# Patient Record
Sex: FEMALE | Race: WHITE | NOT HISPANIC OR LATINO | Employment: FULL TIME | URBAN - METROPOLITAN AREA
[De-identification: names, ages, dates, MRNs, and addresses within clinical notes are randomized per-mention and may not be internally consistent; named-entity substitution may affect disease eponyms.]

---

## 2017-02-09 ENCOUNTER — ALLSCRIPTS OFFICE VISIT (OUTPATIENT)
Dept: OTHER | Facility: OTHER | Age: 57
End: 2017-02-09

## 2017-05-11 ENCOUNTER — ALLSCRIPTS OFFICE VISIT (OUTPATIENT)
Dept: OTHER | Facility: OTHER | Age: 57
End: 2017-05-11

## 2017-08-23 ENCOUNTER — TRANSCRIBE ORDERS (OUTPATIENT)
Dept: MRI IMAGING | Facility: CLINIC | Age: 57
End: 2017-08-23

## 2017-08-23 ENCOUNTER — APPOINTMENT (OUTPATIENT)
Dept: RADIOLOGY | Facility: CLINIC | Age: 57
End: 2017-08-23
Payer: MEDICARE

## 2017-08-23 DIAGNOSIS — M47.817 LUMBOSACRAL SPONDYLOSIS WITHOUT MYELOPATHY: ICD-10-CM

## 2017-08-23 DIAGNOSIS — M47.812 CERVICAL SPONDYLOSIS WITHOUT MYELOPATHY: ICD-10-CM

## 2017-08-23 DIAGNOSIS — M47.812 CERVICAL SPONDYLOSIS WITHOUT MYELOPATHY: Primary | ICD-10-CM

## 2017-08-23 PROCEDURE — 72050 X-RAY EXAM NECK SPINE 4/5VWS: CPT

## 2017-08-23 PROCEDURE — 72100 X-RAY EXAM L-S SPINE 2/3 VWS: CPT

## 2017-11-27 ENCOUNTER — GENERIC CONVERSION - ENCOUNTER (OUTPATIENT)
Dept: OTHER | Facility: OTHER | Age: 57
End: 2017-11-27

## 2017-11-27 DIAGNOSIS — Z98.1 ARTHRODESIS STATUS: ICD-10-CM

## 2017-11-27 DIAGNOSIS — R93.7 ABNORMAL FINDINGS ON DIAGNOSTIC IMAGING OF OTHER PARTS OF MUSCULOSKELETAL SYSTEM: ICD-10-CM

## 2017-11-27 DIAGNOSIS — Z48.89 ENCOUNTER FOR OTHER SPECIFIED SURGICAL AFTERCARE (CODE): ICD-10-CM

## 2017-12-12 ENCOUNTER — APPOINTMENT (OUTPATIENT)
Dept: RADIOLOGY | Facility: CLINIC | Age: 57
End: 2017-12-12
Payer: MEDICARE

## 2017-12-12 ENCOUNTER — TRANSCRIBE ORDERS (OUTPATIENT)
Dept: RADIOLOGY | Facility: CLINIC | Age: 57
End: 2017-12-12

## 2017-12-12 DIAGNOSIS — R93.7 ABNORMAL FINDINGS ON DIAGNOSTIC IMAGING OF OTHER PARTS OF MUSCULOSKELETAL SYSTEM: ICD-10-CM

## 2017-12-12 DIAGNOSIS — Z48.89 ENCOUNTER FOR OTHER SPECIFIED SURGICAL AFTERCARE (CODE): ICD-10-CM

## 2017-12-12 DIAGNOSIS — Z98.1 ARTHRODESIS STATUS: ICD-10-CM

## 2017-12-12 PROCEDURE — 72100 X-RAY EXAM L-S SPINE 2/3 VWS: CPT

## 2018-01-04 ENCOUNTER — ALLSCRIPTS OFFICE VISIT (OUTPATIENT)
Dept: OTHER | Facility: OTHER | Age: 58
End: 2018-01-04
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
## 2018-01-10 ENCOUNTER — GENERIC CONVERSION - ENCOUNTER (OUTPATIENT)
Dept: OTHER | Facility: OTHER | Age: 58
End: 2018-01-10

## 2018-01-11 NOTE — RESULT NOTES
Dear Elvis Mcghee,   Your test results have returned and are listed below:      Discussion/Summary  Your results have some minor abnormalities, but nothing that is concerning  Please keep your regularly scheduled follow-up appointment  If you do not have a follow-up scheduled, please call the office to schedule a follow-up visit  If you have any questions, please don't hesitate to call the office  Sincerely,      Signatures   Electronically signed by : ABIGAIL Spencer RD; Sep  7 2016  2:53PM EST                       (Author)    Electronically signed by :  TANIA Briggs ; Sep 13 2016  9:07AM EST

## 2018-01-11 NOTE — PROGRESS NOTES
Discussion/Summary  Discussion Summary:   Assess: 3# wt loss from previous appointment  Total 9 4# wt loss  Pt needs additional 2  1# wt loss prior to surgery  Pt has significantly decreased her pain medications  Pt takes a centrum multivitamin, 1000 IU Vitamin D3, and 250 mg Mg daily  Pt continues to food journal on paper daily  Pt continues to eat yogurt for breakfast and now includes a piece of fruit  Pt now eats poultry/seafood/meat and a salad for lunch and dinner and is eating a 40-calorie popsicle for 8:00pm snack  Pt is still working on getting up to 64 oz water per day, but is close  Pt states 30/60 rule is going very well, she is eating slowly, chewing well, and has eliminated all carbonated, caffeinated, and sugared beverages except for approx  4 oz sweet tea occasionally  Pt walks 7 days per week for varying amounts of time  Diagnose: Overweight/Obesity related to positive energy balance as evidenced by BMI >30 and pt's self-reported energy intake  (Continued-Improving)  Intervene: Congratulated pt on her hard work  Reviewed with pt the importance of adequate fruits, vegetables, fiber in diet  Discussed post-operative protein supplement and vitamin recommendations and provided samples of each  Pt will begin sampling protein drinks for her 8:00 PM snack  PT may also begin substituting a protein shake for breakfast  Pt will samples the chewable bariatric vitamins  Reviewed workflow with pt: Pt needs psych clearance (appointment completed), Bloodwork, EGD, Cardiac clearance, and precert  Pt verbalized understanding, no further questions at present, expect good compliance  Monitor/Evaluate: Will monitor food journals, weight, pt's reported compliance with goals at next appointment  Follow-up scheduled for: Wednesday, 7/27/16 at 2:00pm  Will cancel if pt gets scheduled for surgery prior to follow-up date  Active Problems    1  Abnormal x-ray of lumbar spine (793 7) (R93 7)   2   Aftercare following surgery (V58 89) (Z48 89)   3  Aftercare following surgery of the musculoskeletal system (V58 78) (Z47 89)   4  Backache (724 5) (M54 9)   5  Bilateral low back pain without sciatica (724 2) (M54 5)   6  Cellulitis of finger, unspecified laterality (681 00) (L03 019)   7  Chronic pain syndrome (338 4) (G89 4)   8  Diabetes Mellitus (250 00)   9  Digital mucinous cyst (727 43) (M67 449)   10  History of allergy (V15 09) (Z88 9)   11  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 3) (G56 31)   12  Limb pain (729 5) (M79 609)   13  Lumbar degenerative disc disease (722 52) (M51 36)   14  Lumbar post-laminectomy syndrome (722 83) (M96 1)   15  Lumbar radiculopathy (724 4) (M54 16)   16  Lumbar stenosis (724 02) (M48 06)   17  Morbid obesity due to excess calories (278 01) (E66 01)   18  Other abnormal finding of urine (791 9) (R82 99)   19  Postoperative examination (V67 00) (Z09)   20  Postsurgical arthrodesis status (V45 4) (Z98 1)   21  Removal of staple (V58 32) (Z48 02)   22  Surgery, elective (V50 9) (Z41 9)   23  UTI (lower urinary tract infection) (599 0) (N39 0)    Past Medical History    1  History of urinary tract infection (V13 02) (Z87 440)   2  History of Spinal stenosis (724 00) (M48 00)   3  History of Tachycardia (785 0) (R00 0)   4  History of Type 2 diabetes mellitus (250 00) (E11 9)    Surgical History    1  History of Appendectomy   2  History of Back Surgery   3  History of Catheter Ablation   4  History of Lower Back Surgery   5  History of Neck Surgery   6  History of Removal Of Intraspinal Neurostimulator, With Fluoroscopy   7  History of Spinal Surgery Neurostimulator Implants    Family History  Mother    1  No pertinent family history  Family History    2  Family history of Family Health Status Brother 1   3  Family history of Family Health Status Brother 2   4  Family history of Family Health Status Of Father -    11  Family history of Family Health Status Of Mother - Alive   6  Family history of Family Health Status Sister 1   7  Family history of Maternal Grandfather Is    6  Family history of Maternal Grandmother Is    5  Family history of Paternal Grandfather Is    8  Family history of Paternal Grandmother Is     Social History    · Activities   · Caffeine Use   · Educational Level - Completed [address]   · Former smoker (E74 99) (Y73 341)   · Heavy Alcohol Consumption (305 00)   · Living Independently   · Marital History -  (V61 03)   · Never Used Drugs   · Occupation:   · Sexual Activity Denied    Current Meds   1  ALPRAZolam 0 25 MG Oral Tablet; TAKE 1 TABLET EVERY 12 HOURS AS NEEDED; Therapy: 95OUY9985 to (Evaluate:60Ewy5531) Recorded   2  BuPROPion HCl ER (SR) 150 MG Oral Tablet Extended Release 12 Hour; TAKE 1   TABLET DAILY; Therapy: 12KWL6098 to Recorded   3  Cyclobenzaprine HCl - 10 MG Oral Tablet; Take 1 tablet daily; Therapy: (0498 72 13 49) to Recorded   4  Diclofenac Sodium 75 MG Oral Tablet Delayed Release; 1 PO BID; Therapy: ([employer]) to Recorded   5  Fenofibrate 160 MG Oral Tablet; TAKE 1 TABLET DAILY; Therapy: 02RNE2468 to Recorded   6  FentaNYL 25 MCG/HR Transdermal Patch 72 Hour; APPLY 1 PATCH EVERY 3 DAYS; Therapy: 74VQQ4386 to Recorded   7  Fluticasone Propionate 50 MCG/ACT Nasal Suspension; INSTILL 1 SQUIRT  PRN; Therapy: 10Frq2316 to Recorded   8  Glimepiride 2 MG Oral Tablet; TAKE 1 TABLET DAILY AS DIRECTED; Therapy: 35YZH1997 to Recorded   9  Lexapro 20 MG Oral Tablet; TAKE 1 TABLET DAILY; Therapy: (911  271) to Recorded   10  Loratadine 10 MG CAPS; 1 PO QD; Therapy: () to Recorded   11  Magnesium 200 MG Oral Tablet; TAKE 1 TABLET DAILY AS DIRECTED; Therapy: (Recorded:2015) to Recorded   12  MetFORMIN HCl - 500 MG Oral Tablet; Take 1 tablet daily; Therapy: (Recorded:2015) to Recorded   13   Neurontin 800 MG Oral Tablet; 1 PO BID; Therapy: ([phone]) to Recorded   14  OxyCODONE HCl - 15 MG Oral Tablet; Take 1-2 Tablets QID as needed; Therapy: 25JSK0314 to (76 443 107) Recorded   15  TraMADol HCl - 50 MG Oral Tablet; TAKE 1 TABLET Bedtime; Therapy: 97WYJ2236 to (Evaluate:31Dhh4220) Recorded   16  Vitamin B-12 1000 MCG Oral Tablet; 2500; Therapy: ([phone]) to Recorded   17  Vitamin B-6 500 MG Oral Tablet; 2000; Therapy: ([phone]) to Recorded   18  Vitamin D3 5000 UNIT Oral Tablet; qd;    Therapy: (VGBIZPIZ:38EGO6661) to Recorded   19  Wellbutrin  MG Oral Tablet Extended Release 24 Hour; TAKE 1 TABLET DAILY; Therapy: (Recorded:23Njz9259) to Recorded    Allergies    1  No Known Drug Allergies    Vitals  Signs [Data Includes: Current Encounter]   Recorded: 97ODC5080 03:35PM   Height: 5 ft 3 5 in  Weight: 223 lb 9 6 oz  BMI Calculated: 38 99  BSA Calculated: 2 04    Signatures   Electronically signed by : ABIGAIL LopezRD; Jun 29 2016  3:35PM EST                       (Author)    Electronically signed by :  TANIA Lucas ; Jul 7 2016  8:20AM EST

## 2018-01-11 NOTE — PROGRESS NOTES
Discussion/Summary  Discussion Summary:   Assess: 6 4# wt loss from previous visit  Pt needs 5# additional wt loss  No changes in Dx or Rx noted  Pt brought 7 days of written food journals:  B: 1 greek yogurt cup and coffee  L: roast beef sandwich or 2 hardboiled eggs  D: grilled chicken or 2 hot dogs  S: fruit plate, water  Pt c/o some instances of significant hunger at night  Pt states she has read the entire bariatric manual and also watched the exercise podcast on the website  Pt states she is practicing and doing well with the 30/60 rule  Pt states she will occasionally have 4 oz sweetened tea, but has increased her water consumption to 32 oz per day  Diagnose: Overweight/Obesity related to positive energy balance as evidenced by BMI >30 and pt's self-reported energy intake  (Continued-Improving)  Intervene: Reviewed pt's food journals  Discussed adequate nutrition during the day to prevent feeling famished at night  Discussed importance of protein and fiber at each meal for improved satiation  Also reviewed timing of meals  Pt will set alarms for three meals per day at 9:00am, 1:00pm, and 5:00pm  Pt will add 1 piece fruit to breakfast and add a vegetable to both lunch and dinner  Pt will have a snack at 8:00pm only if still hungry, and if so will choose off 200-calorie snack list provided  Pt will also eliminate all sugared beverages  Discussed alternative sugar-free beverage options  Reviewed workflow with pt: Pt needs psych clearance, EGD, Labs, Cardiac Clearance, and 5# additional wt loss  Pt verbalized understanding, no further questions at present, expect good compliance  Monitor/Evaluate: Will monitor food journals, weight, pt's reported compliance with goals at next appointment  Follow-up scheduled for: Wednesday, June 29, 2016 at 2:00pm       Active Problems    1  Abnormal x-ray of lumbar spine (793 7) (R93 7)   2  Aftercare following surgery (V58 89) (Z48 89)   3   Aftercare following surgery of the musculoskeletal system (V58 78) (Z47 89)   4  Backache (724 5) (M54 9)   5  Bilateral low back pain without sciatica (724 2) (M54 5)   6  Cellulitis of finger, unspecified laterality (681 00) (L03 019)   7  Chronic pain syndrome (338 4) (G89 4)   8  Diabetes Mellitus (250 00)   9  Digital mucinous cyst (727 43) (M67 449)   10  History of allergy (V15 09) (Z88 9)   11  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 3) (G56 31)   12  Limb pain (729 5) (M79 609)   13  Lumbar degenerative disc disease (722 52) (M51 36)   14  Lumbar post-laminectomy syndrome (722 83) (M96 1)   15  Lumbar radiculopathy (724 4) (M54 16)   16  Lumbar stenosis (724 02) (M48 06)   17  Morbid obesity due to excess calories (278 01) (E66 01)   18  Other abnormal finding of urine (791 9) (R82 99)   19  Postoperative examination (V67 00) (Z09)   20  Postsurgical arthrodesis status (V45 4) (Z98 1)   21  Removal of staple (V58 32) (Z48 02)   22  Surgery, elective (V50 9) (Z41 9)   23  UTI (lower urinary tract infection) (599 0) (N39 0)    Past Medical History    1  History of urinary tract infection (V13 02) (Z87 440)   2  History of Spinal stenosis (724 00) (M48 00)   3  History of Tachycardia (785 0) (R00 0)   4  History of Type 2 diabetes mellitus (250 00) (E11 9)    Surgical History    1  History of Appendectomy   2  History of Back Surgery   3  History of Catheter Ablation   4  History of Lower Back Surgery   5  History of Neck Surgery   6  History of Removal Of Intraspinal Neurostimulator, With Fluoroscopy   7  History of Spinal Surgery Neurostimulator Implants    Family History  Mother    1  No pertinent family history  Family History    2  Family history of Family Health Status Brother 1   3  Family history of Family Health Status Brother 2   4  Family history of Family Health Status Of Father -    11  Family history of Family Health Status Of Mother - Alive   6  Family history of Family Health Status Sister 1   7   Family history of Maternal Grandfather Is    8  Family history of Maternal Grandmother Is    5  Family history of Paternal Grandfather Is    8  Family history of Paternal Grandmother Is     Social History    · Activities   · Caffeine Use   · Educational Level - Completed [address]   · Former smoker (H36 31) (P61 493)   · Heavy Alcohol Consumption (305 00)   · Living Independently   · Marital History -  (V61 03)   · Never Used Drugs   · Occupation:   · Sexual Activity Denied    Current Meds   1  ALPRAZolam 0 25 MG Oral Tablet; TAKE 1 TABLET EVERY 12 HOURS AS NEEDED; Therapy: 62AFC8265 to (Evaluate:15Vdd2776) Recorded   2  BuPROPion HCl ER (SR) 150 MG Oral Tablet Extended Release 12 Hour; TAKE 1   TABLET DAILY; Therapy: 65VBY5263 to Recorded   3  Cyclobenzaprine HCl - 10 MG Oral Tablet; Take 1 tablet daily; Therapy: ([phone]) to Recorded   4  Diclofenac Sodium 75 MG Oral Tablet Delayed Release; 1 PO BID; Therapy: (Tyshawn Patel) to Recorded   5  Fenofibrate 160 MG Oral Tablet; TAKE 1 TABLET DAILY; Therapy: 88KSO5660 to Recorded   6  FentaNYL 25 MCG/HR Transdermal Patch 72 Hour; APPLY 1 PATCH EVERY 3 DAYS; Therapy: 86JJS9361 to Recorded   7  Fluticasone Propionate 50 MCG/ACT Nasal Suspension; INSTILL 1 SQUIRT  PRN; Therapy: 36Nqw5245 to Recorded   8  Glimepiride 2 MG Oral Tablet; TAKE 1 TABLET DAILY AS DIRECTED; Therapy: 18XSG5487 to Recorded   9  Lexapro 20 MG Oral Tablet; TAKE 1 TABLET DAILY; Therapy: [phone]) to Recorded   10  Loratadine 10 MG CAPS; 1 PO QD; Therapy: [phone]) to Recorded   11  Magnesium 200 MG Oral Tablet; TAKE 1 TABLET DAILY AS DIRECTED; Therapy: (Recorded:2015) to Recorded   12  MetFORMIN HCl - 500 MG Oral Tablet; Take 1 tablet daily; Therapy: (Recorded:2015) to Recorded   13  Neurontin 800 MG Oral Tablet; 1 PO BID; Therapy: [phone]) to Recorded   14   OxyCODONE HCl - 15 MG Oral Tablet; Take 1-2 Tablets QID as needed; Therapy: 69PGF0936 to (Arthur Cunningham) Recorded   15  TraMADol HCl - 50 MG Oral Tablet; TAKE 1 TABLET Bedtime; Therapy: 66YMN7673 to (Evaluate:55Lvc7825) Recorded   16  Vitamin B-12 1000 MCG Oral Tablet; 2500; Therapy: ([phone]) to Recorded   17  Vitamin B-6 500 MG Oral Tablet; 2000; Therapy: ([phone]) to Recorded   18  Vitamin D3 5000 UNIT Oral Tablet; qd;    Therapy: (FQNEEKQU:89DWO2868) to Recorded   19  Wellbutrin  MG Oral Tablet Extended Release 24 Hour; TAKE 1 TABLET DAILY; Therapy: (Recorded:04Oct2013) to Recorded    Allergies    1  No Known Drug Allergies    Vitals  Signs [Data Includes: Current Encounter]   Recorded: 53JFZ4715 03:37PM   Height: 5 ft 3 5 in  Weight: 226 lb 9 6 oz  BMI Calculated: 39 51  BSA Calculated: 2 05    Signatures   Electronically signed by : ABIGAIL Currie RD; Jun 1 2016  3:37PM EST                       (Author)    Electronically signed by :  TANIA Ibarra ; Jun 2 2016 11:35AM EST

## 2018-01-13 NOTE — PROGRESS NOTES
Message  placed call to patient to inform her that her psychiatric clearance documents were received  Patient's psychiatrist has cleared her for surgery      Active Problems    1  Abnormal x-ray of lumbar spine (793 7) (R93 7)   2  Aftercare following surgery (V58 89) (Z48 89)   3  Aftercare following surgery of the musculoskeletal system (V58 78) (Z47 89)   4  Backache (724 5) (M54 9)   5  Bilateral low back pain without sciatica (724 2) (M54 5)   6  Cellulitis of finger, unspecified laterality (681 00) (L03 019)   7  Chronic pain syndrome (338 4) (G89 4)   8  Diabetes Mellitus (250 00)   9  Digital mucinous cyst (727 43) (M67 449)   10  History of allergy (V15 09) (Z88 9)   11  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 3) (G56 31)   12  Limb pain (729 5) (M79 609)   13  Lumbar degenerative disc disease (722 52) (M51 36)   14  Lumbar post-laminectomy syndrome (722 83) (M96 1)   15  Lumbar radiculopathy (724 4) (M54 16)   16  Lumbar stenosis (724 02) (M48 06)   17  Morbid obesity due to excess calories (278 01) (E66 01)   18  Other abnormal finding of urine (791 9) (R82 99)   19  Postoperative examination (V67 00) (Z09)   20  Postsurgical arthrodesis status (V45 4) (Z98 1)   21  Removal of staple (V58 32) (Z48 02)   22  Surgery, elective (V50 9) (Z41 9)   23  UTI (lower urinary tract infection) (599 0) (N39 0)    Current Meds   1  ALPRAZolam 0 25 MG Oral Tablet; TAKE 1 TABLET EVERY 12 HOURS AS NEEDED; Therapy: 13DUG6230 to (Evaluate:23Net6538) Recorded   2  BuPROPion HCl ER (SR) 150 MG Oral Tablet Extended Release 12 Hour; TAKE 1   TABLET DAILY; Therapy: 29EVI2094 to Recorded   3  Cyclobenzaprine HCl - 10 MG Oral Tablet; Take 1 tablet daily; Therapy: ([phone]) to Recorded   4  Diclofenac Sodium 75 MG Oral Tablet Delayed Release; 1 PO BID; Therapy: (Genita Jump) to Recorded   5  Fenofibrate 160 MG Oral Tablet; TAKE 1 TABLET DAILY; Therapy: 88AKQ9266 to Recorded   6   FentaNYL 25 MCG/HR Transdermal Patch 72 Hour; APPLY 1 PATCH EVERY 3 DAYS; Therapy: 31FCF9902 to Recorded   7  Fluticasone Propionate 50 MCG/ACT Nasal Suspension; INSTILL 1 SQUIRT  PRN; Therapy: 27Cqa5852 to Recorded   8  Glimepiride 2 MG Oral Tablet; TAKE 1 TABLET DAILY AS DIRECTED; Therapy: 60JWB1501 to Recorded   9  Lexapro 20 MG Oral Tablet (Escitalopram Oxalate); TAKE 1 TABLET DAILY; Therapy: ([phone]) to Recorded   10  Loratadine 10 MG CAPS; 1 PO QD; Therapy: (468 6587) to Recorded   11  Magnesium 200 MG Oral Tablet; TAKE 1 TABLET DAILY AS DIRECTED; Therapy: (Recorded:27Mar2015) to Recorded   12  MetFORMIN HCl - 500 MG Oral Tablet; Take 1 tablet daily; Therapy: (Recorded:27Mar2015) to Recorded   13  Neurontin 800 MG Oral Tablet (Gabapentin); 1 PO BID; Therapy: (220 6547) to Recorded   14  OxyCODONE HCl - 15 MG Oral Tablet; Take 1-2 Tablets QID as needed; Therapy: 06LWQ9718 to (Roselie Stage) Recorded   15  TraMADol HCl - 50 MG Oral Tablet; TAKE 1 TABLET Bedtime; Therapy: 01UWP2910 to (Evaluate:41Xuy1381) Recorded   16  Vitamin B-12 1000 MCG Oral Tablet; 2500; Therapy: (382 6564) to Recorded   17  Vitamin B-6 500 MG Oral Tablet; 2000; Therapy: (656 9124) to Recorded   18  Vitamin D3 5000 UNIT Oral Tablet; qd;    Therapy: (PLTTTYMT:92ADB3900) to Recorded   19  Wellbutrin  MG Oral Tablet Extended Release 24 Hour (BuPROPion HCl ER (XL));    TAKE 1 TABLET DAILY; Therapy: (Recorded:04Oct2013) to Recorded    Allergies    1  No Known Drug Allergies    Signatures   Electronically signed by :  Clifford Mistry, NATEWMSWMSAMPARO,JOE; Aug 11 2016  8:54AM EST                       (Author)

## 2018-01-13 NOTE — MISCELLANEOUS
Message  10/28/2016 @ 6496 - post op follow up phone call completed  Pt is sipping liquids, using IS as instructed, reinforced importance of using IS to help prevent pneumonia  Ambulating about home without difficulty  Pain controlled with analgesia  Reaffirmed examples of liquid diet over the next week  Pt stated understanding about discharge instructions and medication adjustments  Pt educated [facility] [address] [facility]  Follow up appt with surgeon scheduled for next week  Instructed to call with any additional questions or concerns  Active Problems    1  Abnormal x-ray of lumbar spine (793 7) (R93 7)   2  Aftercare following surgery (V58 89) (Z48 89)   3  Aftercare following surgery of the musculoskeletal system (V58 78) (Z47 89)   4  Backache (724 5) (M54 9)   5  Bilateral low back pain without sciatica (724 2) (M54 5)   6  Cellulitis of finger, unspecified laterality (681 00) (L03 019)   7  Chronic pain syndrome (338 4) (G89 4)   8  Digital mucinous cyst (727 43) (M67 449)   9  DMII (diabetes mellitus, type 2) (250 00) (E11 9)   10  History of allergy (V15 09) (Z88 9)   11  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 3) (G56 31)   12  Lateral epicondylitis of left elbow (726 32) (M77 12)   13  Limb pain (729 5) (M79 609)   14  Lumbar degenerative disc disease (722 52) (M51 36)   15  Lumbar post-laminectomy syndrome (722 83) (M96 1)   16  Lumbar radiculopathy (724 4) (M54 16)   17  Lumbar stenosis (724 02) (M48 06)   18  Morbid obesity due to excess calories (278 01) (E66 01)   19  Obesity (278 00) (E66 9)   20  Other abnormal finding of urine (791 9) (R82 99)   21  Postoperative examination (V67 00) (Z09)   22  Postsurgical arthrodesis status (V45 4) (Z98 1)   23  Removal of staple (V58 32) (Z48 02)   24  Surgery, elective (V50 9) (Z41 9)   25  UTI (lower urinary tract infection) (599 0) (N39 0)    Current Meds   1  Baclofen TABS; Therapy: (Recorded:06Oct2016) to Recorded   2   BuPROPion HCl ER (SR) 150 MG Oral Tablet Extended Release 12 Hour; TAKE 1   TABLET DAILY; Therapy: 12BPO2470 to Recorded   3  Fenofibrate 160 MG Oral Tablet; TAKE 1 TABLET DAILY; Therapy: 94TYW2537 to Recorded   4  FentaNYL 25 MCG/HR Transdermal Patch 72 Hour; APPLY 1 PATCH EVERY 3 DAYS; Therapy: 54MIA5775 to Recorded   5  Fluticasone Propionate 50 MCG/ACT Nasal Suspension; INSTILL 1 SQUIRT  PRN; Therapy: 64Eub3898 to Recorded   6  Lexapro 20 MG Oral Tablet (Escitalopram Oxalate); TAKE 1 TABLET DAILY; Therapy: [phone]) to Recorded   7  Loratadine 10 MG CAPS; 1 PO QD; Therapy: [phone]) to Recorded   8  Magnesium 200 MG Oral Tablet; TAKE 1 TABLET DAILY AS DIRECTED; Therapy: (Recorded:27Mar2015) to Recorded   9  MetFORMIN HCl - 500 MG Oral Tablet; Take 1 tablet daily; Therapy: (Recorded:27Mar2015) to Recorded   10  Naproxen TABS Recorded   11  Neurontin 800 MG Oral Tablet (Gabapentin); 1 PO BID; Therapy: [phone]) to Recorded   12  Omeprazole 20 MG Oral Capsule Delayed Release; take 1 capsule daily; Therapy: 88RMD2574 to (Evaluate:00Ahv9396)  Requested for: 27Oct2016; Last    Rx:06Oct2016 Ordered   13  OxyCODONE HCl - 15 MG Oral Tablet; Take 1-2 Tablets QID as needed; Therapy: 21ZIY7901 to ([phone]) Recorded   14  TraMADol HCl - 50 MG Oral Tablet; TAKE 1 TABLET Bedtime; Therapy: 40XPF8353 to (Evaluate:99Uuc4045) Recorded   15  Vitamin D3 5000 UNIT Oral Tablet; qd;    Therapy: (KOUERORN:78OZX1944) to Recorded   16  Wellbutrin  MG Oral Tablet Extended Release 24 Hour (BuPROPion HCl ER (XL));    TAKE 1 TABLET DAILY; Therapy: (Recorded:04Oct2013) to Recorded    Allergies    1   No Known Drug Allergies    Signatures   Electronically signed by : Pramod Claros, ; Oct 28 2016  1:20PM EST                       (Author)

## 2018-01-14 VITALS
TEMPERATURE: 97.6 F | HEIGHT: 64 IN | DIASTOLIC BLOOD PRESSURE: 66 MMHG | BODY MASS INDEX: 25.86 KG/M2 | SYSTOLIC BLOOD PRESSURE: 128 MMHG | RESPIRATION RATE: 20 BRPM | WEIGHT: 151.5 LBS | HEART RATE: 68 BPM

## 2018-01-14 VITALS
BODY MASS INDEX: 27.57 KG/M2 | RESPIRATION RATE: 17 BRPM | DIASTOLIC BLOOD PRESSURE: 70 MMHG | SYSTOLIC BLOOD PRESSURE: 100 MMHG | WEIGHT: 161.5 LBS | TEMPERATURE: 98.5 F | HEIGHT: 64 IN | HEART RATE: 69 BPM

## 2018-01-14 NOTE — MISCELLANEOUS
Message  Pt called office because she is on the 2 week liquid diet and she was wondering if she should still v=continue her Metformin  I spoke to Charity Raymond the [place] and she referred me to Dr Ana Raymond did have me advise the pt to monitor her BP since she is taking medication for it  She said if she is feeling dizzy or light headed she should contact the prescriber of her BP meds as they may need to be adjusted  Dr Ana Sow said to advise her to stop the Metformin as it may cause GI upset  He said she will most like stop it after sx anyway  Called pt and let her know  She understood the advice and said dhe would  Active Problems    1  Abnormal x-ray of lumbar spine (793 7) (R93 7)   2  Aftercare following surgery (V58 89) (Z48 89)   3  Aftercare following surgery of the musculoskeletal system (V58 78) (Z47 89)   4  Backache (724 5) (M54 9)   5  Bilateral low back pain without sciatica (724 2) (M54 5)   6  Cellulitis of finger, unspecified laterality (681 00) (L03 019)   7  Chronic pain syndrome (338 4) (G89 4)   8  Digital mucinous cyst (727 43) (M67 449)   9  DMII (diabetes mellitus, type 2) (250 00) (E11 9)   10  History of allergy (V15 09) (Z88 9)   11  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 3) (G56 31)   12  Lateral epicondylitis of left elbow (726 32) (M77 12)   13  Limb pain (729 5) (M79 609)   14  Lumbar degenerative disc disease (722 52) (M51 36)   15  Lumbar post-laminectomy syndrome (722 83) (M96 1)   16  Lumbar radiculopathy (724 4) (M54 16)   17  Lumbar stenosis (724 02) (M48 06)   18  Morbid obesity due to excess calories (278 01) (E66 01)   19  Obesity (278 00) (E66 9)   20  Other abnormal finding of urine (791 9) (R82 99)   21  Postoperative examination (V67 00) (Z09)   22  Postsurgical arthrodesis status (V45 4) (Z98 1)   23  Removal of staple (V58 32) (Z48 02)   24  Surgery, elective (V50 9) (Z41 9)   25  UTI (lower urinary tract infection) (599 0) (N39 0)    Current Meds   1   Baclofen TABS; Therapy: (Recorded:06Oct2016) to Recorded   2  BuPROPion HCl ER (SR) 150 MG Oral Tablet Extended Release 12 Hour; TAKE 1   TABLET DAILY; Therapy: 27DHL0550 to Recorded   3  Fenofibrate 160 MG Oral Tablet; TAKE 1 TABLET DAILY; Therapy: 47RPL8962 to Recorded   4  FentaNYL 25 MCG/HR Transdermal Patch 72 Hour; APPLY 1 PATCH EVERY 3 DAYS; Therapy: 84FRR2090 to Recorded   5  Fluticasone Propionate 50 MCG/ACT Nasal Suspension; INSTILL 1 SQUIRT  PRN; Therapy: 68Mxy6770 to Recorded   6  Lexapro 20 MG Oral Tablet (Escitalopram Oxalate); TAKE 1 TABLET DAILY; Therapy: ([phone]) to Recorded   7  Loratadine 10 MG CAPS; 1 PO QD; Therapy: (494 17 271) to Recorded   8  Magnesium 200 MG Oral Tablet; TAKE 1 TABLET DAILY AS DIRECTED; Therapy: (Recorded:27Mar2015) to Recorded   9  MetFORMIN HCl - 500 MG Oral Tablet; Take 1 tablet daily; Therapy: (Recorded:27Mar2015) to Recorded   10  Naproxen TABS Recorded   11  Neurontin 800 MG Oral Tablet (Gabapentin); 1 PO BID; Therapy: (494 17 271) to Recorded   12  Omeprazole 20 MG Oral Capsule Delayed Release; take 1 capsule daily; Therapy: 01NLV7607 to (Evaluate:03Feb2017)  Requested for: 87YLO8737; Last    Rx:06Oct2016; Status: ACTIVE - Retrospective By Protocol Authorization Ordered   13  OxyCODONE HCl - 15 MG Oral Tablet; Take 1-2 Tablets QID as needed; Therapy: 32KIM3516 to ([id number]) Recorded   14  TraMADol HCl - 50 MG Oral Tablet; TAKE 1 TABLET Bedtime; Therapy: 33EUC2387 to (Evaluate:04Feb2015) Recorded   15  Vitamin D3 5000 UNIT Oral Tablet; qd;    Therapy: (DYXOTHWZ:82WST4110) to Recorded   16  Wellbutrin  MG Oral Tablet Extended Release 24 Hour (BuPROPion HCl ER (XL));    TAKE 1 TABLET DAILY; Therapy: (Recorded:04Oct2013) to Recorded    Allergies    1   No Known Drug Allergies    Signatures   Electronically signed by : Ciaran Castellanos LPN; Oct 19 3357  3:62JC EST                       (Author)

## 2018-01-16 NOTE — PROGRESS NOTES
History of Present Illness  Bariatric MNT [facility] Screening Preop [facility]:     She was on time  the appointment lasted: 30 minutes  Her surgeon is Dr Odalys Valentin  The patient was present at the session  The diagnosis/reason for the appointment is: She has Grade III Obesity with a BMI of 40 6  Diet Order: Nutritional Assesment for Bariatric Surgery  Her goal weight is 170#-179#  She has the following comorbidities: diabetes type II and Tachycardia, Heart Ablation, Osteoarthritis, Heartburn, Reflux, Carpal Tunnel, neck surgery, back surgery, appy   Labs: Pilar Galvan She was reminded to have her labs drawn   She does not need to monitor her glucose  She has a meter but does not test her blood glucose  Exercise Frequency:  She does not exercise  Relationship to food: She is an emotional eater, is a stress eater, eats when she is bored and eats large portions  She knows the difference between being comfortably full and uncomfortably full and knows the difference between being stuffed and comfortably full  She felt/thought they felt her best at 130# pounds she last weighed this 15 years ago  She completed a food journal She drinks 2-6 cups of water daily She drinks 3 caffienated beverages daily Her motivators are:pt wants to improve pain, mobility, activity level, and quality of life  Her obesity/being overweight is related to positive energy balance and is evidenced by: BMI=40 6, pt reports sedentary lifestyle and daily consumption of large amount of sugared beverages  Knowledge Deficit Prior to Education  She is new to the diet  Barriers to education:  She has no barriers to education  Medical Nutrition Therapy Intervention: Individualized Meal Plan, Daily Food /Exercise Diary, Lifestyle /Behavior Modification Techniques, Basic Pathophysiology of Obesity, Checklist of the Overview of Lesson Plans and Surgical changes to Stomach/GI     Area's Reviewed: Post - surgery goal weight, Web forum, Lifestyle Ewa Biggs Modification Techniques, [facility], [facility] in [facility] and Pre- surgery goals Jono Leonard  Brief Review of Vitamins, diet progression for post-op and Pathophysiology of Obesity  Her comprehension of the presented material was good  Her receptivity to the presented material was good  Her motivation was good  Provided: Nutrition Guidelines for Gastric Surgery, Bariatric Program Pre- Surgery Nutrition and Goals  Goals: Her set goal for physical activity is aqua-therapy , for 30 minutes, 3 days a week  Review [employer] [facility] in [facility]   Post Surgery: She will adhere to the diet progression: remain hydrated, consume adequate protein; and take vitamins as outlined in guidelines  Recommendations: She was provided contact information for any questions  She is recommended for surgery  ~He/She is aware she needs to lose 11 5# prior to surgery  ~He/She needs additional education  She states adequate knowledge of nutrition, exercise, and behavior modification  She will attend a Team Meeting approximately 2-3 weeks prior to surgery  Bariatric Behavioral Health Evaluation Caderuben Lemusradha:   She is here today because: Increase health, increase mobility, reduce chronic pain, prevent family diseases  She is seeking a Bariatric surgery evaluation  She researched this option for: 4 years  She realizes the post-op requirements has a community contact with bariatric surgery, has discussed with her PCP   Her Psychiatric/Psychological diagnosis:   She has not had Inpatient Treatment  (Patient has long history of chronic pain and narcotic pain medication  she is currently on 2 antidepressants but states they are for other conditions  patient being required to have psychiatric and substance abuse clearance/evaluations)  Family Constellation: Family Constellation: Mother: diabetes, heart disease arthritis,  Father: , heart disease, cancer, tobacco use history,   Siblings: hypertension, heart disease, diabetes, stroke, copd, drug and alcohol abuse  Spouse:   Children: 2-overweight  Other: tobacco use history  She lives withher son  Domestic Violence: has not happened  Abuse History: (denies child abuse)   Additional Comments: health, weight, severe chronic pain, financial    Physical/psychological assessment Appearance: casual  Sociability: average  Affect: labile  Mood: calm  Thought Process: coherent  Speech: normal  Content: no impairment  The patient was oriented to person, oriented to place, oriented to time and decreased memory   normal attention span  no decreased concentration ability  normal judgment  Her emotional insight was: fair  Her intellectual insight was: fair  Risk assessment: No associated symptoms are reported  (patient denies depression and/or anxiety but has severe chronic pain condition for over 10 years and takes numerous narcotics  patient is being required to have a full psychiatric and drug and alcohol evaluation  Patient's chart identifies heavy alcohol abuse but patient denies drinking alcohol)  Sexual history: She is not pregnant  She has a history of STD's, treated  Recommendations: She is not recommended for surger  (patient in need of full substance abuse and psychiatric evaluation)  The Following Ratings are based on my: Obsevation of this individual over the last  Risk of Harm to Self or Others: The following are demographic risk factors associated with harm to self: , [place], or  and  Status  (n/a)  Recent Specific Risk Factors: No associated symptoms are reported  Access to Weapons:   She has access to the following weapons: denies access to weapons   Summary and Recommendations:   Low: No thoughts or occasional thoughts of suicide, but no intent or actions  Self inflicted scratches, abrasions, or other self- injurious of behavior where medical attention is typically not warranted   No elements of homicidality or occasional thoughts but no plan, intent, or actions  Active Problems    1  Abnormal x-ray of lumbar spine (793 7) (R93 7)   2  Aftercare following surgery (V58 89) (Z48 89)   3  Aftercare following surgery of the musculoskeletal system (V58 78) (Z47 89)   4  Backache (724 5) (M54 9)   5  Bilateral low back pain without sciatica (724 2) (M54 5)   6  Cellulitis of finger, unspecified laterality (681 00) (L03 019)   7  Chronic pain syndrome (338 4) (G89 4)   8  Diabetes Mellitus (250 00)   9  Digital mucinous cyst (727 43) (M67 449)   10  History of allergy (V15 09) (Z88 9)   11  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 3) (G56 31)   12  Limb pain (729 5) (M79 609)   13  Lumbar degenerative disc disease (722 52) (M51 36)   14  Lumbar post-laminectomy syndrome (722 83) (M96 1)   15  Lumbar radiculopathy (724 4) (M54 16)   16  Lumbar stenosis (724 02) (M48 06)   17  Morbid obesity due to excess calories (278 01) (E66 01)   18  Other abnormal finding of urine (791 9) (R82 99)   19  Postoperative examination (V67 00) (Z09)   20  Postsurgical arthrodesis status (V45 4) (Z98 1)   21  Removal of staple (V58 32) (Z48 02)   22  Surgery, elective (V50 9) (Z41 9)   23  UTI (lower urinary tract infection) (599 0) (N39 0)    Past Medical History    1  History of urinary tract infection (V13 02) (Z87 440)   2  History of Spinal stenosis (724 00) (M48 00)   3  History of Tachycardia (785 0) (R00 0)   4  History of Type 2 diabetes mellitus (250 00) (E11 9)    Surgical History    1  History of Appendectomy   2  History of Back Surgery   3  History of Catheter Ablation   4  History of Lower Back Surgery   5  History of Neck Surgery   6  History of Removal Of Intraspinal Neurostimulator, With Fluoroscopy   7  History of Spinal Surgery Neurostimulator Implants    Family History  Mother    1  No pertinent family history  Family History    2  Family history of Family Health Status Brother 1   3  Family history of Family Health Status Brother 2   4   Family history of Family Health Status Of Father -    5  Family history of Family Health Status Of Mother - Alive   6  Family history of Family Health Status Sister 1   7  Family history of Maternal Grandfather Is    6  Family history of Maternal Grandmother Is    5  Family history of Paternal Grandfather Is    8  Family history of Paternal Grandmother Is     Social History    · Activities   · Caffeine Use   · Educational Level - Completed [address]   · Former smoker (Q82 53) (A54 901)   · Heavy Alcohol Consumption (305 00)   · Living Independently   · Marital History -  (V61 03)   · Never Used Drugs   · Occupation:   · Sexual Activity Denied    Current Meds   1  ALPRAZolam 0 25 MG Oral Tablet; TAKE 1 TABLET EVERY 12 HOURS AS NEEDED; Therapy: 74SLH2064 to (Evaluate:02Hys0254) Recorded   2  BuPROPion HCl ER (SR) 150 MG Oral Tablet Extended Release 12 Hour; TAKE 1   TABLET DAILY; Therapy: 23UYP9936 to Recorded   3  Cyclobenzaprine HCl - 10 MG Oral Tablet; Take 1 tablet daily; Therapy: ([phone]) to Recorded   4  Diclofenac Sodium 75 MG Oral Tablet Delayed Release; 1 PO BID; Therapy: (Eitan Donahue) to Recorded   5  Fenofibrate 160 MG Oral Tablet; TAKE 1 TABLET DAILY; Therapy: 21LKK5060 to Recorded   6  FentaNYL 25 MCG/HR Transdermal Patch 72 Hour; APPLY 1 PATCH EVERY 3 DAYS; Therapy: 61ZVM7808 to Recorded   7  Fluticasone Propionate 50 MCG/ACT Nasal Suspension; INSTILL 1 SQUIRT  PRN; Therapy: 71Mox0860 to Recorded   8  Glimepiride 2 MG Oral Tablet; TAKE 1 TABLET DAILY AS DIRECTED; Therapy: 88BFI0300 to Recorded   9  Lexapro 20 MG Oral Tablet; TAKE 1 TABLET DAILY; Therapy: [phone]) to Recorded   10  Loratadine 10 MG CAPS; 1 PO QD; Therapy: [phone]) to Recorded   11  Magnesium 200 MG Oral Tablet; TAKE 1 TABLET DAILY AS DIRECTED; Therapy: (Recorded:2015) to Recorded   12  MetFORMIN HCl - 500 MG Oral Tablet;  Take 1 tablet daily; Therapy: (Recorded:27Mar2015) to Recorded   13  Neurontin 800 MG Oral Tablet; 1 PO BID; Therapy: ([phone] 15) to Recorded   14  OxyCODONE HCl - 15 MG Oral Tablet; Take 1-2 Tablets QID as needed; Therapy: 88MTT3373 to (Grace Bobby) Recorded   15  TraMADol HCl - 50 MG Oral Tablet; TAKE 1 TABLET Bedtime; Therapy: 58YAL0798 to (Evaluate:37Aoa5293) Recorded   16  Vitamin B-12 1000 MCG Oral Tablet; 2500; Therapy: ([phone] 63 15) to Recorded   17  Vitamin B-6 500 MG Oral Tablet; 2000; Therapy: (031 339 63 15) to Recorded   18  Vitamin D3 5000 UNIT Oral Tablet; qd;    Therapy: (SGWUIRMQ:59PYA7573) to Recorded   19  Wellbutrin  MG Oral Tablet Extended Release 24 Hour; TAKE 1 TABLET DAILY; Therapy: (Recorded:04Oct2013) to Recorded    Allergies    1  No Known Drug Allergies    Vitals  Signs [Data Includes: Current Encounter]   Recorded: 20ZMQ6992 12:43PM   Height: 5 ft 3 5 in  Weight: 233 lb   BMI Calculated: 40 63  BSA Calculated: 2 08     Note   Note:   patient in need of full psychiatric and substance abuse evaluations  patient has E/R note that indicates heavy alcohol use  patient denies drinking  patient has over 10 year history of multiple narcotic medications  Further decisions deferred until receipt and review of completed clearances  Future Appointments    Date/Time Provider Specialty Site   05/05/2016 11:30 AM TANIA Gil  General Surgery [facility]     Signatures   Electronically signed by : NATE WhiteWMSWMSAMPARO,JOE; May  4 2016 11:56AM EST                       (Author)    Electronically signed by : ABIGAIL Rodriguez RD; May  4 2016 12:43PM EST                       (Author)    Electronically signed by :  TANIA Anglin ; May  5 2016  1:15PM EST

## 2018-01-17 NOTE — PROGRESS NOTES
Message  placed pre op call to patient who continues to follow all medical and dietary directions she was provided  Patient is aware she will be weighed the day of surgery  Patient encouraged to contact this office with questions or concerns  Patient call was transferred to nursing to respond to medication questions      Active Problems    1  Abnormal x-ray of lumbar spine (793 7) (R93 7)   2  Aftercare following surgery (V58 89) (Z48 89)   3  Aftercare following surgery of the musculoskeletal system (V58 78) (Z47 89)   4  Backache (724 5) (M54 9)   5  Bilateral low back pain without sciatica (724 2) (M54 5)   6  Cellulitis of finger, unspecified laterality (681 00) (L03 019)   7  Chronic pain syndrome (338 4) (G89 4)   8  Digital mucinous cyst (727 43) (M67 449)   9  DMII (diabetes mellitus, type 2) (250 00) (E11 9)   10  History of allergy (V15 09) (Z88 9)   11  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 3) (G56 31)   12  Lateral epicondylitis of left elbow (726 32) (M77 12)   13  Limb pain (729 5) (M79 609)   14  Lumbar degenerative disc disease (722 52) (M51 36)   15  Lumbar post-laminectomy syndrome (722 83) (M96 1)   16  Lumbar radiculopathy (724 4) (M54 16)   17  Lumbar stenosis (724 02) (M48 06)   18  Morbid obesity due to excess calories (278 01) (E66 01)   19  Obesity (278 00) (E66 9)   20  Other abnormal finding of urine (791 9) (R82 99)   21  Postoperative examination (V67 00) (Z09)   22  Postsurgical arthrodesis status (V45 4) (Z98 1)   23  Removal of staple (V58 32) (Z48 02)   24  Surgery, elective (V50 9) (Z41 9)   25  UTI (lower urinary tract infection) (599 0) (N39 0)    Current Meds   1  Baclofen TABS; Therapy: (Recorded:06Oct2016) to Recorded   2  BuPROPion HCl ER (SR) 150 MG Oral Tablet Extended Release 12 Hour; TAKE 1   TABLET DAILY; Therapy: 39YFQ2146 to Recorded   3  Fenofibrate 160 MG Oral Tablet; TAKE 1 TABLET DAILY; Therapy: 20VRN2509 to Recorded   4   FentaNYL 25 MCG/HR Transdermal Patch 72 Hour; APPLY 1 PATCH EVERY 3 DAYS; Therapy: 71VSP6277 to Recorded   5  Fluticasone Propionate 50 MCG/ACT Nasal Suspension; INSTILL 1 SQUIRT  PRN; Therapy: 45Owu9470 to Recorded   6  Lexapro 20 MG Oral Tablet (Escitalopram Oxalate); TAKE 1 TABLET DAILY; Therapy: [phone]) to Recorded   7  Loratadine 10 MG CAPS; 1 PO QD; Therapy: [phone]) to Recorded   8  Magnesium 200 MG Oral Tablet; TAKE 1 TABLET DAILY AS DIRECTED; Therapy: (Recorded:27Mar2015) to Recorded   9  MetFORMIN HCl - 500 MG Oral Tablet; Take 1 tablet daily; Therapy: (Recorded:27Mar2015) to Recorded   10  Naproxen TABS Recorded   11  Neurontin 800 MG Oral Tablet (Gabapentin); 1 PO BID; Therapy: [phone]) to Recorded   12  Omeprazole 20 MG Oral Capsule Delayed Release; take 1 capsule daily; Therapy: 09UYX2395 to (Evaluate:25Tmh9229)  Requested for: 31GJH7662; Last    Rx:06Oct2016; Status: ACTIVE - Retrospective By Protocol Authorization Ordered   13  OxyCODONE HCl - 15 MG Oral Tablet; Take 1-2 Tablets QID as needed; Therapy: 77RXS6324 to ([phone]) Recorded   14  TraMADol HCl - 50 MG Oral Tablet; TAKE 1 TABLET Bedtime; Therapy: 79MYG2234 to (Evaluate:04Feb2015) Recorded   15  Vitamin D3 5000 UNIT Oral Tablet; qd;    The[facility]: ([facility] to Recorded   16  Wellbutrin  MG Oral Tablet Extended Release 24 Hour (BuPROPion HCl ER (XL));    TAKE 1 TABLET DAILY; Therapy: (Recorded:04Oct2013) to Recorded    Allergies    1  No Known Drug Allergies    Signatures   Electronically signed by :  Kenneth Holden, JOEMSWMSAMPARO,JOE; Oct 18 2016  2:58PM EST                       (Author)

## 2018-01-17 NOTE — PROGRESS NOTES
Discussion/Summary  Discussion Summary:   Pt seen briefly in conference with JOE Sloan  7 2# wt loss from previous visit  Total 16 5# wt loss  Pt has exceeded pre-operative weight loss goal  Pt is doing well and following all dietary goal in bariatric manual  Pt has read manual several times  Pt is awaiting psych clearance to schedule surgeon consult, EGD, and get lab script  Briefly reviewed post-operative vitamins and diet progression  Active Problems    1  Abnormal x-ray of lumbar spine (793 7) (R93 7)   2  Aftercare following surgery (V58 89) (Z48 89)   3  Aftercare following surgery of the musculoskeletal system (V58 78) (Z47 89)   4  Backache (724 5) (M54 9)   5  Bilateral low back pain without sciatica (724 2) (M54 5)   6  Cellulitis of finger, unspecified laterality (681 00) (L03 019)   7  Chronic pain syndrome (338 4) (G89 4)   8  Diabetes Mellitus (250 00)   9  Digital mucinous cyst (727 43) (M67 449)   10  History of allergy (V15 09) (Z88 9)   11  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 3) (G56 31)   12  Limb pain (729 5) (M79 609)   13  Lumbar degenerative disc disease (722 52) (M51 36)   14  Lumbar post-laminectomy syndrome (722 83) (M96 1)   15  Lumbar radiculopathy (724 4) (M54 16)   16  Lumbar stenosis (724 02) (M48 06)   17  Morbid obesity due to excess calories (278 01) (E66 01)   18  Other abnormal finding of urine (791 9) (R82 99)   19  Postoperative examination (V67 00) (Z09)   20  Postsurgical arthrodesis status (V45 4) (Z98 1)   21  Removal of staple (V58 32) (Z48 02)   22  Surgery, elective (V50 9) (Z41 9)   23  UTI (lower urinary tract infection) (599 0) (N39 0)    Past Medical History    1  History of urinary tract infection (V13 02) (Z87 440)   2  History of Spinal stenosis (724 00) (M48 00)   3  History of Tachycardia (785 0) (R00 0)   4  History of Type 2 diabetes mellitus (250 00) (E11 9)    Surgical History    1  History of Appendectomy   2  History of Back Surgery   3  History of Catheter Ablation   4  History of Lower Back Surgery   5  History of Neck Surgery   6  History of Removal Of Intraspinal Neurostimulator, With Fluoroscopy   7  History of Spinal Surgery Neurostimulator Implants    Family History  Mother    1  No pertinent family history  Family History    2  Family history of Family Health Status Brother 1   3  Family history of Family Health Status Brother 2   4  Family history of Family Health Status Of Father -    11  Family history of Family Health Status Of Mother - Alive   6  Family history of Family Health Status Sister 1   7  Family history of Maternal Grandfather Is    6  Family history of Maternal Grandmother Is    5  Family history of Paternal Grandfather Is    8  Family history of Paternal Grandmother Is     Social History    · Activities   · Caffeine Use   · Educational Level - Completed [address]   · Former smoker (U05 76) (N61 288)   · Heavy Alcohol Consumption (305 00)   · Living Independently   · Marital History -  (V61 03)   · Never Used Drugs   · Occupation:   · Sexual Activity Denied    Current Meds   1  ALPRAZolam 0 25 MG Oral Tablet; TAKE 1 TABLET EVERY 12 HOURS AS NEEDED; Therapy: 51DKE9735 to (Evaluate:86Lqc2520) Recorded   2  BuPROPion HCl ER (SR) 150 MG Oral Tablet Extended Release 12 Hour; TAKE 1   TABLET DAILY; Therapy: 65CJQ9405 to Recorded   3  Cyclobenzaprine HCl - 10 MG Oral Tablet; Take 1 tablet daily; Therapy: ([id number]) to Recorded   4  Diclofenac Sodium 75 MG Oral Tablet Delayed Release; 1 PO BID; Therapy: (Jacquelyn Cueva) to Recorded   5  Fenofibrate 160 MG Oral Tablet; TAKE 1 TABLET DAILY; Therapy: 44UTB9571 to Recorded   6  FentaNYL 25 MCG/HR Transdermal Patch 72 Hour; APPLY 1 PATCH EVERY 3 DAYS; Therapy: 57VPL3565 to Recorded   7  Fluticasone Propionate 50 MCG/ACT Nasal Suspension; INSTILL 1 SQUIRT  PRN; Therapy: 00Hvb5764 to Recorded   8   Glimepiride 2 MG Oral Tablet; TAKE 1 TABLET DAILY AS DIRECTED; Therapy: 18JLB3056 to Recorded   9  Lexapro 20 MG Oral Tablet; TAKE 1 TABLET DAILY; Therapy: ([phone]) to Recorded   10  Loratadine 10 MG CAPS; 1 PO QD; Therapy: ([phone]) to Recorded   11  Magnesium 200 MG Oral Tablet; TAKE 1 TABLET DAILY AS DIRECTED; Therapy: (Recorded:27Mar2015) to Recorded   12  MetFORMIN HCl - 500 MG Oral Tablet; Take 1 tablet daily; Therapy: (Recorded:27Mar2015) to Recorded   13  Neurontin 800 MG Oral Tablet; 1 PO BID; Therapy: ([phone]) to Recorded   14  OxyCODONE HCl - 15 MG Oral Tablet; Take 1-2 Tablets QID as needed; Therapy: 34CSO0638 to (76 443 107) Recorded   15  TraMADol HCl - 50 MG Oral Tablet; TAKE 1 TABLET Bedtime; Therapy: 57EMV0111 to (Evaluate:56Uxu0021) Recorded   16  Vitamin B-12 1000 MCG Oral Tablet; 2500; Therapy: ([phone]) to Recorded   17  Vitamin B-6 500 MG Oral Tablet; 2000; Therapy: ([phone]) to Recorded   18  Vitamin D3 5000 UNIT Oral Tablet; qd;    Therapy: (GGGTLJOE:33OOU9962) to Recorded   19  Wellbutrin  MG Oral Tablet Extended Release 24 Hour; TAKE 1 TABLET DAILY; Therapy: (Recorded:04Oct2013) to Recorded    Allergies    1  No Known Drug Allergies    Vitals  Signs   Recorded: 18Wrj0227 02:26PM   Weight: 216 lb 6 4 oz  BMI Calculated: 37 73  BSA Calculated: 2 01    Signatures   Electronically signed by : ABIGAIL SzymanskiRD; Jul 28 2016  2:25PM EST                       (Author)    Electronically signed by :  TANIA Hardin ; Aug  4 2016 10:12AM EST

## 2018-01-18 ENCOUNTER — ALLSCRIPTS OFFICE VISIT (OUTPATIENT)
Dept: OTHER | Facility: OTHER | Age: 58
End: 2018-01-18

## 2018-01-18 NOTE — PROGRESS NOTES
Chief Complaint  Chief Complaint Free Text Note Form: Patient excited about weight loss  Her pain management doctor is titrating her off her pain medications  She provided information from pain management physician which was scanned into her allscripts chart  She stated we should have her psychiatric clearance this week  Patient is aware that she will receive a call upon receipt and review of her clearance forms  Active Problems    1  Abnormal x-ray of lumbar spine (793 7) (R93 7)   2  Aftercare following surgery (V58 89) (Z48 89)   3  Aftercare following surgery of the musculoskeletal system (V58 78) (Z47 89)   4  Backache (724 5) (M54 9)   5  Bilateral low back pain without sciatica (724 2) (M54 5)   6  Cellulitis of finger, unspecified laterality (681 00) (L03 019)   7  Chronic pain syndrome (338 4) (G89 4)   8  Diabetes Mellitus (250 00)   9  Digital mucinous cyst (727 43) (M67 449)   10  History of allergy (V15 09) (Z88 9)   11  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 3) (G56 31)   12  Limb pain (729 5) (M79 609)   13  Lumbar degenerative disc disease (722 52) (M51 36)   14  Lumbar post-laminectomy syndrome (722 83) (M96 1)   15  Lumbar radiculopathy (724 4) (M54 16)   16  Lumbar stenosis (724 02) (M48 06)   17  Morbid obesity due to excess calories (278 01) (E66 01)   18  Other abnormal finding of urine (791 9) (R82 99)   19  Postoperative examination (V67 00) (Z09)   20  Postsurgical arthrodesis status (V45 4) (Z98 1)   21  Removal of staple (V58 32) (Z48 02)   22  Surgery, elective (V50 9) (Z41 9)   23  UTI (lower urinary tract infection) (599 0) (N39 0)    Past Medical History    1  History of urinary tract infection (V13 02) (Z87 440)   2  History of Spinal stenosis (724 00) (M48 00)   3  History of Tachycardia (785 0) (R00 0)   4  History of Type 2 diabetes mellitus (250 00) (E11 9)    Surgical History    1  History of Appendectomy   2  History of Back Surgery   3  History of Catheter Ablation   4  History of Lower Back Surgery   5  History of Neck Surgery   6  History of Removal Of Intraspinal Neurostimulator, With Fluoroscopy   7  History of Spinal Surgery Neurostimulator Implants    Family History  Mother    1  No pertinent family history  Family History    2  Family history of Family Health Status Brother 1   3  Family history of Family Health Status Brother 2   4  Family history of Family Health Status Of Father -    11  Family history of Family Health Status Of Mother - Alive   6  Family history of Family Health Status Sister 1   7  Family history of Maternal Grandfather Is    6  Family history of Maternal Grandmother Is    5  Family history of Paternal Grandfather Is    8  Family history of Paternal Grandmother Is     Social History    · Activities   · Caffeine Use   · Educational Level - Completed [address]   · Former smoker (I79 84) (J86 364)   · Heavy Alcohol Consumption (305 00)   · Living Independently   · Marital History -  (V61 03)   · Never Used Drugs   · Occupation:   · Sexual Activity Denied    Current Meds   1  ALPRAZolam 0 25 MG Oral Tablet; TAKE 1 TABLET EVERY 12 HOURS AS NEEDED; Therapy: 46NIV9869 to (Evaluate:32Rqm1546) Recorded   2  BuPROPion HCl ER (SR) 150 MG Oral Tablet Extended Release 12 Hour; TAKE 1   TABLET DAILY; Therapy: 60UTO9986 to Recorded   3  Cyclobenzaprine HCl - 10 MG Oral Tablet; Take 1 tablet daily; Therapy: ([id number]) to Recorded   4  Diclofenac Sodium 75 MG Oral Tablet Delayed Release; 1 PO BID; Therapy: (Laura Correa) to Recorded   5  Fenofibrate 160 MG Oral Tablet; TAKE 1 TABLET DAILY; Therapy: 27PZM1262 to Recorded   6  FentaNYL 25 MCG/HR Transdermal Patch 72 Hour; APPLY 1 PATCH EVERY 3 DAYS; Therapy: 96XOH3356 to Recorded   7  Fluticasone Propionate 50 MCG/ACT Nasal Suspension; INSTILL 1 SQUIRT  PRN; Therapy: 54Nhr5169 to Recorded   8   Glimepiride 2 MG Oral Tablet; TAKE 1 TABLET DAILY AS DIRECTED; Therapy: 47RLW5342 to Recorded   9  Lexapro 20 MG Oral Tablet (Escitalopram Oxalate); TAKE 1 TABLET DAILY; Therapy: (782 2652) to Recorded   10  Loratadine 10 MG CAPS; 1 PO QD; Therapy: (782 2652) to Recorded   11  Magnesium 200 MG Oral Tablet; TAKE 1 TABLET DAILY AS DIRECTED; Therapy: (Recorded:27Mar2015) to Recorded   12  MetFORMIN HCl - 500 MG Oral Tablet; Take 1 tablet daily; Therapy: (Recorded:27Mar2015) to Recorded   13  Neurontin 800 MG Oral Tablet (Gabapentin); 1 PO BID; Therapy: (782 2652) to Recorded   14  OxyCODONE HCl - 15 MG Oral Tablet; Take 1-2 Tablets QID as needed; Therapy: 73WAZ8605 to (Shannon Escamilla) Recorded   15  TraMADol HCl - 50 MG Oral Tablet; TAKE 1 TABLET Bedtime; Therapy: 36RFC4009 to (Evaluate:81Dww3898) Recorded   16  Vitamin B-12 1000 MCG Oral Tablet; 2500; Therapy: (782 2652) to Recorded   17  Vitamin B-6 500 MG Oral Tablet; 2000; Therapy: (782 2652) to Recorded   18  Vitamin D3 5000 UNIT Oral Tablet; qd;    Therapy: (BUHDZTDL:84SCH6255) to Recorded   19  Wellbutrin  MG Oral Tablet Extended Release 24 Hour (BuPROPion HCl ER (XL));    TAKE 1 TABLET DAILY; Therapy: (Recorded:04Oct2013) to Recorded    Allergies    1  No Known Drug Allergies    Signatures   Electronically signed by :  Jordan Canales LCSWMSWEDUARD,JOE; Jun 29 2016  2:46PM EST                       (Author)    Electronically signed by : TANIA Minor ; Jun 30 2016  3:12PM EST                       (Validation)

## 2018-01-21 NOTE — PROGRESS NOTES
Assessment    1  Status post gastric bypass for obesity (V45 86) (Z98 84)   2  Postgastrectomy malabsorption (579 3) (K91 2,Z90 3)   3  Vitamin D insufficiency (268 9) (E55 9)   4  History of Type 2 diabetes mellitus (250 00) (E11 9)   5  Epigastric pain (789 06) (R10 13)    Discussion/Summary    Follow up in 4-6 weeks to reassess abdominal pain  Go back to post-op liquid diet for 2 days and then re-advance diet as tolerated  Increase omeprazole to twice daily now  Follow up in 1 year   Follow diet as discussed  I will mail results/recommendations after I get your labs back-call the office if you have not heard back with results  Follow vitamin/mineral recommendations as reviewed with you  Exercise as tolerated  Call our office if you have any problems with abdominal pain especially if associated with fever, chills, nausea, vomiting, or any other concerns  1 s/p lap Noé-en-y gastric bypass surgery/ overweight bmi of 26 2 3  epigastric abdominal pain  62-year-old female status post laparoscopic Noé-en-Y gastric bypass surgery October 25, 2016 by Dr Haim Lees is here for routine follow-up  Patient is here for routine follow-up  She is complaining of 2 week history of intermittent mid-epigastric burning pain which tends to be worse with meals  No associated nausea/vomiting/fever or chills  she notes she primarily struggles with intermittent constipation but also reports loose stools-no black/tarry or bloody bowel movements  Pain is not associated with her bowel movements  she restarted her ppi once daily with some but not complete relief  She did have + tenderness to palpation to mid-epigastric area on exam   She is not smoking, does not drink alcohol and denies NSAIDs or oral steroids   She does drink at least 30 ounces of caffeinated coffee a day  I advised her on the importance of gradually tapering her caffeine down to 16 ounces /day-advised to do so gradually to avoid headache but advised high caffeine intakes can cause gastric irritation  I suspect she has some gastritis/pouchitis    PLAN: increase ppi to twice daily  Advised to go back to post-op liquid diet for 2 days and then increase diet back to regular as tolerated  (of note she is 56 and has never had routine colonoscopy- I encouraged her to have this done for general health maintenance  She has a follow-up with her PCP in February of this year and will also discuss with him )    A 24 hour diet recall was obtained from the patient  Is eating a regular diet  Is eating at least 60 grams of protein per day  is following 30/60 minute rule with liquids  is not drinking at least 64 ounces of fluid per day-advised on the importance of adequate hydration and ways to improve this as this will also help with her bowels  Is exercising regularly-primarily by walking  She has lost 92% excess body weight loss which is above average for this time frame  bmi is 26 2-stable from last visit-concerning some excess skin, I believe she is actually closer to ideal body weight range-advise of cold to maintain weight now      3  Malabsorption- pateint is at risk for malabsorption of vitamins/minerals secondary to malabsorption from her procedure and restriction of intakes  Reviewed current supplements and advised on same  She is currently taking for bariatric fusion and 500 mg of calcium citrate with D daily-she is not taking extra vitamin D as advised    She reports that she recently had her routine labs done through her PCP office  She was asking them to fax a copy of her labs to our office for my review but I did not receive them at this point  She has routine follow-up with family doctor in February advised her that if she has questions about her labs at that point and has not heard back from our office she should call me to review the labs with her      4  Vitamin-D insufficiency-by last year's labs-she has not been taking the maintenance dose of vitamin-D and expect this to be low again  Advised in the interim she can an extra 2000 of D3 daily    The patient has the current Goals: Resolve abdominal pain  Maintain +/- Continued weight loss with good nutrition intakes  Normal vitamin/mineral levels  Exercise as toleratedThe patent has the current Barriers: None identified  Patient is able to Self-Care  Educational resources provided: N/a  Possible side effects of new medications were reviewed with the patient/guardian today  The treatment plan was reviewed with the patient/guardian  The patient/guardian understands and agrees with the treatment plan   She was advised to follow up due to malabsorption risks  Chief Complaint  Patient in office today for annual visit  She is having issues with heartburnwith some abdominal pain and is taking Omeprazole once a day with some relief  She is taking vitamins  Post-Op  Post-Op Bariatric Surgery:   Colt Matthews is status post laparoscopic Noé-en-Y procedure, performed on 10/25/2016   by Dr Florentino Favre  HPI: today's weight is 150 lb pounds, today's BMI is 26 2 and her total weight loss is 92% excess body weight loss pounds  The patient reports constipation, but no nausea, no vomiting, no diarrhea, no chest pain and no abdominal pain  Diet and Exercise: Diet history reviewed and discussed with the patient  Weight loss/gains to date discussed with the patient  Supplements: multivitamins and calcium  PE:   Abdominal exam: soft and no incisional hernia  Assessment:   Post-op, the patient see discussion  Plan: Activity restrictions: None  Instructions / Recommendations: recommended a daily protein intake of  grams, vitamin supplement(s) recommended, mineral supplement(s) recommended, recommended exercising at least 150 minutes per week, diet as discussed and instructed to call the office for concerns, questions, or problems     The patient was instructed to follow up in 4-6 weeks, and schedule routine annual visit  Review of Systems    Constitutional: planned weight loss, but not feeling poorly  Cardiovascular: no chest pain and no palpitations  Respiratory: no shortness of breath and no wheezing  Gastrointestinal: constipation and alternates between constipation and loose stools, no black/tarry stools, but no abdominal pain, no nausea, no vomiting, no diarrhea and no blood in stools  Psychiatric: no anxiety and no depression  Active Problems    1  Abnormal x-ray of lumbar spine (793 7) (R93 7)   2  Aftercare following surgery (V58 89) (Z48 89)   3  Aftercare following surgery of the musculoskeletal system (V58 78) (Z47 89)   4  Backache (724 5) (M54 9)   5  Bilateral low back pain without sciatica (724 2) (M54 5)   6  Cellulitis of finger, unspecified laterality (681 00) (L03 019)   7  Chronic pain syndrome (338 4) (G89 4)   8  Digital mucinous cyst (727 43) (M67 449)   9  DMII (diabetes mellitus, type 2) (250 00) (E11 9)   10  History of allergy (V15 09) (Z88 9)   11  Hyperlipemia (272 4) (E78 5)   12  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 3) (G56 31)   13  Lateral epicondylitis of left elbow (726 32) (M77 12)   14  Limb pain (729 5) (M79 609)   15  Lumbar degenerative disc disease (722 52) (M51 36)   16  Lumbar post-laminectomy syndrome (722 83) (M96 1)   17  Lumbar radiculopathy (724 4) (M54 16)   18  Lumbar stenosis (724 02) (M48 061)   19  Morbid obesity due to excess calories (278 01) (E66 01)   20  Obesity (278 00) (E66 9)   21  Other abnormal finding of urine (791 9) (R82 99)   22  Postgastrectomy malabsorption (579 3) (K91 2,Z90 3)   23  Postoperative examination (V67 00) (Z09)   24  Postsurgical arthrodesis status (V45 4) (Z98 1)   25  Removal of staple (V58 32) (Z48 02)   26  Status post gastric bypass for obesity (V45 86) (Z98 84)   27  Surgery, elective (V50 9) (Z41 9)   28  UTI (lower urinary tract infection) (599 0) (N39 0)   29   Vitamin D insufficiency (268 9) (E55 9)    Social History    · Activities   · Does not participate in activities inside/outside the home   · Caffeine Use   · 24 oz   · Educational Level - Completed [address]   · Former smoker (W60 97) (U07 590)   · Heavy Alcohol Consumption (305 00)   · Living Independently   · 2 sons & grandchild   · Marital History -  (V61 03)   · Never Used Drugs   · Occupation:   · Realtor   · Sexual Activity Denied  The social history was reviewed and updated today  Current Meds   1  Bariatric Fusion Oral Tablet Chewable; CHEW AND SWALLOW 1 TABLET DAILY; Therapy: (UQWYWIPP:67KGT8974) to Recorded   2  BuPROPion HCl ER (SR) 150 MG Oral Tablet Extended Release 12 Hour; TAKE 1   TABLET DAILY; Therapy: 71SQG9746 to Recorded   3  Fenofibrate 160 MG Oral Tablet; TAKE 1 TABLET DAILY; Therapy: 05VAA7014 to Recorded   4  FentaNYL 25 MCG/HR Transdermal Patch 72 Hour; APPLY 1 PATCH EVERY 3 DAYS; Therapy: 56IFH3699 to Recorded   5  Lexapro 20 MG Oral Tablet; TAKE 1 TABLET DAILY; Therapy: (4335 3859) to Recorded   6  Loratadine 10 MG Oral Capsule; 1 PO QD; Therapy: (1057 5241) to Recorded   7  Omeprazole 20 MG Oral Capsule Delayed Release; Therapy: (Recorded:78Kex7395) to Recorded   8  OxyCODONE HCl - 15 MG Oral Tablet; Take 1-2 Tablets QID as needed; Therapy: 31QTQ0736 to (773 222 038) Recorded   9  Vitamin D TABS; Take 1 tablet daily; Therapy: (368 303 985) to Recorded    The medication list was reviewed and updated today  Allergies    1  No Known Drug Allergies    Vitals   Recorded: 48PDB4078 03:05PM   Temperature 98 3 F   Heart Rate 70   Respiration 18   Systolic 339   Diastolic 62   Height 5 ft 3 5 in   Weight 150 lb    BMI Calculated 26 15   BSA Calculated 1 72     Physical Exam    Constitutional   General appearance: No acute distress, well appearing and well nourished  Eyes bilateral conjunctiva without pallor     Ears, Nose, Mouth, and Throat mucous membranes moist  Pulmonary   Respiratory effort: No increased work of breathing or signs of respiratory distress  Auscultation of lungs: Clear to auscultation  Cardiovascular   Auscultation of heart: Normal rate and rhythm, normal S1 and S2, without murmurs  Abdomen Soft, +BS, + tenderness to epigastric area to palpation, no rebound or guarding, negative gill's sign; no incisional hernias appreciated  Musculoskeletal   Gait and station: Normal     Psychiatric   Orientation to person, place, and time: Normal     Mood and affect: Normal          Signatures   Electronically signed by : JR Heart; Jan 18 2018  4:14PM EST                       (Author)    Electronically signed by :  TANIA Solomon ; Jan 20 2018  8:36AM EST

## 2018-01-22 VITALS
HEIGHT: 64 IN | TEMPERATURE: 96.6 F | DIASTOLIC BLOOD PRESSURE: 64 MMHG | BODY MASS INDEX: 25.78 KG/M2 | HEART RATE: 65 BPM | RESPIRATION RATE: 16 BRPM | SYSTOLIC BLOOD PRESSURE: 108 MMHG | WEIGHT: 151 LBS

## 2018-01-23 VITALS
TEMPERATURE: 98.3 F | HEART RATE: 70 BPM | BODY MASS INDEX: 25.61 KG/M2 | DIASTOLIC BLOOD PRESSURE: 62 MMHG | WEIGHT: 150 LBS | RESPIRATION RATE: 18 BRPM | SYSTOLIC BLOOD PRESSURE: 102 MMHG | HEIGHT: 64 IN

## 2018-01-23 NOTE — MISCELLANEOUS
Message  Patient called to report that she has been having a burning feeling in her stomach after meals  I told her to go back on Omeprazole twice a day until het follow up appointment next week  If she develops any other symptoms, she knows to call the office  Active Problems    1  Abnormal x-ray of lumbar spine (793 7) (R93 7)   2  Aftercare following surgery (V58 89) (Z48 89)   3  Aftercare following surgery of the musculoskeletal system (V58 78) (Z47 89)   4  Backache (724 5) (M54 9)   5  Bilateral low back pain without sciatica (724 2) (M54 5)   6  Cellulitis of finger, unspecified laterality (681 00) (L03 019)   7  Chronic pain syndrome (338 4) (G89 4)   8  Digital mucinous cyst (727 43) (M67 449)   9  DMII (diabetes mellitus, type 2) (250 00) (E11 9)   10  History of allergy (V15 09) (Z88 9)   11  Hyperlipemia (272 4) (E78 5)   12  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 3) (G56 31)   13  Lateral epicondylitis of left elbow (726 32) (M77 12)   14  Limb pain (729 5) (M79 609)   15  Lumbar degenerative disc disease (722 52) (M51 36)   16  Lumbar post-laminectomy syndrome (722 83) (M96 1)   17  Lumbar radiculopathy (724 4) (M54 16)   18  Lumbar stenosis (724 02) (M48 061)   19  Morbid obesity due to excess calories (278 01) (E66 01)   20  Obesity (278 00) (E66 9)   21  Other abnormal finding of urine (791 9) (R82 99)   22  Postgastrectomy malabsorption (579 3) (K91 2,Z90 3)   23  Postoperative examination (V67 00) (Z09)   24  Postsurgical arthrodesis status (V45 4) (Z98 1)   25  Removal of staple (V58 32) (Z48 02)   26  Status post gastric bypass for obesity (V45 86) (Z98 84)   27  Surgery, elective (V50 9) (Z41 9)   28  UTI (lower urinary tract infection) (599 0) (N39 0)   29  Vitamin D insufficiency (268 9) (E55 9)    Current Meds   1  Bariatric Fusion Oral Tablet Chewable; CHEW AND SWALLOW 1 TABLET DAILY; Therapy: (FTQLTBOP:78USY0787) to Recorded   2   BuPROPion HCl ER (SR) 150 MG Oral Tablet Extended Release 12 Hour; TAKE 1   TABLET DAILY; Therapy: 09HXX8243 to Recorded   3  Fenofibrate 160 MG Oral Tablet; TAKE 1 TABLET DAILY; Therapy: 81ILH0756 to Recorded   4  FentaNYL 25 MCG/HR Transdermal Patch 72 Hour; APPLY 1 PATCH EVERY 3 DAYS; Therapy: 65EXF6257 to Recorded   5  Fluticasone Propionate 50 MCG/ACT Nasal Suspension; INSTILL 1 SQUIRT  PRN; Therapy: 30Ast6764 to Recorded   6  Lexapro 20 MG Oral Tablet (Escitalopram Oxalate); TAKE 1 TABLET DAILY; Therapy: (105 79 680) to Recorded   7  Loratadine 10 MG Oral Capsule; 1 PO QD; Therapy: (369 98 929) to Recorded   8  Neurontin 800 MG Oral Tablet (Gabapentin); 1 PO BID; Therapy: (076 24 582) to Recorded   9  OxyCODONE HCl - 15 MG Oral Tablet; Take 1-2 Tablets QID as needed; Therapy: 65FHN7836 to (Karelena Hunger) Recorded   10  Vitamin C TABS; TAKE 1 TABLET DAILY; Therapy: ([id number]) to Recorded   11  Vitamin D TABS; Take 1 tablet daily; Therapy: (Recorded:02Yfo1489) to Recorded    Allergies    1   No Known Drug Allergies    Signatures   Electronically signed by : Maris Sanabria, ; Oneil 10 2018  1:25PM EST                       (Author)

## 2018-03-09 RX ORDER — ALPRAZOLAM 0.5 MG/1
TABLET ORAL
COMMUNITY
End: 2018-03-20 | Stop reason: ALTCHOICE

## 2018-03-09 RX ORDER — GLIMEPIRIDE 2 MG/1
2 TABLET ORAL
COMMUNITY
Start: 2016-01-12 | End: 2018-03-20 | Stop reason: ALTCHOICE

## 2018-03-09 RX ORDER — DICLOFENAC SODIUM 75 MG/1
TABLET, DELAYED RELEASE ORAL
COMMUNITY
End: 2018-03-20 | Stop reason: ALTCHOICE

## 2018-03-09 RX ORDER — OMEPRAZOLE 20 MG/1
CAPSULE, DELAYED RELEASE ORAL
COMMUNITY
End: 2018-04-24 | Stop reason: SDUPTHER

## 2018-03-09 RX ORDER — CYCLOBENZAPRINE HCL 10 MG
10 TABLET ORAL
COMMUNITY
Start: 2015-11-24 | End: 2018-03-20 | Stop reason: ALTCHOICE

## 2018-03-09 RX ORDER — PRAVASTATIN SODIUM 40 MG
TABLET ORAL
COMMUNITY
End: 2018-03-20 | Stop reason: ALTCHOICE

## 2018-03-09 RX ORDER — OXYCODONE HYDROCHLORIDE 15 MG/1
15 TABLET ORAL 2 TIMES DAILY PRN
COMMUNITY
Start: 2016-01-27 | End: 2020-02-26 | Stop reason: ALTCHOICE

## 2018-03-20 ENCOUNTER — OFFICE VISIT (OUTPATIENT)
Dept: BARIATRICS | Facility: CLINIC | Age: 58
End: 2018-03-20
Payer: MEDICARE

## 2018-03-20 VITALS
HEIGHT: 64 IN | TEMPERATURE: 98.5 F | DIASTOLIC BLOOD PRESSURE: 80 MMHG | WEIGHT: 151 LBS | BODY MASS INDEX: 25.78 KG/M2 | SYSTOLIC BLOOD PRESSURE: 110 MMHG | HEART RATE: 64 BPM

## 2018-03-20 DIAGNOSIS — Z98.84 BARIATRIC SURGERY STATUS: ICD-10-CM

## 2018-03-20 DIAGNOSIS — R10.13 EPIGASTRIC PAIN: Primary | ICD-10-CM

## 2018-03-20 PROCEDURE — 99213 OFFICE O/P EST LOW 20 MIN: CPT | Performed by: PHYSICIAN ASSISTANT

## 2018-03-20 RX ORDER — BUPROPION HYDROCHLORIDE 75 MG/1
25 TABLET ORAL 2 TIMES DAILY
Refills: 3 | COMMUNITY
Start: 2018-03-01 | End: 2019-11-27 | Stop reason: SDUPTHER

## 2018-03-20 NOTE — ASSESSMENT & PLAN NOTE
She is returning today to reassess her abdominal pain  She has had intermittent mid-epigastric abdominal  burning pain for up to 8 weeks now  Initially when she added back her ppi once a day she noted some but not complete relief  She was advised to increase her ppi to twice a day since her last visit a few weeks ago  and she notes some break-through pain with this as well  She does report one episode this week caused her to curl up with the pain which happened an hour or so after a meal of [place] fried chicken  (but notes she ate it without the skin)  She is moving her bowels every other day now and denies straining  She is not smoking, not using NSAIDs and not drinking alcohol  On exam she does still appear tender to mid-epigastric area on exam without rebound or guarding with negative gill's sign, but since most recent episode and others have been after a meal, along with her very successful 91% excess body weight loss concern would be for a biliary dyskinesia vs  Gastric ulcer/pouchitis  PLAN: Will add Carafate 4 times daily since ppi twice daily alone is not helping her symptoms  Check Ultrasound of gallbladder for stones and upper endoscopy to rule out ulcer/pouchitis  Follow-up will be determined after getting Ultrasound results back    Patient is agreeable to the plan

## 2018-03-20 NOTE — PATIENT INSTRUCTIONS
Get ultrasound of gallbladder done and upper endoscopy done  Once ultrasound is completed will determine whether you have follow-up with me or Dr Doris Willams  Go back to post-op liquid diet for a couple days and then advance diet as tolerated  If you are having pain from the gallbladder high fat foods will make this worse  I will call you with results of gallbladder study  Follow diet as discussed  Get lab work done prior to next office visit  It is recommended to check with your insurance BEFORE getting labs done to make sure they are covered by your policy  Make sure to HOLD any multivitamins that may contain biotin and any biotin supplements FOR 5 DAYS before any labs since it can affect the results  Follow vitamin  and mineral recommendations as reviewed with you  Exercise as tolerated  Call our office if you have any problems with abdominal pain especially associated with fever, chills, nausea, vomiting or any other concerns  All  Post-bariatric surgery patients should be aware that very small quantities of any alcohol  can cause impairment and it is very possible not to feel the effect  The effect can be in the system for several hours  It is also a stomach irritant  It is advised to AVOID alcohol, Nonsteroidal antiinflammatory drugs (NSAIDS) and nicotine of all forms   Any of these can cause stomach irritation/pain

## 2018-03-20 NOTE — PROGRESS NOTES
Assessment/Plan:    Epigastric pain  She is returning today to reassess her abdominal pain  She has had intermittent mid-epigastric abdominal  burning pain for up to 8 weeks now  Initially when she added back her ppi once a day she noted some but not complete relief  She was advised to increase her ppi to twice a day since her last visit a few weeks ago  and she notes some break-through pain with this as well  She does report one episode this week caused her to curl up with the pain which happened an hour or so after a meal of [place]  (but notes she ate it without the skin)  She is moving her bowels every other day now and denies straining  She is not smoking, not using NSAIDs and not drinking alcohol  On exam she does still appear tender to mid-epigastric area on exam without rebound or guarding with negative gill's sign, but since most recent episode and others have been after a meal, along with her very successful 91% excess body weight loss concern would be for a biliary dyskinesia vs  Gastric ulcer/pouchitis  PLAN: Will add Carafate 4 times daily since ppi twice daily alone is not helping her symptoms  Check Ultrasound of gallbladder for stones and upper endoscopy to rule out ulcer/pouchitis  Follow-up will be determined after getting Ultrasound results back  Patient is agreeable to the plan    Bariatric surgery status  She is status post Noé-en-y gastric bypass surgery 10/25/2016 by Dr Janneth Molina  Initial: 233 lb  Current: 151 lb  EWL: 91% which is above average weight loss  Endy: 150 lb  Current BMI is Body mass index is 26 33 kg/m²  Diagnoses and all orders for this visit:    Epigastric pain  -     US gallbladder; Future    Bariatric surgery status  -     US gallbladder; Future    Other orders  -     buPROPion (WELLBUTRIN) 75 mg tablet; Take 75 mg by mouth 2 (two) times a day          Subjective:      Patient ID: Denise Cortes is a 56 y o  female      She is here in follow-up to reassess mid-epigastric abdominal pain  Notes despite increasing her ppi to twice a day she is still having pain at times  A couple episodes have caused increase pain and were an hour after her meal  No associated vomiting/chills or fever, mild nausea at times  She is moving her bowels every other day now  The following portions of the patient's history were reviewed and updated as appropriate: allergies, current medications, past family history, past medical history, past social history, past surgical history and problem list     Review of Systems   Constitutional: Negative for chills and fever  Unexpected weight change: weight is stable from last visit  Respiratory: Negative for shortness of breath and wheezing  Cardiovascular: Negative for chest pain and palpitations  Gastrointestinal: Negative for abdominal pain, constipation, diarrhea, nausea and vomiting  Psychiatric/Behavioral: Suicidal ideas: no complait of anxiety or depression  Objective:      /80   Pulse 64   Temp 98 5 °F (36 9 °C)   Ht 5' 3 5" (1 613 m)   Wt 68 5 kg (151 lb)   BMI 26 33 kg/m²          Physical Exam   Constitutional: She is oriented to person, place, and time  She appears well-developed and well-nourished  HENT:   Mouth/Throat: Oropharynx is clear and moist    Eyes: Conjunctivae are normal  No scleral icterus  Cardiovascular: Normal rate, regular rhythm and normal heart sounds  Pulmonary/Chest: Effort normal and breath sounds normal    Abdominal: Soft  There is tenderness  No incisional hernias appreciated; pain to palpation to mid-epigastric area without rebound or guarding  Musculoskeletal:   Normal gait   Neurological: She is alert and oriented to person, place, and time  Psychiatric: She has a normal mood and affect  Her behavior is normal  Judgment and thought content normal    Nursing note and vitals reviewed        GOALS: Maintain weight loss with good nutrition intakes    Normal vitamin and mineral levels  Exercise as tolerated  Resolve abdominal pain  BARRIERS: none identified

## 2018-03-20 NOTE — ASSESSMENT & PLAN NOTE
She is status post Noé-en-y gastric bypass surgery 10/25/2016 by Dr Jonna Vazquez  Initial: 233 lb  Current: 151 lb  EWL: 91% which is above average weight loss  Endy: 150 lb  Current BMI is Body mass index is 26 33 kg/m²

## 2018-03-21 DIAGNOSIS — R10.13 EPIGASTRIC ABDOMINAL PAIN: Primary | ICD-10-CM

## 2018-03-21 RX ORDER — SUCRALFATE ORAL 1 G/10ML
1 SUSPENSION ORAL 4 TIMES DAILY
Qty: 420 ML | Refills: 0 | Status: ON HOLD | OUTPATIENT
Start: 2018-03-21 | End: 2018-04-18

## 2018-03-23 PROBLEM — R10.13 ABDOMINAL PAIN, EPIGASTRIC: Status: ACTIVE | Noted: 2018-03-23

## 2018-03-27 ENCOUNTER — HOSPITAL ENCOUNTER (OUTPATIENT)
Dept: ULTRASOUND IMAGING | Facility: HOSPITAL | Age: 58
Discharge: HOME | End: 2018-03-27
Payer: MEDICARE

## 2018-03-27 DIAGNOSIS — Z98.84 BARIATRIC SURGERY STATUS: ICD-10-CM

## 2018-03-27 DIAGNOSIS — R10.13 EPIGASTRIC PAIN: ICD-10-CM

## 2018-03-27 PROCEDURE — 76705 ECHO EXAM OF ABDOMEN: CPT

## 2018-03-29 ENCOUNTER — TELEPHONE (OUTPATIENT)
Dept: BARIATRICS | Facility: CLINIC | Age: 58
End: 2018-03-29

## 2018-03-29 NOTE — TELEPHONE ENCOUNTER
Reviewed Ultrasound results of gallbladder with patient  Advised she does have gallstones and may benefit from consideration of lap cholecystectomy but this would be determined at follow-up with her surgeon  She has upper endoscopy scheduled and advised once this is completed she should make follow-up with her surgeon to review both tests  She notes she is going to call next week to see if there is a cancellation on the EGD schedule for next week  Advised she also has liver cyst and evidence of fatty liver on ultrasound and in general fatty liver should be followed by her PCP over time as needed  CR    She verbalized understanding  I will mail her ultrasound results to her for her records and to share with her pcp   CR

## 2018-03-30 DIAGNOSIS — E66.01 MORBID (SEVERE) OBESITY DUE TO EXCESS CALORIES (HCC): Primary | ICD-10-CM

## 2018-04-03 RX ORDER — OMEPRAZOLE 20 MG/1
CAPSULE, DELAYED RELEASE ORAL
Qty: 30 CAPSULE | Refills: 1 | Status: SHIPPED | OUTPATIENT
Start: 2018-04-03 | End: 2018-05-30 | Stop reason: SDUPTHER

## 2018-04-17 ENCOUNTER — ANESTHESIA EVENT (OUTPATIENT)
Dept: GASTROENTEROLOGY | Facility: HOSPITAL | Age: 58
End: 2018-04-17
Payer: MEDICARE

## 2018-04-18 ENCOUNTER — ANESTHESIA (OUTPATIENT)
Dept: GASTROENTEROLOGY | Facility: HOSPITAL | Age: 58
End: 2018-04-18
Payer: MEDICARE

## 2018-04-18 ENCOUNTER — HOSPITAL ENCOUNTER (OUTPATIENT)
Facility: HOSPITAL | Age: 58
Setting detail: OUTPATIENT SURGERY
Discharge: HOME | End: 2018-04-18
Attending: SURGERY | Admitting: SURGERY
Payer: MEDICARE

## 2018-04-18 VITALS
WEIGHT: 148 LBS | DIASTOLIC BLOOD PRESSURE: 60 MMHG | HEIGHT: 65 IN | SYSTOLIC BLOOD PRESSURE: 108 MMHG | RESPIRATION RATE: 16 BRPM | HEART RATE: 60 BPM | BODY MASS INDEX: 24.66 KG/M2 | OXYGEN SATURATION: 100 % | TEMPERATURE: 97.9 F

## 2018-04-18 DIAGNOSIS — Z98.84 BARIATRIC SURGERY STATUS: ICD-10-CM

## 2018-04-18 DIAGNOSIS — R10.13 ABDOMINAL PAIN, EPIGASTRIC: ICD-10-CM

## 2018-04-18 PROCEDURE — 88342 IMHCHEM/IMCYTCHM 1ST ANTB: CPT | Performed by: PATHOLOGY

## 2018-04-18 PROCEDURE — 43239 EGD BIOPSY SINGLE/MULTIPLE: CPT | Performed by: SURGERY

## 2018-04-18 PROCEDURE — 88305 TISSUE EXAM BY PATHOLOGIST: CPT | Performed by: PATHOLOGY

## 2018-04-18 RX ORDER — BACLOFEN 10 MG/1
10 TABLET ORAL DAILY
COMMUNITY

## 2018-04-18 RX ORDER — SODIUM CHLORIDE 9 MG/ML
125 INJECTION, SOLUTION INTRAVENOUS CONTINUOUS
Status: DISCONTINUED | OUTPATIENT
Start: 2018-04-18 | End: 2018-04-18 | Stop reason: HOSPADM

## 2018-04-18 RX ORDER — PROPOFOL 10 MG/ML
INJECTION, EMULSION INTRAVENOUS AS NEEDED
Status: DISCONTINUED | OUTPATIENT
Start: 2018-04-18 | End: 2018-04-18 | Stop reason: SURG

## 2018-04-18 RX ADMIN — SODIUM CHLORIDE 125 ML/HR: 0.9 INJECTION, SOLUTION INTRAVENOUS at 09:04

## 2018-04-18 RX ADMIN — LIDOCAINE HYDROCHLORIDE 60 MG: 20 INJECTION, SOLUTION INTRAVENOUS at 09:29

## 2018-04-18 RX ADMIN — PROPOFOL 150 MG: 10 INJECTION, EMULSION INTRAVENOUS at 09:30

## 2018-04-18 NOTE — ANESTHESIA PREPROCEDURE EVALUATION
Review of Systems/Medical History  Patient summary reviewed        Cardiovascular  Negative cardio ROS    Pulmonary  Negative pulmonary ROS        GI/Hepatic  Negative GI/hepatic ROS    Hiatal hernia,        Negative  ROS        Endo/Other  Negative endo/other ROS      GYN  Negative gynecology ROS          Hematology  Negative hematology ROS      Musculoskeletal  Negative musculoskeletal ROS   Arthritis     Neurology  Negative neurology ROS      Psychology   Negative psychology ROS              Physical Exam    Airway    Mallampati score: II  TM Distance: >3 FB  Neck ROM: full     Dental   No notable dental hx     Cardiovascular  Comment: Negative ROS, Rhythm: regular, Rate: normal, Cardiovascular exam normal    Pulmonary  Pulmonary exam normal Breath sounds clear to auscultation,     Other Findings        Anesthesia Plan  ASA Score- 2     Anesthesia Type- general with ASA Monitors  Additional Monitors:   Airway Plan:         Plan Factors-    Induction- intravenous  Postoperative Plan-     Informed Consent- Anesthetic plan and risks discussed with patient

## 2018-04-18 NOTE — H&P
H&P EXAM - Outpatient Endoscopy  [facility] GI LAB INTRA   Cherelle Mckeon 56 y o  female MRN: [id number]  Unit/Bed#: [facility] Encounter: [id number]        Impression: Epigastric pain    Plan:Upper endoscopy and a biopsy to rule out H  Pylori    Chief Complaint: Epigastric pain s/p RYGB    Physical Exam: Normal not in acute distress   Chest: Clear to auscultation   Heart: Normal S1 and S2

## 2018-04-18 NOTE — ANESTHESIA POSTPROCEDURE EVALUATION
Post-Op Assessment Note      CV Status:  Stable    Mental Status:  Alert and awake    Hydration Status:  Euvolemic    PONV Controlled:  Controlled    Airway Patency:  Patent    Post Op Vitals Reviewed: Yes          Staff: Anesthesiologist           /54 (04/18/18 0936)    Temp      Pulse 61 (04/18/18 0936)   Resp 16 (04/18/18 0936)    SpO2 100 % (04/18/18 0936)

## 2018-04-18 NOTE — DISCHARGE INSTRUCTIONS
Findings: Normal         Upper Endoscopy   WHAT YOU NEED TO KNOW:   An upper endoscopy is also called an upper gastrointestinal (GI) endoscopy, or an esophagogastroduodenoscopy (EGD)  You may feel bloated, gassy, or have some abdominal discomfort after your procedure  Your throat may be sore for 24 to 36 hours  You may burp or pass gas from air that is still inside your body  DISCHARGE INSTRUCTIONS:   Call 911 if:   · You have sudden chest pain or trouble breathing  Seek care immediately if:   · You feel dizzy or faint  · You have trouble swallowing  · You have severe throat pain  · Your bowel movements are very dark or black  · Your abdomen is hard and firm and you have severe pain  · You vomit blood  Contact your healthcare provider if:   · You feel full or bloated and cannot burp or pass gas  · You have not had a bowel movement for 3 days after your procedure  · You have neck pain  · You have a fever or chills  · You have nausea or are vomiting  · You have a rash or hives  · You have questions or concerns about your endoscopy  Relieve a sore throat:  Suck on throat lozenges or crushed ice  Gargle with a small amount of warm salt water  Mix 1 teaspoon of salt and 1 cup of warm water to make salt water  Relieve gas and discomfort from bloating:  Lie on your right side with a heating pad on your abdomen  Take short walks to help pass gas  Eat small meals until bloating is relieved  Rest after your procedure:  Do not drive or make important decisions until the day after your procedure  Return to your normal activity as directed  You can usually return to work the day after your procedure  Follow up with your healthcare provider as directed:  Write down your questions so you remember to ask them during your visits  © 2017 [employer] Information is for End User's use only and may not be sold, redistributed or otherwise used for commercial purposes   All illustrations and images included in CareNotes® are the copyrighted property of [employer]  or [employer]  The above information is an  only  It is not intended as medical advice for individual conditions or treatments  Talk to your doctor, nurse or pharmacist before following any medical regimen to see if it is safe and effective for you

## 2018-04-18 NOTE — OP NOTE
OPERATIVE REPORT  PATIENT NAME: Phuong Hayes    :  1960  MRN: [id number]  Pt Location: [facility] GI ROOM 01    SURGERY DATE: 2018    Surgeon(s) and Role:     * Valente Steward MD - Primary    Preop Diagnosis:  Abdominal pain, epigastric [R10 13]  Bariatric surgery status [Z98 84]    Post-Op Diagnosis Codes:     * Abdominal pain, epigastric [R10 13]     * Bariatric surgery status [Z98 84]    Procedure(s) (LRB):  ESOPHAGOGASTRODUODENOSCOPY (EGD) (N/A)    Specimen(s):  * No specimens in log *    Estimated Blood Loss:   Minimal    Drains:       Anesthesia Type:   IV Sedation with Anesthesia    Operative Indications:  Abdominal pain, epigastric [R10 13]  Bariatric surgery status [Z98 84]      Operative Findings:  Normal findings    Complications:   None    Procedure and Technique:  Upper endoscopy and biopsy   I was present for the entire procedure    Patient Disposition:  hemodynamically stable     Indication:   Patient suffers from morbid obesity s/p RYGB presenting with epigastric pain    Description of the procedure: The patient was brought to the endoscopy suite and was placed in  left lateral decubitus position  A time-out was called and the patient was identified by name and by armband  The patient received IV  Propofol under closed monitoring  by the anesthesiologist and nurse anesthesist     Upper endoscopy was performed under direct visualization  Esophagus was visualized and showed normal findings   The GE junction was normal   The stomach pouch was then inspected and showed normal findings  Gastrojejunostomy was inspected and showed normal findings    Blind end and Noé limbs were both inspected and showed normal findings  Biopsy was taken with a punch biopsy forceps from the gastric pouch and sent to pathology to rule out H  Pylori  Gastro-gastric Fistula was not identified      The patient tolerated the procedure well and was transferred to the recovery unit in stable condition  SIGNATURE: Autumn Sinha MD  DATE: April 18, 2018  TIME: 9:29 AM

## 2018-04-24 RX ORDER — GABAPENTIN 100 MG/1
100 CAPSULE ORAL
COMMUNITY
Start: 2018-02-27

## 2018-04-24 RX ORDER — SUCRALFATE 1 G/1
1 TABLET ORAL
COMMUNITY
End: 2018-05-08

## 2018-04-24 RX ORDER — OFLOXACIN 3 MG/ML
SOLUTION/ DROPS OPHTHALMIC
Refills: 0 | COMMUNITY
Start: 2018-03-24 | End: 2018-05-08

## 2018-04-27 ENCOUNTER — OFFICE VISIT (OUTPATIENT)
Dept: BARIATRICS | Facility: CLINIC | Age: 58
End: 2018-04-27
Payer: MEDICARE

## 2018-04-27 VITALS
BODY MASS INDEX: 25.44 KG/M2 | HEIGHT: 64 IN | SYSTOLIC BLOOD PRESSURE: 118 MMHG | RESPIRATION RATE: 20 BRPM | DIASTOLIC BLOOD PRESSURE: 64 MMHG | TEMPERATURE: 98.2 F | HEART RATE: 64 BPM | WEIGHT: 149 LBS

## 2018-04-27 DIAGNOSIS — R10.13 EPIGASTRIC PAIN: Primary | ICD-10-CM

## 2018-04-27 DIAGNOSIS — K80.20 CALCULUS OF GALLBLADDER WITHOUT CHOLECYSTITIS WITHOUT OBSTRUCTION: Primary | ICD-10-CM

## 2018-04-27 PROBLEM — K80.40 CALCULUS OF BILE DUCT WITH CHOLECYSTITIS WITHOUT OBSTRUCTION: Status: ACTIVE | Noted: 2018-04-27

## 2018-04-27 PROCEDURE — 99214 OFFICE O/P EST MOD 30 MIN: CPT | Performed by: SURGERY

## 2018-04-27 RX ORDER — OXYCODONE HYDROCHLORIDE AND ACETAMINOPHEN 5; 325 MG/1; MG/1
1 TABLET ORAL EVERY 4 HOURS PRN
Qty: 30 TABLET | Refills: 0 | Status: CANCELLED | OUTPATIENT
Start: 2018-04-27

## 2018-04-27 RX ORDER — HEPARIN SODIUM 5000 [USP'U]/ML
5000 INJECTION, SOLUTION INTRAVENOUS; SUBCUTANEOUS
Status: CANCELLED | OUTPATIENT
Start: 2018-04-27 | End: 2018-04-28

## 2018-04-27 NOTE — PROGRESS NOTES
BARIATRIC HISTORY AND PHYSICAL - BARIATRIC SURGERY  Trevin Sosa 56 y o  female MRN: [id number]  Unit/Bed#:  Encounter: [id number]      HPI:  Trevin Sosa is a 56 y o  female who presents with recurrent postprandial right upper quadrant pain  Patient pain is colicky in nature and usually lasts for 2 hr and not associated with nausea vomiting  Recent upper endoscopy was performed and showed normal findings  Review of Systems   Constitutional: Negative  HENT: Negative  Eyes: Negative  Respiratory: Negative  Cardiovascular: Negative  Endocrine: Negative  Genitourinary: Negative  Musculoskeletal: Negative  Skin: Negative  Neurological: Negative  Hematological: Negative  Psychiatric/Behavioral: Negative  Historical Information   Past Medical History:   Diagnosis Date    Degenerative lumbar disc     Hiatal hernia     History of transfusion     Post-op spinal surgery    Low back pain     Spinal stenosis     SVT (supraventricular tachycardia) (HCC)     Wears glasses      Past Surgical History:   Procedure Laterality Date    APPENDECTOMY      BACK SURGERY      Lumbar and Sacral fusions-3 surgeries total    CARDIAC ELECTROPHYSIOLOGY STUDY AND ABLATION      CARPAL TUNNEL RELEASE      x2    CERVICAL SPINE SURGERY      Fusion of C2-C7 over a period of 3 surgeries     SECTION      X2    INSERTION / PLACEMENT / REVISION NEUROSTIMULATOR      X2- both were removed    NECK SURGERY      [place] EGD TRANSORAL BIOPSY SINGLE/MULTIPLE N/A 2016    Procedure: ESOPHAGOGASTRODUODENOSCOPY (EGD); Surgeon: Soledad Zurita MD;  Location: [facility] LAB; Service: Bariatrics    [place] EGD TRANSORAL BIOPSY SINGLE/MULTIPLE N/A 2018    Procedure: ESOPHAGOGASTRODUODENOSCOPY (EGD) with biopsy;  Surgeon: Soledad Zurita MD;  Location: [facility];   Service: Bariatrics    [place] LAP GASTRIC BYPASS/BRALUIO-EN-Y N/A 10/25/2016    Procedure: BYPASS GASTRIC  BRAULIO-EN-Y LAPAROSCOPIC, WITH INTRA OP EGD;  Surgeon: Dwight Lemus MD;  Location: [facility] OR;  Service: Bariatrics    TONSILLECTOMY      TUBAL LIGATION      WISDOM TOOTH EXTRACTION       Social History   History   Alcohol Use No     History   Drug Use No     History   Smoking Status    Former Smoker    Packs/day: 1 00    Years: 20 00    Types: Cigarettes   Smokeless Tobacco    Never Used     Comment: Quit 15 years ago  Family History: non-contributory    Meds/Allergies   all medications and allergies reviewed  No Known Allergies    Objective     Current Vitals:   Blood Pressure: 118/64 (04/27/18 1106)  Pulse: 64 (04/27/18 1106)  Temperature: 98 2 °F (36 8 °C) (04/27/18 1106)  Respirations: 20 (04/27/18 1106)  Height: 5' 3 5" (161 3 cm) (04/27/18 1106)  Weight - Scale: 67 6 kg (149 lb) (04/27/18 1106)  bowel movement  [unfilled]    Invasive Devices          No matching active lines, drains, or airways          Physical Exam   Constitutional: She is oriented to person, place, and time  She appears well-developed  HENT:   Head: Normocephalic and atraumatic  Eyes: Conjunctivae and EOM are normal    Neck: Normal range of motion  Neck supple  Cardiovascular: Normal rate, regular rhythm, normal heart sounds and intact distal pulses  Pulmonary/Chest: Effort normal and breath sounds normal    Abdominal: Soft  Bowel sounds are normal    Musculoskeletal: Normal range of motion  Neurological: She is alert and oriented to person, place, and time  She has normal reflexes  Skin: Skin is warm and dry  Psychiatric: She has a normal mood and affect  Lab Results: I have personally reviewed pertinent lab results  Imaging: I have personally reviewed pertinent reports  EKG, Pathology, and Other Studies: I have personally reviewed pertinent reports        Code Status: [unfilled]  Advance Directive and Living Will:      Power of :    POLST:      Assessment/Plan:    Patient's symptoms are consistent with symptomatic cholelithiasis  I had a long talk with the patient regarding the nature of her symptoms and the natural history of gallbladder stones  We also discussed the different options that we have regarding her treatment options including laparoscopic cholecystectomy versus dietary modifications  Patient selected to undergo laparoscopic cholecystectomy  Risks and benefits were explained to the patient including the risks of bleeding, infection, bile leak and bile duct injury  All questions were answered  Patient understands and wishes to proceed

## 2018-05-08 RX ORDER — CYANOCOBALAMIN (VITAMIN B-12) 1000 MCG
1 TABLET, EXTENDED RELEASE ORAL 2 TIMES DAILY
COMMUNITY

## 2018-05-08 NOTE — PRE-PROCEDURE INSTRUCTIONS
Pre-Surgery Instructions:   Medication Instructions    baclofen 10 mg tablet Instructed patient per Anesthesia Guidelines   buPROPion (WELLBUTRIN) 75 mg tablet Instructed patient per Anesthesia Guidelines   Calcium Citrate-Vitamin D (CALCIUM CITRATE +D) 315-250 MG-UNIT TABS Instructed patient per Anesthesia Guidelines   escitalopram (LEXAPRO) 20 mg tablet Instructed patient per Anesthesia Guidelines   fenofibrate (TRIGLIDE) 160 MG tablet Instructed patient per Anesthesia Guidelines   fentaNYL (DURAGESIC) 25 mcg/hr Instructed patient per Anesthesia Guidelines   gabapentin (NEURONTIN) 100 mg capsule Instructed patient per Anesthesia Guidelines   loratadine (CLARITIN) 10 mg tablet Instructed patient per Anesthesia Guidelines   Multiple Vitamins-Minerals (BARIATRIC FUSION PO) Instructed patient per Anesthesia Guidelines   omeprazole (PriLOSEC) 20 mg delayed release capsule Instructed patient per Anesthesia Guidelines   oxyCODONE (ROXICODONE) 15 mg immediate release tablet Instructed patient per Anesthesia Guidelines  Spoke to patient via telephone  Medications reviewed and patient was instructed to take Wellbutrin, omeprazole, and Lexapro am of surgery with a sip of water  Patient was instructed to avoid NSAIDs, Aspirin, Vitamins, and supplements 7 days prior to surgery  [facility] pre-op instructions reviewed  Pre-op bathing reviewed with patient

## 2018-05-14 ENCOUNTER — ANESTHESIA EVENT (OUTPATIENT)
Dept: PERIOP | Facility: HOSPITAL | Age: 58
End: 2018-05-14
Payer: MEDICARE

## 2018-05-14 ENCOUNTER — ANESTHESIA (OUTPATIENT)
Dept: PERIOP | Facility: HOSPITAL | Age: 58
End: 2018-05-14
Payer: MEDICARE

## 2018-05-14 ENCOUNTER — HOSPITAL ENCOUNTER (OUTPATIENT)
Facility: HOSPITAL | Age: 58
Setting detail: OUTPATIENT SURGERY
Discharge: HOME | End: 2018-05-14
Attending: SURGERY | Admitting: SURGERY
Payer: MEDICARE

## 2018-05-14 VITALS
WEIGHT: 148 LBS | TEMPERATURE: 98.6 F | RESPIRATION RATE: 18 BRPM | BODY MASS INDEX: 24.66 KG/M2 | SYSTOLIC BLOOD PRESSURE: 113 MMHG | DIASTOLIC BLOOD PRESSURE: 53 MMHG | OXYGEN SATURATION: 96 % | HEIGHT: 65 IN | HEART RATE: 71 BPM

## 2018-05-14 DIAGNOSIS — K80.40 CALCULUS OF BILE DUCT WITH CHOLECYSTITIS WITHOUT OBSTRUCTION, UNSPECIFIED CHOLECYSTITIS ACUITY: Primary | ICD-10-CM

## 2018-05-14 DIAGNOSIS — Z98.84 BARIATRIC SURGERY STATUS: ICD-10-CM

## 2018-05-14 DIAGNOSIS — Z90.49 S/P LAPAROSCOPIC CHOLECYSTECTOMY: ICD-10-CM

## 2018-05-14 DIAGNOSIS — R10.13 ABDOMINAL PAIN, EPIGASTRIC: ICD-10-CM

## 2018-05-14 PROCEDURE — 88304 TISSUE EXAM BY PATHOLOGIST: CPT | Performed by: PATHOLOGY

## 2018-05-14 PROCEDURE — 47562 LAPAROSCOPIC CHOLECYSTECTOMY: CPT | Performed by: SURGERY

## 2018-05-14 RX ORDER — MORPHINE SULFATE 10 MG/ML
2 INJECTION, SOLUTION INTRAMUSCULAR; INTRAVENOUS EVERY 4 HOURS PRN
Status: DISCONTINUED | OUTPATIENT
Start: 2018-05-14 | End: 2018-05-14 | Stop reason: HOSPADM

## 2018-05-14 RX ORDER — PROMETHAZINE HYDROCHLORIDE 25 MG/ML
12.5 INJECTION, SOLUTION INTRAMUSCULAR; INTRAVENOUS ONCE
Status: DISCONTINUED | OUTPATIENT
Start: 2018-05-14 | End: 2018-05-14 | Stop reason: HOSPADM

## 2018-05-14 RX ORDER — ROCURONIUM BROMIDE 10 MG/ML
INJECTION, SOLUTION INTRAVENOUS AS NEEDED
Status: DISCONTINUED | OUTPATIENT
Start: 2018-05-14 | End: 2018-05-14 | Stop reason: SURG

## 2018-05-14 RX ORDER — PROPOFOL 10 MG/ML
INJECTION, EMULSION INTRAVENOUS AS NEEDED
Status: DISCONTINUED | OUTPATIENT
Start: 2018-05-14 | End: 2018-05-14 | Stop reason: SURG

## 2018-05-14 RX ORDER — GLYCOPYRROLATE 0.2 MG/ML
INJECTION INTRAMUSCULAR; INTRAVENOUS AS NEEDED
Status: DISCONTINUED | OUTPATIENT
Start: 2018-05-14 | End: 2018-05-14 | Stop reason: SURG

## 2018-05-14 RX ORDER — ONDANSETRON 2 MG/ML
INJECTION INTRAMUSCULAR; INTRAVENOUS AS NEEDED
Status: DISCONTINUED | OUTPATIENT
Start: 2018-05-14 | End: 2018-05-14 | Stop reason: SURG

## 2018-05-14 RX ORDER — KETOROLAC TROMETHAMINE 30 MG/ML
INJECTION, SOLUTION INTRAMUSCULAR; INTRAVENOUS AS NEEDED
Status: DISCONTINUED | OUTPATIENT
Start: 2018-05-14 | End: 2018-05-14 | Stop reason: SURG

## 2018-05-14 RX ORDER — SODIUM CHLORIDE 9 MG/ML
125 INJECTION, SOLUTION INTRAVENOUS CONTINUOUS
Status: DISCONTINUED | OUTPATIENT
Start: 2018-05-14 | End: 2018-05-14 | Stop reason: HOSPADM

## 2018-05-14 RX ORDER — FENTANYL CITRATE 50 UG/ML
INJECTION, SOLUTION INTRAMUSCULAR; INTRAVENOUS AS NEEDED
Status: DISCONTINUED | OUTPATIENT
Start: 2018-05-14 | End: 2018-05-14 | Stop reason: SURG

## 2018-05-14 RX ORDER — OXYCODONE HCL 5 MG/5 ML
5 SOLUTION, ORAL ORAL EVERY 4 HOURS PRN
Status: DISCONTINUED | OUTPATIENT
Start: 2018-05-14 | End: 2018-05-14 | Stop reason: HOSPADM

## 2018-05-14 RX ORDER — OXYCODONE HYDROCHLORIDE AND ACETAMINOPHEN 5; 325 MG/1; MG/1
1 TABLET ORAL EVERY 4 HOURS PRN
Qty: 10 TABLET | Refills: 0
Start: 2018-05-14 | End: 2018-05-14 | Stop reason: HOSPADM

## 2018-05-14 RX ORDER — ONDANSETRON 2 MG/ML
4 INJECTION INTRAMUSCULAR; INTRAVENOUS EVERY 4 HOURS PRN
Status: DISCONTINUED | OUTPATIENT
Start: 2018-05-14 | End: 2018-05-14 | Stop reason: HOSPADM

## 2018-05-14 RX ORDER — HEPARIN SODIUM 5000 [USP'U]/ML
5000 INJECTION, SOLUTION INTRAVENOUS; SUBCUTANEOUS
Status: COMPLETED | OUTPATIENT
Start: 2018-05-14 | End: 2018-05-14

## 2018-05-14 RX ORDER — ACETAMINOPHEN 160 MG/5ML
650 SUSPENSION, ORAL (FINAL DOSE FORM) ORAL EVERY 4 HOURS PRN
Status: DISCONTINUED | OUTPATIENT
Start: 2018-05-14 | End: 2018-05-14 | Stop reason: HOSPADM

## 2018-05-14 RX ORDER — MIDAZOLAM HYDROCHLORIDE 1 MG/ML
INJECTION INTRAMUSCULAR; INTRAVENOUS AS NEEDED
Status: DISCONTINUED | OUTPATIENT
Start: 2018-05-14 | End: 2018-05-14 | Stop reason: SURG

## 2018-05-14 RX ORDER — BUPIVACAINE HYDROCHLORIDE AND EPINEPHRINE 5; 5 MG/ML; UG/ML
INJECTION, SOLUTION PERINEURAL AS NEEDED
Status: DISCONTINUED | OUTPATIENT
Start: 2018-05-14 | End: 2018-05-14 | Stop reason: HOSPADM

## 2018-05-14 RX ORDER — SODIUM CHLORIDE 9 MG/ML
INJECTION, SOLUTION INTRAVENOUS AS NEEDED
Status: DISCONTINUED | OUTPATIENT
Start: 2018-05-14 | End: 2018-05-14 | Stop reason: HOSPADM

## 2018-05-14 RX ORDER — ONDANSETRON 2 MG/ML
4 INJECTION INTRAMUSCULAR; INTRAVENOUS ONCE AS NEEDED
Status: DISCONTINUED | OUTPATIENT
Start: 2018-05-14 | End: 2018-05-14 | Stop reason: HOSPADM

## 2018-05-14 RX ORDER — DEXAMETHASONE SODIUM PHOSPHATE 4 MG/ML
INJECTION, SOLUTION INTRA-ARTICULAR; INTRALESIONAL; INTRAMUSCULAR; INTRAVENOUS; SOFT TISSUE AS NEEDED
Status: DISCONTINUED | OUTPATIENT
Start: 2018-05-14 | End: 2018-05-14 | Stop reason: SURG

## 2018-05-14 RX ADMIN — PROPOFOL 180 MG: 10 INJECTION, EMULSION INTRAVENOUS at 10:29

## 2018-05-14 RX ADMIN — FENTANYL CITRATE 50 MCG: 50 INJECTION, SOLUTION INTRAMUSCULAR; INTRAVENOUS at 10:49

## 2018-05-14 RX ADMIN — DEXAMETHASONE SODIUM PHOSPHATE 4 MG: 4 INJECTION, SOLUTION INTRAMUSCULAR; INTRAVENOUS at 10:38

## 2018-05-14 RX ADMIN — SODIUM CHLORIDE: 0.9 INJECTION, SOLUTION INTRAVENOUS at 10:52

## 2018-05-14 RX ADMIN — OXYCODONE HYDROCHLORIDE 5 MG: 5 SOLUTION ORAL at 13:15

## 2018-05-14 RX ADMIN — GLYCOPYRROLATE 0.6 MG: 0.2 INJECTION, SOLUTION INTRAMUSCULAR; INTRAVENOUS at 11:27

## 2018-05-14 RX ADMIN — SODIUM CHLORIDE 125 ML/HR: 0.9 INJECTION, SOLUTION INTRAVENOUS at 12:08

## 2018-05-14 RX ADMIN — LIDOCAINE HYDROCHLORIDE 60 MG: 20 INJECTION, SOLUTION INTRAVENOUS at 10:29

## 2018-05-14 RX ADMIN — HYDROMORPHONE HYDROCHLORIDE 0.5 MG: 1 INJECTION, SOLUTION INTRAMUSCULAR; INTRAVENOUS; SUBCUTANEOUS at 12:15

## 2018-05-14 RX ADMIN — Medication 2000 MG: at 10:35

## 2018-05-14 RX ADMIN — KETOROLAC TROMETHAMINE 30 MG: 30 INJECTION, SOLUTION INTRAMUSCULAR at 11:20

## 2018-05-14 RX ADMIN — NEOSTIGMINE METHYLSULFATE 3 MG: 1 INJECTION, SOLUTION INTRAMUSCULAR; INTRAVENOUS; SUBCUTANEOUS at 11:27

## 2018-05-14 RX ADMIN — MIDAZOLAM HYDROCHLORIDE 2 MG: 1 INJECTION, SOLUTION INTRAMUSCULAR; INTRAVENOUS at 10:24

## 2018-05-14 RX ADMIN — SODIUM CHLORIDE 125 ML/HR: 0.9 INJECTION, SOLUTION INTRAVENOUS at 08:51

## 2018-05-14 RX ADMIN — ONDANSETRON HYDROCHLORIDE 4 MG: 2 INJECTION, SOLUTION INTRAVENOUS at 10:24

## 2018-05-14 RX ADMIN — FENTANYL CITRATE 50 MCG: 50 INJECTION, SOLUTION INTRAMUSCULAR; INTRAVENOUS at 11:14

## 2018-05-14 RX ADMIN — HYDROMORPHONE HYDROCHLORIDE 0.5 MG: 1 INJECTION, SOLUTION INTRAMUSCULAR; INTRAVENOUS; SUBCUTANEOUS at 12:25

## 2018-05-14 RX ADMIN — FENTANYL CITRATE 100 MCG: 50 INJECTION, SOLUTION INTRAMUSCULAR; INTRAVENOUS at 10:29

## 2018-05-14 RX ADMIN — HYDROMORPHONE HYDROCHLORIDE 0.5 MG: 1 INJECTION, SOLUTION INTRAMUSCULAR; INTRAVENOUS; SUBCUTANEOUS at 11:55

## 2018-05-14 RX ADMIN — ROCURONIUM BROMIDE 50 MG: 10 INJECTION INTRAVENOUS at 10:29

## 2018-05-14 RX ADMIN — HEPARIN SODIUM 5000 UNITS: 5000 INJECTION, SOLUTION INTRAVENOUS; SUBCUTANEOUS at 09:41

## 2018-05-14 RX ADMIN — HYDROMORPHONE HYDROCHLORIDE 0.5 MG: 1 INJECTION, SOLUTION INTRAMUSCULAR; INTRAVENOUS; SUBCUTANEOUS at 12:05

## 2018-05-14 NOTE — DISCHARGE INSTRUCTIONS
Laparoscopic Cholecystectomy   WHAT YOU NEED TO KNOW:   Laparoscopic cholecystectomy is surgery to remove your gallbladder  DISCHARGE INSTRUCTIONS:   Medicines: You may need any of the following:  · Prescription pain medicine  helps decrease pain  Do not wait until the pain is severe before you take this medicine  · NSAIDs  decrease swelling and pain  This medicine can be bought with or without a doctor's order  This medicine can cause stomach bleeding or kidney problems in certain people  If you take blood thinner medicine, always ask your healthcare provider if NSAIDs are safe for you  Read the medicine label and follow the directions on it before using this medicine  · Take your medicine as directed  Contact your healthcare provider if you think your medicine is not helping or if you have side effects  Tell him or her if you are allergic to any medicine  Keep a list of the medicines, vitamins, and herbs you take  Include the amounts, and when and why you take them  Bring the list or the pill bottles to follow-up visits  Carry your medicine list with you in case of an emergency  Follow up with your healthcare provider 2 weeks after surgery, or as directed:  Write down your questions so you remember to ask them during your visits  Wound care:  Care for your surgical wounds as directed  Keep the wounds clean and dry  You may take a shower the day after your surgery  What to eat after surgery:  Eat low-fat foods for 4 to 6 weeks while your body learns to digest fat without a gallbladder  Slowly increase the amount of fat that you eat  Drink plenty of liquids  Ask how much liquid to drink and which liquids are best for you  When to return to work and other activities: You may return to work or other activities as soon as your pain is controlled and you feel comfortable  For many people, this is 5 to 7 days after surgery     Contact your healthcare provider if:   · You have a fever over 101°F (38°C) or chills  · You have pain or nausea that is not relieved by medicine  · You have redness and swelling around your incisions, or blood or pus is leaking from your incisions  · You are constipated or have diarrhea  · Your skin or eyes are yellow, or your bowel movements are pale  · You have questions or concerns about your surgery, condition, or care  Seek care immediately or call 911 if:   · You cannot stop vomiting  · Your bowel movements are black or bloody  · You have pain in your abdomen and it is swollen or hard  · Your arm or leg feels warm, tender, and painful  It may look swollen and red  · You feel lightheaded, short of breath, and have chest pain  · You cough up blood  © 2017 [employer]  Information is for End User's use only and may not be sold, redistributed or otherwise used for commercial purposes  All illustrations and images included in CareNotes® are the copyrighted property of [employer]  or [employer]  The above information is an  only  It is not intended as medical advice for individual conditions or treatments  Talk to your doctor, nurse or pharmacist before following any medical regimen to see if it is safe and effective for you

## 2018-05-14 NOTE — H&P (VIEW-ONLY)
BARIATRIC HISTORY AND PHYSICAL - BARIATRIC SURGERY  Adalberto Higuera 56 y o  female MRN: [id number]  Unit/Bed#:  Encounter: [id number]      HPI:  Adalberto Higuera is a 56 y o  female who presents with recurrent postprandial right upper quadrant pain  Patient pain is colicky in nature and usually lasts for 2 hr and not associated with nausea vomiting  Recent upper endoscopy was performed and showed normal findings  Review of Systems   Constitutional: Negative  HENT: Negative  Eyes: Negative  Respiratory: Negative  Cardiovascular: Negative  Endocrine: Negative  Genitourinary: Negative  Musculoskeletal: Negative  Skin: Negative  Neurological: Negative  Hematological: Negative  Psychiatric/Behavioral: Negative  Historical Information   Past Medical History:   Diagnosis Date    Degenerative lumbar disc     Hiatal hernia     History of transfusion     Post-op spinal surgery    Low back pain     Spinal stenosis     SVT (supraventricular tachycardia) (HCC)     Wears glasses      Past Surgical History:   Procedure Laterality Date    APPENDECTOMY      BACK SURGERY      Lumbar and Sacral fusions-3 surgeries total    CARDIAC ELECTROPHYSIOLOGY STUDY AND ABLATION      CARPAL TUNNEL RELEASE      x2    CERVICAL SPINE SURGERY      Fusion of C2-C7 over a period of 3 surgeries     SECTION      X2    INSERTION / PLACEMENT / REVISION NEUROSTIMULATOR      X2- both were removed    NECK SURGERY      [place] E[place] TRANSORAL BIOPSY SINGLE/MULTIPLE N/A 2016    Procedure: ESOPHAGOGASTRODUODENOSCOPY (EGD); Surgeon: Villa Lim MD;  [facility]; Service: Bariatrics    [place] E[place] TRANSORAL BIOPSY SINGLE/MULTIPLE N/A 2018    Procedure: ESOPHAGOGASTRODUODENOSCOPY (EGD) with biopsy;  Surgeon: Villa Lim MD;  [facility];   Service: Bariatrics    [place] LAP GASTRIC BYPASS/BRAULIO-EN-Y N/A 10/25/2016    Procedure: BYPASS GASTRIC  BRAULIO-EN-Y LAPAROSCOPIC, WITH INTRA OP EGD;  Surgeon: Abdirahman Jean Baptiste MD;  Location[facility];  Service: Bariatrics    TONSILLECTOMY      TUBAL LIGATION      WISDOM TOOTH EXTRACTION       Social History   History   Alcohol Use No     History   Drug Use No     History   Smoking Status    Former Smoker    Packs/day: 1 00    Years: 20 00    Types: Cigarettes   Smokeless Tobacco    Never Used     Comment: Quit 15 years ago  Family History: non-contributory    Meds/Allergies   all medications and allergies reviewed  No Known Allergies    Objective     Current Vitals:   Blood Pressure: 118/64 (04/27/18 1106)  Pulse: 64 (04/27/18 1106)  Temperature: 98 2 °F (36 8 °C) (04/27/18 1106)  Respirations: 20 (04/27/18 1106)  Height: 5' 3 5" (161 3 cm) (04/27/18 1106)  Weight - Scale: 67 6 kg (149 lb) (04/27/18 1106)  bowel movement  [unfilled]    Invasive Devices          No matching active lines, drains, or airways          Physical Exam   Constitutional: She is oriented to person, place, and time  She appears well-developed  HENT:   Head: Normocephalic and atraumatic  Eyes: Conjunctivae and EOM are normal    Neck: Normal range of motion  Neck supple  Cardiovascular: Normal rate, regular rhythm, normal heart sounds and intact distal pulses  Pulmonary/Chest: Effort normal and breath sounds normal    Abdominal: Soft  Bowel sounds are normal    Musculoskeletal: Normal range of motion  Neurological: She is alert and oriented to person, place, and time  She has normal reflexes  Skin: Skin is warm and dry  Psychiatric: She has a normal mood and affect  Lab Results: I have personally reviewed pertinent lab results  Imaging: I have personally reviewed pertinent reports  EKG, Pathology, and Other Studies: I have personally reviewed pertinent reports        Code Status: [unfilled]  Advance Directive and Living Will:      Power of :    POLST:      Assessment/Plan:    Patient's symptoms are consistent with symptomatic cholelithiasis  I had a long talk with the patient regarding the nature of her symptoms and the natural history of gallbladder stones  We also discussed the different options that we have regarding her treatment options including laparoscopic cholecystectomy versus dietary modifications  Patient selected to undergo laparoscopic cholecystectomy  Risks and benefits were explained to the patient including the risks of bleeding, infection, bile leak and bile duct injury  All questions were answered  Patient understands and wishes to proceed

## 2018-05-14 NOTE — OP NOTE
OPERATIVE REPORT  PATIENT NAME: Param Ribeiro    :  1960  MRN: [id number]  Pt Location: [facility]    SURGERY DATE: 2018    Surgeon(s) and Role:     * Muna Lim MD - Primary     * Dawson Weinberg MD - Fellow    Preop Diagnosis:  Abdominal pain, epigastric [R10 13]  Bariatric surgery status [Z98 84]  Calculus of bile duct with cholecystitis without obstruction, unspecified cholecystitis acuity [K80 40]    Post-Op Diagnosis Codes:     * Abdominal pain, epigastric [R10 13]     * Bariatric surgery status [Z98 84]     * Calculus of bile duct with cholecystitis without obstruction, unspecified cholecystitis acuity [K80 40]    Procedure(s) (LRB):  CHOLECYSTECTOMY LAPAROSCOPIC (N/A)    Specimen(s):  * No specimens in log *    Estimated Blood Loss:   Minimal    Drains:  None    Anesthesia Type:   General    Operative Indications:  Abdominal pain, epigastric [R10 13]  Bariatric surgery status [Z98 84]  Calculus of bile duct with cholecystitis without obstruction, unspecified cholecystitis acuity [K80 40]    Operative Findings:  Normal post-bypass anatomy    Complications:   None    Surgical fellow was necessary for traction, counter traction and for camera holding    Procedure:  Patient was brought to the operating room and was placed in a supine position, patient was induced and intubated under general endotracheal anesthesia, abdominal wall was and draped under sterile conditions in the usual fashion  Patient received a dose of IV antibiotics prior to the procedure   An infraumbilical incision was made with #15 blade and the subcutaneous tissue was dissected down to the fascia, fascia was then opened with a #15 blade and a 12 mm trocar was placed under direct visualization, 2 separate 5 mm trocars were placed on the right side of the abdominal wall and another 12 mm trocar was placed in the subxiphoid window, patient was placed in a reverse Trendelenburg with the right side was elevated, gallbladder appeared to be thickened and distended so the gallbladder was decompressed with the suction  catheter, fundus was then retracted in a cephalad fashion and the infundibulum was retracted toward the right side of the abdominal wall, dissection was started at the level of the infundibulo-cystic junction in the triangle of Callot, cystic duct and cystic artery were both completely skeletonized and dissected until the critical view was obtained, cystic duct was then transected between 2 proximal hemoclips and one distal hemoclip, cystic artery was controlled with one proximal hemoclip and taken down with the Harmonic scalpel, the gallbladder was then dissected off the liver bed using the Harmonic scalpel, no injury to Celestine's capsule was made there was no violation of the liver bed and there was no evidence of bleeding or bile leak, gallbladder was placed in an Endo Catch bag and removed from the subxiphoid port, midline port was closed with #1 Vicryl suture, skin edges were approximated with 4-0 Monocryl and Histocryl  Patient was extubated and transferred to the PACU in stable condition      Patient Disposition:  PACU     SIGNATURE: Baldemar Driver MD  DATE: May 14, 2018  TIME: 10:36 AM

## 2018-05-14 NOTE — ANESTHESIA PREPROCEDURE EVALUATION
Review of Systems/Medical History  Patient summary reviewed  Chart reviewed  No history of anesthetic complications     Cardiovascular  Exercise tolerance: good,  Dysrhythmias (SVT with ablation) , history of PSVT,    Pulmonary  Smoker ex-smoker  ,        GI/Hepatic     Hiatal hernia, Bariatric surgery,             Endo/Other  Diabetes well controlled ,   Comment: Resolved since bariatric surgery    GYN  Negative gynecology ROS          Hematology   Musculoskeletal  Back pain , cervical pain, lumbar pain and spinal stenosis,   Arthritis     Neurology    Neuromuscular disease: spinal stenosis with impaired reflexex  ,    Psychology   Psychiatric history (depression Nancy Clink),              Physical Exam    Airway    Mallampati score: II  TM Distance: >3 FB  Neck ROM: full     Dental   No notable dental hx     Cardiovascular  Rhythm: regular, Rate: normal, Cardiovascular exam normal    Pulmonary  Pulmonary exam normal Breath sounds clear to auscultation,     Other Findings        Anesthesia Plan  ASA Score- 2     Anesthesia Type- general with ASA Monitors  Additional Monitors:   Airway Plan: ETT  Plan Factors- Patient instructed to abstain from smoking on day of procedure       Induction- intravenous  Postoperative Plan- Plan for postoperative opioid use  Informed Consent- Anesthetic plan and risks discussed with patient

## 2018-05-14 NOTE — DISCHARGE SUMMARY
Discharge Summary - Cherelle Mckeon 56 y o  female MRN: [id number]    Unit/Bed#: [facility] Encounter: [id number]    Admission Date: 5/14/2018     Discharge Date: 05/14/18    Admitting Diagnosis: Abdominal pain, epigastric [R10 13]  Bariatric surgery status [Z98 84]  Calculus of bile duct with cholecystitis without obstruction, unspecified cholecystitis acuity [K80 40]    Secondary Diagnosis:   Past Medical History:   Diagnosis Date    Basal cell carcinoma     Degenerative lumbar disc     Diabetes mellitus ([address] )     resolved since bariatric surgery    Hiatal hernia     History of transfusion 2014    Post-op spinal surgery    Low back pain     Spinal stenosis     SVT [facility]ra[facility]ricular tachy[address]) [facility] [address] [facility]     Wears glasses        Discharge Diagnosis: Same    Procedures Performed: Procedure(s):  CHOLECYSTECTOMY LAPAROSCOPIC    Consults: None    Hospital Course: Patient with morbid obesity was admitted to the hospital on 5/14/2018 for elective Procedure(s):  CHOLECYSTECTOMY LAPAROSCOPIC  Postoperatively, the patient was stable and transferred to PACU and back to Same-Day Surgery for further observation  The patient was tolerating a liquid diet and pain was controlled oral pain medications  The patient was ambulating without assistance, vital signs and lab work were stable  The patient was cleared for discharge on 05/14/18  Disposition: The patient should follow up with Luis Hackett MD in 2 weeks for a post op check and with Gloria Santos DO as needed for medical management  The patient should refer to the handout "Discharge Instructions" for further information  Call physician for temperature over 101, wound redness or discharge, vomiting, or intolerance to diet  Discharge Medications:  See after visit summary for reconciled discharge medications provided to patient and family

## 2018-05-24 ENCOUNTER — OFFICE VISIT (OUTPATIENT)
Dept: BARIATRICS | Facility: CLINIC | Age: 58
End: 2018-05-24
Payer: MEDICARE

## 2018-05-24 VITALS
BODY MASS INDEX: 25.44 KG/M2 | RESPIRATION RATE: 20 BRPM | WEIGHT: 149 LBS | HEIGHT: 64 IN | DIASTOLIC BLOOD PRESSURE: 64 MMHG | SYSTOLIC BLOOD PRESSURE: 122 MMHG | TEMPERATURE: 98.2 F | HEART RATE: 70 BPM

## 2018-05-24 DIAGNOSIS — E66.01 MORBID (SEVERE) OBESITY DUE TO EXCESS CALORIES (HCC): Primary | ICD-10-CM

## 2018-05-24 PROCEDURE — 99024 POSTOP FOLLOW-UP VISIT: CPT | Performed by: SURGERY

## 2018-05-24 NOTE — PROGRESS NOTES
Bariatric Surgery Post Op    Procedure: Laparoscopic Cholecystectomy  Date: 5/14/18      Assessment     57 yo F s/p Lap Caryl on 5/14 with Dr Kedar Graham, doing very well, no issues  Pathology negative  Plan:    - Continue normal diet  - Follow up as scheduled for normal bariatric f/u      Subjective:     Patient ID: Farrukh Delaney is a 56 y o  female  Patient presents for post op appointment, she is post op laparoscopic cholecystectomy for cholelithiasis on 5/14 with Dr Kedar Graham  She states she is "cured" and feeling very well, no RUQ pain  No N/V  Incisions healing well, slightly itchy at times  No pain control requirements  Chief Complaint   Patient presents with    Post-op     1st post op Lap caryl     Post-Op Bariatric Surgery Lap Caryl    HPI as above    Review of Systems  Negatve    Objective:     Physical Exam    Physical Exam:   Gen: A&O, NAD  Cardio: RRR  Lungs: CTAB  Abd: Soft, non distended, non tender  Incisions healing well, glue is flaking off  No erythema or drainage    Ext: Warm, dry

## 2018-05-30 DIAGNOSIS — E66.01 MORBID (SEVERE) OBESITY DUE TO EXCESS CALORIES (HCC): ICD-10-CM

## 2018-05-30 RX ORDER — OMEPRAZOLE 20 MG/1
CAPSULE, DELAYED RELEASE ORAL
Qty: 30 CAPSULE | Refills: 1 | Status: SHIPPED | OUTPATIENT
Start: 2018-05-30 | End: 2018-07-23 | Stop reason: SDUPTHER

## 2018-06-07 ENCOUNTER — TELEPHONE (OUTPATIENT)
Dept: BARIATRICS | Facility: CLINIC | Age: 58
End: 2018-06-07

## 2018-06-07 NOTE — TELEPHONE ENCOUNTER
LM for patient to call back if she would like to schedule her follow-up appointments at the [facility]

## 2018-06-28 ENCOUNTER — TELEPHONE (OUTPATIENT)
Dept: OBGYN CLINIC | Facility: HOSPITAL | Age: 58
End: 2018-06-28

## 2018-07-23 DIAGNOSIS — E66.01 MORBID (SEVERE) OBESITY DUE TO EXCESS CALORIES (HCC): ICD-10-CM

## 2018-07-25 RX ORDER — OMEPRAZOLE 20 MG/1
CAPSULE, DELAYED RELEASE ORAL
Qty: 30 CAPSULE | Refills: 1 | Status: SHIPPED | OUTPATIENT
Start: 2018-07-25 | End: 2018-09-19 | Stop reason: SDUPTHER

## 2018-09-19 DIAGNOSIS — E66.01 MORBID (SEVERE) OBESITY DUE TO EXCESS CALORIES (HCC): ICD-10-CM

## 2018-09-21 RX ORDER — OMEPRAZOLE 20 MG/1
CAPSULE, DELAYED RELEASE ORAL
Qty: 30 CAPSULE | Refills: 1 | Status: SHIPPED | OUTPATIENT
Start: 2018-09-21 | End: 2018-11-13 | Stop reason: SDUPTHER

## 2018-10-02 ENCOUNTER — OFFICE VISIT (OUTPATIENT)
Dept: URGENT CARE | Facility: CLINIC | Age: 58
End: 2018-10-02
Payer: MEDICARE

## 2018-10-02 VITALS
SYSTOLIC BLOOD PRESSURE: 98 MMHG | DIASTOLIC BLOOD PRESSURE: 64 MMHG | BODY MASS INDEX: 25.49 KG/M2 | WEIGHT: 153 LBS | RESPIRATION RATE: 16 BRPM | HEART RATE: 58 BPM | HEIGHT: 65 IN | TEMPERATURE: 97.4 F | OXYGEN SATURATION: 98 %

## 2018-10-02 DIAGNOSIS — N30.00 ACUTE CYSTITIS WITHOUT HEMATURIA: Primary | ICD-10-CM

## 2018-10-02 DIAGNOSIS — R30.9 PAINFUL URINATION: ICD-10-CM

## 2018-10-02 LAB
SL AMB  POCT GLUCOSE, UA: NEGATIVE
SL AMB LEUKOCYTE ESTERASE,UA: NORMAL
SL AMB POCT BILIRUBIN,UA: NEGATIVE
SL AMB POCT BLOOD,UA: NORMAL
SL AMB POCT CLARITY,UA: CLEAR
SL AMB POCT COLOR,UA: NORMAL
SL AMB POCT KETONES,UA: NEGATIVE
SL AMB POCT NITRITE,UA: POSITIVE
SL AMB POCT PH,UA: 5
SL AMB POCT SPECIFIC GRAVITY,UA: 1.01
SL AMB POCT URINE PROTEIN: NEGATIVE
SL AMB POCT UROBILINOGEN: 1

## 2018-10-02 PROCEDURE — 81002 URINALYSIS NONAUTO W/O SCOPE: CPT | Performed by: PHYSICIAN ASSISTANT

## 2018-10-02 PROCEDURE — 87086 URINE CULTURE/COLONY COUNT: CPT | Performed by: PHYSICIAN ASSISTANT

## 2018-10-02 PROCEDURE — G0463 HOSPITAL OUTPT CLINIC VISIT: HCPCS | Performed by: PHYSICIAN ASSISTANT

## 2018-10-02 PROCEDURE — 99213 OFFICE O/P EST LOW 20 MIN: CPT | Performed by: PHYSICIAN ASSISTANT

## 2018-10-02 RX ORDER — SULFAMETHOXAZOLE AND TRIMETHOPRIM 800; 160 MG/1; MG/1
1 TABLET ORAL EVERY 12 HOURS SCHEDULED
Qty: 10 TABLET | Refills: 0 | Status: SHIPPED | OUTPATIENT
Start: 2018-10-02 | End: 2018-10-07

## 2018-10-02 RX ORDER — PHENAZOPYRIDINE HYDROCHLORIDE 200 MG/1
200 TABLET, FILM COATED ORAL
Qty: 10 TABLET | Refills: 0 | Status: SHIPPED | OUTPATIENT
Start: 2018-10-02 | End: 2018-10-24 | Stop reason: ALTCHOICE

## 2018-10-02 NOTE — PATIENT INSTRUCTIONS
Urinary Tract Infection in Women, Ambulatory Care   GENERAL INFORMATION:   A urinary tract infection (UTI)  is caused by bacteria that get inside your urinary tract  Most bacteria that enter your urinary tract are expelled when you urinate  If the bacteria stay in your urinary tract, you may get an infection  Your urinary tract includes your kidneys, ureters, bladder, and urethra  Urine is made in your kidneys, and it flows from the ureters to the bladder  Urine leaves the bladder through the urethra  A UTI is more common in your lower urinary tract, which includes your bladder and urethra  Common symptoms include the following:   · Urinating more often or waking from sleep to urinate    · Pain or burning when you urinate    · Pain or pressure in your lower abdomen     · Urine that smells bad    · Blood in your urine    · Leaking urine  Seek immediate care for the following symptoms:   · Urinating very little or not at all    · Vomiting    · Shaking chills with a fever    · Side or back pain that gets worse  Treatment for a UTI  may include medicines to treat a bacterial infection  You may also need medicines to decrease pain and burning, or decrease the urge to urinate often  Prevent a UTI:   · Urinate when you feel the urge  Do not hold your urine  Urinate as soon as you feel you have to  · Drink liquids as directed  Ask how much liquid to drink each day and which liquids are best for you  You may need to drink more fluids than usual to help flush out the bacteria  Do not drink alcohol, caffeine, and citrus juices  These can irritate your bladder and increase your symptoms  · Apply heat  on your abdomen for 20 to 30 minutes every 2 hours for as many days as directed  Heat helps decrease discomfort and pressure in your bladder  Follow up with your healthcare provider as directed:  Write down your questions so you remember to ask them during your visits     CARE AGREEMENT:   You have the right to help plan your care  Learn about your health condition and how it may be treated  Discuss treatment options with your caregivers to decide what care you want to receive  You always have the right to refuse treatment  The above information is an  only  It is not intended as medical advice for individual conditions or treatments  Talk to your doctor, nurse or pharmacist before following any medical regimen to see if it is safe and effective for you  © 2014 [address] is for End User's use only and may not be sold, redistributed or otherwise used for commercial purposes  All illustrations and images included in CareNotes® are the copyrighted property of [employer]  or [employer]

## 2018-10-02 NOTE — PROGRESS NOTES
[facility]        NAME: Ed Ocasio is a 56 y o  female  : 1960    MRN: [id number]  DATE: 2018  TIME: 12:01 PM    Assessment and Plan   Acute cystitis without hematuria [N30 00]  1  Acute cystitis without hematuria  Urine culture    sulfamethoxazole-trimethoprim (BACTRIM DS) 800-160 mg per tablet    phenazopyridine (PYRIDIUM) 200 mg tablet   2  Painful urination  POCT urine dip         Patient Instructions      urine dip with leukocytes protein blood nitrates  Follow up with PCP in 3-5 days  Proceed to  ER if symptoms worsen  Chief Complaint     Chief Complaint   Patient presents with    Possible UTI     x last night, pain/urgency/lower back pain, took azo         History of Present Illness        59-year-old female complains of urinary  Urgency hesitancy dysuria and frequency since last night  History of frequent UTIs but last was over 6 months ago  No history urinary surgery  No blood in her urine  She took azo over-the-counter  No back pain nausea or vomiting        Review of Systems   Review of Systems      Current Medications       Current Outpatient Prescriptions:     baclofen 10 mg tablet, Take 10 mg by mouth daily, Disp: , Rfl:     buPROPion (WELLBUTRIN) 75 mg tablet, Take 75 mg by mouth 2 (two) times a day, Disp: , Rfl: 3    Calcium Citrate-Vitamin D (CALCIUM CITRATE +D) 315-250 MG-UNIT TABS, Take 2 tablets by mouth daily, Disp: , Rfl:     escitalopram (LEXAPRO) 20 mg tablet, Take 20 mg by mouth every morning  , Disp: , Rfl:     fenofibrate (TRIGLIDE) 160 MG tablet, Take 160 mg by mouth daily  , Disp: , Rfl:     fentaNYL (DURAGESIC) 25 mcg/hr, Place 1 patch on the skin every third day, Disp: , Rfl:     gabapentin (NEURONTIN) 100 mg capsule, TAKE ONE CAPSULE TWICE A DAY, Disp: , Rfl:     loratadine (CLARITIN) 10 mg tablet, Take 10 mg by mouth daily  , Disp: , Rfl:     Multiple Vitamins-Minerals (BARIATRIC FUSION PO), Take 1 tablet by mouth daily, Disp: , Rfl:   omeprazole (PriLOSEC) 20 mg delayed release capsule, TAKE 1 CAPSULE DAILY, Disp: 30 capsule, Rfl: 1    oxyCODONE (ROXICODONE) 15 mg immediate release tablet, Take 15 mg by mouth 2 (two) times a day as needed  , Disp: , Rfl:     phenazopyridine (PYRIDIUM) 200 mg tablet, Take 1 tablet (200 mg total) by mouth 3 (three) times a day with meals, Disp: 10 tablet, Rfl: 0    sulfamethoxazole-trimethoprim (BACTRIM DS) 800-160 mg per tablet, Take 1 tablet by mouth every 12 (twelve) hours for 5 days, Disp: 10 tablet, Rfl: 0    Current Allergies     Allergies as of 10/02/2018 - Reviewed 10/02/2018   Allergen Reaction Noted    No known allergies Other (See Comments) 2016            The following portions of the patient's history were reviewed and updated as appropriate: allergies, current medications, past family history, past medical history, past social history, past surgical history and problem list      Past Medical History:   Diagnosis Date    Basal cell carcinoma     Degenerative lumbar disc     Diabetes mellitus ([address] )     resolved since bariatric surgery    Hiatal hernia     History of transfusion     Post-op spinal surgery    Low back pain     Spinal stenosis     SVT (supraventricular tachycardia) ([address] )     Wears glasses        Past Surgical History:   Procedure Laterality Date    APPENDECTOMY      BACK SURGERY      Lumbar and Sacral fusions-3 surgeries total    CARDIAC ELECTROPHYSIOLOGY STUDY AND ABLATION      CARPAL TUNNEL RELEASE      x2    CERVICAL SPINE SURGERY      Fusion of C2-C7 over a period of 3 surgeries     SECTION      X2    CHOLECYSTECTOMY      INSERTION / PLACEMENT / REVISION NEUROSTIMULATOR      X2- both were removed    NECK SURGERY      [place] E[place] TRANSORAL BIOPSY SINGLE/MULTIPLE N/A 2016    Procedure: ESOPHAGOGASTRODUODENOSCOPY (EGD); Surgeon: Rosy Estevez MD;  Location: [facility];   Service: Bariatrics    [place] E[place] TRANSORAL BIOPSY SINGLE/MULTIPLE N/A 4/18/2018    Procedure: ESOPHAGOGASTRODUODENOSCOPY (EGD) with biopsy;  Surgeon: Edu Gillespie MD;  [facility]: [facility]; Service: Bariatrics    [place] LAP GASTRIC BYPASS/BRAULIO-EN-Y N/A 10/25/2016    Procedure: BYPASS GASTRIC  RBAULIO-EN-Y LAPAROSCOPIC, WITH INTRA OP EGD;  Surgeon: Edu Gillespie MD;  [facility];  Service: Wu Pacheco [place] LAP,CHOLECYSTECTOMY N/A 5/14/2018    Procedure: Yari Parker;  Surgeon: Edu Gillespie MD;  [facility];  Service: Lurleslye Pacheco SKIN CANCER EXCISION      TONSILLECTOMY      TUBAL LIGATION      WISDOM TOOTH EXTRACTION         Family History   Problem Relation Age of Onset    No Known Problems Father     No Known Problems Mother          Medications have been verified  Objective   BP 98/64 (BP Location: Left arm, Patient Position: Sitting, Cuff Size: Standard)   Pulse 58   Temp (!) 97 4 °F (36 3 °C) (Tympanic)   Resp 16   Ht 5' 5" (1 651 m)   Wt 69 4 kg (153 lb)   SpO2 98%   BMI 25 46 kg/m²        Physical Exam     Physical Exam   Constitutional: She appears well-developed and well-nourished  Cardiovascular: Normal rate and regular rhythm  Pulmonary/Chest: Effort normal and breath sounds normal    Abdominal: There is tenderness in the suprapubic area  There is no rigidity, no rebound, no guarding and no CVA tenderness

## 2018-10-03 LAB — BACTERIA UR CULT: NORMAL

## 2018-10-24 ENCOUNTER — OFFICE VISIT (OUTPATIENT)
Dept: URGENT CARE | Facility: CLINIC | Age: 58
End: 2018-10-24
Payer: MEDICARE

## 2018-10-24 VITALS
WEIGHT: 153 LBS | HEIGHT: 65 IN | TEMPERATURE: 98.5 F | HEART RATE: 66 BPM | OXYGEN SATURATION: 97 % | SYSTOLIC BLOOD PRESSURE: 110 MMHG | BODY MASS INDEX: 25.49 KG/M2 | RESPIRATION RATE: 18 BRPM | DIASTOLIC BLOOD PRESSURE: 70 MMHG

## 2018-10-24 DIAGNOSIS — N30.00 ACUTE CYSTITIS WITHOUT HEMATURIA: Primary | ICD-10-CM

## 2018-10-24 LAB
SL AMB  POCT GLUCOSE, UA: NEGATIVE
SL AMB LEUKOCYTE ESTERASE,UA: ABNORMAL
SL AMB POCT BLOOD,UA: ABNORMAL
SL AMB POCT CLARITY,UA: ABNORMAL
SL AMB POCT COLOR,UA: ABNORMAL

## 2018-10-24 PROCEDURE — 99213 OFFICE O/P EST LOW 20 MIN: CPT | Performed by: PHYSICIAN ASSISTANT

## 2018-10-24 PROCEDURE — 87086 URINE CULTURE/COLONY COUNT: CPT | Performed by: PHYSICIAN ASSISTANT

## 2018-10-24 PROCEDURE — G0463 HOSPITAL OUTPT CLINIC VISIT: HCPCS | Performed by: PHYSICIAN ASSISTANT

## 2018-10-24 PROCEDURE — 81002 URINALYSIS NONAUTO W/O SCOPE: CPT | Performed by: PHYSICIAN ASSISTANT

## 2018-10-24 RX ORDER — LEVOFLOXACIN 500 MG/1
500 TABLET, FILM COATED ORAL EVERY 24 HOURS
Qty: 5 TABLET | Refills: 0 | Status: SHIPPED | OUTPATIENT
Start: 2018-10-24 | End: 2018-10-29

## 2018-10-24 RX ORDER — PHENAZOPYRIDINE HYDROCHLORIDE 200 MG/1
200 TABLET, FILM COATED ORAL
Qty: 10 TABLET | Refills: 0 | Status: SHIPPED | OUTPATIENT
Start: 2018-10-24 | End: 2019-03-28

## 2018-10-24 NOTE — PROGRESS NOTES
[facility]        NAME: Elba Antony is a 66 y o  female  : 1960    MRN: [id number]  DATE: 2018  TIME: 4:17 PM    Assessment and Plan   Acute cystitis without hematuria [N30 00]  1  Acute cystitis without hematuria  POCT urine dip    Urine culture    levofloxacin (LEVAQUIN) 500 mg tablet    phenazopyridine (PYRIDIUM) 200 mg tablet         Patient Instructions       Do not hold urine  Hydrate  If back pain or fever go to the ER  We will check a culture  Follow up with PCP in 3-5 days  Proceed to  ER if symptoms worsen  Chief Complaint     Chief Complaint   Patient presents with    Possible UTI     started 2 hours ago  complains of frequency, urgency, and pain at the end of her urine stream  also noted blood in urine  History of Present Illness       53-year-old female complains of 2 hr of urinary burning or urgency hematuria and dysuria  She had a UTI several weeks ago which got better but she recently came back from vacation where she was holding her bladder a lot  No back pain or fever  No history of urinary surgery  No nausea or vomiting  Review of Systems   Review of Systems      Current Medications       Current Outpatient Prescriptions:     baclofen 10 mg tablet, Take 10 mg by mouth daily, Disp: , Rfl:     buPROPion (WELLBUTRIN) 75 mg tablet, Take 75 mg by mouth 2 (two) times a day, Disp: , Rfl: 3    Calcium Citrate-Vitamin D (CALCIUM CITRATE +D) 315-250 MG-UNIT TABS, Take 2 tablets by mouth daily, Disp: , Rfl:     escitalopram (LEXAPRO) 20 mg tablet, Take 20 mg by mouth every morning  , Disp: , Rfl:     fenofibrate (TRIGLIDE) 160 MG tablet, Take 160 mg by mouth daily  , Disp: , Rfl:     fentaNYL (DURAGESIC) 25 mcg/hr, Place 1 patch on the skin every third day, Disp: , Rfl:     gabapentin (NEURONTIN) 100 mg capsule, TAKE ONE CAPSULE TWICE A DAY, Disp: , Rfl:     loratadine (CLARITIN) 10 mg tablet, Take 10 mg by mouth daily  , Disp: , Rfl:    Multiple Vitamins-Minerals (BARIATRIC FUSION PO), Take 1 tablet by mouth daily, Disp: , Rfl:     omeprazole (PriLOSEC) 20 mg delayed release capsule, TAKE 1 CAPSULE DAILY, Disp: 30 capsule, Rfl: 1    oxyCODONE (ROXICODONE) 15 mg immediate release tablet, Take 15 mg by mouth 2 (two) times a day as needed  , Disp: , Rfl:     levofloxacin (LEVAQUIN) 500 mg tablet, Take 1 tablet (500 mg total) by mouth every 24 hours for 5 days, Disp: 5 tablet, Rfl: 0    phenazopyridine (PYRIDIUM) 200 mg tablet, Take 1 tablet (200 mg total) by mouth 3 (three) times a day with meals, Disp: 10 tablet, Rfl: 0    Current Allergies     Allergies as of 10/24/2018 - Reviewed 10/24/2018   Allergen Reaction Noted    No known allergies Other (See Comments) 2016            The following portions of the patient's history were reviewed and updated as appropriate: allergies, current medications, past family history, past medical history, past social history, past surgical history and problem list      Past Medical History:   Diagnosis Date    Basal cell carcinoma     Degenerative lumbar disc     Diabetes mellitus ([address] )     resolved since bariatric surgery    Hiatal hernia     History of transfusion     Post-op spinal surgery    Low back pain     Spinal stenosis     SVT (supraventricular tachycardia) ([address] )     Wears glasses        Past Surgical History:   Procedure Laterality Date    APPENDECTOMY      BACK SURGERY      Lumbar and Sacral fusions-3 surgeries total    CARDIAC ELECTROPHYSIOLOGY STUDY AND ABLATION      CARPAL TUNNEL RELEASE      x2    CERVICAL SPINE SURGERY      Fusion of C2-C7 over a period of 3 surgeries     SECTION      X2    CHOLECYSTECTOMY      INSERTION / PLACEMENT / REVISION NEUROSTIMULATOR      X2- both were removed    NECK SURGERY      [place] EGD TRANSORAL BIOPSY SINGLE/MULTIPLE N/A 2016    Procedure: ESOPHAGOGASTRODUODENOSCOPY (EGD);   Surgeon: Ct Maria MD;  [facility]; Service: Bariatrics    [place] EGD TRANSORAL BIOPSY SINGLE/MULTIPLE N/A 4/18/2018    Procedure: ESOPHAGOGASTRODUODENOSCOPY (EGD) with biopsy;  Surgeon: Jeane Hutchins MD;  [facility]; Service: Bariatrics    [place] LAP GASTRIC BYPASS/BRAULIO-EN-Y N/A 10/25/2016    Procedure: BYPASS GASTRIC  BRAULIO-EN-Y LAPAROSCOPIC, WITH INTRA OP EGD;  Surgeon: Jeane Hutchins MD;  [facility];  Service: Laura Ant [place] LAP,CHOLECYSTECTOMY N/A 5/14/2018    Procedure: Christine Jenaro;  Surgeon: Jeane Hutchins MD;  [facility];  Service: Laura Cain SKIN CANCER EXCISION      TONSILLECTOMY      TUBAL LIGATION      WISDOM TOOTH EXTRACTION         Family History   Problem Relation Age of Onset    No Known Problems Father     No Known Problems Mother          Medications have been verified  Objective   /70 (BP Location: Left arm, Patient Position: Sitting)   Pulse 66   Temp 98 5 °F (36 9 °C) (Tympanic)   Resp 18   Ht 5' 5" (1 651 m)   Wt 69 4 kg (153 lb)   SpO2 97%   BMI 25 46 kg/m²        Physical Exam     Physical Exam   Constitutional: She appears well-developed and well-nourished  Cardiovascular: Normal rate and regular rhythm  Pulmonary/Chest: Effort normal and breath sounds normal    Abdominal: There is tenderness in the suprapubic area  There is no rigidity, no rebound, no guarding and no CVA tenderness  Urine dip with leukocytes some blood however she did take azo before we got the sample

## 2018-10-25 LAB — BACTERIA UR CULT: NORMAL

## 2018-11-13 DIAGNOSIS — E66.01 MORBID (SEVERE) OBESITY DUE TO EXCESS CALORIES (HCC): ICD-10-CM

## 2018-11-15 RX ORDER — OMEPRAZOLE 20 MG/1
CAPSULE, DELAYED RELEASE ORAL
Qty: 30 CAPSULE | Refills: 1 | Status: SHIPPED | OUTPATIENT
Start: 2018-11-15 | End: 2019-03-13 | Stop reason: SDUPTHER

## 2018-12-19 ENCOUNTER — TRANSCRIBE ORDERS (OUTPATIENT)
Dept: ADMINISTRATIVE | Facility: HOSPITAL | Age: 58
End: 2018-12-19

## 2018-12-19 DIAGNOSIS — M47.817 LUMBOSACRAL SPONDYLOSIS WITHOUT MYELOPATHY: Primary | ICD-10-CM

## 2018-12-27 ENCOUNTER — HOSPITAL ENCOUNTER (OUTPATIENT)
Dept: CT IMAGING | Facility: CLINIC | Age: 58
Discharge: HOME | End: 2018-12-27
Payer: MEDICARE

## 2018-12-27 DIAGNOSIS — M47.817 LUMBOSACRAL SPONDYLOSIS WITHOUT MYELOPATHY: ICD-10-CM

## 2018-12-27 PROCEDURE — 72131 CT LUMBAR SPINE W/O DYE: CPT

## 2019-01-09 ENCOUNTER — OFFICE VISIT (OUTPATIENT)
Dept: URGENT CARE | Facility: CLINIC | Age: 59
End: 2019-01-09
Payer: MEDICARE

## 2019-01-09 VITALS
TEMPERATURE: 97.4 F | HEART RATE: 59 BPM | RESPIRATION RATE: 18 BRPM | SYSTOLIC BLOOD PRESSURE: 122 MMHG | OXYGEN SATURATION: 96 % | HEIGHT: 64 IN | DIASTOLIC BLOOD PRESSURE: 74 MMHG | WEIGHT: 153 LBS | BODY MASS INDEX: 26.12 KG/M2

## 2019-01-09 DIAGNOSIS — R30.0 DYSURIA: Primary | ICD-10-CM

## 2019-01-09 PROCEDURE — 87077 CULTURE AEROBIC IDENTIFY: CPT | Performed by: PHYSICIAN ASSISTANT

## 2019-01-09 PROCEDURE — 99213 OFFICE O/P EST LOW 20 MIN: CPT | Performed by: PHYSICIAN ASSISTANT

## 2019-01-09 PROCEDURE — 87186 SC STD MICRODIL/AGAR DIL: CPT | Performed by: PHYSICIAN ASSISTANT

## 2019-01-09 PROCEDURE — 87086 URINE CULTURE/COLONY COUNT: CPT | Performed by: PHYSICIAN ASSISTANT

## 2019-01-09 PROCEDURE — G0463 HOSPITAL OUTPT CLINIC VISIT: HCPCS | Performed by: PHYSICIAN ASSISTANT

## 2019-01-09 RX ORDER — NITROFURANTOIN 25; 75 MG/1; MG/1
100 CAPSULE ORAL 2 TIMES DAILY
Qty: 14 CAPSULE | Refills: 0 | Status: SHIPPED | OUTPATIENT
Start: 2019-01-09 | End: 2019-01-16

## 2019-01-09 NOTE — PATIENT INSTRUCTIONS

## 2019-01-09 NOTE — PROGRESS NOTES
[facility]        NAME: Dodie Curtis is a 66 y o  female  : 1960    MRN: [id number]  DATE: 2019  TIME: 6:40 PM    Assessment and Plan   Dysuria [R30 0]  1  Dysuria  POCT urine dip    Urine culture    Ambulatory referral to Urology    nitrofurantoin (MACROBID) 100 mg capsule         Patient Instructions     Due to recurrent UTI follow up with Urology  Follow up with PCP in 3-5 days  Proceed to  ER if symptoms worsen  Chief Complaint     Chief Complaint   Patient presents with    Possible UTI     since Sun  complains of pressure, pain with urination, urgency  History of Present Illness         72-year-old female presents with urinary burning frequency and urgency for 3 days  She took 2 days of azo over-the-counter with minimal help  No fever chills  No nausea or vomiting  No back pain  No blood in her urine  She had a UTI in October that took 2 treatments to go away  Review of Systems   Review of Systems      Current Medications       Current Outpatient Prescriptions:     baclofen 10 mg tablet, Take 10 mg by mouth daily, Disp: , Rfl:     buPROPion (WELLBUTRIN) 75 mg tablet, Take 75 mg by mouth 2 (two) times a day, Disp: , Rfl: 3    Calcium Citrate-Vitamin D (CALCIUM CITRATE +D) 315-250 MG-UNIT TABS, Take 2 tablets by mouth daily, Disp: , Rfl:     escitalopram (LEXAPRO) 20 mg tablet, Take 20 mg by mouth every morning  , Disp: , Rfl:     fenofibrate (TRIGLIDE) 160 MG tablet, Take 160 mg by mouth daily  , Disp: , Rfl:     fentaNYL (DURAGESIC) 25 mcg/hr, Place 1 patch on the skin every third day, Disp: , Rfl:     gabapentin (NEURONTIN) 100 mg capsule, TAKE ONE CAPSULE TWICE A DAY, Disp: , Rfl:     loratadine (CLARITIN) 10 mg tablet, Take 10 mg by mouth daily  , Disp: , Rfl:     Multiple Vitamins-Minerals (BARIATRIC FUSION PO), Take 1 tablet by mouth daily, Disp: , Rfl:     omeprazole (PriLOSEC) 20 mg delayed release capsule, TAKE 1 CAPSULE DAILY, Disp: 30 capsule, Rfl: 1    oxyCODONE (ROXICODONE) 15 mg immediate release tablet, Take 15 mg by mouth 2 (two) times a day as needed  , Disp: , Rfl:     nitrofurantoin (MACROBID) 100 mg capsule, Take 1 capsule (100 mg total) by mouth 2 (two) times a day for 7 days, Disp: 14 capsule, Rfl: 0    phenazopyridine (PYRIDIUM) 200 mg tablet, Take 1 tablet (200 mg total) by mouth 3 (three) times a day with meals (Patient not taking: Reported on 2019 ), Disp: 10 tablet, Rfl: 0    Current Allergies     Allergies as of 2019    (No Known Allergies)            The following portions of the patient's history were reviewed and updated as appropriate: allergies, current medications, past family history, past medical history, past social history, past surgical history and problem list      Past Medical History:   Diagnosis Date    Bariatric surgery status     Basal cell carcinoma     Degenerative lumbar disc     Diabetes mellitus [address] [address] )     resolved since bariatric surgery    Hiatal hernia     History of transfusion     Post-op spinal surgery    Low back pain     Postsurgical malabsorption     Spinal stenosis     SVT (supraventricular tachycardia) ([address] )     Wears glasses        Past Surgical History:   Procedure Laterality Date    APPENDECTOMY      BACK SURGERY      Lumbar and Sacral fusions-3 surgeries total    CARDIAC ELECTROPHYSIOLOGY STUDY AND ABLATION      CARPAL TUNNEL RELEASE      x2    CERVICAL SPINE SURGERY      Fusion of C2-C7 over a period of 3 surgeries     SECTION      X2    CHOLECYSTECTOMY      INSERTION / PLACEMENT / REVISION NEUROSTIMULATOR      X2- both were removed    NECK SURGERY      [place] EGD TRANSORAL BIOPSY SINGLE/MULTIPLE N/A 2016    Procedure: ESOPHAGOGASTRODUODENOSCOPY (EGD); Surgeon: Nori Onofre MD;  [facility];   Service: Bariatrics    [place] EGD TRANSORAL BIOPSY SINGLE/MULTIPLE N/A 2018    Procedure: ESOPHAGOGASTRODUODENOSCOPY (EGD) with biopsy; Surgeon: Ronald Darden MD;  [facility]; Service: Bariatrics    [place] LAP GASTRIC BYPASS/BRAULIO-EN-Y N/A 10/25/2016    Procedure: BYPASS GASTRIC  BRAULIO-EN-Y LAPAROSCOPIC, WITH INTRA OP EGD;  Surgeon: Ronald Darden MD;  [facility];  Service: [facility] [place] LAP,CHOLECYSTECTOMY N/A 5/14/2018    Procedure: Omero Litter;  Surgeon: Ronald Darden MD;  [facility];  Service: Vu Colin SKIN CANCER EXCISION      TONSILLECTOMY      TUBAL LIGATION      WISDOM TOOTH EXTRACTION         Family History   Problem Relation Age of Onset    No Known Problems Father     No Known Problems Mother          Medications have been verified  Objective   /74 (BP Location: Left arm, Patient Position: Sitting)   Pulse 59   Temp (!) 97 4 °F (36 3 °C) (Temporal)   Resp 18   Ht 5' 4" (1 626 m)   Wt 69 4 kg (153 lb)   SpO2 96%   BMI 26 26 kg/m²        Physical Exam     Physical Exam   Constitutional: She appears well-developed and well-nourished  Cardiovascular: Normal rate and regular rhythm  Pulmonary/Chest: Effort normal and breath sounds normal    Abdominal: There is no tenderness  There is no rigidity, no rebound, no guarding and no CVA tenderness

## 2019-01-12 LAB
BACTERIA UR CULT: ABNORMAL

## 2019-01-14 RX ORDER — FLUTICASONE PROPIONATE 50 MCG
SPRAY, SUSPENSION (ML) NASAL
Refills: 0 | COMMUNITY
Start: 2018-12-18 | End: 2019-09-25

## 2019-01-15 ENCOUNTER — TELEPHONE (OUTPATIENT)
Dept: URGENT CARE | Facility: CLINIC | Age: 59
End: 2019-01-15

## 2019-01-15 ENCOUNTER — TELEPHONE (OUTPATIENT)
Dept: NEUROSURGERY | Facility: CLINIC | Age: 59
End: 2019-01-15

## 2019-01-15 DIAGNOSIS — Z98.1 HISTORY OF LUMBAR SPINAL FUSION: Primary | ICD-10-CM

## 2019-01-15 NOTE — TELEPHONE ENCOUNTER
Received call from [place] reporting that she had a recent CT scan ordered by her pain management doctor  They had concerns about a loose screw in her fusion  Discussed with APs in the office and determined best to order flex/ex xray and schedule appt as SNPX with Dr Beryl Power  Contacted patient and was able to get scheduled 3/1/2019 ordered imaging and advised patient to have completed prior to appt

## 2019-01-16 ENCOUNTER — APPOINTMENT (OUTPATIENT)
Dept: RADIOLOGY | Facility: CLINIC | Age: 59
End: 2019-01-16
Payer: MEDICARE

## 2019-01-16 DIAGNOSIS — Z98.1 HISTORY OF LUMBAR SPINAL FUSION: ICD-10-CM

## 2019-01-16 PROCEDURE — 72120 X-RAY BEND ONLY L-S SPINE: CPT

## 2019-01-17 ENCOUNTER — TELEPHONE (OUTPATIENT)
Dept: UROLOGY | Facility: MEDICAL CENTER | Age: 59
End: 2019-01-17

## 2019-01-17 NOTE — TELEPHONE ENCOUNTER
Complaint/Diagnosis:Recurring UTI'S    Insurance:[facility]/[place]    History of Cancer:Basal Cell    Previous urologist:no    Outside testing/where:epic    If yes,what kind:epic    Records requested/where:[facility]    Preferred location:[place]

## 2019-01-18 ENCOUNTER — OFFICE VISIT (OUTPATIENT)
Dept: BARIATRICS | Facility: CLINIC | Age: 59
End: 2019-01-18
Payer: MEDICARE

## 2019-01-18 VITALS
TEMPERATURE: 98.1 F | RESPIRATION RATE: 18 BRPM | HEART RATE: 60 BPM | SYSTOLIC BLOOD PRESSURE: 118 MMHG | WEIGHT: 157.4 LBS | HEIGHT: 64 IN | BODY MASS INDEX: 26.87 KG/M2 | DIASTOLIC BLOOD PRESSURE: 68 MMHG

## 2019-01-18 DIAGNOSIS — I10 BENIGN ESSENTIAL HYPERTENSION: ICD-10-CM

## 2019-01-18 DIAGNOSIS — G89.4 CHRONIC PAIN DISORDER: ICD-10-CM

## 2019-01-18 DIAGNOSIS — Z90.3 POSTGASTRECTOMY MALABSORPTION: ICD-10-CM

## 2019-01-18 DIAGNOSIS — E11.9 TYPE 2 DIABETES MELLITUS WITHOUT COMPLICATION, UNSPECIFIED WHETHER LONG TERM INSULIN USE (HCC): ICD-10-CM

## 2019-01-18 DIAGNOSIS — K91.2 POSTGASTRECTOMY MALABSORPTION: ICD-10-CM

## 2019-01-18 DIAGNOSIS — E78.5 HYPERLIPIDEMIA, UNSPECIFIED HYPERLIPIDEMIA TYPE: ICD-10-CM

## 2019-01-18 DIAGNOSIS — Z98.84 S/P GASTRIC BYPASS: Primary | ICD-10-CM

## 2019-01-18 PROCEDURE — 99214 OFFICE O/P EST MOD 30 MIN: CPT | Performed by: PHYSICIAN ASSISTANT

## 2019-01-18 NOTE — ASSESSMENT & PLAN NOTE
-status post Noé-en-y gastric bypass surgery 10/25/2016 and s/p lap maya on 05/14/18 by Dr Davin Bain  EWL is 84%; noted about 8lbs weight gain in the last 6 months r/t holiday snacking and poor food choices  Lengthy dietary education today - she agrees to keep food records, increase fluids, and reduce unhealthy snacking      · Tolerating a regular diet-yes  · Eating at least 60 grams of protein per day-yes  · Following 30/60 minute rule with liquids-yes  · Drinking at least 64 ounces of fluid per day-no  · Drinking carbonated beverages-no  · Sufficient exercise-no, limited b/c of back pain  · Using NSAIDs regularly-no  · Using nicotine-no  · Using alcohol-no Lower Extremity





- HPI Summary


HPI Summary: 


30 male presents with right knee pain today.  He states he planted his foot and 

went to push off and turned his knee while playing soccer and developed pain in 

his lateral aspect of his right knee.  He states he has previous torn LCL that 

was are going to require surgery but he did not have the surgery.  He states 

the pain feels same.  He denies the knee giving out.  He has not placed any 

weight on the area.  He denies any popping or locking of the knee.  He denies 

any numbness tingling.  He denies any other injury.








- History of Current Complaint


Chief Complaint: EDExtremityLower


Stated Complaint: RT KNEE PAIN


Time Seen by Provider: 06/09/18 19:45


Pain Intensity: 7





- Allergies/Home Medications


Allergies/Adverse Reactions: 


 Allergies











Allergy/AdvReac Type Severity Reaction Status Date / Time


 


No Known Allergies Allergy   Verified 06/09/18 18:54














PMH/Surg Hx/FS Hx/Imm Hx


Endocrine/Hematology History: 


   Denies: Hx Anticoagulant Therapy


Cardiovascular History: 


   Denies: Hx Myocardial Infarction


Infectious Disease History: No


Infectious Disease History: Reports: Traveled Outside the [place] in Last 30 Days - 

from [place]





- Family History


Known Family History: 


   Negative: Diabetes





- Social History


Substance Use Type: Reports: None


Smoking Status (MU): Never Smoked Tobacco





Review of Systems


Negative: Fever


Negative: Chest Pain


Negative: Shortness Of Breath


Positive: Myalgia - right knee pain


All Other Systems Reviewed And Are Negative: Yes





Physical Exam


Triage Information Reviewed: Yes


Vital Signs On Initial Exam: 


 Initial Vitals











Temp Pulse Resp BP Pulse Ox


 


 98.4 F   82   16   118/64   98 


 


 06/09/18 18:50  06/09/18 18:50  06/09/18 18:50  06/09/18 18:50  06/09/18 18:50











Vital Signs Reviewed: Yes


Appearance: Positive: Well-Appearing


Skin: Positive: Warm, Dry


Head/Face: Positive: Normal Head/Face Inspection


Eyes: Positive: Normal, Conjunctiva Clear


Respiratory/Lung Sounds: Positive: Clear to Auscultation, Breath Sounds Present


Cardiovascular: Positive: Normal, RRR


Musculoskeletal: Positive: Limited @ - right knee, Other - laxity with carie 

noted, good pulses, sensation grossly intact


Neurological: Positive: Normal


Psychiatric: Positive: Normal





Diagnostics





- Vital Signs


 Vital Signs











  Temp Pulse Resp BP Pulse Ox


 


 06/09/18 18:50  98.4 F  82  16  118/64  98














- Laboratory


Lab Statement: Any lab studies that have been ordered have been reviewed, and 

results considered in the medical decision making process.





- Radiology


  ** knee


Xray Interpretation: No Acute Changes


Radiology Interpretation Completed By: Radiologist





Lower Extremity Course/Dx





- Course


Course Of Treatment: 30 male presents with right knee pain today.  He states he 

planted his foot and went to push off and turned his knee while playing soccer 

and developed pain in his lateral aspect of his right knee.  He states he has 

previous torn LCL that was are going to require surgery but he did not have the 

surgery.  He states the pain feels same.  He denies the knee giving out.  He 

has not placed any weight on the area.  He denies any popping or locking of the 

knee.  He denies any numbness tingling.  He denies any other injury.   On exam 

tenderness over lateral aspect of right knee.  Neurovascularly intact.  Has 

laxity with Sissy's.  X-ray normal.  We'll have follow-up with orthopedic 

due to previous injury to LCL could have further damage it or hurt the mensicus 

with mechanism.  Patient understands agrees with plan.





- Diagnoses


Differential Diagnosis/HQI/PQRI: Positive: Fracture (Closed), Sprain, Strain


Provider Diagnoses: 


 Right knee pain








Discharge





- Sign-Out/Discharge


Documenting (check all that apply): Discharge/Admit/Transfer





- Discharge Plan


Condition: Good


Disposition: HOME


Patient Education Materials:  Knee Pain (ED)


Forms:  *School Release


Referrals: 


[facility] - Suleiman WHITNEY [Primary Care Provider] - 


Ever Duvall MD [Medical Doctor] - 


Additional Instructions: 


Use immobilizer


Stay off knee as much as possible


Ice, elevate, 


Ibuprofen or Tylenol every 6 hours for pain


Follow up with ortho 


Return to ED if develop or any new or worsening symptoms





- Billing Disposition and Condition


Condition: GOOD


Disposition: Home

## 2019-01-18 NOTE — ASSESSMENT & PLAN NOTE
-At risk for malabsorption of vitamins/minerals secondary to malabsorption and restriction of intake from bariatric surgery  -Currently taking adequate postop bariatric surgery vitamin supplementation: Procare MVI, calcium citrate TID   -Last noted limited set of bariatric labs completed January 2017: low vitamin D (25), elevated Thiamin  -Next set of bariatric labs ordered for approximately 2 weeks  -Patient received education about the importance of adhering to a lifelong supplementation regimen to avoid vitamin/mineral deficiencies

## 2019-01-18 NOTE — PROGRESS NOTES
Assessment/Plan:    S/P gastric bypass  -status post Noé-en-y gastric bypass surgery 10/25/2016 and s/p lap maya on 05/14/18 by Dr Shilpa Chairez  EWL is 84%; noted about 8lbs weight gain in the last 6 months r/t holiday snacking and poor food choices  Lengthy dietary education today - she agrees to keep food records, increase fluids, and reduce unhealthy snacking  · Tolerating a regular diet-yes  · Eating at least 60 grams of protein per day-yes  · Following 30/60 minute rule with liquids-yes  · Drinking at least 64 ounces of fluid per day-no  · Drinking carbonated beverages-no  · Sufficient exercise-no, limited b/c of back pain  · Using NSAIDs regularly-no  · Using nicotine-no  · Using alcohol-no    Postgastrectomy malabsorption  -At risk for malabsorption of vitamins/minerals secondary to malabsorption and restriction of intake from bariatric surgery  -Currently taking adequate postop bariatric surgery vitamin supplementation: Procare MVI, calcium citrate TID   -Last noted limited set of bariatric labs completed January 2017: low vitamin D (25), elevated Thiamin  -Next set of bariatric labs ordered for approximately 2 weeks  -Patient received education about the importance of adhering to a lifelong supplementation regimen to avoid vitamin/mineral deficiencies     DMII (diabetes mellitus, type 2) ([facility] [address] )  -Last noted HgbA1C was January 2017 - 5 7%  -Has been off Metformin since surgery      Hyperlipemia  -Last noted lipid panel January 2017 - elevated Trigs and low HDL  -On Fenofibrate    Benign essential hypertension  -Good BP today  -No longer on BP medications    Chronic pain disorder  -Avoids NSAIDS  -On Baclofen, Fentanyl, Oxycodone, Gabapentin       Diagnoses and all orders for this visit:    S/P gastric bypass  -     CBC and differential; Future  -     Comprehensive metabolic panel; Future  -     Copper Level; Future  -     Ferritin; Future  -     Folate; Future  -     Iron;  Future  -     Iron Saturation %; Future  -     PTH, intact; Future  -     Vitamin A; Future  -     Vitamin B1, whole blood; Future  -     Vitamin B12; Future  -     Vitamin D 25 hydroxy; Future  -     Zinc; Future  -     Lipid panel; Future  -     Hemoglobin A1C; Future    Postgastrectomy malabsorption  -     CBC and differential; Future  -     Comprehensive metabolic panel; Future  -     Copper Level; Future  -     Ferritin; Future  -     Folate; Future  -     Iron; Future  -     Iron Saturation %; Future  -     PTH, intact; Future  -     Vitamin A; Future  -     Vitamin B1, whole blood; Future  -     Vitamin B12; Future  -     Vitamin D 25 hydroxy; Future  -     Zinc; Future  -     Lipid panel; Future  -     Hemoglobin A1C; Future    Hyperlipidemia, unspecified hyperlipidemia type    Type 2 diabetes mellitus without complication, unspecified whether long term insulin use (HCC)    Benign essential hypertension    Chronic pain disorder    Other orders  -     fluticasone (FLONASE) 50 mcg/act nasal spray; INSTILL 1 SPRAY INTO EACH NOSTRIL DAILY          Subjective:      Patient ID: Seven Vizcaino is a 66 y o  female     -status post Noé-en-y gastric bypass surgery 10/25/2016 and s/p lap maya on 05/14/18 by Dr Chanelle Jimenes  Presents to the office today for routine follow up  Tolerating diet without issues; denies N/V, dysphagia, reflux  Overall doing Well, but has gained about 8lbs over last 6 months r/t unhealthy snacking  Initial: 233lbs  Current: 157 4lbs  EWL: 84%  Endy: 149lbs  Current BMI is Body mass index is 27 44 kg/m²      Diet Recall:   B - oatmeal, banana, later coffee with creamer  L - salad or sandwich   D - salad or steak or chicken and vegetable and potato  Snacks - crackers with cheese, pistachios, nakita chips    Fluids: low sugar iced tea, limited water intake  Holiday snacking     The following portions of the patient's history were reviewed and updated as appropriate: allergies, current medications, past family history, past medical history, past social history, past surgical history and problem list     Review of Systems   Constitutional: Positive for unexpected weight change (8lbs weight gain last 6 months)  Negative for chills and fever  HENT: Negative for trouble swallowing  Respiratory: Negative for cough and shortness of breath  Cardiovascular: Negative for chest pain and palpitations  Gastrointestinal: Negative for abdominal pain, constipation, diarrhea, nausea and vomiting  Musculoskeletal: Positive for arthralgias, back pain and neck pain  Neurological: Negative for dizziness  Psychiatric/Behavioral:        Denies anxiety and depression         Objective:      /68 (BP Location: Right arm, Patient Position: Sitting, Cuff Size: Standard)   Pulse 60   Temp 98 1 °F (36 7 °C) (Tympanic)   Resp 18   Ht 5' 3 5" (1 613 m)   Wt 71 4 kg (157 lb 6 4 oz)   BMI 27 44 kg/m²          Physical Exam   Constitutional: She is oriented to person, place, and time  She appears well-developed and well-nourished  No distress  HENT:   Head: Normocephalic and atraumatic  Eyes: Pupils are equal, round, and reactive to light  No scleral icterus  Cardiovascular: Normal rate, regular rhythm and normal heart sounds  Pulmonary/Chest: Effort normal and breath sounds normal  No respiratory distress  Abdominal: Soft  Bowel sounds are normal  She exhibits no distension  There is no tenderness  No incisional hernias appreciated   Musculoskeletal: She exhibits no edema  Neurological: She is alert and oriented to person, place, and time  Skin: Skin is warm and dry  Psychiatric: She has a normal mood and affect  Nursing note and vitals reviewed  BARRIERS: none identified    GOALS:   · Continued/Maintain healthy weight loss with good nutrition intakes  · Adequate hydration with at least 64oz  fluid intake - this will help your joints, bladder, and cut down on sugar cravings!   Keep water on you at all times  · Normal vitamin and mineral levels  · Exercise as tolerated  · Keep food records  · Avoid crackers, chips - snack on apples and high protein low fat cheese sticks, yogurt, or hard boiled egg if hungry    · Follow-up in 1 year  We kindly ask that your arrive 15 minutes before your scheduled appointment time with your provider to allow our staff to room you, get your vital signs and update your chart  · Follow diet as discussed  · Get lab work done in the next 2 weeks  You have been given a lab slip today  Please call the office if you need a replacement  It is recommended to check with your insurance BEFORE getting labs done to make sure they are covered by your policy  Also, please check with your PCP and other providers before getting labs to avoid duplicate labs  Make sure to HOLD any multivitamins that may contain biotin and any biotin supplements FOR 5 DAYS before any labs since it can affect the results  · Follow vitamin and mineral recommendations as reviewed with you  · Call our office if you have any problems with abdominal pain especially associated with fever, chills, nausea, vomiting or any other concerns  · All  Post-bariatric surgery patients should be aware that very small quantities of any alcohol can cause impairment and it is very possible not to feel the effect  The effect can be in the system for several hours  It is also a stomach irritant  · It is advised to AVOID alcohol, Nonsteroidal antiinflammatory drugs (NSAIDS) and nicotine of all forms   Any of these can cause stomach irritation/pain

## 2019-01-18 NOTE — PATIENT INSTRUCTIONS
GOALS:   · Continued/Maintain healthy weight loss with good nutrition intakes  · Adequate hydration with at least 64oz  fluid intake - this will help your joints, bladder, and cut down on sugar cravings! Keep water on you at all times  · Normal vitamin and mineral levels  · Exercise as tolerated  · Keep food records  · Avoid crackers, chips - snack on apples and high protein low fat cheese sticks, yogurt, or hard boiled egg if hungry    · Follow-up in 1 year  We kindly ask that your arrive 15 minutes before your scheduled appointment time with your provider to allow our staff to room you, get your vital signs and update your chart  · Follow diet as discussed  · Get lab work done in the next 2 weeks  You have been given a lab slip today  Please call the office if you need a replacement  It is recommended to check with your insurance BEFORE getting labs done to make sure they are covered by your policy  Also, please check with your PCP and other providers before getting labs to avoid duplicate labs  Make sure to HOLD any multivitamins that may contain biotin and any biotin supplements FOR 5 DAYS before any labs since it can affect the results  · Follow vitamin and mineral recommendations as reviewed with you  · Call our office if you have any problems with abdominal pain especially associated with fever, chills, nausea, vomiting or any other concerns  · All  Post-bariatric surgery patients should be aware that very small quantities of any alcohol can cause impairment and it is very possible not to feel the effect  The effect can be in the system for several hours  It is also a stomach irritant  · It is advised to AVOID alcohol, Nonsteroidal antiinflammatory drugs (NSAIDS) and nicotine of all forms   Any of these can cause stomach irritation/pain

## 2019-01-28 ENCOUNTER — OFFICE VISIT (OUTPATIENT)
Dept: URGENT CARE | Facility: CLINIC | Age: 59
End: 2019-01-28
Payer: MEDICARE

## 2019-01-28 VITALS
WEIGHT: 160 LBS | HEART RATE: 55 BPM | OXYGEN SATURATION: 100 % | RESPIRATION RATE: 16 BRPM | BODY MASS INDEX: 26.66 KG/M2 | DIASTOLIC BLOOD PRESSURE: 76 MMHG | TEMPERATURE: 97.5 F | HEIGHT: 65 IN | SYSTOLIC BLOOD PRESSURE: 130 MMHG

## 2019-01-28 DIAGNOSIS — N30.00 ACUTE CYSTITIS WITHOUT HEMATURIA: Primary | ICD-10-CM

## 2019-01-28 LAB
SL AMB  POCT GLUCOSE, UA: ABNORMAL
SL AMB LEUKOCYTE ESTERASE,UA: ABNORMAL
SL AMB POCT BILIRUBIN,UA: ABNORMAL
SL AMB POCT BLOOD,UA: ABNORMAL
SL AMB POCT CLARITY,UA: ABNORMAL
SL AMB POCT COLOR,UA: YELLOW
SL AMB POCT KETONES,UA: ABNORMAL
SL AMB POCT NITRITE,UA: ABNORMAL
SL AMB POCT PH,UA: 6.5
SL AMB POCT SPECIFIC GRAVITY,UA: 1.02
SL AMB POCT URINE PROTEIN: ABNORMAL
SL AMB POCT UROBILINOGEN: 0.2

## 2019-01-28 PROCEDURE — 87430 STREP A AG IA: CPT | Performed by: PHYSICIAN ASSISTANT

## 2019-01-28 PROCEDURE — 99213 OFFICE O/P EST LOW 20 MIN: CPT | Performed by: PHYSICIAN ASSISTANT

## 2019-01-28 PROCEDURE — G0463 HOSPITAL OUTPT CLINIC VISIT: HCPCS | Performed by: PHYSICIAN ASSISTANT

## 2019-01-28 PROCEDURE — 87086 URINE CULTURE/COLONY COUNT: CPT | Performed by: PHYSICIAN ASSISTANT

## 2019-01-28 PROCEDURE — 87077 CULTURE AEROBIC IDENTIFY: CPT | Performed by: PHYSICIAN ASSISTANT

## 2019-01-28 PROCEDURE — 87186 SC STD MICRODIL/AGAR DIL: CPT | Performed by: PHYSICIAN ASSISTANT

## 2019-01-28 RX ORDER — LEVOFLOXACIN 500 MG/1
500 TABLET, FILM COATED ORAL EVERY 24 HOURS
Qty: 7 TABLET | Refills: 0 | Status: SHIPPED | OUTPATIENT
Start: 2019-01-28 | End: 2019-02-04

## 2019-01-28 NOTE — PROGRESS NOTES
[facility]        NAME: Desi Huizar is a 66 y o  female  : 1960    MRN: [id number]  DATE: 2019  TIME: 5:08 PM    Assessment and Plan   Acute cystitis without hematuria [N30 00]  1  Acute cystitis without hematuria  POCT urine dip    Urine culture    levofloxacin (LEVAQUIN) 500 mg tablet         Patient Instructions       Her last urine culture was susceptible to nitrofurantoin which I used  Due to the recurrence we will treat with Levaquin which was also affected and her last culture  She should follow up with her PCP  Follow up with PCP in 3-5 days  Proceed to  ER if symptoms worsen  Chief Complaint     Chief Complaint   Patient presents with    Possible UTI     x 1 week, has pressure, urgency and pain after voiding         History of Present Illness         82-year-old female complains of 3 days of urinary burning and pressure  She has been seen several times for recurrent UTIs  She has an appointment with Urology but she could not get in until March  She has not followed up with her family doctor  She denies blood in her urine  No fever or vomiting  She has constant back pain but no change in her back pain  Review of Systems   Review of Systems      Current Medications       Current Outpatient Prescriptions:     baclofen 10 mg tablet, Take 10 mg by mouth daily, Disp: , Rfl:     buPROPion (WELLBUTRIN) 75 mg tablet, Take 75 mg by mouth 2 (two) times a day, Disp: , Rfl: 3    Calcium Citrate-Vitamin D (CALCIUM CITRATE +D) 315-250 MG-UNIT TABS, Take 2 tablets by mouth daily, Disp: , Rfl:     escitalopram (LEXAPRO) 20 mg tablet, Take 20 mg by mouth every morning  , Disp: , Rfl:     fenofibrate (TRIGLIDE) 160 MG tablet, Take 160 mg by mouth daily  , Disp: , Rfl:     fentaNYL (DURAGESIC) 25 mcg/hr, Place 1 patch on the skin every third day, Disp: , Rfl:     fluticasone (FLONASE) 50 mcg/act nasal spray, INSTILL 1 SPRAY INTO EACH NOSTRIL DAILY, Disp: , Rfl: 0   gabapentin (NEURONTIN) 100 mg capsule, TAKE ONE CAPSULE TWICE A DAY, Disp: , Rfl:     levofloxacin (LEVAQUIN) 500 mg tablet, Take 1 tablet (500 mg total) by mouth every 24 hours for 7 days, Disp: 7 tablet, Rfl: 0    loratadine (CLARITIN) 10 mg tablet, Take 10 mg by mouth daily  , Disp: , Rfl:     Multiple Vitamins-Minerals (BARIATRIC FUSION PO), Take 1 tablet by mouth daily, Disp: , Rfl:     omeprazole (PriLOSEC) 20 mg delayed release capsule, TAKE 1 CAPSULE DAILY, Disp: 30 capsule, Rfl: 1    oxyCODONE (ROXICODONE) 15 mg immediate release tablet, Take 15 mg by mouth 2 (two) times a day as needed  , Disp: , Rfl:     phenazopyridine (PYRIDIUM) 200 mg tablet, Take 1 tablet (200 mg total) by mouth 3 (three) times a day with meals (Patient not taking: Reported on 2019 ), Disp: 10 tablet, Rfl: 0    Current Allergies     Allergies as of 2019    (No Known Allergies)            The following portions of the patient's history were reviewed and updated as appropriate: allergies, current medications, past family history, past medical history, past social history, past surgical history and problem list      Past Medical History:   Diagnosis Date    Bariatric surgery status     Basal cell carcinoma     Degenerative lumbar disc     Diabetes mellitus ([address] )     resolved since bariatric surgery    Hiatal hernia     History of transfusion     Post-op spinal surgery    Low back pain     Postsurgical malabsorption     Spinal stenosis     SVT (supraventricular tachycardia) ([address] )     Wears glasses        Past Surgical History:   Procedure Laterality Date    APPENDECTOMY      BACK SURGERY      Lumbar and Sacral fusions-3 surgeries total    CARDIAC ELECTROPHYSIOLOGY STUDY AND ABLATION      CARPAL TUNNEL RELEASE      x2    CERVICAL SPINE SURGERY      Fusion of C2-C7 over a period of 3 surgeries     SECTION      X2    CHOLECYSTECTOMY      INSERTION / PLACEMENT / REVISION NEUROSTIMULATOR      X2- both were removed    NECK SURGERY      VT EGD TRANSORAL BIOPSY SINGLE/MULTIPLE N/A 9/14/2016    Procedure: ESOPHAGOGASTRODUODENOSCOPY (EGD); Surgeon: Dayami Downs MD;  [facility]: [facility]; Service: Bariatrics    VT EGD TRANSORAL BIOPSY SINGLE/MULTIPLE N/A 4/18/2018    Procedure: ESOPHAGOGASTRODUODENOSCOPY (EGD) with biopsy;  Surgeon: Dayami Downs MD;  [facility]; Service: Bariatrics    VT LAP GASTRIC BYPASS/BRAULIO-EN-Y N/A 10/25/2016    Procedure: BYPASS GASTRIC  BRAULIO-EN-Y LAPAROSCOPIC, WITH INTRA OP EGD;  Surgeon: Dayami Downs MD;  [facility];  Service: Yeseniamalvin De La Cruz VT LAP,CHOLECYSTECTOMY N/A 5/14/2018    Procedure: Hermon Christopher;  Surgeon: Dayami Downs MD;  [facility];  Service: Yesenia De La Cruz SKIN CANCER EXCISION      TONSILLECTOMY      TUBAL LIGATION      WISDOM TOOTH EXTRACTION         Family History   Problem Relation Age of Onset    No Known Problems Father     No Known Problems Mother          Medications have been verified  Objective   /76   Pulse 55   Temp 97 5 °F (36 4 °C) (Tympanic)   Resp 16   Ht 5' 5" (1 651 m)   Wt 72 6 kg (160 lb)   SpO2 100%   BMI 26 63 kg/m²        Physical Exam     Physical Exam   Constitutional: She appears well-developed and well-nourished  No distress  Cardiovascular: Normal rate and regular rhythm  Pulmonary/Chest: Effort normal and breath sounds normal    Abdominal: There is tenderness in the suprapubic area  There is no rigidity, no rebound, no guarding and no CVA tenderness  Mild suprapubic tenderness

## 2019-01-28 NOTE — PATIENT INSTRUCTIONS
Her last urine culture was susceptible to nitrofurantoin which I used  Due to the recurrence we will treat with Levaquin which was also affected and her last culture  She should follow up with her PCP  Follow up with PCP in 3-5 days  Proceed to  ER if symptoms worsen

## 2019-01-31 LAB
BACTERIA UR CULT: ABNORMAL
BACTERIA UR CULT: ABNORMAL

## 2019-02-09 ENCOUNTER — APPOINTMENT (OUTPATIENT)
Dept: LAB | Facility: CLINIC | Age: 59
End: 2019-02-09
Payer: MEDICARE

## 2019-02-09 DIAGNOSIS — Z90.3 POSTGASTRECTOMY MALABSORPTION: ICD-10-CM

## 2019-02-09 DIAGNOSIS — K91.2 POSTGASTRECTOMY MALABSORPTION: ICD-10-CM

## 2019-02-09 DIAGNOSIS — Z98.84 S/P GASTRIC BYPASS: ICD-10-CM

## 2019-02-09 LAB
25(OH)D3 SERPL-MCNC: 23.9 NG/ML (ref 30–100)
ALBUMIN SERPL BCP-MCNC: 3.8 G/DL (ref 3.5–5)
ALP SERPL-CCNC: 58 U/L (ref 46–116)
ALT SERPL W P-5'-P-CCNC: 21 U/L (ref 12–78)
ANION GAP SERPL CALCULATED.3IONS-SCNC: 7 MMOL/L (ref 4–13)
AST SERPL W P-5'-P-CCNC: 35 U/L (ref 5–45)
BASOPHILS # BLD AUTO: 0.07 THOUSANDS/ΜL (ref 0–0.1)
BASOPHILS NFR BLD AUTO: 2 % (ref 0–1)
BILIRUB SERPL-MCNC: 0.26 MG/DL (ref 0.2–1)
BUN SERPL-MCNC: 13 MG/DL (ref 5–25)
CALCIUM SERPL-MCNC: 9.1 MG/DL (ref 8.3–10.1)
CHLORIDE SERPL-SCNC: 106 MMOL/L (ref 100–108)
CHOLEST SERPL-MCNC: 138 MG/DL (ref 50–200)
CO2 SERPL-SCNC: 28 MMOL/L (ref 21–32)
CREAT SERPL-MCNC: 0.81 MG/DL (ref 0.6–1.3)
EOSINOPHIL # BLD AUTO: 0.1 THOUSAND/ΜL (ref 0–0.61)
EOSINOPHIL NFR BLD AUTO: 2 % (ref 0–6)
ERYTHROCYTE [DISTWIDTH] IN BLOOD BY AUTOMATED COUNT: 12.2 % (ref 11.6–15.1)
EST. AVERAGE GLUCOSE BLD GHB EST-MCNC: 105 MG/DL
FERRITIN SERPL-MCNC: 79 NG/ML (ref 8–388)
FOLATE SERPL-MCNC: >20 NG/ML (ref 3.1–17.5)
GFR SERPL CREATININE-BSD FRML MDRD: 81 ML/MIN/1.73SQ M
GLUCOSE P FAST SERPL-MCNC: 62 MG/DL (ref 65–99)
HBA1C MFR BLD: 5.3 % (ref 4.2–6.3)
HCT VFR BLD AUTO: 38.3 % (ref 34.8–46.1)
HDLC SERPL-MCNC: 55 MG/DL (ref 40–60)
HGB BLD-MCNC: 12.7 G/DL (ref 11.5–15.4)
IMM GRANULOCYTES # BLD AUTO: 0 THOUSAND/UL (ref 0–0.2)
IMM GRANULOCYTES NFR BLD AUTO: 0 % (ref 0–2)
IRON SATN MFR SERPL: 31 %
IRON SERPL-MCNC: 131 UG/DL (ref 50–170)
LDLC SERPL CALC-MCNC: 54 MG/DL (ref 0–100)
LYMPHOCYTES # BLD AUTO: 2.38 THOUSANDS/ΜL (ref 0.6–4.47)
LYMPHOCYTES NFR BLD AUTO: 53 % (ref 14–44)
MCH RBC QN AUTO: 30.5 PG (ref 26.8–34.3)
MCHC RBC AUTO-ENTMCNC: 33.2 G/DL (ref 31.4–37.4)
MCV RBC AUTO: 92 FL (ref 82–98)
MONOCYTES # BLD AUTO: 0.35 THOUSAND/ΜL (ref 0.17–1.22)
MONOCYTES NFR BLD AUTO: 8 % (ref 4–12)
NEUTROPHILS # BLD AUTO: 1.53 THOUSANDS/ΜL (ref 1.85–7.62)
NEUTS SEG NFR BLD AUTO: 35 % (ref 43–75)
NONHDLC SERPL-MCNC: 83 MG/DL
NRBC BLD AUTO-RTO: 0 /100 WBCS
PLATELET # BLD AUTO: 325 THOUSANDS/UL (ref 149–390)
PMV BLD AUTO: 10.3 FL (ref 8.9–12.7)
POTASSIUM SERPL-SCNC: 3.8 MMOL/L (ref 3.5–5.3)
PROT SERPL-MCNC: 7 G/DL (ref 6.4–8.2)
PTH-INTACT SERPL-MCNC: 23.3 PG/ML (ref 18.4–80.1)
RBC # BLD AUTO: 4.16 MILLION/UL (ref 3.81–5.12)
SODIUM SERPL-SCNC: 141 MMOL/L (ref 136–145)
TIBC SERPL-MCNC: 427 UG/DL (ref 250–450)
TRIGL SERPL-MCNC: 143 MG/DL
VIT B12 SERPL-MCNC: 1706 PG/ML (ref 100–900)
WBC # BLD AUTO: 4.43 THOUSAND/UL (ref 4.31–10.16)

## 2019-02-09 PROCEDURE — 82525 ASSAY OF COPPER: CPT

## 2019-02-09 PROCEDURE — 84425 ASSAY OF VITAMIN B-1: CPT

## 2019-02-09 PROCEDURE — 80061 LIPID PANEL: CPT

## 2019-02-09 PROCEDURE — 84630 ASSAY OF ZINC: CPT

## 2019-02-09 PROCEDURE — 82607 VITAMIN B-12: CPT

## 2019-02-09 PROCEDURE — 36415 COLL VENOUS BLD VENIPUNCTURE: CPT

## 2019-02-09 PROCEDURE — 85025 COMPLETE CBC W/AUTO DIFF WBC: CPT

## 2019-02-09 PROCEDURE — 82728 ASSAY OF FERRITIN: CPT

## 2019-02-09 PROCEDURE — 84590 ASSAY OF VITAMIN A: CPT

## 2019-02-09 PROCEDURE — 82746 ASSAY OF FOLIC ACID SERUM: CPT

## 2019-02-09 PROCEDURE — 83036 HEMOGLOBIN GLYCOSYLATED A1C: CPT

## 2019-02-09 PROCEDURE — 80053 COMPREHEN METABOLIC PANEL: CPT

## 2019-02-09 PROCEDURE — 83540 ASSAY OF IRON: CPT

## 2019-02-09 PROCEDURE — 82306 VITAMIN D 25 HYDROXY: CPT

## 2019-02-09 PROCEDURE — 83970 ASSAY OF PARATHORMONE: CPT

## 2019-02-09 PROCEDURE — 83550 IRON BINDING TEST: CPT

## 2019-02-13 ENCOUNTER — TELEPHONE (OUTPATIENT)
Dept: BARIATRICS | Facility: CLINIC | Age: 59
End: 2019-02-13

## 2019-02-13 LAB
COPPER SERPL-MCNC: 109 UG/DL (ref 72–166)
VIT A SERPL-MCNC: 51.1 UG/DL (ref 20.1–62)
ZINC SERPL-MCNC: 87 UG/DL (ref 56–134)

## 2019-02-14 LAB — VIT B1 BLD-SCNC: 195.9 NMOL/L (ref 66.5–200)

## 2019-02-15 ENCOUNTER — TELEPHONE (OUTPATIENT)
Dept: BARIATRICS | Facility: CLINIC | Age: 59
End: 2019-02-15

## 2019-02-15 DIAGNOSIS — E55.9 VITAMIN D DEFICIENCY: Primary | ICD-10-CM

## 2019-02-22 ENCOUNTER — OFFICE VISIT (OUTPATIENT)
Dept: URGENT CARE | Facility: CLINIC | Age: 59
End: 2019-02-22
Payer: MEDICARE

## 2019-02-22 VITALS
SYSTOLIC BLOOD PRESSURE: 100 MMHG | HEART RATE: 82 BPM | BODY MASS INDEX: 26.33 KG/M2 | HEIGHT: 65 IN | RESPIRATION RATE: 16 BRPM | TEMPERATURE: 98.5 F | OXYGEN SATURATION: 96 % | WEIGHT: 158 LBS | DIASTOLIC BLOOD PRESSURE: 78 MMHG

## 2019-02-22 DIAGNOSIS — N30.01 ACUTE CYSTITIS WITH HEMATURIA: Primary | ICD-10-CM

## 2019-02-22 LAB
SL AMB  POCT GLUCOSE, UA: NEGATIVE
SL AMB LEUKOCYTE ESTERASE,UA: ABNORMAL
SL AMB POCT BILIRUBIN,UA: NEGATIVE
SL AMB POCT BLOOD,UA: ABNORMAL
SL AMB POCT CLARITY,UA: CLEAR
SL AMB POCT COLOR,UA: YELLOW
SL AMB POCT KETONES,UA: NEGATIVE
SL AMB POCT NITRITE,UA: NEGATIVE
SL AMB POCT PH,UA: 6
SL AMB POCT SPECIFIC GRAVITY,UA: 1
SL AMB POCT URINE PROTEIN: NEGATIVE
SL AMB POCT UROBILINOGEN: 0.2

## 2019-02-22 PROCEDURE — 99213 OFFICE O/P EST LOW 20 MIN: CPT | Performed by: PHYSICIAN ASSISTANT

## 2019-02-22 PROCEDURE — G0463 HOSPITAL OUTPT CLINIC VISIT: HCPCS | Performed by: PHYSICIAN ASSISTANT

## 2019-02-22 PROCEDURE — 81002 URINALYSIS NONAUTO W/O SCOPE: CPT | Performed by: PHYSICIAN ASSISTANT

## 2019-02-22 PROCEDURE — 87086 URINE CULTURE/COLONY COUNT: CPT | Performed by: PHYSICIAN ASSISTANT

## 2019-02-22 RX ORDER — AMOXICILLIN AND CLAVULANATE POTASSIUM 875; 125 MG/1; MG/1
1 TABLET, FILM COATED ORAL EVERY 12 HOURS SCHEDULED
Qty: 14 TABLET | Refills: 0 | Status: SHIPPED | OUTPATIENT
Start: 2019-02-22 | End: 2019-03-01

## 2019-02-22 NOTE — PROGRESS NOTES
[facility]        NAME: Sony Dobbs is a 66 y o  female  : 1960    MRN: [id number]  DATE: 2019  TIME: 10:05 AM    Assessment and Plan   Acute cystitis with hematuria [N30 01]  1  Acute cystitis with hematuria  POCT urine dip    Urine culture    amoxicillin-clavulanate (AUGMENTIN) 875-125 mg per tablet     Patient has been treated with a variety of antibiotics over the last several months  Based on last urine culture documented Augmentin is susceptible we will try this route  Patient was on Levaquin on  and symptoms have returned  Patient states she has follow-up with Urology   Encouraged to continue follow up with Urology  Will send this urine for culture and f/u if there are new susceptibilities  Patient Instructions       Follow up with PCP in 3-5 days  Proceed to  ER if symptoms worsen  Chief Complaint     Chief Complaint   Patient presents with    Possible UTI     x this am, frequency, pressure/pain after urination         History of Present Illness       65-year-old female complains of urinary frequency, pressure and dysuria for 1 day  She has been seen several times for recurrent UTIs on several antibiotoics  She has an appointment with Urology in March  She states she was recently treated with Levaquin with some relief for about 2 weeks until symptoms returned today  Denies fever, chills, n/v       Review of Systems   Review of Systems   Constitutional: Negative for chills, fatigue and fever  HENT: Negative for congestion, ear pain, sinus pain, sore throat and trouble swallowing  Eyes: Negative for pain, discharge and redness  Respiratory: Negative for cough, chest tightness, shortness of breath and wheezing  Cardiovascular: Negative for chest pain, palpitations and leg swelling  Gastrointestinal: Negative for abdominal pain, diarrhea, nausea and vomiting  Genitourinary: Positive for dysuria and frequency   Negative for decreased urine volume, flank pain, hematuria, vaginal bleeding and vaginal discharge  Musculoskeletal: Negative for arthralgias, joint swelling and myalgias  Skin: Negative for rash  Neurological: Negative for dizziness, weakness, numbness and headaches  Current Medications       Current Outpatient Medications:     baclofen 10 mg tablet, Take 10 mg by mouth daily, Disp: , Rfl:     buPROPion (WELLBUTRIN) 75 mg tablet, Take 75 mg by mouth 2 (two) times a day, Disp: , Rfl: 3    Calcium Citrate-Vitamin D (CALCIUM CITRATE +D) 315-250 MG-UNIT TABS, Take 2 tablets by mouth daily, Disp: , Rfl:     escitalopram (LEXAPRO) 20 mg tablet, Take 20 mg by mouth every morning  , Disp: , Rfl:     fenofibrate (TRIGLIDE) 160 MG tablet, Take 160 mg by mouth daily  , Disp: , Rfl:     fentaNYL (DURAGESIC) 25 mcg/hr, Place 1 patch on the skin every third day, Disp: , Rfl:     fluticasone (FLONASE) 50 mcg/act nasal spray, INSTILL 1 SPRAY INTO EACH NOSTRIL DAILY, Disp: , Rfl: 0    gabapentin (NEURONTIN) 100 mg capsule, TAKE ONE CAPSULE TWICE A DAY, Disp: , Rfl:     loratadine (CLARITIN) 10 mg tablet, Take 10 mg by mouth daily  , Disp: , Rfl:     Multiple Vitamins-Minerals (BARIATRIC FUSION PO), Take 1 tablet by mouth daily, Disp: , Rfl:     omeprazole (PriLOSEC) 20 mg delayed release capsule, TAKE 1 CAPSULE DAILY, Disp: 30 capsule, Rfl: 1    oxyCODONE (ROXICODONE) 15 mg immediate release tablet, Take 15 mg by mouth 2 (two) times a day as needed  , Disp: , Rfl:     amoxicillin-clavulanate (AUGMENTIN) 875-125 mg per tablet, Take 1 tablet by mouth every 12 (twelve) hours for 7 days, Disp: 14 tablet, Rfl: 0    phenazopyridine (PYRIDIUM) 200 mg tablet, Take 1 tablet (200 mg total) by mouth 3 (three) times a day with meals (Patient not taking: Reported on 1/9/2019 ), Disp: 10 tablet, Rfl: 0    Current Allergies     Allergies as of 02/22/2019    (No Known Allergies)            The following portions of the patient's history were reviewed and updated as appropriate: allergies, current medications, past family history, past medical history, past social history, past surgical history and problem list      Past Medical History:   Diagnosis Date    Bariatric surgery status     Basal cell carcinoma     Degenerative lumbar disc     Diabetes mellitus ([address] )     resolved since bariatric surgery    Hiatal hernia     History of transfusion     Post-op spinal surgery    Low back pain     Postsurgical malabsorption     Spinal stenosis     SVT (supraventricular tachycardia) ([address] )     Wears glasses        Past Surgical History:   Procedure Laterality Date    APPENDECTOMY      BACK SURGERY      Lumbar and Sacral fusions-3 surgeries total    CARDIAC ELECTROPHYSIOLOGY STUDY AND ABLATION      CARPAL TUNNEL RELEASE      x2    CERVICAL SPINE SURGERY      Fusion of C2-C7 over a period of 3 surgeries     SECTION      X2    CHOLECYSTECTOMY      INSERTION / PLACEMENT / REVISION NEUROSTIMULATOR      X2- both were removed    NECK SURGERY      GA EGD TRANSORAL BIOPSY SINGLE/MULTIPLE N/A 2016    Procedure: ESOPHAGOGASTRODUODENOSCOPY (EGD); Surgeon: Heidy Sales MD;  [facility]; Service: Bariatrics    GA EGD TRANSORAL BIOPSY SINGLE/MULTIPLE N/A 2018    Procedure: ESOPHAGOGASTRODUODENOSCOPY (EGD) with biopsy;  Surgeon: Heidy Sales MD;  [facility];   Service: Bariatrics    GA LAP GASTRIC BYPASS/BRAULIO-EN-Y N/A 10/25/2016    Procedure: BYPASS GASTRIC  BRAULIO-EN-Y LAPAROSCOPIC, WITH INTRA OP EGD;  Surgeon: Heidy Sales MD;  [facility];  Service: Lesvia Edward GA LAP,CHOLECYSTECTOMY N/A 2018    Procedure: [place] Cueto;  Surgeon: Heidy Sales MD;  [facility];  Service: Lesvia Edward SKIN CANCER EXCISION      TONSILLECTOMY      TUBAL LIGATION      WISDOM TOOTH EXTRACTION         Family History   Problem Relation Age of Onset    No Known Problems Father     No Known Problems Mother          Medications have been verified  Objective   /78 (BP Location: Left arm, Patient Position: Sitting, Cuff Size: Standard)   Pulse 82   Temp 98 5 °F (36 9 °C) (Tympanic)   Resp 16   Ht 5' 5" (1 651 m)   Wt 71 7 kg (158 lb)   SpO2 96%   BMI 26 29 kg/m²        Physical Exam     Physical Exam   Constitutional: She is oriented to person, place, and time  She appears well-developed and well-nourished  No distress  HENT:   Head: Normocephalic  Right Ear: External ear normal    Left Ear: External ear normal    Mouth/Throat: Oropharynx is clear and moist    Eyes: Pupils are equal, round, and reactive to light  Conjunctivae and EOM are normal    Neck: Normal range of motion  Neck supple  Cardiovascular: Normal rate, regular rhythm and normal heart sounds  No murmur heard  Pulmonary/Chest: Effort normal and breath sounds normal  No respiratory distress  She has no wheezes  Abdominal: Soft  Bowel sounds are normal  There is no tenderness  There is no CVA tenderness  Musculoskeletal: Normal range of motion  Lymphadenopathy:     She has no cervical adenopathy  Neurological: She is alert and oriented to person, place, and time  She has normal reflexes  Skin: Skin is warm and dry  Psychiatric: She has a normal mood and affect  Nursing note and vitals reviewed

## 2019-02-23 LAB — BACTERIA UR CULT: NORMAL

## 2019-03-01 ENCOUNTER — OFFICE VISIT (OUTPATIENT)
Dept: NEUROSURGERY | Facility: CLINIC | Age: 59
End: 2019-03-01
Payer: MEDICARE

## 2019-03-01 VITALS
HEIGHT: 65 IN | HEART RATE: 69 BPM | RESPIRATION RATE: 16 BRPM | TEMPERATURE: 98.7 F | DIASTOLIC BLOOD PRESSURE: 70 MMHG | BODY MASS INDEX: 26.33 KG/M2 | SYSTOLIC BLOOD PRESSURE: 134 MMHG | WEIGHT: 158 LBS

## 2019-03-01 DIAGNOSIS — G89.4 CHRONIC PAIN SYNDROME: ICD-10-CM

## 2019-03-01 DIAGNOSIS — M54.16 LUMBAR RADICULOPATHY: ICD-10-CM

## 2019-03-01 DIAGNOSIS — T84.216A: Primary | ICD-10-CM

## 2019-03-01 DIAGNOSIS — Z98.1 POSTSURGICAL ARTHRODESIS STATUS: ICD-10-CM

## 2019-03-01 DIAGNOSIS — Z98.1 HISTORY OF LUMBAR SPINAL FUSION: ICD-10-CM

## 2019-03-01 PROCEDURE — 99214 OFFICE O/P EST MOD 30 MIN: CPT | Performed by: NEUROLOGICAL SURGERY

## 2019-03-01 RX ORDER — BUPROPION HYDROCHLORIDE 150 MG/1
150 TABLET, EXTENDED RELEASE ORAL
COMMUNITY
End: 2019-03-28

## 2019-03-01 NOTE — PROGRESS NOTES
Assessment/Plan:  Diagnoses and all orders for this visit:    Breakdown (mechanical) of internal fixation device of vertebrae, initial encounter ([facility] [address] )    History of lumbar spinal fusion  -     FL myelogram lumbar; Future  -     CT spine lumbar wo contrast; Future    Lumbar radiculopathy  -     FL myelogram lumbar; Future  -     CT spine lumbar wo contrast; Future    Postsurgical arthrodesis status    Chronic pain syndrome    Other orders  -     buPROPion (WELLBUTRIN SR) 150 mg 12 hr tablet; Take 150 mg by mouth        Subjective:      Patient ID: Marcelo Barry is a 66 y o  female  HPI   She presents for referral visit  to assess complaint of new onset of pain approximately 4 months ago  She has a history of L1-S1  Lumbar spine  Instrumentation fusion performed by Dr Alex Siddiqui  In 2018 imagining revealed a fracture  Of right S1 pedicle screw which appeared stable when compared with Xray from 11/19/2014  She complained to pain management Dr Iliana Gan of intermittent pain in her right anterior proximal thigh below the groin while sitting and changing position  She describes that pain as shooting around form her right lower back above right buttock (where she has chronic pain) a stabbing pain that abruptly occurs then abruptly dissipates    The pain does not occurr during standing  She cannot reproduce the pain during visit while sitting and changing position  She also reports intermittent aching pain in her right calf while walking  The duration of this right calf pain is approximately 1-2 minutes  She reports weekly babysitting her grandchild and carrying him on her right hip , his weigh 25 LBS  , she does a lot of bending and chasing him around the house as well  She does not receive physical therapy  She medicates with Fentanyl patch, gabapentin, and oxycodone prn    She reports in 2016 gastric bypass surgery losing  Approximately 90 LBS      Imagining:  A CT of the lumbar spine ordered by pain management, Dr Iliana Gan completed 12/27/18 revels status post dorsal fusion L1-S1 with transpedicular screw placement  The left transpedicular screw is lateral in position with lucency surrounding the screw distally concerning for loosening with the same appearance at S1 transpedicular screws  There are also spondylotic changes  Of the lumbar spine  Moderate to sever eright L3-L4 foraminal encroachment, correlate wit L3 radiculopathy  --reviewed in collaboration with Dr Chase Jovel  CT findings discussed in detail with patient  Plan   CT Lumbar spine myelogram for further characterization and specificity of L1 and S1 screw loosing  To correlate with radiculopathy symptoms  Current CT findings to not provide adequate visualization to correlate symptoms for definitive diagnosis  Schedule appointment to RTO after diagnostic imagining to discuss findings  Impressions and treatment recommendations were discussed in detail with the patient who verbalized understanding, all questions were answered and contact information provided in the event future questions arise  The following portions of the patient's history were reviewed and updated as appropriate:   She  has a past medical history of Bariatric surgery status, Basal cell carcinoma, Degenerative lumbar disc, Diabetes mellitus ([address] ), Hiatal hernia, History of transfusion (2014), Low back pain, Postsurgical malabsorption, Spinal stenosis, SVT (supraventricular tachycardia) ([address] ), and Wears glasses    She   Patient Active Problem List    Diagnosis Date Noted    History of lumbar spinal fusion 03/01/2019    Lumbar radiculopathy 03/01/2019    Postsurgical arthrodesis status 03/01/2019    [facility]down [facility]me[facility] internal [facility] 03/01/2019    Calculus of bile duct with cholecystitis without obstruction 04/27/2018    Abdominal pain, epigastric 03/23/2018    Epigastric pain 03/20/2018    S/P gastric bypass 03/20/2018    Hyperlipemia 2017    Postgastrectomy malabsorption 2016    DMII (diabetes mellitus, type 2) ([facility] [address] ) 2013    Chronic pain syndrome 10/04/2013    Benign essential hypertension 2012     She  has a past surgical history that includes Appendectomy; Insertion / placement / revision neurostimulator; Cervical spine surgery; Cardiac electrophysiology study and ablation; Back surgery;  section; Tubal ligation; Carpal tunnel release; Tonsillectomy; [place] tooth extraction; pr lap gastric bypass/danielle-en-y (N/A, 10/25/2016); pr egd transoral biopsy single/multiple (N/A, 2016); Neck surgery; pr egd transoral biopsy single/multiple (N/A, 2018); Skin cancer excision; pr lap,cholecystectomy (N/A, 2018); and Cholecystectomy  Her family history includes No Known Problems in her father and mother  She  reports that she quit smoking about 15 years ago  Her smoking use included cigarettes  She has a 20 00 pack-year smoking history  She has never used smokeless tobacco  She reports that she does not drink alcohol or use drugs  Current Outpatient Medications   Medication Sig Dispense Refill    amoxicillin-clavulanate (AUGMENTIN) 875-125 mg per tablet Take 1 tablet by mouth every 12 (twelve) hours for 7 days 14 tablet 0    baclofen 10 mg tablet Take 10 mg by mouth daily      buPROPion (WELLBUTRIN) 75 mg tablet Take 75 mg by mouth 2 (two) times a day  3    Calcium Citrate-Vitamin D (CALCIUM CITRATE +D) 315-250 MG-UNIT TABS Take 2 tablets by mouth daily      escitalopram (LEXAPRO) 20 mg tablet Take 20 mg by mouth every morning        fenofibrate (TRIGLIDE) 160 MG tablet Take 160 mg by mouth daily   fentaNYL (DURAGESIC) 25 mcg/hr Place 1 patch on the skin every third day      fluticasone (FLONASE) 50 mcg/act nasal spray INSTILL 1 SPRAY INTO EACH NOSTRIL DAILY  0    gabapentin (NEURONTIN) 100 mg capsule TAKE two CAPSULE at HS      loratadine (CLARITIN) 10 mg tablet Take 10 mg by mouth daily   Multiple Vitamins-Minerals (BARIATRIC FUSION PO) Take 1 tablet by mouth daily      omeprazole (PriLOSEC) 20 mg delayed release capsule TAKE 1 CAPSULE DAILY 30 capsule 1    oxyCODONE (ROXICODONE) 15 mg immediate release tablet Take 15 mg by mouth 2 (two) times a day as needed        buPROPion (WELLBUTRIN SR) 150 mg 12 hr tablet Take 150 mg by mouth      phenazopyridine (PYRIDIUM) 200 mg tablet Take 1 tablet (200 mg total) by mouth 3 (three) times a day with meals (Patient not taking: Reported on 1/9/2019 ) 10 tablet 0     No current facility-administered medications for this visit  She has No Known Allergies       Review of Systems   Constitutional: Positive for fatigue  HENT: Negative  Eyes: Negative  Respiratory: Negative  Cardiovascular:        Hx of SVT   Gastrointestinal: Negative  Endocrine: Negative  Genitourinary: Negative  Musculoskeletal: Positive for back pain and neck pain  Right thigh sharp stabbing pain   Skin: Negative  Allergic/Immunologic: Negative  Neurological: Positive for numbness  Hematological: Negative  Psychiatric/Behavioral: Negative  Objective:      /70 (BP Location: Left arm, Patient Position: Sitting)   Pulse 69   Temp 98 7 °F (37 1 °C) (Tympanic)   Resp 16   Ht 5' 5" (1 651 m)   Wt 71 7 kg (158 lb)   BMI 26 29 kg/m²          Physical Exam   Constitutional: She is oriented to person, place, and time  She appears well-developed and well-nourished  No distress  HENT:   Head: Normocephalic and atraumatic  Eyes: Right eye exhibits no discharge  Left eye exhibits no discharge  No scleral icterus  Neck: Normal range of motion  Neck supple  Cardiovascular: Normal rate and regular rhythm  Pulmonary/Chest: Effort normal  No respiratory distress  Musculoskeletal: Normal range of motion  She exhibits no edema, tenderness or deformity  Neurological: She is alert and oriented to person, place, and time   She has normal strength  She displays normal reflexes  Coordination and gait normal  She displays no Babinski's sign on the right side  She displays no Babinski's sign on the left side  Reflex Scores:       Patellar reflexes are 2+ on the right side and 2+ on the left side  Achilles reflexes are 2+ on the right side and 2+ on the left side  Could not reproduce calf pain during ambulation in exam room    Skin: Skin is warm and dry  She is not diaphoretic  No erythema  No pallor  Psychiatric: She has a normal mood and affect   Her behavior is normal

## 2019-03-13 DIAGNOSIS — E66.01 MORBID (SEVERE) OBESITY DUE TO EXCESS CALORIES (HCC): ICD-10-CM

## 2019-03-13 RX ORDER — OMEPRAZOLE 20 MG/1
CAPSULE, DELAYED RELEASE ORAL
Qty: 90 CAPSULE | Refills: 0 | Status: SHIPPED | OUTPATIENT
Start: 2019-03-13 | End: 2019-06-06 | Stop reason: SDUPTHER

## 2019-03-19 ENCOUNTER — OFFICE VISIT (OUTPATIENT)
Dept: UROLOGY | Facility: CLINIC | Age: 59
End: 2019-03-19
Payer: MEDICARE

## 2019-03-19 VITALS
WEIGHT: 160 LBS | DIASTOLIC BLOOD PRESSURE: 78 MMHG | BODY MASS INDEX: 26.66 KG/M2 | HEART RATE: 65 BPM | HEIGHT: 65 IN | SYSTOLIC BLOOD PRESSURE: 130 MMHG

## 2019-03-19 DIAGNOSIS — R31.0 GROSS HEMATURIA: ICD-10-CM

## 2019-03-19 DIAGNOSIS — N95.2 ATROPHIC VAGINITIS: ICD-10-CM

## 2019-03-19 DIAGNOSIS — N39.0 RECURRENT UTI: Primary | ICD-10-CM

## 2019-03-19 LAB
SL AMB  POCT GLUCOSE, UA: ABNORMAL
SL AMB LEUKOCYTE ESTERASE,UA: ABNORMAL
SL AMB POCT BILIRUBIN,UA: ABNORMAL
SL AMB POCT BLOOD,UA: ABNORMAL
SL AMB POCT CLARITY,UA: CLEAR
SL AMB POCT COLOR,UA: YELLOW
SL AMB POCT KETONES,UA: ABNORMAL
SL AMB POCT NITRITE,UA: ABNORMAL
SL AMB POCT PH,UA: 5
SL AMB POCT SPECIFIC GRAVITY,UA: 1
SL AMB POCT URINE PROTEIN: ABNORMAL
SL AMB POCT UROBILINOGEN: ABNORMAL

## 2019-03-19 PROCEDURE — 99214 OFFICE O/P EST MOD 30 MIN: CPT | Performed by: UROLOGY

## 2019-03-19 PROCEDURE — 81002 URINALYSIS NONAUTO W/O SCOPE: CPT | Performed by: UROLOGY

## 2019-03-19 RX ORDER — ESTRADIOL 0.1 MG/G
2 CREAM VAGINAL DAILY
Qty: 42.5 G | Refills: 3 | Status: SHIPPED | OUTPATIENT
Start: 2019-03-19 | End: 2019-09-24

## 2019-03-19 NOTE — PROGRESS NOTES
Referring Physician: Anshul Iqbal DO  A copy of this note was sent to the referring physician  Diagnoses and all orders for this visit:    Recurrent UTI  -     POCT urine dip  -     estradiol (ESTRACE) 0 1 mg/g vaginal cream; Insert 2 g into the vagina daily Apply a pea sized ammount onto the urethra every Monday Wednesday and friday  -     CT abdomen pelvis w wo contrast; Future  -     Creatinine, serum; Future  -     Cystoscopy; Future    Gross hematuria  -     estradiol (ESTRACE) 0 1 mg/g vaginal cream; Insert 2 g into the vagina daily Apply a pea sized ammount onto the urethra every Monday Wednesday and friday  -     CT abdomen pelvis w wo contrast; Future  -     Creatinine, serum; Future  -     Cystoscopy; Future    Atrophic vaginitis  -     estradiol (ESTRACE) 0 1 mg/g vaginal cream; Insert 2 g into the vagina daily Apply a pea sized ammount onto the urethra every Monday Wednesday and friday  -     CT abdomen pelvis w wo contrast; Future  -     Creatinine, serum; Future  -     Cystoscopy; Future            Assessment and plan:       1  Recurrent UTI  -ESBL  -    2  Symptoms of atrophic vaginitis    3  Gross hematuria with prior episodes of cystitis      Today we discussed the nature of recurrent urinary tract infections in postmenopausal women  We discussed that these are subdivided into cystitis, which include only lower urinary tract symptoms , versus pyelonephritis which includes flank pain and fever  We discussed that pyelonephritis poses a significant risk of renal insufficiency if it is recurrent  Cystitis symptoms, while certainly bothersome, do not carry this risk  I discussed my typical algorithm for management and prevention of recurrent UTIs  This includes beginning with topical estrogen cream to stimulate regrowth of the urethral tissue  We also discussed a workup for anatomic abnormalities including cystoscopy, and imaging of the upper tracts with a renal ultrasound and a KUB   We also discussed the indications and contraindications to using a suppressive antibiotic  Plan is to prescribed topical estrogen cream   Dosing adverse effects and risks were discussed  We will schedule cystoscopy after completing a CT urogram     Clemente Ashraf MD      Chief Complaint     Recurrent UTI      History of Present Illness     Salomón Johnson is a 66 y o  female referred in consultation for recurrent urinary tract infection  He has multiple episodes of symptomatic urinary tract infections, culture positive over the past few years  He has had 2 prior episodes of ESBL urinary tract infection  She is treated with multiple rounds of antibiotics  Her typical symptoms at the time of infection include dysuria and suprapubic discomfort as well as gross hematuria  He does have symptoms of atrophic vaginitis with increasing pruritus and sensitivity since undergoing menopause  He has no prior history of nephrolithiasis  She does have a history of significant weight loss from bariatric surgery, prior history of a cholecystectomy as well and multiple orthopedic and spinal issues    Detailed Urologic History     - please refer to HPI    Review of Systems     Review of Systems   Constitutional: Negative for activity change and fatigue  HENT: Negative for congestion  Eyes: Negative for visual disturbance  Respiratory: Negative for shortness of breath and wheezing  Cardiovascular: Negative for chest pain and leg swelling  Gastrointestinal: Negative for abdominal pain  Endocrine: Negative for polyuria  Genitourinary: Positive for dysuria and pelvic pain  Negative for flank pain, hematuria and urgency  Musculoskeletal: Negative for back pain  Allergic/Immunologic: Negative for immunocompromised state  Neurological: Negative for dizziness and numbness  Psychiatric/Behavioral: Negative for dysphoric mood  All other systems reviewed and are negative              Allergies     No Known Allergies    Physical Exam     Physical Exam   Constitutional: She is oriented to person, place, and time  She appears well-developed  HENT:   Head: Normocephalic and atraumatic  Eyes: Pupils are equal, round, and reactive to light  Neck: Normal range of motion  Cardiovascular:   Negative lower extremity edema   Pulmonary/Chest: Effort normal and breath sounds normal    Abdominal: Soft  Genitourinary:   Genitourinary Comments: Negative suprapubic tenderness   Musculoskeletal: Normal range of motion  Neurological: She is alert and oriented to person, place, and time  Skin: Skin is warm  Psychiatric: She has a normal mood and affect  Vital Signs  Vitals:    03/19/19 0945   BP: 130/78   BP Location: Left arm   Patient Position: Sitting   Cuff Size: Standard   Pulse: 65   Weight: 72 6 kg (160 lb)   Height: 5' 5" (1 651 m)         Current Medications       Current Outpatient Medications:     baclofen 10 mg tablet, Take 10 mg by mouth daily, Disp: , Rfl:     buPROPion (WELLBUTRIN SR) 150 mg 12 hr tablet, Take 150 mg by mouth, Disp: , Rfl:     buPROPion (WELLBUTRIN) 75 mg tablet, Take 75 mg by mouth 2 (two) times a day, Disp: , Rfl: 3    Calcium Citrate-Vitamin D (CALCIUM CITRATE +D) 315-250 MG-UNIT TABS, Take 2 tablets by mouth daily, Disp: , Rfl:     escitalopram (LEXAPRO) 20 mg tablet, Take 20 mg by mouth every morning  , Disp: , Rfl:     fenofibrate (TRIGLIDE) 160 MG tablet, Take 160 mg by mouth daily  , Disp: , Rfl:     fentaNYL (DURAGESIC) 25 mcg/hr, Place 1 patch on the skin every third day, Disp: , Rfl:     fluticasone (FLONASE) 50 mcg/act nasal spray, INSTILL 1 SPRAY INTO EACH NOSTRIL DAILY, Disp: , Rfl: 0    gabapentin (NEURONTIN) 100 mg capsule, TAKE two CAPSULE at HS, Disp: , Rfl:     loratadine (CLARITIN) 10 mg tablet, Take 10 mg by mouth daily  , Disp: , Rfl:     Multiple Vitamins-Minerals (BARIATRIC FUSION PO), Take 1 tablet by mouth daily, Disp: , Rfl:     omeprazole (PriLOSEC) 20 mg delayed release capsule, TAKE 1 CAPSULE DAILY, Disp: 90 capsule, Rfl: 0    oxyCODONE (ROXICODONE) 15 mg immediate release tablet, Take 15 mg by mouth 2 (two) times a day as needed  , Disp: , Rfl:     phenazopyridine (PYRIDIUM) 200 mg tablet, Take 1 tablet (200 mg total) by mouth 3 (three) times a day with meals, Disp: 10 tablet, Rfl: 0    estradiol (ESTRACE) 0 1 mg/g vaginal cream, Insert 2 g into the vagina daily Apply a pea sized ammount onto the urethra every  and friday, Disp: 42 5 g, Rfl: 3      Active Problems     Patient Active Problem List   Diagnosis    Epigastric pain    S/P gastric bypass    Abdominal pain, epigastric    Calculus of bile duct with cholecystitis without obstruction    DMII (diabetes mellitus, type 2) (HCC)    Benign essential hypertension    Hyperlipemia    Postgastrectomy malabsorption    Chronic pain syndrome    History of lumbar spinal fusion    Lumbar radiculopathy    Postsurgical arthrodesis status    Breakdown (mechanical) of internal fixation device of vertebrae, initial encounter ([facility] [address] )    Recurrent UTI    Gross hematuria    Atrophic vaginitis         Past Medical History     Past Medical History:   Diagnosis Date    Bariatric surgery status     Basal cell carcinoma     Degenerative lumbar disc     Diabetes mellitus ([address] )     resolved since bariatric surgery    Hiatal hernia     History of transfusion     Post-op spinal surgery    Low back pain     Postsurgical malabsorption     Spinal stenosis     SVT (supraventricular tachycardia) ([address] )     Wears glasses          Surgical History     Past Surgical History:   Procedure Laterality Date    APPENDECTOMY      BACK SURGERY      Lumbar and Sacral fusions-3 surgeries total    CARDIAC ELECTROPHYSIOLOGY STUDY AND ABLATION      CARPAL TUNNEL RELEASE      x2    CERVICAL SPINE SURGERY      Fusion of C2-C7 over a period of 3 surgeries     SECTION      X2    CHOLECYSTECTOMY      INSERTION / PLACEMENT / REVISION NEUROSTIMULATOR      X2- both were removed    NECK SURGERY      OH EGD TRANSORAL BIOPSY SINGLE/MULTIPLE N/A 9/14/2016    Procedure: ESOPHAGOGASTRODUODENOSCOPY (EGD); Surgeon: Ina Maciel MD;  Location: [facility]; Service: Bariatrics    OH EGD TRANSORAL BIOPSY SINGLE/MULTIPLE N/A 4/18/2018    Procedure: ESOPHAGOGASTRODUODENOSCOPY (EGD) with biopsy;  Surgeon: Ina Maciel MD;  [facility]: [facility]; Service: Bariatrics    OH LAP GASTRIC BYPASS/BRAULIO-EN-Y N/A 10/25/2016    Procedure: BYPASS GASTRIC  BRAULIO-EN-Y LAPAROSCOPIC, WITH INTRA OP EGD;  Surgeon: Ina Maciel MD;  [facility]: [facility];  Service: Bariatrics    OH LAP,CHOLECYSTECTOMY N/A 5/14/2018    Procedure: Sue Coulter;  Surgeon: Ina Maciel MD;  [facility];  Service: Doris Counts SKIN CANCER EXCISION      TONSILLECTOMY      TUBAL LIGATION      WISDOM TOOTH EXTRACTION           Family History     Family History   Problem Relation Age of Onset    No Known Problems Father     No Known Problems Mother          Social History     Social History     Social History     Tobacco Use   Smoking Status Former Smoker    Packs/day: 1 00    Years: 20 00    Pack years: 20 00    Types: Cigarettes    Last attempt to quit: 2004    Years since quitting: 15 2   Smokeless Tobacco Never Used         Pertinent Lab Values     Lab Results   Component Value Date    CREATININE 0 81 02/09/2019       No results found for: PSA    @RESULTRCNT(1H])@      Pertinent Imaging      - n/a    Portions of the record may have been created with voice recognition software   Occasional wrong word or "sound a like" substitutions may have occurred due to the inherent limitations of voice recognition software   Read the chart carefully and recognize, using context, where substitutions have occurred

## 2019-03-28 ENCOUNTER — APPOINTMENT (OUTPATIENT)
Dept: LAB | Facility: HOSPITAL | Age: 59
End: 2019-03-28
Attending: UROLOGY
Payer: MEDICARE

## 2019-03-28 ENCOUNTER — HOSPITAL ENCOUNTER (OUTPATIENT)
Dept: RADIOLOGY | Facility: HOSPITAL | Age: 59
Discharge: HOME | End: 2019-03-28
Payer: MEDICARE

## 2019-03-28 ENCOUNTER — TELEPHONE (OUTPATIENT)
Dept: UROLOGY | Facility: MEDICAL CENTER | Age: 59
End: 2019-03-28

## 2019-03-28 VITALS
BODY MASS INDEX: 26.66 KG/M2 | OXYGEN SATURATION: 99 % | HEIGHT: 65 IN | DIASTOLIC BLOOD PRESSURE: 68 MMHG | RESPIRATION RATE: 18 BRPM | HEART RATE: 72 BPM | SYSTOLIC BLOOD PRESSURE: 120 MMHG | TEMPERATURE: 98 F | WEIGHT: 160 LBS

## 2019-03-28 DIAGNOSIS — Z98.1 HISTORY OF LUMBAR SPINAL FUSION: ICD-10-CM

## 2019-03-28 DIAGNOSIS — M54.16 LUMBAR RADICULOPATHY: ICD-10-CM

## 2019-03-28 DIAGNOSIS — N95.2 ATROPHIC VAGINITIS: ICD-10-CM

## 2019-03-28 DIAGNOSIS — R35.0 URINARY FREQUENCY: ICD-10-CM

## 2019-03-28 DIAGNOSIS — R31.0 GROSS HEMATURIA: ICD-10-CM

## 2019-03-28 DIAGNOSIS — R35.0 URINARY FREQUENCY: Primary | ICD-10-CM

## 2019-03-28 DIAGNOSIS — N39.0 RECURRENT UTI: ICD-10-CM

## 2019-03-28 LAB
APTT PPP: 32 SECONDS (ref 26–38)
BACTERIA UR QL AUTO: ABNORMAL /HPF
BILIRUB UR QL STRIP: NEGATIVE
CLARITY UR: CLEAR
COLOR UR: YELLOW
CREAT SERPL-MCNC: 0.92 MG/DL (ref 0.6–1.3)
GFR SERPL CREATININE-BSD FRML MDRD: 70 ML/MIN/1.73SQ M
GLUCOSE UR STRIP-MCNC: NEGATIVE MG/DL
HGB UR QL STRIP.AUTO: NEGATIVE
HYALINE CASTS #/AREA URNS LPF: ABNORMAL /LPF
INR PPP: 0.97 (ref 0.86–1.17)
KETONES UR STRIP-MCNC: NEGATIVE MG/DL
LEUKOCYTE ESTERASE UR QL STRIP: ABNORMAL
NITRITE UR QL STRIP: NEGATIVE
NON-SQ EPI CELLS URNS QL MICRO: ABNORMAL /HPF
PH UR STRIP.AUTO: 7 [PH]
PLATELET # BLD AUTO: 306 THOUSANDS/UL (ref 149–390)
PMV BLD AUTO: 9.6 FL (ref 8.9–12.7)
PROT UR STRIP-MCNC: NEGATIVE MG/DL
PROTHROMBIN TIME: 13 SECONDS (ref 11.8–14.2)
RBC #/AREA URNS AUTO: ABNORMAL /HPF
SP GR UR STRIP.AUTO: 1.02 (ref 1–1.03)
UROBILINOGEN UR QL STRIP.AUTO: 0.2 E.U./DL
WBC #/AREA URNS AUTO: ABNORMAL /HPF

## 2019-03-28 PROCEDURE — 82565 ASSAY OF CREATININE: CPT

## 2019-03-28 PROCEDURE — 72131 CT LUMBAR SPINE W/O DYE: CPT

## 2019-03-28 PROCEDURE — 81001 URINALYSIS AUTO W/SCOPE: CPT

## 2019-03-28 PROCEDURE — 87086 URINE CULTURE/COLONY COUNT: CPT

## 2019-03-28 PROCEDURE — 85049 AUTOMATED PLATELET COUNT: CPT | Performed by: PHYSICIAN ASSISTANT

## 2019-03-28 PROCEDURE — 85610 PROTHROMBIN TIME: CPT | Performed by: PHYSICIAN ASSISTANT

## 2019-03-28 PROCEDURE — 36415 COLL VENOUS BLD VENIPUNCTURE: CPT

## 2019-03-28 PROCEDURE — 85730 THROMBOPLASTIN TIME PARTIAL: CPT | Performed by: PHYSICIAN ASSISTANT

## 2019-03-28 PROCEDURE — 62304 MYELOGRAPHY LUMBAR INJECTION: CPT

## 2019-03-28 RX ORDER — LIDOCAINE HYDROCHLORIDE 10 MG/ML
10 INJECTION, SOLUTION INFILTRATION; PERINEURAL
Status: COMPLETED | OUTPATIENT
Start: 2019-03-28 | End: 2019-03-28

## 2019-03-28 RX ORDER — ACETAMINOPHEN 325 MG/1
650 TABLET ORAL EVERY 4 HOURS PRN
Status: DISCONTINUED | OUTPATIENT
Start: 2019-03-28 | End: 2019-03-29 | Stop reason: HOSPADM

## 2019-03-28 RX ORDER — ERGOCALCIFEROL (VITAMIN D2) 10 MCG
1 TABLET ORAL DAILY
COMMUNITY
End: 2021-03-16

## 2019-03-28 RX ORDER — SODIUM CHLORIDE 9 MG/ML
125 INJECTION, SOLUTION INTRAVENOUS CONTINUOUS
Status: DISCONTINUED | OUTPATIENT
Start: 2019-03-28 | End: 2019-03-29 | Stop reason: HOSPADM

## 2019-03-28 RX ADMIN — SODIUM CHLORIDE 125 ML/HR: 0.9 INJECTION, SOLUTION INTRAVENOUS at 12:04

## 2019-03-28 RX ADMIN — IOHEXOL 16 ML: 180 INJECTION INTRAVENOUS at 13:30

## 2019-03-28 RX ADMIN — LIDOCAINE HYDROCHLORIDE 10 ML: 10 INJECTION, SOLUTION INFILTRATION; PERINEURAL at 13:30

## 2019-03-28 NOTE — TELEPHONE ENCOUNTER
Patient of Dr Meredith Heck, managed at [facility]  Patient states she has had urinary urgency, frequency and pressure for two days  Patient denies fever, chills, flank pain  Urine testing ordered  Patient to complete today  Patient advised that office will call with results in 2-3 days, in meantime she is to hydrate well with water and call office if symptoms worsen

## 2019-03-28 NOTE — TELEPHONE ENCOUNTER
Pt calling with frequency bladder pressure fees infection starting she will be going to [address] [place] [address] for radiology testing can order to get urine checked be entered into epic

## 2019-03-28 NOTE — TELEPHONE ENCOUNTER
Patient's urine was leukocytes, however nitrite negative in no bacteria appreciated on on microscopy  Will hold off on antibiotics at this time and continue to monitor urine culture results  Patient should continue with symptomatic measures in the meantime  Thank you

## 2019-03-28 NOTE — BRIEF OP NOTE (RAD/CATH)
LUMBAR MYELOGRAM    INDICATION: Acute lumbar back pain with right lower extremity radiculopathy for approximately 4 months  Prior history of L5-S1 fusion and L3-L5 laminectomy  Prior CT imaging showing findings concerning for loosened hardware  Evaluate for nerve impingement   COMPARISON: CT of the lumbar spine without contrast on December 27, 2018  FLUOROSCOPY TIME:  1 14 MFT    IMAGES: 12    TECHNIQUE:    After fully explaining the risks, benefits and alternatives of the procedure to the Patient , consent was obtained  The skin overlying the lumbar spine was prepped and draped in the usual sterile fashion  1% lidocaine was infiltrated at the puncture site  Under fluoroscopic guidance a 22 gauge 3 5 inch spinal needle was inserted into the thecal sac at the L3-L4 interlaminar space  16 cc of Omnipaque 180 mg contrast was injected into the thecal sac under fluoroscopy  The needle was removed  Post myelogram plain films and CT were obtained  Please see separate dictation for CT results  No post-procedure complications  The patient was given appropriate instructions  This procedure was performed by Quyen Irvin PA-C under the direct supervision of Dr Calvin Mckinley

## 2019-03-28 NOTE — DISCHARGE INSTRUCTIONS
Myelogram   WHAT YOU NEED TO KNOW:   Myelogram, also called myelography, is a procedure that uses an x-ray to examine your spinal canal  Contrast dye is used to help healthcare providers see your nerves, bones, or spinal cord more clearly  DISCHARGE INSTRUCTIONS:   Follow up with your healthcare provider or specialist as directed:  Write down your questions so you remember to ask them during your visits  Drink liquids as directed:  Liquids will help flush the contrast dye out of your body  Ask how much liquid to drink each day, and which liquids to drink  Some foods, such as soup and fruit, also provide liquid  Contact your healthcare provider or specialist if:   · You have bleeding or a discharge coming from where the needle was put into your back  · You have severe neck or back pain  · You have a headache or nausea that does not go away with rest and medicine  · You feel anxious or irritable  · You have questions or concerns about your condition or care  Seek care immediately or call 911 if:   · You have a stiff neck or have trouble thinking clearly  · You have numbness, tingling, or weakness anywhere below your waist     · You have a severe headache that does not get better  · You have a fever

## 2019-03-29 LAB — BACTERIA UR CULT: NORMAL

## 2019-03-29 NOTE — TELEPHONE ENCOUNTER
Please inform patient results of final urine culture showed no evidence of infection, mixed contaminants noted

## 2019-03-29 NOTE — TELEPHONE ENCOUNTER
Called and left message for patient that there is no bacteria on urinalysis  In message stated that we will hold off on antibiotics at this time and continue to monitor urine culture results  Patient is to kike with any questions or concerns

## 2019-04-05 ENCOUNTER — OFFICE VISIT (OUTPATIENT)
Dept: NEUROSURGERY | Facility: CLINIC | Age: 59
End: 2019-04-05
Payer: MEDICARE

## 2019-04-05 VITALS
TEMPERATURE: 98.8 F | HEIGHT: 65 IN | HEART RATE: 61 BPM | SYSTOLIC BLOOD PRESSURE: 139 MMHG | WEIGHT: 162 LBS | DIASTOLIC BLOOD PRESSURE: 73 MMHG | BODY MASS INDEX: 26.99 KG/M2 | RESPIRATION RATE: 16 BRPM

## 2019-04-05 DIAGNOSIS — M96.1 POST LAMINECTOMY SYNDROME: ICD-10-CM

## 2019-04-05 DIAGNOSIS — Z98.1 POSTSURGICAL ARTHRODESIS STATUS: ICD-10-CM

## 2019-04-05 DIAGNOSIS — Z98.1 HISTORY OF LUMBAR SPINAL FUSION: ICD-10-CM

## 2019-04-05 DIAGNOSIS — G89.4 CHRONIC PAIN SYNDROME: Primary | ICD-10-CM

## 2019-04-05 DIAGNOSIS — M54.16 LUMBAR RADICULOPATHY: ICD-10-CM

## 2019-04-05 PROCEDURE — 99214 OFFICE O/P EST MOD 30 MIN: CPT | Performed by: NURSE PRACTITIONER

## 2019-04-07 PROBLEM — M96.1 POST LAMINECTOMY SYNDROME: Status: ACTIVE | Noted: 2019-04-07

## 2019-04-08 ENCOUNTER — APPOINTMENT (OUTPATIENT)
Dept: RADIOLOGY | Facility: CLINIC | Age: 59
End: 2019-04-08
Payer: MEDICARE

## 2019-04-08 ENCOUNTER — OFFICE VISIT (OUTPATIENT)
Dept: URGENT CARE | Facility: CLINIC | Age: 59
End: 2019-04-08
Payer: MEDICARE

## 2019-04-08 VITALS
HEIGHT: 65 IN | WEIGHT: 161 LBS | BODY MASS INDEX: 26.82 KG/M2 | HEART RATE: 68 BPM | TEMPERATURE: 98.5 F | SYSTOLIC BLOOD PRESSURE: 124 MMHG | OXYGEN SATURATION: 97 % | DIASTOLIC BLOOD PRESSURE: 78 MMHG | RESPIRATION RATE: 16 BRPM

## 2019-04-08 DIAGNOSIS — M77.8 RIGHT WRIST TENDINITIS: ICD-10-CM

## 2019-04-08 DIAGNOSIS — M77.8 RIGHT WRIST TENDINITIS: Primary | ICD-10-CM

## 2019-04-08 PROCEDURE — 99213 OFFICE O/P EST LOW 20 MIN: CPT | Performed by: PHYSICIAN ASSISTANT

## 2019-04-08 PROCEDURE — G0463 HOSPITAL OUTPT CLINIC VISIT: HCPCS | Performed by: PHYSICIAN ASSISTANT

## 2019-04-08 PROCEDURE — 73110 X-RAY EXAM OF WRIST: CPT

## 2019-04-08 RX ORDER — FENTANYL 12 UG/H
PATCH TRANSDERMAL
COMMUNITY
Start: 2019-04-03 | End: 2020-02-26 | Stop reason: ALTCHOICE

## 2019-04-16 ENCOUNTER — HOSPITAL ENCOUNTER (OUTPATIENT)
Dept: CT IMAGING | Facility: HOSPITAL | Age: 59
Discharge: HOME | End: 2019-04-16
Attending: UROLOGY
Payer: MEDICARE

## 2019-04-16 DIAGNOSIS — R31.0 GROSS HEMATURIA: ICD-10-CM

## 2019-04-16 DIAGNOSIS — N39.0 RECURRENT UTI: ICD-10-CM

## 2019-04-16 DIAGNOSIS — N95.2 ATROPHIC VAGINITIS: ICD-10-CM

## 2019-04-16 PROCEDURE — 74178 CT ABD&PLV WO CNTR FLWD CNTR: CPT

## 2019-04-16 RX ADMIN — IOHEXOL 100 ML: 350 INJECTION, SOLUTION INTRAVENOUS at 15:48

## 2019-04-23 ENCOUNTER — EVALUATION (OUTPATIENT)
Dept: PHYSICAL THERAPY | Facility: CLINIC | Age: 59
End: 2019-04-23
Payer: MEDICARE

## 2019-04-23 DIAGNOSIS — M47.817 SPONDYLOSIS OF LUMBOSACRAL REGION, UNSPECIFIED SPINAL OSTEOARTHRITIS COMPLICATION STATUS: Primary | ICD-10-CM

## 2019-04-23 PROCEDURE — 97110 THERAPEUTIC EXERCISES: CPT | Performed by: PHYSICAL THERAPIST

## 2019-04-23 PROCEDURE — 97162 PT EVAL MOD COMPLEX 30 MIN: CPT | Performed by: PHYSICAL THERAPIST

## 2019-04-24 ENCOUNTER — TRANSCRIBE ORDERS (OUTPATIENT)
Dept: PHYSICAL THERAPY | Facility: CLINIC | Age: 59
End: 2019-04-24

## 2019-04-24 DIAGNOSIS — M47.817 SPONDYLOSIS OF LUMBOSACRAL REGION, UNSPECIFIED SPINAL OSTEOARTHRITIS COMPLICATION STATUS: Primary | ICD-10-CM

## 2019-04-25 ENCOUNTER — OFFICE VISIT (OUTPATIENT)
Dept: PHYSICAL THERAPY | Facility: CLINIC | Age: 59
End: 2019-04-25
Payer: MEDICARE

## 2019-04-25 DIAGNOSIS — M47.817 SPONDYLOSIS OF LUMBOSACRAL REGION, UNSPECIFIED SPINAL OSTEOARTHRITIS COMPLICATION STATUS: Primary | ICD-10-CM

## 2019-04-25 PROCEDURE — 97110 THERAPEUTIC EXERCISES: CPT | Performed by: PHYSICAL THERAPIST

## 2019-04-25 PROCEDURE — 97112 NEUROMUSCULAR REEDUCATION: CPT | Performed by: PHYSICAL THERAPIST

## 2019-04-30 ENCOUNTER — OFFICE VISIT (OUTPATIENT)
Dept: PHYSICAL THERAPY | Facility: CLINIC | Age: 59
End: 2019-04-30
Payer: MEDICARE

## 2019-04-30 DIAGNOSIS — M47.817 SPONDYLOSIS OF LUMBOSACRAL REGION, UNSPECIFIED SPINAL OSTEOARTHRITIS COMPLICATION STATUS: Primary | ICD-10-CM

## 2019-04-30 PROCEDURE — 97112 NEUROMUSCULAR REEDUCATION: CPT

## 2019-04-30 PROCEDURE — 97110 THERAPEUTIC EXERCISES: CPT

## 2019-05-02 ENCOUNTER — OFFICE VISIT (OUTPATIENT)
Dept: PHYSICAL THERAPY | Facility: CLINIC | Age: 59
End: 2019-05-02
Payer: MEDICARE

## 2019-05-02 DIAGNOSIS — M47.817 SPONDYLOSIS OF LUMBOSACRAL REGION, UNSPECIFIED SPINAL OSTEOARTHRITIS COMPLICATION STATUS: Primary | ICD-10-CM

## 2019-05-02 PROCEDURE — 97112 NEUROMUSCULAR REEDUCATION: CPT

## 2019-05-02 PROCEDURE — 97110 THERAPEUTIC EXERCISES: CPT

## 2019-05-07 ENCOUNTER — OFFICE VISIT (OUTPATIENT)
Dept: PHYSICAL THERAPY | Facility: CLINIC | Age: 59
End: 2019-05-07
Payer: MEDICARE

## 2019-05-07 DIAGNOSIS — M47.817 SPONDYLOSIS OF LUMBOSACRAL REGION, UNSPECIFIED SPINAL OSTEOARTHRITIS COMPLICATION STATUS: Primary | ICD-10-CM

## 2019-05-07 PROCEDURE — 97110 THERAPEUTIC EXERCISES: CPT | Performed by: PHYSICAL THERAPIST

## 2019-05-07 PROCEDURE — 97530 THERAPEUTIC ACTIVITIES: CPT | Performed by: PHYSICAL THERAPIST

## 2019-05-07 PROCEDURE — 97112 NEUROMUSCULAR REEDUCATION: CPT | Performed by: PHYSICAL THERAPIST

## 2019-05-09 ENCOUNTER — OFFICE VISIT (OUTPATIENT)
Dept: PHYSICAL THERAPY | Facility: CLINIC | Age: 59
End: 2019-05-09
Payer: MEDICARE

## 2019-05-09 DIAGNOSIS — M47.817 SPONDYLOSIS OF LUMBOSACRAL REGION, UNSPECIFIED SPINAL OSTEOARTHRITIS COMPLICATION STATUS: Primary | ICD-10-CM

## 2019-05-09 PROCEDURE — 97112 NEUROMUSCULAR REEDUCATION: CPT

## 2019-05-13 ENCOUNTER — OFFICE VISIT (OUTPATIENT)
Dept: PHYSICAL THERAPY | Facility: CLINIC | Age: 59
End: 2019-05-13
Payer: MEDICARE

## 2019-05-13 DIAGNOSIS — M47.817 SPONDYLOSIS OF LUMBOSACRAL REGION, UNSPECIFIED SPINAL OSTEOARTHRITIS COMPLICATION STATUS: Primary | ICD-10-CM

## 2019-05-13 PROCEDURE — 97110 THERAPEUTIC EXERCISES: CPT

## 2019-05-14 ENCOUNTER — APPOINTMENT (OUTPATIENT)
Dept: PHYSICAL THERAPY | Facility: CLINIC | Age: 59
End: 2019-05-14
Payer: MEDICARE

## 2019-05-16 ENCOUNTER — OFFICE VISIT (OUTPATIENT)
Dept: PHYSICAL THERAPY | Facility: CLINIC | Age: 59
End: 2019-05-16
Payer: MEDICARE

## 2019-05-16 DIAGNOSIS — M47.817 SPONDYLOSIS OF LUMBOSACRAL REGION, UNSPECIFIED SPINAL OSTEOARTHRITIS COMPLICATION STATUS: Primary | ICD-10-CM

## 2019-05-16 PROCEDURE — 97140 MANUAL THERAPY 1/> REGIONS: CPT

## 2019-05-16 PROCEDURE — 97110 THERAPEUTIC EXERCISES: CPT

## 2019-05-21 ENCOUNTER — OFFICE VISIT (OUTPATIENT)
Dept: PHYSICAL THERAPY | Facility: CLINIC | Age: 59
End: 2019-05-21
Payer: MEDICARE

## 2019-05-21 DIAGNOSIS — M47.817 SPONDYLOSIS OF LUMBOSACRAL REGION, UNSPECIFIED SPINAL OSTEOARTHRITIS COMPLICATION STATUS: Primary | ICD-10-CM

## 2019-05-21 PROCEDURE — 97110 THERAPEUTIC EXERCISES: CPT

## 2019-05-23 ENCOUNTER — EVALUATION (OUTPATIENT)
Dept: PHYSICAL THERAPY | Facility: CLINIC | Age: 59
End: 2019-05-23
Payer: MEDICARE

## 2019-05-23 DIAGNOSIS — M47.817 SPONDYLOSIS OF LUMBOSACRAL REGION, UNSPECIFIED SPINAL OSTEOARTHRITIS COMPLICATION STATUS: Primary | ICD-10-CM

## 2019-05-23 PROCEDURE — 97112 NEUROMUSCULAR REEDUCATION: CPT | Performed by: PHYSICAL THERAPIST

## 2019-05-23 PROCEDURE — 97110 THERAPEUTIC EXERCISES: CPT | Performed by: PHYSICAL THERAPIST

## 2019-05-28 ENCOUNTER — APPOINTMENT (OUTPATIENT)
Dept: PHYSICAL THERAPY | Facility: CLINIC | Age: 59
End: 2019-05-28
Payer: MEDICARE

## 2019-05-30 ENCOUNTER — APPOINTMENT (OUTPATIENT)
Dept: PHYSICAL THERAPY | Facility: CLINIC | Age: 59
End: 2019-05-30
Payer: MEDICARE

## 2019-06-06 DIAGNOSIS — E66.01 MORBID (SEVERE) OBESITY DUE TO EXCESS CALORIES (HCC): ICD-10-CM

## 2019-06-08 ENCOUNTER — APPOINTMENT (OUTPATIENT)
Dept: LAB | Facility: CLINIC | Age: 59
End: 2019-06-08
Payer: MEDICARE

## 2019-06-08 DIAGNOSIS — E55.9 VITAMIN D DEFICIENCY: ICD-10-CM

## 2019-06-08 LAB — 25(OH)D3 SERPL-MCNC: 23.1 NG/ML (ref 30–100)

## 2019-06-08 PROCEDURE — 36415 COLL VENOUS BLD VENIPUNCTURE: CPT

## 2019-06-08 PROCEDURE — 82306 VITAMIN D 25 HYDROXY: CPT

## 2019-06-10 ENCOUNTER — TELEPHONE (OUTPATIENT)
Dept: OTHER | Facility: HOSPITAL | Age: 59
End: 2019-06-10

## 2019-06-10 DIAGNOSIS — K91.2 POSTSURGICAL MALABSORPTION: ICD-10-CM

## 2019-06-10 DIAGNOSIS — E55.9 VITAMIN D INSUFFICIENCY: Primary | ICD-10-CM

## 2019-06-10 RX ORDER — OMEPRAZOLE 20 MG/1
CAPSULE, DELAYED RELEASE ORAL
Qty: 90 CAPSULE | Refills: 0 | Status: SHIPPED | OUTPATIENT
Start: 2019-06-10 | End: 2019-09-01 | Stop reason: SDUPTHER

## 2019-06-10 RX ORDER — ERGOCALCIFEROL 1.25 MG/1
50000 CAPSULE ORAL WEEKLY
Qty: 10 CAPSULE | Refills: 0 | Status: SHIPPED | OUTPATIENT
Start: 2019-06-10 | End: 2019-09-25

## 2019-06-27 ENCOUNTER — PROCEDURE VISIT (OUTPATIENT)
Dept: UROLOGY | Facility: CLINIC | Age: 59
End: 2019-06-27
Payer: MEDICARE

## 2019-06-27 ENCOUNTER — TELEPHONE (OUTPATIENT)
Dept: UROLOGY | Facility: CLINIC | Age: 59
End: 2019-06-27

## 2019-06-27 VITALS
DIASTOLIC BLOOD PRESSURE: 78 MMHG | SYSTOLIC BLOOD PRESSURE: 126 MMHG | HEIGHT: 65 IN | WEIGHT: 160 LBS | BODY MASS INDEX: 26.66 KG/M2 | HEART RATE: 68 BPM

## 2019-06-27 DIAGNOSIS — N39.0 RECURRENT UTI: ICD-10-CM

## 2019-06-27 DIAGNOSIS — R31.0 GROSS HEMATURIA: Primary | ICD-10-CM

## 2019-06-27 PROCEDURE — 52000 CYSTOURETHROSCOPY: CPT | Performed by: UROLOGY

## 2019-09-01 DIAGNOSIS — E66.01 MORBID (SEVERE) OBESITY DUE TO EXCESS CALORIES (HCC): ICD-10-CM

## 2019-09-12 RX ORDER — OMEPRAZOLE 20 MG/1
CAPSULE, DELAYED RELEASE ORAL
Qty: 90 CAPSULE | Refills: 0 | Status: SHIPPED | OUTPATIENT
Start: 2019-09-12 | End: 2019-12-04 | Stop reason: SDUPTHER

## 2019-09-18 ENCOUNTER — TELEPHONE (OUTPATIENT)
Dept: BARIATRICS | Facility: CLINIC | Age: 59
End: 2019-09-18

## 2019-09-18 NOTE — TELEPHONE ENCOUNTER
Left message on patient's voicemail reminding her to have labs drawn for TRISHA Villareal and left my number for her to return my call

## 2019-09-19 ENCOUNTER — TELEPHONE (OUTPATIENT)
Dept: UROLOGY | Facility: MEDICAL CENTER | Age: 59
End: 2019-09-19

## 2019-09-19 NOTE — TELEPHONE ENCOUNTER
Patient left a message on the Medication Refill voice mail line stating her Estrodiol Cream requires prior authorization  Contact [facility]/pharmacy in [place] for more information

## 2019-09-21 ENCOUNTER — APPOINTMENT (OUTPATIENT)
Dept: LAB | Facility: CLINIC | Age: 59
End: 2019-09-21
Payer: MEDICARE

## 2019-09-21 ENCOUNTER — TRANSCRIBE ORDERS (OUTPATIENT)
Dept: ADMINISTRATIVE | Facility: HOSPITAL | Age: 59
End: 2019-09-21

## 2019-09-21 DIAGNOSIS — E11.8 TYPE 2 DIABETES MELLITUS WITH COMPLICATION, UNSPECIFIED WHETHER LONG TERM INSULIN USE: ICD-10-CM

## 2019-09-21 DIAGNOSIS — E55.9 VITAMIN D INSUFFICIENCY: ICD-10-CM

## 2019-09-21 DIAGNOSIS — R74.8 ACID PHOSPHATASE ELEVATED: ICD-10-CM

## 2019-09-21 DIAGNOSIS — R74.9 NONSPECIFIC ABNORMAL SERUM ENZYME LEVELS: ICD-10-CM

## 2019-09-21 DIAGNOSIS — E11.8 TYPE 2 DIABETES MELLITUS WITH COMPLICATION, UNSPECIFIED WHETHER LONG TERM INSULIN USE: Primary | ICD-10-CM

## 2019-09-21 DIAGNOSIS — K91.2 POSTSURGICAL MALABSORPTION: ICD-10-CM

## 2019-09-21 LAB
25(OH)D3 SERPL-MCNC: 28.1 NG/ML (ref 30–100)
INSULIN SERPL-ACNC: 5.1 MU/L (ref 3–25)

## 2019-09-21 PROCEDURE — 82306 VITAMIN D 25 HYDROXY: CPT

## 2019-09-21 PROCEDURE — 84681 ASSAY OF C-PEPTIDE: CPT

## 2019-09-21 PROCEDURE — 83525 ASSAY OF INSULIN: CPT

## 2019-09-21 PROCEDURE — 36415 COLL VENOUS BLD VENIPUNCTURE: CPT

## 2019-09-23 ENCOUNTER — TELEPHONE (OUTPATIENT)
Dept: UROLOGY | Facility: AMBULATORY SURGERY CENTER | Age: 59
End: 2019-09-23

## 2019-09-23 NOTE — TELEPHONE ENCOUNTER
I spoke with Enma Aldrich and apologized for not getting back to her regarding this, I did not receive anything from the pharmacy or any messages from her in regards to this  I sent information over to the insurance and it is pending, I will follow up with her when I hear something    Thanks  Jane Fernando

## 2019-09-23 NOTE — TELEPHONE ENCOUNTER
----- Message from Hollie Torres RN sent at 9/23/2019  7:58 AM EDT -----  Regarding: FW: Prescription Question  Contact: [phone]      ----- Message -----  From: Josee Parrish  Sent: 9/20/2019   9:48 PM EDT  To: [facility]  Subject: Prescription Question                            Hi I have been trying for 2 weeks as well as my pharmacy to get pre authorization on my script for Estradiol  My calls are not being returned nor is anyone responding to the several requests from the pharmacy  Needless to say I am really upset I can't get any resolution to get this script  If I run out the infection will come back  I spoke with you when I was in the office about it and you were going to andres the chart  Please have someone take care of this as my calls are going unreturned  Thank you    Lilia Prince

## 2019-09-24 ENCOUNTER — TELEPHONE (OUTPATIENT)
Dept: UROLOGY | Facility: AMBULATORY SURGERY CENTER | Age: 59
End: 2019-09-24

## 2019-09-24 DIAGNOSIS — N39.0 RECURRENT UTI: Primary | ICD-10-CM

## 2019-09-24 DIAGNOSIS — N95.2 ATROPHIC VAGINITIS: ICD-10-CM

## 2019-09-24 LAB — C PEPTIDE SERPL-MCNC: 2.4 NG/ML (ref 1.1–4.4)

## 2019-09-24 NOTE — TELEPHONE ENCOUNTER
Estradiol cream is denied with the insurance patient needs to have tried two of these covered drugs:  Estring  Premarin Cream    I did not see anything documented in her chart that she has tried those     Thanks  Ranjan Brought

## 2019-09-24 NOTE — TELEPHONE ENCOUNTER
Called and left message for patient that Premarin cream sent to pharmacy because the estradiol cream was denied by insurance  Patient is to call the office back with any questions or concerns  Office number was left in the message

## 2019-09-25 ENCOUNTER — OFFICE VISIT (OUTPATIENT)
Dept: OBGYN CLINIC | Facility: CLINIC | Age: 59
End: 2019-09-25
Payer: MEDICARE

## 2019-09-25 VITALS
BODY MASS INDEX: 27.02 KG/M2 | DIASTOLIC BLOOD PRESSURE: 71 MMHG | WEIGHT: 162.2 LBS | HEART RATE: 58 BPM | HEIGHT: 65 IN | SYSTOLIC BLOOD PRESSURE: 112 MMHG

## 2019-09-25 DIAGNOSIS — M25.531 PAIN IN RIGHT WRIST: Primary | ICD-10-CM

## 2019-09-25 PROCEDURE — 99203 OFFICE O/P NEW LOW 30 MIN: CPT | Performed by: ORTHOPAEDIC SURGERY

## 2019-09-25 NOTE — PROGRESS NOTES
CHIEF COMPLAINT:  Chief Complaint   Patient presents with    Right Wrist - Pain    Right Middle Finger - Pain       SUBJECTIVE:  Param Ribeiro is a 66y o  year old RHD female who presents right wrist and right long finger pain  Patient states it has been going on for about 1 week  She denies any injuries or trauma to the area  She states most of the pain is at her DIP joint of the long finger  She notes an increase in pain when she stretches her arm out  She has tried immobilizing the joint which seems to provide some relief for her  She is on chronic pain medication neck surgeries  She states the pain is more of a burning sensation to the long finger  She does have a history of carpal tunnel releases  Currently she denies any numbness or tingling  Only a burning sensation is appreciated when stretching out the long finger in extension          PAST MEDICAL HISTORY:  Past Medical History:   Diagnosis Date    Bariatric surgery status     Basal cell carcinoma     basil cell carcinoma- in remission    Degenerative lumbar disc     Diabetes mellitus ([facility] [address] )     resolved since bariatric surgery    Hiatal hernia     History of transfusion     Post-op spinal surgery    Low back pain     Postsurgical malabsorption     Spinal stenosis     SVT (supraventricular tachycardia) ([facility] [address] )     Wears glasses        PAST SURGICAL HISTORY:  Past Surgical History:   Procedure Laterality Date    APPENDECTOMY      BACK SURGERY      Lumbar (3 surgeries)- two levels fused, clean out from infection, then fusion of 7 levels (last surgery in )   [facility]      x2    CERVICAL SPINE SURGERY      Fusion of C2-C7 over a period of 3 surgeries ( first)     SECTION      X2    CHOLECYSTECTOMY      FL MYELOGRAM LUMBAR  3/28/2019    INSERTION / PLACEMENT / REVISION NEUROSTIMULATOR      X2- both were removed    NECK SURGERY      MN EGD TRANSORAL BIOPSY SINGLE/MULTIPLE N/A 9/14/2016    Procedure: ESOPHAGOGASTRODUODENOSCOPY (EGD); Surgeon: Juan Haywood MD;  Location: [facility]; Service: Bariatrics    NM EGD TRANSORAL BIOPSY SINGLE/MULTIPLE N/A 4/18/2018    Procedure: ESOPHAGOGASTRODUODENOSCOPY (EGD) with biopsy;  Surgeon: Juan Haywood MD;  [facility]: [facility];   Service: Bariatrics    NM LAP GASTRIC BYPASS/BRAULIO-EN-Y N/A 10/25/2016    Procedure: BYPASS GASTRIC  BRAULIO-EN-Y LAPAROSCOPIC, WITH INTRA OP EGD;  Surgeon: Juan Haywood MD;  [facility];  Service: Bariatrics    NM LAP,CHOLECYSTECTOMY N/A 5/14/2018    Procedure: CHOLECYSTECTOMY LAPAROSCOPIC;  Surgeon: Juan Haywood MD;  [facility];  Service: Salma Cram SKIN CANCER EXCISION      TONSILLECTOMY      TUBAL LIGATION      WISDOM TOOTH EXTRACTION         FAMILY HISTORY:  Family History   Problem Relation Age of Onset    Cancer Father         Lung    Arthritis Mother         Rheumatoid       SOCIAL HISTORY:  Social History     Tobacco Use    Smoking status: Former Smoker     Packs/day: 1 00     Years: 20 00     Pack years: 20 00     Types: Cigarettes     Last attempt to quit: 2004     Years since quitting: 15 7    Smokeless tobacco: Never Used   Substance Use Topics    Alcohol use: No    Drug use: No       MEDICATIONS:    Current Outpatient Medications:     baclofen 10 mg tablet, Take 10 mg by mouth daily, Disp: , Rfl:     buPROPion (WELLBUTRIN) 75 mg tablet, Take 75 mg by mouth 2 (two) times a day, Disp: , Rfl: 3    Calcium Citrate-Vitamin D (CALCIUM CITRATE +D) 315-250 MG-UNIT TABS, Take 1 tablet by mouth 2 (two) times a day , Disp: , Rfl:     conjugated estrogens (PREMARIN) vaginal cream, Insert 1 g into the vagina 3 (three) times a week, Disp: 30 g, Rfl: 5    ergocalciferol (VITAMIN D2) 50,000 units, Take 1 capsule (50,000 Units total) by mouth once a week for 10 doses, Disp: 10 capsule, Rfl: 0    escitalopram (LEXAPRO) 20 mg tablet, Take 20 mg by mouth every morning  , Disp: , Rfl:     fenofibrate (TRIGLIDE) 160 MG tablet, Take 160 mg by mouth daily  , Disp: , Rfl:     fentaNYL (DURAGESIC) 12 mcg/hr TD 72 hr patch, , Disp: , Rfl:     fentaNYL (DURAGESIC) 25 mcg/hr, Place 1 patch on the skin every third day, Disp: , Rfl:     fluticasone (FLONASE) 50 mcg/act nasal spray, INSTILL 1 SPRAY INTO EACH NOSTRIL DAILY, Disp: , Rfl: 0    gabapentin (NEURONTIN) 100 mg capsule, TAKE two CAPSULE at HS, Disp: , Rfl:     loratadine (CLARITIN) 10 mg tablet, Take 10 mg by mouth daily  , Disp: , Rfl:     Multiple Vitamins-Minerals (BARIATRIC FUSION PO), Take 1 tablet by mouth daily, Disp: , Rfl:     omeprazole (PriLOSEC) 20 mg delayed release capsule, TAKE 1 CAPSULE DAILY, Disp: 90 capsule, Rfl: 0    oxyCODONE (ROXICODONE) 15 mg immediate release tablet, Take 15 mg by mouth 2 (two) times a day as needed  , Disp: , Rfl:     Vitamin D, Cholecalciferol, 400 units TABS, Take 1 tablet by mouth daily, Disp: , Rfl:     ALLERGIES:  No Known Allergies    REVIEW OF SYSTEMS:  Review of Systems  ROS:   General: no fever, no chills  HEENT:  No loss of hearing or eyesight problems  Eyes:  No red eyes  Respiratory:  No coughing, shortness of breath or wheezing  Cardiovascular:  No chest pain, no palpitations  GI:  Abdomen soft nontender, denies nausea  Endocrine:  No muscle weakness, no frequent urination, no excessive thirst  Urinary:  No dysuria, no incontinence  Musculoskeletal: see HPI and PE  SKIN:  No skin rash, no dry skin  Neurological:  No headaches, no confusion  Psychiatric:  No suicide thoughts, no anxiety, no depression  Review of all other systems is negative  VITALS:  Vitals:    09/25/19 0915   BP: 112/71   Pulse: 58       LABS:  HgA1c:   Lab Results   Component Value Date    HGBA1C 5 3 02/09/2019     BMP:   Lab Results   Component Value Date    GLUCOSE 122 03/02/2015    CALCIUM 9 1 02/09/2019     03/02/2015    K 3 8 02/09/2019    CO2 28 02/09/2019     02/09/2019 BUN 13 02/09/2019    CREATININE 0 92 03/28/2019       _____________________________________________________  PHYSICAL EXAMINATION:  General: well developed and well nourished, alert, oriented times 3 and appears comfortable  Psychiatric: Normal  HEENT: Trachea Midline, No torticollis  Pulmonary: No audible wheezing or respiratory distress   Skin: No masses, erythema, lacerations, fluctation, ulcerations  Neurovascular: Sensation Intact to the Median, Ulnar, Radial Nerve, Motor Intact to the Median, Ulnar, Radial Nerve and Pulses Intact    MUSCULOSKELETAL EXAMINATION:  Right wrist  No erythema edema or ecchymosis noted, skin is warm to touch  No tenderness to palpation over the DIP joint or over the wrist  She has normal range of motion of all the fingers and the wrist  Strength of the FDS and FDP are 5/5  She does note some pain when the arm is fully extended and there is extension of the long finger  Patient is neurovascularly intact    ___________________________________________________  STUDIES REVIEWED:  No studies reviewed  PROCEDURES PERFORMED:  Procedures  No Procedures performed today    _____________________________________________________  ASSESSMENT/PLAN:      Right median nerve pain  - we will have the patient start some physical therapy to work on range of motion stretching  - patient is currently already on pain medication as well as gabapentin  She will continue this treatment from her pain management provider  - she will follow up in 6 weeks for re-evaluation    Follow Up:  Return in about 6 weeks (around 11/6/2019)      Work/school status:  No restrictions    To Do Next Visit:  Re-evaluation of current issue      Scribe Attestation    I,:   Jayleen Junior PA-C am acting as a scribe while in the presence of the attending physician :        I,:   Wilner Verdin MD personally performed the services described in this documentation    as scribed in my presence :

## 2019-09-27 ENCOUNTER — TELEPHONE (OUTPATIENT)
Dept: BARIATRICS | Facility: CLINIC | Age: 59
End: 2019-09-27

## 2019-09-27 DIAGNOSIS — K91.2 POSTGASTRECTOMY MALABSORPTION: Primary | ICD-10-CM

## 2019-09-27 DIAGNOSIS — Z90.3 POSTGASTRECTOMY MALABSORPTION: Primary | ICD-10-CM

## 2019-09-27 DIAGNOSIS — E55.9 VITAMIN D INSUFFICIENCY: ICD-10-CM

## 2019-09-27 RX ORDER — ERGOCALCIFEROL 1.25 MG/1
50000 CAPSULE ORAL
Qty: 20 CAPSULE | Refills: 0 | Status: SHIPPED | OUTPATIENT
Start: 2019-09-30 | End: 2019-11-27 | Stop reason: ALTCHOICE

## 2019-09-27 NOTE — TELEPHONE ENCOUNTER
Noted vitamin D levels increased slightly to 28  Advised patient that I am also mailing her Replesta for OTC Vitamin D - take 2x/week for 8 weeks and repeat Vitamin D labs in 3-4 months

## 2019-10-08 ENCOUNTER — EVALUATION (OUTPATIENT)
Dept: OCCUPATIONAL THERAPY | Facility: CLINIC | Age: 59
End: 2019-10-08
Payer: MEDICARE

## 2019-10-08 DIAGNOSIS — M25.531 PAIN IN RIGHT WRIST: ICD-10-CM

## 2019-10-08 PROCEDURE — 97035 APP MDLTY 1+ULTRASOUND EA 15: CPT

## 2019-10-08 PROCEDURE — 97140 MANUAL THERAPY 1/> REGIONS: CPT

## 2019-10-08 PROCEDURE — 97165 OT EVAL LOW COMPLEX 30 MIN: CPT

## 2019-10-08 NOTE — PROGRESS NOTES
OT Evaluation     Today's date: 10/8/2019  Patient name: Linnette Borden  : 1960  MRN: [id number]  Referring provider: Kayy Farrell PA-C  Dx:   Encounter Diagnosis     ICD-10-CM    1  Pain in right wrist M25 531 Ambulatory referral to PT/OT hand therapy                  Assessment  Assessment details: This is a 66year old, left hand dominant female who began to develop pain in the DIP joint of the right middle finger 3 weeks ago  She was not certain of a precipitating incident, but thought that perhaps the finger began to hurt after using a lot of force to shut a door  AROM, strength in the right hand wrist are WNL  No edema noted  Patient has pain with blocking of the FDP tendon to the RMF and when the right arm is stretched in forward flexion with the elbow, wrist and digits extended  Patient reports this pain is affecting her ability to keyboard at work and do daily activities  The only thing she is able to do to relieve pain is immobilize the finger  This patient is a good candidate for OT services to relieve pain in the right middle finger DIP joint  Impairments: activity intolerance, lacks appropriate home exercise program and pain with function  Functional limitations: Pain in the right middle finger during work tasks, especially when reaching or or stretching the armBarriers to therapy: Chronic back pain, DM  Understanding of Dx/Px/POC: excellent   Prognosis: good    Goals  STGs/LTGs ( 6 weeks)  1  Patient will be independent in HEP to relieve pain and gradually stretch finger in pain free range  2  Patient will report a maximum pain level of 3/10 and an average pain level of 1/10 in the right middle finger during reaching and work tasks  3  Patient will demonstrate pain free AROM in the RMF and RUE  4    Patient will b pain free during reaching tasks at work and home    Plan  Plan details: 10/8/19:  Begin OT to reduce pain in the RMF  Patient would benefit from: orthotics, OT eval and skilled occupational therapy  Planned modality interventions: ultrasound, thermotherapy: paraffin bath and thermotherapy: hydrocollator packs  Planned therapy interventions: activity modification, joint mobilization, Mcdonald taping, compression, fine motor coordination training, graded activity, graded exercise, home exercise program, therapeutic exercise, therapeutic activities, strengthening, patient education, orthotic fitting/training and manual therapy  Other planned therapy interventions: IASTM to FDP tendon  Frequency: 2x week  Duration in weeks: 6  Plan of Care beginning date: 10/8/2019  Plan of Care expiration date: 2019  Treatment plan discussed with: patient        Subjective Evaluation    History of Present Illness  Onset date: 2019  Mechanism of injury: Patient reports she began to feel burning and pulling pain in the right middle finger beginning 3 weeks ago  Patient did not remember any precipitating incident, except for injuring one of her hands at work while pushing a door shut  Patient now experiencing pain in the DIP joint of the RMF  Pain is relieved with immobilization  Patient now presents for OT evaluation and treatment          Not a recurrent problem   Quality of life: good    Pain  Current pain rating: 3  At best pain ratin  At worst pain rating: 10  Location: DIP joint of the RMF  Quality: sharp and burning  Relieving factors: support  Exacerbated by: reaching with full arm extension  Social Support  Lives with: adult children    Employment status: working (Full [place] [employer])  Hand dominance: left    Treatments  No previous or current treatments  Patient Goals  Patient goals for therapy: decreased pain, independence with ADLs/IADLs and return to sport/leisure activities  Patient goal: Be able to use my finger without pain        Objective     Observations     Right Wrist/Hand   Positive for edema       Palpation     Right   Tenderness of the flexor digitorum profundus  Right Wrist/Hand Comments  Right flexor digitorum profundus: RMF only  Neurological Testing     Sensation     Wrist/Hand     Right   Intact: light touch and static two point discrimination    Active Range of Motion     Right Wrist   Normal active range of motion    Additional Active Range of Motion Details  10/8/19:  AROM of the RMF is WNL    Strength/Myotome Testing     Left Wrist/Hand      (2nd hand position)     Trial 1: 55    Thumb Strength  Palmar/Three-Point Pinch     Trial 1: 10    Right Wrist/Hand   Wrist extension: 5  Wrist flexion: 5  Radial deviation: 5  Ulnar deviation: 5     (2nd hand position)     Trial 1: 60    Thumb Strength   Palmar/Three-Point Pinch     Trial 1: 11 5    Swelling     Left Wrist/Hand   Middle     Proximal: 6 cm    Middle: 5 3 cm    Distal: 4 6 cm    Right Wrist/Hand   Middle     Proximal: 6 cm    Middle: 5 2 cm    Distal: 4 5 cm             Precautions: DM, chronic back pain                Manual  10/8       IASTM flexor tendons RMF, palm 15'                                           Exercise Diary  10/8       HEP STM right palm and RMF    Figure 8 splint        Orthotic fit/train Figure 8 splint to immobilize DIP RMF                                                                                                                                                           Modalities 10/8       US to palm, FDP RMF 50%, 3 3MHz, 0 8w/cm2 8'

## 2019-10-15 ENCOUNTER — OFFICE VISIT (OUTPATIENT)
Dept: OCCUPATIONAL THERAPY | Facility: CLINIC | Age: 59
End: 2019-10-15
Payer: MEDICARE

## 2019-10-15 DIAGNOSIS — M25.531 PAIN IN RIGHT WRIST: Primary | ICD-10-CM

## 2019-10-15 PROCEDURE — 97110 THERAPEUTIC EXERCISES: CPT

## 2019-10-15 PROCEDURE — 97140 MANUAL THERAPY 1/> REGIONS: CPT

## 2019-10-15 NOTE — PROGRESS NOTES
Daily Note     Today's date: 10/15/2019  Patient name: Sandy Joy  : 1960  MRN: [id number]  Referring provider: Leighton Raines PA-C  Dx:   Encounter Diagnosis     ICD-10-CM    1  Pain in right wrist M25 531                   Subjective: I've been wearing the alumofoam splint all the time on my finger and it hasn't hurt      Objective: See treatment diary below      Assessment: Tolerated treatment well  Patient exhibited good technique with therapeutic exercises  Patient advised to do gentle ROM on finger to maintain motion, but continue to wear the splint most of the time until she leaves for her vacation in 4 days  Plan: Continue per plan of care  Precautions: DM, chronic back pain                Manual  10/8 10/15      IASTM flexor tendons RMF, palm 15' 10'                                          Exercise Diary  10/8 10/15      HEP STM right palm and RMF    Figure 8 splint  AROM as tolerated      Orthotic fit/train Figure 8 splint to immobilize DIP RMF       AAROM RMF  13'                                                                                                                                                  Modalities 10/8 10/15      US to palm, FDP RMF 50%, 3 3MHz, 0 8w/cm2 ' 8'

## 2019-10-17 ENCOUNTER — OFFICE VISIT (OUTPATIENT)
Dept: OCCUPATIONAL THERAPY | Facility: CLINIC | Age: 59
End: 2019-10-17
Payer: MEDICARE

## 2019-10-17 DIAGNOSIS — M25.531 PAIN IN RIGHT WRIST: Primary | ICD-10-CM

## 2019-10-17 PROCEDURE — 97140 MANUAL THERAPY 1/> REGIONS: CPT

## 2019-10-17 PROCEDURE — 97110 THERAPEUTIC EXERCISES: CPT

## 2019-10-17 PROCEDURE — 97035 APP MDLTY 1+ULTRASOUND EA 15: CPT

## 2019-10-29 ENCOUNTER — OFFICE VISIT (OUTPATIENT)
Dept: OCCUPATIONAL THERAPY | Facility: CLINIC | Age: 59
End: 2019-10-29
Payer: MEDICARE

## 2019-10-29 DIAGNOSIS — M25.531 PAIN IN RIGHT WRIST: Primary | ICD-10-CM

## 2019-10-29 PROCEDURE — 97110 THERAPEUTIC EXERCISES: CPT

## 2019-10-29 NOTE — PROGRESS NOTES
OT Discharge     Today's date: 10/29/2019  Patient name: Sandy Joy  : 1960  MRN: [id number]  Referring provider: Valentin Fuentes  Dx:   Encounter Diagnosis     ICD-10-CM    1  Pain in right wrist M25 531                   Assessment  Assessment details: This is a 66year old, left hand dominant female who began to develop pain in the DIP joint of the right middle finger 3 weeks ago  She was not certain of a precipitating incident, but thought that perhaps the finger began to hurt after using a lot of force to shut a door  AROM, strength in the right hand wrist are WNL  No edema noted  Patient has pain with blocking of the FDP tendon to the RMF and when the right arm is stretched in forward flexion with the elbow, wrist and digits extended  Patient reports this pain is affecting her ability to keyboard at work and do daily activities  The only thing she is able to do to relieve pain is immobilize the finger  This patient is a good candidate for OT services to relieve pain in the right middle finger DIP joint  10/29/19:  Patient reports the pain in her right middle finger completely resolved and she has not had any pain in the finger for 3 days  She has stopped using her splint  Patient is now having pain in her right shoulder  She reports she "strained" her shoulder 10/28/19  Discharge OT to HEP  Barriers to therapy: Chronic back pain, DM  Understanding of Dx/Px/POC: excellent   Prognosis: good    Goals  STGs/LTGs ( 6 weeks)  1  Patient will be independent in HEP to relieve pain and gradually stretch finger in pain free range  MET  2  Patient will report a maximum pain level of 3/10 and an average pain level of 1/10 in the right middle finger during reaching and work tasks  MET  3  Patient will demonstrate pain free AROM in the RMF and RUE  MET  4  Patient will b pain free during reaching tasks at work and home    MET    Plan  Plan details: 10/8/19:  Begin OT to reduce pain in the RMF  10/29/19:  Discharge to [address] beginning date: 10/8/2019  Plan of Care expiration date: 2019  Treatment plan discussed with: patient        Subjective Evaluation    History of Present Illness  Onset date: 2019  Mechanism of injury: Patient reports she began to feel burning and pulling pain in the right middle finger beginning 3 weeks ago  Patient did not remember any precipitating incident, except for injuring one of her hands at work while pushing a door shut  Patient now experiencing pain in the DIP joint of the RMF  Pain is relieved with immobilization  Patient now presents for OT evaluation and treatment    10/29/19:  Patient reports she no longer has pain in her right long finger  She has been pain free for 2-3 days  Patient reports she strained her right shoulder yesterday  Not a recurrent problem   Quality of life: good    Pain  Current pain ratin  At best pain ratin  At worst pain ratin  Location: DIP joint of the RMF    Social Support  Lives with: adult children    Employment status: working ([employer] [place] [employer])  Hand dominance: left    Treatments  No previous or current treatments  Patient Goals  Patient goals for therapy: decreased pain, independence with ADLs/IADLs and return to sport/leisure activities  Patient goal: Be able to use my finger without pain        Objective     Observations     Right Wrist/Hand   Negative for edema  Additional Observation Details  10/29/19:  Edema has resolved    Palpation     Right Wrist/Hand Comments  Right flexor digitorum profundus: RMF only       Neurological Testing     Sensation     Wrist/Hand     Right   Intact: light touch and static two point discrimination    Active Range of Motion     Right Wrist   Normal active range of motion    Additional Active Range of Motion Details  10/8/19:  AROM of the RMF is WNL    Strength/Myotome Testing     Left Wrist/Hand      (2nd hand position)     Trial 1: 80    Thumb Strength  Palmar/Three-Point Pinch     Trial 1: 11    Right Wrist/Hand   Wrist extension: 5  Wrist flexion: 5  Radial deviation: 5  Ulnar deviation: 5     (2nd hand position)     Trial 1: 60    Thumb Strength   Palmar/Three-Point Pinch     Trial 1: 11    Swelling     Left Wrist/Hand   Middle     Proximal: 6 cm    Middle: 5 2 cm    Distal: 4 5 cm    Right Wrist/Hand   Middle     Proximal: 6 cm    Middle: 5 2 cm    Distal: 4 5 cm             Precautions: DM, chronic back pain              Manual  10/8 10/15 10/17       IASTM flexor tendons RMF, palm 15' 10' 10'                                                                     Exercise Diary  10/8 10/15 10/17  10/29     HEP STM right palm and RMF    Figure 8 splint  AROM as tolerated Median nerve glides distal       Orthotic fit/train Figure 8 splint to immobilize DIP RMF           AAROM RMF   13' x20 e/f  x20     Distal MNG     x10  x10     Passive wrist e/f with digits in passive ext     x10  x10                                                                                                                                                                                                                             Modalities 10/8 10/15 10/17       US to palm, FDP RMF 50%, 3 3MHz, 0 8w/cm2 8' 8' 8'

## 2019-10-31 ENCOUNTER — APPOINTMENT (OUTPATIENT)
Dept: OCCUPATIONAL THERAPY | Facility: CLINIC | Age: 59
End: 2019-10-31
Payer: MEDICARE

## 2019-11-26 PROBLEM — R10.13 EPIGASTRIC PAIN: Status: ACTIVE | Noted: 2018-01-18

## 2019-11-26 PROBLEM — E55.9 VITAMIN D INSUFFICIENCY: Status: ACTIVE | Noted: 2017-05-11

## 2019-11-27 ENCOUNTER — OFFICE VISIT (OUTPATIENT)
Dept: FAMILY MEDICINE CLINIC | Facility: CLINIC | Age: 59
End: 2019-11-27
Payer: MEDICARE

## 2019-11-27 VITALS
WEIGHT: 162.8 LBS | OXYGEN SATURATION: 98 % | DIASTOLIC BLOOD PRESSURE: 78 MMHG | SYSTOLIC BLOOD PRESSURE: 128 MMHG | BODY MASS INDEX: 27.12 KG/M2 | HEART RATE: 64 BPM | TEMPERATURE: 98.6 F | HEIGHT: 65 IN

## 2019-11-27 DIAGNOSIS — Z12.11 SCREENING FOR COLON CANCER: ICD-10-CM

## 2019-11-27 DIAGNOSIS — F32.5 MAJOR DEPRESSIVE DISORDER WITH SINGLE EPISODE, IN FULL REMISSION (HCC): ICD-10-CM

## 2019-11-27 DIAGNOSIS — Z00.00 MEDICARE ANNUAL WELLNESS VISIT, INITIAL: Primary | ICD-10-CM

## 2019-11-27 DIAGNOSIS — Z98.84 S/P GASTRIC BYPASS: ICD-10-CM

## 2019-11-27 DIAGNOSIS — J06.9 VIRAL URI WITH COUGH: ICD-10-CM

## 2019-11-27 DIAGNOSIS — E78.5 HYPERLIPIDEMIA, UNSPECIFIED HYPERLIPIDEMIA TYPE: ICD-10-CM

## 2019-11-27 DIAGNOSIS — G89.4 CHRONIC PAIN SYNDROME: ICD-10-CM

## 2019-11-27 DIAGNOSIS — Z12.39 SCREENING FOR BREAST CANCER: ICD-10-CM

## 2019-11-27 PROBLEM — R10.13 ABDOMINAL PAIN, EPIGASTRIC: Status: RESOLVED | Noted: 2018-03-23 | Resolved: 2019-11-27

## 2019-11-27 PROBLEM — R31.0 GROSS HEMATURIA: Status: RESOLVED | Noted: 2019-03-19 | Resolved: 2019-11-27

## 2019-11-27 PROBLEM — R10.13 EPIGASTRIC PAIN: Status: RESOLVED | Noted: 2018-01-18 | Resolved: 2019-11-27

## 2019-11-27 PROBLEM — K80.40 CALCULUS OF BILE DUCT WITH CHOLECYSTITIS WITHOUT OBSTRUCTION: Status: RESOLVED | Noted: 2018-04-27 | Resolved: 2019-11-27

## 2019-11-27 PROBLEM — N39.0 RECURRENT UTI: Status: RESOLVED | Noted: 2019-03-19 | Resolved: 2019-11-27

## 2019-11-27 PROBLEM — T84.216A: Status: RESOLVED | Noted: 2019-03-01 | Resolved: 2019-11-27

## 2019-11-27 PROCEDURE — G0438 PPPS, INITIAL VISIT: HCPCS | Performed by: FAMILY MEDICINE

## 2019-11-27 PROCEDURE — 99204 OFFICE O/P NEW MOD 45 MIN: CPT | Performed by: FAMILY MEDICINE

## 2019-11-27 RX ORDER — FENOFIBRATE 160 MG/1
160 TABLET ORAL DAILY
Qty: 90 TABLET | Refills: 1 | Status: SHIPPED | OUTPATIENT
Start: 2019-11-27 | End: 2020-05-14

## 2019-11-27 RX ORDER — BUPROPION HYDROCHLORIDE 75 MG/1
75 TABLET ORAL 2 TIMES DAILY
Qty: 180 TABLET | Refills: 1 | Status: SHIPPED | OUTPATIENT
Start: 2019-11-27 | End: 2020-05-14

## 2019-11-27 RX ORDER — ESCITALOPRAM OXALATE 20 MG/1
20 TABLET ORAL EVERY MORNING
Qty: 90 TABLET | Refills: 1 | Status: SHIPPED | OUTPATIENT
Start: 2019-11-27 | End: 2020-05-14

## 2019-11-27 NOTE — PROGRESS NOTES
Jacki Dia 1960 female MRN: [id number]      ASSESSMENT/PLAN  Problem List Items Addressed This Visit        Respiratory    Viral URI with cough       Other    Hyperlipemia    Relevant Medications    fenofibrate (TRIGLIDE) 160 MG tablet    Chronic pain syndrome    Relevant Medications    buPROPion (WELLBUTRIN) 75 mg tablet    Major depressive disorder    Relevant Medications    buPROPion (WELLBUTRIN) 75 mg tablet    escitalopram (LEXAPRO) 20 mg tablet    Medicare annual wellness visit, initial - Primary      Other Visit Diagnoses     Screening for breast cancer        Relevant Orders    Mammo screening bilateral w cad    Screening for colon cancer        Relevant Orders    Cologuard        No OM, pharyngitis, PNA on exam  Symptoms likely viral in nature  Encouraged symptomatic management with Flonase, hydration, and rest  Discussed precautions including fever, SOB  Future Appointments   Date Time Provider Yanelis Carcamo   1/28/2020  3:30 PM Kailey Wong PA-C [facility] [place] [place] [facility]   2/26/2020  1:20 PM DO PAULIE Reyes [facility]          SUBJECTIVE  CC: Establish Care and Medicare Wellness Visit      HPI:  Jacki Dia is a 64 y o  female who presents to [facility]  History reviewed and updated as below  Chronic Pain: Follows with Pain Management  - Currently transitioning off Fentanyl and from short to long acting Oxy; Also takes Baclofen daily, gabapentin 200 qhs, and wellbutrin 75 bid   Depression/Anxiety: Lexapro -- has tried to wean off a few times without success; stable   GERD: on omeprazole x3 years -- is unsure why she is still taking this and is going to discuss with bariatrics at her follow up in January     Cough x 1 week  Rhinorrhea, sinus pressure   No ear pain, sore throat, vomiting, diarrhea   Tried tylenol cold and sinus, with some relief   (+) sick contacts - grandson      Review of Systems   Constitutional: Negative for unexpected weight change     HENT: Positive for rhinorrhea and sinus pressure  Negative for congestion, ear pain and sore throat  Eyes: Negative for visual disturbance  Respiratory: Positive for cough  Negative for shortness of breath  Cardiovascular: Negative for chest pain, palpitations and leg swelling  Gastrointestinal: Negative for abdominal pain, constipation and diarrhea  Endocrine: Negative for polydipsia and polyuria  Genitourinary: Negative for difficulty urinating (recurrent UTI) and vaginal bleeding  Musculoskeletal: Positive for arthralgias (chronic ) and myalgias (chronic)  Neurological: Negative for dizziness and headaches  Psychiatric/Behavioral: Negative for sleep disturbance         Historical Information   The patient history was reviewed and updated as follows:    Past Medical History:   Diagnosis Date    Abdominal pain, epigastric 3/23/2018    Added automatically from request for surgery [id number]    Abnormal x-ray of lumbar spine 11/3/2015    Bariatric surgery status     Basal cell carcinoma     basil cell carcinoma- in remission    Benign essential hypertension 6/12/2012    Breakdown (mechanical) of internal fixation device of vertebrae, initial encounter ([facility] ) 3/1/2019    Calculus of bile duct with cholecystitis without obstruction 4/27/2018    Added automatically from request for surgery [id number]    Cellulitis of finger 12/3/2015    Degenerative lumbar disc     Diabetes mellitus ([facility] )     resolved since bariatric surgery    Digital mucinous cyst 6/24/2015    DMII (diabetes mellitus, type 2) ([facility] ) 11/8/2013    Gross hematuria 3/19/2019    Hiatal hernia     History of transfusion 2014    Post-op spinal surgery    Low back pain     Lower urinary tract infectious disease 3/27/2015    Obesity     Resolved 10/6/2016     Postsurgical malabsorption     Recurrent UTI 3/19/2019    Spinal stenosis     SVT [facility]ra[facility]ricular tachycardia) ([facility])     Tachycardia     Resolved 5/4/2016     Type 2 diabetes mellitus ([address] )     Last assesssed 2018     Wears glasses      Past Surgical History:   Procedure Laterality Date    APPENDECTOMY      ARTHRODESIS      Lumbar - Last assessed 2018    [facility] SURGERY      Lumbar (3 surgeries)- two levels fused, clean out from infection, then fusion of 7 levels (last surgery in )   [address]      x2    CERVICAL SPINE SURGERY      Fusion of C2-C7 over a period of 3 surgeries ( first)     SECTION      X2    CHOLECYSTECTOMY      FL MYELOGRAM LUMBAR  3/28/2019    INSERTION / PLACEMENT / REVISION NEUROSTIMULATOR      X2- both were removed    NECK SURGERY      OTHER SURGICAL HISTORY      Catheter ablation     MN EGD TRANSORAL BIOPSY SINGLE/MULTIPLE N/A 2016    Procedure: ESOPHAGOGASTRODUODENOSCOPY (EGD); Surgeon: Juliana Roger MD;  [facility]; Service: Bariatrics    MN EGD TRANSORAL BIOPSY SINGLE/MULTIPLE N/A 2018    Procedure: ESOPHAGOGASTRODUODENOSCOPY (EGD) with biopsy;  Surgeon: Juliana Roger MD;  [facility];   Service: Bariatrics    MN LAP GASTRIC BYPASS/BRAULIO-EN-Y N/A 10/25/2016    Procedure: BYPASS GASTRIC  BRAULIO-EN-Y LAPAROSCOPIC, WITH INTRA OP EGD;  Surgeon: Juliana Roger MD;  [facility];  Service: Bariatrics    MN LAP,CHOLECYSTECTOMY N/A 2018    Procedure: Rao Numbers;  Surgeon: Juliana Roger MD;  [facility];  Service: Jeanmarie Bachelor SKIN CANCER EXCISION      TONSILLECTOMY      TUBAL LIGATION      WISDOM TOOTH EXTRACTION       Family History   Problem Relation Age of Onset    Cancer Father         Lung    Cystic fibrosis Father     Arthritis Mother         Rheumatoid    Angina Mother     Coronary artery disease Mother     Diabetes Mother     Rheum arthritis Sister     Diabetes Sister     Other Brother         Back problems     Diabetes Brother     No Known Problems Brother       Social History   Social History     Substance and Sexual Activity   Alcohol Use No     Social History     Substance and Sexual Activity   Drug Use No     Social History     Tobacco Use   Smoking Status Former Smoker    Packs/day: 1 00    Years: 20 00    Pack years: 20 00    Types: Cigarettes    Last attempt to quit: 2004    Years since quitting: 15 9   Smokeless Tobacco Never Used       Medications:     Current Outpatient Medications:     baclofen 10 mg tablet, Take 10 mg by mouth daily, Disp: , Rfl:     buPROPion (WELLBUTRIN) 75 mg tablet, Take 1 tablet (75 mg total) by mouth 2 (two) times a day, Disp: 180 tablet, Rfl: 1    Calcium Citrate-Vitamin D (CALCIUM CITRATE +D) 315-250 MG-UNIT TABS, Take 1 tablet by mouth 2 (two) times a day , Disp: , Rfl:     conjugated estrogens (PREMARIN) vaginal cream, Insert 1 g into the vagina 3 (three) times a week, Disp: 30 g, Rfl: 5    escitalopram (LEXAPRO) 20 mg tablet, Take 1 tablet (20 mg total) by mouth every morning, Disp: 90 tablet, Rfl: 1    fenofibrate (TRIGLIDE) 160 MG tablet, Take 1 tablet (160 mg total) by mouth daily, Disp: 90 tablet, Rfl: 1    fentaNYL (DURAGESIC) 12 mcg/hr TD 72 hr patch, , Disp: , Rfl:     gabapentin (NEURONTIN) 100 mg capsule, TAKE two CAPSULE at HS, Disp: , Rfl:     loratadine (CLARITIN) 10 mg tablet, Take 10 mg by mouth daily  , Disp: , Rfl:     Multiple Vitamins-Minerals (BARIATRIC FUSION PO), Take 1 tablet by mouth daily, Disp: , Rfl:     omeprazole (PriLOSEC) 20 mg delayed release capsule, TAKE 1 CAPSULE DAILY, Disp: 90 capsule, Rfl: 0    oxyCODONE (ROXICODONE) 15 mg immediate release tablet, Take 15 mg by mouth 2 (two) times a day as needed  , Disp: , Rfl:     Vitamin D, Cholecalciferol, 400 units TABS, Take 1 tablet by mouth daily, Disp: , Rfl:   No Known Allergies    OBJECTIVE    Vitals:   Vitals:    11/27/19 1516   BP: 128/78   Pulse: 64   Temp: 98 6 °F (37 °C)   TempSrc: Tympanic   SpO2: 98%   Weight: 73 8 kg (162 lb 12 8 oz) Height: 5' 4 5" (1 638 m)           Physical Exam   Constitutional: She appears well-developed and well-nourished  No distress  HENT:   Head: Normocephalic and atraumatic  Right Ear: External ear normal  Tympanic membrane is not erythematous, not retracted and not bulging  Left Ear: External ear normal  Tympanic membrane is not erythematous, not retracted and not bulging  Nose: Rhinorrhea present  Mouth/Throat: Oropharynx is clear and moist  No oropharyngeal exudate, posterior oropharyngeal edema or posterior oropharyngeal erythema  Eyes: Conjunctivae are normal    Neck: No thyromegaly present  Cardiovascular: Normal rate and regular rhythm  Pulmonary/Chest: Effort normal and breath sounds normal  No respiratory distress  She has no wheezes  She has no rales  Abdominal: Soft  Bowel sounds are normal  She exhibits no distension  There is no tenderness  Musculoskeletal: She exhibits no edema  Lymphadenopathy:     She has no cervical adenopathy  Neurological: She is alert  Skin: Skin is warm and dry  Psychiatric: She has a normal mood and affect  Vitals reviewed                   DO [facility]   11/27/2019  4:24 PM

## 2019-11-27 NOTE — PATIENT INSTRUCTIONS
Medicare Preventive Visit Patient Instructions  Thank you for completing your Welcome to Medicare Visit or Medicare Annual Wellness Visit today  Your next wellness visit will be due in one year (11/27/2020)  The screening/preventive services that you may require over the next 5-10 years are detailed below  Some tests may not apply to you based off risk factors and/or age  Screening tests ordered at today's visit but not completed yet may show as past due  Also, please note that scanned in results may not display below  Preventive Screenings:  Service Recommendations Previous Testing/Comments   Colorectal Cancer Screening  * Colonoscopy    * Fecal Occult Blood Test (FOBT)/Fecal Immunochemical Test (FIT)  * Fecal DNA/Cologuard Test  * Flexible Sigmoidoscopy Age: 54-65 years old   Colonoscopy: every 10 years (may be performed more frequently if at higher risk)  OR  FOBT/FIT: every 1 year  OR  Cologuard: every 3 years  OR  Sigmoidoscopy: every 5 years  Screening may be recommended earlier than age 48 if at higher risk for colorectal cancer  Also, an individualized decision between you and your healthcare provider will decide whether screening between the ages of 74-80 would be appropriate  Colonoscopy: Not on file  FOBT/FIT: Not on file  Cologuard: Not on file  Sigmoidoscopy: Not on file         Breast Cancer Screening Age: 36 years old  Frequency: every 1-2 years  Not required if history of left and right mastectomy Mammogram: Not on file       Cervical Cancer Screening Between the ages of 21-29, pap smear recommended once every 3 years  Between the ages of 33-67, can perform pap smear with HPV co-testing every 5 years     Recommendations may differ for women with a history of total hysterectomy, cervical cancer, or abnormal pap smears in past  Pap Smear: Not on file       Hepatitis C Screening Once for adults born between [facility]  More frequently in patients at high risk for Hepatitis C Hep C Antibody: Not on file       Diabetes Screening 1-2 times per year if you're at risk for diabetes or have pre-diabetes Fasting glucose: 62 mg/dL   A1C: 5 3 %    Screening Not Indicated  History Diabetes   Cholesterol Screening Once every 5 years if you don't have a lipid disorder  May order more often based on risk factors  Lipid panel: 02/09/2019    Screening Not Indicated  History Lipid Disorder     Other Preventive Screenings Covered by Medicare:  1  Abdominal Aortic Aneurysm (AAA) Screening: covered once if your at risk  You're considered to be at risk if you have a family history of AAA  2  Lung Cancer Screening: covers low dose CT scan once per year if you meet all of the following conditions: (1) Age 50-69; (2) No signs or symptoms of lung cancer; (3) Current smoker or have quit smoking within the last 15 years; (4) You have a tobacco smoking history of at least 30 pack years (packs per day multiplied by number of years you smoked); (5) You get a written order from a healthcare provider  3  Glaucoma Screening: covered annually if you're considered high risk: (1) You have diabetes OR (2) Family history of glaucoma OR (3)  aged 48 and older OR (3)  American aged 72 and older  3  Osteoporosis Screening: covered every 2 years if you meet one of the following conditions: (1) You're estrogen deficient and at risk for osteoporosis based off medical history and other findings; (2) Have a vertebral abnormality; (3) On glucocorticoid therapy for more than 3 months; (4) Have primary hyperparathyroidism; (5) On osteoporosis medications and need to assess response to drug therapy  · Last bone density test (DXA Scan): Not on file  5  HIV Screening: covered annually if you're between the age of 12-76  Also covered annually if you are younger than 13 and older than 72 with risk factors for HIV infection  For pregnant patients, it is covered up to 3 times per pregnancy      Immunizations:  Immunization Recommendations   Influenza Vaccine Annual influenza vaccination during flu season is recommended for all persons aged >= 6 months who do not have contraindications   Pneumococcal Vaccine (Prevnar and Pneumovax)  * Prevnar = PCV13  * Pneumovax = PPSV23   Adults 25-60 years old: 1-3 doses may be recommended based on certain risk factors  Adults 72 years old: Prevnar (PCV13) vaccine recommended followed by Pneumovax (PPSV23) vaccine  If already received PPSV23 since turning 65, then PCV13 recommended at least one year after PPSV23 dose  Hepatitis B Vaccine 3 dose series if at intermediate or high risk (ex: diabetes, end stage renal disease, liver disease)   Tetanus (Td) Vaccine - COST NOT COVERED BY MEDICARE PART B Following completion of primary series, a booster dose should be given every 10 years to maintain immunity against tetanus  Td may also be given as tetanus wound prophylaxis  Tdap Vaccine - COST NOT COVERED BY MEDICARE PART B Recommended at least once for all adults  For pregnant patients, recommended with each pregnancy  Shingles Vaccine (Shingrix) - COST NOT COVERED BY MEDICARE PART B  2 shot series recommended in those aged 48 and above     Health Maintenance Due:      Topic Date Due    Hepatitis C Screening  1960    MAMMOGRAM  1960    CRC Screening: Colonoscopy  1960    Cervical Cancer Screening  10/19/1983     Immunizations Due:      Topic Date Due    Pneumococcal Vaccine: Pediatrics (0 to 5 Years) and At-Risk Patients (6 to 59 Years) (1 of 3 - PCV13) 10/19/1968    Hepatitis B Vaccine (1 of 3 - Risk 3-dose series) 10/19/1981     Advance Directives   What are advance directives? Advance directives are legal documents that state your wishes and plans for medical care  These plans are made ahead of time in case you lose your ability to make decisions for yourself   Advance directives can apply to any medical decision, such as the treatments you want, and if you want to donate organs  What are the types of advance directives? There are many types of advance directives, and each state has rules about how to use them  You may choose a combination of any of the following:  · Living will: This is a written record of the treatment you want  You can also choose which treatments you do not want, which to limit, and which to stop at a certain time  This includes surgery, medicine, IV fluid, and tube feedings  · Durable power of  for healthcare [facility]): This is a written record that states who you want to make healthcare choices for you when you are unable to make them for yourself  This person, called a proxy, is usually a family member or a friend  You may choose more than 1 proxy  · Do not resuscitate (DNR) order:  A DNR order is used in case your heart stops beating or you stop breathing  It is a request not to have certain forms of treatment, such as CPR  A DNR order may be included in other types of advance directives  · Medical directive: This covers the care that you want if you are in a coma, near death, or unable to make decisions for yourself  You can list the treatments you want for each condition  Treatment may include pain medicine, surgery, blood transfusions, dialysis, IV or tube feedings, and a ventilator (breathing machine)  · Values history: This document has questions about your views, beliefs, and how you feel and think about life  This information can help others choose the care that you would choose  Why are advance directives important? An advance directive helps you control your care  Although spoken wishes may be used, it is better to have your wishes written down  Spoken wishes can be misunderstood, or not followed  Treatments may be given even if you do not want them  An advance directive may make it easier for your family to make difficult choices about your care     Weight Management   Why it is important to manage your weight:  Being overweight increases your risk of health conditions such as heart disease, high blood pressure, type 2 diabetes, and certain types of cancer  It can also increase your risk for osteoarthritis, sleep apnea, and other respiratory problems  Aim for a slow, steady weight loss  Even a small amount of weight loss can lower your risk of health problems  How to lose weight safely:  A safe and healthy way to lose weight is to eat fewer calories and get regular exercise  You can lose up about 1 pound a week by decreasing the number of calories you eat by 500 calories each day  Healthy meal plan for weight management:  A healthy meal plan includes a variety of foods, contains fewer calories, and helps you stay healthy  A healthy meal plan includes the following:  · Eat whole-grain foods more often  A healthy meal plan should contain fiber  Fiber is the part of grains, fruits, and vegetables that is not broken down by your body  Whole-grain foods are healthy and provide extra fiber in your diet  Some examples of whole-grain foods are whole-wheat breads and pastas, oatmeal, brown rice, and bulgur  · Eat a variety of vegetables every day  Include dark, leafy greens such as spinach, kale, blanche greens, and mustard greens  Eat yellow and orange vegetables such as carrots, sweet potatoes, and winter squash  · Eat a variety of fruits every day  Choose fresh or canned fruit (canned in its own juice or light syrup) instead of juice  Fruit juice has very little or no fiber  · Eat low-fat dairy foods  Drink fat-free (skim) milk or 1% milk  Eat fat-free yogurt and low-fat cottage cheese  Try low-fat cheeses such as mozzarella and other reduced-fat cheeses  · Choose meat and other protein foods that are low in fat  Choose beans or other legumes such as split peas or lentils  Choose fish, skinless poultry (chicken or turkey), or lean cuts of red meat (beef or pork)  Before you cook meat or poultry, cut off any visible fat  · Use less fat and oil  Try baking foods instead of frying them  Add less fat, such as margarine, sour cream, regular salad dressing and mayonnaise to foods  Eat fewer high-fat foods  Some examples of high-fat foods include french fries, doughnuts, ice cream, and cakes  · Eat fewer sweets  Limit foods and drinks that are high in sugar  This includes candy, cookies, regular soda, and sweetened drinks  Exercise:  Exercise at least 30 minutes per day on most days of the week  Some examples of exercise include walking, biking, dancing, and swimming  You can also fit in more physical activity by taking the stairs instead of the elevator or parking farther away from stores  Ask your healthcare provider about the best exercise plan for you  © Copyright [employer] 2018 Information is for End User's use only and may not be sold, redistributed or otherwise used for commercial purposes   All illustrations and images included in CareNotes® are the copyrighted property of [employer]  or [address] [place] [address]

## 2019-11-27 NOTE — PROGRESS NOTES
Assessment and Plan:     Problem List Items Addressed This Visit        Respiratory    Viral URI with cough       Other    S/P gastric bypass    Hyperlipemia    Relevant Medications    fenofibrate (TRIGLIDE) 160 MG tablet    Chronic pain syndrome    Relevant Medications    buPROPion (WELLBUTRIN) 75 mg tablet    Major depressive disorder    Relevant Medications    buPROPion (WELLBUTRIN) 75 mg tablet    escitalopram (LEXAPRO) 20 mg tablet    Medicare annual wellness visit, initial - Primary      Other Visit Diagnoses     Screening for breast cancer        Relevant Orders    Mammo screening bilateral w cad    Screening for colon cancer        Relevant Orders    Cologuard           Preventive health issues were discussed with patient, and age appropriate screening tests were ordered as noted in patient's After Visit Summary  Personalized health advice and appropriate referrals for health education or preventive services given if needed, as noted in patient's After Visit Summary       History of Present Illness:     Patient presents for Medicare Annual Wellness visit    Patient Care Team:  Dean [place] DO as PCP - General (Family Medicine)  MIA Esquivel MD Earline Loveless, PA-C Gwenette Shorts, DO Austin Debar, DO Jerel Kite, MD Domnick Gambles, MD Bryson Albe, MD Madonna Sills PAEWA     Problem List:     Patient Active Problem List   Diagnosis    S/P gastric bypass    Hyperlipemia    Postgastrectomy malabsorption    Chronic pain syndrome    History of lumbar spinal fusion    Lumbar radiculopathy    Postsurgical arthrodesis status    Atrophic vaginitis    Lumbar post-laminectomy syndrome    Anxiety states    Bilateral low back pain without sciatica    Chronic pain due to trauma    Intervertebral disc prolapse    Irritation of right radial nerve    Lateral epicondylitis of left elbow    Lumbar degenerative disc disease    Major depressive disorder    Vitamin D insufficiency    Lumbar stenosis    Medicare annual wellness visit, initial    Viral URI with cough      Past Medical and Surgical History:     Past Medical History:   Diagnosis Date    Abdominal pain, epigastric 3/23/2018    Added automatically from request for surgery [id number]    Abnormal x-ray of lumbar spine 11/3/2015    Bariatric surgery status     Basal cell carcinoma     basil cell carcinoma- in remission    Benign essential hypertension 2012    Breakdown (mechanical) [facility] internal [facility] 3/1/2019    Calculus of bile duct with cholecystitis without obstruction 2018    Added automatically from request for surgery [id number]    Cellulitis of finger 12/3/2015    Degenerative lumbar disc     Diabetes mellitus ([facility] )     resolved since bariatric surgery    Digital mucinous cyst 2015    DMII (diabetes mellitus, type 2) ([facility] ) 2013    Gross hematuria 3/19/2019    Hiatal hernia     History of transfusion     Post-op spinal surgery    Low back pain     Lower urinary tract infectious disease 3/27/2015    Obesity     Resolved 10/6/2016     Postsurgical malabsorption     Recurrent UTI 3/19/2019    Spinal stenosis     SVT [facility]supraventricular tachycardia) [facility]     Tachycardia     Resolved 2016     Type 2 diabetes mellitus ([facility] )     Last assesssed 2018     Wears glasses      Past Surgical History:   Procedure Laterality Date    APPENDECTOMY      ARTHRODESIS      Lumbar - Last assessed 2018    [facility] Bones BACK SURGERY      Lumbar (3 surgeries)- two levels fused, clean out from infection, then fusion of 7 levels (last surgery in )   [facility]      x2    CERVICAL SPINE SURGERY      Fusion of C2-C7 over a period of 3 surgeries ( first)     SECTION      X2    CHOLECYSTECTOMY      FL MYELOGRAM LUMBAR  3/28/2019    INSERTION / PLACEMENT / REVISION NEUROSTIMULATOR      X2- both were removed    NECK SURGERY      OTHER SURGICAL HISTORY      Catheter ablation     VT EGD TRANSORAL BIOPSY SINGLE/MULTIPLE N/A 9/14/2016    Procedure: ESOPHAGOGASTRODUODENOSCOPY (EGD); Surgeon: Blake Colbert MD;  Location: [facility]; Service: Bariatrics    VT EGD TRANSORAL BIOPSY SINGLE/MULTIPLE N/A 4/18/2018    Procedure: ESOPHAGOGASTRODUODENOSCOPY (EGD) with biopsy;  Surgeon: Blake Colbert MD;  Location: [facility];   Service: Bariatrics    VT LAP GASTRIC BYPASS/BRAULIO-EN-Y N/A 10/25/2016    Procedure: BYPASS GASTRIC  BRAULIO-EN-Y LAPAROSCOPIC, WITH INTRA OP EGD;  Surgeon: Blake Colbert MD;  [facility];  Service: Bariatrics    VT LAP,CHOLECYSTECTOMY N/A 5/14/2018    Procedure: Carol Ann Yanes;  Surgeon: Blake Colbert MD;  [facility];  Service: Cherl Jester SKIN CANCER EXCISION      TONSILLECTOMY      TUBAL LIGATION      WISDOM TOOTH EXTRACTION        Family History:     Family History   Problem Relation Age of Onset    Cancer Father         Lung    Cystic fibrosis Father     Arthritis Mother         Rheumatoid    Angina Mother     Coronary artery disease Mother     Diabetes Mother     Rheum arthritis Sister     Diabetes Sister     Other Brother         Back problems     Diabetes Brother     No Known Problems Brother       Social History:     Social History     Socioeconomic History    Marital status:      Spouse name: None    Number of children: None    Years of education: Completed 4th year of college     Highest education level: None   Occupational History    Occupation: Realtor    Social Needs    Financial resource strain: None    Food insecurity:     Worry: None     Inability: None    Transportation needs:     Medical: None     Non-medical: None   Tobacco Use    Smoking status: Former Smoker     Packs/day: 1 00     Years: 20 00     Pack years: 20 00     Types: Cigarettes     Last attempt to quit: 2004 Years since quitting: 15 9    Smokeless tobacco: Never Used   Substance and Sexual Activity    Alcohol use: No    Drug use: No    Sexual activity: Not Currently   Lifestyle    Physical activity:     Days per week: None     Minutes per session: None    Stress: None   Relationships    Social connections:     Talks on phone: None     Gets together: None     Attends Mosque service: None     Active member of club or organization: None     Attends meetings of clubs or organizations: None     Relationship status: None    Intimate partner violence:     Fear of current or ex partner: None     Emotionally abused: None     Physically abused: None     Forced sexual activity: None   Other Topics Concern    None   Social History Narrative    Lives with son     Works as         Medications and Allergies:     Current Outpatient Medications   Medication Sig Dispense Refill    baclofen 10 mg tablet Take 10 mg by mouth daily      buPROPion (WELLBUTRIN) 75 mg tablet Take 1 tablet (75 mg total) by mouth 2 (two) times a day 180 tablet 1    Calcium Citrate-Vitamin D (CALCIUM CITRATE +D) 315-250 MG-UNIT TABS Take 1 tablet by mouth 2 (two) times a day       conjugated estrogens (PREMARIN) vaginal cream Insert 1 g into the vagina 3 (three) times a week 30 g 5    escitalopram (LEXAPRO) 20 mg tablet Take 1 tablet (20 mg total) by mouth every morning 90 tablet 1    fenofibrate (TRIGLIDE) 160 MG tablet Take 1 tablet (160 mg total) by mouth daily 90 tablet 1    fentaNYL (DURAGESIC) 12 mcg/hr TD 72 hr patch       gabapentin (NEURONTIN) 100 mg capsule TAKE two CAPSULE at HS      loratadine (CLARITIN) 10 mg tablet Take 10 mg by mouth daily        Multiple Vitamins-Minerals (BARIATRIC FUSION PO) Take 1 tablet by mouth daily      omeprazole (PriLOSEC) 20 mg delayed release capsule TAKE 1 CAPSULE DAILY 90 capsule 0    oxyCODONE (ROXICODONE) 15 mg immediate release tablet Take 15 mg by mouth 2 (two) times a day as needed        Vitamin D, Cholecalciferol, 400 units TABS Take 1 tablet by mouth daily       No current facility-administered medications for this visit  No Known Allergies   Immunizations:     Immunization History   Administered Date(s) Administered    INFLUENZA 10/20/2017    Influenza, injectable, quadrivalent, preservative free 0 5 mL 09/22/2018, 10/06/2019    Tdap 02/24/2018    Tuberculin Skin Test-PPD Intradermal 01/28/2016    Zoster 10/25/2014      Health Maintenance:         Topic Date Due    MAMMOGRAM  1960    CRC Screening: Colonoscopy  1960    Cervical Cancer Screening  10/19/1983    Hepatitis C Screening  11/27/2020 (Originally 1960)         Topic Date Due    Pneumococcal Vaccine: Pediatrics (0 to 5 Years) and At-Risk Patients (6 to 59 Years) (1 of 3 - PCV13) 10/19/1968    Hepatitis B Vaccine (1 of 3 - Risk 3-dose series) 10/19/1981      Medicare Health Risk Assessment:     /78   Pulse 64   Temp 98 6 °F (37 °C) (Tympanic)   Ht 5' 4 5" (1 638 m)   Wt 73 8 kg (162 lb 12 8 oz)   SpO2 98%   BMI 27 51 kg/m²      Flavia Cai is here for her Initial Wellness visit  Health Risk Assessment:   Patient rates overall health as very good  Patient feels that their physical health rating is same  Eyesight was rated as same  Hearing was rated as same  Patient feels that their emotional and mental health rating is same  Pain experienced in the last 7 days has been some  Patient's pain rating has been 7/10  Patient states that she has experienced no weight loss or gain in last 6 months  Depression Screening:   PHQ-2 Score: 0  PHQ-9 Score: 0      Fall Risk Screening: In the past year, patient has experienced: no history of falling in past year      Urinary Incontinence Screening:   Patient has not leaked urine accidently in the last six months  Home Safety:  Patient does not have trouble with stairs inside or outside of their home   Patient has working smoke alarms and has working carbon monoxide detector  Home safety hazards include: none  Nutrition:   Current diet is Regular  Medications:   Patient is not currently taking any over-the-counter supplements  Patient is able to manage medications  Activities of Daily Living (ADLs)/Instrumental Activities of Daily Living (IADLs):   Walk and transfer into and out of bed and chair?: Yes  Dress and groom yourself?: Yes    Bathe or shower yourself?: Yes    Feed yourself?  Yes  Do your laundry/housekeeping?: Yes  Manage your money, pay your bills and track your expenses?: Yes  Make your own meals?: Yes    Do your own shopping?: Yes    Durable Medical Equipment Suppliers  N/A    Previous Hospitalizations:   Any hospitalizations or ED visits within the last 12 months?: No      Advance Care Planning:   Living will: No      Cognitive Screening:   Provider or family/friend/caregiver concerned regarding cognition?: No    PREVENTIVE SCREENINGS      Cardiovascular Screening:    General: Screening Not Indicated and History Lipid Disorder      Diabetes Screening:     General: Screening Current      Colorectal Cancer Screening:     General: Risks and Benefits Discussed    Due for: Cologuard      Breast Cancer Screening:     General: Risks and Benefits Discussed    Due for: Mammogram        Cervical Cancer Screening:    General: Screening Current      Osteoporosis Screening:    General: Screening Not Indicated      Abdominal Aortic Aneurysm (AAA) Screening:        General: Screening Not Indicated      Lung Cancer Screening:     General: Screening Not Indicated      Hepatitis C Screening:    General: Risks and Benefits Discussed    Hep C Screening Accepted: Yes        Margret Perez DO

## 2019-11-29 ENCOUNTER — APPOINTMENT (OUTPATIENT)
Dept: RADIOLOGY | Facility: CLINIC | Age: 59
End: 2019-11-29
Payer: MEDICARE

## 2019-11-29 ENCOUNTER — TRANSCRIBE ORDERS (OUTPATIENT)
Dept: ADMINISTRATIVE | Facility: HOSPITAL | Age: 59
End: 2019-11-29

## 2019-11-29 ENCOUNTER — CLINICAL SUPPORT (OUTPATIENT)
Dept: FAMILY MEDICINE CLINIC | Facility: CLINIC | Age: 59
End: 2019-11-29
Payer: MEDICARE

## 2019-11-29 DIAGNOSIS — N30.90 CYSTITIS: Primary | ICD-10-CM

## 2019-11-29 DIAGNOSIS — R31.9 URINARY TRACT INFECTION WITH HEMATURIA, SITE UNSPECIFIED: Primary | ICD-10-CM

## 2019-11-29 DIAGNOSIS — M54.12 BRACHIAL NEURITIS: Primary | ICD-10-CM

## 2019-11-29 DIAGNOSIS — N39.0 URINARY TRACT INFECTION WITH HEMATURIA, SITE UNSPECIFIED: Primary | ICD-10-CM

## 2019-11-29 DIAGNOSIS — M54.12 BRACHIAL NEURITIS: ICD-10-CM

## 2019-11-29 LAB
SL AMB  POCT GLUCOSE, UA: NEGATIVE
SL AMB LEUKOCYTE ESTERASE,UA: ABNORMAL
SL AMB POCT BILIRUBIN,UA: NEGATIVE
SL AMB POCT BLOOD,UA: ABNORMAL
SL AMB POCT COLOR,UA: ABNORMAL
SL AMB POCT KETONES,UA: ABNORMAL
SL AMB POCT NITRITE,UA: NEGATIVE
SL AMB POCT PH,UA: 7
SL AMB POCT SPECIFIC GRAVITY,UA: 1.02
SL AMB POCT URINE PROTEIN: 30
SL AMB POCT UROBILINOGEN: 0.2

## 2019-11-29 PROCEDURE — 72050 X-RAY EXAM NECK SPINE 4/5VWS: CPT

## 2019-11-29 PROCEDURE — 81003 URINALYSIS AUTO W/O SCOPE: CPT | Performed by: FAMILY MEDICINE

## 2019-11-29 PROCEDURE — 87077 CULTURE AEROBIC IDENTIFY: CPT | Performed by: FAMILY MEDICINE

## 2019-11-29 PROCEDURE — 87086 URINE CULTURE/COLONY COUNT: CPT | Performed by: FAMILY MEDICINE

## 2019-11-29 PROCEDURE — 87186 SC STD MICRODIL/AGAR DIL: CPT | Performed by: FAMILY MEDICINE

## 2019-11-29 RX ORDER — SULFAMETHOXAZOLE AND TRIMETHOPRIM 800; 160 MG/1; MG/1
1 TABLET ORAL EVERY 12 HOURS SCHEDULED
Qty: 6 TABLET | Refills: 0 | Status: SHIPPED | OUTPATIENT
Start: 2019-11-29 | End: 2019-12-02

## 2019-12-01 LAB — BACTERIA UR CULT: ABNORMAL

## 2019-12-04 DIAGNOSIS — E66.01 MORBID (SEVERE) OBESITY DUE TO EXCESS CALORIES (HCC): ICD-10-CM

## 2019-12-04 RX ORDER — OMEPRAZOLE 20 MG/1
CAPSULE, DELAYED RELEASE ORAL
Qty: 90 CAPSULE | Refills: 0 | Status: SHIPPED | OUTPATIENT
Start: 2019-12-04 | End: 2020-03-24

## 2020-01-24 PROBLEM — Z48.815 ENCOUNTER FOR SURGICAL AFTERCARE FOLLOWING SURGERY OF DIGESTIVE SYSTEM: Status: ACTIVE | Noted: 2018-03-20

## 2020-01-24 NOTE — ASSESSMENT & PLAN NOTE
-At risk for malabsorption of vitamins/minerals secondary to malabsorption and restriction of intake from bariatric surgery  -Currently taking adequate postop bariatric surgery vitamin supplementation: Procare MVI without iron, calcium citrate 500mg BID, never took replesta as advised, takes 5,000IU Vitamin D/day  -Last set of bariatric labs completed in February 2019 and showed Vitamin D insufficiency   Repeat labs in September showed it improved slightly to 28 and she was given additional Vitamin D Rx (Replesta)  -Next set of bariatric labs ordered for approximately 2 weeks  -Patient received education about the importance of adhering to a lifelong supplementation regimen to avoid vitamin/mineral deficiencies

## 2020-01-24 NOTE — PROGRESS NOTES
Assessment/Plan:    Encounter for surgical aftercare following surgery of digestive system  -status post Noé-en-y gastric bypass surgery 10/25/2016 and s/p lap maya on 05/14/18 by Dr Lopez President  She is up 14lbs from her hiren in last 2 years - per diet recall she is doing very well but advised increased exercise as tolerated and increase fluids and decrease coffee and unhealthy snacking  She agrees to f/u with RD, try back to basics, support groups, PEP rallies  Initial: 233lbs  Current: 162 8lbs  EWL: 78%  Hiren: 149lbs  Current BMI is Body mass index is 28 39 kg/m²  · Tolerating a regular diet-yes  · Eating at least 60 grams of protein per day-yes  · Following 30/60 minute rule with liquids-yes  · Drinking at least 64 ounces of fluid per day-no; drinks mostly coffee - lengthy discussion about decreasing caffeine and increasing water  · Drinking carbonated beverages-no  · Sufficient exercise-no d/t back and neck pain  Advised her to try low impact exercise as tolerated and pool therapy  · Using NSAIDs regularly-no  · Using nicotine-no  · Using alcohol-no  Advised about the risks of alcohol s/p bariatric surgery and recommend avoiding all alcohol      Postgastrectomy malabsorption  -At risk for malabsorption of vitamins/minerals secondary to malabsorption and restriction of intake from bariatric surgery  -Currently taking adequate postop bariatric surgery vitamin supplementation: Procare MVI without iron, calcium citrate 500mg BID, never took replesta as advised, takes 5,000IU Vitamin D/day  -Last set of bariatric labs completed in February 2019 and showed Vitamin D insufficiency   Repeat labs in September showed it improved slightly to 28 and she was given additional Vitamin D Rx (Replesta)  -Next set of bariatric labs ordered for approximately 2 weeks  -Patient received education about the importance of adhering to a lifelong supplementation regimen to avoid vitamin/mineral deficiencies       Chronic pain syndrome  -Avoids NSAIDS  -On Baclofen, Dilaudid, Gabapentin    Hyperlipemia  -Last noted lipid panel January 2019 - WNL  -On Fenofibrate  -Repeat lipid panel       Diagnoses and all orders for this visit:    Encounter for surgical aftercare following surgery of digestive system  -     CBC and differential; Future  -     Comprehensive metabolic panel; Future  -     Copper Level; Future  -     Ferritin; Future  -     Folate; Future  -     Iron Saturation %; Future  -     Lipid panel; Future  -     PTH, intact; Future  -     Vitamin A; Future  -     Vitamin B1, whole blood; Future  -     Vitamin B12; Future  -     Vitamin D 25 hydroxy; Future  -     Zinc; Future  -     Hemoglobin A1C; Future    Postgastrectomy malabsorption  -     CBC and differential; Future  -     Comprehensive metabolic panel; Future  -     Copper Level; Future  -     Ferritin; Future  -     Folate; Future  -     Iron Saturation %; Future  -     Lipid panel; Future  -     PTH, intact; Future  -     Vitamin A; Future  -     Vitamin B1, whole blood; Future  -     Vitamin B12; Future  -     Vitamin D 25 hydroxy; Future  -     Zinc; Future  -     Hemoglobin A1C; Future    Chronic pain syndrome    Hyperlipidemia, unspecified hyperlipidemia type    Vitamin D insufficiency  -     Vitamin D 25 hydroxy; Future    Other orders  -     HYDROmorphone (DILAUDID) 2 mg tablet; Take 2 mg by mouth every 4 (four) hours as needed for moderate pain          Subjective:      Patient ID: Lizeth Jones is a 64 y o  female     -status post Noé-en-y gastric bypass surgery 10/25/2016 and s/p julienne bonillae on 05/14/18 by Dr Mario Belle  Presents to the office today for routine follow up  Tolerating diet without issues; denies N/V, dysphagia, reflux  Overall doing Well, but she is up 14lbs from her hiren  Sometimes skips meals and not drinking much water, mostly coffee 5-6 cups/day      Diet Recall:   B - Greek yogurt and banana  L - salad and soup  D - salad and lean protein   HS - sometimes has cheese crackers, nuts      The following portions of the patient's history were reviewed and updated as appropriate: allergies, current medications, past family history, past medical history, past social history, past surgical history and problem list     Review of Systems   Constitutional: Positive for unexpected weight change (14lbs weight regain from hiren)  Negative for chills and fever  HENT: Negative for trouble swallowing  Respiratory: Negative for cough and shortness of breath  Cardiovascular: Negative for chest pain and palpitations  Gastrointestinal: Negative for abdominal pain, constipation, diarrhea, nausea and vomiting  Musculoskeletal: Positive for arthralgias, back pain and neck pain  Neurological: Negative for dizziness  Psychiatric/Behavioral:        Denies anxiety and depression         Objective:      /70 (BP Location: Right arm, Patient Position: Sitting, Cuff Size: Standard)   Pulse 62   Temp 98 2 °F (36 8 °C) (Tympanic)   Ht 5' 3 5" (1 613 m)   Wt 73 8 kg (162 lb 12 8 oz)   BMI 28 39 kg/m²     Colonoscopy-Completed cologuard in November 2019 - advised to f/u in 5 years       Physical Exam   Constitutional: She is oriented to person, place, and time  She appears well-developed and well-nourished  No distress  HENT:   Head: Normocephalic and atraumatic  Eyes: Pupils are equal, round, and reactive to light  No scleral icterus  Cardiovascular: Normal rate, regular rhythm and normal heart sounds  Pulmonary/Chest: Effort normal and breath sounds normal  No respiratory distress  Abdominal: Soft  Bowel sounds are normal  She exhibits no distension  There is no tenderness  No incisional hernias appreciated   Musculoskeletal: She exhibits no edema  Neurological: She is alert and oriented to person, place, and time  Skin: Skin is warm and dry  Psychiatric: She has a normal mood and affect  Nursing note and vitals reviewed          BARRIERS: none identified    GOALS:   · Continued/Maintain healthy weight loss with good nutrition intakes  · Adequate hydration with at least 64oz  fluid intake  · Normal vitamin and mineral levels  · Exercise as tolerated  · Increase water and decrease coffee  Try Ensure Max protein shakes (Nauruan vanilla, cafe Mocha) as your "creamer"  · Increase calcium citrate to three times/day - total of 1500mg daily    · Follow-up in 1 year  We kindly ask that your arrive 15 minutes before your scheduled appointment time with your provider to allow our staff to room you, get your vital signs and update your chart  · Follow diet as discussed  · Get lab work done in the next 2 weeks  You have been given a lab slip today  Please call the office if you need a replacement  It is recommended to check with your insurance BEFORE getting labs done to make sure they are covered by your policy  Also, please check with your PCP and other providers before getting labs to avoid duplicate labs  Make sure to HOLD any multivitamins that may contain biotin and any biotin supplements FOR 5 DAYS before any labs since it can affect the results  · Follow vitamin and mineral recommendations as reviewed with you  · Call our office if you have any problems with abdominal pain especially associated with fever, chills, nausea, vomiting or any other concerns  · All  Post-bariatric surgery patients should be aware that very small quantities of any alcohol can cause impairment and it is very possible not to feel the effect  The effect can be in the system for several hours  It is also a stomach irritant  · It is advised to AVOID alcohol, Nonsteroidal antiinflammatory drugs (NSAIDS) and nicotine of all forms   Any of these can cause stomach irritation/pain

## 2020-01-24 NOTE — ASSESSMENT & PLAN NOTE
-status post Noé-en-y gastric bypass surgery 10/25/2016 and s/p lap maya on 05/14/18 by Dr Jayme Pang  She is up 14lbs from her hiren in last 2 years - per diet recall she is doing very well but advised increased exercise as tolerated and increase fluids and decrease coffee and unhealthy snacking  She agrees to f/u with RD, try back to basics, support groups, PEP rallies  Initial: 233lbs  Current: 162 8lbs  EWL: 78%  Hiren: 149lbs  Current BMI is Body mass index is 28 39 kg/m²  · Tolerating a regular diet-yes  · Eating at least 60 grams of protein per day-yes  · Following 30/60 minute rule with liquids-yes  · Drinking at least 64 ounces of fluid per day-no; drinks mostly coffee - lengthy discussion about decreasing caffeine and increasing water  · Drinking carbonated beverages-no  · Sufficient exercise-no d/t back and neck pain  Advised her to try low impact exercise as tolerated and pool therapy  · Using NSAIDs regularly-no  · Using nicotine-no  · Using alcohol-no   Advised about the risks of alcohol s/p bariatric surgery and recommend avoiding all alcohol

## 2020-01-28 ENCOUNTER — OFFICE VISIT (OUTPATIENT)
Dept: BARIATRICS | Facility: CLINIC | Age: 60
End: 2020-01-28
Payer: MEDICARE

## 2020-01-28 VITALS
HEART RATE: 62 BPM | TEMPERATURE: 98.2 F | BODY MASS INDEX: 27.79 KG/M2 | HEIGHT: 64 IN | WEIGHT: 162.8 LBS | DIASTOLIC BLOOD PRESSURE: 70 MMHG | SYSTOLIC BLOOD PRESSURE: 110 MMHG

## 2020-01-28 DIAGNOSIS — E55.9 VITAMIN D INSUFFICIENCY: ICD-10-CM

## 2020-01-28 DIAGNOSIS — K91.2 POSTGASTRECTOMY MALABSORPTION: ICD-10-CM

## 2020-01-28 DIAGNOSIS — Z48.815 ENCOUNTER FOR SURGICAL AFTERCARE FOLLOWING SURGERY OF DIGESTIVE SYSTEM: Primary | ICD-10-CM

## 2020-01-28 DIAGNOSIS — Z90.3 POSTGASTRECTOMY MALABSORPTION: ICD-10-CM

## 2020-01-28 DIAGNOSIS — G89.4 CHRONIC PAIN SYNDROME: ICD-10-CM

## 2020-01-28 DIAGNOSIS — E78.5 HYPERLIPIDEMIA, UNSPECIFIED HYPERLIPIDEMIA TYPE: ICD-10-CM

## 2020-01-28 PROCEDURE — 99214 OFFICE O/P EST MOD 30 MIN: CPT | Performed by: PHYSICIAN ASSISTANT

## 2020-01-28 RX ORDER — HYDROMORPHONE HYDROCHLORIDE 2 MG/1
2 TABLET ORAL
COMMUNITY
End: 2020-06-03 | Stop reason: ALTCHOICE

## 2020-01-28 NOTE — PATIENT INSTRUCTIONS
GOALS:   · Continued/Maintain healthy weight loss with good nutrition intakes  · Adequate hydration with at least 64oz  fluid intake  · Normal vitamin and mineral levels  · Exercise as tolerated  · Increase water and decrease coffee  Try Ensure Max protein shakes (Maltese vanilla, cafe Mocha) as your "creamer"  · Increase calcium citrate to three times/day - total of 1500mg daily  · Try [employer] shoes! · Follow-up in 1 year  We kindly ask that your arrive 15 minutes before your scheduled appointment time with your provider to allow our staff to room you, get your vital signs and update your chart  · Follow diet as discussed  · Get lab work done in the next 2 weeks  You have been given a lab slip today  Please call the office if you need a replacement  It is recommended to check with your insurance BEFORE getting labs done to make sure they are covered by your policy  Also, please check with your PCP and other providers before getting labs to avoid duplicate labs  Make sure to HOLD any multivitamins that may contain biotin and any biotin supplements FOR 5 DAYS before any labs since it can affect the results  · Follow vitamin and mineral recommendations as reviewed with you  · Call our office if you have any problems with abdominal pain especially associated with fever, chills, nausea, vomiting or any other concerns  · All  Post-bariatric surgery patients should be aware that very small quantities of any alcohol can cause impairment and it is very possible not to feel the effect  The effect can be in the system for several hours  It is also a stomach irritant  · It is advised to AVOID alcohol, Nonsteroidal antiinflammatory drugs (NSAIDS) and nicotine of all forms   Any of these can cause stomach irritation/pain

## 2020-02-01 ENCOUNTER — APPOINTMENT (OUTPATIENT)
Dept: LAB | Facility: CLINIC | Age: 60
End: 2020-02-01
Payer: MEDICARE

## 2020-02-01 DIAGNOSIS — Z90.3 POSTGASTRECTOMY MALABSORPTION: ICD-10-CM

## 2020-02-01 DIAGNOSIS — E55.9 VITAMIN D INSUFFICIENCY: ICD-10-CM

## 2020-02-01 DIAGNOSIS — K91.2 POSTGASTRECTOMY MALABSORPTION: ICD-10-CM

## 2020-02-01 DIAGNOSIS — Z48.815 ENCOUNTER FOR SURGICAL AFTERCARE FOLLOWING SURGERY OF DIGESTIVE SYSTEM: ICD-10-CM

## 2020-02-01 LAB
25(OH)D3 SERPL-MCNC: 36.1 NG/ML (ref 30–100)
ALBUMIN SERPL BCP-MCNC: 4.1 G/DL (ref 3.5–5)
ALP SERPL-CCNC: 60 U/L (ref 46–116)
ALT SERPL W P-5'-P-CCNC: 27 U/L (ref 12–78)
ANION GAP SERPL CALCULATED.3IONS-SCNC: 2 MMOL/L (ref 4–13)
AST SERPL W P-5'-P-CCNC: 38 U/L (ref 5–45)
BASOPHILS # BLD AUTO: 0.09 THOUSANDS/ΜL (ref 0–0.1)
BASOPHILS NFR BLD AUTO: 2 % (ref 0–1)
BILIRUB SERPL-MCNC: 0.32 MG/DL (ref 0.2–1)
BUN SERPL-MCNC: 14 MG/DL (ref 5–25)
CALCIUM SERPL-MCNC: 9.6 MG/DL (ref 8.3–10.1)
CHLORIDE SERPL-SCNC: 106 MMOL/L (ref 100–108)
CHOLEST SERPL-MCNC: 157 MG/DL (ref 50–200)
CO2 SERPL-SCNC: 30 MMOL/L (ref 21–32)
CREAT SERPL-MCNC: 0.91 MG/DL (ref 0.6–1.3)
EOSINOPHIL # BLD AUTO: 0.12 THOUSAND/ΜL (ref 0–0.61)
EOSINOPHIL NFR BLD AUTO: 2 % (ref 0–6)
ERYTHROCYTE [DISTWIDTH] IN BLOOD BY AUTOMATED COUNT: 12.3 % (ref 11.6–15.1)
EST. AVERAGE GLUCOSE BLD GHB EST-MCNC: 105 MG/DL
FERRITIN SERPL-MCNC: 57 NG/ML (ref 8–388)
FOLATE SERPL-MCNC: >20 NG/ML (ref 3.1–17.5)
GFR SERPL CREATININE-BSD FRML MDRD: 70 ML/MIN/1.73SQ M
GLUCOSE SERPL-MCNC: 87 MG/DL (ref 65–140)
HBA1C MFR BLD: 5.3 % (ref 4.2–6.3)
HCT VFR BLD AUTO: 40.7 % (ref 34.8–46.1)
HDLC SERPL-MCNC: 56 MG/DL
HGB BLD-MCNC: 13.2 G/DL (ref 11.5–15.4)
IMM GRANULOCYTES # BLD AUTO: 0.02 THOUSAND/UL (ref 0–0.2)
IMM GRANULOCYTES NFR BLD AUTO: 0 % (ref 0–2)
IRON SATN MFR SERPL: 27 %
IRON SERPL-MCNC: 114 UG/DL (ref 50–170)
LDLC SERPL CALC-MCNC: 75 MG/DL (ref 0–100)
LYMPHOCYTES # BLD AUTO: 2.6 THOUSANDS/ΜL (ref 0.6–4.47)
LYMPHOCYTES NFR BLD AUTO: 43 % (ref 14–44)
MCH RBC QN AUTO: 30.2 PG (ref 26.8–34.3)
MCHC RBC AUTO-ENTMCNC: 32.4 G/DL (ref 31.4–37.4)
MCV RBC AUTO: 93 FL (ref 82–98)
MONOCYTES # BLD AUTO: 0.39 THOUSAND/ΜL (ref 0.17–1.22)
MONOCYTES NFR BLD AUTO: 6 % (ref 4–12)
NEUTROPHILS # BLD AUTO: 2.89 THOUSANDS/ΜL (ref 1.85–7.62)
NEUTS SEG NFR BLD AUTO: 47 % (ref 43–75)
NONHDLC SERPL-MCNC: 101 MG/DL
NRBC BLD AUTO-RTO: 0 /100 WBCS
PLATELET # BLD AUTO: 462 THOUSANDS/UL (ref 149–390)
PMV BLD AUTO: 9.7 FL (ref 8.9–12.7)
POTASSIUM SERPL-SCNC: 4.2 MMOL/L (ref 3.5–5.3)
PROT SERPL-MCNC: 7.7 G/DL (ref 6.4–8.2)
PTH-INTACT SERPL-MCNC: 40.8 PG/ML (ref 18.4–80.1)
RBC # BLD AUTO: 4.37 MILLION/UL (ref 3.81–5.12)
SODIUM SERPL-SCNC: 138 MMOL/L (ref 136–145)
TIBC SERPL-MCNC: 420 UG/DL (ref 250–450)
TRIGL SERPL-MCNC: 130 MG/DL
VIT B12 SERPL-MCNC: 1294 PG/ML (ref 100–900)
WBC # BLD AUTO: 6.11 THOUSAND/UL (ref 4.31–10.16)

## 2020-02-01 PROCEDURE — 80061 LIPID PANEL: CPT

## 2020-02-01 PROCEDURE — 84590 ASSAY OF VITAMIN A: CPT

## 2020-02-01 PROCEDURE — 83970 ASSAY OF PARATHORMONE: CPT

## 2020-02-01 PROCEDURE — 82746 ASSAY OF FOLIC ACID SERUM: CPT

## 2020-02-01 PROCEDURE — 82728 ASSAY OF FERRITIN: CPT

## 2020-02-01 PROCEDURE — 82607 VITAMIN B-12: CPT

## 2020-02-01 PROCEDURE — 83540 ASSAY OF IRON: CPT

## 2020-02-01 PROCEDURE — 80053 COMPREHEN METABOLIC PANEL: CPT

## 2020-02-01 PROCEDURE — 83036 HEMOGLOBIN GLYCOSYLATED A1C: CPT

## 2020-02-01 PROCEDURE — 82306 VITAMIN D 25 HYDROXY: CPT

## 2020-02-01 PROCEDURE — 84630 ASSAY OF ZINC: CPT

## 2020-02-01 PROCEDURE — 85025 COMPLETE CBC W/AUTO DIFF WBC: CPT

## 2020-02-01 PROCEDURE — 84425 ASSAY OF VITAMIN B-1: CPT

## 2020-02-01 PROCEDURE — 82525 ASSAY OF COPPER: CPT

## 2020-02-01 PROCEDURE — 36415 COLL VENOUS BLD VENIPUNCTURE: CPT

## 2020-02-01 PROCEDURE — 83550 IRON BINDING TEST: CPT

## 2020-02-05 LAB
COPPER SERPL-MCNC: 124 UG/DL (ref 72–166)
ZINC SERPL-MCNC: 88 UG/DL (ref 56–134)

## 2020-02-06 LAB — VIT B1 BLD-SCNC: 208.2 NMOL/L (ref 66.5–200)

## 2020-02-07 LAB — VIT A SERPL-MCNC: 23 UG/DL (ref 20.1–62)

## 2020-02-12 ENCOUNTER — TELEPHONE (OUTPATIENT)
Dept: BARIATRICS | Facility: CLINIC | Age: 60
End: 2020-02-12

## 2020-02-12 DIAGNOSIS — K91.2 POSTGASTRECTOMY MALABSORPTION: Primary | ICD-10-CM

## 2020-02-12 DIAGNOSIS — Z90.3 POSTGASTRECTOMY MALABSORPTION: Primary | ICD-10-CM

## 2020-02-12 DIAGNOSIS — Z48.815 ENCOUNTER FOR SURGICAL AFTERCARE FOLLOWING SURGERY OF DIGESTIVE SYSTEM: ICD-10-CM

## 2020-02-12 NOTE — TELEPHONE ENCOUNTER
Discussed labs from 02/01/20:     -Mildly elevated B1 - safe and likely r/t taking vitamins to close to day of blood draw  Will repeat in 6 months    -Vitamin D has improved to 36 - advised her to continue with additional 5,000IU/day until end of March and then reduce to 2,000IU/day    - Ferritin levels trending down, advised her to change to Procare with at least 18mg of iron or add in additional 36mg of iron    -Noted elevated platelets - she admits to being sick lately with sinus infection  Advised her to notify her PCP - she verbalizes understanding    - Will repeat iron, ferritin, vitamin-D and the 1 in 6 months    Rest of labs annually

## 2020-02-26 ENCOUNTER — OFFICE VISIT (OUTPATIENT)
Dept: FAMILY MEDICINE CLINIC | Facility: CLINIC | Age: 60
End: 2020-02-26
Payer: MEDICARE

## 2020-02-26 VITALS
OXYGEN SATURATION: 97 % | TEMPERATURE: 99 F | DIASTOLIC BLOOD PRESSURE: 72 MMHG | HEIGHT: 64 IN | WEIGHT: 163.6 LBS | HEART RATE: 72 BPM | BODY MASS INDEX: 27.93 KG/M2 | SYSTOLIC BLOOD PRESSURE: 114 MMHG

## 2020-02-26 DIAGNOSIS — F41.1 ANXIETY STATES: ICD-10-CM

## 2020-02-26 DIAGNOSIS — Z98.84 BARIATRIC SURGERY STATUS: ICD-10-CM

## 2020-02-26 DIAGNOSIS — E78.5 HYPERLIPIDEMIA, UNSPECIFIED HYPERLIPIDEMIA TYPE: ICD-10-CM

## 2020-02-26 DIAGNOSIS — G89.4 CHRONIC PAIN SYNDROME: Primary | ICD-10-CM

## 2020-02-26 PROBLEM — J06.9 VIRAL URI WITH COUGH: Status: RESOLVED | Noted: 2019-11-27 | Resolved: 2020-02-26

## 2020-02-26 PROBLEM — Z00.00 MEDICARE ANNUAL WELLNESS VISIT, INITIAL: Status: RESOLVED | Noted: 2019-11-27 | Resolved: 2020-02-26

## 2020-02-26 PROBLEM — Z48.815 ENCOUNTER FOR SURGICAL AFTERCARE FOLLOWING SURGERY OF DIGESTIVE SYSTEM: Status: RESOLVED | Noted: 2018-03-20 | Resolved: 2020-02-26

## 2020-02-26 PROCEDURE — 3078F DIAST BP <80 MM HG: CPT | Performed by: FAMILY MEDICINE

## 2020-02-26 PROCEDURE — 99214 OFFICE O/P EST MOD 30 MIN: CPT | Performed by: FAMILY MEDICINE

## 2020-02-26 PROCEDURE — 3008F BODY MASS INDEX DOCD: CPT | Performed by: FAMILY MEDICINE

## 2020-02-26 PROCEDURE — 3074F SYST BP LT 130 MM HG: CPT | Performed by: FAMILY MEDICINE

## 2020-02-26 PROCEDURE — 3044F HG A1C LEVEL LT 7.0%: CPT | Performed by: FAMILY MEDICINE

## 2020-02-26 PROCEDURE — 1036F TOBACCO NON-USER: CPT | Performed by: FAMILY MEDICINE

## 2020-02-26 RX ORDER — OXYCODONE 13.5 MG/1
CAPSULE, EXTENDED RELEASE ORAL
COMMUNITY
Start: 2019-12-11 | End: 2020-06-12 | Stop reason: ALTCHOICE

## 2020-02-26 NOTE — PROGRESS NOTES
Sasha Mazariegos 1960 female MRN: [id number]      ASSESSMENT/PLAN  Problem List Items Addressed This Visit        Other    Hyperlipemia    Chronic pain syndrome - Primary    Anxiety states    Bariatric surgery status        S/p Gastric Bypass: Stable, f/u as scheduled, reviewed labs   HLD: Within goal   Chronic pain: Med list adjusted to reflect current regimen, continue to f/u as scheduled   Anxiety: Stable, continue current regimen     Encouraged to schedule Mammogram   Pt is going to try and find records from her PAP and her lung imaging, which she states showed a lung nodule       BMI Counseling: Body mass index is 28 53 kg/m²  The BMI is above normal  Patient referred to weight management due to patient being overweight  Pt is s/p gastric bypass and continues to follow with Weight Management  Future Appointments   Date Time Provider Yanelis Carcamo   1/29/2021  9:30 AM [facility]          SUBJECTIVE  CC: Follow-up (3 month)      HPI:  Sasha Mazariegos is a 64 y o  female who presents for follow up of chronic conditions as detailed below  S/p bariatric surgery: F/u in 1/2020 -- labs completed  Chronic pain: Off Fentanyl and Oxycodone, now on slightly higher dose of Diluadid 4mg -- but she cuts this in half and takes more frequently; pulled a muscle a few weeks ago, so her pain is slightly uncontrolled due to this  HLD: Lipid panel: Total 157, LDL75, HDL 56,    Anxiety: Well controlled on current regimen       Review of Systems   Respiratory: Negative for shortness of breath  Cardiovascular: Negative for chest pain, palpitations and leg swelling  Musculoskeletal: Positive for back pain  Neurological: Negative for dizziness and light-headedness         Historical Information   The patient history was reviewed and updated as follows:    Past Medical History:   Diagnosis Date    Abdominal pain, epigastric 3/23/2018    Added automatically from request for surgery [id number]    Abnormal x-ray of lumbar spine 11/3/2015    Bariatric surgery status     Basal cell carcinoma     basil cell carcinoma- in remission    Benign essential hypertension 2012    Breakdown (mechanical) of internal fixation device [facility] 3/1/2019    Calculus of bile duct with cholecystitis without obstruction 2018    Added automatically from request for surgery [id number]    Cellulitis of finger 12/3/2015    Degenerative lumbar disc     Diabetes mellitus ([facility] )     resolved since bariatric surgery    Digital mucinous cyst 2015    DMII (diabetes mellitus, type 2) ([facility] ) 2013    Gross hematuria 3/19/2019    Hiatal hernia     History of transfusion     Post-op spinal surgery    Low back pain     Lower urinary tract infectious disease 3/27/2015    Medicare annual wellness visit, initial 2019    Obesity     Resolved 10/6/2016     Postsurgical malabsorption     Recurrent UTI 3/19/2019    Spinal stenosis     [facility]     Tachycardia     Resolved 2016     Type 2 diabetes mellitus ([facility] )     Last assesssed 2018     Wears glasses      Past Surgical History:   Procedure Laterality Date    APPENDECTOMY      ARTHRODESIS      Lumbar - Last assessed 2018    [facility]      Lumbar (3 surgeries)- two levels fused, clean out from infection, then fusion of 7 levels (last surgery in )   [facility]      x2    CERVICAL SPINE SURGERY      Fusion of C2-C7 over a period of 3 surgeries ( first)     SECTION      X2    CHOLECYSTECTOMY      FL MYELOGRAM LUMBAR  3/28/2019    INSERTION / PLACEMENT / REVISION NEUROSTIMULATOR      X2- both were removed    NECK SURGERY      OTHER SURGICAL HISTORY      Catheter ablation     [place] EGD TRANSORAL BIOPSY SINGLE/MULTIPLE N/A 2016    Procedure: ESOPHAGOGASTRODUODENOSCOPY (EGD); Surgeon: Reinaldo Weinberg MD;  [facility]; Service: Bariatrics    [place] E[place] TRANSORAL BIOPSY SINGLE/MULTIPLE N/A 2018    Procedure: ESOPHAGOGASTRODUODENOSCOPY (EGD) with biopsy;  Surgeon: Reinaldo Weinberg MD;  [facility];   Service: Bariatrics    [place] LAP GASTRIC BYPASS/BRAULIO-EN-Y N/A 10/25/2016    Procedure: BYPASS GASTRIC  BRAULIO-EN-Y LAPAROSCOPIC, WITH INTRA OP EGD;  Surgeon: Reinaldo Weinberg MD;  [facility];  Service: Bariatrics    [place] LAP,CHOLECYSTECTOMY N/A 2018    Procedure: Bernardine Actis;  Surgeon: Reinaldo Weinberg MD;  [facility];  Service: [facility] SKIN CANCER EXCISION      TONSILLECTOMY      TUBAL LIGATION      WISDOM TOOTH EXTRACTION       Family History   Problem Relation Age of Onset    Cancer Father         Lung    Cystic fibrosis Father     Arthritis Mother         Rheumatoid    Angina Mother     Coronary artery disease Mother     Diabetes Mother     Rheum arthritis Sister     Diabetes Sister     Other Brother         Back problems     Diabetes Brother     No Known Problems Brother       Social History   Social History     Substance and Sexual Activity   Alcohol Use No     Social History     Substance and Sexual Activity   Drug Use No     Social History     Tobacco Use   Smoking Status Former Smoker    Packs/day: 1 00    Years: 20 00    Pack years: 20 00    Types: Cigarettes    Last attempt to quit:     Years since quittin 1   Smokeless Tobacco Never Used       Medications:     Current Outpatient Medications:     baclofen 10 mg tablet, Take 10 mg by mouth daily, Disp: , Rfl:     buPROPion (WELLBUTRIN) 75 mg tablet, Take 1 tablet (75 mg total) by mouth 2 (two) times a day, Disp: 180 tablet, Rfl: 1    Calcium Citrate-Vitamin D (CALCIUM CITRATE +D) 315-250 MG-UNIT TABS, Take 1 tablet by mouth 2 (two) times a day , Disp: , Rfl:     conjugated estrogens (PREMARIN) vaginal cream, Insert 1 g into the vagina 3 (three) times a week, Disp: 30 g, Rfl: 5    escitalopram (LEXAPRO) 20 mg tablet, Take 1 tablet (20 mg total) by mouth every morning, Disp: 90 tablet, Rfl: 1    fenofibrate (TRIGLIDE) 160 MG tablet, Take 1 tablet (160 mg total) by mouth daily, Disp: 90 tablet, Rfl: 1    gabapentin (NEURONTIN) 100 mg capsule, TAKE two CAPSULE at HS, Disp: , Rfl:     HYDROmorphone (DILAUDID) 2 mg tablet, Take 2 mg by mouth every 3 (three) hours as needed for moderate pain, Disp: , Rfl:     loratadine (CLARITIN) 10 mg tablet, Take 10 mg by mouth daily  , Disp: , Rfl:     Multiple Vitamins-Minerals (BARIATRIC FUSION PO), Take 1 tablet by mouth daily, Disp: , Rfl:     Vitamin D, Cholecalciferol, 400 units TABS, Take 1 tablet by mouth daily, Disp: , Rfl:     omeprazole (PriLOSEC) 20 mg delayed release capsule, TAKE 1 CAPSULE DAILY, Disp: 90 capsule, Rfl: 0    XTAMPZA ER 13 5 MG C12A, TAKE 1 CAPSULE BY MOUTH EVERY 12 HOURS WITH FOOD, Disp: , Rfl:   No Known Allergies    OBJECTIVE    Vitals:   Vitals:    02/26/20 1302   BP: 114/72   Pulse: 72   Temp: 99 °F (37 2 °C)   TempSrc: Tympanic   SpO2: 97%   Weight: 74 2 kg (163 lb 9 6 oz)   Height: 5' 3 5" (1 613 m)           Physical Exam   Constitutional: She appears well-developed and well-nourished  No distress  HENT:   Head: Normocephalic and atraumatic  Cardiovascular: Normal rate and regular rhythm  Pulmonary/Chest: Effort normal and breath sounds normal  No respiratory distress  Abdominal: Soft  Bowel sounds are normal  She exhibits no distension  There is no tenderness  Musculoskeletal: She exhibits no edema  Neurological: She is alert  Skin: Skin is warm and dry  Psychiatric: She has a normal mood and affect  Vitals reviewed                   DO Lidia [facility]   2/26/2020  1:21 PM

## 2020-03-21 DIAGNOSIS — E66.01 MORBID (SEVERE) OBESITY DUE TO EXCESS CALORIES (HCC): ICD-10-CM

## 2020-03-24 RX ORDER — OMEPRAZOLE 20 MG/1
CAPSULE, DELAYED RELEASE ORAL
Qty: 90 CAPSULE | Refills: 0 | Status: SHIPPED | OUTPATIENT
Start: 2020-03-24 | End: 2020-06-19

## 2020-04-07 ENCOUNTER — TELEMEDICINE (OUTPATIENT)
Dept: FAMILY MEDICINE CLINIC | Facility: CLINIC | Age: 60
End: 2020-04-07
Payer: MEDICARE

## 2020-04-07 ENCOUNTER — TELEPHONE (OUTPATIENT)
Dept: FAMILY MEDICINE CLINIC | Facility: CLINIC | Age: 60
End: 2020-04-07

## 2020-04-07 DIAGNOSIS — N30.00 ACUTE CYSTITIS WITHOUT HEMATURIA: Primary | ICD-10-CM

## 2020-04-07 PROCEDURE — 99213 OFFICE O/P EST LOW 20 MIN: CPT | Performed by: PHYSICIAN ASSISTANT

## 2020-04-07 RX ORDER — NITROFURANTOIN 25; 75 MG/1; MG/1
100 CAPSULE ORAL 2 TIMES DAILY
Qty: 10 CAPSULE | Refills: 0 | Status: SHIPPED | OUTPATIENT
Start: 2020-04-07 | End: 2020-04-12

## 2020-04-14 ENCOUNTER — TELEMEDICINE (OUTPATIENT)
Dept: FAMILY MEDICINE CLINIC | Facility: CLINIC | Age: 60
End: 2020-04-14
Payer: MEDICARE

## 2020-04-14 ENCOUNTER — TELEPHONE (OUTPATIENT)
Dept: FAMILY MEDICINE CLINIC | Facility: CLINIC | Age: 60
End: 2020-04-14

## 2020-04-14 DIAGNOSIS — N30.01 ACUTE CYSTITIS WITH HEMATURIA: Primary | ICD-10-CM

## 2020-04-14 LAB
SL AMB  POCT GLUCOSE, UA: NEGATIVE
SL AMB LEUKOCYTE ESTERASE,UA: ABNORMAL
SL AMB POCT BILIRUBIN,UA: NEGATIVE
SL AMB POCT BLOOD,UA: ABNORMAL
SL AMB POCT KETONES,UA: NEGATIVE
SL AMB POCT NITRITE,UA: NEGATIVE
SL AMB POCT PH,UA: 7
SL AMB POCT SPECIFIC GRAVITY,UA: 1.02
SL AMB POCT URINE PROTEIN: NEGATIVE
SL AMB POCT UROBILINOGEN: 0.2

## 2020-04-14 PROCEDURE — 87086 URINE CULTURE/COLONY COUNT: CPT | Performed by: PHYSICIAN ASSISTANT

## 2020-04-14 PROCEDURE — 99214 OFFICE O/P EST MOD 30 MIN: CPT | Performed by: PHYSICIAN ASSISTANT

## 2020-04-14 PROCEDURE — 81003 URINALYSIS AUTO W/O SCOPE: CPT | Performed by: PHYSICIAN ASSISTANT

## 2020-04-14 RX ORDER — SULFAMETHOXAZOLE AND TRIMETHOPRIM 800; 160 MG/1; MG/1
1 TABLET ORAL EVERY 12 HOURS SCHEDULED
Qty: 10 TABLET | Refills: 0 | Status: SHIPPED | OUTPATIENT
Start: 2020-04-14 | End: 2020-04-20 | Stop reason: SDUPTHER

## 2020-04-15 ENCOUNTER — TELEPHONE (OUTPATIENT)
Dept: LAB | Facility: HOSPITAL | Age: 60
End: 2020-04-15

## 2020-04-16 LAB — BACTERIA UR CULT: NORMAL

## 2020-04-20 ENCOUNTER — TELEMEDICINE (OUTPATIENT)
Dept: FAMILY MEDICINE CLINIC | Facility: CLINIC | Age: 60
End: 2020-04-20
Payer: MEDICARE

## 2020-04-20 VITALS — HEIGHT: 64 IN | WEIGHT: 163 LBS | BODY MASS INDEX: 27.83 KG/M2

## 2020-04-20 DIAGNOSIS — N30.01 ACUTE CYSTITIS WITH HEMATURIA: Primary | ICD-10-CM

## 2020-04-20 DIAGNOSIS — N95.2 ATROPHIC VAGINITIS: ICD-10-CM

## 2020-04-20 PROCEDURE — 99213 OFFICE O/P EST LOW 20 MIN: CPT | Performed by: NURSE PRACTITIONER

## 2020-04-20 RX ORDER — SULFAMETHOXAZOLE AND TRIMETHOPRIM 800; 160 MG/1; MG/1
1 TABLET ORAL EVERY 12 HOURS SCHEDULED
Qty: 20 TABLET | Refills: 0 | Status: SHIPPED | OUTPATIENT
Start: 2020-04-20 | End: 2020-04-30

## 2020-05-14 DIAGNOSIS — E78.5 HYPERLIPIDEMIA, UNSPECIFIED HYPERLIPIDEMIA TYPE: ICD-10-CM

## 2020-05-14 DIAGNOSIS — G89.4 CHRONIC PAIN SYNDROME: ICD-10-CM

## 2020-05-14 DIAGNOSIS — F32.5 MAJOR DEPRESSIVE DISORDER WITH SINGLE EPISODE, IN FULL REMISSION (HCC): ICD-10-CM

## 2020-05-14 RX ORDER — ESCITALOPRAM OXALATE 20 MG/1
TABLET ORAL
Qty: 90 TABLET | Refills: 1 | Status: SHIPPED | OUTPATIENT
Start: 2020-05-14 | End: 2020-11-05 | Stop reason: SDUPTHER

## 2020-05-14 RX ORDER — BUPROPION HYDROCHLORIDE 75 MG/1
TABLET ORAL
Qty: 180 TABLET | Refills: 1 | Status: SHIPPED | OUTPATIENT
Start: 2020-05-14 | End: 2020-11-05 | Stop reason: SDUPTHER

## 2020-05-14 RX ORDER — FENOFIBRATE 160 MG/1
TABLET ORAL
Qty: 90 TABLET | Refills: 1 | Status: SHIPPED | OUTPATIENT
Start: 2020-05-14 | End: 2020-11-05 | Stop reason: SDUPTHER

## 2020-06-03 ENCOUNTER — OFFICE VISIT (OUTPATIENT)
Dept: FAMILY MEDICINE CLINIC | Facility: CLINIC | Age: 60
End: 2020-06-03
Payer: MEDICARE

## 2020-06-03 ENCOUNTER — TELEPHONE (OUTPATIENT)
Dept: BARIATRICS | Facility: CLINIC | Age: 60
End: 2020-06-03

## 2020-06-03 VITALS
SYSTOLIC BLOOD PRESSURE: 122 MMHG | TEMPERATURE: 97.7 F | DIASTOLIC BLOOD PRESSURE: 64 MMHG | OXYGEN SATURATION: 98 % | HEIGHT: 64 IN | WEIGHT: 161.8 LBS | HEART RATE: 63 BPM | BODY MASS INDEX: 27.62 KG/M2

## 2020-06-03 DIAGNOSIS — R07.89 CHEST PRESSURE: Primary | ICD-10-CM

## 2020-06-03 DIAGNOSIS — R10.13 EPIGASTRIC PAIN: Primary | ICD-10-CM

## 2020-06-03 PROBLEM — N30.01 ACUTE CYSTITIS WITH HEMATURIA: Status: RESOLVED | Noted: 2020-04-07 | Resolved: 2020-06-03

## 2020-06-03 PROCEDURE — 93000 ELECTROCARDIOGRAM COMPLETE: CPT | Performed by: FAMILY MEDICINE

## 2020-06-03 PROCEDURE — 99214 OFFICE O/P EST MOD 30 MIN: CPT | Performed by: FAMILY MEDICINE

## 2020-06-03 PROCEDURE — 3074F SYST BP LT 130 MM HG: CPT | Performed by: FAMILY MEDICINE

## 2020-06-03 PROCEDURE — 1036F TOBACCO NON-USER: CPT | Performed by: FAMILY MEDICINE

## 2020-06-03 PROCEDURE — 3044F HG A1C LEVEL LT 7.0%: CPT | Performed by: FAMILY MEDICINE

## 2020-06-03 PROCEDURE — 3008F BODY MASS INDEX DOCD: CPT | Performed by: FAMILY MEDICINE

## 2020-06-03 PROCEDURE — 3078F DIAST BP <80 MM HG: CPT | Performed by: FAMILY MEDICINE

## 2020-06-03 RX ORDER — HYDROMORPHONE HYDROCHLORIDE 4 MG/1
TABLET ORAL
COMMUNITY
Start: 2020-05-08

## 2020-06-03 RX ORDER — NALOXONE HYDROCHLORIDE 4 MG/.1ML
SPRAY NASAL
COMMUNITY
Start: 2020-05-06 | End: 2021-12-22

## 2020-06-04 DIAGNOSIS — Z11.59 SPECIAL SCREENING EXAMINATION FOR UNSPECIFIED VIRAL DISEASE: Primary | ICD-10-CM

## 2020-06-04 DIAGNOSIS — Z20.828 EXPOSURE TO SARS-ASSOCIATED CORONAVIRUS: ICD-10-CM

## 2020-06-05 ENCOUNTER — PREP FOR PROCEDURE (OUTPATIENT)
Dept: BARIATRICS | Facility: CLINIC | Age: 60
End: 2020-06-05

## 2020-06-05 DIAGNOSIS — E66.01 MORBID OBESITY (HCC): Primary | ICD-10-CM

## 2020-06-08 ENCOUNTER — TELEPHONE (OUTPATIENT)
Dept: BARIATRICS | Facility: CLINIC | Age: 60
End: 2020-06-08

## 2020-06-08 DIAGNOSIS — Z11.59 SPECIAL SCREENING EXAMINATION FOR UNSPECIFIED VIRAL DISEASE: ICD-10-CM

## 2020-06-08 PROCEDURE — U0003 INFECTIOUS AGENT DETECTION BY NUCLEIC ACID (DNA OR RNA); SEVERE ACUTE RESPIRATORY SYNDROME CORONAVIRUS 2 (SARS-COV-2) (CORONAVIRUS DISEASE [COVID-19]), AMPLIFIED PROBE TECHNIQUE, MAKING USE OF HIGH THROUGHPUT TECHNOLOGIES AS DESCRIBED BY CMS-2020-01-R: HCPCS

## 2020-06-09 LAB — SARS-COV-2 RNA SPEC QL NAA+PROBE: NOT DETECTED

## 2020-06-11 ENCOUNTER — ANESTHESIA EVENT (OUTPATIENT)
Dept: GASTROENTEROLOGY | Facility: HOSPITAL | Age: 60
End: 2020-06-11

## 2020-06-11 ENCOUNTER — TELEPHONE (OUTPATIENT)
Dept: ANESTHESIOLOGY | Facility: HOSPITAL | Age: 60
End: 2020-06-11

## 2020-06-12 ENCOUNTER — ANESTHESIA (OUTPATIENT)
Dept: GASTROENTEROLOGY | Facility: HOSPITAL | Age: 60
End: 2020-06-12

## 2020-06-12 ENCOUNTER — HOSPITAL ENCOUNTER (OUTPATIENT)
Dept: GASTROENTEROLOGY | Facility: HOSPITAL | Age: 60
Setting detail: OUTPATIENT SURGERY
Discharge: HOME | End: 2020-06-12
Attending: SURGERY | Admitting: SURGERY
Payer: MEDICARE

## 2020-06-12 VITALS
BODY MASS INDEX: 27.21 KG/M2 | OXYGEN SATURATION: 100 % | WEIGHT: 159.39 LBS | SYSTOLIC BLOOD PRESSURE: 129 MMHG | TEMPERATURE: 97.6 F | HEART RATE: 58 BPM | HEIGHT: 64 IN | RESPIRATION RATE: 15 BRPM | DIASTOLIC BLOOD PRESSURE: 67 MMHG

## 2020-06-12 DIAGNOSIS — E66.01 MORBID OBESITY (HCC): ICD-10-CM

## 2020-06-12 PROCEDURE — 43235 EGD DIAGNOSTIC BRUSH WASH: CPT | Performed by: SURGERY

## 2020-06-12 RX ORDER — SODIUM CHLORIDE, SODIUM LACTATE, POTASSIUM CHLORIDE, CALCIUM CHLORIDE 600; 310; 30; 20 MG/100ML; MG/100ML; MG/100ML; MG/100ML
100 INJECTION, SOLUTION INTRAVENOUS CONTINUOUS
Status: DISCONTINUED | OUTPATIENT
Start: 2020-06-12 | End: 2020-06-16 | Stop reason: HOSPADM

## 2020-06-12 RX ORDER — PROPOFOL 10 MG/ML
INJECTION, EMULSION INTRAVENOUS AS NEEDED
Status: DISCONTINUED | OUTPATIENT
Start: 2020-06-12 | End: 2020-06-12 | Stop reason: SURG

## 2020-06-12 RX ORDER — LIDOCAINE HYDROCHLORIDE 20 MG/ML
INJECTION, SOLUTION EPIDURAL; INFILTRATION; INTRACAUDAL; PERINEURAL AS NEEDED
Status: DISCONTINUED | OUTPATIENT
Start: 2020-06-12 | End: 2020-06-12 | Stop reason: SURG

## 2020-06-12 RX ADMIN — LIDOCAINE HYDROCHLORIDE 30 MG: 20 INJECTION, SOLUTION EPIDURAL; INFILTRATION; INTRACAUDAL; PERINEURAL at 10:23

## 2020-06-12 RX ADMIN — LIDOCAINE HYDROCHLORIDE 20 MG: 20 INJECTION, SOLUTION EPIDURAL; INFILTRATION; INTRACAUDAL; PERINEURAL at 10:25

## 2020-06-12 RX ADMIN — PROPOFOL 100 MG: 10 INJECTION, EMULSION INTRAVENOUS at 10:20

## 2020-06-12 RX ADMIN — LIDOCAINE HYDROCHLORIDE 100 MG: 20 INJECTION, SOLUTION EPIDURAL; INFILTRATION; INTRACAUDAL; PERINEURAL at 10:20

## 2020-06-12 RX ADMIN — SODIUM CHLORIDE, SODIUM LACTATE, POTASSIUM CHLORIDE, AND CALCIUM CHLORIDE 100 ML/HR: .6; .31; .03; .02 INJECTION, SOLUTION INTRAVENOUS at 09:53

## 2020-06-17 ENCOUNTER — HOSPITAL ENCOUNTER (OUTPATIENT)
Dept: RADIOLOGY | Facility: HOSPITAL | Age: 60
Discharge: HOME | End: 2020-06-17
Payer: MEDICARE

## 2020-06-17 DIAGNOSIS — R10.13 EPIGASTRIC PAIN: ICD-10-CM

## 2020-06-17 PROCEDURE — 74240 X-RAY XM UPR GI TRC 1CNTRST: CPT

## 2020-06-18 DIAGNOSIS — E66.01 MORBID (SEVERE) OBESITY DUE TO EXCESS CALORIES (HCC): ICD-10-CM

## 2020-06-19 ENCOUNTER — TELEPHONE (OUTPATIENT)
Dept: BARIATRICS | Facility: CLINIC | Age: 60
End: 2020-06-19

## 2020-06-19 RX ORDER — OMEPRAZOLE 20 MG/1
CAPSULE, DELAYED RELEASE ORAL
Qty: 90 CAPSULE | Refills: 0 | Status: SHIPPED | OUTPATIENT
Start: 2020-06-19 | End: 2020-09-16

## 2020-06-22 ENCOUNTER — TELEPHONE (OUTPATIENT)
Dept: BARIATRICS | Facility: CLINIC | Age: 60
End: 2020-06-22

## 2020-07-13 ENCOUNTER — OFFICE VISIT (OUTPATIENT)
Dept: FAMILY MEDICINE CLINIC | Facility: CLINIC | Age: 60
End: 2020-07-13
Payer: MEDICARE

## 2020-07-13 VITALS
HEART RATE: 56 BPM | BODY MASS INDEX: 27.66 KG/M2 | OXYGEN SATURATION: 99 % | TEMPERATURE: 96.2 F | HEIGHT: 64 IN | DIASTOLIC BLOOD PRESSURE: 76 MMHG | WEIGHT: 162 LBS | SYSTOLIC BLOOD PRESSURE: 132 MMHG

## 2020-07-13 DIAGNOSIS — Z12.4 CERVICAL CANCER SCREENING: ICD-10-CM

## 2020-07-13 DIAGNOSIS — N30.01 ACUTE CYSTITIS WITH HEMATURIA: Primary | ICD-10-CM

## 2020-07-13 LAB
SL AMB  POCT GLUCOSE, UA: ABNORMAL
SL AMB LEUKOCYTE ESTERASE,UA: ABNORMAL
SL AMB POCT BILIRUBIN,UA: ABNORMAL
SL AMB POCT BLOOD,UA: ABNORMAL
SL AMB POCT KETONES,UA: ABNORMAL
SL AMB POCT NITRITE,UA: ABNORMAL
SL AMB POCT PH,UA: 8.5
SL AMB POCT SPECIFIC GRAVITY,UA: 1.02
SL AMB POCT URINE PROTEIN: 100
SL AMB POCT UROBILINOGEN: 0.2

## 2020-07-13 PROCEDURE — 81003 URINALYSIS AUTO W/O SCOPE: CPT | Performed by: FAMILY MEDICINE

## 2020-07-13 PROCEDURE — 3008F BODY MASS INDEX DOCD: CPT | Performed by: FAMILY MEDICINE

## 2020-07-13 PROCEDURE — 99214 OFFICE O/P EST MOD 30 MIN: CPT | Performed by: FAMILY MEDICINE

## 2020-07-13 PROCEDURE — 3075F SYST BP GE 130 - 139MM HG: CPT | Performed by: FAMILY MEDICINE

## 2020-07-13 PROCEDURE — 87086 URINE CULTURE/COLONY COUNT: CPT | Performed by: FAMILY MEDICINE

## 2020-07-13 PROCEDURE — 3044F HG A1C LEVEL LT 7.0%: CPT | Performed by: FAMILY MEDICINE

## 2020-07-13 PROCEDURE — 1036F TOBACCO NON-USER: CPT | Performed by: FAMILY MEDICINE

## 2020-07-13 PROCEDURE — 3078F DIAST BP <80 MM HG: CPT | Performed by: FAMILY MEDICINE

## 2020-07-13 RX ORDER — SULFAMETHOXAZOLE AND TRIMETHOPRIM 800; 160 MG/1; MG/1
1 TABLET ORAL 2 TIMES DAILY
Qty: 6 TABLET | Refills: 0 | Status: SHIPPED | OUTPATIENT
Start: 2020-07-13 | End: 2020-07-16

## 2020-07-13 NOTE — PROGRESS NOTES
Pieter Varghese 1960 female MRN: [id number]      ASSESSMENT/PLAN  Problem List Items Addressed This Visit        Genitourinary    Acute cystitis with hematuria - Primary    Relevant Medications    sulfamethoxazole-trimethoprim (BACTRIM DS) 800-160 mg per tablet    Other Relevant Orders    POCT urine dip auto non-scope (Completed)    Urine culture      Other Visit Diagnoses     Cervical cancer screening        Relevant Orders    Ambulatory referral to Obstetrics / Gynecology        UA: (+) leuks, blood -- will treat as UTI  Pt previously had success with Bactrim  F/u culture  Encouraged to schedule mammogram and pap  Future Appointments   Date Time Provider Yanelis Carcamo   8/26/2020 10:20 AM [facility] [place] [facility]   1/29/2021  9:30 AM Michael Bauer PA-C [facility]          SUBJECTIVE  CC: Urinary Tract Infection      HPI:  Pieter Varghese is a 64 y o  female who presents due to concern for UTI      5 day history:   Dysuria, hematuria, urgency  No increased frequency, fever  Has not tried anything for the symptoms     Review of Systems   Constitutional: Negative for fever  Genitourinary: Positive for dysuria, hematuria and urgency  Negative for frequency  Musculoskeletal: Positive for back pain (chronic, unchanged)         Historical Information   The patient history was reviewed and updated as follows:    Past Medical History:   Diagnosis Date    Abdominal pain, epigastric 3/23/2018    Added automatically from request for surgery [id number]    Abnormal x-ray of lumbar spine 11/3/2015    Bariatric surgery status     Basal cell carcinoma     basil cell carcinoma- in remission    Benign essential hypertension 6/12/2012    [facility]down [facility]me[facility] 3/1/2019    Calculus of bile duct with cholecystitis without obstruction 4/27/2018    Added automatically from request for surgery [id number]    Cellulitis of finger 12/3/2015    Degenerative lumbar disc     Digital mucinous cyst 2015    DMII (diabetes mellitus, type 2) ([facility] ) 2013    Gross hematuria 3/19/2019    Hiatal hernia     History of transfusion     Post-op spinal surgery    Low back pain     Lower urinary tract infectious disease 3/27/2015    Medicare annual wellness visit, initial 2019    Obesity     Resolved 10/6/2016     Postsurgical malabsorption     Recurrent UTI 3/19/2019    Spinal stenosis     [facility]     Tachycardia     Resolved 2016     Type 2 diabetes mellitus ([facility] )     Last assesssed 2018     Wears glasses      Past Surgical History:   Procedure Laterality Date    APPENDECTOMY      ARTHRODESIS      Lumbar - Last assessed 2018    [facility] BACK SURGERY      Lumbar (3 surgeries)- two levels fused, clean out from infection, then fusion of 7 levels (last surgery in )   [facility]      x2    CERVICAL SPINE SURGERY      Fusion of C2-C7 over a period of 3 surgeries ( first)     SECTION      X2    CHOLECYSTECTOMY      FL MYELOGRAM LUMBAR  3/28/2019    INSERTION / PLACEMENT / REVISION NEUROSTIMULATOR      X2- both were removed    NECK SURGERY      OTHER SURGICAL HISTORY      Catheter ablation     [place] EGD TRANSORAL BIOPSY SINGLE/MULTIPLE N/A 2016    Procedure: ESOPHAGOGASTRODUODENOSCOPY (EGD); Surgeon: Redd Ochoa MD;  [facility]; Service: Bariatrics    [place] EGD TRANSORAL BIOPSY SINGLE/MULTIPLE N/A 2018    Procedure: ESOPHAGOGASTRODUODENOSCOPY (EGD) with biopsy;  Surgeon: Redd Ochoa MD;  [facility];   Service: Bariatrics    [place] LAP GASTRIC BYPASS/BRAULIO-EN-Y N/A 10/25/2016    Procedure: BYPASS GASTRIC  BRAULIO-EN-Y LAPAROSCOPIC, WITH INTRA OP EGD;  Surgeon: Redd Ochoa MD;  [facility];  Service: Bariatrics    [place] LAP,CHOLECYSTECTOMY N/A 2018 Procedure: CHOLECYSTECTOMY LAPAROSCOPIC;  Surgeon: Shira Castaneda MD;  [facility];  Service: Bariatrics    SKIN CANCER EXCISION      TONSILLECTOMY      TUBAL LIGATION      WISDOM TOOTH EXTRACTION       Family History   Problem Relation Age of Onset    Cancer Father         Lung    Cystic fibrosis Father     Arthritis Mother         Rheumatoid    Angina Mother     Coronary artery disease Mother     Diabetes Mother    [facility] Rheum arthritis Sister     Diabetes Sister     Other Brother         Back problems     Diabetes Brother     No Known Problems Brother       Social History   Social History     Substance and Sexual Activity   Alcohol Use No     Social History     Substance and Sexual Activity   Drug Use No     Social History     Tobacco Use   Smoking Status Former Smoker    Packs/day:     Years: 20     Pack years: 20     Types: Cigarettes    Last attempt to quit:     Years since quittin 5   Smokeless Tobacco Never Used       Medications:     Current Outpatient Medications:     baclofen 10 mg tablet, Take 10 mg by mouth daily, Disp: , Rfl:     buPROPion (WELLBUTRIN) 75 mg tablet, TAKE 1 TABLET BY MOUTH TWICE A DAY, Disp: 180 tablet, Rfl: 1    Calcium Citrate-Vitamin D (CALCIUM CITRATE +D) 315-250 MG-UNIT TABS, Take 1 tablet by mouth 2 (two) times a day , Disp: , Rfl:     conjugated estrogens (PREMARIN) vaginal cream, Insert 1 g into the vagina 3 (three) times a week, Disp: 30 g, Rfl: 5    escitalopram (LEXAPRO) 20 mg tablet, TAKE 1 TABLET BY MOUTH EVERY DAY IN THE MORNING, Disp: 90 tablet, Rfl: 1    fenofibrate (TRIGLIDE) 160 MG tablet, TAKE 1 TABLET BY MOUTH EVERY DAY, Disp: 90 tablet, Rfl: 1    gabapentin (NEURONTIN) 100 mg capsule, TAKE two CAPSULE at HS, Disp: , Rfl:     HYDROmorphone (DILAUDID) 4 mg tablet, TAKE 1 TABLET BY MOUTH EVERY 6 HOURS AS NEEDED    FILL 2020, Disp: , Rfl:     loratadine (CLARITIN) 10 mg tablet, Take 10 mg by mouth daily  , Disp: , Rfl:     Multiple Vitamins-Minerals (BARIATRIC FUSION PO), Take 1 tablet by mouth daily, Disp: , Rfl:     NARCAN 4 MG/0 1ML LIQD, , Disp: , Rfl:     omeprazole (PriLOSEC) 20 mg delayed release capsule, TAKE 1 CAPSULE BY MOUTH EVERY DAY, Disp: 90 capsule, Rfl: 0    sulfamethoxazole-trimethoprim (BACTRIM DS) 800-160 mg per tablet, Take 1 tablet by mouth 2 (two) times a day for 3 days, Disp: 6 tablet, Rfl: 0    Vitamin D, Cholecalciferol, 400 units TABS, Take 1 tablet by mouth daily, Disp: , Rfl:   No Known Allergies    OBJECTIVE    Vitals:   Vitals:    07/13/20 1627   BP: 132/76   Pulse: 56   Temp: (!) 96 2 °F (35 7 °C)   SpO2: 99%   Weight: 73 5 kg (162 lb)   Height: 5' 4" (1 626 m)           Physical Exam   Constitutional: She appears well-developed and well-nourished  No distress  HENT:   Head: Normocephalic and atraumatic  Pulmonary/Chest: Effort normal    Neurological: She is alert  Psychiatric: She has a normal mood and affect  Vitals reviewed                   DO Lidia [facility]   7/13/2020  4:48 PM

## 2020-07-14 LAB — BACTERIA UR CULT: NORMAL

## 2020-07-15 ENCOUNTER — TELEPHONE (OUTPATIENT)
Dept: UROLOGY | Facility: MEDICAL CENTER | Age: 60
End: 2020-07-15

## 2020-07-15 ENCOUNTER — TELEPHONE (OUTPATIENT)
Dept: FAMILY MEDICINE CLINIC | Facility: CLINIC | Age: 60
End: 2020-07-15

## 2020-07-15 DIAGNOSIS — N39.0 RECURRENT UTI: ICD-10-CM

## 2020-07-15 DIAGNOSIS — R31.9 HEMATURIA, UNSPECIFIED TYPE: Primary | ICD-10-CM

## 2020-07-15 DIAGNOSIS — R30.0 DYSURIA: ICD-10-CM

## 2020-07-15 DIAGNOSIS — R31.0 GROSS HEMATURIA: ICD-10-CM

## 2020-07-15 RX ORDER — PHENAZOPYRIDINE HYDROCHLORIDE 100 MG/1
100 TABLET, FILM COATED ORAL 3 TIMES DAILY PRN
Qty: 10 TABLET | Refills: 0 | Status: SHIPPED | OUTPATIENT
Start: 2020-07-15 | End: 2020-12-04

## 2020-07-15 NOTE — TELEPHONE ENCOUNTER
Her urine culture actually did not show an infection  I can send in a course of pyridium that may help calm down her symptoms, but I would suggest she schedule an appointment with Urology to further evaluate these recurrent symptoms she is having  Referral placed  Thanks!

## 2020-07-15 NOTE — TELEPHONE ENCOUNTER
Patient called to schedule follow up appointment as per referral patient needs to come in for gross hematuria and uti  Scheduled patient for first opening in [facility] office with Lisa Dave on 07/28/20  Please advise if acceptable

## 2020-07-15 NOTE — TELEPHONE ENCOUNTER
Patient notified  She will take the Pyridium if you want to send that into [facility]    She will also call Urology

## 2020-07-15 NOTE — TELEPHONE ENCOUNTER
Fabiana Maldonado called, her UTI sx's are much worse  The Bactrim does not seem to be helping  Her frequency has increased and so has her pain  She also see's some blood  Can you send in something different?

## 2020-08-27 ENCOUNTER — OFFICE VISIT (OUTPATIENT)
Dept: UROLOGY | Facility: AMBULATORY SURGERY CENTER | Age: 60
End: 2020-08-27
Payer: MEDICARE

## 2020-08-27 VITALS
SYSTOLIC BLOOD PRESSURE: 118 MMHG | TEMPERATURE: 97.1 F | HEART RATE: 60 BPM | HEIGHT: 64 IN | DIASTOLIC BLOOD PRESSURE: 72 MMHG | WEIGHT: 162 LBS | BODY MASS INDEX: 27.66 KG/M2

## 2020-08-27 DIAGNOSIS — R31.0 GROSS HEMATURIA: ICD-10-CM

## 2020-08-27 DIAGNOSIS — N39.0 RECURRENT UTI: Primary | ICD-10-CM

## 2020-08-27 DIAGNOSIS — N32.89 BLADDER SPASM: ICD-10-CM

## 2020-08-27 LAB
BACTERIA UR QL AUTO: ABNORMAL /HPF
BILIRUB UR QL STRIP: NEGATIVE
CLARITY UR: ABNORMAL
COLOR UR: YELLOW
GLUCOSE UR STRIP-MCNC: NEGATIVE MG/DL
HGB UR QL STRIP.AUTO: NEGATIVE
HYALINE CASTS #/AREA URNS LPF: ABNORMAL /LPF
KETONES UR STRIP-MCNC: NEGATIVE MG/DL
LEUKOCYTE ESTERASE UR QL STRIP: ABNORMAL
NITRITE UR QL STRIP: POSITIVE
NON-SQ EPI CELLS URNS QL MICRO: ABNORMAL /HPF
PH UR STRIP.AUTO: 7 [PH]
PROT UR STRIP-MCNC: NEGATIVE MG/DL
RBC #/AREA URNS AUTO: ABNORMAL /HPF
SL AMB  POCT GLUCOSE, UA: NORMAL
SL AMB LEUKOCYTE ESTERASE,UA: NORMAL
SL AMB POCT BILIRUBIN,UA: NORMAL
SL AMB POCT BLOOD,UA: NORMAL
SL AMB POCT CLARITY,UA: NORMAL
SL AMB POCT COLOR,UA: YELLOW
SL AMB POCT KETONES,UA: NORMAL
SL AMB POCT NITRITE,UA: NORMAL
SL AMB POCT PH,UA: 6.5
SL AMB POCT SPECIFIC GRAVITY,UA: 1.01
SL AMB POCT URINE PROTEIN: NORMAL
SL AMB POCT UROBILINOGEN: 0.1
SP GR UR STRIP.AUTO: 1.02 (ref 1–1.03)
UROBILINOGEN UR QL STRIP.AUTO: 0.2 E.U./DL
WBC #/AREA URNS AUTO: ABNORMAL /HPF

## 2020-08-27 PROCEDURE — 1036F TOBACCO NON-USER: CPT | Performed by: NURSE PRACTITIONER

## 2020-08-27 PROCEDURE — 87077 CULTURE AEROBIC IDENTIFY: CPT | Performed by: NURSE PRACTITIONER

## 2020-08-27 PROCEDURE — 81001 URINALYSIS AUTO W/SCOPE: CPT | Performed by: NURSE PRACTITIONER

## 2020-08-27 PROCEDURE — 81002 URINALYSIS NONAUTO W/O SCOPE: CPT | Performed by: NURSE PRACTITIONER

## 2020-08-27 PROCEDURE — 3008F BODY MASS INDEX DOCD: CPT | Performed by: NURSE PRACTITIONER

## 2020-08-27 PROCEDURE — 87086 URINE CULTURE/COLONY COUNT: CPT | Performed by: NURSE PRACTITIONER

## 2020-08-27 PROCEDURE — 87186 SC STD MICRODIL/AGAR DIL: CPT | Performed by: NURSE PRACTITIONER

## 2020-08-27 PROCEDURE — 3074F SYST BP LT 130 MM HG: CPT | Performed by: NURSE PRACTITIONER

## 2020-08-27 PROCEDURE — 87181 SC STD AGAR DILUTION PER AGT: CPT | Performed by: NURSE PRACTITIONER

## 2020-08-27 PROCEDURE — 3044F HG A1C LEVEL LT 7.0%: CPT | Performed by: NURSE PRACTITIONER

## 2020-08-27 PROCEDURE — 3078F DIAST BP <80 MM HG: CPT | Performed by: NURSE PRACTITIONER

## 2020-08-27 PROCEDURE — 99214 OFFICE O/P EST MOD 30 MIN: CPT | Performed by: NURSE PRACTITIONER

## 2020-08-27 RX ORDER — OXYBUTYNIN CHLORIDE 5 MG/1
5 TABLET ORAL 3 TIMES DAILY PRN
Qty: 30 TABLET | Refills: 3 | Status: SHIPPED | OUTPATIENT
Start: 2020-08-27 | End: 2020-10-21 | Stop reason: ALTCHOICE

## 2020-08-27 NOTE — PATIENT INSTRUCTIONS
BLADDER HEALTH    WHAT IS CONSIDERED NORMAL?  The average bladder can hold about 2 cups of urine before it needs to be emptied   The normal range of voiding urine is 6 to 8 times during a 24 hour period  As we get older, our bladder capacity can get smaller and we may need to pass urine more frequently but usually not more than every 2 hours   Urine should flow easily without discomfort in a good, steady stream until the bladder is empty  No pushing or straining is necessary to empty the bladder   An urge is a signal that you feel as the bladder stretches to fill with urine  Urges can be felt even if the bladder is not full  Urges are not commands to go to the toilet, merely a signal and can be controlled  WHAT ARE GOOD BLADDER HABITS?  Take your time when emptying your bladder  Dont strain or push to empty your bladder  Make sure you empty your bladder completely each time you pass urine  Do not rush the process   Consistently ignoring the urge to go (waiting more than 4 hours between toileting) or urinating too infrequently may be convenient but not healthy for your bladder   Avoid going to the toilet just in case or more often than every 2 hours  It is usually not necessary to go when you feel the first urge  Try to go only when your bladder is full  Urgency and frequency of urination can be improved by retraining the bladder and spacing your fluid intake throughout the day  Practice good toilet habits  Dont let your bladder control your life  TIPS TO MAINTAIN GOOD BLADDER HABITS   Maintain a good fluid intake  Depending on your body size and environment, drink 6 -8 cups (8 oz each) of fluid per day unless otherwise advised by your doctor  Not enough fluid creates a foul odor and dark color of the urine     Limit the amount of caffeine (coffee, cola, chocolate or tea) and citrus foods that you consume as these foods can be associated with increased sensation of urinary urgency and frequency   Limit the amount of alcohol you drink  Alcohol increases urine production and makes it difficult for the brain to coordinate bladder control   Avoid constipation by maintaining a balanced diet of dietary fiber   Cigarette smoking is also irritating to the bladder surface and is associated with bladder cancer  In addition, the coughing associated with smoking may lead to increased incontinent episodes because of the increased pressure  HOW DIET CAN AFFECT YOUR BLADDER  Although there is no particular "diet" that can cure bladder control, there are certain dietary suggestions you can use to help control the problem  There are 2 points to consider when evaluating how your habits and diet may affect your bladder:    1  Foods and fluids can irritate the bladder  Some foods and beverages are thought to contribute to bladder leakage and irritability  However their effect on the bladder is not completely understood and you may want to see if eliminating one or all of these items improves your bladder control  If you are unable to give them up completely, it is recommended that you use the following items in moderation:   Acidic beverages and foods (orange juice, grapefruit juice, lemonade etc)   Alcoholic beverages   Vinegar   Coffee (regular and decaf)   Tea (regular and decaf)   Caffeinated beverages   Carbonated beverages          2  Drinking enough and the right kinds of fluids  Many people with bladder control issues decrease their intake of liquids in hope that they will need to urinate less frequently or have less urinary leakage   You should not restrict fluids to control your bladder  While a decrease in liquid intake does result in a decrease in the volume of urine, the smaller amount of urine may be more highly concentrated         Highly concentrated, dark yellow urine is irritating to the bladder surface and may actually cause you to go to the bathroom more frequently   It also encourages the growth of bacteria, which may lead to infections resulting in incontinence   Substitutions for Bladder Irritants: water is always the best beverage choice    Grape and apple juice are thirst quenchers are good selections and are not as irritating to the bladder   o Low acid fruits:  Pears, apricots, papaya, watermelon  o For coffee drinkers: KAVA®, Postum®, Christen®, Kaffree Gillian®  o For tea drinkers:  non-citrus or herbal and sun brewed tea

## 2020-08-27 NOTE — PROGRESS NOTES
8/27/2020    Humberto Brown  1960  [id number]        Assessment  -History of gross hematuria s/p negative hematuria workup (6/27/19)  -History of urinary tract infections  -Urinary urgency  -Bladder spasms    Discussion/Plan  Rhetta Brittle is a 64 y o  female being managed by Dr Ro Shukla  1  History of gross hematuria s/p negative hematuria workup (6/27/19)- urine dip in office today identified moderate blood  We will send for formal urinalysis with microscopic and culture and call with any significant findings  Patient unsure if intermittent episodes of wiping is urologic or gynecologic  I encouraged patient to continue using topical estrogen 3 times weekly  Reviewed administration instructions  Instructed patient to call office if she experience gross hematuria in urine  2  Urinary urgency and bladder spasms- in regards to her urinary symptoms I reviewed dietary recommendations including avoiding presents in increasing water intake  Patient will try to wean her coffee consumption  We also discussed trialing oxybutynin 5 mg as needed for management of bladder spasms  Patient amenable with this plan  Reviewed potential side effects  Prescription was electronically sent to her pharmacy  Plan to follow up in 1 year for follow-up, or sooner if needed  Patient was instructed to call with any issues    -All questions answered, patients agree with plan     History of Present Illness  64 y o  female with a history of gross hematuria, UTI, urinary urgency, and bladder spasms presents today for follow up  She is known to Dr Ro Shukla  Patient underwent negative hematuria workup in June 2019  There were no abnormal findings noted on cystoscopic evaluation or upper tract imaging  Since last office visit, patient reports intermittent episodes of urinary urgency and suprapubic discomfort  Patient had thought she was having a urinary tract infection and contacted her PCP    However, all urine cultures were negative for infection  She also notes pink tinge blood after wiping  Patient unsure if this is urologic or gynecologic in etiology  She remains on Premarin 2 times daily as managed by per PCP  Patient states she primarily drinks coffee and does not drink water  Review of Systems  Review of Systems   Constitutional: Negative  HENT: Negative  Respiratory: Negative  Cardiovascular: Negative  Gastrointestinal: Negative  Genitourinary: Positive for urgency  Negative for decreased urine volume, difficulty urinating, dysuria, flank pain, frequency and hematuria  Musculoskeletal: Negative  Skin: Negative  Neurological: Negative  Psychiatric/Behavioral: Negative          Past Medical History  Past Medical History:   Diagnosis Date    Abdominal pain, epigastric 3/23/2018    Added automatically from request for surgery [id number]    Abnormal x-ray of lumbar spine 11/3/2015    Bariatric surgery status     Basal cell carcinoma     basil cell carcinoma- in remission    Benign essential hypertension 6/12/2012    Breakdown (mechanical) of internal fixation device of vertebrae, initial encounter ([facility] ) 3/1/2019    Calculus of bile duct with cholecystitis without obstruction 4/27/2018    Added automatically from request for surgery [id number]    Cellulitis of finger 12/3/2015    Degenerative lumbar disc     Digital mucinous cyst 6/24/2015    DMII (diabetes mellitus, type 2) ([facility] ) 11/8/2013    Gross hematuria 3/19/2019    Hiatal hernia     History of transfusion 2014    Post-op spinal surgery    Low back pain     Lower urinary tract infectious disease 3/27/2015    Medicare annual wellness visit, initial 11/27/2019    Obesity     Resolved 10/6/2016     Postsurgical malabsorption     Recurrent UTI 3/19/2019    Spinal stenosis     [facility]     Tachycardia     Resolved 5/4/2016     Type 2 diabetes mellitus ([facility] )     Last assesssed 1/18/2018     Wears glasses Past Social History  Past Surgical History:   Procedure Laterality Date    APPENDECTOMY      ARTHRODESIS      Lumbar - Last assessed 2018    [facility] SURGERY      Lumbar (3 surgeries)- two levels fused, clean out from infection, then fusion of 7 levels (last surgery in )   [facility]      x2    CERVICAL SPINE SURGERY      Fusion of C2-C7 over a period of 3 surgeries ( first)     SECTION      X2    CHOLECYSTECTOMY      FL MYELOGRAM LUMBAR  3/28/2019    INSERTION / PLACEMENT / REVISION NEUROSTIMULATOR      X2- both were removed    NECK SURGERY      OTHER SURGICAL HISTORY      Catheter ablation     ME EGD TRANSORAL BIOPSY SINGLE/MULTIPLE N/A 2016    Procedure: ESOPHAGOGASTRODUODENOSCOPY (EGD); Surgeon: Eva Irene MD;  [facility]; Service: Bariatrics    ME EGD TRANSORAL BIOPSY SINGLE/MULTIPLE N/A 2018    Procedure: ESOPHAGOGASTRODUODENOSCOPY (EGD) with biopsy;  Surgeon: Eva Irene MD;  [facility];   Service: Bariatrics    ME LAP GASTRIC BYPASS/BRAULIO-EN-Y N/A 10/25/2016    Procedure: BYPASS GASTRIC  BRAULIO-EN-Y LAPAROSCOPIC, WITH INTRA OP EGD;  Surgeon: Eva Irene MD;  [facility];  Service: Bariatrics    ME LAP,CHOLECYSTECTOMY N/A 2018    Procedure: Anna Alonzo;  Surgeon: Eva Irene MD;  [facility];  Service: Viktoria Wong SKIN CANCER EXCISION      TONSILLECTOMY      TUBAL LIGATION      WISDOM TOOTH EXTRACTION         Past Family History  Family History   Problem Relation Age of Onset    Cancer Father         Lung    Cystic fibrosis Father     Arthritis Mother         Rheumatoid    Angina Mother     Coronary artery disease Mother     Diabetes Mother     Rheum arthritis Sister     Diabetes Sister     Other Brother         Back problems     Diabetes Brother     No Known Problems Brother        Past Social history  Social History     Socioeconomic History    Marital status:      Spouse name: Not on file    Number of children: Not on file    Years of education: Completed 4th year of college     Highest education level: Not on file   Occupational History    Occupation: Realtor    Social Needs    Financial resource strain: Not on file    Food insecurity     Worry: Not on file     Inability: Not on file   [employer] needs     Medical: Not on file     Non-medical: Not on file   Tobacco Use    Smoking status: Former Smoker     Packs/day: 1 00     Years: 20 00     Pack years: 20 00     Types: Cigarettes     Last attempt to quit:      Years since quittin 6    Smokeless tobacco: Never Used   Substance and Sexual Activity    Alcohol use: No    Drug use: No    Sexual activity: Not Currently   Lifestyle    Physical activity     Days per week: Not on file     Minutes per session: Not on file    Stress: Not on file   Relationships    Social connections     Talks on phone: Not on file     Gets together: Not on file     Attends Denominational service: Not on file     Active member of club or organization: Not on file     Attends meetings of clubs or organizations: Not on file     Relationship status: Not on file    Intimate partner violence     Fear of current or ex partner: Not on file     Emotionally abused: Not on file     Physically abused: Not on file     Forced sexual activity: Not on file   Other Topics Concern    Not on file   Social History Narrative    Lives with son     Works as         Current Medications  Current Outpatient Medications   Medication Sig Dispense Refill    baclofen 10 mg tablet Take 10 mg by mouth daily      buPROPion (WELLBUTRIN) 75 mg tablet TAKE 1 TABLET BY MOUTH TWICE A  tablet 1    Calcium Citrate-Vitamin D (CALCIUM CITRATE +D) 315-250 MG-UNIT TABS Take 1 tablet by mouth 2 (two) times a day       conjugated estrogens (PREMARIN) vaginal cream Insert 1 g into the vagina 3 (three) times a week 30 g 5    escitalopram (LEXAPRO) 20 mg tablet TAKE 1 TABLET BY MOUTH EVERY DAY IN THE MORNING 90 tablet 1    fenofibrate (TRIGLIDE) 160 MG tablet TAKE 1 TABLET BY MOUTH EVERY DAY 90 tablet 1    gabapentin (NEURONTIN) 100 mg capsule TAKE two CAPSULE at HS      HYDROmorphone (DILAUDID) 4 mg tablet TAKE 1 TABLET BY MOUTH EVERY 6 HOURS AS NEEDED    FILL 5/8/2020      loratadine (CLARITIN) 10 mg tablet Take 10 mg by mouth daily   Multiple Vitamins-Minerals (BARIATRIC FUSION PO) Take 1 tablet by mouth daily      omeprazole (PriLOSEC) 20 mg delayed release capsule TAKE 1 CAPSULE BY MOUTH EVERY DAY 90 capsule 0    phenazopyridine (PYRIDIUM) 100 mg tablet Take 1 tablet (100 mg total) by mouth 3 (three) times a day as needed for bladder spasms 10 tablet 0    Vitamin D, Cholecalciferol, 400 units TABS Take 1 tablet by mouth daily      NARCAN 4 MG/0 1ML LIQD       oxybutynin (DITROPAN) 5 mg tablet Take 1 tablet (5 mg total) by mouth 3 (three) times a day as needed (PRN) 30 tablet 3     No current facility-administered medications for this visit  Allergies  No Known Allergies    Past medical history, social history, family history, medications and allergies were reviewed  Vitals  Vitals:    08/27/20 0737   BP: 118/72   BP Location: Left arm   Patient Position: Sitting   Cuff Size: Adult   Pulse: 60   Temp: (!) 97 1 °F (36 2 °C)   TempSrc: Temporal   Weight: 73 5 kg (162 lb)   Height: 5' 4" (1 626 m)       Physical Exam  Physical Exam  Constitutional:       Appearance: Normal appearance  She is well-developed  HENT:      Head: Normocephalic  Eyes:      Pupils: Pupils are equal, round, and reactive to light  Neck:      Musculoskeletal: Normal range of motion  Pulmonary:      Effort: Pulmonary effort is normal    Abdominal:      Palpations: Abdomen is soft     Genitourinary:     Comments: No suprapubic discomfort or distension   Musculoskeletal: Normal range of motion  Skin:     General: Skin is warm and dry  Neurological:      General: No focal deficit present  Mental Status: She is alert and oriented to person, place, and time  Psychiatric:         Mood and Affect: Mood normal          Behavior: Behavior normal          Thought Content: Thought content normal          Judgment: Judgment normal          Results    I have personally reviewed all pertinent lab results and reviewed with patient  Lab Results   Component Value Date    GLUCOSE 122 03/02/2015    CALCIUM 9 6 02/01/2020     03/02/2015    K 4 2 02/01/2020    CO2 30 02/01/2020     02/01/2020    BUN 14 02/01/2020    CREATININE 0 91 02/01/2020     Lab Results   Component Value Date    WBC 6 11 02/01/2020    HGB 13 2 02/01/2020    HCT 40 7 02/01/2020    MCV 93 02/01/2020     (H) 02/01/2020     No results found for this or any previous visit (from the past 1 hour(s))

## 2020-08-30 LAB
BACTERIA UR CULT: ABNORMAL
BACTERIA UR CULT: ABNORMAL

## 2020-08-31 ENCOUNTER — TELEPHONE (OUTPATIENT)
Dept: UROLOGY | Facility: AMBULATORY SURGERY CENTER | Age: 60
End: 2020-08-31

## 2020-08-31 DIAGNOSIS — N39.0 URINARY TRACT INFECTION WITHOUT HEMATURIA, SITE UNSPECIFIED: Primary | ICD-10-CM

## 2020-08-31 RX ORDER — NITROFURANTOIN 25; 75 MG/1; MG/1
100 CAPSULE ORAL 2 TIMES DAILY
Qty: 14 CAPSULE | Refills: 0 | Status: SHIPPED | OUTPATIENT
Start: 2020-08-31 | End: 2020-09-07

## 2020-08-31 NOTE — TELEPHONE ENCOUNTER
Left detailed message on machine, as per communication consent, relaying provider information and recommendations  Encouraged to hydrate well with water, avoid bladder irritants, and take prescribed medication as ordered  Encouraged to call office with questions, concerns, or worsening symptoms and office number given

## 2020-08-31 NOTE — TELEPHONE ENCOUNTER
Conversation: Test Results Question   (Newest Message First)   Delores Kidd   to Roland Carter I, [address]            8/30/20 7:06 PM   I have a question about URINALYSIS AUTO W/SCOPE resulted on 8/27/20, 9:18 PM      Does this indicate that I have a UTI infection? And how are we treating this if so   Thanks      Vamsi Escoto

## 2020-08-31 NOTE — TELEPHONE ENCOUNTER
Please inform patient results of recent urine culture were positive for infection  Prescription for Macrobid was sent to her pharmacy

## 2020-08-31 NOTE — TELEPHONE ENCOUNTER
Patient of Dr Peter Sumner seen in [facility] with history of gross hematuria, UTI, and urinary urgency and bladder spasms  Patient calling in regards to urine testing results from 8/27 and possible treatment  Routing to provider to review and advise

## 2020-08-31 NOTE — TELEPHONE ENCOUNTER
As documented by Amanda Massey earlier today, urine concerning for infection and an antibiotic sent to her pharmacy

## 2020-09-08 ENCOUNTER — TELEPHONE (OUTPATIENT)
Dept: FAMILY MEDICINE CLINIC | Facility: CLINIC | Age: 60
End: 2020-09-08

## 2020-09-08 PROBLEM — N30.01 ACUTE CYSTITIS WITH HEMATURIA: Status: RESOLVED | Noted: 2020-04-07 | Resolved: 2020-09-08

## 2020-09-08 PROBLEM — R07.89 CHEST PRESSURE: Status: RESOLVED | Noted: 2020-06-03 | Resolved: 2020-09-08

## 2020-09-08 NOTE — TELEPHONE ENCOUNTER
Pt is scheduled for visit on 9/9, but is due for AWV on or after 11/27  Can we please call and see if she is willing to reschedule? If there is an issue she needs to discuss tomorrow, I'd be happy to see her, but if it is just for regular follow up, I would appreciate changing it  Thanks!

## 2020-09-11 DIAGNOSIS — E66.01 MORBID (SEVERE) OBESITY DUE TO EXCESS CALORIES (HCC): ICD-10-CM

## 2020-09-16 ENCOUNTER — TELEPHONE (OUTPATIENT)
Dept: BARIATRICS | Facility: CLINIC | Age: 60
End: 2020-09-16

## 2020-09-16 DIAGNOSIS — K21.9 GERD (GASTROESOPHAGEAL REFLUX DISEASE): Primary | ICD-10-CM

## 2020-09-16 RX ORDER — OMEPRAZOLE 20 MG/1
20 CAPSULE, DELAYED RELEASE ORAL DAILY
Qty: 90 CAPSULE | Refills: 1 | Status: SHIPPED | OUTPATIENT
Start: 2020-09-16 | End: 2021-03-01

## 2020-09-16 RX ORDER — OMEPRAZOLE 20 MG/1
CAPSULE, DELAYED RELEASE ORAL
Qty: 90 CAPSULE | Refills: 0 | Status: SHIPPED | OUTPATIENT
Start: 2020-09-16 | End: 2020-12-04 | Stop reason: SDUPTHER

## 2020-09-16 NOTE — TELEPHONE ENCOUNTER
Refill placed for omeprazole 20mg daily  Please continue to avoid food triggers and gastric irritants, follow anti-reflux diet and lifestyle measures  Attempt to slowly wean/taper off PPI and bridge to pepcid, gaviscon as able and notify me of issues

## 2020-09-16 NOTE — TELEPHONE ENCOUNTER
Informed patient that Yamile Cortez did just refill her Omeprazole but that she should continue to avoid food triggers and gastric irritants, follow anti-reflux diet and lifestyle measures and attempt to slowly wean/taper off PPI and bridge to pepcid, gaviscon as able and notify me of issues  Patient verbalized understanding and will follow PA recommendations

## 2020-09-16 NOTE — TELEPHONE ENCOUNTER
Patient left a message on the Clinical line requesting refill of her Omeprazole to the [facility] in [facility]

## 2020-10-21 ENCOUNTER — TELEPHONE (OUTPATIENT)
Dept: UROLOGY | Facility: MEDICAL CENTER | Age: 60
End: 2020-10-21

## 2020-10-21 DIAGNOSIS — N32.89 BLADDER SPASM: Primary | ICD-10-CM

## 2020-10-21 RX ORDER — OXYBUTYNIN CHLORIDE 10 MG/1
10 TABLET, EXTENDED RELEASE ORAL
Qty: 30 TABLET | Refills: 3 | Status: SHIPPED | OUTPATIENT
Start: 2020-10-21 | End: 2021-03-01 | Stop reason: DRUGHIGH

## 2020-11-05 DIAGNOSIS — G89.4 CHRONIC PAIN SYNDROME: ICD-10-CM

## 2020-11-05 DIAGNOSIS — F32.5 MAJOR DEPRESSIVE DISORDER WITH SINGLE EPISODE, IN FULL REMISSION (HCC): ICD-10-CM

## 2020-11-05 DIAGNOSIS — E78.5 HYPERLIPIDEMIA, UNSPECIFIED HYPERLIPIDEMIA TYPE: ICD-10-CM

## 2020-11-05 RX ORDER — ESCITALOPRAM OXALATE 20 MG/1
20 TABLET ORAL EVERY MORNING
Qty: 90 TABLET | Refills: 1 | Status: SHIPPED | OUTPATIENT
Start: 2020-11-05 | End: 2021-04-23

## 2020-11-05 RX ORDER — BUPROPION HYDROCHLORIDE 75 MG/1
75 TABLET ORAL 2 TIMES DAILY
Qty: 180 TABLET | Refills: 1 | Status: SHIPPED | OUTPATIENT
Start: 2020-11-05 | End: 2021-04-28

## 2020-11-05 RX ORDER — FENOFIBRATE 160 MG/1
160 TABLET ORAL DAILY
Qty: 90 TABLET | Refills: 1 | Status: SHIPPED | OUTPATIENT
Start: 2020-11-05 | End: 2021-04-26

## 2020-12-03 RX ORDER — OXYBUTYNIN CHLORIDE 5 MG/1
TABLET ORAL
COMMUNITY
Start: 2020-11-01 | End: 2020-12-04 | Stop reason: SDUPTHER

## 2020-12-04 ENCOUNTER — OFFICE VISIT (OUTPATIENT)
Dept: FAMILY MEDICINE CLINIC | Facility: CLINIC | Age: 60
End: 2020-12-04
Payer: MEDICARE

## 2020-12-04 VITALS
RESPIRATION RATE: 18 BRPM | BODY MASS INDEX: 28.13 KG/M2 | HEART RATE: 70 BPM | TEMPERATURE: 97.7 F | OXYGEN SATURATION: 97 % | DIASTOLIC BLOOD PRESSURE: 76 MMHG | HEIGHT: 64 IN | WEIGHT: 164.8 LBS | SYSTOLIC BLOOD PRESSURE: 122 MMHG

## 2020-12-04 DIAGNOSIS — Z12.31 ENCOUNTER FOR SCREENING MAMMOGRAM FOR BREAST CANCER: ICD-10-CM

## 2020-12-04 DIAGNOSIS — Z98.84 BARIATRIC SURGERY STATUS: ICD-10-CM

## 2020-12-04 DIAGNOSIS — F41.1 ANXIETY STATES: ICD-10-CM

## 2020-12-04 DIAGNOSIS — Z11.4 SCREENING FOR HIV (HUMAN IMMUNODEFICIENCY VIRUS): ICD-10-CM

## 2020-12-04 DIAGNOSIS — E78.5 HYPERLIPIDEMIA, UNSPECIFIED HYPERLIPIDEMIA TYPE: ICD-10-CM

## 2020-12-04 DIAGNOSIS — G89.4 CHRONIC PAIN SYNDROME: Primary | ICD-10-CM

## 2020-12-04 DIAGNOSIS — Z11.59 ENCOUNTER FOR HEPATITIS C SCREENING TEST FOR LOW RISK PATIENT: ICD-10-CM

## 2020-12-04 DIAGNOSIS — Z00.00 MEDICARE ANNUAL WELLNESS VISIT, SUBSEQUENT: ICD-10-CM

## 2020-12-04 PROCEDURE — G0439 PPPS, SUBSEQ VISIT: HCPCS | Performed by: FAMILY MEDICINE

## 2020-12-04 PROCEDURE — 99214 OFFICE O/P EST MOD 30 MIN: CPT | Performed by: FAMILY MEDICINE

## 2020-12-14 ENCOUNTER — TELEMEDICINE (OUTPATIENT)
Dept: FAMILY MEDICINE CLINIC | Facility: CLINIC | Age: 60
End: 2020-12-14
Payer: MEDICARE

## 2020-12-14 VITALS — TEMPERATURE: 97.2 F

## 2020-12-14 DIAGNOSIS — R09.81 NASAL CONGESTION: Primary | ICD-10-CM

## 2020-12-14 DIAGNOSIS — R09.81 NASAL CONGESTION: ICD-10-CM

## 2020-12-14 DIAGNOSIS — R51.9 ACUTE NONINTRACTABLE HEADACHE, UNSPECIFIED HEADACHE TYPE: ICD-10-CM

## 2020-12-14 PROCEDURE — 99214 OFFICE O/P EST MOD 30 MIN: CPT | Performed by: FAMILY MEDICINE

## 2020-12-14 PROCEDURE — U0003 INFECTIOUS AGENT DETECTION BY NUCLEIC ACID (DNA OR RNA); SEVERE ACUTE RESPIRATORY SYNDROME CORONAVIRUS 2 (SARS-COV-2) (CORONAVIRUS DISEASE [COVID-19]), AMPLIFIED PROBE TECHNIQUE, MAKING USE OF HIGH THROUGHPUT TECHNOLOGIES AS DESCRIBED BY CMS-2020-01-R: HCPCS | Performed by: FAMILY MEDICINE

## 2020-12-15 ENCOUNTER — TELEPHONE (OUTPATIENT)
Dept: FAMILY MEDICINE CLINIC | Facility: CLINIC | Age: 60
End: 2020-12-15

## 2020-12-15 DIAGNOSIS — J01.90 ACUTE NON-RECURRENT SINUSITIS, UNSPECIFIED LOCATION: Primary | ICD-10-CM

## 2020-12-15 LAB — SARS-COV-2 RNA SPEC QL NAA+PROBE: NOT DETECTED

## 2020-12-15 RX ORDER — AMOXICILLIN 500 MG/1
500 CAPSULE ORAL EVERY 8 HOURS SCHEDULED
Qty: 30 CAPSULE | Refills: 0 | Status: SHIPPED | OUTPATIENT
Start: 2020-12-15 | End: 2020-12-25

## 2020-12-19 DIAGNOSIS — N32.81 OAB (OVERACTIVE BLADDER): Primary | ICD-10-CM

## 2020-12-20 RX ORDER — OXYBUTYNIN CHLORIDE 5 MG/1
TABLET ORAL
Qty: 30 TABLET | Refills: 1 | Status: SHIPPED | OUTPATIENT
Start: 2020-12-20 | End: 2021-01-23 | Stop reason: SDUPTHER

## 2021-01-19 ENCOUNTER — TELEMEDICINE (OUTPATIENT)
Dept: FAMILY MEDICINE CLINIC | Facility: CLINIC | Age: 61
End: 2021-01-19
Payer: MEDICARE

## 2021-01-19 DIAGNOSIS — J01.90 ACUTE NON-RECURRENT SINUSITIS, UNSPECIFIED LOCATION: ICD-10-CM

## 2021-01-19 DIAGNOSIS — B34.9 VIRAL INFECTION, UNSPECIFIED: ICD-10-CM

## 2021-01-19 DIAGNOSIS — B34.9 VIRAL INFECTION, UNSPECIFIED: Primary | ICD-10-CM

## 2021-01-19 PROCEDURE — U0005 INFEC AGEN DETEC AMPLI PROBE: HCPCS | Performed by: PHYSICIAN ASSISTANT

## 2021-01-19 PROCEDURE — U0003 INFECTIOUS AGENT DETECTION BY NUCLEIC ACID (DNA OR RNA); SEVERE ACUTE RESPIRATORY SYNDROME CORONAVIRUS 2 (SARS-COV-2) (CORONAVIRUS DISEASE [COVID-19]), AMPLIFIED PROBE TECHNIQUE, MAKING USE OF HIGH THROUGHPUT TECHNOLOGIES AS DESCRIBED BY CMS-2020-01-R: HCPCS | Performed by: PHYSICIAN ASSISTANT

## 2021-01-19 PROCEDURE — 99214 OFFICE O/P EST MOD 30 MIN: CPT | Performed by: PHYSICIAN ASSISTANT

## 2021-01-19 RX ORDER — AMOXICILLIN AND CLAVULANATE POTASSIUM 875; 125 MG/1; MG/1
1 TABLET, FILM COATED ORAL EVERY 12 HOURS SCHEDULED
Qty: 10 TABLET | Refills: 0 | Status: SHIPPED | OUTPATIENT
Start: 2021-01-19 | End: 2021-01-24

## 2021-01-19 NOTE — PROGRESS NOTES
COVID-19 Virtual Visit     Assessment/Plan:    Problem List Items Addressed This Visit     None      Visit Diagnoses     Viral infection, unspecified    -  Primary    Relevant Orders    Novel Coronavirus (Covid-19),PCR [facility] - Collected at [facility] or [facility]    Acute non-recurrent sinusitis, unspecified location        Relevant Medications    amoxicillin-clavulanate (AUGMENTIN) 875-125 mg per tablet         Disposition:     I referred patient to one of our centralized sites for a COVID-19 swab  Sx x 3-4 days, no improvement with otc sinus relief medicine and allergy medication, r/o covid, supportive care, cover with augmentin and follow up for acute worsening     I have spent 10 minutes directly with the patient  Encounter provider Bri Mendes PA-C    Provider located at [place] [address]  [address] [place] [id number]    Recent Visits  No visits were found meeting these conditions  Showing recent visits within past 7 days and meeting all other requirements     Today's Visits  Date Type Provider Dept   01/19/21 Telemedicine Em Collado PA-C  [facility]    Showing today's visits and meeting all other requirements     Future Appointments  No visits were found meeting these conditions  Showing future appointments within next 150 days and meeting all other requirements      This virtual check-in was done via [employer] and patient was informed that this is not a secure, HIPAA-compliant platform  She agrees to proceed  Patient agrees to participate in a virtual check in via telephone or video visit instead of presenting to the office to address urgent/immediate medical needs  Patient is aware this is a billable service  After connecting through [facility], the patient was identified by name and date of birth   John Jimenez was informed that this was a telemedicine visit and that the exam was being conducted confidentially over secure lines  My office door was closed  No one else was in the room  Jacki Dia acknowledged consent and understanding of privacy and security of the telemedicine visit  I informed the patient that I have reviewed her record in Epic and presented the opportunity for her to ask any questions regarding the visit today  The patient agreed to participate  Subjective:   Jacki Dia is a 64 y o  female who is concerned about COVID-19  Patient is currently asymptomatic  Patient's symptoms include nasal congestion and headache  Patient denies fever, chills, fatigue, malaise, rhinorrhea, sore throat, anosmia, loss of taste, cough, shortness of breath, chest tightness, abdominal pain, nausea, vomiting, diarrhea and myalgias  Date of symptom onset: 1/16/2021    Exposure:   Contact with a person who is under investigation (PUI) for or who is positive for COVID-19 within the last 14 days?: No    Hospitalized recently for fever and/or lower respiratory symptoms?: No      Currently a healthcare worker that is involved in direct patient care?: No      Works in a special setting where the risk of COVID-19 transmission may be high? (this may include long-term care, correctional and alf facilities; homeless shelters; assisted-living facilities and group homes ): No      Resident in a special setting where the risk of COVID-19 transmission may be high? (this may include long-term care, correctional and alf facilities; homeless shelters; assisted-living facilities and group homes ): No      Pt shares she has had 3-4 days of facial pain/pressure, sinus pain, ear pain, PND, and ALMARAZ  She has been taking otc acetaminophen, guifenasin, phenylephrine combination for her sx, with no improvement  She shares the head pain/pressure is significant  She did have a sinus infection in December 2020 as well, but she normally does not have issues with recurrent infection  She has no cough, sore throat, fever, SOB   She shares one individual at her work did test positive for covid but she was not directly exposed to them     Lab Results   Component Value Date    SARSCOV2 Not Detected 2020     Past Medical History:   Diagnosis Date    Abdominal pain, epigastric 3/23/2018    Added automatically from request for surgery [id number]    Abnormal x-ray of lumbar spine 11/3/2015    Acute cystitis with hematuria 2020    Bariatric surgery status     Basal cell carcinoma     basil cell carcinoma- in remission    Benign essential hypertension 2012    Breakdown (mechanical) of internal fixation device of vertebrae, initial encounter ([facility] ) 3/1/2019    Calculus of bile duct with cholecystitis without obstruction 2018    Added automatically from request for surgery [id number]    Cellulitis of finger 12/3/2015    Degenerative lumbar disc     Digital mucinous cyst 2015    DMII (diabetes mellitus, type 2) ([facility] ) 2013    Gross hematuria 3/19/2019    Hiatal hernia     History of transfusion     Post-op spinal surgery    Low back pain     Lower urinary tract infectious disease 3/27/2015    Medicare annual wellness visit, initial 2019    Obesity     Resolved 10/6/2016     Postsurgical malabsorption     Recurrent UTI 3/19/2019    Spinal stenosis     [facility]     Tachycardia     Resolved 2016     Type 2 diabetes mellitus ([facility] )     Last assesssed 2018     Wears glasses      Past Surgical History:   Procedure Laterality Date    APPENDECTOMY      ARTHRODESIS      Lumbar - Last assessed 2018    [facility]      Lumbar (3 surgeries)- two levels fused, clean out from infection, then fusion of 7 levels (last surgery in )   [facility]      x2    CERVICAL SPINE SURGERY      Fusion of C2-C7 over a period of 3 surgeries ( first)     SECTION      X2    CHOLECYSTECTOMY      FL MYELOGRAM LUMBAR  3/28/2019    INSERTION / PLACEMENT / REVISION NEUROSTIMULATOR      X2- both were removed    NECK SURGERY      OTHER SURGICAL HISTORY      Catheter ablation     CA EGD TRANSORAL BIOPSY SINGLE/MULTIPLE N/A 9/14/2016    Procedure: ESOPHAGOGASTRODUODENOSCOPY (EGD); Surgeon: Nena Obregon MD;  [facility]; Service: Bariatrics    CA EGD TRANSORAL BIOPSY SINGLE/MULTIPLE N/A 4/18/2018    Procedure: ESOPHAGOGASTRODUODENOSCOPY (EGD) with biopsy;  Surgeon: Nena Obregon MD;  [facility];   Service: Bariatrics    CA LAP GASTRIC BYPASS/BRAULIO-EN-Y N/A 10/25/2016    Procedure: BYPASS GASTRIC  BRAULIO-EN-Y LAPAROSCOPIC, WITH INTRA OP EGD;  Surgeon: Nena Obregon MD;  [facility];  Service: Bariatrics    CA LAP,CHOLECYSTECTOMY N/A 5/14/2018    Procedure: CHOLECYSTECTOMY LAPAROSCOPIC;  Surgeon: Nena Obregon MD;  [facility];  Service: [facility] SKIN CANCER EXCISION      TONSILLECTOMY      TUBAL LIGATION      WISDOM TOOTH EXTRACTION       Current Outpatient Medications   Medication Sig Dispense Refill    amoxicillin-clavulanate (AUGMENTIN) 875-125 mg per tablet Take 1 tablet by mouth every 12 (twelve) hours for 5 days 10 tablet 0    baclofen 10 mg tablet Take 10 mg by mouth daily      buPROPion (WELLBUTRIN) 75 mg tablet Take 1 tablet (75 mg total) by mouth 2 (two) times a day 180 tablet 1    Calcium Citrate-Vitamin D (CALCIUM CITRATE +D) 315-250 MG-UNIT TABS Take 1 tablet by mouth 2 (two) times a day       conjugated estrogens (PREMARIN) vaginal cream Insert 1 g into the vagina 3 (three) times a week 30 g 5    escitalopram (LEXAPRO) 20 mg tablet Take 1 tablet (20 mg total) by mouth every morning 90 tablet 1    fenofibrate (TRIGLIDE) 160 MG tablet Take 1 tablet (160 mg total) by mouth daily 90 tablet 1    fluocinonide (LIDEX) 0 05 % cream Apply 1 application topically 2 (two) times a day To affected area      gabapentin (NEURONTIN) 100 mg capsule TAKE two CAPSULE at HS      HYDROmorphone (DILAUDID) 4 mg tablet TAKE 1 TABLET BY MOUTH EVERY 6 HOURS AS NEEDED    FILL 5/8/2020      loratadine (CLARITIN) 10 mg tablet Take 10 mg by mouth daily   Multiple Vitamins-Minerals (BARIATRIC FUSION PO) Take 1 tablet by mouth daily      NARCAN 4 MG/0 1ML LIQD       omeprazole (PriLOSEC) 20 mg delayed release capsule Take 1 capsule (20 mg total) by mouth daily 90 capsule 1    oxybutynin (DITROPAN) 5 mg tablet TAKE 1 TABLET 3 TIMES A DAY AS NEEDED 30 tablet 1    oxybutynin (DITROPAN-XL) 10 MG 24 hr tablet Take 1 tablet (10 mg total) by mouth daily at bedtime 30 tablet 3    Vitamin D, Cholecalciferol, 400 units TABS Take 1 tablet by mouth daily       No current facility-administered medications for this visit  No Known Allergies    Review of Systems   Constitutional: Negative for chills, fatigue and fever  HENT: Positive for congestion, postnasal drip, sinus pressure and sinus pain  Negative for rhinorrhea and sore throat  Respiratory: Negative for cough, chest tightness and shortness of breath  Gastrointestinal: Negative for abdominal pain, diarrhea, nausea and vomiting  Musculoskeletal: Negative for myalgias  Neurological: Positive for headaches  Objective: There were no vitals filed for this visit  Physical Exam  Vitals signs and nursing note reviewed  Constitutional:       General: She is not in acute distress  Appearance: Normal appearance  She is not ill-appearing  HENT:      Head: Normocephalic and atraumatic  Nose: Congestion present  Eyes:      Conjunctiva/sclera: Conjunctivae normal    Neck:      Musculoskeletal: Normal range of motion  Pulmonary:      Effort: Pulmonary effort is normal  No respiratory distress  Skin:     Coloration: Skin is not pale  Findings: No erythema or rash  Neurological:      Mental Status: She is alert and oriented to person, place, and time     Psychiatric:         Mood and Affect: Mood normal  Behavior: Behavior normal        VIRTUAL VISIT DISCLAIMER    Linnette Borden acknowledges that she has consented to an online visit or consultation  She understands that the online visit is based solely on information provided by her, and that, in the absence of a face-to-face physical evaluation by the physician, the diagnosis she receives is both limited and provisional in terms of accuracy and completeness  This is not intended to replace a full medical face-to-face evaluation by the physician  Linnette Borden understands and accepts these terms

## 2021-01-20 LAB — SARS-COV-2 RNA RESP QL NAA+PROBE: NEGATIVE

## 2021-01-23 DIAGNOSIS — N32.81 OAB (OVERACTIVE BLADDER): ICD-10-CM

## 2021-01-24 RX ORDER — OXYBUTYNIN CHLORIDE 5 MG/1
TABLET ORAL
Qty: 30 TABLET | Refills: 1 | Status: SHIPPED | OUTPATIENT
Start: 2021-01-24 | End: 2021-03-01 | Stop reason: SDUPTHER

## 2021-01-25 RX ORDER — OXYBUTYNIN CHLORIDE 5 MG/1
5 TABLET ORAL 3 TIMES DAILY PRN
Qty: 30 TABLET | Refills: 0 | Status: SHIPPED | OUTPATIENT
Start: 2021-01-25 | End: 2021-03-01 | Stop reason: SDUPTHER

## 2021-01-27 PROBLEM — R12 HEARTBURN: Status: ACTIVE | Noted: 2021-01-27

## 2021-01-27 NOTE — ASSESSMENT & PLAN NOTE
-At risk for malabsorption of vitamins/minerals secondary to malabsorption and restriction of intake from bariatric surgery  -Currently taking adequate postop bariatric surgery vitamin supplementation: Procare MVI with 45mg iron, calcium citrate 500mg BID, takes 5,000IU Vitamin D/day  -Last set of bariatric labs completed in February 2020: ferritin levels trending down, elevated B1, Vitamin D 36  -Next set of bariatric labs ordered for approximately 2 weeks  -Patient received education about the importance of adhering to a lifelong supplementation regimen to avoid vitamin/mineral deficiencies

## 2021-01-27 NOTE — ASSESSMENT & PLAN NOTE
-EGD on 06/12/20 WNL, no noted ulcers or gastritis  -UGI on 06/17/20: WNL, no reflux noted  -She continues with omeprazole 20mg daily   -Advised avoidance of food triggers and gastric irritants, follow anti-reflux diet and lifestyle measures  -Will obtain 24 Hr pH manometry testing and she will f/u with Dr Shaka Prakash for review of results

## 2021-01-27 NOTE — ASSESSMENT & PLAN NOTE
-status post Noé-en-y gastric bypass surgery 10/25/2016 and s/p lap maya on 05/14/18 by Dr Paresh Uribe  She maintaining her weight over the last year  Initial: 233lbs  Current: 163lbs  EWL: 78%  Endy: 149lbs  Current BMI is Body mass index is 28 42 kg/m²  · Tolerating a regular diet-yes  · Eating at least 60 grams of protein per day-yes  · Following 30/60 minute rule with liquids-yes  · Drinking at least 64 ounces of fluid per day-no; drinks mostly coffee - lengthy discussion about decreasing caffeine and increasing water  · Drinking carbonated beverages-no  · Sufficient exercise-no d/t back and neck pain  Advised her to try low impact exercise as tolerated, walking program  · Using NSAIDs regularly-no  · Using nicotine-no  · Using alcohol-no   Advised about the risks of alcohol s/p bariatric surgery and recommend avoiding all alcohol

## 2021-01-29 ENCOUNTER — OFFICE VISIT (OUTPATIENT)
Dept: BARIATRICS | Facility: CLINIC | Age: 61
End: 2021-01-29
Payer: MEDICARE

## 2021-01-29 VITALS
SYSTOLIC BLOOD PRESSURE: 114 MMHG | RESPIRATION RATE: 14 BRPM | HEART RATE: 59 BPM | WEIGHT: 163 LBS | BODY MASS INDEX: 27.83 KG/M2 | DIASTOLIC BLOOD PRESSURE: 70 MMHG | HEIGHT: 64 IN | TEMPERATURE: 97.3 F

## 2021-01-29 DIAGNOSIS — G89.4 CHRONIC PAIN SYNDROME: ICD-10-CM

## 2021-01-29 DIAGNOSIS — R12 HEARTBURN: ICD-10-CM

## 2021-01-29 DIAGNOSIS — E66.3 OVERWEIGHT: ICD-10-CM

## 2021-01-29 DIAGNOSIS — K91.2 POSTGASTRECTOMY MALABSORPTION: ICD-10-CM

## 2021-01-29 DIAGNOSIS — Z48.815 ENCOUNTER FOR SURGICAL AFTERCARE FOLLOWING SURGERY OF DIGESTIVE SYSTEM: Primary | ICD-10-CM

## 2021-01-29 DIAGNOSIS — E78.5 HYPERLIPIDEMIA, UNSPECIFIED HYPERLIPIDEMIA TYPE: ICD-10-CM

## 2021-01-29 DIAGNOSIS — Z90.3 POSTGASTRECTOMY MALABSORPTION: ICD-10-CM

## 2021-01-29 PROCEDURE — 99214 OFFICE O/P EST MOD 30 MIN: CPT | Performed by: PHYSICIAN ASSISTANT

## 2021-01-29 NOTE — PATIENT INSTRUCTIONS
GOALS:   · Continued/Maintain healthy weight loss with good nutrition intakes  · Adequate hydration with at least 64oz  fluid intake  · Normal vitamin and mineral levels  · Exercise as tolerated  · Choose procare MVI with iron (we can be more specific with the amount after we get updated labs)  · Increase water and decrease coffee    · Follow-up in 1 year  We kindly ask that your arrive 15 minutes before your scheduled appointment time with your provider to allow our staff to room you, get your vital signs and update your chart  · Follow diet as discussed  · Get lab work done in the next 2 weeks  You have been given a lab slip today  Please call the office if you need a replacement  It is recommended to check with your insurance BEFORE getting labs done to make sure they are covered by your policy  Also, please check with your PCP and other providers before getting labs to avoid duplicate labs  Make sure to HOLD any multivitamins that may contain biotin and any biotin supplements FOR 5 DAYS before any labs since it can affect the results  · Follow vitamin and mineral recommendations as reviewed with you  · Call our office if you have any problems with abdominal pain especially associated with fever, chills, nausea, vomiting or any other concerns  · All  Post-bariatric surgery patients should be aware that very small quantities of any alcohol can cause impairment and it is very possible not to feel the effect  The effect can be in the system for several hours  It is also a stomach irritant  · It is advised to AVOID alcohol, Nonsteroidal antiinflammatory drugs (NSAIDS) and nicotine of all forms   Any of these can cause stomach irritation/pain

## 2021-01-29 NOTE — PROGRESS NOTES
Assessment/Plan:    Encounter for surgical aftercare following surgery of digestive system  -status post Noé-en-y gastric bypass surgery 10/25/2016 and s/p lap maya on 05/14/18 by Dr Debbie Reyna  She maintaining her weight over the last year  Initial: 233lbs  Current: 163lbs  EWL: 78%  Endy: 149lbs  Current BMI is Body mass index is 28 42 kg/m²  · Tolerating a regular diet-yes  · Eating at least 60 grams of protein per day-yes  · Following 30/60 minute rule with liquids-yes  · Drinking at least 64 ounces of fluid per day-no; drinks mostly coffee - lengthy discussion about decreasing caffeine and increasing water  · Drinking carbonated beverages-no  · Sufficient exercise-no d/t back and neck pain  Advised her to try low impact exercise as tolerated, walking program  · Using NSAIDs regularly-no  · Using nicotine-no  · Using alcohol-no   Advised about the risks of alcohol s/p bariatric surgery and recommend avoiding all alcohol    Heartburn  -EGD on 06/12/20 WNL, no noted ulcers or gastritis  -UGI on 06/17/20: WNL, no reflux noted  -She continues with omeprazole 20mg daily   -Advised avoidance of food triggers and gastric irritants, follow anti-reflux diet and lifestyle measures  -Will obtain 24 Hr pH manometry testing and she will f/u with Dr Debbie Reyna for review of results       Postgastrectomy malabsorption  -At risk for malabsorption of vitamins/minerals secondary to malabsorption and restriction of intake from bariatric surgery  -Currently taking adequate postop bariatric surgery vitamin supplementation: Procare MVI with 45mg iron, calcium citrate 500mg BID, takes 5,000IU Vitamin D/day  -Last set of bariatric labs completed in February 2020: ferritin levels trending down, elevated B1, Vitamin D 36  -Next set of bariatric labs ordered for approximately 2 weeks  -Patient received education about the importance of adhering to a lifelong supplementation regimen to avoid vitamin/mineral deficiencies     Chronic pain syndrome  -Avoids NSAIDS  -On Baclofen, Dilaudid, Gabapentin    Hyperlipemia  -Last noted lipid panel January 2019 - WNL  -On Fenofibrate  -Repeat lipid panel       Diagnoses and all orders for this visit:    Encounter for surgical aftercare following surgery of digestive system    Heartburn    Postgastrectomy malabsorption  -     CBC and differential; Future  -     Comprehensive metabolic panel; Future  -     Folate; Future  -     Iron Panel (Includes Ferritin, Iron Sat%, Iron, and TIBC); Future  -     Zinc; Future  -     Vitamin D 25 hydroxy; Future  -     Vitamin B12; Future  -     Vitamin A; Future  -     Vitamin B1, whole blood; Future  -     PTH, intact; Future    Chronic pain syndrome    Hyperlipidemia, unspecified hyperlipidemia type    Overweight  -     CBC and differential; Future  -     Comprehensive metabolic panel; Future  -     Folate; Future  -     Iron Panel (Includes Ferritin, Iron Sat%, Iron, and TIBC); Future  -     Zinc; Future  -     Vitamin D 25 hydroxy; Future  -     Vitamin B12; Future  -     Vitamin A; Future  -     Vitamin B1, whole blood; Future  -     PTH, intact; Future          Subjective:      Patient ID: Lizeth Jones is a 64 y o  female     -status post Noé-en-y gastric bypass surgery 10/25/2016 and s/p lap maya on 05/14/18 by Dr Mario Belle  Presents to the office today for routine follow up  Tolerating diet without issues; denies N/V, dysphagia, no longer having issues with reflux  Overall doing very well maintaining her weight over the last year  Still drinks excessive coffee and limited water      Diet Recall:   B - Greek yogurt and banana  L - lean cuisine   D - green salad with ground meat and cheese  Snacks - wheat thins or cheese crackers    Fluids - 7 x 12oz cups coffee with creamer, 8 oz water    The following portions of the patient's history were reviewed and updated as appropriate: allergies, current medications, past family history, past medical history, past social history, past surgical history and problem list     Review of Systems   Constitutional: Negative for chills, fever and unexpected weight change  HENT: Negative for trouble swallowing  Respiratory: Negative for cough and shortness of breath  Cardiovascular: Negative for chest pain and palpitations  Gastrointestinal: Negative for abdominal pain, constipation, diarrhea, nausea and vomiting  Musculoskeletal: Positive for arthralgias, back pain and neck stiffness  Neurological: Negative for dizziness  Psychiatric/Behavioral:        Denies anxiety and depression         Objective:      /70 (BP Location: Right arm, Patient Position: Sitting, Cuff Size: Large)   Pulse 59   Temp (!) 97 3 °F (36 3 °C) (Temporal)   Resp 14   Ht 5' 3 5" (1 613 m)   Wt 73 9 kg (163 lb)   BMI 28 42 kg/m²     Colonoscopy-completed cologuard 6 months ago       Physical Exam  Vitals signs reviewed  Constitutional:       General: She is not in acute distress  Appearance: She is well-developed  HENT:      Head: Normocephalic and atraumatic  Eyes:      General: No scleral icterus  Cardiovascular:      Rate and Rhythm: Normal rate and regular rhythm  Pulmonary:      Effort: Pulmonary effort is normal  No respiratory distress  Abdominal:      General: There is no distension  Palpations: Abdomen is soft  Tenderness: There is no abdominal tenderness  Comments: No incisional hernias appreciated   Skin:     General: Skin is warm and dry  Neurological:      Mental Status: She is alert and oriented to person, place, and time  Psychiatric:         Mood and Affect: Mood normal          Behavior: Behavior normal            BARRIERS: none identified    GOALS:   · Continued/Maintain healthy weight loss with good nutrition intakes  · Adequate hydration with at least 64oz  fluid intake  · Normal vitamin and mineral levels  · Exercise as tolerated    · Choose procare MVI with iron (we can be more specific with the amount after we get updated labs)  · Increase water and decrease coffee    · Follow-up in 1 year  We kindly ask that your arrive 15 minutes before your scheduled appointment time with your provider to allow our staff to room you, get your vital signs and update your chart  · Follow diet as discussed  · Get lab work done in the next 2 weeks  You have been given a lab slip today  Please call the office if you need a replacement  It is recommended to check with your insurance BEFORE getting labs done to make sure they are covered by your policy  Also, please check with your PCP and other providers before getting labs to avoid duplicate labs  Make sure to HOLD any multivitamins that may contain biotin and any biotin supplements FOR 5 DAYS before any labs since it can affect the results  · Follow vitamin and mineral recommendations as reviewed with you  · Call our office if you have any problems with abdominal pain especially associated with fever, chills, nausea, vomiting or any other concerns  · All  Post-bariatric surgery patients should be aware that very small quantities of any alcohol can cause impairment and it is very possible not to feel the effect  The effect can be in the system for several hours  It is also a stomach irritant  · It is advised to AVOID alcohol, Nonsteroidal antiinflammatory drugs (NSAIDS) and nicotine of all forms   Any of these can cause stomach irritation/pain

## 2021-02-06 ENCOUNTER — LAB (OUTPATIENT)
Dept: LAB | Facility: CLINIC | Age: 61
End: 2021-02-06
Payer: MEDICARE

## 2021-02-06 DIAGNOSIS — Z11.4 SCREENING FOR HIV (HUMAN IMMUNODEFICIENCY VIRUS): ICD-10-CM

## 2021-02-06 DIAGNOSIS — K91.2 POSTGASTRECTOMY MALABSORPTION: ICD-10-CM

## 2021-02-06 DIAGNOSIS — Z11.59 ENCOUNTER FOR HEPATITIS C SCREENING TEST FOR LOW RISK PATIENT: ICD-10-CM

## 2021-02-06 DIAGNOSIS — Z90.3 POSTGASTRECTOMY MALABSORPTION: ICD-10-CM

## 2021-02-06 DIAGNOSIS — Z48.815 ENCOUNTER FOR SURGICAL AFTERCARE FOLLOWING SURGERY OF DIGESTIVE SYSTEM: ICD-10-CM

## 2021-02-06 DIAGNOSIS — E66.3 OVERWEIGHT: ICD-10-CM

## 2021-02-06 DIAGNOSIS — E78.5 HYPERLIPIDEMIA, UNSPECIFIED HYPERLIPIDEMIA TYPE: ICD-10-CM

## 2021-02-06 LAB
25(OH)D3 SERPL-MCNC: 32.8 NG/ML (ref 30–100)
ALBUMIN SERPL BCP-MCNC: 4.2 G/DL (ref 3.5–5)
ALP SERPL-CCNC: 52 U/L (ref 46–116)
ALT SERPL W P-5'-P-CCNC: 22 U/L (ref 12–78)
ANION GAP SERPL CALCULATED.3IONS-SCNC: 7 MMOL/L (ref 4–13)
AST SERPL W P-5'-P-CCNC: 34 U/L (ref 5–45)
BASOPHILS # BLD AUTO: 0.08 THOUSANDS/ΜL (ref 0–0.1)
BASOPHILS NFR BLD AUTO: 2 % (ref 0–1)
BILIRUB SERPL-MCNC: 0.33 MG/DL (ref 0.2–1)
BUN SERPL-MCNC: 9 MG/DL (ref 5–25)
CALCIUM SERPL-MCNC: 9.6 MG/DL (ref 8.3–10.1)
CHLORIDE SERPL-SCNC: 107 MMOL/L (ref 100–108)
CHOLEST SERPL-MCNC: 166 MG/DL (ref 50–200)
CO2 SERPL-SCNC: 28 MMOL/L (ref 21–32)
CREAT SERPL-MCNC: 0.86 MG/DL (ref 0.6–1.3)
EOSINOPHIL # BLD AUTO: 0.12 THOUSAND/ΜL (ref 0–0.61)
EOSINOPHIL NFR BLD AUTO: 3 % (ref 0–6)
ERYTHROCYTE [DISTWIDTH] IN BLOOD BY AUTOMATED COUNT: 12.3 % (ref 11.6–15.1)
FERRITIN SERPL-MCNC: 23 NG/ML (ref 8–388)
FOLATE SERPL-MCNC: >20 NG/ML (ref 3.1–17.5)
GFR SERPL CREATININE-BSD FRML MDRD: 75 ML/MIN/1.73SQ M
GLUCOSE P FAST SERPL-MCNC: 91 MG/DL (ref 65–99)
HCT VFR BLD AUTO: 38.8 % (ref 34.8–46.1)
HDLC SERPL-MCNC: 64 MG/DL
HGB BLD-MCNC: 12.9 G/DL (ref 11.5–15.4)
IMM GRANULOCYTES # BLD AUTO: 0 THOUSAND/UL (ref 0–0.2)
IMM GRANULOCYTES NFR BLD AUTO: 0 % (ref 0–2)
IRON SATN MFR SERPL: 23 %
IRON SERPL-MCNC: 97 UG/DL (ref 50–170)
LDLC SERPL CALC-MCNC: 83 MG/DL (ref 0–100)
LYMPHOCYTES # BLD AUTO: 2.06 THOUSANDS/ΜL (ref 0.6–4.47)
LYMPHOCYTES NFR BLD AUTO: 44 % (ref 14–44)
MCH RBC QN AUTO: 29.9 PG (ref 26.8–34.3)
MCHC RBC AUTO-ENTMCNC: 33.2 G/DL (ref 31.4–37.4)
MCV RBC AUTO: 90 FL (ref 82–98)
MONOCYTES # BLD AUTO: 0.35 THOUSAND/ΜL (ref 0.17–1.22)
MONOCYTES NFR BLD AUTO: 8 % (ref 4–12)
NEUTROPHILS # BLD AUTO: 1.99 THOUSANDS/ΜL (ref 1.85–7.62)
NEUTS SEG NFR BLD AUTO: 43 % (ref 43–75)
NONHDLC SERPL-MCNC: 102 MG/DL
NRBC BLD AUTO-RTO: 0 /100 WBCS
PLATELET # BLD AUTO: 360 THOUSANDS/UL (ref 149–390)
PMV BLD AUTO: 10 FL (ref 8.9–12.7)
POTASSIUM SERPL-SCNC: 4 MMOL/L (ref 3.5–5.3)
PROT SERPL-MCNC: 7.5 G/DL (ref 6.4–8.2)
PTH-INTACT SERPL-MCNC: 28.8 PG/ML (ref 18.4–80.1)
RBC # BLD AUTO: 4.32 MILLION/UL (ref 3.81–5.12)
SODIUM SERPL-SCNC: 142 MMOL/L (ref 136–145)
TIBC SERPL-MCNC: 424 UG/DL (ref 250–450)
TRIGL SERPL-MCNC: 93 MG/DL
VIT B12 SERPL-MCNC: 1536 PG/ML (ref 100–900)
WBC # BLD AUTO: 4.6 THOUSAND/UL (ref 4.31–10.16)

## 2021-02-06 PROCEDURE — 36415 COLL VENOUS BLD VENIPUNCTURE: CPT

## 2021-02-06 PROCEDURE — 80061 LIPID PANEL: CPT

## 2021-02-06 PROCEDURE — 82746 ASSAY OF FOLIC ACID SERUM: CPT

## 2021-02-06 PROCEDURE — 83550 IRON BINDING TEST: CPT

## 2021-02-06 PROCEDURE — 84630 ASSAY OF ZINC: CPT

## 2021-02-06 PROCEDURE — 80053 COMPREHEN METABOLIC PANEL: CPT

## 2021-02-06 PROCEDURE — 82607 VITAMIN B-12: CPT

## 2021-02-06 PROCEDURE — 84425 ASSAY OF VITAMIN B-1: CPT

## 2021-02-06 PROCEDURE — 86803 HEPATITIS C AB TEST: CPT

## 2021-02-06 PROCEDURE — 82728 ASSAY OF FERRITIN: CPT

## 2021-02-06 PROCEDURE — 85025 COMPLETE CBC W/AUTO DIFF WBC: CPT

## 2021-02-06 PROCEDURE — 87389 HIV-1 AG W/HIV-1&-2 AB AG IA: CPT

## 2021-02-06 PROCEDURE — 83970 ASSAY OF PARATHORMONE: CPT

## 2021-02-06 PROCEDURE — 83540 ASSAY OF IRON: CPT

## 2021-02-06 PROCEDURE — 84590 ASSAY OF VITAMIN A: CPT

## 2021-02-06 PROCEDURE — 82306 VITAMIN D 25 HYDROXY: CPT

## 2021-02-07 LAB — HCV AB SER QL: NORMAL

## 2021-02-08 LAB — HIV 1+2 AB+HIV1 P24 AG SERPL QL IA: NORMAL

## 2021-02-09 ENCOUNTER — TELEPHONE (OUTPATIENT)
Dept: BARIATRICS | Facility: CLINIC | Age: 61
End: 2021-02-09

## 2021-02-09 DIAGNOSIS — R79.0 LOW FERRITIN: ICD-10-CM

## 2021-02-09 DIAGNOSIS — Z90.3 POSTGASTRECTOMY MALABSORPTION: Primary | ICD-10-CM

## 2021-02-09 DIAGNOSIS — K91.2 POSTGASTRECTOMY MALABSORPTION: Primary | ICD-10-CM

## 2021-02-09 NOTE — TELEPHONE ENCOUNTER
Please advise patient of labs from 02/06/21:     -Elevated folate, B1, and B12 - safe and ok    -Ferritin dropped from 57 to 23, iron WNL  Completed cologuard 6 months ago per PCP  Continue with James MVI with 45mg iron and add in 1 additional Vitron C daily   Repeat iron panel in about 5 months    -Vitamin D low normal (32) - continue with James MVI and additional 5,000IU daily per her current protocol    -Rest of vitamins WNL

## 2021-02-11 LAB — ZINC SERPL-MCNC: 82 UG/DL (ref 44–115)

## 2021-02-12 LAB — VIT B1 BLD-SCNC: 206.7 NMOL/L (ref 66.5–200)

## 2021-02-13 LAB — VIT A SERPL-MCNC: 47.7 UG/DL (ref 20.1–62)

## 2021-02-15 NOTE — TELEPHONE ENCOUNTER
Relayed lab results and PA recommendations to patient, who verbalized understanding  Per patient's request, I [facility] slip [facility]

## 2021-02-26 ENCOUNTER — APPOINTMENT (OUTPATIENT)
Dept: RADIOLOGY | Facility: CLINIC | Age: 61
End: 2021-02-26
Payer: MEDICARE

## 2021-02-26 ENCOUNTER — TRANSCRIBE ORDERS (OUTPATIENT)
Dept: ADMINISTRATIVE | Facility: HOSPITAL | Age: 61
End: 2021-02-26

## 2021-02-26 DIAGNOSIS — K21.9 GERD (GASTROESOPHAGEAL REFLUX DISEASE): ICD-10-CM

## 2021-02-26 DIAGNOSIS — M54.16 LUMBAR RADICULOPATHY: ICD-10-CM

## 2021-02-26 DIAGNOSIS — N32.81 OAB (OVERACTIVE BLADDER): ICD-10-CM

## 2021-02-26 DIAGNOSIS — M54.16 LUMBAR RADICULOPATHY: Primary | ICD-10-CM

## 2021-02-26 PROCEDURE — 72100 X-RAY EXAM L-S SPINE 2/3 VWS: CPT

## 2021-02-26 RX ORDER — OXYBUTYNIN CHLORIDE 5 MG/1
5 TABLET ORAL 3 TIMES DAILY PRN
Qty: 30 TABLET | Refills: 0 | Status: CANCELLED | OUTPATIENT
Start: 2021-02-26

## 2021-03-01 DIAGNOSIS — N32.81 OAB (OVERACTIVE BLADDER): ICD-10-CM

## 2021-03-01 RX ORDER — OXYBUTYNIN CHLORIDE 5 MG/1
5 TABLET ORAL 3 TIMES DAILY PRN
Qty: 270 TABLET | Refills: 1 | Status: SHIPPED | OUTPATIENT
Start: 2021-03-01 | End: 2021-09-08 | Stop reason: SDUPTHER

## 2021-03-01 RX ORDER — OMEPRAZOLE 20 MG/1
CAPSULE, DELAYED RELEASE ORAL
Qty: 90 CAPSULE | Refills: 1 | Status: SHIPPED | OUTPATIENT
Start: 2021-03-01 | End: 2021-08-26

## 2021-03-01 NOTE — TELEPHONE ENCOUNTER
----- Message from Keshawn Brown sent at 2/26/2021  7:16 PM EST -----  Regarding: Prescription Question  Contact: [phone]  I need a refill script for my Oxybutynin 5 mg  I am currently taking 1 tablet 3x a day  My prescriptions have only been for 30 which lasts 10 days  I would like if possible to get a script for the full month or if you can do a 3 month supply at a time that would be even better  It would be very cost effective for me  I have to pay a co-pay every 10 days  And regardless of the quantity my co-pay would be the same  If there is any change and I don't need to take them 3 times a day I will be sure to notify the office immediately to change it  There are no refills left and I have enough for a few days  They really do help the spasms that feel like a UTI  I am so thankful for that! If there are any questions, please feel free to call me at [phone]  Thank you       Odalys Alcantar

## 2021-03-01 NOTE — TELEPHONE ENCOUNTER
The patient was last seen on 8/27/20 by WARREN Farris in the [place] location; continuation of the medication was authorized at that time    Request for same, 90 day supply with 1 refill was queued and forwarded to the Advanced Practitioner covering the [place] location for approval

## 2021-03-12 ENCOUNTER — TELEMEDICINE (OUTPATIENT)
Dept: FAMILY MEDICINE CLINIC | Facility: CLINIC | Age: 61
End: 2021-03-12
Payer: MEDICARE

## 2021-03-12 DIAGNOSIS — B34.9 VIRAL INFECTION, UNSPECIFIED: ICD-10-CM

## 2021-03-12 DIAGNOSIS — B34.9 VIRAL INFECTION, UNSPECIFIED: Primary | ICD-10-CM

## 2021-03-12 PROCEDURE — U0003 INFECTIOUS AGENT DETECTION BY NUCLEIC ACID (DNA OR RNA); SEVERE ACUTE RESPIRATORY SYNDROME CORONAVIRUS 2 (SARS-COV-2) (CORONAVIRUS DISEASE [COVID-19]), AMPLIFIED PROBE TECHNIQUE, MAKING USE OF HIGH THROUGHPUT TECHNOLOGIES AS DESCRIBED BY CMS-2020-01-R: HCPCS | Performed by: PHYSICIAN ASSISTANT

## 2021-03-12 PROCEDURE — U0005 INFEC AGEN DETEC AMPLI PROBE: HCPCS | Performed by: PHYSICIAN ASSISTANT

## 2021-03-12 PROCEDURE — 99214 OFFICE O/P EST MOD 30 MIN: CPT | Performed by: PHYSICIAN ASSISTANT

## 2021-03-12 NOTE — PROGRESS NOTES
COVID-19 Virtual Visit     Assessment/Plan:    Problem List Items Addressed This Visit     None      Visit Diagnoses     Viral infection, unspecified    -  Primary    Relevant Orders    Novel Coronavirus (Covid-19),PCR [facility] - Collected at Mobile [facility] or [facility]         Disposition:     I referred patient to one of our centralized sites for a COVID-19 swab  Sx x 2-3 days, possibly allergic in etiology, r/o viral covid, supportive care, quarantine protocol, follow up with acute worsening     I have spent 10 minutes directly with the patient  Encounter provider Porfirio Coelho PA-C    Provider located at [place] [address]  [address] [place] [id number]    Recent Visits  No visits were found meeting these conditions  Showing recent visits within past 7 days and meeting all other requirements     Today's Visits  Date Type Provider Dept   03/12/21 Telemedicine Yelena Calero PA-C [facility]   Showing today's visits and meeting all other requirements     Future Appointments  No visits were found meeting these conditions  Showing future appointments within next 150 days and meeting all other requirements      This virtual check-in was done via "[employer] and patient was informed that this is not a secure, HIPAA-compliant platform  She agrees to proceed  Patient agrees to participate in a virtual check in via telephone or video visit instead of presenting to the office to address urgent/immediate medical needs  Patient is aware this is a billable service  After connecting through [facility], the patient was identified by name and date of birth  Karla Bennett was informed that this was a telemedicine visit and that the exam was being conducted confidentially over secure lines  My office door was closed  No one else was in the room  Karla Bennett acknowledged consent and understanding of privacy and security of the telemedicine visit   I informed the patient that I have reviewed her record in Epic and presented the opportunity for her to ask any questions regarding the visit today  The patient agreed to participate  Subjective:   Amadeo Chris is a 64 y o  female who is concerned about COVID-19  Patient is currently asymptomatic  Patient's symptoms include nasal congestion, sore throat and cough (from pnd)  Patient denies fever, chills, fatigue, malaise, rhinorrhea, anosmia, loss of taste, shortness of breath, chest tightness, abdominal pain, nausea, vomiting, diarrhea, myalgias and headaches  Date of symptom onset: 3/9/2021    Exposure:   Contact with a person who is under investigation (PUI) for or who is positive for COVID-19 within the last 14 days?: No    Hospitalized recently for fever and/or lower respiratory symptoms?: No      Currently a healthcare worker that is involved in direct patient care?: No      Works in a special setting where the risk of COVID-19 transmission may be high? (this may include long-term care, correctional and correction facilities; homeless shelters; assisted-living facilities and group homes ): No      Resident in a special setting where the risk of COVID-19 transmission may be high? (this may include long-term care, correctional and correction facilities; homeless shelters; assisted-living facilities and group homes ): No      Sx as above x 2-3 days, no known exposures, no fevers or SOB   Unsure if this is her allergies or not    Lab Results   Component Value Date    SARSCOV2 Negative 01/19/2021    SARSCOV2 Not Detected 12/14/2020     Past Medical History:   Diagnosis Date    Abdominal pain, epigastric 3/23/2018    Added automatically from request for surgery [id number]    Abnormal x-ray of lumbar spine 11/3/2015    Acute cystitis with hematuria 4/7/2020    Bariatric surgery status     Basal cell carcinoma     basil cell carcinoma- in remission    Benign essential hypertension 6/12/2012    Breakdown (mechanical) of internal fixation device of vertebrae, initial encounter ([facility] ) 3/1/2019    Calculus of bile duct with cholecystitis without obstruction 2018    Added automatically from request for surgery [id number]    Cellulitis of finger 12/3/2015    Degenerative lumbar disc     Digital mucinous cyst 2015    DMII (diabetes mellitus, type 2) ([facility] [address] ) 2013    Gross hematuria 3/19/2019    Hiatal hernia     History of transfusion     Post-op spinal surgery    Low back pain     Lower urinary tract infectious disease 3/27/2015    Medicare annual wellness visit, initial 2019    Obesity     Resolved 10/6/2016     Overactive bladder     Postsurgical malabsorption     Recurrent UTI 3/19/2019    Spinal stenosis     [facility]     Tachycardia     Resolved 2016     Type 2 diabetes mellitus ([facility] [address] )     Last assesssed 2018     Wears glasses      Past Surgical History:   Procedure Laterality Date    APPENDECTOMY      ARTHRODESIS      Lumbar - Last assessed 2018    [place] [facility] BACK SURGERY      Lumbar (3 surgeries)- two levels fused, clean out from infection, then fusion of 7 levels (last surgery in )   [facility]      x2    CERVICAL SPINE SURGERY      Fusion of C2-C7 over a period of 3 surgeries ( first)     SECTION      X2    CHOLECYSTECTOMY      FL MYELOGRAM LUMBAR  3/28/2019    INSERTION / PLACEMENT / REVISION NEUROSTIMULATOR      X2- both were removed    NECK SURGERY      OTHER SURGICAL HISTORY      Catheter ablation     TN EGD TRANSORAL BIOPSY SINGLE/MULTIPLE N/A 2016    Procedure: ESOPHAGOGASTRODUODENOSCOPY (EGD); Surgeon: Shantell Flor MD;  Location: [facility]; Service: Bariatrics    TN EGD TRANSORAL BIOPSY SINGLE/MULTIPLE N/A 2018    Procedure: ESOPHAGOGASTRODUODENOSCOPY (EGD) with biopsy;  Surgeon: Shantell Flro MD;  [facility]: [facility];   Service: [place] LAP GASTRIC BYPASS/BRAULIO-EN-Y N/A 10/25/2016    Procedure: BYPASS GASTRIC  BRAULIO-EN-Y LAPAROSCOPIC, WITH INTRA OP EGD;  Surgeon: Ama Moody MD;  [facility];  Service: Bariatrics    AL LAP,CHOLECYSTECTOMY N/A 5/14/2018    Procedure: CHOLECYSTECTOMY LAPAROSCOPIC;  Surgeon: Ama Moody MD;  [facility];  Service: Bariatrics    SKIN CANCER EXCISION      TONSILLECTOMY      TUBAL LIGATION      WISDOM TOOTH EXTRACTION       Current Outpatient Medications   Medication Sig Dispense Refill    baclofen 10 mg tablet Take 10 mg by mouth daily      buPROPion (WELLBUTRIN) 75 mg tablet Take 1 tablet (75 mg total) by mouth 2 (two) times a day 180 tablet 1    Calcium Citrate-Vitamin D (CALCIUM CITRATE +D) 315-250 MG-UNIT TABS Take 1 tablet by mouth 2 (two) times a day       conjugated estrogens (PREMARIN) vaginal cream Insert 1 g into the vagina 3 (three) times a week 30 g 5    escitalopram (LEXAPRO) 20 mg tablet Take 1 tablet (20 mg total) by mouth every morning 90 tablet 1    fenofibrate (TRIGLIDE) 160 MG tablet Take 1 tablet (160 mg total) by mouth daily 90 tablet 1    fluocinonide (LIDEX) 0 05 % cream Apply 1 application topically 2 (two) times a day To affected area      gabapentin (NEURONTIN) 100 mg capsule TAKE two CAPSULE at HS      HYDROmorphone (DILAUDID) 4 mg tablet TAKE 1 TABLET BY MOUTH EVERY 6 HOURS AS NEEDED    FILL 5/8/2020      loratadine (CLARITIN) 10 mg tablet Take 10 mg by mouth daily        Multiple Vitamins-Minerals (BARIATRIC FUSION PO) Take 1 tablet by mouth daily      NARCAN 4 MG/0 1ML LIQD       omeprazole (PriLOSEC) 20 mg delayed release capsule TAKE 1 CAPSULE BY MOUTH EVERY DAY 90 capsule 1    oxybutynin (DITROPAN) 5 mg tablet Take 1 tablet (5 mg total) by mouth 3 (three) times a day as needed (bladder spasms) 270 tablet 1    Vitamin D, Cholecalciferol, 400 units TABS Take 1 tablet by mouth daily       No current facility-administered medications for this visit  No Known Allergies    Review of Systems   Constitutional: Negative for chills, fatigue and fever  HENT: Positive for congestion, postnasal drip, sore throat and voice change  Negative for rhinorrhea  Respiratory: Positive for cough (from pnd)  Negative for chest tightness and shortness of breath  Gastrointestinal: Negative for abdominal pain, diarrhea, nausea and vomiting  Musculoskeletal: Negative for myalgias  Neurological: Negative for headaches  Objective: There were no vitals filed for this visit  Physical Exam  Vitals signs and nursing note reviewed  Constitutional:       General: She is not in acute distress  Appearance: Normal appearance  She is not ill-appearing  HENT:      Head: Normocephalic and atraumatic  Nose: Nose normal    Eyes:      Conjunctiva/sclera: Conjunctivae normal    Neck:      Musculoskeletal: Normal range of motion  Pulmonary:      Effort: Pulmonary effort is normal  No respiratory distress  Skin:     Coloration: Skin is not pale  Findings: No erythema or rash  Neurological:      Mental Status: She is alert and oriented to person, place, and time  Psychiatric:         Mood and Affect: Mood normal        VIRTUAL VISIT DISCLAIMER    Sandy oJy acknowledges that she has consented to an online visit or consultation  She understands that the online visit is based solely on information provided by her, and that, in the absence of a face-to-face physical evaluation by the physician, the diagnosis she receives is both limited and provisional in terms of accuracy and completeness  This is not intended to replace a full medical face-to-face evaluation by the physician  Sandy Joy understands and accepts these terms

## 2021-03-14 LAB — SARS-COV-2 RNA RESP QL NAA+PROBE: NEGATIVE

## 2021-03-16 ENCOUNTER — TELEPHONE (OUTPATIENT)
Dept: FAMILY MEDICINE CLINIC | Facility: CLINIC | Age: 61
End: 2021-03-16

## 2021-03-16 DIAGNOSIS — R11.0 NAUSEA: Primary | ICD-10-CM

## 2021-03-16 RX ORDER — ONDANSETRON 4 MG/1
4 TABLET, FILM COATED ORAL EVERY 8 HOURS PRN
Qty: 15 TABLET | Refills: 0 | Status: SHIPPED | OUTPATIENT
Start: 2021-03-16 | End: 2021-05-05 | Stop reason: SDUPTHER

## 2021-03-16 NOTE — TELEPHONE ENCOUNTER
Patient LM at 10:21am asking for anti-nausea medication  I called her back and her COVID trest is negative and she just doesn't have an appetite and feels sick to her stomach      She uses [facility] in [place]    Please advise

## 2021-03-17 ENCOUNTER — TELEPHONE (OUTPATIENT)
Dept: FAMILY MEDICINE CLINIC | Facility: CLINIC | Age: 61
End: 2021-03-17

## 2021-03-17 DIAGNOSIS — J01.90 ACUTE NON-RECURRENT SINUSITIS, UNSPECIFIED LOCATION: Primary | ICD-10-CM

## 2021-03-17 RX ORDER — AMOXICILLIN AND CLAVULANATE POTASSIUM 875; 125 MG/1; MG/1
1 TABLET, FILM COATED ORAL EVERY 12 HOURS SCHEDULED
Qty: 14 TABLET | Refills: 0 | Status: SHIPPED | OUTPATIENT
Start: 2021-03-17 | End: 2021-03-24

## 2021-03-17 NOTE — TELEPHONE ENCOUNTER
Just like with any antibiotic, can cause GI upset -- can eat yogurt or take a probiotic with it  Thanks!

## 2021-03-17 NOTE — TELEPHONE ENCOUNTER
Will send in Augmentin  If no improvement in the next few days, she should come in to the office for re-evaluation  Thanks!

## 2021-03-17 NOTE — TELEPHONE ENCOUNTER
Pt had a virtual with Tray Desai on 03/12  She tested negative for COVID  Pt still has a headache and sinus congestion  She wants to know if you will send an antibiotic to the pharmacy   Thank you

## 2021-04-13 DIAGNOSIS — Z23 ENCOUNTER FOR IMMUNIZATION: ICD-10-CM

## 2021-04-23 DIAGNOSIS — F32.5 MAJOR DEPRESSIVE DISORDER WITH SINGLE EPISODE, IN FULL REMISSION (HCC): ICD-10-CM

## 2021-04-23 RX ORDER — ESCITALOPRAM OXALATE 20 MG/1
TABLET ORAL
Qty: 90 TABLET | Refills: 1 | Status: SHIPPED | OUTPATIENT
Start: 2021-04-23 | End: 2021-10-15

## 2021-04-24 DIAGNOSIS — E78.5 HYPERLIPIDEMIA, UNSPECIFIED HYPERLIPIDEMIA TYPE: ICD-10-CM

## 2021-04-26 RX ORDER — FENOFIBRATE 160 MG/1
TABLET ORAL
Qty: 90 TABLET | Refills: 1 | Status: SHIPPED | OUTPATIENT
Start: 2021-04-26 | End: 2021-10-15

## 2021-04-28 DIAGNOSIS — G89.4 CHRONIC PAIN SYNDROME: ICD-10-CM

## 2021-04-28 RX ORDER — BUPROPION HYDROCHLORIDE 75 MG/1
TABLET ORAL
Qty: 180 TABLET | Refills: 1 | Status: SHIPPED | OUTPATIENT
Start: 2021-04-28 | End: 2021-10-22

## 2021-05-05 DIAGNOSIS — R11.0 NAUSEA: ICD-10-CM

## 2021-05-06 RX ORDER — ONDANSETRON 4 MG/1
4 TABLET, FILM COATED ORAL EVERY 8 HOURS PRN
Qty: 15 TABLET | Refills: 0 | Status: SHIPPED | OUTPATIENT
Start: 2021-05-06 | End: 2021-07-19

## 2021-06-24 ENCOUNTER — OFFICE VISIT (OUTPATIENT)
Dept: OBGYN CLINIC | Facility: MEDICAL CENTER | Age: 61
End: 2021-06-24
Payer: MEDICARE

## 2021-06-24 ENCOUNTER — APPOINTMENT (OUTPATIENT)
Dept: RADIOLOGY | Facility: MEDICAL CENTER | Age: 61
End: 2021-06-24
Payer: MEDICARE

## 2021-06-24 VITALS
SYSTOLIC BLOOD PRESSURE: 120 MMHG | WEIGHT: 160 LBS | HEART RATE: 56 BPM | DIASTOLIC BLOOD PRESSURE: 77 MMHG | BODY MASS INDEX: 27.31 KG/M2 | HEIGHT: 64 IN

## 2021-06-24 DIAGNOSIS — M25.551 PAIN IN RIGHT HIP: ICD-10-CM

## 2021-06-24 DIAGNOSIS — M25.551 PAIN IN RIGHT HIP: Primary | ICD-10-CM

## 2021-06-24 PROCEDURE — 73502 X-RAY EXAM HIP UNI 2-3 VIEWS: CPT

## 2021-06-24 PROCEDURE — 20610 DRAIN/INJ JOINT/BURSA W/O US: CPT | Performed by: PHYSICAL MEDICINE & REHABILITATION

## 2021-06-24 PROCEDURE — 99214 OFFICE O/P EST MOD 30 MIN: CPT | Performed by: PHYSICAL MEDICINE & REHABILITATION

## 2021-06-24 RX ORDER — LIDOCAINE HYDROCHLORIDE 10 MG/ML
3 INJECTION, SOLUTION INFILTRATION; PERINEURAL
Status: COMPLETED | OUTPATIENT
Start: 2021-06-24 | End: 2021-06-24

## 2021-06-24 RX ORDER — TRIAMCINOLONE ACETONIDE 40 MG/ML
80 INJECTION, SUSPENSION INTRA-ARTICULAR; INTRAMUSCULAR
Status: COMPLETED | OUTPATIENT
Start: 2021-06-24 | End: 2021-06-24

## 2021-06-24 RX ADMIN — TRIAMCINOLONE ACETONIDE 80 MG: 40 INJECTION, SUSPENSION INTRA-ARTICULAR; INTRAMUSCULAR at 11:35

## 2021-06-24 RX ADMIN — LIDOCAINE HYDROCHLORIDE 3 ML: 10 INJECTION, SOLUTION INFILTRATION; PERINEURAL at 11:35

## 2021-06-24 NOTE — PROGRESS NOTES
1  Pain in right hip  XR hip/pelv 2-3 vws right if performed    Large joint arthrocentesis: R greater trochanteric bursa     Orders Placed This Encounter   Procedures    Large joint arthrocentesis: R greater trochanteric bursa    XR hip/pelv 2-3 vws right if performed        Impression: This is a patient with history of lumbar fusion who presents with chronic right thigh pain likely multifactorial and secondary to greater trochanteric pain syndrome and hip osteoarthritis  We discussed different treatment options and decided to proceed with a greater trochanteric steroid injection  The patient tolerated the procedure well  We will have her go for physical therapy  I will see her back in four weeks to reassess  Can consider targeting her hip joint in the future if she continues to have pain  Imaging Studies (I personally reviewed images in PACS and report):  Right hip x-rays most recent to this encounter reviewed  These images show moderate osteoarthritis  Return in about 4 weeks (around 7/22/2021), or if symptoms worsen or fail to improve  HPI:  Victoriano Hawkins is a 64 y o  female  who presents for evaluation of   Chief Complaint   Patient presents with    Right Hip - Pain       Onset/Mechanism: Chronic pain for the past few months without an injury  Location: Right side of low back and in the lateral thigh  Radiation: Denies  Provocative: Walking  Severity: Not improving  Associated Symptoms: Denies  Treatment so far: Steroid injections into her spine  Patient is accompanied by nobody  Review of Systems   Constitutional: Positive for activity change  Negative for fever  HENT: Negative for sore throat  Eyes: Negative for visual disturbance  Respiratory: Negative for shortness of breath  Cardiovascular: Negative for chest pain  Gastrointestinal: Negative for abdominal pain  Endocrine: Negative for polydipsia  Genitourinary: Negative for difficulty urinating  Musculoskeletal: Positive for arthralgias  Skin: Negative for rash  Allergic/Immunologic: Negative for immunocompromised state  Neurological: Negative for numbness  Hematological: Does not bruise/bleed easily  Psychiatric/Behavioral: Negative for confusion         Following history reviewed and updated:  Past Medical History:   Diagnosis Date    Abdominal pain, epigastric 3/23/2018    Added automatically from request for surgery [id number]    Abnormal x-ray of lumbar spine 11/3/2015    Acute cystitis with hematuria 4/7/2020    Bariatric surgery status     Basal cell carcinoma     basil cell carcinoma- in remission    Benign essential hypertension 6/12/2012    Breakdown (mechanical) of internal fixation device of vertebrae, initial encounter [facility] ) 3/1/2019    Calculus of bile duct with cholecystitis without obstruction 4/27/2018    Added automatically from request for surgery [id number]    Cellulitis of finger 12/3/2015    Degenerative lumbar disc     Digital mucinous cyst 6/24/2015    DMII (diabetes mellitus, type 2) ([facility] ) 11/8/2013    Gross hematuria 3/19/2019    Hiatal hernia     History of transfusion 2014    Post-op spinal surgery    Low back pain     Lower urinary tract infectious disease 3/27/2015    Medicare annual wellness visit, initial 11/27/2019    Obesity     Resolved 10/6/2016     Overactive bladder     Postsurgical malabsorption     Recurrent UTI 3/19/2019    Spinal stenosis     SV[facility]ra[facility]ricular tachycardia) [facility])     Tachycardia     Resolved 5/4/2016     Type 2 diabetes mellitus ([facility] )     Last assesssed 1/18/2018     Wears glasses      Past Surgical History:   Procedure Laterality Date    APPENDECTOMY      ARTHRODESIS      Lumbar - Last assessed 1/4/2018    [facility] BACK SURGERY      Lumbar (3 surgeries)- two levels fused, clean out from infection, then fusion of 7 levels (last surgery in 2014)   [facility] x2    CERVICAL SPINE SURGERY      Fusion of C2-C7 over a period of 3 surgeries (2003 first)     SECTION      X2    CHOLECYSTECTOMY      FL MYELOGRAM LUMBAR  3/28/2019    INSERTION / PLACEMENT / REVISION NEUROSTIMULATOR      X2- both were removed    NECK SURGERY      OTHER SURGICAL HISTORY      Catheter ablation     IA EGD TRANSORAL BIOPSY SINGLE/MULTIPLE N/A 2016    Procedure: ESOPHAGOGASTRODUODENOSCOPY (EGD); Surgeon: Eva Irene MD;  Location: [facility]; Service: Bariatrics    IA EGD TRANSORAL BIOPSY SINGLE/MULTIPLE N/A 2018    Procedure: ESOPHAGOGASTRODUODENOSCOPY (EGD) with biopsy;  Surgeon: Eva Irene MD;  [facility]: [facility];   Service: Bariatrics    IA LAP GASTRIC BYPASS/BRAULIO-EN-Y N/A 10/25/2016    Procedure: BYPASS GASTRIC  BRAULIO-EN-Y LAPAROSCOPIC, WITH INTRA OP EGD;  Surgeon: Eva Irene MD;  [facility];  Service: Bariatrics    IA LAP,CHOLECYSTECTOMY N/A 2018    Procedure: CHOLECYSTECTOMY LAPAROSCOPIC;  Surgeon: Eva Irene MD;  [facility];  Service: [facility] SKIN CANCER EXCISION      TONSILLECTOMY      TUBAL LIGATION      WISDOM TOOTH EXTRACTION       Social History   Social History     Substance and Sexual Activity   Alcohol Use No     Social History     Substance and Sexual Activity   Drug Use No     Social History     Tobacco Use   Smoking Status Former Smoker    Packs/day: 1 00    Years: 20 00    Pack years: 20 00    Types: Cigarettes    Quit date:     Years since quittin 4   Smokeless Tobacco Never Used     Family History   Problem Relation Age of Onset    Cancer Father         Lung    Cystic fibrosis Father     Arthritis Mother         Rheumatoid    Angina Mother     Coronary artery disease Mother     Diabetes Mother     Rheum arthritis Sister     Diabetes Sister     Other Brother         Back problems     Diabetes Brother     No Known Problems Brother      No Known Allergies Constitutional:  /77 (BP Location: Right arm, Patient Position: Sitting, Cuff Size: Standard)   Pulse 56   Ht 5' 3 5" (1 613 m)   Wt 72 6 kg (160 lb)   BMI 27 90 kg/m²    General: NAD  Eyes: Anicteric sclerae  Neck: Supple  Lungs: Unlabored breathing  Cardiovascular: No lower extremity edema  Skin: Intact without erythema  Neurologic: Sensation intact to light touch  Psychiatric: Mood and affect are appropriate  Right Hip Exam     Tenderness   The patient is experiencing tenderness in the greater trochanter  Range of Motion   Internal rotation: normal     Muscle Strength   The patient has normal right hip strength  Tests   SUDHAKAR: positive    Other   Erythema: absent  Scars: absent  Sensation: normal  Pulse: present    Comments:  Pain at lateral hip with hip internal rotation, Stinchfield, log roll and hip scour  Injection site was CDI  Large joint arthrocentesis: R greater trochanteric bursa  Universal Protocol:  Consent given by: patient  Timeout called at: 6/24/2021 11:34 AM   Supporting Documentation  Indications: pain   Procedure Details  Location: hip - R greater trochanteric bursa  Needle size: 20 G (20G 3 5'')  Ultrasound guidance: no  Approach: Lateral to medial   Medications administered: 3 mL lidocaine 1 %; 80 mg triamcinolone acetonide 40 mg/mL    Patient tolerance: patient tolerated the procedure well with no immediate complications  Dressing:  Sterile dressing applied    Prior to the procedure, the patient was informed of the following risks in layman terms:    - Risk of bleeding since a needle is involved  - Risk of infection (1/10,000 chance as per recent studies)  Signs/symptoms were discussed and they would prompt an urgent evaluation at an emergency department   - Risk of pigmentation or skin dimpling in the skin (2-3% chance as per recent studies) from the steroid    - Risk of increased pain from steroid flare (1% chance as per recent studies) that typically lasts 24-48 hours  - Risk of increased blood sugars from the steroid medication that can last for a few weeks  If the patient is a diabetic or pre-diabetic, they were encouraged to closely monitor their blood sugars and discuss with PCP if elevated more than usual or if having symptoms  After going over these risks, we decided that the benefits outweigh the risks and proceeded with the procedure

## 2021-07-08 ENCOUNTER — EVALUATION (OUTPATIENT)
Dept: PHYSICAL THERAPY | Facility: CLINIC | Age: 61
End: 2021-07-08
Payer: MEDICARE

## 2021-07-08 DIAGNOSIS — M25.551 PAIN IN RIGHT HIP: Primary | ICD-10-CM

## 2021-07-08 PROCEDURE — 97112 NEUROMUSCULAR REEDUCATION: CPT | Performed by: PHYSICAL THERAPIST

## 2021-07-08 PROCEDURE — 97110 THERAPEUTIC EXERCISES: CPT | Performed by: PHYSICAL THERAPIST

## 2021-07-08 PROCEDURE — 97161 PT EVAL LOW COMPLEX 20 MIN: CPT | Performed by: PHYSICAL THERAPIST

## 2021-07-08 NOTE — PROGRESS NOTES
PT Evaluation     Today's date: 2021  Patient name: Kris Smiley  : 1960  MRN: [id number]  Referring provider: Zainab Capps DO  Dx:   Encounter Diagnosis     ICD-10-CM    1  Pain in right hip  M25 551                   Assessment  Assessment details: Kris Smiley is a 64 y o  female referred with primary diagnosis of Pain in right hip  (primary encounter diagnosis)   Patient presents with the following functional limitations: Pain with prolonged standing, walking, picking up grandchild, and housework  She has chronic pain in the hip for the past 6 months  Patient's symptoms are multifactorial in nature, with likely GT bursitis, hip DJD, and sciatic nerve involvement  Treatment to include: Manual therapy techniques, extremity/core strengthening, neuromuscular control exercises, instruction in a comprehensive HEP, and modalities as needed  They will benefit from skilled PT services to address the above functional deficits and to decrease pain to promote a return to their premorbid level of function  Functional limitations: Pain with prolonged standing, walking, picking up grandchild, and housework  Understanding of Dx/Px/POC: good   Prognosis: fair  Prognosis details: Chronic pain x 6 months    Goals  STG (4 weeks)  1  Patient will report pain as a 6-7/10 at worst with normal household and community ambulation  2  Patient will sleep through the night with minimal sleep disturbances  LTG (8 weeks)  1  Patient will report pain as a 4-5/10 at worst with normal activity  2  Patient will demonstrate right hip strength 4-4+/5   3  Patient will report the ability to  her grandchild without pain  4  Patient will be independent and comopliant with a HEP in order to maintain gains made with skilled PT services      Plan  Patient would benefit from: skilled physical therapy  Planned modality interventions: cryotherapy  Planned therapy interventions: manual therapy, abdominal trunk stabilization, neuromuscular re-education, patient education, strengthening, stretching, therapeutic activities and therapeutic exercise  Frequency: 2x week  Duration in weeks: 8  Plan of Care beginning date: 2021  Plan of Care expiration date: 2021  Treatment plan discussed with: patient        Subjective Evaluation    History of Present Illness  Mechanism of injury: Patient has history of chronic lower back pain, radiculopathy and lateral hip pain  Patient saw orthopedics and had an x-ray which showed Mild to moderate right hip osteoarthritis  Patient had an injection in her hip with no relief of her lateral hip pain  There was mild relief of her anterior thigh pain  She has also been seeing pain management for her back  She is referred now to outpatient PT services  Recurrent probem    Pain  Current pain ratin  At best pain ratin  At worst pain rating: 10 (25/10 with straight face and laughing)  Location: Right GT pain  Quality: dull ache and knife-like  Relieving factors: medications and rest  Progression: no change    Social Support  Stairs in house: yes (Left HR)   16  Lives in: multiple-level home  Lives with: adult children    Employment status: working (FT office work)  Exercise history: Just walking at work and home  Diagnostic Tests    FCE comments: (-) sleep disturbances  (+) lumbar radiculopathy with pain and paresthesias into right LE down to foot  Overactive bladder  Pain with lifting objects because of back and hip  Treatments  Previous treatment: physical therapy and medication  Current treatment: injection treatment and medication  Patient Goals  Patient goals for therapy: decreased pain          Objective     Tenderness     Right Hip   Tenderness in the ASIS and greater trochanter  Neurological Testing     Sensation     Hip     Right Hip   Hyposensation: light touch    Comments   Right light touch: L4 dermatome       Active Range of Motion   Left Hip   Extension: WFL  Adduction: [facility]  External rotation (90/90): [facility]  Internal rotation (90/90): WFL    Right Hip   Extension: WFL  Adduction: WFL  External rotation (90/90): [facility]  Internal rotation (90/90): [facility]    Strength/Myotome Testing     Left Hip   Planes of Motion   Flexion: 4  Extension: 4  Abduction: 4+    Right Hip   Planes of Motion   Flexion: 4-  Extension: 4-  Abduction: 4    Tests     Right Hip   Positive SUDHAKAR, piriformis and scour  Negative FADIR and Jenna  Precautions: DM, Tachycardia, Obesity, spinal stenosis      Manuals 7/8            Sciatic neuromobility             IASTM right ITB                                       Neuro Re-Ed             Pt education Hip and spine anatomy  Sciatica    Hip DJD            Dylon rows H,M,L             [employer]                                                                              Ther Ex             Nustep             Right ITB stretch standing             Standing hip 3 ways right             Supine bridges             Supine HL clamshells with t-band             Sidelying clamshells right             Supine sciatic nerve flossing x20            Supine piriformis stretch right 3x30"            Ther Activity                                       Gait Training                                       Modalities

## 2021-07-12 ENCOUNTER — OFFICE VISIT (OUTPATIENT)
Dept: PHYSICAL THERAPY | Facility: CLINIC | Age: 61
End: 2021-07-12
Payer: MEDICARE

## 2021-07-12 DIAGNOSIS — M25.551 PAIN IN RIGHT HIP: Primary | ICD-10-CM

## 2021-07-12 PROCEDURE — 97110 THERAPEUTIC EXERCISES: CPT

## 2021-07-14 ENCOUNTER — OFFICE VISIT (OUTPATIENT)
Dept: PHYSICAL THERAPY | Facility: CLINIC | Age: 61
End: 2021-07-14
Payer: MEDICARE

## 2021-07-14 DIAGNOSIS — M25.551 PAIN IN RIGHT HIP: Primary | ICD-10-CM

## 2021-07-14 PROCEDURE — 97140 MANUAL THERAPY 1/> REGIONS: CPT | Performed by: PHYSICAL THERAPIST

## 2021-07-14 PROCEDURE — 97110 THERAPEUTIC EXERCISES: CPT | Performed by: PHYSICAL THERAPIST

## 2021-07-14 NOTE — PROGRESS NOTES
Daily Note     Today's date: 2021  Patient name: Josee Parrish  : 1960  MRN: [id number]  Referring provider: Adrienne Laura DO  Dx:   Encounter Diagnosis     ICD-10-CM    1  Pain in right hip  M25 551        Start Time: 1633  Stop Time: 1715  Total time in clinic (min): 42 minutes    Subjective: Patient reports that she had increased pain following her previous treatment session, but it has since subsided  Objective: See treatment diary below      Assessment: Tolerated treatment fair  Patient demonstrated fatigue post treatment and would benefit from continued PT  Patient continues to have increased tenderness along proximal 2/3 of right ITB  Improved tolerance to STM with theraband roller vs IASTM, but remains very tender  Plan: Continue per plan of care  Progress treatment as tolerated  Precautions: DM, Tachycardia, Obesity, spinal stenosis    Manuals           Sciatic neuromobility  JM           IASTM right ITB  JM GR STM with theraband roller                                    Neuro Re-Ed             Pt education Hip and spine anatomy  Sciatica  Hip DJD            Dylon maciel H,M,L             [employer]                                                                              Ther Ex             Nustep  L3 8' L3 8'          Right ITB stretch standing  Man  ual  30"x3           Standing hip 3 ways right  2x10 ea 2x10 ea            Supine bridges  2x10 2x10          Supine HL clamshells with t-band  RTB  5"x15 RTB 5"x15          Sidelying clamshells right  5"x15 5"x15          Supine sciatic nerve flossing x20 x20 x20          Supine piriformis stretch right 3x30" 3x30" 3x30"          Ther Activity                                       Gait Training                                       Modalities

## 2021-07-18 DIAGNOSIS — R11.0 NAUSEA: ICD-10-CM

## 2021-07-19 ENCOUNTER — APPOINTMENT (OUTPATIENT)
Dept: PHYSICAL THERAPY | Facility: CLINIC | Age: 61
End: 2021-07-19
Payer: MEDICARE

## 2021-07-19 RX ORDER — ONDANSETRON 4 MG/1
TABLET, FILM COATED ORAL
Qty: 15 TABLET | Refills: 0 | Status: SHIPPED | OUTPATIENT
Start: 2021-07-19 | End: 2021-10-25 | Stop reason: SDUPTHER

## 2021-07-19 NOTE — PROGRESS NOTES
Daily Note     Today's date: 2021  Patient name: Josee Parrish  : 1960  MRN: [id number]  Referring provider: Adrienne Laura DO  Dx:   Encounter Diagnosis     ICD-10-CM    1  Pain in right hip  M25 551                   Subjective: ***      Objective: See treatment diary below      Assessment: Tolerated treatment {Tolerated treatment :[id number]}  Patient {assessment:}      Plan: {PLAN:[id number]}     Precautions: DM, Tachycardia, Obesity, spinal stenosis    Manuals           Sciatic neuromobility  JM           IASTM right ITB  JM GR STM with theraband roller                                    Neuro Re-Ed             Pt education Hip and spine anatomy  Sciatica  Hip DJD            Dylon maciel H,M,L             [employer]                                                                              Ther Ex             Nustep  L3 8' L3 8'          Right ITB stretch standing  Man  ual  30"x3           Standing hip 3 ways right  2x10 ea 2x10 ea            Supine bridges  2x10 2x10          Supine HL clamshells with t-band  RTB  5"x15 RTB 5"x15          Sidelying clamshells right  5"x15 5"x15          Supine sciatic nerve flossing x20 x20 x20          Supine piriformis stretch right 3x30" 3x30" 3x30"          Ther Activity                                       Gait Training                                       Modalities

## 2021-07-21 ENCOUNTER — OFFICE VISIT (OUTPATIENT)
Dept: PHYSICAL THERAPY | Facility: CLINIC | Age: 61
End: 2021-07-21
Payer: MEDICARE

## 2021-07-21 DIAGNOSIS — M25.551 PAIN IN RIGHT HIP: Primary | ICD-10-CM

## 2021-07-21 PROCEDURE — 97112 NEUROMUSCULAR REEDUCATION: CPT

## 2021-07-21 PROCEDURE — 97140 MANUAL THERAPY 1/> REGIONS: CPT

## 2021-07-21 PROCEDURE — 97110 THERAPEUTIC EXERCISES: CPT

## 2021-07-21 NOTE — PROGRESS NOTES
Daily Note     Today's date: 2021  Patient name: Param Ribeiro  : 1960  MRN: [id number]  Referring provider: Robi Hardy DO  Dx:   Encounter Diagnosis     ICD-10-CM    1  Pain in right hip  M25 551        Start Time: 1619          Subjective: Pt noted that today is not a good day  Pt noted that she has a lot of soreness and sciatic pain  Objective: See treatment diary below      Assessment:  Continued with treatment session  Pt noeTolerated treatment fair  Pt noted no changed after IASTM performed  Educated patient on how webslide exercises can help her back and promote increase in strength while performing TA within  Patient demonstrated fatigue post treatment, exhibited good technique with therapeutic exercises and would benefit from continued PT      Plan: Continue per plan of care  Precautions: DM, Tachycardia, Obesity, spinal stenosis    Manuals          Sciatic neuromobility  JM           IASTM right ITB  JM GR STM with theraband roller IASTM  SC                                   Neuro Re-Ed             Pt education Hip and spine anatomy  Sciatica  Hip DJD            Webslide rows H,M,L    L 2x 10 GTB with Phizz[employer]                                                                              Ther Ex             Nustep  L3 8' L3 8' 10 min L5          Right ITB stretch standing  Man  ual  30"x3           Standing hip 3 ways right  2x10 ea 2x10 ea  2x 10 ea            Supine bridges  2x10 2x10 2x 10          Supine HL clamshells with t-band  RTB  5"x15 RTB 5"x15 RTB 2x 10          Sidelying clamshells right  5"x15 5"x15 5" 15x          Supine sciatic nerve flossing x20 x20 x20 2x 10          Supine piriformis stretch right 3x30" 3x30" 3x30" 3x 30"          Ther Activity                                       Gait Training                                       Modalities

## 2021-07-22 ENCOUNTER — OFFICE VISIT (OUTPATIENT)
Dept: OBGYN CLINIC | Facility: CLINIC | Age: 61
End: 2021-07-22
Payer: MEDICARE

## 2021-07-22 ENCOUNTER — OFFICE VISIT (OUTPATIENT)
Dept: PHYSICAL THERAPY | Facility: CLINIC | Age: 61
End: 2021-07-22
Payer: MEDICARE

## 2021-07-22 VITALS
HEART RATE: 60 BPM | HEIGHT: 64 IN | BODY MASS INDEX: 27.31 KG/M2 | DIASTOLIC BLOOD PRESSURE: 78 MMHG | SYSTOLIC BLOOD PRESSURE: 128 MMHG | WEIGHT: 160 LBS

## 2021-07-22 DIAGNOSIS — Z98.1 HISTORY OF LUMBAR SPINAL FUSION: ICD-10-CM

## 2021-07-22 DIAGNOSIS — M51.36 LUMBAR DEGENERATIVE DISC DISEASE: ICD-10-CM

## 2021-07-22 DIAGNOSIS — M25.551 PAIN IN RIGHT HIP: Primary | ICD-10-CM

## 2021-07-22 DIAGNOSIS — M16.11 PRIMARY OSTEOARTHRITIS OF ONE HIP, RIGHT: Primary | ICD-10-CM

## 2021-07-22 DIAGNOSIS — M96.1 LUMBAR POST-LAMINECTOMY SYNDROME: ICD-10-CM

## 2021-07-22 PROCEDURE — 99213 OFFICE O/P EST LOW 20 MIN: CPT | Performed by: PHYSICAL MEDICINE & REHABILITATION

## 2021-07-22 PROCEDURE — 97110 THERAPEUTIC EXERCISES: CPT

## 2021-07-22 NOTE — PROGRESS NOTES
1  Primary osteoarthritis of one hip, right     2  Lumbar degenerative disc disease     3  History of lumbar spinal fusion     4  Lumbar post-laminectomy syndrome       No orders of the defined types were placed in this encounter  Impression:  The patient with history of lumbar fusion who is here in follow up of chronic right thigh pain likely multifactorial and secondary to greater trochanteric pain syndrome and hip osteoarthritis  Also possible that this is due to spinal etiology  She is neurologically intact  Patient had a greater trochanteric steroid injection on 6/24/2021  She has been going through physical therapy since then  Today, the patient has positive hip impingement signs  She is improving with physical therapy which she will continue  We will have her set up for an ultrasound-guided hip joint injection  Return to clinic for this planned injection  If no improvement after this, can consider advanced imaging of her lumbar spine as she has had surgery in the past     Imaging Studies (I personally reviewed images in PACS and report):  Right hip x-rays most recent to this encounter reviewed  These images show moderate osteoarthritis  Return for Right ultrasound-guided hip joint injection  HPI:  Fifi Muniz is a 64 y o  female  who presents in follow up  Here for   Chief Complaint   Patient presents with    Right Hip - Follow-up, Pain       Date of injury: Chronic pain for the past few months without an injury  Trajectory of symptoms:  Slight improvement since her last visit  She is content with physical therapy progress  Patient is accompanied by nobody  Review of Systems   Constitutional: Positive for activity change  Negative for fever  HENT: Negative for sore throat  Eyes: Negative for visual disturbance  Respiratory: Negative for shortness of breath  Cardiovascular: Negative for chest pain  Gastrointestinal: Negative for abdominal pain     Endocrine: Negative for polydipsia  Genitourinary: Negative for difficulty urinating  Musculoskeletal: Positive for arthralgias  Skin: Negative for rash  Allergic/Immunologic: Negative for immunocompromised state  Neurological: Negative for numbness  Hematological: Does not bruise/bleed easily  Psychiatric/Behavioral: Negative for confusion         Following history reviewed and updated:  Past Medical History:   Diagnosis Date    Abdominal pain, epigastric 3/23/2018    Added automatically from request for surgery [id number]    Abnormal x-ray of lumbar spine 11/3/2015    Acute cystitis with hematuria 4/7/2020    Bariatric surgery status     Basal cell carcinoma     basil cell carcinoma- in remission    Benign essential hypertension 6/12/2012    Breakdown (mechanical) of internal fixation device of vertebrae, initial encounter ([facility] ) 3/1/2019    Calculus of bile duct with cholecystitis without obstruction 4/27/2018    Added automatically from request for surgery [id number]    Cellulitis of finger 12/3/2015    Degenerative lumbar disc     Digital mucinous cyst 6/24/2015    DMII [facility]diabetes mellitus, type 2) ([facility] ) 11/8/2013    Gross hematuria 3/19/2019    Hiatal hernia     History of transfusion 2014    Post-op spinal surgery    Low back pain     Lower urinary tract infectious disease 3/27/2015    Medicare annual wellness visit, initial 11/27/2019    Obesity     Resolved 10/6/2016     Overactive bladder     Postsurgical malabsorption     Recurrent UTI 3/19/2019    Spinal stenosis     [facility]     Tachycardia     Resolved 5/4/2016     Type 2 diabetes mellitus ([facility] )     Last assesssed 1/18/2018     Wears glasses      Past Surgical History:   Procedure Laterality Date    APPENDECTOMY      ARTHRODESIS      Lumbar - Last assessed 1/4/2018    Nuvia Saida BACK SURGERY      Lumbar (3 surgeries)- two levels fused, clean out from infection, then fusion of 7 levels (last surgery in 2014)    CARDIAC ELECTROPHYSIOLOGY STUDY AND ABLATION      CARPAL TUNNEL RELEASE      x2    CERVICAL SPINE SURGERY      Fusion of C2-C7 over a period of 3 surgeries ( first)     SECTION      X2    CHOLECYSTECTOMY      FL MYELOGRAM LUMBAR  3/28/2019    INSERTION / PLACEMENT / REVISION NEUROSTIMULATOR      X2- both were removed    NECK SURGERY      OTHER SURGICAL HISTORY      Catheter ablation     WA EGD TRANSORAL BIOPSY SINGLE/MULTIPLE N/A 2016    Procedure: ESOPHAGOGASTRODUODENOSCOPY (EGD); Surgeon: Sadi Meza MD;  Location: [facility]; Service: Bariatrics    WA EGD TRANSORAL BIOPSY SINGLE/MULTIPLE N/A 2018    Procedure: ESOPHAGOGASTRODUODENOSCOPY (EGD) with biopsy;  Surgeon: Sadi Meza MD;  [facility]: [facility];   Service: Bariatrics    WA LAP GASTRIC BYPASS/BRAULIO-EN-Y N/A 10/25/2016    Procedure: BYPASS GASTRIC  BRAULIO-EN-Y LAPAROSCOPIC, WITH INTRA OP EGD;  Surgeon: Sadi Meza MD;  [facility];  Service: Bariatrics    WA LAP,CHOLECYSTECTOMY N/A 2018    Procedure: CHOLECYSTECTOMY LAPAROSCOPIC;  Surgeon: Sadi Meza MD;  [facility];  Service: Soren Clap SKIN CANCER EXCISION      TONSILLECTOMY      TUBAL LIGATION      WISDOM TOOTH EXTRACTION       Social History   Social History     Substance and Sexual Activity   Alcohol Use No     Social History     Substance and Sexual Activity   Drug Use No     Social History     Tobacco Use   Smoking Status Former Smoker    Packs/day: 1 00    Years: 20 00    Pack years: 20 00    Types: Cigarettes    Quit date:     Years since quittin 5   Smokeless Tobacco Never Used     Family History   Problem Relation Age of Onset    Cancer Father         Lung    Cystic fibrosis Father     Arthritis Mother         Rheumatoid    Angina Mother     Coronary artery disease Mother     Diabetes Mother     Rheum arthritis Sister     Diabetes Sister     Other Brother         Back problems     Diabetes Brother     No Known Problems Brother      No Known Allergies     Constitutional:  /78   Pulse 60   Ht 5' 3 5" (1 613 m)   Wt 72 6 kg (160 lb)   BMI 27 90 kg/m²    General: NAD  Eyes: Clear sclerae  ENT: No inflammation, lesion, or mass of lips  No tracheal deviation  Musculoskeletal: As mentioned below  Integumentary: No visible rashes or skin lesions  Pulmonary/Chest: Effort normal  No respiratory distress  Neuro: CN's grossly intact, MODI  Psych: Normal affect and judgement  Vascular: WWP  Right Hip Exam     Tenderness   The patient is experiencing tenderness in the greater trochanter and anterior      Range of Motion   Internal rotation: abnormal     Other   Erythema: absent  Scars: absent  Sensation: normal  Pulse: present    Comments:  Positive Stinchfield test              Procedures

## 2021-07-22 NOTE — PROGRESS NOTES
Daily Note     Today's date: 2021  Patient name: Josiah Browne  : 1960  MRN: [id number]  Referring provider: Dorcas Ribeiro DO  Dx:   Encounter Diagnosis     ICD-10-CM    1  Pain in right hip  M25 551                   Subjective: Pt reports soreness from having PT yesterday  Objective: See treatment diary below      Assessment: Patient reports discomfort with IASTM along ITB  Pt reporst RLE numbness with piriformis stretch  1:1 25 min    Plan: Continue per plan of care  Precautions: DM, Tachycardia, Obesity, spinal stenosis    Manuals         Sciatic neuromobility  JM           IASTM right ITB  JM GR STM with theraband roller IASTM  SC IASTM HA                                  Neuro Re-Ed             Pt education Hip and spine anatomy  Sciatica  Hip DJD            Webslide rows H,M,L    L 2x 10 GTB with TA   L2 x10 GTB w/ TA        Master Press                                                                              Ther Ex             Nustep  L3 8' L3 8' 10 min L5  10 min L5        Right ITB stretch standing  Man  ual  30"x3           Standing hip 3 ways right  2x10 ea 2x10 ea  2x 10 ea    2x10 ea        Supine bridges  2x10 2x10 2x 10  2x10         Supine HL clamshells with t-band  RTB  5"x15 RTB 5"x15 RTB 2x 10  RTB 5" 2x10        Sidelying clamshells right  5"x15 5"x15 5" 15x  5"x15        Supine sciatic nerve flossing x20 x20 x20 2x 10  2x10        Supine piriformis stretch right 3x30" 3x30" 3x30" 3x 30"  3x30"        Ther Activity                                       Gait Training                                       Modalities

## 2021-07-24 ENCOUNTER — APPOINTMENT (OUTPATIENT)
Dept: LAB | Facility: CLINIC | Age: 61
End: 2021-07-24
Payer: MEDICARE

## 2021-07-24 DIAGNOSIS — R79.0 LOW FERRITIN: ICD-10-CM

## 2021-07-24 DIAGNOSIS — K91.2 POSTGASTRECTOMY MALABSORPTION: ICD-10-CM

## 2021-07-24 DIAGNOSIS — Z90.3 POSTGASTRECTOMY MALABSORPTION: ICD-10-CM

## 2021-07-24 LAB
FERRITIN SERPL-MCNC: 24 NG/ML (ref 8–388)
IRON SATN MFR SERPL: 23 %
IRON SERPL-MCNC: 96 UG/DL (ref 50–170)
TIBC SERPL-MCNC: 421 UG/DL (ref 250–450)

## 2021-07-24 PROCEDURE — 82728 ASSAY OF FERRITIN: CPT

## 2021-07-24 PROCEDURE — 36415 COLL VENOUS BLD VENIPUNCTURE: CPT

## 2021-07-24 PROCEDURE — 83540 ASSAY OF IRON: CPT

## 2021-07-24 PROCEDURE — 83550 IRON BINDING TEST: CPT

## 2021-07-26 ENCOUNTER — OFFICE VISIT (OUTPATIENT)
Dept: PHYSICAL THERAPY | Facility: CLINIC | Age: 61
End: 2021-07-26
Payer: MEDICARE

## 2021-07-26 DIAGNOSIS — M25.551 PAIN IN RIGHT HIP: Primary | ICD-10-CM

## 2021-07-26 PROCEDURE — 97140 MANUAL THERAPY 1/> REGIONS: CPT | Performed by: PHYSICAL THERAPIST

## 2021-07-26 PROCEDURE — 97110 THERAPEUTIC EXERCISES: CPT | Performed by: PHYSICAL THERAPIST

## 2021-07-26 NOTE — PROGRESS NOTES
Daily Note     Today's date: 2021  Patient name: Mia Adams  : 1960  MRN: [id number]  Referring provider: Darci Chandra DO  Dx:   Encounter Diagnosis     ICD-10-CM    1  Pain in right hip  M25 551                   Subjective: Pt reports that her always hurts after the weekend because she is lifting her granddaughter  Objective: See treatment diary below      Assessment: Pt demonstrated antalgic gait t/o session  [place] of gr 1-2 mobs for pain this visit  Pt had difficulty with overall mobility in the hip and sensitivity with IASTM  Continue to progress strengthening, consider trailing higher grade mobs for mobility  Plan: Continue per plan of care  Precautions: DM, Tachycardia, Obesity, spinal stenosis    Manuals        Sciatic neuromobility  JM           IASTM right ITB  JM GR STM with theraband roller IASTM  SC IASTM HA IASTM KB        Lateral/lat inf mobs for pain       KB gr 1-2                     Neuro Re-Ed             Pt education Hip and spine anatomy  Sciatica  Hip DJD            Webslide rows H,M,L    L 2x 10 GTB with TA   L 2 x10 GTB w/ TA M, L 2 x10 GTB w/ TA       Master Press                                                                              Ther Ex             Nustep  L3 8' L3 8' 10 min L5  10 min L5 10 min L3       Right ITB stretch standing  Man  ual  30"x3           Standing hip 3 ways right  2x10 ea 2x10 ea  2x 10 ea    2x10 ea 2x10 ea       Supine bridges  2x10 2x10 2x 10  2x10  2x10       Supine HL clamshells with t-band  RTB  5"x15 RTB 5"x15 RTB 2x 10  RTB 5" 2x10 RTB 5" 2x10       Sidelying clamshells right  5"x15 5"x15 5" 15x  5"x15 5"x15       Supine sciatic nerve flossing x20 x20 x20 2x 10  2x10 2x10       Supine piriformis stretch right 3x30" 3x30" 3x30" 3x 30"  3x30" 3x30"       Ther Activity                                       Gait Training                                       Modalities

## 2021-07-28 ENCOUNTER — OFFICE VISIT (OUTPATIENT)
Dept: PHYSICAL THERAPY | Facility: CLINIC | Age: 61
End: 2021-07-28
Payer: MEDICARE

## 2021-07-28 DIAGNOSIS — M25.551 PAIN IN RIGHT HIP: Primary | ICD-10-CM

## 2021-07-28 PROCEDURE — 97140 MANUAL THERAPY 1/> REGIONS: CPT

## 2021-07-28 PROCEDURE — 97112 NEUROMUSCULAR REEDUCATION: CPT

## 2021-07-28 PROCEDURE — 97110 THERAPEUTIC EXERCISES: CPT

## 2021-07-28 NOTE — PROGRESS NOTES
Daily Note     Today's date: 2021  Patient name: Kadeem Duncan  : 1960  MRN: [id number]  Referring provider: Ishan Ramirez DO  Dx:   Encounter Diagnosis     ICD-10-CM    1  Pain in right hip  M25 551        Start Time: 1630  Stop Time: 1708  Total time in clinic (min): 38 minutes    Subjective: pt noted today she woke up with more pain  Objective: See treatment diary below      Assessment:  Continued with treatment session, overall patient able to perform all exercises with no increase in pain  Noted some relief with ITB supine stretch today  Tolerated treatment fair  Patient exhibited good technique with therapeutic exercises and would benefit from continued PT      Plan: Continue per plan of care  Precautions: DM, Tachycardia, Obesity, spinal stenosis    Manuals       Sciatic neuromobility  JM           IASTM right ITB  JM GR STM with theraband roller IASTM  SC IASTM HA IASTM KB  IASTM SC       Lateral/lat inf mobs for pain       KB gr 1-2                     Neuro Re-Ed             Pt education Hip and spine anatomy  Sciatica  Hip DJD            Webslide rows H,M,L    L 2x 10 GTB with TA   L 2 x10 GTB w/ TA M, L 2 x10 GTB w/ TA M and L 2x 10 GTB with [employer]                                                                              Ther Ex             Nustep  L3 8' L3 8' 10 min L5  10 min L5 10 min L3 10 min L3       Right ITB stretch standing  Man  ual  30"x3     supine 30" x 3       Standing hip 3 ways right  2x10 ea 2x10 ea  2x 10 ea    2x10 ea 2x10 ea       Supine bridges  2x10 2x10 2x 10  2x10  2x10 2x 10       Supine HL clamshells with t-band  RTB  5"x15 RTB 5"x15 RTB 2x 10  RTB 5" 2x10 RTB 5" 2x10       Sidelying clamshells right  5"x15 5"x15 5" 15x  5"x15 5"x15 2x 10       Supine sciatic nerve flossing x20 x20 x20 2x 10  2x10 2x10 10x      Supine piriformis stretch right 3x30" 3x30" 3x30" 3x 30"  3x30" 3x30" 4x 30"       Ther Activity                                       Gait Training                                       Modalities

## 2021-08-02 ENCOUNTER — OFFICE VISIT (OUTPATIENT)
Dept: PHYSICAL THERAPY | Facility: CLINIC | Age: 61
End: 2021-08-02
Payer: MEDICARE

## 2021-08-02 DIAGNOSIS — M25.551 PAIN IN RIGHT HIP: Primary | ICD-10-CM

## 2021-08-02 PROCEDURE — 97140 MANUAL THERAPY 1/> REGIONS: CPT

## 2021-08-02 PROCEDURE — 97110 THERAPEUTIC EXERCISES: CPT

## 2021-08-02 PROCEDURE — 97112 NEUROMUSCULAR REEDUCATION: CPT

## 2021-08-02 NOTE — PROGRESS NOTES
Daily Note     Today's date: 2021  Patient name: Jenni Estevez  : 1960  MRN: [id number]  Referring provider: Ingrid Jain DO  Dx:   Encounter Diagnosis     ICD-10-CM    1  Pain in right hip  M25 551        Start Time:   Stop Time: 2701  Total time in clinic (min): 51 minutes    Subjective: Pt noted she was watching her grandchildren over the weekend therefore she is in a lot of pain in her R hip  Pt noted she holds her on her R hip  3year old       Objective: See treatment diary below      Assessment:  Continued with treatment session, Tolerated treatment fair  Patient demonstrated fatigue post treatment, exhibited good technique with therapeutic exercises and would benefit from continued PT  S/p treatment, patient noted maybe feeling a little better but noted not much change  Plan: Continue per plan of care  Precautions: DM, Tachycardia, Obesity, spinal stenosis    Manuals  8     Sciatic neuromobility  JM           IASTM right ITB  JM GR STM with theraband roller IASTM  SC IASTM HA IASTM KB  IASTM SC  SC     Lateral/lat inf mobs for pain       KB gr 1-2                     Neuro Re-Ed             Pt education Hip and spine anatomy  Sciatica  Hip DJD            Webslide rows H,M,L    L 2x 10 GTB with TA   L 2 x10 GTB w/ TA M, L 2 x10 GTB w/ TA M and L 2x 10 GTB with TA  M and L 2x 10 GTB      Master Press                                                                              Ther Ex             Nustep  L3 8' L3 8' 10 min L5  10 min L5 10 min L3 10 min L3  10 min l3      Right ITB stretch standing  Man  ual  30"x3     supine 30" x 3  supine 30" x 3      Standing hip 3 ways right  2x10 ea 2x10 ea  2x 10 ea  2x10 ea 2x10 ea  2x 10 ea        Supine bridges  2x10 2x10 2x 10  2x10  2x10 2x 10  2x 10      Supine HL clamshells with t-band  RTB  5"x15 RTB 5"x15 RTB 2x 10  RTB 5" 2x10 RTB 5" 2x10  RTB 5" 2x 10      Sidelying clamshells right  5"x15 5"x15 5" 15x  5"x15 5"x15 2x 10  2x 10      Supine sciatic nerve flossing x20 x20 x20 2x 10  2x10 2x10 10x 10x      Supine piriformis stretch right 3x30" 3x30" 3x30" 3x 30"  3x30" 3x30" 4x 30"  4x 30"      Ther Activity                                       Gait Training                                       Modalities                                       1 on 1 time for 38 minutes on 8/2/21

## 2021-08-05 ENCOUNTER — EVALUATION (OUTPATIENT)
Dept: PHYSICAL THERAPY | Facility: CLINIC | Age: 61
End: 2021-08-05
Payer: MEDICARE

## 2021-08-05 DIAGNOSIS — M25.551 PAIN IN RIGHT HIP: Primary | ICD-10-CM

## 2021-08-05 PROCEDURE — 97140 MANUAL THERAPY 1/> REGIONS: CPT | Performed by: PHYSICAL THERAPIST

## 2021-08-05 PROCEDURE — 97110 THERAPEUTIC EXERCISES: CPT | Performed by: PHYSICAL THERAPIST

## 2021-08-05 NOTE — PROGRESS NOTES
PT Re-Evaluation     Today's date: 2021  Patient name: Zehra Flanagan  : 1960  MRN: [id number]  Referring provider: Tara Lester DO  Dx:   Encounter Diagnosis     ICD-10-CM    1  Pain in right hip  M25 551                   Assessment  Assessment details: Patient has been seen for 9 visits over the past 4 weeks in Physical Therapy  She states that her right hip pain is not getting better, and is actually getting worse  She is scheduled for have another hip injection on 21  She does, however, report significantly improved radicular symptoms into right LE  She no longer has pain in her right foot and distal LE  Hip ROM is WNL, but painful into IR  Hip strength is improved, although weakness persists in hip extension  She still ambulates with an antalgic gait pattern  She will benefit from continuation of PT services in order to further decrease her pain to promote a normal gait pattern and a return to her prior level of function  Functional limitations: Pain with prolonged standing, walking, picking up grandchild, and housework  Understanding of Dx/Px/POC: good   Prognosis: fair  Prognosis details: Chronic pain x 6 months    Goals  STG (4 weeks)  1  Patient will report pain as a 6-7/10 at worst with normal household and community ambulation - not met  2  Patient will sleep through the night with minimal sleep disturbances - met  LTG (8 weeks)  1  Patient will report pain as a 4-5/10 at worst with normal activity - not met  2  Patient will demonstrate right hip strength 4-4+/5 - met  3  Patient will report the ability to  her grandchild without pain- not met  4  Patient will be independent and comopliant with a HEP in order to maintain gains made with skilled PT services   - not met    Plan  Patient would benefit from: skilled physical therapy  Planned modality interventions: cryotherapy  Planned therapy interventions: manual therapy, abdominal trunk stabilization, neuromuscular re-education, patient education, strengthening, stretching, therapeutic activities and therapeutic exercise  Frequency: 2x week  Duration in weeks: 4  Plan of Care beginning date: 2021  Plan of Care expiration date: 2021  Treatment plan discussed with: patient        Subjective Evaluation    History of Present Illness  Mechanism of injury: Patient states her hip pain continues to get worse  Her lower back is feeling better and her sciatic pain is improving  She no longer has pain into distal LE or foot  She is having guided hip injection on 21  Pain is worst with hip abduction  Patient wants to continue PT to work on core stabilization            Recurrent probem    Pain  Current pain ratin  At best pain ratin  At worst pain rating: 10 (25/10 with straight face and laughing)  Location: Right GT and groin  Quality: dull ache and knife-like  Relieving factors: medications and rest  Progression: no change    Social Support  Stairs in house: yes (Left HR)   18  Lives in: multiple-level home  Lives with: adult children    Employment status: working (FT office work)  Exercise history: Just walking at work and home  Diagnostic Tests    FCE comments: (-) sleep disturbances  Overactive bladder  Pain with lifting objects because of back and hip  Treatments  Previous treatment: physical therapy and medication  Current treatment: injection treatment and medication  Patient Goals  Patient goals for therapy: decreased pain          Objective     Tenderness     Right Hip   Tenderness in the ASIS and greater trochanter  Neurological Testing     Sensation     Hip     Right Hip   Hyposensation: light touch    Comments   Right light touch: L4 dermatome  Active Range of Motion   Left Hip   Extension: WFL  Adduction: WFL  [facility]  Internal rotation (90/90): WFL    Right Hip   Extension: WFL  Adduction: WFL  External rotation (90/90):  [facility]  Internal rotation (90/90): [facility]    Strength/Myotome Testing     Left Hip   Planes of Motion   Flexion: 4+  Extension: 4+  Abduction: 4+    Right Hip   Planes of Motion   Flexion: 4+  Extension: 4-  Abduction: 4+    Tests     Right Hip   Positive SUDHAKAR, piriformis and scour  Negative FADIR and Jenna  Precautions: DM, Tachycardia, Obesity, spinal stenosis    Manuals 7/8 7/12 7/14 7/21 7/22 7/26 7/28 8/2 8/5    Sciatic neuromobility  JM           IASTM right ITB  JM GR STM with theraband roller IASTM  SC IASTM HA IASTM KB  IASTM SC  SC JF    Lateral/lat inf mobs for pain       KB gr 1-2    JF GIII                 Neuro Re-Ed             Pt education Hip and spine anatomy  Sciatica  Hip DJD            Webslide rows H,M,L    L 2x 10 GTB with TA   L 2 x10 GTB w/ TA M, L 2 x10 GTB w/ TA M and L 2x 10 GTB with TA  M and L 2x 10 GTB      Master Press                                                                              Ther Ex             Nustep  L3 8' L3 8' 10 min L5  10 min L5 10 min L3 10 min L3  10 min l3      Right ITB stretch standing  Man  ual  30"x3     supine 30" x 3  supine 30" x 3  supine strap 3x30"    Standing hip 3 ways right  2x10 ea 2x10 ea  2x 10 ea  2x10 ea 2x10 ea  2x 10 ea        Supine bridges  2x10 2x10 2x 10  2x10  2x10 2x 10  2x 10      Supine HL clamshells with t-band  RTB  5"x15 RTB 5"x15 RTB 2x 10  RTB 5" 2x10 RTB 5" 2x10  RTB 5" 2x 10  RTB 5" 2x 10    Sidelying clamshells right  5"x15 5"x15 5" 15x  5"x15 5"x15 2x 10  2x 10      Supine sciatic nerve flossing x20 x20 x20 2x 10  2x10 2x10 10x 10x  np    Supine piriformis stretch right 3x30" 3x30" 3x30" 3x 30"  3x30" 3x30" 4x 30"  4x 30"  4x30"    Ther Activity                                       Gait Training                                       Modalities

## 2021-08-09 ENCOUNTER — OFFICE VISIT (OUTPATIENT)
Dept: PHYSICAL THERAPY | Facility: CLINIC | Age: 61
End: 2021-08-09
Payer: MEDICARE

## 2021-08-09 DIAGNOSIS — M25.551 PAIN IN RIGHT HIP: Primary | ICD-10-CM

## 2021-08-09 PROCEDURE — 97110 THERAPEUTIC EXERCISES: CPT

## 2021-08-09 PROCEDURE — 97140 MANUAL THERAPY 1/> REGIONS: CPT

## 2021-08-09 NOTE — PROGRESS NOTES
Daily Note     Today's date: 2021  Patient name: Fuentes Pablo  : 1960  MRN: [id number]  Referring provider: Fara Zee DO  Dx:   Encounter Diagnosis     ICD-10-CM    1  Pain in right hip  M25 551                   Subjective: Pt noted she still has a lot of pain on her R hip  Pt noted that she is hoping they give her a shot in his R hip on Thursday this week  Objective: See treatment diary below      Assessment:  Continued with treatment session, overall patient able to perform all exercises without additional discomfort  Tolerated treatment fair  Patient exhibited good technique with therapeutic exercises  S/p treatment patient noted feeling a little less tight but noted still having the same amount of pain  Plan: Continue per plan of care  Precautions: DM, Tachycardia, Obesity, spinal stenosis    Manuals    Sciatic neuromobility  JM           IASTM right ITB  JM GR STM with theraband roller IASTM  SC IASTM HA IASTM KB  IASTM SC  SC JF SC   Lateral/lat inf mobs for pain       KB gr 1-2    JF GIII                 Neuro Re-Ed             Pt education Hip and spine anatomy  Sciatica  Hip DJD            Webslide rows H,M,L    L 2x 10 GTB with TA   L 2 x10 GTB w/ TA M, L 2 x10 GTB w/ TA M and L 2x 10 GTB with TA  M and L 2x 10 GTB      Master Press                                                                              Ther Ex             Nustep  L3 8' L3 8' 10 min L5  10 min L5 10 min L3 10 min L3  10 min l3   10 min L3    Right ITB stretch standing  Man  ual  30"x3     supine 30" x 3  supine 30" x 3  supine strap 3x30" supine 4x 30"    Standing hip 3 ways right  2x10 ea 2x10 ea  2x 10 ea  2x10 ea 2x10 ea  2x 10 ea        Supine bridges  2x10 2x10 2x 10  2x10  2x10 2x 10  2x 10   2x10    Supine HL clamshells with t-band  RTB  5"x15 RTB 5"x15 RTB 2x 10  RTB 5" 2x10 RTB 5" 2x10  RTB 5" 2x 10  RTB 5" 2x 10    Sidelying clamshells right 5"x15 5"x15 5" 15x  5"x15 5"x15 2x 10  2x 10   2x 10    Supine sciatic nerve flossing x20 x20 x20 2x 10  2x10 2x10 10x 10x  np 10x    Supine piriformis stretch right 3x30" 3x30" 3x30" 3x 30"  3x30" 3x30" 4x 30"  4x 30"  4x30" 4x 30"    Ther Activity                                       Gait Training                                       Modalities

## 2021-08-11 ENCOUNTER — OFFICE VISIT (OUTPATIENT)
Dept: PHYSICAL THERAPY | Facility: CLINIC | Age: 61
End: 2021-08-11
Payer: MEDICARE

## 2021-08-11 DIAGNOSIS — M25.551 PAIN IN RIGHT HIP: Primary | ICD-10-CM

## 2021-08-11 PROCEDURE — 97110 THERAPEUTIC EXERCISES: CPT

## 2021-08-11 PROCEDURE — 97140 MANUAL THERAPY 1/> REGIONS: CPT

## 2021-08-11 NOTE — PROGRESS NOTES
Daily Note     Today's date: 2021  Patient name: Josiah Browne  : 1960  MRN: [id number]  Referring provider: Dorcas Ribeiro DO  Dx:   Encounter Diagnosis     ICD-10-CM    1  Pain in right hip  M25 551        Start Time:   Stop Time: 165  Total time in clinic (min): 39 minutes    Subjective: Pt noted still having a lot of pain in R hip, patient noted she she also has some p! In R glute and sciatic symptoms  Pt note that she does go to see her MD tomorrow  Pt noted when she watches her grandchildren she tends to hold them on her hip  Objective: See treatment diary below      Assessment:  Continued with treatment, due to patients level of discomfort in R hip and R glute no progressions this visit  Advised patient to avoid holding her grandchildren on her R hip to trial to decrease amount of symptoms  Tolerated treatment fair  Patient demonstrated fatigue post treatment, exhibited good technique with therapeutic exercises and would benefit from continued PT      Plan: Continue per plan of care  Precautions: DM, Tachycardia, Obesity, spinal stenosis    Manuals    Sciatic neuromobility  JM            IASTM right ITB  JM GR STM with theraband roller IASTM  SC IASTM HA IASTM KB  IASTM SC  SC JF SC SC   Lateral/lat inf mobs for pain       KB gr 1-2    JF GIII                   Neuro Re-Ed              Pt education Hip and spine anatomy  Sciatica    Hip DJD             Webslide rows H,M,L    L 2x 10 GTB with TA   L 2 x10 GTB w/ TA M, L 2 x10 GTB w/ TA M and L 2x 10 GTB with TA  M and L 2x 10 GTB       Master Press                                                                                    Ther Ex              Nustep  L3 8' L3 8' 10 min L5  10 min L5 10 min L3 10 min L3  10 min l3   10 min L3  10 mIn L3    Right ITB stretch standing  Man  ual  30"x3     supine 30" x 3  supine 30" x 3  supine strap 3x30" supine 4x 30"  supine 30" x 4 Standing hip 3 ways right  2x10 ea 2x10 ea  2x 10 ea    2x10 ea 2x10 ea  2x 10 ea     hold   Supine bridges  2x10 2x10 2x 10  2x10  2x10 2x 10  2x 10   2x10  2x 10    Supine HL clamshells with t-band  RTB  5"x15 RTB 5"x15 RTB 2x 10  RTB 5" 2x10 RTB 5" 2x10  RTB 5" 2x 10  RTB 5" 2x 10  RTB 2x 10    Sidelying clamshells right  5"x15 5"x15 5" 15x  5"x15 5"x15 2x 10  2x 10   2x 10  2x 10    Supine sciatic nerve flossing x20 x20 x20 2x 10  2x10 2x10 10x 10x  np 10x  10x    Supine piriformis stretch right 3x30" 3x30" 3x30" 3x 30"  3x30" 3x30" 4x 30"  4x 30"  4x30" 4x 30"  4x 30"    Ther Activity                                          Gait Training                                          Modalities                                          1 on 1 time for 39 min on 8/11/21

## 2021-08-12 ENCOUNTER — OFFICE VISIT (OUTPATIENT)
Dept: OBGYN CLINIC | Facility: MEDICAL CENTER | Age: 61
End: 2021-08-12
Payer: MEDICARE

## 2021-08-12 VITALS
HEIGHT: 64 IN | WEIGHT: 160 LBS | DIASTOLIC BLOOD PRESSURE: 85 MMHG | BODY MASS INDEX: 27.31 KG/M2 | SYSTOLIC BLOOD PRESSURE: 147 MMHG | HEART RATE: 62 BPM

## 2021-08-12 DIAGNOSIS — G89.4 CHRONIC PAIN SYNDROME: ICD-10-CM

## 2021-08-12 DIAGNOSIS — Z98.1 HISTORY OF LUMBAR SPINAL FUSION: ICD-10-CM

## 2021-08-12 DIAGNOSIS — M16.11 PRIMARY OSTEOARTHRITIS OF ONE HIP, RIGHT: Primary | ICD-10-CM

## 2021-08-12 DIAGNOSIS — M54.50 CHRONIC BILATERAL LOW BACK PAIN WITHOUT SCIATICA: ICD-10-CM

## 2021-08-12 DIAGNOSIS — G89.29 CHRONIC BILATERAL LOW BACK PAIN WITHOUT SCIATICA: ICD-10-CM

## 2021-08-12 DIAGNOSIS — M48.061 SPINAL STENOSIS OF LUMBAR REGION, UNSPECIFIED WHETHER NEUROGENIC CLAUDICATION PRESENT: ICD-10-CM

## 2021-08-12 DIAGNOSIS — M96.1 LUMBAR POST-LAMINECTOMY SYNDROME: ICD-10-CM

## 2021-08-12 PROCEDURE — 20611 DRAIN/INJ JOINT/BURSA W/US: CPT | Performed by: PHYSICAL MEDICINE & REHABILITATION

## 2021-08-12 PROCEDURE — 99212 OFFICE O/P EST SF 10 MIN: CPT | Performed by: PHYSICAL MEDICINE & REHABILITATION

## 2021-08-12 RX ORDER — TRIAMCINOLONE ACETONIDE 40 MG/ML
40 INJECTION, SUSPENSION INTRA-ARTICULAR; INTRAMUSCULAR
Status: COMPLETED | OUTPATIENT
Start: 2021-08-12 | End: 2021-08-12

## 2021-08-12 RX ORDER — LIDOCAINE HYDROCHLORIDE 10 MG/ML
3 INJECTION, SOLUTION INFILTRATION; PERINEURAL
Status: COMPLETED | OUTPATIENT
Start: 2021-08-12 | End: 2021-08-12

## 2021-08-12 RX ADMIN — TRIAMCINOLONE ACETONIDE 40 MG: 40 INJECTION, SUSPENSION INTRA-ARTICULAR; INTRAMUSCULAR at 09:12

## 2021-08-12 RX ADMIN — LIDOCAINE HYDROCHLORIDE 3 ML: 10 INJECTION, SOLUTION INFILTRATION; PERINEURAL at 09:12

## 2021-08-12 NOTE — PROGRESS NOTES
1  Primary osteoarthritis of one hip, right     2  Spinal stenosis of lumbar region, unspecified whether neurogenic claudication present  Large joint arthrocentesis: R hip joint   3  Chronic bilateral low back pain without sciatica  Large joint arthrocentesis: R hip joint   4  Lumbar post-laminectomy syndrome  Large joint arthrocentesis: R hip joint   5  History of lumbar spinal fusion  Large joint arthrocentesis: R hip joint   6  Chronic pain syndrome  Large joint arthrocentesis: R hip joint     Orders Placed This Encounter   Procedures    Large joint arthrocentesis: R hip joint        Impression:  Patient with history of lumbar fusion is here in follow up of chronic right thigh pain likely multifactorial and secondary to greater trochanteric pain syndrome and hip osteoarthritis   Also possible that this is due to spinal etiology  She is neurologically intact  Patient had a greater trochanteric steroid injection on 6/24/2021  She has been going through physical therapy since then  Today, the patient continues to have positive hip impingement signs  She is improving with physical therapy which she will continue  We proceeded with an ultrasound-guided hip joint injection  If no improvement after this, can consider advanced imaging of her lumbar spine as she has had surgery in the past   Patient will call to let me know how she is doing in 4 weeks  Imaging Studies (I personally reviewed images in PACS and report):  Right hip x-rays most recent to this encounter reviewed   These images show moderate osteoarthritis  Return if symptoms worsen or fail to improve  Patient is in agreement with the above plan  HPI:  Mia Adams is a 64 y o  female  who presents in follow up  Here for   Chief Complaint   Patient presents with    Right Hip - Follow-up       Date of injury: Chronic pain for the past few months without an injury  Trajectory of symptoms: Improving with physical therapy      Review of Systems   Constitutional: Positive for activity change  Negative for fever  HENT: Negative for sore throat  Eyes: Negative for visual disturbance  Respiratory: Negative for shortness of breath  Cardiovascular: Negative for chest pain  Gastrointestinal: Negative for abdominal pain  Endocrine: Negative for polydipsia  Genitourinary: Negative for difficulty urinating  Musculoskeletal: Positive for arthralgias  Skin: Negative for rash  Allergic/Immunologic: Negative for immunocompromised state  Neurological: Negative for numbness  Hematological: Does not bruise/bleed easily  Psychiatric/Behavioral: Negative for confusion         Following history reviewed and updated:  Past Medical History:   Diagnosis Date    Abdominal pain, epigastric 3/23/2018    Added automatically from request for surgery [id number]    Abnormal x-ray of lumbar spine 11/3/2015    Acute cystitis with hematuria 4/7/2020    Bariatric surgery status     Basal cell carcinoma     basil cell carcinoma- in remission    Benign essential hypertension 6/12/2012    Breakdown (mechanical) of internal fixation device of vertebrae, initial encounter ([facility] ) 3/1/2019    Calculus of bile duct with cholecystitis without obstruction 4/27/2018    Added automatically from request for surgery [id number]    Cellulitis of finger 12/3/2015    Degenerative lumbar disc     Digital mucinous cyst 6/24/2015    DMII (diabetes mellitus, type 2) ([facility] ) 11/8/2013    Gross hematuria 3/19/2019    Hiatal hernia     History of transfusion 2014    Post-op spinal surgery    Low back pain     Lower urinary tract infectious disease 3/27/2015    Medicare annual wellness visit, initial 11/27/2019    Obesity     Resolved 10/6/2016     Overactive bladder     Postsurgical malabsorption     Recurrent UTI 3/19/2019    Spinal stenosis     [facility]ra[facility] tachycardia[facility]     Tachycardia     Resolved 5/4/2016     Type 2 diabetes mellitus ([facility] ) Last assesssed 2018     Wears glasses      Past Surgical History:   Procedure Laterality Date    APPENDECTOMY      ARTHRODESIS      Lumbar - Last assessed 2018    [facility] SURGERY      Lumbar (3 surgeries)- two levels fused, clean out from infection, then fusion of 7 levels (last surgery in )   [address]      x2    CERVICAL SPINE SURGERY      Fusion of C2-C7 over a period of 3 surgeries ( first)     SECTION      X2    CHOLECYSTECTOMY      FL MYELOGRAM LUMBAR  3/28/2019    INSERTION / PLACEMENT / REVISION NEUROSTIMULATOR      X2- both were removed    NECK SURGERY      OTHER SURGICAL HISTORY      Catheter ablation     SC EGD TRANSORAL BIOPSY SINGLE/MULTIPLE N/A 2016    Procedure: ESOPHAGOGASTRODUODENOSCOPY (EGD); Surgeon: Yani Salcido MD;  Location: [facility]; Service: Bariatrics    SC EGD TRANSORAL BIOPSY SINGLE/MULTIPLE N/A 2018    Procedure: ESOPHAGOGASTRODUODENOSCOPY (EGD) with biopsy;  Surgeon: Yani Salcido MD;  Location: [facility];   Service: Bariatrics    SC LAP GASTRIC BYPASS/BRAULIO-EN-Y N/A 10/25/2016    Procedure: BYPASS GASTRIC  BRAULIO-EN-Y LAPAROSCOPIC, WITH INTRA OP EGD;  Surgeon: Yani Salcido MD;  [facility]: [facility];  Service: Bariatrics    SC LAP,CHOLECYSTECTOMY N/A 2018    Procedure: CHOLECYSTECTOMY LAPAROSCOPIC;  Surgeon: Yani Salcido MD;  [facility];  Service: Elmira Hurd SKIN CANCER EXCISION      TONSILLECTOMY      TUBAL LIGATION      WISDOM TOOTH EXTRACTION       Social History   Social History     Substance and Sexual Activity   Alcohol Use No     Social History     Substance and Sexual Activity   Drug Use No     Social History     Tobacco Use   Smoking Status Former Smoker    Packs/day: 1 00    Years: 20 00    Pack years: 20 00    Types: Cigarettes    Quit date:     Years since quittin 6   Smokeless Tobacco Never Used     Family History Problem Relation Age of Onset    Cancer Father         Lung    Cystic fibrosis Father     Arthritis Mother         Rheumatoid    Angina Mother     Coronary artery disease Mother     Diabetes Mother     Rheum arthritis Sister     Diabetes Sister     Other Brother         Back problems     Diabetes Brother     No Known Problems Brother      No Known Allergies     Constitutional:  /85 (BP Location: Left arm, Patient Position: Sitting, Cuff Size: Standard)   Pulse 62   Ht 5' 3 5" (1 613 m)   Wt 72 6 kg (160 lb)   BMI 27 90 kg/m²    General: NAD  Eyes: Clear sclerae  ENT: No inflammation, lesion, or mass of lips  No tracheal deviation  Musculoskeletal: As mentioned below  Integumentary: No visible rashes or skin lesions  Pulmonary/Chest: Effort normal  No respiratory distress  Neuro: CN's grossly intact, MODI  Psych: Normal affect and judgement  Vascular: WWP  Right Hip Exam     Range of Motion   Internal rotation: abnormal     Other   Erythema: absent  Scars: absent  Sensation: normal  Pulse: present    Comments:  Injection site was CDI  Large joint arthrocentesis: R hip joint  Universal Protocol:  Procedure performed by:  Consent: Verbal consent obtained  Written consent not obtained    Consent given by: patient  Timeout called at: 8/12/2021 9:12 AM   Patient understanding: patient states understanding of the procedure being performed  Site marked: the operative site was marked  Supporting Documentation  Indications: pain and diagnostic evaluation   Procedure Details  Location: hip - R hip joint  Ultrasound guidance: yes (US guidance was used to find the area of interest )  Approach: anterolateral  Medications administered: 40 mg triamcinolone acetonide 40 mg/mL; 3 mL lidocaine 1 %    Patient tolerance: patient tolerated the procedure well with no immediate complications  Dressing:  Sterile dressing applied    The right hip joint was visualized with ultrasound and injected with steroid/anesthetic solution as indicated  Prior to the injection, the ultrasound was used to evaluate for any neural or vascular structures  Care was taken to avoid these structures  The images (and video if taken) were saved to the [employer] ultrasound system  Prior to the procedure, the patient was informed of the following risks in layman terms:    - Risk of bleeding since a needle is involved  - Risk of infection (1/10,000 chance as per recent studies)  Signs/symptoms were discussed and they would prompt an urgent evaluation at an emergency department   - Risk of pigmentation or skin dimpling in the skin (2-3% chance as per recent studies) from the steroid  - Risk of increased pain from steroid flare (1% chance as per recent studies) that typically lasts 24-48 hours  - Risk of increased blood sugars from the steroid medication that can last for a few weeks  If the patient is a diabetic or pre-diabetic, they were encouraged to closely monitor their blood sugars and discuss with PCP if elevated more than usual or if having symptoms  After going over these risks, we decided that the benefits outweigh the risks and proceeded with the procedure

## 2021-08-16 ENCOUNTER — OFFICE VISIT (OUTPATIENT)
Dept: PHYSICAL THERAPY | Facility: CLINIC | Age: 61
End: 2021-08-16
Payer: MEDICARE

## 2021-08-16 DIAGNOSIS — M25.551 PAIN IN RIGHT HIP: Primary | ICD-10-CM

## 2021-08-16 PROCEDURE — 97110 THERAPEUTIC EXERCISES: CPT

## 2021-08-16 PROCEDURE — 97140 MANUAL THERAPY 1/> REGIONS: CPT

## 2021-08-16 NOTE — PROGRESS NOTES
Daily Note     Today's date: 2021  Patient name: Rocco Velázquez  : 1960  MRN: [id number]  Referring provider: Elisabeth Dewitt DO  Dx:   Encounter Diagnosis     ICD-10-CM    1  Pain in right hip  M25 551                   Subjective: Pt noted the injection seems to be helpin but noted having sciatic pain more in R hip and down R LE today  Objective: See treatment diary below      Assessment: Tolerated treatment fair  Patient demonstrated fatigue post treatment, exhibited good technique with therapeutic exercises and would benefit from continued PT      Plan: Continue per plan of care  Precautions: DM, Tachycardia, Obesity, spinal stenosis    Manuals    Sciatic neuromobility  JM             IASTM right ITB  JM GR STM with theraband roller IASTM  SC IASTM HA IASTM KB  IASTM SC  SC JF SC SC    Lateral/lat inf mobs for pain       KB gr 1-2    JF GIII      STM with roller piriformis R LE only             SC   Neuro Re-Ed               Pt education Hip and spine anatomy  Sciatica  Hip DJD              Webslide rows H,M,L    L 2x 10 GTB with TA   L 2 x10 GTB w/ TA M, L 2 x10 GTB w/ TA M and L 2x 10 GTB with TA  M and L 2x 10 GTB        Master Press                                                                                          Ther Ex               Nustep  L3 8' L3 8' 10 min L5  10 min L5 10 min L3 10 min L3  10 min l3   10 min L3  10 mIn L3  10 min L3    Right ITB stretch standing  Man  ual  30"x3     supine 30" x 3  supine 30" x 3  supine strap 3x30" supine 4x 30"  supine 30" x 4     Standing hip 3 ways right  2x10 ea 2x10 ea  2x 10 ea    2x10 ea 2x10 ea  2x 10 ea     hold    Supine bridges  2x10 2x10 2x 10  2x10  2x10 2x 10  2x 10   2x10  2x 10  2x 10    Supine HL clamshells with t-band  RTB  5"x15 RTB 5"x15 RTB 2x 10  RTB 5" 2x10 RTB 5" 2x10  RTB 5" 2x 10  RTB 5" 2x 10  RTB 2x 10     Sidelying clamshells right  5"x15 5"x15 5" 15x 5"x15 5"x15 2x 10  2x 10   2x 10  2x 10     Supine sciatic nerve flossing x20 x20 x20 2x 10  2x10 2x10 10x 10x  np 10x  10x  10x    Supine piriformis stretch right 3x30" 3x30" 3x30" 3x 30"  3x30" 3x30" 4x 30"  4x 30"  4x30" 4x 30"  4x 30"  4x 30"    Ther Activity                                             Gait Training                                             Modalities               MH  Piriformis             10 min s/l                   1 on 1 time for 26min on 8/16/21

## 2021-08-18 ENCOUNTER — OFFICE VISIT (OUTPATIENT)
Dept: PHYSICAL THERAPY | Facility: CLINIC | Age: 61
End: 2021-08-18
Payer: MEDICARE

## 2021-08-18 DIAGNOSIS — M25.551 PAIN IN RIGHT HIP: Primary | ICD-10-CM

## 2021-08-18 PROCEDURE — 97110 THERAPEUTIC EXERCISES: CPT

## 2021-08-18 PROCEDURE — 97140 MANUAL THERAPY 1/> REGIONS: CPT

## 2021-08-18 NOTE — PROGRESS NOTES
Daily Note     Today's date: 2021  Patient name: Fifi Muniz  : 1960  MRN: [id number]  Referring provider: Sapphire Bañuelos DO  Dx:   Encounter Diagnosis     ICD-10-CM    1  Pain in right hip  M25 551                   Subjective: Pt noted that that she does still have pain in her R hip she cant touch it without it hurting  Pt noted that her sciatic is more painful than her hip right this moment  Objective: See treatment diary below      Assessment:  Continued with treatment session, patient noted having stretches along with manuals seem to help  Pt note no pain s/p treatment session  Tolerated treatment fair  Patient exhibited good technique with therapeutic exercises and would benefit from continued PT      Plan: Continue per plan of care  Precautions: DM, Tachycardia, Obesity, spinal stenosis    Manuals     Sciatic neuromobility               IASTM right ITB GR STM with theraband roller IASTM  SC IASTM HA IASTM KB  IASTM SC  SC JF SC SC  SC with roller     Lateral/lat inf mobs for pain     KB gr 1-2    JF GIII        STM with roller piriformis R LE only           SC SC    Neuro Re-Ed               Pt education               Webslide rows H,M,L  L 2x 10 GTB with TA   L 2 x10 GTB w/ TA M, L 2 x10 GTB w/ TA M and L 2x 10 GTB with TA  M and L 2x 10 GTB          Master Press                                                                                          Ther Ex               Nustep L3 8' 10 min L5  10 min L5 10 min L3 10 min L3  10 min l3   10 min L3  10 mIn L3  10 min L3  10 Min L3     Right ITB stretch standing     supine 30" x 3  supine 30" x 3  supine strap 3x30" supine 4x 30"  supine 30" x 4  supine 30" x 4  supine 30  " x 4     Standing hip 3 ways right 2x10 ea  2x 10 ea    2x10 ea 2x10 ea  2x 10 ea     hold      Supine bridges 2x10 2x 10  2x10  2x10 2x 10  2x 10   2x10  2x 10  2x 10  2x 10     Supine HL clamshells with t-band RTB 5"x15 RTB 2x 10  RTB 5" 2x10 RTB 5" 2x10  RTB 5" 2x 10  RTB 5" 2x 10  RTB 2x 10       Sidelying clamshells right 5"x15 5" 15x  5"x15 5"x15 2x 10  2x 10   2x 10  2x 10       Supine sciatic nerve flossing x20 2x 10  2x10 2x10 10x 10x  np 10x  10x  10x  10x     Supine piriformis stretch right 3x30" 3x 30"  3x30" 3x30" 4x 30"  4x 30"  4x30" 4x 30"  4x 30"  4x 30"  4x 30  "     Hip flexor stretch            4x 30"     Hamstring stretch on stairs           4x 30"                    Ther Activity                                             Gait Training                                             Modalities               MH  Piriformis           10 min s/l

## 2021-08-23 ENCOUNTER — OFFICE VISIT (OUTPATIENT)
Dept: PHYSICAL THERAPY | Facility: CLINIC | Age: 61
End: 2021-08-23
Payer: MEDICARE

## 2021-08-23 DIAGNOSIS — M25.551 PAIN IN RIGHT HIP: Primary | ICD-10-CM

## 2021-08-23 PROCEDURE — 97140 MANUAL THERAPY 1/> REGIONS: CPT

## 2021-08-23 PROCEDURE — 97010 HOT OR COLD PACKS THERAPY: CPT

## 2021-08-23 PROCEDURE — 97110 THERAPEUTIC EXERCISES: CPT

## 2021-08-23 NOTE — PROGRESS NOTES
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   -9/10 today when standing and standing  "sitting not that bad"       Objective: See treatment diary below      Assessment:  Continued with treatment session, Due to patient in a lot of pain this session, focused on stretching and manuals  Pt noted feeling some relief s/p treatment 6/10  Tolerated treatment fair  Patient demonstrated fatigue post treatment, exhibited good technique with therapeutic exercises and would benefit from continued PT       Plan: Continue per plan of care        Precautions: DM, Tachycardia, Obesity, spinal stenosis    Manuals    Sciatic neuromobility               IASTM right ITB GR STM with theraband roller IASTM  SC IASTM HA IASTM KB  IASTM SC  SC JF SC SC  SC with roller  SC with rollwe    Lateral/lat inf mobs for pain     KB gr 1-2    JF GIII        STM with roller piriformis R LE only           SC SC SC   Neuro Re-Ed               Pt education               Webslide rows H,M,L  L 2x 10 GTB with TA   L 2 x10 GTB w/ TA M, L 2 x10 GTB w/ TA M and L 2x 10 GTB with TA  M and L 2x 10 GTB          Master Press                                                                                          Ther Ex               Nustep L3 8' 10 min L5  10 min L5 10 min L3 10 min L3  10 min l3   10 min L3  10 mIn L3  10 min L3  10 Min L3  5 min L3    Right ITB stretch standing     supine 30" x 3  supine 30" x 3  supine strap 3x30" supine 4x 30"  supine 30" x 4  supine 30" x 4  supine 30  " x 4     Standing hip 3 ways right 2x10 ea  2x 10 ea    2x10 ea 2x10 ea  2x 10 ea     hold      Supine bridges 2x10 2x 10  2x10  2x10 2x 10  2x 10   2x10  2x 10  2x 10  2x 10     Supine HL clamshells with t-band RTB 5"x15 RTB 2x 10  RTB 5" 2x10 RTB 5" 2x10  RTB 5" 2x 10  RTB 5" 2x 10  RTB 2x 10       Sidelying clamshells right 5"x15 5" 15x  5"x15 5"x15 2x 10  2x 10   2x 10  2x 10       Supine sciatic nerve flossing x20 2x 10  2x10 2x10 10x 10x  np 10x  10x  10x  10x  10x    Supine piriformis stretch right 3x30" 3x 30"  3x30" 3x30" 4x 30"  4x 30"  4x30" 4x 30"  4x 30"  4x 30"  4x 30  "  4x 30"    Hip flexor stretch            4x 30"  4x 30"    Hamstring stretch on stairs           4x 30"  4x 30"                   Ther Activity                                             Gait Training                                             Modalities               MH  Piriformis           10 min s/l   10 min s/l

## 2021-08-25 ENCOUNTER — OFFICE VISIT (OUTPATIENT)
Dept: PHYSICAL THERAPY | Facility: CLINIC | Age: 61
End: 2021-08-25
Payer: MEDICARE

## 2021-08-25 DIAGNOSIS — M25.551 PAIN IN RIGHT HIP: Primary | ICD-10-CM

## 2021-08-25 PROCEDURE — 97110 THERAPEUTIC EXERCISES: CPT

## 2021-08-25 PROCEDURE — 97140 MANUAL THERAPY 1/> REGIONS: CPT

## 2021-08-25 NOTE — PROGRESS NOTES
Daily Note     Today's date: 2021  Patient name: Adalberto Higuera  : 1960  MRN: [id number]  Referring provider: Anant De DO  Dx:   Encounter Diagnosis     ICD-10-CM    1  Pain in right hip  M25 551        Start Time:   Stop Time:   Total time in clinic (min): 43 minutes    Subjective: Pt noted that she feels a little better than last treatment session  Pt noted that she is now taking another  Prescription medication that is helping reduce some of the pain  6/10p! In side of R hip and buttocks  Pt noted she still would like to take it easy today due to still having pretty much pain  Objective: See treatment diary below      Assessment:  Continued with treatment session, overall no changed in exercise program today  Pt noted some relief form stretches and manuals about a 5/10 p! S/p treatment session  Tolerated treatment fair  Patient demonstrated fatigue post treatment, exhibited good technique with therapeutic exercises and would benefit from continued PT   Plan: Continue per plan of care        Precautions: DM, Tachycardia, Obesity, spinal stenosis    Manuals      Sciatic neuromobility               IASTM right ITB IASTM KB  IASTM SC  SC JF SC SC  SC with roller  SC with roller SC with roller      Lateral/lat inf mobs for pain  KB gr 1-2    JF GIII           STM with roller piriformis R LE only        SC SC SC SC     Neuro Re-Ed               Pt education               Webslide rows H,M,L M, L 2 x10 GTB w/ TA M and L 2x 10 GTB with TA  M and L 2x 10 GTB             Master Press                                                                                          Ther Ex               Nustep 10 min L3 10 min L3  10 min l3   10 min L3  10 mIn L3  10 min L3  10 Min L3  5 min L3  10 min L3 to L1      Right ITB stretch standing  supine 30" x 3  supine 30" x 3  supine strap 3x30" supine 4x 30"  supine 30" x 4  supine 30" x 4  supine 30  " x 4   supine 30" x 4      Standing hip 3 ways right 2x10 ea  2x 10 ea     hold         Supine bridges 2x10 2x 10  2x 10   2x10  2x 10  2x 10  2x 10        Supine HL clamshells with t-band RTB 5" 2x10  RTB 5" 2x 10  RTB 5" 2x 10  RTB 2x 10          Sidelying clamshells right 5"x15 2x 10  2x 10   2x 10  2x 10          Supine sciatic nerve flossing 2x10 10x 10x  np 10x  10x  10x  10x  10x  10x      Supine piriformis stretch right 3x30" 4x 30"  4x 30"  4x30" 4x 30"  4x 30"  4x 30"  4x 30  "  4x 30"  4x 30"      Hip flexor stretch         4x 30"  4x 30"  3x 30"      Hamstring stretch on stairs        4x 30"  4x 30"  3x 30"                     Ther Activity                                             Gait Training                                             Modalities               MH  Piriformis        10 min s/l   10 min s/l

## 2021-08-26 DIAGNOSIS — K21.9 GERD (GASTROESOPHAGEAL REFLUX DISEASE): ICD-10-CM

## 2021-08-26 RX ORDER — OMEPRAZOLE 20 MG/1
CAPSULE, DELAYED RELEASE ORAL
Qty: 90 CAPSULE | Refills: 1 | Status: SHIPPED | OUTPATIENT
Start: 2021-08-26 | End: 2022-02-22

## 2021-08-30 ENCOUNTER — OFFICE VISIT (OUTPATIENT)
Dept: PHYSICAL THERAPY | Facility: CLINIC | Age: 61
End: 2021-08-30
Payer: MEDICARE

## 2021-08-30 DIAGNOSIS — M25.551 PAIN IN RIGHT HIP: Primary | ICD-10-CM

## 2021-08-30 PROCEDURE — 97140 MANUAL THERAPY 1/> REGIONS: CPT | Performed by: PHYSICAL THERAPIST

## 2021-08-30 PROCEDURE — 97110 THERAPEUTIC EXERCISES: CPT | Performed by: PHYSICAL THERAPIST

## 2021-08-30 NOTE — PROGRESS NOTES
Daily Note     Today's date: 2021  Patient name: Cherelle Mckeon  : 1960  MRN: [id number]  Referring provider: Destinee Herrera DO  Dx:   Encounter Diagnosis     ICD-10-CM    1  Pain in right hip  M25 551                   Subjective: Patient states she gets pain relief after therapy for only 1-2 hours  Is trying to get approval for new procedure on her spine      Objective: See treatment diary below      Assessment: Tolerated treatment well  Patient exhibited good technique with therapeutic exercises  Felt more loose after massage roller  Pain levels relieved temporarily with treatment  Plan: Continue per plan of care        Precautions: DM, Tachycardia, Obesity, spinal stenosis    Manuals     Sciatic neuromobility               IASTM right ITB IASTM KB  IASTM SC  SC JF SC SC  SC with roller  SC with roller SC with roller  JF with roller     Lateral/lat inf mobs for pain  KB gr 1-2    JF GIII           STM with roller piriformis R LE only        SC SC SC SC JF    Neuro Re-Ed               Pt education               Webslide rows H,M,L M, L 2 x10 GTB w/ TA M and L 2x 10 GTB with TA  M and L 2x 10 GTB             Master Press                                                                                          Ther Ex               Nustep 10 min L3 10 min L3  10 min l3   10 min L3  10 mIn L3  10 min L3  10 Min L3  5 min L3  10 min L3 to L1  L3x10'    Right ITB stretch standing  supine 30" x 3  supine 30" x 3  supine strap 3x30" supine 4x 30"  supine 30" x 4  supine 30" x 4  supine 30  " x 4   supine 30" x 4  supine 30" x 4     Standing hip 3 ways right 2x10 ea  2x 10 ea     hold         Supine bridges 2x10 2x 10  2x 10   2x10  2x 10  2x 10  2x 10        Supine HL clamshells with t-band RTB 5" 2x10  RTB 5" 2x 10  RTB 5" 2x 10  RTB 2x 10          Sidelying clamshells right 5"x15 2x 10  2x 10   2x 10  2x 10          Supine sciatic nerve flossing 2x10 10x 10x  np 10x  10x  10x  10x  10x  10x  10x    Supine piriformis stretch right 3x30" 4x 30"  4x 30"  4x30" 4x 30"  4x 30"  4x 30"  4x 30  "  4x 30"  4x 30"  4x 30"     Hip flexor stretch         4x 30"  4x 30"  3x 30"  3x 30"     Hamstring stretch on stairs        4x 30"  4x 30"  3x 30"  3x 30"                    Ther Activity                                             Gait Training                                             Modalities               MH  Piriformis        10 min s/l   10 min s/l Mirvaso Pregnancy And Lactation Text: This medication has not been assigned a Pregnancy Risk Category. It is unknown if the medication is excreted in breast milk.

## 2021-09-01 ENCOUNTER — APPOINTMENT (OUTPATIENT)
Dept: PHYSICAL THERAPY | Facility: CLINIC | Age: 61
End: 2021-09-01
Payer: MEDICARE

## 2021-09-04 ENCOUNTER — APPOINTMENT (OUTPATIENT)
Dept: LAB | Facility: CLINIC | Age: 61
End: 2021-09-04
Payer: MEDICARE

## 2021-09-04 DIAGNOSIS — Z51.81 ENCOUNTER FOR THERAPEUTIC DRUG MONITORING: ICD-10-CM

## 2021-09-04 LAB
APTT PPP: 32 SECONDS (ref 23–37)
ERYTHROCYTE [DISTWIDTH] IN BLOOD BY AUTOMATED COUNT: 12.2 % (ref 11.6–15.1)
HCT VFR BLD AUTO: 40.5 % (ref 34.8–46.1)
HGB BLD-MCNC: 13.5 G/DL (ref 11.5–15.4)
INR PPP: 0.97 (ref 0.84–1.19)
MCH RBC QN AUTO: 31 PG (ref 26.8–34.3)
MCHC RBC AUTO-ENTMCNC: 33.3 G/DL (ref 31.4–37.4)
MCV RBC AUTO: 93 FL (ref 82–98)
PLATELET # BLD AUTO: 380 THOUSANDS/UL (ref 149–390)
PMV BLD AUTO: 9.1 FL (ref 8.9–12.7)
PROTHROMBIN TIME: 12.5 SECONDS (ref 11.6–14.5)
RBC # BLD AUTO: 4.35 MILLION/UL (ref 3.81–5.12)
WBC # BLD AUTO: 7.6 THOUSAND/UL (ref 4.31–10.16)

## 2021-09-04 PROCEDURE — 36415 COLL VENOUS BLD VENIPUNCTURE: CPT

## 2021-09-04 PROCEDURE — 85027 COMPLETE CBC AUTOMATED: CPT

## 2021-09-04 PROCEDURE — 85610 PROTHROMBIN TIME: CPT

## 2021-09-04 PROCEDURE — 85730 THROMBOPLASTIN TIME PARTIAL: CPT

## 2021-09-06 ENCOUNTER — OFFICE VISIT (OUTPATIENT)
Dept: URGENT CARE | Facility: CLINIC | Age: 61
End: 2021-09-06
Payer: MEDICARE

## 2021-09-06 VITALS
TEMPERATURE: 97.2 F | RESPIRATION RATE: 16 BRPM | SYSTOLIC BLOOD PRESSURE: 139 MMHG | HEART RATE: 77 BPM | OXYGEN SATURATION: 96 % | DIASTOLIC BLOOD PRESSURE: 67 MMHG

## 2021-09-06 DIAGNOSIS — R05.9 COUGH: Primary | ICD-10-CM

## 2021-09-06 DIAGNOSIS — H10.023 PINK EYE DISEASE OF BOTH EYES: ICD-10-CM

## 2021-09-06 DIAGNOSIS — J01.10 ACUTE FRONTAL SINUSITIS, RECURRENCE NOT SPECIFIED: ICD-10-CM

## 2021-09-06 PROCEDURE — U0005 INFEC AGEN DETEC AMPLI PROBE: HCPCS | Performed by: PHYSICIAN ASSISTANT

## 2021-09-06 PROCEDURE — G0463 HOSPITAL OUTPT CLINIC VISIT: HCPCS | Performed by: PHYSICIAN ASSISTANT

## 2021-09-06 PROCEDURE — 99213 OFFICE O/P EST LOW 20 MIN: CPT | Performed by: PHYSICIAN ASSISTANT

## 2021-09-06 PROCEDURE — U0003 INFECTIOUS AGENT DETECTION BY NUCLEIC ACID (DNA OR RNA); SEVERE ACUTE RESPIRATORY SYNDROME CORONAVIRUS 2 (SARS-COV-2) (CORONAVIRUS DISEASE [COVID-19]), AMPLIFIED PROBE TECHNIQUE, MAKING USE OF HIGH THROUGHPUT TECHNOLOGIES AS DESCRIBED BY CMS-2020-01-R: HCPCS | Performed by: PHYSICIAN ASSISTANT

## 2021-09-06 RX ORDER — BROMPHENIRAMINE MALEATE, PSEUDOEPHEDRINE HYDROCHLORIDE, AND DEXTROMETHORPHAN HYDROBROMIDE 2; 30; 10 MG/5ML; MG/5ML; MG/5ML
5 SYRUP ORAL 4 TIMES DAILY PRN
Qty: 120 ML | Refills: 0 | Status: CANCELLED | OUTPATIENT
Start: 2021-09-06

## 2021-09-06 RX ORDER — OFLOXACIN 3 MG/ML
1 SOLUTION/ DROPS OPHTHALMIC 4 TIMES DAILY
Qty: 5 ML | Refills: 0 | Status: SHIPPED | OUTPATIENT
Start: 2021-09-06 | End: 2021-12-22

## 2021-09-06 RX ORDER — AMOXICILLIN AND CLAVULANATE POTASSIUM 875; 125 MG/1; MG/1
1 TABLET, FILM COATED ORAL EVERY 12 HOURS SCHEDULED
Qty: 14 TABLET | Refills: 0 | Status: SHIPPED | OUTPATIENT
Start: 2021-09-06 | End: 2021-09-13

## 2021-09-06 RX ORDER — BENZONATATE 100 MG/1
100 CAPSULE ORAL 3 TIMES DAILY PRN
Qty: 20 CAPSULE | Refills: 0 | Status: SHIPPED | OUTPATIENT
Start: 2021-09-06 | End: 2021-09-22

## 2021-09-06 NOTE — PATIENT INSTRUCTIONS
antibiotic 2 x a day for 7   Use the drops 4x a day       COVID instructions provided to patient and patient instructed to self isolate at home until swab returns  Will follow up when COVID swab is available, Recommend to check [address] for quickest access to results  If result is positive individual must quarantine for 10 days from symptom onset  If a significant exposure occurred ( within 6 feet of a COVID positive pt for 15 minutes or more without a mask on) the exposed individual must self isolate for 14 days from that date and monitor for symptoms  Recommend taking Vitamins such as D3, C and Zinc OTC  Vitamin D3 2000 IU once a day  Vitamin C 1g by mouth twice a day 12 hours apart  It is recommended to wear a mask while in public or within 6 feet from others, proper hand washing and hygiene  If you are feeling unwell, we recommend to self-isolate at home and stay away from household contacts until well again  If you develop any chest pain, chest pain with deep breathing, shortness of breath, or trouble breathing go to the ER  COVID-19 (Coronavirus Disease 2019)   AMBULATORY CARE:   Coronavirus disease 2019 (COVID-19)  is the disease caused by a coronavirus first discovered in December 2019  Coronaviruses generally cause upper respiratory (nose, throat, and lung) infections, such as a cold  The new virus spreads quickly and easily  The virus can be spread starting 2 days before symptoms even begin  The virus has also changed into several new forms (called variants) since it was discovered  The variants may be more contagious (easily spread) than the original form  Some may also cause more severe illness than others  It is important to follow local, national, and worldwide measures to protect yourself and others from infection  Signs and symptoms of COVID-19  may not develop  Signs and symptoms that develop usually start about 5 days after infection but can take 2 to 14 days   You may feel like you have the flu or a bad cold  Some signs and symptoms go away in a few days  Others can last weeks, months, or possibly years  Information on COVID-19 is still being learned  Tell your healthcare provider if you think you were infected but develop signs or symptoms not listed below:  · A cough    · Shortness of breath or trouble breathing that may become severe    · A fever of at least 100 4°F, or 38°C (may be lower in adults 65 or older)    · Chills that might include shaking    · Muscle pain, body aches, or a headache    · A sore throat    · Suddenly not being able to taste or smell anything    · Feeling mentally and physically tired (fatigue)    · Congestion (stuffy head and nose), or a runny nose    · Diarrhea, nausea, or vomiting    If you think you or someone you know may be infected:  Do the following to protect others:  · If emergency care is needed,  tell the  about the possible infection, or call ahead and tell the emergency department  · Call a healthcare provider  for instructions if symptoms are mild  Anyone who may be infected should not  arrive without calling first  The provider will need to protect staff members and other patients  · The person who may be infected needs to wear a face covering  while getting medical care  This will help lower the risk of infecting others  Coverings are not used for anyone who is younger than 2 years, has breathing problems, or cannot remove it  The provider can give you instructions for anyone who cannot wear a covering  Call your local emergency number (911 in the [address] [place] [address],[address]) or an emergency department if:   · You have trouble breathing or shortness of breath at rest     · You have chest pain or pressure that lasts longer than 5 minutes  · You become confused or hard to wake  · Your lips or face are blue  · You have a fever of 104°F (40°C) or higher      Call your doctor if:   · You do not  have symptoms of COVID-19 but had close physical contact within 14 days with someone who tested positive  · You have questions or concerns about your condition or care  How COVID-19 is diagnosed: If you think you have COVID-19, call your healthcare provider  He or she will tell you what to do based on your symptoms and testing guidelines in your area  In general, the following may be used:  · A viral test  shows if you have a current infection  Samples are taken from your nose and throat, usually with swabs  You may need to wait several days to get the test results  Your healthcare provider will tell you how to get your results  You will need to quarantine (stay physically away from others) until you get your results  If results show you have COVID-19, you will need to continue until you are well  Your provider or other health official may give you more directions  You will also need to prevent another infection until it is known if you can get COVID-19 again  · An antibody test  shows if you had a past infection  Blood samples are used for this test  Antibodies are made by your immune system to attack the virus that causes COVID-19  Antibodies will form 1 to 3 weeks after you are infected  It is not known if antibodies prevent a second infection, or for how long a person might be protected  If you have antibodies, you will still need to be careful around others until more is known  · CT scans or x-rays  may be used to check for signs of pneumonia  The 2019 coronavirus causes a specific kind of pneumonia, usually in both lungs  The pictures may also be used to check for health problems in other parts of your body  Treatment  such as monoclonal antibodies and convalescent plasma have emergency use authorization (EUA)  This means they may be given only to patients who are hospitalized with severe signs and symptoms  The following may be used to manage your symptoms:  · Mild symptoms  may get better on their own   If you do not need to be treated in a hospital, you will be given instructions to use at home  Your condition will be closely monitored  You will need to watch for worsening symptoms and seek immediate care if needed  Talk to your healthcare provider about the following:    ? Decongestants  help reduce nasal congestion and help you breathe more easily  If you take decongestant pills, they may make you feel restless or cause problems with your sleep  Do not use decongestant sprays for more than a few days  ? Cough suppressants  help reduce coughing  Ask your healthcare provider which type of cough medicine is best for you  ? To soothe a sore throat,  gargle with warm salt water, or use throat lozenges or a throat spray  Drink more liquids to thin and loosen mucus and to prevent dehydration  ? NSAIDs or acetaminophen  can help lower a fever and relieve body aches or a headache  Follow directions  If not taken correctly, NSAIDs can cause kidney damage and acetaminophen can cause liver damage  · Severe or life-threatening symptoms  are treated in the hospital  You may need a combination of the following:    ? Medicines  may be given to lower or prevent inflammation or to fight the virus  You may also need blood thinners to prevent or treat blood clots  If you have a deep vein thrombosis (DVT) or pulmonary embolism (PE), you may need to keep using blood thinners for 3 months  ? Extra oxygen  may be given if you have respiratory failure  This means your lungs cannot get enough oxygen into your blood and out to your organs  Extra oxygen can help prevent organ failure  ? A ventilator  may be used to help you breathe  What you need to know about health problems the virus may cause:  Serious health problems may improve or continue for weeks, months, and possibly years  Health problems that continue may be called long COVID  Anyone can develop serious problems from this virus, but your risk is higher if you are 72 or older   A weak immune system, diabetes, or a heart or lung condition can also increase your risk  Your risk is also higher if you are a current or former cigarette smoker  COVID-19 can lead to any of the following:  · Serious lower respiratory conditions, such as pneumonia or acute respiratory distress syndrome (ARDS)    · Blood vessel damage, leading to blood clots    · Organ damage from a lack of oxygen or from blood clots    · Sleep problems    · Problems thinking clearly, remembering information, or concentrating    · Mood changes, depression, or anxiety    How the 2019 coronavirus spreads: The following are ways the virus is thought to spread, but more information may be coming:  · Droplets are the main way all coronaviruses spread  The virus travels in droplets that form when a person talks, coughs, or sneezes  The droplets can also float in the air for minutes or hours  Infection happens when you breathe in the droplets or get them in your eyes or nose  Close personal contact with an infected person increases your risk for infection  This means being within 6 feet (2 meters) of the person for at least 15 minutes over 24 hours  · Person-to-person contact can spread the virus  For example, a person with the virus on his or her hands can spread it by shaking hands with someone  · The virus can stay on objects and surfaces for a short time  You may become infected by touching the object or surface and then touching your eyes or mouth  · An infected animal may be able to infect a person who touches it  This may happen at live markets or on a farm  Help lower the risk for COVID-19:  The best way to prevent infection is to avoid anyone who is infected, but this can be hard to do  An infected person can spread the virus before signs or symptoms begin, or even if signs or symptoms never develop  The following can help lower the risk for infection:      · Wash your hands often throughout the day  Use soap and water  Rub your soapy hands together, lacing your fingers, for at least 20 seconds  Rinse with warm, running water  Dry your hands with a clean towel or paper towel  Use hand  that contains alcohol if soap and water are not available  Teach children how to wash their hands and use hand   · Cover sneezes and coughs  Turn your face away and cover your mouth and nose with a tissue  Throw the tissue away  Use the bend of your arm if a tissue is not available  Then wash your hands well with soap and water or use hand   Teach children how to cover a cough or sneeze  · Wear a face covering (mask) around anyone who does not live in your home  Use a cloth covering with at least 2 layers  You can also create layers by putting a cloth covering over a disposable non-medical mask  Cover your mouth and your nose  The covering should fit snugly against the bridge of your nose  Securely fasten it under your chin and on the sides of your face  Do not  wear a plastic face shield instead of a covering  Continue social distancing and washing your hands often  A face covering is not a substitute for social distancing safety measures  · Follow worldwide, national, and local social distancing guidelines  Keep at least 6 feet (2 meters) between you and others  Also keep this distance from anyone who comes to your home, such as someone making a delivery  Wear a face covering while you are around others  You will need to wear a covering in restaurants, stores, and other public buildings  You will also need a covering on mass transit, such as a bus, subway, or airplane  Remember to use a covering made from thick material or wear 2 coverings together  · Make a habit of not touching your face  If you get the virus on your hands, you can transfer it to your eyes, nose, or mouth and become infected  You can also transfer it to objects, surfaces, or people   Do not touch your eyes, nose, or mouth without washing your hands first     · Clean and disinfect high-touch surfaces and objects often  Use disinfecting wipes, or make a solution of 4 teaspoons of bleach in 1 quart (4 cups) of water  Clean and disinfect even if you think no one living in or coming to your home is infected with the virus  · Ask about vaccines you may need  Get a COVID-19 vaccine when it is available to you  The current vaccines are given as a shot in 1 or 2 doses  Get the influenza (flu) vaccine as soon as recommended each year, usually starting in September or October  Get the pneumonia vaccine if recommended  Follow social distancing guidelines:  National and local social distancing rules vary  Rules may change over time as restrictions are lifted  Restrictions may return if an outbreak happens where you live  It is important to know and follow all current social distancing rules in your area  The following are general guidelines:  · Stay home if you are sick or think you may have COVID-19  It is important to stay home if you are waiting for a testing appointment or for test results  Even if you do not have symptoms, you can pass the virus to others  · Limit trips out of your home  Have food, medicines, and other supplies delivered and left at your door or other area, if possible  Plan trips out of your home so you make the fewest stops possible to limit close personal contact  Keep track of places you go  This will help contact tracers notify others if you become infected  · Avoid close physical contact with anyone who does not live in your home  Do not shake hands with, hug, or kiss a person as a greeting  If you must use public transportation (such as a bus or subway), try to sit or stand away from others  Only allow necessary people into your home  Wear your face covering, and remind others to wear a face covering  Remind them to wash their hands when they arrive and before they leave   Do not  let someone into your home or go to someone's home just to visit  Even if you both do not feel sick, the virus can pass from one of you to the other  · Avoid in-person gatherings and crowds  Gatherings or crowds of 10 or more individuals can cause the virus to spread  Avoid places such as [place], beaches, sporting events, and tourist attractions  For events such as parties, holiday meals, Synagogue services, and conferences, attend virtually (on a computer), if possible  · Ask your healthcare provider for other ways to have appointments  Some providers offer phone, video, or other types of appointments  You may also be able to get prescriptions for a few months of your medicines at a time  · Stay safe if you must go out to work  Keep physical distance between you and other workers as much as possible  Follow your employer's rules so everyone stays safe  If you have COVID-19 and are recovering at home,  healthcare providers will give you specific instructions to follow  The following are general guidelines to remind you how to keep others safe until you are well:  · Wash your hands often  Use soap and water as much as possible  Use hand  that contains alcohol if soap and water are not available  Dry your hands with a clean towel or paper towel  Do not share towels with anyone  If you use paper towels, throw them away in a lined trash can kept in your room or area  Use a covered trash can, if possible  · Do not go out of your home unless it is necessary  Ask someone who is not infected to go out for groceries or supplies, or have them delivered  Do not go to your healthcare provider's office without an appointment  · Only have close physical contact with a person giving direct care, or a baby or child you must care for  Family members and friends should not visit you  If possible, stay in a separate area or room of your home if you live with others   No one should go into the area or room except to give you alicia Carpenter can visit with others by phone, video chat, e-mail, or similar systems  · Wear a face covering while others are near you  This can help prevent droplets from spreading the virus when you talk, sneeze, or cough  Put the covering on before anyone comes into your room or area  Remind the person to cover his or her nose and mouth before coming in to provide care for you  · Do not share items  Do not share dishes, towels, or other items with anyone  Items need to be washed after you use them  · Protect your baby  Some newborns have tested positive for the virus  It is not known if they became infected before or after birth  The highest risk is when a  has close contact with an infected person  If you are pregnant or breastfeeding, talk to your healthcare provider or obstetrician about any concerns you have  He or she will tell you when to bring your baby in for check-ups and vaccines  He or she will also tell you what to do if you think your baby was infected with the coronavirus  Wash your hands and put on a clean face covering before you breastfeed or care for your baby  · Do not handle live animals unless it is necessary  Some animals, including pets, have been infected with the new coronavirus  Ask someone who is not infected to take care of your pet until you are well  If you must care for a pet, wear a face covering  Wash your hands before and after you give care  Talk to your healthcare provider about how to keep a service animal safe, if needed  · Follow directions from your healthcare provider for being around others after you recover  It is not known if or for how long a recovered person can pass the virus to others  Your provider may give you instructions, such as continuing social distancing and wearing a face covering  He or she will tell you when it is okay to be around others again   This may be 10 to 20 days after symptoms started or you had a positive test  Most symptoms will also need to be gone  Your provider will give you more information  Follow up with your doctor as directed:  Write down your questions so you remember to ask them during your visits  For more information:   · Centers for Disease Control and Prevention  [address] , [address]  Phone: 3- 520 - [phone]  Web Address: [employer]    © [address] [place] [address] 2021 Information is for End User's use only and may not be sold, redistributed or otherwise used for commercial purposes  All illustrations and images included in CareNotes® are the copyrighted property of A D A M [employer] Inc  or [address] [place], [place] [address]   The above information is an  only  It is not intended as medical advice for individual conditions or treatments  Talk to your doctor, nurse or pharmacist before following any medical regimen to see if it is safe and effective for you

## 2021-09-06 NOTE — LETTER
September 6, 2021     Patient: Cherelle Mckeon   YOB: 1960   Date of Visit: 9/6/2021       To Whom it May Concern:    Soren Dhillon is under my professional care  She was seen in my office on 9/6/2021  She may return to work pending negative COVID test    If you have any questions or concerns, please don't hesitate to call           Sincerely,          Abby Alicea PA-C        CC: No Recipients

## 2021-09-06 NOTE — PROGRESS NOTES
[facility]        NAME: Fifi Muniz is a 64 y o  female  : 1960    MRN: [id number]  DATE: 2021  TIME: 8:50 AM    Assessment and Plan   Cough [R05]  1  Cough  benzonatate (TESSALON PERLES) 100 mg capsule    Novel Coronavirus (COVID-19), PCR [facility] Col[facility] at [facility] the [facility] or [facility]   2  Acute frontal sinusitis, recurrence not specified  amoxicillin-clavulanate (AUGMENTIN) 875-125 mg per tablet   3  Pink eye disease of both eyes  ofloxacin (OCUFLOX) 0 3 % ophthalmic solution         Patient Instructions   Patient Instructions     antibiotic 2 x a day for 7   Use the drops 4x a day       COVID instructions provided to patient and patient instructed to self isolate at home until swab returns  Will follow up when COVID swab is available, Recommend to check [employer] for quickest access to results  If result is positive individual must quarantine for 10 days from symptom onset  If a significant exposure occurred ( within 6 feet of a COVID positive pt for 15 minutes or more without a mask on) the exposed individual must self isolate for 14 days from that date and monitor for symptoms  Recommend taking Vitamins such as D3, C and Zinc OTC  Vitamin D3 2000 IU once a day  Vitamin C 1g by mouth twice a day 12 hours apart  It is recommended to wear a mask while in public or within 6 feet from others, proper hand washing and hygiene  If you are feeling unwell, we recommend to self-isolate at home and stay away from household contacts until well again  If you develop any chest pain, chest pain with deep breathing, shortness of breath, or trouble breathing go to the ER  COVID-19 (Coronavirus Disease 2019)   AMBULATORY CARE:   Coronavirus disease 2019 (COVID-19)  is the disease caused by a coronavirus first discovered in 2019  Coronaviruses generally cause upper respiratory (nose, throat, and lung) infections, such as a cold   The new virus spreads quickly and easily  The virus can be spread starting 2 days before symptoms even begin  The virus has also changed into several new forms (called variants) since it was discovered  The variants may be more contagious (easily spread) than the original form  Some may also cause more severe illness than others  It is important to follow local, national, and worldwide measures to protect yourself and others from infection  Signs and symptoms of COVID-19  may not develop  Signs and symptoms that develop usually start about 5 days after infection but can take 2 to 14 days  You may feel like you have the flu or a bad cold  Some signs and symptoms go away in a few days  Others can last weeks, months, or possibly years  Information on COVID-19 is still being learned  Tell your healthcare provider if you think you were infected but develop signs or symptoms not listed below:  · A cough    · Shortness of breath or trouble breathing that may become severe    · A fever of at least 100 4°F, or 38°C (may be lower in adults 65 or older)    · Chills that might include shaking    · Muscle pain, body aches, or a headache    · A sore throat    · Suddenly not being able to taste or smell anything    · Feeling mentally and physically tired (fatigue)    · Congestion (stuffy head and nose), or a runny nose    · Diarrhea, nausea, or vomiting    If you think you or someone you know may be infected:  Do the following to protect others:  · If emergency care is needed,  tell the  about the possible infection, or call ahead and tell the emergency department  · Call a healthcare provider  for instructions if symptoms are mild  Anyone who may be infected should not  arrive without calling first  The provider will need to protect staff members and other patients  · The person who may be infected needs to wear a face covering  while getting medical care  This will help lower the risk of infecting others   Coverings are not used for anyone who is younger than 2 years, has breathing problems, or cannot remove it  The provider can give you instructions for anyone who cannot wear a covering  Call your local emergency number (911 in the [address]) or an emergency department if:   · You have trouble breathing or shortness of breath at rest     · You have chest pain or pressure that lasts longer than 5 minutes  · You become confused or hard to wake  · Your lips or face are blue  · You have a fever of 104°F (40°C) or higher  Call your doctor if:   · You do not  have symptoms of COVID-19 but had close physical contact within 14 days with someone who tested positive  · You have questions or concerns about your condition or care  How COVID-19 is diagnosed: If you think you have COVID-19, call your healthcare provider  He or she will tell you what to do based on your symptoms and testing guidelines in your area  In general, the following may be used:  · A viral test  shows if you have a current infection  Samples are taken from your nose and throat, usually with swabs  You may need to wait several days to get the test results  Your healthcare provider will tell you how to get your results  You will need to quarantine (stay physically away from others) until you get your results  If results show you have COVID-19, you will need to continue until you are well  Your provider or other health official may give you more directions  You will also need to prevent another infection until it is known if you can get COVID-19 again  · An antibody test  shows if you had a past infection  Blood samples are used for this test  Antibodies are made by your immune system to attack the virus that causes COVID-19  Antibodies will form 1 to 3 weeks after you are infected  It is not known if antibodies prevent a second infection, or for how long a person might be protected  If you have antibodies, you will still need to be careful around others until more is known      · CT scans or x-rays  may be used to check for signs of pneumonia  The 2019 coronavirus causes a specific kind of pneumonia, usually in both lungs  The pictures may also be used to check for health problems in other parts of your body  Treatment  such as monoclonal antibodies and convalescent plasma have emergency use authorization (EUA)  This means they may be given only to patients who are hospitalized with severe signs and symptoms  The following may be used to manage your symptoms:  · Mild symptoms  may get better on their own  If you do not need to be treated in a hospital, you will be given instructions to use at home  Your condition will be closely monitored  You will need to watch for worsening symptoms and seek immediate care if needed  Talk to your healthcare provider about the following:    ? Decongestants  help reduce nasal congestion and help you breathe more easily  If you take decongestant pills, they may make you feel restless or cause problems with your sleep  Do not use decongestant sprays for more than a few days  ? Cough suppressants  help reduce coughing  Ask your healthcare provider which type of cough medicine is best for you  ? To soothe a sore throat,  gargle with warm salt water, or use throat lozenges or a throat spray  Drink more liquids to thin and loosen mucus and to prevent dehydration  ? NSAIDs or acetaminophen  can help lower a fever and relieve body aches or a headache  Follow directions  If not taken correctly, NSAIDs can cause kidney damage and acetaminophen can cause liver damage  · Severe or life-threatening symptoms  are treated in the hospital  You may need a combination of the following:    ? Medicines  may be given to lower or prevent inflammation or to fight the virus  You may also need blood thinners to prevent or treat blood clots   If you have a deep vein thrombosis (DVT) or pulmonary embolism (PE), you may need to keep using blood thinners for 3 months  ? Extra oxygen  may be given if you have respiratory failure  This means your lungs cannot get enough oxygen into your blood and out to your organs  Extra oxygen can help prevent organ failure  ? A ventilator  may be used to help you breathe  What you need to know about health problems the virus may cause:  Serious health problems may improve or continue for weeks, months, and possibly years  Health problems that continue may be called long COVID  Anyone can develop serious problems from this virus, but your risk is higher if you are 72 or older  A weak immune system, diabetes, or a heart or lung condition can also increase your risk  Your risk is also higher if you are a current or former cigarette smoker  COVID-19 can lead to any of the following:  · Serious lower respiratory conditions, such as pneumonia or acute respiratory distress syndrome (ARDS)    · Blood vessel damage, leading to blood clots    · Organ damage from a lack of oxygen or from blood clots    · Sleep problems    · Problems thinking clearly, remembering information, or concentrating    · Mood changes, depression, or anxiety    How the 2019 coronavirus spreads: The following are ways the virus is thought to spread, but more information may be coming:  · Droplets are the main way all coronaviruses spread  The virus travels in droplets that form when a person talks, coughs, or sneezes  The droplets can also float in the air for minutes or hours  Infection happens when you breathe in the droplets or get them in your eyes or nose  Close personal contact with an infected person increases your risk for infection  This means being within 6 feet (2 meters) of the person for at least 15 minutes over 24 hours  · Person-to-person contact can spread the virus  For example, a person with the virus on his or her hands can spread it by shaking hands with someone  · The virus can stay on objects and surfaces for a short time    You may become infected by touching the object or surface and then touching your eyes or mouth  · An infected animal may be able to infect a person who touches it  This may happen at live markets or on a farm  Help lower the risk for COVID-19:  The best way to prevent infection is to avoid anyone who is infected, but this can be hard to do  An infected person can spread the virus before signs or symptoms begin, or even if signs or symptoms never develop  The following can help lower the risk for infection:      · Wash your hands often throughout the day  Use soap and water  Rub your soapy hands together, lacing your fingers, for at least 20 seconds  Rinse with warm, running water  Dry your hands with a clean towel or paper towel  Use hand  that contains alcohol if soap and water are not available  Teach children how to wash their hands and use hand   · Cover sneezes and coughs  Turn your face away and cover your mouth and nose with a tissue  Throw the tissue away  Use the bend of your arm if a tissue is not available  Then wash your hands well with soap and water or use hand   Teach children how to cover a cough or sneeze  · Wear a face covering (mask) around anyone who does not live in your home  Use a cloth covering with at least 2 layers  You can also create layers by putting a cloth covering over a disposable non-medical mask  Cover your mouth and your nose  The covering should fit snugly against the bridge of your nose  Securely fasten it under your chin and on the sides of your face  Do not  wear a plastic face shield instead of a covering  Continue social distancing and washing your hands often  A face covering is not a substitute for social distancing safety measures  · Follow worldwide, national, and local social distancing guidelines  Keep at least 6 feet (2 meters) between you and others   Also keep this distance from anyone who comes to your home, such as someone making a delivery  Wear a face covering while you are around others  You will need to wear a covering in restaurants, stores, and other public buildings  You will also need a covering on mass transit, such as a bus, subway, or airplane  Remember to use a covering made from thick material or wear 2 coverings together  · Make a habit of not touching your face  If you get the virus on your hands, you can transfer it to your eyes, nose, or mouth and become infected  You can also transfer it to objects, surfaces, or people  Do not touch your eyes, nose, or mouth without washing your hands first     · Clean and disinfect high-touch surfaces and objects often  Use disinfecting wipes, or make a solution of 4 teaspoons of bleach in 1 quart (4 cups) of water  Clean and disinfect even if you think no one living in or coming to your home is infected with the virus  · Ask about vaccines you may need  Get a COVID-19 vaccine when it is available to you  The current vaccines are given as a shot in 1 or 2 doses  Get the influenza (flu) vaccine as soon as recommended each year, usually starting in September or October  Get the pneumonia vaccine if recommended  Follow social distancing guidelines:  National and local social distancing rules vary  Rules may change over time as restrictions are lifted  Restrictions may return if an outbreak happens where you live  It is important to know and follow all current social distancing rules in your area  The following are general guidelines:  · Stay home if you are sick or think you may have COVID-19  It is important to stay home if you are waiting for a testing appointment or for test results  Even if you do not have symptoms, you can pass the virus to others  · Limit trips out of your home  Have food, medicines, and other supplies delivered and left at your door or other area, if possible   Plan trips out of your home so you make the fewest stops possible to limit close personal contact  Keep track of places you go  This will help contact tracers notify others if you become infected  · Avoid close physical contact with anyone who does not live in your home  Do not shake hands with, hug, or kiss a person as a greeting  If you must use public transportation (such as a bus or subway), try to sit or stand away from others  Only allow necessary people into your home  Wear your face covering, and remind others to wear a face covering  Remind them to wash their hands when they arrive and before they leave  Do not  let someone into your home or go to someone's home just to visit  Even if you both do not feel sick, the virus can pass from one of you to the other  · Avoid in-person gatherings and crowds  Gatherings or crowds of 10 or more individuals can cause the virus to spread  Avoid places such as st[place]ffer, beaches, sporting events, and tourist attractions  For events such as parties, holiday meals, Hindu services, and conferences, attend virtually (on a computer), if possible  · Ask your healthcare provider for other ways to have appointments  Some providers offer phone, video, or other types of appointments  You may also be able to get prescriptions for a few months of your medicines at a time  · Stay safe if you must go out to work  Keep physical distance between you and other workers as much as possible  Follow your employer's rules so everyone stays safe  If you have COVID-19 and are recovering at home,  healthcare providers will give you specific instructions to follow  The following are general guidelines to remind you how to keep others safe until you are well:  · Wash your hands often  Use soap and water as much as possible  Use hand  that contains alcohol if soap and water are not available  Dry your hands with a clean towel or paper towel  Do not share towels with anyone   If you use paper towels, throw them away in a lined trash can kept in your room or area  Use a covered trash can, if possible  · Do not go out of your home unless it is necessary  Ask someone who is not infected to go out for groceries or supplies, or have them delivered  Do not go to your healthcare provider's office without an appointment  · Only have close physical contact with a person giving direct care, or a baby or child you must care for  Family members and friends should not visit you  If possible, stay in a separate area or room of your home if you live with others  No one should go into the area or room except to give you care  You can visit with others by phone, video chat, e-mail, or similar systems  · Wear a face covering while others are near you  This can help prevent droplets from spreading the virus when you talk, sneeze, or cough  Put the covering on before anyone comes into your room or area  Remind the person to cover his or her nose and mouth before coming in to provide care for you  · Do not share items  Do not share dishes, towels, or other items with anyone  Items need to be washed after you use them  · Protect your baby  Some newborns have tested positive for the virus  It is not known if they became infected before or after birth  The highest risk is when a  has close contact with an infected person  If you are pregnant or breastfeeding, talk to your healthcare provider or obstetrician about any concerns you have  He or she will tell you when to bring your baby in for check-ups and vaccines  He or she will also tell you what to do if you think your baby was infected with the coronavirus  Wash your hands and put on a clean face covering before you breastfeed or care for your baby  · Do not handle live animals unless it is necessary  Some animals, including pets, have been infected with the new coronavirus  Ask someone who is not infected to take care of your pet until you are well  If you must care for a pet, wear a face covering  Wash your hands before and after you give care  Talk to your healthcare provider about how to keep a service animal safe, if needed  · Follow directions from your healthcare provider for being around others after you recover  It is not known if or for how long a recovered person can pass the virus to others  Your provider may give you instructions, such as continuing social distancing and wearing a face covering  He or she will tell you when it is okay to be around others again  This may be 10 to 20 days after symptoms started or you had a positive test  Most symptoms will also need to be gone  Your provider will give you more information  Follow up with your doctor as directed:  Write down your questions so you remember to ask them during your visits  For more information:   · Centers for Disease Control and Prevention  [address] , [address]  Phone: 4- 430 - [phone]  Web Address: [employer]    © [address] [place] [address] 2021 Information is for End User's use only and may not be sold, redistributed or otherwise used for commercial purposes  All illustrations and images included in CareNotes® are the copyrighted property of A D A M , Inc  or [address] [place] [address]  The above information is an  only  It is not intended as medical advice for individual conditions or treatments  Talk to your doctor, nurse or pharmacist before following any medical regimen to see if it is safe and effective for you  Follow up with PCP in 3-5 days  Proceed to  ER if symptoms worsen  Chief Complaint     Chief Complaint   Patient presents with    Cold Like Symptoms     Pt c/o head congestion, cough, left eye redness x 1 week         History of Present Illness       The pt is a 65-year-old female presenting for a cough, congestion, horse voice x 1 week  This morning she woke up with b/l erythema of the eyes  Pt is vaccinated against COVID         Review of Systems   Review of Systems Constitutional: Negative for activity change, appetite change, chills, fatigue and fever  HENT: Positive for congestion  Negative for rhinorrhea, sinus pressure, sinus pain and sore throat  Eyes: Positive for redness (left)  Negative for photophobia, pain, discharge, itching and visual disturbance  Respiratory: Positive for cough  Negative for chest tightness and shortness of breath  Cardiovascular: Negative for chest pain and palpitations  Gastrointestinal: Negative for diarrhea, nausea and vomiting  Musculoskeletal: Negative for arthralgias and myalgias  Skin: Negative for color change and pallor  Neurological: Negative for headaches  Current Medications       Current Outpatient Medications:     baclofen 10 mg tablet, Take 10 mg by mouth daily, Disp: , Rfl:     buPROPion (WELLBUTRIN) 75 mg tablet, TAKE 1 TABLET BY MOUTH TWICE A DAY, Disp: 180 tablet, Rfl: 1    Calcium Citrate-Vitamin D (CALCIUM CITRATE +D) 315-250 MG-UNIT TABS, Take 1 tablet by mouth 2 (two) times a day , Disp: , Rfl:     escitalopram (LEXAPRO) 20 mg tablet, TAKE 1 TABLET BY MOUTH EVERY DAY IN THE MORNING, Disp: 90 tablet, Rfl: 1    fenofibrate (TRIGLIDE) 160 MG tablet, TAKE 1 TABLET BY MOUTH EVERY DAY, Disp: 90 tablet, Rfl: 1    gabapentin (NEURONTIN) 100 mg capsule, TAKE two CAPSULE at HS, Disp: , Rfl:     HYDROmorphone (DILAUDID) 4 mg tablet, TAKE 1 TABLET BY MOUTH EVERY 6 HOURS AS NEEDED    FILL 5/8/2020, Disp: , Rfl:     loratadine (CLARITIN) 10 mg tablet, Take 10 mg by mouth daily  , Disp: , Rfl:     Multiple Vitamins-Minerals (BARIATRIC FUSION PO), Take 1 tablet by mouth daily, Disp: , Rfl:     omeprazole (PriLOSEC) 20 mg delayed release capsule, TAKE 1 CAPSULE BY MOUTH EVERY DAY, Disp: 90 capsule, Rfl: 1    ondansetron (ZOFRAN) 4 mg tablet, TAKE 1 TABLET BY MOUTH EVERY 8 HOURS AS NEEDED FOR NAUSEA AND VOMITING, Disp: 15 tablet, Rfl: 0    oxybutynin (DITROPAN) 5 mg tablet, Take 1 tablet (5 mg total) by mouth 3 (three) times a day as needed (bladder spasms), Disp: 270 tablet, Rfl: 1    amoxicillin-clavulanate (AUGMENTIN) 875-125 mg per tablet, Take 1 tablet by mouth every 12 (twelve) hours for 7 days, Disp: 14 tablet, Rfl: 0    benzonatate (TESSALON PERLES) 100 mg capsule, Take 1 capsule (100 mg total) by mouth 3 (three) times a day as needed for cough, Disp: 20 capsule, Rfl: 0    fluocinonide (LIDEX) 0 05 % cream, Apply 1 application topically 2 (two) times a day To affected area (Patient not taking: Reported on 9/6/2021), Disp: , Rfl:     NARCAN 4 MG/0 1ML LIQD, , Disp: , Rfl:     ofloxacin (OCUFLOX) 0 3 % ophthalmic solution, Administer 1 drop to both eyes 4 (four) times a day, Disp: 5 mL, Rfl: 0    Current Allergies     Allergies as of 09/06/2021    (No Known Allergies)            The following portions of the patient's history were reviewed and updated as appropriate: allergies, current medications, past family history, past medical history, past social history, past surgical history and problem list      Past Medical History:   Diagnosis Date    Abdominal pain, epigastric 3/23/2018    Added automatically from request for surgery [id number]    Abnormal x-ray of lumbar spine 11/3/2015    Acute cystitis with hematuria 4/7/2020    Bariatric surgery status     Basal cell carcinoma     basil cell carcinoma- in remission    Benign essential hypertension 6/12/2012    [facility]me[facility] internal [facility]    Calculus of bile duct with cholecystitis without obstruction 4/27/2018    Added automatically from request for surgery [id number]    Cellulitis of finger 12/3/2015    Degenerative lumbar disc     Digital mucinous cyst 6/24/2015    DMII (diabetes mellitus, type 2) ([facility] ) 11/8/2013    Gross hematuria 3/19/2019    Hiatal hernia     History of transfusion 2014    Post-op spinal surgery    Low back pain     Lower urinary tract infectious disease 3/27/2015    Medicare annual wellness visit, initial 2019    Obesity     Resolved 10/6/2016     Overactive bladder     Postsurgical malabsorption     Recurrent UTI 3/19/2019    Spinal stenosis     [facility]     Tachycardia     Resolved 2016     Type 2 diabetes mellitus ([facility] )     Last assesssed 2018     Wears glasses        Past Surgical History:   Procedure Laterality Date    APPENDECTOMY      ARTHRODESIS      Lumbar - Last assessed 2018    [facility] BACK SURGERY      Lumbar (3 surgeries)- two levels fused, clean out from infection, then fusion of 7 levels (last surgery in )   [address] [facility]      x2    CERVICAL SPINE SURGERY      Fusion of C2-C7 over a period of 3 surgeries ( first)     SECTION      X2    CHOLECYSTECTOMY      FL MYELOGRAM LUMBAR  3/28/2019    INSERTION / PLACEMENT / REVISION NEUROSTIMULATOR      X2- both were removed    NECK SURGERY      OTHER SURGICAL HISTORY      Catheter ablation     NC EGD TRANSORAL BIOPSY SINGLE/MULTIPLE N/A 2016    Procedure: ESOPHAGOGASTRODUODENOSCOPY (EGD); Surgeon: Jr Dasilva MD;  [facility]; Service: Bariatrics    NC EGD TRANSORAL BIOPSY SINGLE/MULTIPLE N/A 2018    Procedure: ESOPHAGOGASTRODUODENOSCOPY (EGD) with biopsy;  Surgeon: Jr Dasilva MD;  [facility];   Service: Bariatrics    NC LAP GASTRIC BYPASS/BRAULIO-EN-Y N/A 10/25/2016    Procedure: BYPASS GASTRIC  BRAULIO-EN-Y LAPAROSCOPIC, WITH INTRA OP EGD;  Surgeon: Jr Dasilva MD;  [facility];  Service: Beti Pica NC LAP,CHOLECYSTECTOMY N/A 2018    Procedure: Starr Dimas;  Surgeon: Jr Dasilva MD;  [facility];  Service: Beti Pica SKIN CANCER EXCISION      TONSILLECTOMY      TUBAL LIGATION      WISDOM TOOTH EXTRACTION         Family History   Problem Relation Age of Onset    Cancer Father Lung    Cystic fibrosis Father     Arthritis Mother         Rheumatoid    Angina Mother     Coronary artery disease Mother     Diabetes Mother     Rheum arthritis Sister     Diabetes Sister     Other Brother         Back problems     Diabetes Brother     No Known Problems Brother          Medications have been verified  Objective   /67   Pulse 77   Temp (!) 97 2 °F (36 2 °C) (Temporal)   Resp 16   SpO2 96%        Physical Exam     Physical Exam  Vitals reviewed  Constitutional:       General: She is not in acute distress  Appearance: Normal appearance  She is normal weight  She is not ill-appearing, toxic-appearing or diaphoretic  HENT:      Head: Normocephalic and atraumatic  Mouth/Throat:      Mouth: Mucous membranes are moist       Pharynx: Oropharynx is clear  No oropharyngeal exudate or posterior oropharyngeal erythema  Eyes:      General: No scleral icterus  Right eye: Discharge present  Left eye: Discharge present  Pupils: Pupils are equal, round, and reactive to light  Comments: Erythema of the conjunctiva   Cardiovascular:      Rate and Rhythm: Normal rate and regular rhythm  Heart sounds: Normal heart sounds  No murmur heard  No friction rub  No gallop  Pulmonary:      Effort: Pulmonary effort is normal  No respiratory distress  Breath sounds: Normal breath sounds  No stridor  No wheezing, rhonchi or rales  Chest:      Chest wall: No tenderness  Abdominal:      General: Abdomen is flat  There is no distension  Palpations: Abdomen is soft  There is no mass  Tenderness: There is no abdominal tenderness  There is no right CVA tenderness, left CVA tenderness, guarding or rebound  Hernia: No hernia is present  Skin:     General: Skin is warm and dry  Capillary Refill: Capillary refill takes less than 2 seconds  Neurological:      Mental Status: She is alert

## 2021-09-07 ENCOUNTER — TELEPHONE (OUTPATIENT)
Dept: FAMILY MEDICINE CLINIC | Facility: CLINIC | Age: 61
End: 2021-09-07

## 2021-09-07 ENCOUNTER — TELEPHONE (OUTPATIENT)
Dept: URGENT CARE | Facility: CLINIC | Age: 61
End: 2021-09-07

## 2021-09-07 LAB — SARS-COV-2 RNA RESP QL NAA+PROBE: NEGATIVE

## 2021-09-07 NOTE — TELEPHONE ENCOUNTER
Based on the note from Urgent Care, an antibiotic was ordered -- can we please call the pharmacy and figure out what happened? Thanks!    ----- Message from Elli Anguiano sent at 9/7/2021  8:30 AM EDT -----  Regarding: FW: Prescription Question  Contact: [phone]    ----- Message -----  From: Zoila Lu  Sent: 9/6/2021  12:30 PM EDT  To: [facility]  Subject: Prescription Question                            I was at care now today 9/6/21 and I picked up the scripts only to find out that Dina Márquez PA-C forgot to call in the antibiotic  I got the eye drops and the cough meds but the augmentin was not sent in  I am sure you can see it in my chart  I tried calling the care now back and they are now closed for the day  Can't get better without all the meds to help  I feel miserable  Would appreciate it if you can help  They were suppose to sent it in to [facility] in [place]  Thank you!     Glendy Blue

## 2021-09-08 DIAGNOSIS — N32.81 OAB (OVERACTIVE BLADDER): ICD-10-CM

## 2021-09-08 RX ORDER — OXYBUTYNIN CHLORIDE 5 MG/1
5 TABLET ORAL 3 TIMES DAILY PRN
Qty: 270 TABLET | Refills: 1 | Status: SHIPPED | OUTPATIENT
Start: 2021-09-08 | End: 2022-03-25

## 2021-09-09 ENCOUNTER — APPOINTMENT (OUTPATIENT)
Dept: PHYSICAL THERAPY | Facility: CLINIC | Age: 61
End: 2021-09-09
Payer: MEDICARE

## 2021-09-21 ENCOUNTER — TELEPHONE (OUTPATIENT)
Dept: UROLOGY | Facility: CLINIC | Age: 61
End: 2021-09-21

## 2021-09-21 NOTE — TELEPHONE ENCOUNTER
Left message reminding patient her appointment on 9/23/21 is in [address] location  Any questions to call office, number provided

## 2021-09-21 NOTE — PROGRESS NOTES
9/23/2021      Chief Complaint   Patient presents with    Follow-up         Assessment and Plan  1  History of gross hematuria s/p negative hematuria workup (6/27/19)  - UA today negative for leukocytes, nitrites, blood    2  History of UTI  - Using topical estrogen 3 times weekly    3  Urinary urgency  4  Bladder spasms  - Using oxybutynin 5 mg as needed for bladder spasms  - PVR today 22 mL  - Overall happy with symptoms  - Will follow up in 1 year for symptom reassessment  - Will call with any questions or concerns  - All questions answered; patient understands and agrees with plan      History of Present Illness  Yamilka Crawford is a 64 y o  female patient with history of gross hematuria, UTI, urinary urgency, and bladder spasms here for follow up  She is known to Dr Patrica Barton  Patient underwent negative hematuria workup in June 2019  There were no abnormal findings noted on cystoscopic evaluation or upper tract imaging  Denies gross hematuria over past year  Using topical estrogen 3 times weekly  Denies UTIs  Patient using oxybutynin 5 mg as needed for bladder spasms  Has increased water intake and decreased caffeine use  Doing well overall  No complaints  Review of Systems   Constitutional: Negative for activity change, appetite change, chills and fever  HENT: Negative for congestion and trouble swallowing  Respiratory: Negative for cough and shortness of breath  Cardiovascular: Negative for chest pain, palpitations and leg swelling  Gastrointestinal: Negative for abdominal pain, constipation, diarrhea, nausea and vomiting  Genitourinary: Negative for difficulty urinating, dysuria, flank pain, frequency, hematuria and urgency  Musculoskeletal: Negative for back pain and gait problem  Skin: Negative for wound  Allergic/Immunologic: Negative for immunocompromised state  Neurological: Negative for dizziness and syncope  Hematological: Does not bruise/bleed easily  Psychiatric/Behavioral: Negative for confusion  All other systems reviewed and are negative  Vitals  Vitals:    09/23/21 1043   BP: 132/78   Pulse: 59   Weight: 79 2 kg (174 lb 9 6 oz)   Height: 5' 3 5" (1 613 m)       Physical Exam  Constitutional:       Appearance: Normal appearance  HENT:      Head: Normocephalic  Pulmonary:      Effort: Pulmonary effort is normal    Musculoskeletal:      Cervical back: Normal range of motion  Skin:     General: Skin is warm and dry  Neurological:      General: No focal deficit present  Mental Status: She is alert and oriented to person, place, and time  Psychiatric:         Mood and Affect: Mood normal          Behavior: Behavior normal          Thought Content:  Thought content normal          Judgment: Judgment normal            Past History  Past Medical History:   Diagnosis Date    Abdominal pain, epigastric 3/23/2018    Added automatically from request for surgery [id number]    Abnormal x-ray of lumbar spine 11/3/2015    Acute cystitis with hematuria 4/7/2020    Bariatric surgery status     Basal cell carcinoma     basil cell carcinoma- in remission    Benign essential hypertension 6/12/2012    Breakdown (mechanical) of internal fixation device of vertebrae, initial encounter ([facility] ) 3/1/2019    Calculus of bile duct with cholecystitis without obstruction 4/27/2018    Added automatically from request for surgery [id number]    Cellulitis of finger 12/3/2015    Degenerative lumbar disc     Digital mucinous cyst 6/24/2015    DMII (diabetes mellitus, type 2) ([facility] ) 11/8/2013    Gross hematuria 3/19/2019    Hiatal hernia     History of transfusion 2014    Post-op spinal surgery    Low back pain     Lower urinary tract infectious disease 3/27/2015    Medicare annual wellness visit, initial 11/27/2019    Obesity     Resolved 10/6/2016     Overactive bladder     Postsurgical malabsorption     Recurrent UTI 3/19/2019    Spinal stenosis     SVT (supraventricular tachycardia) (HCC)     Tachycardia     Resolved 2016     Type 2 diabetes mellitus ([address] )     Last assesssed 2018     Wears glasses      Social History     Socioeconomic History    Marital status:      Spouse name: None    Number of children: None    Years of education: Completed 4th year of college     Highest education level: None   Occupational History    Occupation: Realtor    Tobacco Use    Smoking status: Former Smoker     Packs/day: 1 00     Years: 20 00     Pack years: 20 00     Types: Cigarettes     Quit date:      Years since quittin 7    Smokeless tobacco: Never Used   Vaping Use    Vaping Use: Never used   Substance and Sexual Activity    Alcohol use: No    Drug use: No    Sexual activity: Not Currently   Other Topics Concern    None   Social History Narrative    Lives with son     Works as       Social Determinants of Health     Financial Resource Strain:     Difficulty of Paying Living Expenses:    Food Insecurity:     Worried About [address] in the Last Year:    [address] in the Last Year:    Transportation Needs:     Lack of Transportation (Medical):      Lack of Transportation (Non-Medical):    Physical Activity:     Days of Exercise per Week:     Minutes of Exercise per Session:    Stress:     Feeling of Stress :    Social Connections:     Frequency of Communication with Friends and Family:     Frequency of Social Gatherings with Friends and Family:     Attends Congregational Services:     Active Member of Clubs or Organizations:     Attends Club or Organization Meetings:     Marital Status:    Intimate Partner Violence:     Fear of Current or Ex-Partner:     Emotionally Abused:     Physically Abused:     Sexually Abused:      Social History     Tobacco Use   Smoking Status Former Smoker    Packs/day: 1 00    Years: 20 00    Pack years: 20 00    Types: Cigarettes    Quit date: 2004    Years since quittin 7   Smokeless Tobacco Never Used     Family History   Problem Relation Age of Onset    Cancer Father         Lung    Cystic fibrosis Father     Arthritis Mother         Rheumatoid    Angina Mother     Coronary artery disease Mother     Diabetes Mother     Rheum arthritis Sister     Diabetes Sister     Other Brother         Back problems     Diabetes Brother     No Known Problems Brother        The following portions of the patient's history were reviewed and updated as appropriate: allergies, current medications, past medical history, past social history, past surgical history and problem list     Results  Recent Results (from the past 1 hour(s))   POCT urine dip    Collection Time: 21 10:53 AM   Result Value Ref Range    LEUKOCYTE ESTERASE,UA -     NITRITE,UA -     SL AMB POCT UROBILINOGEN 0 2     POCT URINE PROTEIN -      PH,UA 6 5     BLOOD,UA -     SPECIFIC GRAVITY,UA 1 010     [facility] -     GLUCOSE, UA -      COLOR,UA yellow     CLARITY,UA clear    POCT Measure PVR    Collection Time: 21 10:55 AM   Result Value Ref Range    POST-VOID RESIDUAL VOLUME, ML POC 22 mL   ]  No results found for: PSA  Lab Results   Component Value Date    GLUCOSE 122 2015    CALCIUM 9 6 2021     2015    K 4 0 2021    CO2 28 2021     2021    BUN 9 2021    CREATININE 0 86 2021     Lab Results   Component Value Date    WBC 7 60 2021    HGB 13 5 2021    HCT 40 5 2021    MCV 93 2021     2021       Rito Villeda PA-C

## 2021-09-22 ENCOUNTER — OFFICE VISIT (OUTPATIENT)
Dept: FAMILY MEDICINE CLINIC | Facility: CLINIC | Age: 61
End: 2021-09-22
Payer: MEDICARE

## 2021-09-22 VITALS
BODY MASS INDEX: 29.94 KG/M2 | HEART RATE: 78 BPM | HEIGHT: 64 IN | TEMPERATURE: 97.9 F | OXYGEN SATURATION: 97 % | SYSTOLIC BLOOD PRESSURE: 130 MMHG | WEIGHT: 175.38 LBS | RESPIRATION RATE: 18 BRPM | DIASTOLIC BLOOD PRESSURE: 68 MMHG

## 2021-09-22 DIAGNOSIS — J01.90 ACUTE SINUSITIS, RECURRENCE NOT SPECIFIED, UNSPECIFIED LOCATION: Primary | ICD-10-CM

## 2021-09-22 PROCEDURE — 99213 OFFICE O/P EST LOW 20 MIN: CPT | Performed by: PHYSICIAN ASSISTANT

## 2021-09-22 RX ORDER — BENZONATATE 200 MG/1
200 CAPSULE ORAL 3 TIMES DAILY PRN
Qty: 20 CAPSULE | Refills: 0 | Status: SHIPPED | OUTPATIENT
Start: 2021-09-22 | End: 2022-02-11

## 2021-09-22 RX ORDER — FLUTICASONE PROPIONATE 50 MCG
1 SPRAY, SUSPENSION (ML) NASAL DAILY
Qty: 16 G | Refills: 0 | Status: SHIPPED | OUTPATIENT
Start: 2021-09-22 | End: 2021-11-13 | Stop reason: SDUPTHER

## 2021-09-22 RX ORDER — AMOXICILLIN AND CLAVULANATE POTASSIUM 875; 125 MG/1; MG/1
1 TABLET, FILM COATED ORAL EVERY 12 HOURS SCHEDULED
Qty: 20 TABLET | Refills: 0 | Status: SHIPPED | OUTPATIENT
Start: 2021-09-22 | End: 2021-10-02

## 2021-09-22 NOTE — PROGRESS NOTES
Assessment/Plan:    No problem-specific Assessment & Plan notes found for this encounter  Problem List Items Addressed This Visit     None      Visit Diagnoses     Acute sinusitis, recurrence not specified, unspecified location    -  Primary    Relevant Medications    fluticasone (FLONASE) 50 mcg/act nasal spray    benzonatate (TESSALON) 200 MG capsule    amoxicillin-clavulanate (AUGMENTIN) 875-125 mg per tablet            Subjective:      Patient ID: Kris salazar a 64 y o  female  63 y/o female presents for evaluation of sinus pain pressure and congestion  Patient had similar symptoms about 3 weeks ago  Was put on augmentin for sinus infection  States this feels similar  C/o cough worse in morning  At times productive  Recently tested negative for COVID  Denies any fever or chills  Denies SOB  The following portions of the patient's history were reviewed and updated as appropriate: allergies, current medications, past family history, past social history, past surgical history and problem list     Review of Systems   Constitutional: Negative for chills, fatigue and fever  HENT: Positive for congestion  Negative for ear pain, sinus pain, sore throat and trouble swallowing  Eyes: Negative for pain, discharge and redness  Respiratory: Negative for cough, chest tightness, shortness of breath and wheezing  Cardiovascular: Negative for chest pain, palpitations and leg swelling  Gastrointestinal: Negative for abdominal pain, diarrhea, nausea and vomiting  Musculoskeletal: Negative for arthralgias, joint swelling and myalgias  Skin: Negative for rash  Neurological: Negative for dizziness, weakness, numbness and headaches           Objective:      /68 (BP Location: Left arm, Patient Position: Sitting)   Pulse 78   Temp 97 9 °F (36 6 °C)   Resp 18   Ht 5' 3 5" (1 613 m)   Wt 79 5 kg (175 lb 6 oz)   SpO2 97%   BMI 30 58 kg/m²          Physical Exam  Vitals and nursing note reviewed  Constitutional:       Appearance: She is well-developed  HENT:      Head: Normocephalic  Right Ear: Hearing and tympanic membrane normal       Left Ear: Hearing and tympanic membrane normal       Nose: No mucosal edema  Mouth/Throat:      Pharynx: Uvula midline  Posterior oropharyngeal erythema present  Cardiovascular:      Rate and Rhythm: Normal rate and regular rhythm  Pulmonary:      Effort: Pulmonary effort is normal       Breath sounds: Normal breath sounds

## 2021-09-23 ENCOUNTER — APPOINTMENT (OUTPATIENT)
Dept: PHYSICAL THERAPY | Facility: CLINIC | Age: 61
End: 2021-09-23
Payer: MEDICARE

## 2021-09-23 ENCOUNTER — OFFICE VISIT (OUTPATIENT)
Dept: UROLOGY | Facility: CLINIC | Age: 61
End: 2021-09-23
Payer: MEDICARE

## 2021-09-23 VITALS
DIASTOLIC BLOOD PRESSURE: 78 MMHG | BODY MASS INDEX: 29.81 KG/M2 | WEIGHT: 174.6 LBS | HEIGHT: 64 IN | HEART RATE: 59 BPM | SYSTOLIC BLOOD PRESSURE: 132 MMHG

## 2021-09-23 DIAGNOSIS — N32.89 BLADDER SPASM: ICD-10-CM

## 2021-09-23 DIAGNOSIS — N39.0 RECURRENT UTI: ICD-10-CM

## 2021-09-23 DIAGNOSIS — N32.81 OAB (OVERACTIVE BLADDER): ICD-10-CM

## 2021-09-23 DIAGNOSIS — R31.0 GROSS HEMATURIA: Primary | ICD-10-CM

## 2021-09-23 LAB
POST-VOID RESIDUAL VOLUME, ML POC: 22 ML
SL AMB  POCT GLUCOSE, UA: NORMAL
SL AMB LEUKOCYTE ESTERASE,UA: NORMAL
SL AMB POCT BILIRUBIN,UA: NORMAL
SL AMB POCT BLOOD,UA: NORMAL
SL AMB POCT CLARITY,UA: CLEAR
SL AMB POCT COLOR,UA: YELLOW
SL AMB POCT KETONES,UA: NORMAL
SL AMB POCT NITRITE,UA: NORMAL
SL AMB POCT PH,UA: 6.5
SL AMB POCT SPECIFIC GRAVITY,UA: 1.01
SL AMB POCT URINE PROTEIN: NORMAL
SL AMB POCT UROBILINOGEN: 0.2

## 2021-09-23 PROCEDURE — 81002 URINALYSIS NONAUTO W/O SCOPE: CPT | Performed by: PHYSICIAN ASSISTANT

## 2021-09-23 PROCEDURE — 51798 US URINE CAPACITY MEASURE: CPT | Performed by: PHYSICIAN ASSISTANT

## 2021-09-23 PROCEDURE — 99213 OFFICE O/P EST LOW 20 MIN: CPT | Performed by: PHYSICIAN ASSISTANT

## 2021-10-11 ENCOUNTER — EVALUATION (OUTPATIENT)
Dept: PHYSICAL THERAPY | Facility: CLINIC | Age: 61
End: 2021-10-11
Payer: MEDICARE

## 2021-10-11 DIAGNOSIS — M25.551 PAIN IN RIGHT HIP: Primary | ICD-10-CM

## 2021-10-11 PROCEDURE — 97110 THERAPEUTIC EXERCISES: CPT | Performed by: PHYSICAL THERAPIST

## 2021-10-15 DIAGNOSIS — F32.5 MAJOR DEPRESSIVE DISORDER WITH SINGLE EPISODE, IN FULL REMISSION (HCC): ICD-10-CM

## 2021-10-15 DIAGNOSIS — E78.5 HYPERLIPIDEMIA, UNSPECIFIED HYPERLIPIDEMIA TYPE: ICD-10-CM

## 2021-10-15 RX ORDER — FENOFIBRATE 160 MG/1
TABLET ORAL
Qty: 90 TABLET | Refills: 1 | Status: SHIPPED | OUTPATIENT
Start: 2021-10-15 | End: 2021-11-25 | Stop reason: SDUPTHER

## 2021-10-15 RX ORDER — ESCITALOPRAM OXALATE 20 MG/1
TABLET ORAL
Qty: 90 TABLET | Refills: 1 | Status: SHIPPED | OUTPATIENT
Start: 2021-10-15 | End: 2022-04-18

## 2021-10-22 DIAGNOSIS — G89.4 CHRONIC PAIN SYNDROME: ICD-10-CM

## 2021-10-22 RX ORDER — BUPROPION HYDROCHLORIDE 75 MG/1
TABLET ORAL
Qty: 180 TABLET | Refills: 1 | Status: SHIPPED | OUTPATIENT
Start: 2021-10-22 | End: 2021-11-13 | Stop reason: SDUPTHER

## 2021-10-25 DIAGNOSIS — R11.0 NAUSEA: ICD-10-CM

## 2021-10-25 RX ORDER — ONDANSETRON 4 MG/1
4 TABLET, FILM COATED ORAL EVERY 8 HOURS PRN
Qty: 30 TABLET | Refills: 1 | Status: SHIPPED | OUTPATIENT
Start: 2021-10-25 | End: 2021-12-29 | Stop reason: SDUPTHER

## 2021-10-27 ENCOUNTER — OFFICE VISIT (OUTPATIENT)
Dept: OBGYN CLINIC | Facility: MEDICAL CENTER | Age: 61
End: 2021-10-27
Payer: MEDICARE

## 2021-10-27 VITALS
SYSTOLIC BLOOD PRESSURE: 123 MMHG | HEIGHT: 64 IN | WEIGHT: 177 LBS | DIASTOLIC BLOOD PRESSURE: 78 MMHG | HEART RATE: 69 BPM | BODY MASS INDEX: 30.22 KG/M2

## 2021-10-27 DIAGNOSIS — M25.551 GREATER TROCHANTERIC PAIN SYNDROME OF RIGHT LOWER EXTREMITY: Primary | ICD-10-CM

## 2021-10-27 DIAGNOSIS — M16.11 PRIMARY OSTEOARTHRITIS OF ONE HIP, RIGHT: ICD-10-CM

## 2021-10-27 PROCEDURE — 99213 OFFICE O/P EST LOW 20 MIN: CPT | Performed by: PHYSICAL MEDICINE & REHABILITATION

## 2021-10-27 PROCEDURE — 20610 DRAIN/INJ JOINT/BURSA W/O US: CPT | Performed by: PHYSICAL MEDICINE & REHABILITATION

## 2021-10-27 RX ORDER — TRIAMCINOLONE ACETONIDE 40 MG/ML
80 INJECTION, SUSPENSION INTRA-ARTICULAR; INTRAMUSCULAR
Status: COMPLETED | OUTPATIENT
Start: 2021-10-27 | End: 2021-10-27

## 2021-10-27 RX ORDER — LIDOCAINE HYDROCHLORIDE 10 MG/ML
3 INJECTION, SOLUTION INFILTRATION; PERINEURAL
Status: COMPLETED | OUTPATIENT
Start: 2021-10-27 | End: 2021-10-27

## 2021-10-27 RX ADMIN — TRIAMCINOLONE ACETONIDE 80 MG: 40 INJECTION, SUSPENSION INTRA-ARTICULAR; INTRAMUSCULAR at 08:58

## 2021-10-27 RX ADMIN — LIDOCAINE HYDROCHLORIDE 3 ML: 10 INJECTION, SOLUTION INFILTRATION; PERINEURAL at 08:58

## 2021-11-13 DIAGNOSIS — J01.90 ACUTE SINUSITIS, RECURRENCE NOT SPECIFIED, UNSPECIFIED LOCATION: ICD-10-CM

## 2021-11-15 RX ORDER — FLUTICASONE PROPIONATE 50 MCG
1 SPRAY, SUSPENSION (ML) NASAL DAILY
Qty: 16 G | Refills: 0 | Status: SHIPPED | OUTPATIENT
Start: 2021-11-15 | End: 2022-01-22 | Stop reason: SDUPTHER

## 2021-11-25 DIAGNOSIS — E78.5 HYPERLIPIDEMIA, UNSPECIFIED HYPERLIPIDEMIA TYPE: ICD-10-CM

## 2021-11-25 DIAGNOSIS — G89.4 CHRONIC PAIN SYNDROME: ICD-10-CM

## 2021-11-26 RX ORDER — BUPROPION HYDROCHLORIDE 75 MG/1
75 TABLET ORAL 2 TIMES DAILY
Qty: 180 TABLET | Refills: 0 | Status: SHIPPED | OUTPATIENT
Start: 2021-11-26 | End: 2022-02-11 | Stop reason: SDUPTHER

## 2021-11-26 RX ORDER — FENOFIBRATE 160 MG/1
160 TABLET ORAL DAILY
Qty: 90 TABLET | Refills: 0 | Status: SHIPPED | OUTPATIENT
Start: 2021-11-26 | End: 2022-03-14 | Stop reason: SDUPTHER

## 2021-12-22 ENCOUNTER — TELEMEDICINE (OUTPATIENT)
Dept: FAMILY MEDICINE CLINIC | Facility: CLINIC | Age: 61
End: 2021-12-22
Payer: MEDICARE

## 2021-12-22 DIAGNOSIS — U07.1 COVID-19: Primary | ICD-10-CM

## 2021-12-22 PROBLEM — R51.9 ACUTE NONINTRACTABLE HEADACHE: Status: RESOLVED | Noted: 2020-12-14 | Resolved: 2021-12-22

## 2021-12-22 PROBLEM — R12 HEARTBURN: Status: RESOLVED | Noted: 2021-01-27 | Resolved: 2021-12-22

## 2021-12-22 PROCEDURE — 99441 PR PHYS/QHP TELEPHONE EVALUATION 5-10 MIN: CPT | Performed by: FAMILY MEDICINE

## 2021-12-29 DIAGNOSIS — R11.0 NAUSEA: ICD-10-CM

## 2021-12-30 RX ORDER — ONDANSETRON 4 MG/1
4 TABLET, FILM COATED ORAL EVERY 8 HOURS PRN
Qty: 30 TABLET | Refills: 0 | Status: SHIPPED | OUTPATIENT
Start: 2021-12-30 | End: 2021-12-30 | Stop reason: SDUPTHER

## 2022-01-10 ENCOUNTER — TELEPHONE (OUTPATIENT)
Dept: FAMILY MEDICINE CLINIC | Facility: CLINIC | Age: 62
End: 2022-01-10

## 2022-01-10 ENCOUNTER — CLINICAL SUPPORT (OUTPATIENT)
Dept: FAMILY MEDICINE CLINIC | Facility: CLINIC | Age: 62
End: 2022-01-10
Payer: MEDICARE

## 2022-01-10 DIAGNOSIS — N30.00 ACUTE CYSTITIS WITHOUT HEMATURIA: Primary | ICD-10-CM

## 2022-01-10 LAB
SL AMB  POCT GLUCOSE, UA: ABNORMAL
SL AMB LEUKOCYTE ESTERASE,UA: ABNORMAL
SL AMB POCT BILIRUBIN,UA: ABNORMAL
SL AMB POCT BLOOD,UA: ABNORMAL
SL AMB POCT KETONES,UA: ABNORMAL
SL AMB POCT NITRITE,UA: POSITIVE
SL AMB POCT PH,UA: 7
SL AMB POCT SPECIFIC GRAVITY,UA: 1.02
SL AMB POCT URINE PROTEIN: ABNORMAL
SL AMB POCT UROBILINOGEN: 1

## 2022-01-10 PROCEDURE — 87086 URINE CULTURE/COLONY COUNT: CPT | Performed by: FAMILY MEDICINE

## 2022-01-10 PROCEDURE — 81003 URINALYSIS AUTO W/O SCOPE: CPT | Performed by: FAMILY MEDICINE

## 2022-01-10 NOTE — TELEPHONE ENCOUNTER
Pt calls with c/o urinary frequency, discomfort, burning  She was recently exposed to covid and would prefer not to come into the office to give a sample even though she is asymptomatic  Pt is wondering if you would be willing to send her in an abx  Please advise and let pt know- thanks!

## 2022-01-10 NOTE — TELEPHONE ENCOUNTER
Would prefer having urine sample, as it helps us make sure we are treating with the correct antibiotic  Thanks!

## 2022-01-12 LAB — BACTERIA UR CULT: NORMAL

## 2022-01-22 DIAGNOSIS — J01.90 ACUTE SINUSITIS, RECURRENCE NOT SPECIFIED, UNSPECIFIED LOCATION: ICD-10-CM

## 2022-01-24 RX ORDER — FLUTICASONE PROPIONATE 50 MCG
1 SPRAY, SUSPENSION (ML) NASAL DAILY
Qty: 16 G | Refills: 0 | Status: SHIPPED | OUTPATIENT
Start: 2022-01-24 | End: 2022-03-08 | Stop reason: SDUPTHER

## 2022-02-03 ENCOUNTER — TELEPHONE (OUTPATIENT)
Dept: FAMILY MEDICINE CLINIC | Facility: CLINIC | Age: 62
End: 2022-02-03

## 2022-02-03 NOTE — TELEPHONE ENCOUNTER
Sent in steroid pack for her -- can cause palpitations, insomnia, increased appetite, mood fluctuations  She should not take any other anti-inflammatories (Motrin, Aleve, etc ) while she is taking this  If she is not already taking an allergy medication regularly, I would recommend starting that as well  Thanks!

## 2022-02-11 ENCOUNTER — OFFICE VISIT (OUTPATIENT)
Dept: FAMILY MEDICINE CLINIC | Facility: CLINIC | Age: 62
End: 2022-02-11
Payer: MEDICARE

## 2022-02-11 ENCOUNTER — TELEPHONE (OUTPATIENT)
Dept: ADMINISTRATIVE | Facility: OTHER | Age: 62
End: 2022-02-11

## 2022-02-11 VITALS
HEART RATE: 96 BPM | SYSTOLIC BLOOD PRESSURE: 152 MMHG | BODY MASS INDEX: 30.05 KG/M2 | DIASTOLIC BLOOD PRESSURE: 86 MMHG | HEIGHT: 64 IN | TEMPERATURE: 97.9 F | WEIGHT: 176 LBS | OXYGEN SATURATION: 94 %

## 2022-02-11 DIAGNOSIS — I10 ESSENTIAL HYPERTENSION: ICD-10-CM

## 2022-02-11 DIAGNOSIS — J01.00 ACUTE NON-RECURRENT MAXILLARY SINUSITIS: Primary | ICD-10-CM

## 2022-02-11 DIAGNOSIS — Z13.1 SCREENING FOR DIABETES MELLITUS: ICD-10-CM

## 2022-02-11 DIAGNOSIS — Z00.00 MEDICARE ANNUAL WELLNESS VISIT, INITIAL: ICD-10-CM

## 2022-02-11 DIAGNOSIS — G89.4 CHRONIC PAIN SYNDROME: ICD-10-CM

## 2022-02-11 DIAGNOSIS — Z13.220 NEED FOR LIPID SCREENING: ICD-10-CM

## 2022-02-11 DIAGNOSIS — Z12.31 VISIT FOR SCREENING MAMMOGRAM: ICD-10-CM

## 2022-02-11 PROCEDURE — G0438 PPPS, INITIAL VISIT: HCPCS | Performed by: FAMILY MEDICINE

## 2022-02-11 PROCEDURE — 99214 OFFICE O/P EST MOD 30 MIN: CPT | Performed by: FAMILY MEDICINE

## 2022-02-11 RX ORDER — BUPROPION HYDROCHLORIDE 75 MG/1
75 TABLET ORAL 2 TIMES DAILY
Qty: 180 TABLET | Refills: 0 | Status: SHIPPED | OUTPATIENT
Start: 2022-02-11 | End: 2022-05-24 | Stop reason: SDUPTHER

## 2022-02-11 RX ORDER — PREDNISONE 20 MG/1
40 TABLET ORAL DAILY
Qty: 10 TABLET | Refills: 0 | Status: SHIPPED | OUTPATIENT
Start: 2022-02-11 | End: 2022-02-16

## 2022-02-11 RX ORDER — DOXYCYCLINE 100 MG/1
100 TABLET ORAL 2 TIMES DAILY
Qty: 14 TABLET | Refills: 0 | Status: SHIPPED | OUTPATIENT
Start: 2022-02-11 | End: 2022-02-18

## 2022-02-11 NOTE — PROGRESS NOTES
Assessment and Plan:     Problem List Items Addressed This Visit        Other    Chronic pain syndrome    Relevant Medications    buPROPion (WELLBUTRIN) 75 mg tablet      Other Visit Diagnoses     Visit for screening mammogram    -  Primary    Relevant Orders    Mammo screening bilateral w cad           Preventive health issues were discussed with patient, and age appropriate screening tests were ordered as noted in patient's After Visit Summary  Personalized health advice and appropriate referrals for health education or preventive services given if needed, as noted in patient's After Visit Summary       History of Present Illness:     Patient presents for Medicare Annual Wellness visit    Patient Care Team:  Yohana Biggs DO as PCP - General (Family Medicine)  MIA Reed MD Gates Spikes, PA-C John Poe, DO (Inactive)  DO Daylin Perry MD [place] Gentry, MD Caralyn Blunt, MD Otis Creeks, PA-C     Problem List:     Patient Active Problem List   Diagnosis    Encounter for surgical aftercare following surgery of digestive system    Hyperlipemia    Postgastrectomy malabsorption    Chronic pain syndrome    History of lumbar spinal fusion    Lumbar radiculopathy    Postsurgical arthrodesis status    Atrophic vaginitis    Lumbar post-laminectomy syndrome    Anxiety states    Bilateral low back pain without sciatica    Chronic pain due to trauma    Intervertebral disc prolapse    Irritation of right radial nerve    Lateral epicondylitis of left elbow    Lumbar degenerative disc disease    Major depressive disorder    Vitamin D insufficiency    Lumbar stenosis    Bariatric surgery status    Primary osteoarthritis of one hip, right    Greater trochanteric pain syndrome of right lower extremity    COVID-19      Past Medical and Surgical History:     Past Medical History:   Diagnosis Date    Abdominal pain, epigastric 3/23/2018    Added automatically from request for surgery [id number]    Abnormal x-ray of lumbar spine 11/3/2015    Acute cystitis with hematuria 2020    Bariatric surgery status     Basal cell carcinoma     basil cell carcinoma- in remission    Benign essential hypertension 2012    Breakdown (mechanical) of internal fixation device of vertebrae, initial encounter ([facility] ) 3/1/2019    Calculus of bile duct with cholecystitis without obstruction 2018    Added automatically from request for surgery [id number]    Cellulitis of finger 12/3/2015    Degenerative lumbar disc     Digital mucinous cyst 2015    DMII (diabetes mellitus, type 2) ([facility] ) 2013    Gross hematuria 3/19/2019    Hiatal hernia     History of transfusion     Post-op spinal surgery    Low back pain     Lower urinary tract infectious disease 3/27/2015    Medicare annual wellness visit, initial 2019    Obesity     Resolved 10/6/2016     Overactive bladder     Postsurgical malabsorption     Recurrent UTI 3/19/2019    Spinal stenosis     [facility]     Tachycardia     Resolved 2016     Type 2 diabetes mellitus ([facility] )     Last assesssed 2018     Wears glasses      Past Surgical History:   Procedure Laterality Date    APPENDECTOMY      ARTHRODESIS      Lumbar - Last assessed 2018    [facility] BACK SURGERY      Lumbar (3 surgeries)- two levels fused, clean out from infection, then fusion of 7 levels (last surgery in )   [facility]      x2    CERVICAL SPINE SURGERY      Fusion of C2-C7 over a period of 3 surgeries ( first)     SECTION      X2    CHOLECYSTECTOMY      FL MYELOGRAM LUMBAR  3/28/2019    INSERTION / PLACEMENT / REVISION NEUROSTIMULATOR      X2- both were removed    NECK SURGERY      OTHER SURGICAL HISTORY      Catheter ablation     AL EGD TRANSORAL BIOPSY SINGLE/MULTIPLE N/A 2016    Procedure: ESOPHAGOGASTRODUODENOSCOPY (EGD); Surgeon: Jim Blackburn MD;  [facility]; Service: Bariatrics    MS EGD TRANSORAL BIOPSY SINGLE/MULTIPLE N/A 2018    Procedure: ESOPHAGOGASTRODUODENOSCOPY (EGD) with biopsy;  Surgeon: Jim Blackburn MD;  [facility];   Service: Bariatrics    MS LAP GASTRIC BYPASS/BRAULIO-EN-Y N/A 10/25/2016    Procedure: BYPASS GASTRIC  BRAULIO-EN-Y LAPAROSCOPIC, WITH INTRA OP EGD;  Surgeon: Jim Blackburn MD;  [facility];  Service: Bariatrics    MS LAP,CHOLECYSTECTOMY N/A 2018    Procedure: CHOLECYSTECTOMY LAPAROSCOPIC;  Surgeon: Jim Blackburn MD;  [facility];  Service: [facility] [place] SKIN CANCER EXCISION      TONSILLECTOMY      TUBAL LIGATION      WISDOM TOOTH EXTRACTION        Family History:     Family History   Problem Relation Age of Onset    Cancer Father         Lung    Cystic fibrosis Father     Arthritis Mother         Rheumatoid    Angina Mother     Coronary artery disease Mother     Diabetes Mother     Rheum arthritis Sister     Diabetes Sister     Other Brother         Back problems     Diabetes Brother     No Known Problems Brother       Social History:     Social History     Socioeconomic History    Marital status:      Spouse name: None    Number of children: None    Years of education: Completed 4th year of college     Highest education level: None   Occupational History    Occupation: Realtor    Tobacco Use    Smoking status: Former Smoker     Packs/day: 1 00     Years: 20 00     Pack years: 20 00     Types: Cigarettes     Quit date:      Years since quittin 1    Smokeless tobacco: Never Used   Vaping Use    Vaping Use: Never used   Substance and Sexual Activity    Alcohol use: No    Drug use: No    Sexual activity: Not Currently   Other Topics Concern    None   Social History Narrative    Lives with son     Works as       Social Determinants of Health     Financial Resource Strain: Not on file   Food Insecurity: Not on file   Transportation Needs: Not on file   Physical Activity: Not on file   Stress: Not on file   Social Connections: Not on file   Intimate Partner Violence: Not on file   Housing Stability: Not on file      Medications and Allergies:     Current Outpatient Medications   Medication Sig Dispense Refill    baclofen 10 mg tablet Take 10 mg by mouth daily      buPROPion (WELLBUTRIN) 75 mg tablet Take 1 tablet (75 mg total) by mouth 2 (two) times a day 180 tablet 0    Calcium Citrate-Vitamin D (CALCIUM CITRATE +D) 315-250 MG-UNIT TABS Take 1 tablet by mouth 2 (two) times a day       Cholecalciferol 25 MCG (1000 UT) CHEW Chew      escitalopram (LEXAPRO) 20 mg tablet TAKE 1 TABLET BY MOUTH EVERY DAY IN THE MORNING 90 tablet 1    fenofibrate 160 MG tablet Take 1 tablet (160 mg total) by mouth daily 90 tablet 0    fluticasone (FLONASE) 50 mcg/act nasal spray 1 spray into each nostril daily 16 g 0    gabapentin (NEURONTIN) 100 mg capsule 100 mg 5 (five) times a day       HYDROmorphone (DILAUDID) 4 mg tablet TAKE 1 TABLET BY MOUTH EVERY 6 HOURS AS NEEDED    FILL 5/8/2020      loratadine (CLARITIN) 10 mg tablet Take 10 mg by mouth daily   Multiple Vitamins-Minerals (BARIATRIC FUSION PO) Take 1 tablet by mouth daily      omeprazole (PriLOSEC) 20 mg delayed release capsule TAKE 1 CAPSULE BY MOUTH EVERY DAY 90 capsule 1    ondansetron (ZOFRAN) 4 mg tablet Take 1 tablet (4 mg total) by mouth every 8 (eight) hours as needed for nausea or vomiting 30 tablet 0    oxybutynin (DITROPAN) 5 mg tablet TAKE 1 TABLET (5 MG TOTAL) BY MOUTH 3 (THREE) TIMES A DAY AS NEEDED (BLADDER SPASMS) 270 tablet 1     No current facility-administered medications for this visit       No Known Allergies   Immunizations:     Immunization History   Administered Date(s) Administered    COVID-19 PFIZER VACCINE 0 3 ML IM 04/13/2021, 05/04/2021, 12/19/2021    INFLUENZA 10/20/2017, 09/29/2021    Influenza Injectable, MDCK, Preservative Free, Quadrivalent, 0 5 mL 10/03/2020    Influenza, injectable, quadrivalent, preservative free 0 5 mL 09/22/2018, 10/06/2019    Tdap 02/24/2018    Tuberculin Skin Test-PPD Intradermal 01/28/2016    Zoster 10/25/2014      Health Maintenance:         Topic Date Due    Cervical Cancer Screening  Never done    Breast Cancer Screening: Mammogram  04/20/2016    Colorectal Cancer Screening  12/11/2022    HIV Screening  Completed    Hepatitis C Screening  Completed     There are no preventive care reminders to display for this patient  Medicare Health Risk Assessment:     /86   Pulse 96   Temp 97 9 °F (36 6 °C)   Ht 5' 3 5" (1 613 m)   Wt 79 8 kg (176 lb)   SpO2 94%   BMI 30 69 kg/m²      Yanique Okeefe is here for her Subsequent Wellness visit  Health Risk Assessment:   Patient rates overall health as fair  Patient feels that their physical health rating is same  Patient is satisfied with their life  Eyesight was rated as same  Hearing was rated as same  Patient feels that their emotional and mental health rating is same  Patients states they are never, rarely angry  Patient states they are never, rarely unusually tired/fatigued  Pain experienced in the last 7 days has been none  Patient states that she has experienced no weight loss or gain in last 6 months  Depression Screening:   PHQ-9 Score: 0      Fall Risk Screening: In the past year, patient has experienced: no history of falling in past year      Urinary Incontinence Screening:   Patient has leaked urine accidently in the last six months  Home Safety:  Patient does not have trouble with stairs inside or outside of their home  Patient has working smoke alarms and has working carbon monoxide detector  Home safety hazards include: none  Nutrition:   Current diet is Regular   BMI Counseling: @BMI@ The BMI is above normal  Nutrition recommendations include 3-5 servings of fruits/vegetables daily, consuming healthier snacks and decreasing soda and/or juice intake  Exercise recommendations include strength training exercises  Medications:   Patient is currently taking over-the-counter supplements  OTC medications include: see medication list  Patient is able to manage medications  Activities of Daily Living (ADLs)/Instrumental Activities of Daily Living (IADLs):   Walk and transfer into and out of bed and chair?: Yes  Dress and groom yourself?: Yes    Bathe or shower yourself?: Yes    Feed yourself? Yes  Do your laundry/housekeeping?: Yes  Manage your money, pay your bills and track your expenses?: Yes  Make your own meals?: Yes    Do your own shopping?: Yes    Previous Hospitalizations:   Any hospitalizations or ED visits within the last 12 months?: No      Advance Care Planning:     Five wishes given: No      PREVENTIVE SCREENINGS      Cardiovascular Screening:    General: History Lipid Disorder    Due for: Lipid Panel      Diabetes Screening:       Due for: Blood Glucose      Colorectal Cancer Screening:     General: Screening Current      Breast Cancer Screening:       Due for: Mammogram        Osteoporosis Screening:    General: Screening Not Indicated      Abdominal Aortic Aneurysm (AAA) Screening:        General: Screening Not Indicated      Lung Cancer Screening:     General: Screening Not Indicated      Hepatitis C Screening:    General: Screening Current    Hep C Screening Accepted: No     Screening, Brief Intervention, and Referral to Treatment (SBIRT)    Screening  Typical number of drinks in a day: 0  Typical number of drinks in a week: 0  Interpretation: Low risk drinking behavior      Single Item Drug Screening:  How often have you used an illegal drug (including marijuana) or a prescription medication for non-medical reasons in the past year? never    Single Item Drug Screen Score: 0  Interpretation: Negative screen for possible drug use disorder    Review of Current Opioid Use    Opioid Risk Tool (ORT) Interpretation: Complete Opioid Risk Tool (ORT)      Carlyn Garvey MD

## 2022-02-11 NOTE — PROGRESS NOTES
Assessment/Plan:    No problem-specific Assessment & Plan notes found for this encounter  Diagnoses and all orders for this visit:    Acute non-recurrent maxillary sinusitis  After discussing risks and benefits of medication along with side effects will start the following:  -     doxycycline (ADOXA) 100 MG tablet; Take 1 tablet (100 mg total) by mouth 2 (two) times a day for 7 days  -     predniSONE 20 mg tablet; Take 2 tablets (40 mg total) by mouth daily for 5 days    Chronic pain syndrome  -     buPROPion (WELLBUTRIN) 75 mg tablet; Take 1 tablet (75 mg total) by mouth 2 (two) times a day    Visit for screening mammogram  -     Mammo screening bilateral w cad; Future    Medicare annual wellness visit, initial  See Medicare wellness note    Essential hypertension  Elevated BP could be related to sudafed use    Screening for diabetes mellitus  -     Comprehensive metabolic panel; Future    Need for lipid screening  -     Lipid panel; Future      Follow up in 6 months or as needed    Subjective:      Patient ID: Jonas Pichardo is a 63 y o  female  Patient is here because she has been having a lot of sinus pressure and congestion with associated headaches  She has been taking sudfaed daily for 1 week  Took an antibiotic and medrol dose dav which did not help her symptoms  Not a smoker          The following portions of the patient's history were reviewed and updated as appropriate:   She  has a past medical history of Abdominal pain, epigastric (3/23/2018), Abnormal x-ray of lumbar spine (11/3/2015), Acute cystitis with hematuria (4/7/2020), Bariatric surgery status, Basal cell carcinoma, Benign essential hypertension (6/12/2012), Breakdown (mechanical) of internal fixation device of vertebrae, initial encounter ([facility] ) (3/1/2019), Calculus of bile duct with cholecystitis without obstruction (4/27/2018), Cellulitis of finger (12/3/2015), Degenerative lumbar disc, Digital mucinous cyst (6/24/2015), DMII (diabetes mellitus, type 2) ([facility] [address] ) (2013), Gross hematuria (3/19/2019), Hiatal hernia, History of transfusion (), Low back pain, Lower urinary tract infectious disease (3/27/2015), Medicare annual wellness visit, initial (2019), Obesity, Overactive bladder, Postsurgical malabsorption, Recurrent UTI (3/19/2019), Spinal stenosis, SVT (supraventricular tachycardia) ([facility] [address] ), Tachycardia, Type 2 diabetes mellitus ([facility] ), and Wears glasses  She   Patient Active Problem List    Diagnosis Date Noted    Acute non-recurrent maxillary sinusitis 2022    Essential hypertension 2022    COVID-19 2021    Greater trochanteric pain syndrome of right lower extremity 10/27/2021    Primary osteoarthritis of one hip, right 2021    Bariatric surgery status 2020    Medicare annual wellness visit, initial 2019    Atrophic vaginitis 2019    History of lumbar spinal fusion 2019    Postsurgical arthrodesis status 2019    Encounter for surgical aftercare following surgery of digestive system 2018    Vitamin D insufficiency 2017    Hyperlipemia 2017    Postgastrectomy malabsorption 2016    Lateral epicondylitis of left elbow 2016    Bilateral low back pain without sciatica 2015    Irritation of right radial nerve 10/21/2015    Lumbar post-laminectomy syndrome 2014    Lumbar stenosis 2014    Lumbar radiculopathy 2014    Lumbar degenerative disc disease 2014    Chronic pain syndrome 10/04/2013    Anxiety states 2010    Chronic pain due to trauma 2010    Intervertebral disc prolapse 2010    Major depressive disorder 2010     She  has a past surgical history that includes Appendectomy; Insertion / placement / revision neurostimulator; Cervical spine surgery; Cardiac electrophysiology study and ablation;  section; Tubal ligation; Carpal tunnel release;  Tonsillectomy; Pinson tooth extraction; pr lap gastric bypass/danielle-en-y (N/A, 10/25/2016); pr egd transoral biopsy single/multiple (N/A, 9/14/2016); Neck surgery; pr egd transoral biopsy single/multiple (N/A, 4/18/2018); Skin cancer excision; pr lap,cholecystectomy (N/A, 5/14/2018); Cholecystectomy; Back surgery; [place] myelogram lumbar (3/28/2019); Arthrodesis; and Other surgical history  Her family history includes Angina in her mother; Arthritis in her mother; Cancer in her father; Coronary artery disease in her mother; Cystic fibrosis in her father; Diabetes in her brother, mother, and sister; No Known Problems in her brother; Other in her brother; Rheum arthritis in her sister  She  reports that she quit smoking about 18 years ago  Her smoking use included cigarettes  She has a 20 00 pack-year smoking history  She has never used smokeless tobacco  She reports that she does not drink alcohol and does not use drugs  Current Outpatient Medications   Medication Sig Dispense Refill    baclofen 10 mg tablet Take 10 mg by mouth daily      buPROPion (WELLBUTRIN) 75 mg tablet Take 1 tablet (75 mg total) by mouth 2 (two) times a day 180 tablet 0    Calcium Citrate-Vitamin D (CALCIUM CITRATE +D) 315-250 MG-UNIT TABS Take 1 tablet by mouth 2 (two) times a day       Cholecalciferol 25 MCG (1000 UT) CHEW Chew      doxycycline (ADOXA) 100 MG tablet Take 1 tablet (100 mg total) by mouth 2 (two) times a day for 7 days 14 tablet 0    escitalopram (LEXAPRO) 20 mg tablet TAKE 1 TABLET BY MOUTH EVERY DAY IN THE MORNING 90 tablet 1    fenofibrate 160 MG tablet Take 1 tablet (160 mg total) by mouth daily 90 tablet 0    fluticasone (FLONASE) 50 mcg/act nasal spray 1 spray into each nostril daily 16 g 0    gabapentin (NEURONTIN) 100 mg capsule 100 mg 5 (five) times a day       HYDROmorphone (DILAUDID) 4 mg tablet TAKE 1 TABLET BY MOUTH EVERY 6 HOURS AS NEEDED    FILL 5/8/2020      loratadine (CLARITIN) 10 mg tablet Take 10 mg by mouth daily        Multiple Vitamins-Minerals (BARIATRIC FUSION PO) Take 1 tablet by mouth daily      omeprazole (PriLOSEC) 20 mg delayed release capsule TAKE 1 CAPSULE BY MOUTH EVERY DAY 90 capsule 1    ondansetron (ZOFRAN) 4 mg tablet Take 1 tablet (4 mg total) by mouth every 8 (eight) hours as needed for nausea or vomiting 30 tablet 0    oxybutynin (DITROPAN) 5 mg tablet TAKE 1 TABLET (5 MG TOTAL) BY MOUTH 3 (THREE) TIMES A DAY AS NEEDED (BLADDER SPASMS) 270 tablet 1    predniSONE 20 mg tablet Take 2 tablets (40 mg total) by mouth daily for 5 days 10 tablet 0     No current facility-administered medications for this visit  Current Outpatient Medications on File Prior to Visit   Medication Sig    baclofen 10 mg tablet Take 10 mg by mouth daily    Calcium Citrate-Vitamin D (CALCIUM CITRATE +D) 315-250 MG-UNIT TABS Take 1 tablet by mouth 2 (two) times a day     Cholecalciferol 25 MCG (1000 UT) CHEW Chew    escitalopram (LEXAPRO) 20 mg tablet TAKE 1 TABLET BY MOUTH EVERY DAY IN THE MORNING    fenofibrate 160 MG tablet Take 1 tablet (160 mg total) by mouth daily    fluticasone (FLONASE) 50 mcg/act nasal spray 1 spray into each nostril daily    gabapentin (NEURONTIN) 100 mg capsule 100 mg 5 (five) times a day     HYDROmorphone (DILAUDID) 4 mg tablet TAKE 1 TABLET BY MOUTH EVERY 6 HOURS AS NEEDED    FILL 5/8/2020    loratadine (CLARITIN) 10 mg tablet Take 10 mg by mouth daily      Multiple Vitamins-Minerals (BARIATRIC FUSION PO) Take 1 tablet by mouth daily    omeprazole (PriLOSEC) 20 mg delayed release capsule TAKE 1 CAPSULE BY MOUTH EVERY DAY    ondansetron (ZOFRAN) 4 mg tablet Take 1 tablet (4 mg total) by mouth every 8 (eight) hours as needed for nausea or vomiting    oxybutynin (DITROPAN) 5 mg tablet TAKE 1 TABLET (5 MG TOTAL) BY MOUTH 3 (THREE) TIMES A DAY AS NEEDED (BLADDER SPASMS)    [DISCONTINUED] benzonatate (TESSALON) 200 MG capsule Take 1 capsule (200 mg total) by mouth 3 (three) times a day as needed for cough    [DISCONTINUED] buPROPion (WELLBUTRIN) 75 mg tablet Take 1 tablet (75 mg total) by mouth 2 (two) times a day    [DISCONTINUED] methylPREDNISolone 4 MG tablet therapy pack Use as directed on package     No current facility-administered medications on file prior to visit  She has No Known Allergies       Review of Systems   Constitutional: Negative for activity change, appetite change, fatigue and fever  HENT: Positive for congestion, sinus pressure and sinus pain  Negative for ear discharge  Respiratory: Negative for cough and shortness of breath  Cardiovascular: Negative for chest pain and palpitations  Gastrointestinal: Negative for diarrhea and nausea  Musculoskeletal: Negative for arthralgias and back pain  Skin: Negative for color change and rash  Neurological: Negative for dizziness and headaches  Psychiatric/Behavioral: Negative for agitation and behavioral problems  Objective:      /86   Pulse 96   Temp 97 9 °F (36 6 °C)   Ht 5' 3 5" (1 613 m)   Wt 79 8 kg (176 lb)   SpO2 94%   BMI 30 69 kg/m²          Physical Exam  Constitutional:       General: She is not in acute distress  Appearance: She is well-developed  She is not diaphoretic  HENT:      Head: Normocephalic and atraumatic  Nose: Nose normal    Eyes:      Conjunctiva/sclera: Conjunctivae normal       Pupils: Pupils are equal, round, and reactive to light  Cardiovascular:      Rate and Rhythm: Normal rate and regular rhythm  Heart sounds: Normal heart sounds  No murmur heard  Pulmonary:      Effort: Pulmonary effort is normal  No respiratory distress  Breath sounds: Normal breath sounds  No wheezing  Abdominal:      General: Bowel sounds are normal  There is no distension  Palpations: Abdomen is soft  Tenderness: There is no abdominal tenderness  Skin:     General: Skin is warm and dry  Findings: No erythema or rash     Neurological:      Mental Status: She is alert and oriented to person, place, and time

## 2022-02-11 NOTE — LETTER
Procedure Request Form: Cervical Cancer Screening      Date Requested: 22  Patient: Salomón Johnson  Patient : 1960   Referring Provider: Tarri Liter, DO        Date of Procedure ______________________________       The above patient has informed us that they have completed their   most recent Cervical Cancer Screening at your facility  Please complete   this form and attach all corresponding procedure reports/results  Comments __________________________________________________________  ____________________________________________________________________  ____________________________________________________________________  ____________________________________________________________________    Facility Completing Procedure _________________________________________    Form Completed By (print name) _______________________________________      Signature __________________________________________________________      These reports are needed for  compliance  Please fax this completed form and a copy of the procedure report to our office located at [place] [address] as soon as possible to [phone] lindy Carrion: Phone [phone]    We thank you for your assistance in treating our mutual patient     Dr Telma Monson [phone] F [phone]

## 2022-02-11 NOTE — TELEPHONE ENCOUNTER
----- Message from Chantale Howard MA sent at 2/11/2022  8:56 AM EST -----  Regarding: Pap  02/11/22 8:56 AM    Hello, our patient Sony Dobbs has had Pap Smear (HPV) aka Cervical Cancer Screening completed/performed  Please assist in updating the patient chart by making an External outreach to [employer] [facility] facility located in [place]      Thank you,  ANDREY Elizabeth PG

## 2022-02-11 NOTE — PATIENT INSTRUCTIONS
Medicare Preventive Visit Patient Instructions  Thank you for completing your Welcome to Medicare Visit or Medicare Annual Wellness Visit today  Your next wellness visit will be due in one year (2/12/2023)  The screening/preventive services that you may require over the next 5-10 years are detailed below  Some tests may not apply to you based off risk factors and/or age  Screening tests ordered at today's visit but not completed yet may show as past due  Also, please note that scanned in results may not display below  Preventive Screenings:  Service Recommendations Previous Testing/Comments   Colorectal Cancer Screening  * Colonoscopy    * Fecal Occult Blood Test (FOBT)/Fecal Immunochemical Test (FIT)  * Fecal DNA/Cologuard Test  * Flexible Sigmoidoscopy Age: 54-65 years old   Colonoscopy: every 10 years (may be performed more frequently if at higher risk)  OR  FOBT/FIT: every 1 year  OR  Cologuard: every 3 years  OR  Sigmoidoscopy: every 5 years  Screening may be recommended earlier than age 48 if at higher risk for colorectal cancer  Also, an individualized decision between you and your healthcare provider will decide whether screening between the ages of 74-80 would be appropriate  Colonoscopy: Not on file  FOBT/FIT: Not on file  Cologuard: 12/11/2019  Sigmoidoscopy: Not on file    Screening Current     Breast Cancer Screening Age: 36 years old  Frequency: every 1-2 years  Not required if history of left and right mastectomy Mammogram: 04/21/2015        Cervical Cancer Screening Between the ages of 21-29, pap smear recommended once every 3 years  Between the ages of 33-67, can perform pap smear with HPV co-testing every 5 years     Recommendations may differ for women with a history of total hysterectomy, cervical cancer, or abnormal pap smears in past  Pap Smear: Not on file        Hepatitis C Screening Once for adults born between 1945 and 1965  More frequently in patients at high risk for Hepatitis C Hep C Antibody: 02/06/2021    Screening Current   Diabetes Screening 1-2 times per year if you're at risk for diabetes or have pre-diabetes Fasting glucose: 91 mg/dL   A1C: 5 3 %        Cholesterol Screening Once every 5 years if you don't have a lipid disorder  May order more often based on risk factors  Lipid panel: 02/06/2021    Screening Not Indicated  History Lipid Disorder     Other Preventive Screenings Covered by Medicare:  1  Abdominal Aortic Aneurysm (AAA) Screening: covered once if your at risk  You're considered to be at risk if you have a family history of AAA  2  Lung Cancer Screening: covers low dose CT scan once per year if you meet all of the following conditions: (1) Age 50-69; (2) No signs or symptoms of lung cancer; (3) Current smoker or have quit smoking within the last 15 years; (4) You have a tobacco smoking history of at least 30 pack years (packs per day multiplied by number of years you smoked); (5) You get a written order from a healthcare provider  3  Glaucoma Screening: covered annually if you're considered high risk: (1) You have diabetes OR (2) Family history of glaucoma OR (3)  aged 48 and older OR (3)  American aged 72 and older  3  Osteoporosis Screening: covered every 2 years if you meet one of the following conditions: (1) You're estrogen deficient and at risk for osteoporosis based off medical history and other findings; (2) Have a vertebral abnormality; (3) On glucocorticoid therapy for more than 3 months; (4) Have primary hyperparathyroidism; (5) On osteoporosis medications and need to assess response to drug therapy  · Last bone density test (DXA Scan): Not on file  5  HIV Screening: covered annually if you're between the age of 12-76  Also covered annually if you are younger than 13 and older than 72 with risk factors for HIV infection  For pregnant patients, it is covered up to 3 times per pregnancy      Immunizations:  Immunization Recommendations Influenza Vaccine Annual influenza vaccination during flu season is recommended for all persons aged >= 6 months who do not have contraindications   Pneumococcal Vaccine (Prevnar and Pneumovax)  * Prevnar = PCV13  * Pneumovax = PPSV23   Adults 25-60 years old: 1-3 doses may be recommended based on certain risk factors  Adults 72 years old: Prevnar (PCV13) vaccine recommended followed by Pneumovax (PPSV23) vaccine  If already received PPSV23 since turning 65, then PCV13 recommended at least one year after PPSV23 dose  Hepatitis B Vaccine 3 dose series if at intermediate or high risk (ex: diabetes, end stage renal disease, liver disease)   Tetanus (Td) Vaccine - COST NOT COVERED BY MEDICARE PART B Following completion of primary series, a booster dose should be given every 10 years to maintain immunity against tetanus  Td may also be given as tetanus wound prophylaxis  Tdap Vaccine - COST NOT COVERED BY MEDICARE PART B Recommended at least once for all adults  For pregnant patients, recommended with each pregnancy  Shingles Vaccine (Shingrix) - COST NOT COVERED BY MEDICARE PART B  2 shot series recommended in those aged 48 and above     Health Maintenance Due:      Topic Date Due    Cervical Cancer Screening  Never done    Breast Cancer Screening: Mammogram  04/20/2016    Colorectal Cancer Screening  12/11/2022    HIV Screening  Completed    Hepatitis C Screening  Completed     Immunizations Due:  There are no preventive care reminders to display for this patient  Advance Directives   What are advance directives? Advance directives are legal documents that state your wishes and plans for medical care  These plans are made ahead of time in case you lose your ability to make decisions for yourself  Advance directives can apply to any medical decision, such as the treatments you want, and if you want to donate organs  What are the types of advance directives?   There are many types of advance directives, and each state has rules about how to use them  You may choose a combination of any of the following:  · Living will: This is a written record of the treatment you want  You can also choose which treatments you do not want, which to limit, and which to stop at a certain time  This includes surgery, medicine, IV fluid, and tube feedings  · Durable power of  for healthcare [facility]): This is a written record that states who you want to make healthcare choices for you when you are unable to make them for yourself  This person, called a proxy, is usually a family member or a friend  You may choose more than 1 proxy  · Do not resuscitate (DNR) order:  A DNR order is used in case your heart stops beating or you stop breathing  It is a request not to have certain forms of treatment, such as CPR  A DNR order may be included in other types of advance directives  · Medical directive: This covers the care that you want if you are in a coma, near death, or unable to make decisions for yourself  You can list the treatments you want for each condition  Treatment may include pain medicine, surgery, blood transfusions, dialysis, IV or tube feedings, and a ventilator (breathing machine)  · Values history: This document has questions about your views, beliefs, and how you feel and think about life  This information can help others choose the care that you would choose  Why are advance directives important? An advance directive helps you control your care  Although spoken wishes may be used, it is better to have your wishes written down  Spoken wishes can be misunderstood, or not followed  Treatments may be given even if you do not want them  An advance directive may make it easier for your family to make difficult choices about your care  Urinary Incontinence   Urinary incontinence (UI)  is when you lose control of your bladder  UI develops because your bladder cannot store or empty urine properly   The 3 most common types of UI are stress incontinence, urge incontinence, or both  Medicines:   · May be given to help strengthen your bladder control  Report any side effects of medication to your healthcare provider  Do pelvic muscle exercises often:  Your pelvic muscles help you stop urinating  Squeeze these muscles tight for 5 seconds, then relax for 5 seconds  Gradually work up to squeezing for 10 seconds  Do 3 sets of 15 repetitions a day, or as directed  This will help strengthen your pelvic muscles and improve bladder control  Train your bladder:  Go to the bathroom at set times, such as every 2 hours, even if you do not feel the urge to go  You can also try to hold your urine when you feel the urge to go  For example, hold your urine for 5 minutes when you feel the urge to go  As that becomes easier, hold your urine for 10 minutes  Self-care:   · Keep a UI record  Write down how often you leak urine and how much you leak  Make a note of what you were doing when you leaked urine  · Drink liquids as directed  You may need to limit the amount of liquid you drink to help control your urine leakage  Do not drink any liquid right before you go to bed  Limit or do not have drinks that contain caffeine or alcohol  · Prevent constipation  Eat a variety of high-fiber foods  Good examples are high-fiber cereals, beans, vegetables, and whole-grain breads  Walking is the best way to trigger your intestines to have a bowel movement  · Exercise regularly and maintain a healthy weight  Weight loss and exercise will decrease pressure on your bladder and help you control your leakage  · Use a catheter as directed  to help empty your bladder  A catheter is a tiny, plastic tube that is put into your bladder to drain your urine  · Go to behavior therapy as directed  Behavior therapy may be used to help you learn to control your urge to urinate      Weight Management   Why it is important to manage your weight:  Being overweight increases your risk of health conditions such as heart disease, high blood pressure, type 2 diabetes, and certain types of cancer  It can also increase your risk for osteoarthritis, sleep apnea, and other respiratory problems  Aim for a slow, steady weight loss  Even a small amount of weight loss can lower your risk of health problems  How to lose weight safely:  A safe and healthy way to lose weight is to eat fewer calories and get regular exercise  You can lose up about 1 pound a week by decreasing the number of calories you eat by 500 calories each day  Healthy meal plan for weight management:  A healthy meal plan includes a variety of foods, contains fewer calories, and helps you stay healthy  A healthy meal plan includes the following:  · Eat whole-grain foods more often  A healthy meal plan should contain fiber  Fiber is the part of grains, fruits, and vegetables that is not broken down by your body  Whole-grain foods are healthy and provide extra fiber in your diet  Some examples of whole-grain foods are whole-wheat breads and pastas, oatmeal, brown rice, and bulgur  · Eat a variety of vegetables every day  Include dark, leafy greens such as spinach, kale, blanche greens, and mustard greens  Eat yellow and orange vegetables such as carrots, sweet potatoes, and winter squash  · Eat a variety of fruits every day  Choose fresh or canned fruit (canned in its own juice or light syrup) instead of juice  Fruit juice has very little or no fiber  · Eat low-fat dairy foods  Drink fat-free (skim) milk or 1% milk  Eat fat-free yogurt and low-fat cottage cheese  Try low-fat cheeses such as mozzarella and other reduced-fat cheeses  · Choose meat and other protein foods that are low in fat  Choose beans or other legumes such as split peas or lentils  Choose fish, skinless poultry (chicken or turkey), or lean cuts of red meat (beef or pork)  Before you cook meat or poultry, cut off any visible fat  · Use less fat and oil  Try baking foods instead of frying them  Add less fat, such as margarine, sour cream, regular salad dressing and mayonnaise to foods  Eat fewer high-fat foods  Some examples of high-fat foods include french fries, doughnuts, ice cream, and cakes  · Eat fewer sweets  Limit foods and drinks that are high in sugar  This includes candy, cookies, regular soda, and sweetened drinks  Exercise:  Exercise at least 30 minutes per day on most days of the week  Some examples of exercise include walking, biking, dancing, and swimming  You can also fit in more physical activity by taking the stairs instead of the elevator or parking farther away from stores  Ask your healthcare provider about the best exercise plan for you  Narcotic (Opioid) Safety    Use narcotics safely:  · Take prescribed narcotics exactly as directed  · Do not give narcotics to others or take narcotics that belong to someone else  · Do not mix narcotics without medicines or alcohol  · Do not drive or operate heavy machinery after you take the narcotic  · Monitor for side effects and notify your healthcare provider if you experienced side effects such as nausea, sleepiness, itching, or trouble thinking clearly  Manage constipation:    Constipation is the most common side effect of narcotic medicine  Constipation is when you have hard, dry bowel movements, or you go longer than usual between bowel movements  Tell your healthcare provider about all changes in your bowel movements while you are taking narcotics  He or she may recommend laxative medicine to help you have a bowel movement  He or she may also change the kind of narcotic you are taking, or change when you take it  The following are more ways you can prevent or relieve constipation:    · Drink liquids as directed  You may need to drink extra liquids to help soften and move your bowels  Ask how much liquid to drink each day and which liquids are best for you    · Eat high-fiber foods  This may help decrease constipation by adding bulk to your bowel movements  High-fiber foods include fruits, vegetables, whole-grain breads and cereals, and beans  Your healthcare provider or dietitian can help you create a high-fiber meal plan  Your provider may also recommend a fiber supplement if you cannot get enough fiber from food  · Exercise regularly  Regular physical activity can help stimulate your intestines  Walking is a good exercise to prevent or relieve constipation  Ask which exercises are best for you  · Schedule a time each day to have a bowel movement  This may help train your body to have regular bowel movements  Bend forward while you are on the toilet to help move the bowel movement out  Sit on the toilet for at least 10 minutes, even if you do not have a bowel movement  Store narcotics safely:   · Store narcotics where others cannot easily get them  Keep them in a locked cabinet or secure area  Do not  keep them in a purse or other bag you carry with you  A person may be looking for something else and find the narcotics  · Make sure narcotics are stored out of the reach of children  A child can easily overdose on narcotics  Narcotics may look like candy to a small child  The best way to dispose of narcotics: The laws vary by country and area  In the [place], the best way is to return the narcotics through a take-back program  This program is offered by the [employer])  The following are options for using the program:  · Take the narcotics to a CORA collection site  The site is often a law enforcement center  Call your local law enforcement center for scheduled take-back days in your area  You will be given information on where to go if the collection site is in a different location  · Take the narcotics to an approved pharmacy or hospital   A pharmacy or hospital may be set up as a collection site   You will need to ask if it is a CORA collection site if you were not directed there  A pharmacy or doctor's office may not be able to take back narcotics unless it is a CORA site  · Use a mail-back system  This means you are given containers to put the narcotics into  You will then mail them in the containers  · Use a take-back drop box  This is a place to leave the narcotics at any time  People and animals will not be able to get into the box  Your local law enforcement agency can tell you where to find a drop box in your area  Other ways to manage pain:   · Ask your healthcare provider about non-narcotic medicines to control pain  Nonprescription medicines include NSAIDs (such as ibuprofen) and acetaminophen  Prescription medicines include muscle relaxers, antidepressants, and steroids  · Pain may be managed without any medicines  Some ways to relieve pain include massage, aromatherapy, or meditation  Physical or occupational therapy may also help  For more information:   · Drug Enforcement Administration  [address] [place] [address] [address]  Phone: 1- 524 - 040-4754  Web Address: [facility]/drug_disposal/    · [address]  and Drug Administration  [place] [facility] [address] [facility]  Phone: 6- 829 - [phone]  Web Address: http://[employer]/     © Copyright [employer] 2018 Information is for End User's use only and may not be sold, redistributed or otherwise used for commercial purposes   All illustrations and images included in CareNotes® are the copyrighted property of [employer]  or [address] [place] [address]

## 2022-02-14 NOTE — TELEPHONE ENCOUNTER
Upon review of the In Basket request and the patient's chart, initial outreach has been made via fax, please see Contacts section for details       Thank you  Kevan Curran

## 2022-02-15 ENCOUNTER — VBI (OUTPATIENT)
Dept: ADMINISTRATIVE | Facility: OTHER | Age: 62
End: 2022-02-15

## 2022-02-16 NOTE — TELEPHONE ENCOUNTER
Upon review of the In Basket request we have found as a result of outreach that patient did not have the requested item(s) completed  Any additional questions or concerns should be emailed to the Practice Liaisons via [email or web address] email, please do not reply via In Basket      Thank you  Jose Richardson

## 2022-02-21 DIAGNOSIS — K21.9 GERD (GASTROESOPHAGEAL REFLUX DISEASE): ICD-10-CM

## 2022-02-22 RX ORDER — OMEPRAZOLE 20 MG/1
CAPSULE, DELAYED RELEASE ORAL
Qty: 90 CAPSULE | Refills: 1 | Status: SHIPPED | OUTPATIENT
Start: 2022-02-22 | End: 2022-06-29 | Stop reason: ALTCHOICE

## 2022-02-25 ENCOUNTER — APPOINTMENT (OUTPATIENT)
Dept: RADIOLOGY | Facility: CLINIC | Age: 62
End: 2022-02-25
Payer: MEDICARE

## 2022-02-25 DIAGNOSIS — M47.812 SPONDYLOSIS OF CERVICAL REGION WITHOUT MYELOPATHY OR RADICULOPATHY: ICD-10-CM

## 2022-02-25 PROCEDURE — 72040 X-RAY EXAM NECK SPINE 2-3 VW: CPT

## 2022-02-26 ENCOUNTER — LAB (OUTPATIENT)
Dept: LAB | Facility: CLINIC | Age: 62
End: 2022-02-26
Payer: MEDICARE

## 2022-02-26 DIAGNOSIS — Z13.1 SCREENING FOR DIABETES MELLITUS: ICD-10-CM

## 2022-02-26 DIAGNOSIS — Z13.220 NEED FOR LIPID SCREENING: ICD-10-CM

## 2022-02-26 LAB
ALBUMIN SERPL BCP-MCNC: 3.6 G/DL (ref 3.5–5)
ALP SERPL-CCNC: 46 U/L (ref 46–116)
ALT SERPL W P-5'-P-CCNC: 28 U/L (ref 12–78)
ANION GAP SERPL CALCULATED.3IONS-SCNC: 5 MMOL/L (ref 4–13)
AST SERPL W P-5'-P-CCNC: 34 U/L (ref 5–45)
BILIRUB SERPL-MCNC: 0.59 MG/DL (ref 0.2–1)
BUN SERPL-MCNC: 14 MG/DL (ref 5–25)
CALCIUM SERPL-MCNC: 9.4 MG/DL (ref 8.3–10.1)
CHLORIDE SERPL-SCNC: 107 MMOL/L (ref 100–108)
CHOLEST SERPL-MCNC: 172 MG/DL
CO2 SERPL-SCNC: 28 MMOL/L (ref 21–32)
CREAT SERPL-MCNC: 1 MG/DL (ref 0.6–1.3)
GFR SERPL CREATININE-BSD FRML MDRD: 61 ML/MIN/1.73SQ M
GLUCOSE P FAST SERPL-MCNC: 99 MG/DL (ref 65–99)
HDLC SERPL-MCNC: 55 MG/DL
LDLC SERPL CALC-MCNC: 93 MG/DL (ref 0–100)
NONHDLC SERPL-MCNC: 117 MG/DL
POTASSIUM SERPL-SCNC: 4.2 MMOL/L (ref 3.5–5.3)
PROT SERPL-MCNC: 7.4 G/DL (ref 6.4–8.2)
SODIUM SERPL-SCNC: 140 MMOL/L (ref 136–145)
TRIGL SERPL-MCNC: 119 MG/DL

## 2022-02-26 PROCEDURE — 36415 COLL VENOUS BLD VENIPUNCTURE: CPT

## 2022-02-26 PROCEDURE — 80061 LIPID PANEL: CPT

## 2022-02-26 PROCEDURE — 80053 COMPREHEN METABOLIC PANEL: CPT

## 2022-03-08 DIAGNOSIS — J01.90 ACUTE SINUSITIS, RECURRENCE NOT SPECIFIED, UNSPECIFIED LOCATION: ICD-10-CM

## 2022-03-09 RX ORDER — FLUTICASONE PROPIONATE 50 MCG
1 SPRAY, SUSPENSION (ML) NASAL DAILY
Qty: 16 G | Refills: 0 | Status: SHIPPED | OUTPATIENT
Start: 2022-03-09 | End: 2022-06-17 | Stop reason: SDUPTHER

## 2022-03-23 ENCOUNTER — VBI (OUTPATIENT)
Dept: ADMINISTRATIVE | Facility: OTHER | Age: 62
End: 2022-03-23

## 2022-03-24 ENCOUNTER — OFFICE VISIT (OUTPATIENT)
Dept: OBGYN CLINIC | Facility: MEDICAL CENTER | Age: 62
End: 2022-03-24
Payer: MEDICARE

## 2022-03-24 VITALS
DIASTOLIC BLOOD PRESSURE: 76 MMHG | SYSTOLIC BLOOD PRESSURE: 120 MMHG | HEIGHT: 64 IN | WEIGHT: 175.2 LBS | HEART RATE: 72 BPM | BODY MASS INDEX: 29.91 KG/M2

## 2022-03-24 DIAGNOSIS — R20.2 RIGHT HAND PARESTHESIA: ICD-10-CM

## 2022-03-24 DIAGNOSIS — M25.531 PAIN IN RIGHT WRIST: Primary | ICD-10-CM

## 2022-03-24 PROCEDURE — 99214 OFFICE O/P EST MOD 30 MIN: CPT | Performed by: PHYSICAL MEDICINE & REHABILITATION

## 2022-03-24 RX ORDER — PREDNISONE 20 MG/1
40 TABLET ORAL
Qty: 10 TABLET | Refills: 0 | Status: SHIPPED | OUTPATIENT
Start: 2022-03-24 | End: 2022-03-29

## 2022-03-24 NOTE — PROGRESS NOTES
1  Pain in right wrist  XR wrist 3+ vw right    predniSONE 20 mg tablet    US MSK limited    US MSK limited    CANCELED: [address] MSK limited    CANCELED: [address] MSK limited   2  Right hand paresthesia  predniSONE 20 mg tablet    US MSK limited    US MSK limited    CANCELED: [address] MSK limited    CANCELED: [address] MSK limited     Orders Placed This Encounter   Procedures    XR wrist 3+ vw right    US MSK limited    US MSK limited        Impression:  Right 4th and 5th digit paresthesias likely secondary to cubital tunnel syndrome and less likely due to ulnar nerve entrapment at Guyon's canal or carpal tunnel syndrome  Patient has a history of carpal tunnel release many years ago  We discussed different treatment options and decided to proceed with an ultrasound study of her cubital tunnel and carpal tunnel  She does have cubital tunnel, we would ask that they proceed with a steroid injection  It also provided her with a cock-up wrist brace as she has noticed improvement with ACE wrap around her wrist   I have also prescribed her a steroid burst   She is currently taking gabapentin 100 mg 5 times a day as per Pain Management  I will see her back after the ultrasound study  Could consider x-ray of her elbow and/or wrist on follow-up visit, depending on her presentation and results  Imaging Studies (I personally reviewed images in PACS and report):  No images  No follow-ups on file  Patient is in agreement with the above plan  HPI:  Yasmany Lowry is a 63 y o  female  who presents for evaluation of   Chief Complaint   Patient presents with    Right Wrist - Pain       Onset/Mechanism: Started 2 5 weeks ago without an injury  Location: 4th and 5th digit  Radiation: Into the elbow  Provocative: Being on the computer  Severity: Not improving  Associated Symptoms: Denies weakness  Treatment so far: ACE wrap      Following history reviewed and updated:  Past Medical History:   Diagnosis Date    Abdominal pain, epigastric 3/23/2018    Added automatically from request for surgery [id number]    Abnormal x-ray of lumbar spine 11/3/2015    Acute cystitis with hematuria 2020    Bariatric surgery status     Basal cell carcinoma     basil cell carcinoma- in remission    Benign essential hypertension 2012    Breakdown (mechanical) of internal fixation device of vertebrae, initial encounter ([facility] [address] ) 3/1/2019    Calculus of bile duct with cholecystitis without obstruction 2018    Added automatically from request for surgery [id number]    Cellulitis of finger 12/3/2015    Degenerative lumbar disc     Digital mucinous cyst 2015    DMII (diabetes mellitus, type 2) ([facility] [address] ) 2013    Gross hematuria 3/19/2019    Hiatal hernia     History of transfusion     Post-op spinal surgery    Low back pain     Lower urinary tract infectious disease 3/27/2015    Medicare annual wellness visit, initial 2019    Obesity     Resolved 10/6/2016     Overactive bladder     Postsurgical malabsorption     Recurrent UTI 3/19/2019    Spinal stenosis     [facility])     Tachycardia     Resolved 2016     Type 2 diabetes mellitus ([facility] [address] )     Last assesssed 2018     Wears glasses      Past Surgical History:   Procedure Laterality Date    APPENDECTOMY      ARTHRODESIS      Lumbar - Last assessed 2018    [facility] BACK SURGERY      Lumbar (3 surgeries)- two levels fused, clean out from infection, then fusion of 7 levels (last surgery in )   [facility]      x2    CERVICAL SPINE SURGERY      Fusion of C2-C7 over a period of 3 surgeries ( first)     SECTION      X2    CHOLECYSTECTOMY      FL MYELOGRAM LUMBAR  3/28/2019    INSERTION / PLACEMENT / REVISION NEUROSTIMULATOR      X2- both were removed    NECK SURGERY      OTHER SURGICAL HISTORY      Catheter ablation     [facility] EGD TRANSORAL BIOPSY SINGLE/MULTIPLE N/A 2016    Procedure: ESOPHAGOGASTRODUODENOSCOPY (EGD); Surgeon: Nori Onofre MD;  [facility]; Service: Bariatrics    OR EGD TRANSORAL BIOPSY SINGLE/MULTIPLE N/A 2018    Procedure: ESOPHAGOGASTRODUODENOSCOPY (EGD) with biopsy;  Surgeon: Nori Onofre MD;  [facility]; Service: Bariatrics    OR LAP GASTRIC BYPASS/BRAULIO-EN-Y N/A 10/25/2016    Procedure: BYPASS GASTRIC  BRAULIO-EN-Y LAPAROSCOPIC, WITH INTRA OP EGD;  Surgeon: Nori Onofre MD;  [facility];  Service: Bariatrics    OR LAP,CHOLECYSTECTOMY N/A 2018    Procedure: CHOLECYSTECTOMY LAPAROSCOPIC;  Surgeon: Nori Onofre MD;  [facility];  Service: Bariatrics    SKIN CANCER EXCISION      TONSILLECTOMY      TUBAL LIGATION      WISDOM TOOTH EXTRACTION       Social History   Social History     Substance and Sexual Activity   Alcohol Use No     Social History     Substance and Sexual Activity   Drug Use No     Social History     Tobacco Use   Smoking Status Former Smoker    Packs/day: 1 00    Years: 20 00    Pack years: 20 00    Types: Cigarettes    Quit date:     Years since quittin 2   Smokeless Tobacco Never Used     Family History   Problem Relation Age of Onset    Cancer Father         Lung    Cystic fibrosis Father     Arthritis Mother         Rheumatoid    Angina Mother     Coronary artery disease Mother     Diabetes Mother     Rheum arthritis Sister     Diabetes Sister     Other Brother         Back problems     Diabetes Brother     No Known Problems Brother      No Known Allergies     Constitutional:  /76   Pulse 72   Ht 5' 3 5" (1 613 m)   Wt 79 5 kg (175 lb 3 2 oz)   BMI 30 55 kg/m²    General: NAD  Eyes: Anicteric sclerae  Neck: Supple  Lungs: Unlabored breathing  Cardiovascular: No lower extremity edema  Skin: Intact without erythema  Neurologic: Sensation intact to light touch  Psychiatric: Mood and affect are appropriate      Right Elbow Exam Tenderness   The patient is experiencing tenderness in the medial epicondyle  Range of Motion   The patient has normal right elbow ROM  Muscle Strength   The patient has normal right elbow strength  Tests   Varus: negative  Valgus: negative  Tinel's sign (cubital tunnel): negative    Other   Erythema: absent  Scars: absent  Right elbow sensation: Decreased sensation to light touch in the 5th digit and the ulnar half of 4th digit    Pulse: present    Comments:  Split ring finger             Procedures

## 2022-03-25 ENCOUNTER — TELEPHONE (OUTPATIENT)
Dept: OBGYN CLINIC | Facility: HOSPITAL | Age: 62
End: 2022-03-25

## 2022-03-25 DIAGNOSIS — N32.81 OAB (OVERACTIVE BLADDER): ICD-10-CM

## 2022-03-25 RX ORDER — OXYBUTYNIN CHLORIDE 5 MG/1
5 TABLET ORAL 3 TIMES DAILY PRN
Qty: 270 TABLET | Refills: 1 | Status: SHIPPED | OUTPATIENT
Start: 2022-03-25

## 2022-03-25 NOTE — TELEPHONE ENCOUNTER
Patient sees Dr Kavon Peraza from procedure scheduling is calling because patient called to schedule her MSK testing anf they advised her one of her MSK procedures involved an injection and the patient stated she was not aware of this  She stated she thought her injections were done in the office  Please advise if patient should be scheduled with scheduling to have the injection done            CB: [phone] -Arnulfo Dk: [phone]

## 2022-04-18 DIAGNOSIS — F32.5 MAJOR DEPRESSIVE DISORDER WITH SINGLE EPISODE, IN FULL REMISSION (HCC): ICD-10-CM

## 2022-04-18 RX ORDER — ESCITALOPRAM OXALATE 20 MG/1
TABLET ORAL
Qty: 90 TABLET | Refills: 1 | Status: SHIPPED | OUTPATIENT
Start: 2022-04-18 | End: 2022-07-15 | Stop reason: SDUPTHER

## 2022-04-19 ENCOUNTER — OCCMED (OUTPATIENT)
Dept: URGENT CARE | Facility: CLINIC | Age: 62
End: 2022-04-19
Payer: MEDICARE

## 2022-04-19 DIAGNOSIS — S00.91XA ABRASION OF HEAD, INITIAL ENCOUNTER: Primary | ICD-10-CM

## 2022-04-19 DIAGNOSIS — S80.811A ABRASION OF RIGHT LOWER EXTREMITY, INITIAL ENCOUNTER: ICD-10-CM

## 2022-04-19 PROCEDURE — 99213 OFFICE O/P EST LOW 20 MIN: CPT

## 2022-04-19 PROCEDURE — G0463 HOSPITAL OUTPT CLINIC VISIT: HCPCS

## 2022-04-20 ENCOUNTER — TELEPHONE (OUTPATIENT)
Dept: FAMILY MEDICINE CLINIC | Facility: CLINIC | Age: 62
End: 2022-04-20

## 2022-04-20 NOTE — TELEPHONE ENCOUNTER
She is likely going to have pain/swelling for awhile if she hit her head  However, I can't see any notes as to what Urgent Care did yesterday as far as evaluation, so if she would like to come in to the office today for a recheck, that is reasonable  Thanks!

## 2022-04-20 NOTE — TELEPHONE ENCOUNTER
Pt fell at work yesterday and hit head on concrete  Was seen in care now but is still having pain and swelling where she hit her head  Would like advice on what to do or if she should come in to office  Dona Lauren

## 2022-04-21 ENCOUNTER — TELEPHONE (OUTPATIENT)
Dept: FAMILY MEDICINE CLINIC | Facility: CLINIC | Age: 62
End: 2022-04-21

## 2022-04-21 ENCOUNTER — OFFICE VISIT (OUTPATIENT)
Dept: FAMILY MEDICINE CLINIC | Facility: CLINIC | Age: 62
End: 2022-04-21
Payer: MEDICARE

## 2022-04-21 ENCOUNTER — APPOINTMENT (OUTPATIENT)
Dept: RADIOLOGY | Facility: CLINIC | Age: 62
End: 2022-04-21
Payer: MEDICARE

## 2022-04-21 VITALS
WEIGHT: 173 LBS | OXYGEN SATURATION: 97 % | SYSTOLIC BLOOD PRESSURE: 122 MMHG | HEIGHT: 64 IN | HEART RATE: 69 BPM | TEMPERATURE: 98.1 F | BODY MASS INDEX: 29.53 KG/M2 | DIASTOLIC BLOOD PRESSURE: 80 MMHG

## 2022-04-21 DIAGNOSIS — S09.90XD INJURY OF HEAD, SUBSEQUENT ENCOUNTER: Primary | ICD-10-CM

## 2022-04-21 DIAGNOSIS — F32.5 MAJOR DEPRESSIVE DISORDER WITH SINGLE EPISODE, IN FULL REMISSION (HCC): ICD-10-CM

## 2022-04-21 DIAGNOSIS — F11.20 CONTINUOUS OPIOID DEPENDENCE (HCC): ICD-10-CM

## 2022-04-21 DIAGNOSIS — S09.90XD INJURY OF HEAD, SUBSEQUENT ENCOUNTER: ICD-10-CM

## 2022-04-21 PROBLEM — Z86.16 HISTORY OF COVID-19: Status: ACTIVE | Noted: 2021-12-22

## 2022-04-21 PROBLEM — Z98.1 POSTSURGICAL ARTHRODESIS STATUS: Status: RESOLVED | Noted: 2019-03-01 | Resolved: 2022-04-21

## 2022-04-21 PROBLEM — Z48.815 ENCOUNTER FOR SURGICAL AFTERCARE FOLLOWING SURGERY OF DIGESTIVE SYSTEM: Status: RESOLVED | Noted: 2018-03-20 | Resolved: 2022-04-21

## 2022-04-21 PROBLEM — J01.00 ACUTE NON-RECURRENT MAXILLARY SINUSITIS: Status: RESOLVED | Noted: 2022-02-11 | Resolved: 2022-04-21

## 2022-04-21 PROBLEM — Z00.00 MEDICARE ANNUAL WELLNESS VISIT, INITIAL: Status: RESOLVED | Noted: 2019-11-27 | Resolved: 2022-04-21

## 2022-04-21 PROCEDURE — 70150 X-RAY EXAM OF FACIAL BONES: CPT

## 2022-04-21 PROCEDURE — 99213 OFFICE O/P EST LOW 20 MIN: CPT | Performed by: FAMILY MEDICINE

## 2022-04-21 NOTE — LETTER
April 21, 2022     Patient: Fransisca Ohara  YOB: 1960  Date of Visit: 4/21/2022      To Whom it May Concern:    Mauro Merlin is under my professional care  Antonette Armstrong was seen in my office on 4/21/2022  Please allow her to work shortened shifts (no more than 5 hours) on 04/21 and 04/22  May return to work as scheduled on 04/25/2022  If you have any questions or concerns, please don't hesitate to call           Sincerely,          Daniel Murry DO        CC: No Recipients

## 2022-04-21 NOTE — PROGRESS NOTES
Victoriano Huizar 1960 female MRN: [id number]      ASSESSMENT/PLAN  Problem List Items Addressed This Visit        Other    Continuous opioid dependence ([facility] [address] )    Major depressive disorder with single episode, in full remission ([facility] [address] )      Other Visit Diagnoses     Injury of head, subsequent encounter    -  Primary    Relevant Orders    XR facial bones 3+ vw        Benign neuro exam  Pt not on anticoagulation  Low suspicion for intracranial bleeding  Will XR facial bones to r/o fracture -- no palpable step off, however it could be masked by swelling  Encouraged continued supportive care with intermittent ice, pain relievers as prescribed, and rest   To call if symptoms change/worsen       HCC:   Continuous Opioid Use: Follows with Pain Management   Depression: Asymptomatic, was initiated on SSRI for postpartum, is unable to wean off       Future Appointments   Date Time Provider Yanelis Carcamo   5/2/2022  8:00 AM BE [facility] [address]   5/2/2022  8:45 AM BE [address]          SUBJECTIVE  CC: Head Injury Yasmeen Falling on tuesday at work on concrete  Still having headaches-taking tylenol also prescribed opioids and they're not helping, slight dizziness, head pressure )      HPI:  Victoriano Huizar is a 63 y o  female who presents due to head injury  2 days ago, tripped over a flower pot at work and fell head first into the concrete  Was seen Urgent Care the same day -- no XR were completed per pt; note not available for review   Having head pressure, feeling a little off   (+) sensitive to light/sound but only because she has a headache   Taking Tylenol, Dilaudid which she has for chronic pain     Review of Systems   Eyes: Positive for photophobia  Negative for visual disturbance  Gastrointestinal: Negative for nausea  Neurological: Positive for dizziness (very light) and headaches         Historical Information   The patient history was reviewed and updated as follows:    Past Medical History: Diagnosis Date    Abdominal pain, epigastric 3/23/2018    Added automatically from request for surgery [id number]    Abnormal x-ray of lumbar spine 11/3/2015    Acute cystitis with hematuria 2020    Bariatric surgery status     Basal cell carcinoma     basil cell carcinoma- in remission    Benign essential hypertension 2012    Breakdown (mechanical) of internal fixation device of vertebrae, initial encounter [facility] ) 3/1/2019    Calculus of bile duct with cholecystitis without obstruction 2018    Added automatically from request for surgery [id number]    Cellulitis of finger 12/3/2015    Degenerative lumbar disc     Digital mucinous cyst 2015    DMII (diabetes mellitus, type 2) ([facility] [address] ) 2013    Gross hematuria 3/19/2019    Hiatal hernia     History of transfusion     Post-op spinal surgery    Low back pain     Lower urinary tract infectious disease 3/27/2015    Medicare annual wellness visit, initial 2019    Obesity     Resolved 10/6/2016     Overactive bladder     Postsurgical malabsorption     Recurrent UTI 3/19/2019    Spinal stenosis     [facility]     Tachycardia     Resolved 2016     Type 2 diabetes mellitus ([facility] )     Last assesssed 2018     Wears glasses      Past Surgical History:   Procedure Laterality Date    APPENDECTOMY      ARTHRODESIS      Lumbar - Last assessed 2018    [facility]      Lumbar (3 surgeries)- two levels fused, clean out from infection, then fusion of 7 levels (last surgery in )   [facility]      x2    CERVICAL SPINE SURGERY      Fusion of C2-C7 over a period of 3 surgeries ( first)     SECTION      X2    CHOLECYSTECTOMY      FL MYELOGRAM LUMBAR  3/28/2019    INSERTION / PLACEMENT / REVISION NEUROSTIMULATOR      X2- both were removed    NECK SURGERY      OTHER SURGICAL HISTORY      Catheter ablation     ND EGD TRANSORAL BIOPSY SINGLE/MULTIPLE N/A 2016    Procedure: ESOPHAGOGASTRODUODENOSCOPY (EGD); Surgeon: Reinaldo Weinberg MD;  [facility]; Service: Bariatrics    DC EGD TRANSORAL BIOPSY SINGLE/MULTIPLE N/A 2018    Procedure: ESOPHAGOGASTRODUODENOSCOPY (EGD) with biopsy;  Surgeon: Reinaldo Weinberg MD;  [facility];   Service: Bariatrics    DC LAP GASTRIC BYPASS/BRAULIO-EN-Y N/A 10/25/2016    Procedure: BYPASS GASTRIC  BRAULIO-EN-Y LAPAROSCOPIC, WITH INTRA OP EGD;  Surgeon: Reinaldo Weinberg MD;  [facility];  Service: Bariatrics    DC LAP,CHOLECYSTECTOMY N/A 2018    Procedure: Bernardine Actis;  Surgeon: Reinaldo Weinberg MD;  [facility];  Service: [place] SKIN CANCER EXCISION      TONSILLECTOMY      TUBAL LIGATION      WISDOM TOOTH EXTRACTION       Family History   Problem Relation Age of Onset    Cancer Father         Lung    Cystic fibrosis Father     Arthritis Mother         Rheumatoid    Angina Mother     Coronary artery disease Mother     Diabetes Mother     Rheum arthritis Sister     Diabetes Sister     Other Brother         Back problems     Diabetes Brother     No Known Problems Brother       Social History   Social History     Substance and Sexual Activity   Alcohol Use No     Social History     Substance and Sexual Activity   Drug Use No     Social History     Tobacco Use   Smoking Status Former Smoker    Packs/day: 1 00    Years: 20 00    Pack years: 20 00    Types: Cigarettes    Quit date:     Years since quittin 3   Smokeless Tobacco Never Used       Medications:     Current Outpatient Medications:     baclofen 10 mg tablet, Take 10 mg by mouth daily, Disp: , Rfl:     buPROPion (WELLBUTRIN) 75 mg tablet, Take 1 tablet (75 mg total) by mouth 2 (two) times a day, Disp: 180 tablet, Rfl: 0    Calcium Citrate-Vitamin D (CALCIUM CITRATE +D) 315-250 MG-UNIT TABS, Take 1 tablet by mouth 2 (two) times a day , Disp: , Rfl:   Cholecalciferol 25 MCG (1000 UT) CHEW, Chew, Disp: , Rfl:     escitalopram (LEXAPRO) 20 mg tablet, TAKE 1 TABLET BY MOUTH EVERY DAY IN THE MORNING, Disp: 90 tablet, Rfl: 1    fenofibrate 160 MG tablet, Take 1 tablet (160 mg total) by mouth daily, Disp: 90 tablet, Rfl: 3    fluticasone (FLONASE) 50 mcg/act nasal spray, 1 spray into each nostril daily, Disp: 16 g, Rfl: 0    gabapentin (NEURONTIN) 100 mg capsule, 100 mg 5 (five) times a day , Disp: , Rfl:     HYDROmorphone (DILAUDID) 4 mg tablet, TAKE 1 TABLET BY MOUTH EVERY 6 HOURS AS NEEDED    FILL 5/8/2020, Disp: , Rfl:     loratadine (CLARITIN) 10 mg tablet, Take 10 mg by mouth daily  , Disp: , Rfl:     Multiple Vitamins-Minerals (BARIATRIC FUSION PO), Take 1 tablet by mouth daily, Disp: , Rfl:     omeprazole (PriLOSEC) 20 mg delayed release capsule, TAKE 1 CAPSULE BY MOUTH EVERY DAY, Disp: 90 capsule, Rfl: 1    ondansetron (ZOFRAN) 4 mg tablet, Take 1 tablet (4 mg total) by mouth every 8 (eight) hours as needed for nausea or vomiting, Disp: 30 tablet, Rfl: 0    oxybutynin (DITROPAN) 5 mg tablet, TAKE 1 TABLET (5 MG TOTAL) BY MOUTH 3 (THREE) TIMES A DAY AS NEEDED (BLADDER SPASMS), Disp: 270 tablet, Rfl: 1  No Known Allergies    OBJECTIVE    Vitals:   Vitals:    04/21/22 0952   BP: 122/80   BP Location: Left arm   Patient Position: Sitting   Cuff Size: Large   Pulse: 69   Temp: 98 1 °F (36 7 °C)   SpO2: 97%   Weight: 78 5 kg (173 lb)   Height: 5' 3 5" (1 613 m)           Physical Exam  Vitals and nursing note reviewed  Constitutional:       General: She is not in acute distress  Appearance: Normal appearance  HENT:      Head: Normocephalic and atraumatic  Comments: Approximately 4 cm x 1 5 cm abrasion, no significant erythema, warmth, drainage  Bruising and edema underneath -- tender to palpation and with movement of eyebrows   Pulmonary:      Effort: Pulmonary effort is normal  No respiratory distress     Neurological:      General: No focal deficit present  Mental Status: She is alert  Cranial Nerves: Cranial nerves are intact  Sensory: Sensation is intact  Motor: Motor function is intact  No weakness, tremor or pronator drift  Coordination: Coordination is intact  Romberg sign negative  Coordination normal  Finger-Nose-Finger Test and Heel to Diane Maxcy Test normal  Rapid alternating movements normal       Gait: Gait is intact  Deep Tendon Reflexes:      Reflex Scores:       Tricep reflexes are 1+ on the right side and 1+ on the left side  Bicep reflexes are 1+ on the right side and 1+ on the left side       Comments: LE reflexes chronically absent s/p spinal surgery    Psychiatric:         Mood and Affect: Mood normal                     DO [facility] [address] [facility]   4/21/2022  10:09 AM

## 2022-04-21 NOTE — TELEPHONE ENCOUNTER
Pt changed her mind and request letter for work:     Pt needs letter to say : to advise she was here for a visit 4/21/22  Pt is only allowed to work 5-6 hrs for 4/21/22 and 4/22/22  She will  Return to    work on 4/25/22 regular full time hours

## 2022-04-25 NOTE — NURSING NOTE
Call placed to patient to discuss upcoming appointment at [facility] radiology department and complete consultation with patient  Patient is having right elbow CSI utilizing  US  guidance  Reviewed patient's allergies, no current anticoagulant medication present, also discussed the pre and post procedure expectations  Reminded patient of location and time expected for procedure, Patient expressed understanding by verbalizing and repeating instructions

## 2022-05-02 ENCOUNTER — HOSPITAL ENCOUNTER (OUTPATIENT)
Dept: RADIOLOGY | Facility: HOSPITAL | Age: 62
Discharge: HOME | End: 2022-05-02
Attending: PHYSICAL MEDICINE & REHABILITATION
Payer: MEDICARE

## 2022-05-02 DIAGNOSIS — M25.531 PAIN IN RIGHT WRIST: ICD-10-CM

## 2022-05-02 DIAGNOSIS — R20.2 RIGHT HAND PARESTHESIA: ICD-10-CM

## 2022-05-02 PROCEDURE — 20606 DRAIN/INJ JOINT/BURSA W/US: CPT

## 2022-05-02 PROCEDURE — 76882 US LMTD JT/FCL EVL NVASC XTR: CPT

## 2022-05-02 RX ORDER — METHYLPREDNISOLONE ACETATE 80 MG/ML
40 INJECTION, SUSPENSION INTRA-ARTICULAR; INTRALESIONAL; INTRAMUSCULAR; SOFT TISSUE ONCE
Status: COMPLETED | OUTPATIENT
Start: 2022-05-02 | End: 2022-05-02

## 2022-05-02 RX ORDER — LIDOCAINE HYDROCHLORIDE 10 MG/ML
3 INJECTION, SOLUTION EPIDURAL; INFILTRATION; INTRACAUDAL; PERINEURAL ONCE
Status: COMPLETED | OUTPATIENT
Start: 2022-05-02 | End: 2022-05-02

## 2022-05-02 RX ADMIN — LIDOCAINE HYDROCHLORIDE 3 ML: 10 INJECTION, SOLUTION EPIDURAL; INFILTRATION; INTRACAUDAL; PERINEURAL at 09:00

## 2022-05-02 RX ADMIN — METHYLPREDNISOLONE ACETATE 40 MG: 80 INJECTION, SUSPENSION INTRA-ARTICULAR; INTRALESIONAL; INTRAMUSCULAR; SOFT TISSUE at 09:00

## 2022-05-12 DIAGNOSIS — R11.0 NAUSEA: ICD-10-CM

## 2022-05-13 ENCOUNTER — TELEPHONE (OUTPATIENT)
Dept: OBGYN CLINIC | Facility: MEDICAL CENTER | Age: 62
End: 2022-05-13

## 2022-05-13 RX ORDER — ONDANSETRON 4 MG/1
4 TABLET, FILM COATED ORAL EVERY 8 HOURS PRN
Qty: 30 TABLET | Refills: 0 | Status: SHIPPED | OUTPATIENT
Start: 2022-05-13 | End: 2022-07-15 | Stop reason: SDUPTHER

## 2022-05-13 NOTE — TELEPHONE ENCOUNTER
Called and left voice message about setting up a appt with Dr Jhaveri Farm  Office phone number was left on voice mail

## 2022-05-13 NOTE — TELEPHONE ENCOUNTER
----- Message from [facility], DO sent at 5/13/2022  9:50 AM EDT -----  Regarding: FW: Elbow brace  Can you have the patient follow up with me next available so that we can help her?  ----- Message -----  From: Lilian Alexis  Sent: 5/13/2022   8:42 AM EDT  To: [facility], DO  Subject: FW: Elbow brace                                    ----- Message -----  From: García Pratt  Sent: 5/12/2022   9:42 PM EDT  To: [facility]  Subject: Elbow brace                                      It would seem that the injection did not work  It is quite painful at the elbow  What is the next step to resolve this?     Elisabeth Weinberg

## 2022-05-24 DIAGNOSIS — G89.4 CHRONIC PAIN SYNDROME: ICD-10-CM

## 2022-05-24 RX ORDER — BUPROPION HYDROCHLORIDE 75 MG/1
75 TABLET ORAL 2 TIMES DAILY
Qty: 180 TABLET | Refills: 0 | Status: SHIPPED | OUTPATIENT
Start: 2022-05-24

## 2022-06-17 DIAGNOSIS — J01.90 ACUTE SINUSITIS, RECURRENCE NOT SPECIFIED, UNSPECIFIED LOCATION: ICD-10-CM

## 2022-06-20 RX ORDER — FLUTICASONE PROPIONATE 50 MCG
1 SPRAY, SUSPENSION (ML) NASAL DAILY
Qty: 16 G | Refills: 0 | Status: SHIPPED | OUTPATIENT
Start: 2022-06-20 | End: 2022-08-07 | Stop reason: SDUPTHER

## 2022-06-29 ENCOUNTER — OFFICE VISIT (OUTPATIENT)
Dept: FAMILY MEDICINE CLINIC | Facility: CLINIC | Age: 62
End: 2022-06-29
Payer: MEDICARE

## 2022-06-29 VITALS
HEART RATE: 57 BPM | BODY MASS INDEX: 30.56 KG/M2 | HEIGHT: 64 IN | OXYGEN SATURATION: 95 % | SYSTOLIC BLOOD PRESSURE: 124 MMHG | WEIGHT: 179 LBS | DIASTOLIC BLOOD PRESSURE: 80 MMHG | TEMPERATURE: 99.3 F

## 2022-06-29 DIAGNOSIS — K21.9 GASTROESOPHAGEAL REFLUX DISEASE WITHOUT ESOPHAGITIS: ICD-10-CM

## 2022-06-29 DIAGNOSIS — K29.00 ACUTE GASTRITIS WITHOUT HEMORRHAGE, UNSPECIFIED GASTRITIS TYPE: Primary | ICD-10-CM

## 2022-06-29 PROCEDURE — 99214 OFFICE O/P EST MOD 30 MIN: CPT | Performed by: PHYSICIAN ASSISTANT

## 2022-06-29 RX ORDER — SUCRALFATE ORAL 1 G/10ML
1 SUSPENSION ORAL
Qty: 414 ML | Refills: 0 | Status: SHIPPED | OUTPATIENT
Start: 2022-06-29 | End: 2022-07-15 | Stop reason: SDUPTHER

## 2022-06-29 RX ORDER — PANTOPRAZOLE SODIUM 40 MG/1
40 TABLET, DELAYED RELEASE ORAL
Qty: 30 TABLET | Refills: 0 | Status: SHIPPED | OUTPATIENT
Start: 2022-06-29 | End: 2022-07-22

## 2022-06-29 NOTE — PROGRESS NOTES
Assessment/Plan:       Problem List Items Addressed This Visit    None     Visit Diagnoses     Acute gastritis without hemorrhage, unspecified gastritis type    -  Primary    Relevant Medications    pantoprazole (PROTONIX) 40 mg tablet    sucralfate (CARAFATE) 1 g/10 mL suspension    Other Relevant Orders    Ambulatory Referral to Gastroenterology    Gastroesophageal reflux disease without esophagitis        Relevant Medications    pantoprazole (PROTONIX) 40 mg tablet    sucralfate (CARAFATE) 1 g/10 mL suspension    Other Relevant Orders    Ambulatory Referral to Gastroenterology        hx and sx consistent with gastritis  PPI and Carafate sent to pharmacy, recommend GI referral for likely EGD  No red flag signs  Discussed return precautions, follow up prn     Subjective:      Patient ID: John Jimenez is a 63 y o  female  Abdominal Pain  This is a new problem  The current episode started in the past 7 days  The onset quality is sudden  The problem occurs daily  The most recent episode lasted 2 hours  The problem has been waxing and waning  The pain is located in the epigastric region  The pain is at a severity of 7/10  The quality of the pain is aching and sharp  The abdominal pain does not radiate  Pertinent negatives include no constipation, diarrhea, fever, frequency, nausea or vomiting  The pain is aggravated by eating  The pain is relieved by nothing         The following portions of the patient's history were reviewed and updated as appropriate:   She  has a past medical history of Abdominal pain, epigastric (3/23/2018), Abnormal x-ray of lumbar spine (11/3/2015), Acute cystitis with hematuria (4/7/2020), Bariatric surgery status, Basal cell carcinoma, Benign essential hypertension (6/12/2012), Breakdown (mechanical) of internal fixation device of vertebrae, [facility] (3/1/2019), Calculus of bile duct with cholecystitis without obstruction (4/27/2018), Cellulitis of finger (12/3/2015), Degenerative lumbar disc, Digital mucinous cyst (2015), DMII (diabetes mellitus, type 2) ([address] ) (2013), Gross hematuria (3/19/2019), Hiatal hernia, History of transfusion (), Low back pain, Lower urinary tract infectious disease (3/27/2015), Medicare annual wellness visit, initial (2019), Obesity, Overactive bladder, Postsurgical malabsorption, Recurrent UTI (3/19/2019), Spinal stenosis, SVT (supraventricular tachycardia) ([facility] ), Tachycardia, Type 2 diabetes mellitus ([address] ), and Wears glasses  She   Patient Active Problem List    Diagnosis Date Noted    Continuous opioid dependence ([facility] ) 2022    Major depressive disorder with single episode, in full remission ([facility] [facility] ) 2022    Right hand paresthesia 2022    Pain in right wrist 2022    Essential hypertension 2022    History of COVID-19 2021    Greater trochanteric pain syndrome of right lower extremity 10/27/2021    Primary osteoarthritis of one hip, right 2021    Bariatric surgery status 2020    Atrophic vaginitis 2019    History of lumbar spinal fusion 2019    Vitamin D insufficiency 2017    Hyperlipemia 2017    Postgastrectomy malabsorption 2016    Lateral epicondylitis of left elbow 2016    Bilateral low back pain without sciatica 2015    Irritation of right radial nerve 10/21/2015    Lumbar post-laminectomy syndrome 2014    Lumbar stenosis 2014    Lumbar radiculopathy 2014    Lumbar degenerative disc disease 2014    Chronic pain syndrome 10/04/2013    Anxiety states 2010    Intervertebral disc prolapse 2010    Major depressive disorder 2010     She  has a past surgical history that includes Appendectomy; Insertion / placement / revision neurostimulator; Cervical spine surgery; Cardiac electrophysiology study and ablation;  section; Tubal ligation; Carpal tunnel release;  Tonsillectomy; [place] tooth extraction; pr lap gastric bypass/danielle-en-y (N/A, 10/25/2016); pr egd transoral biopsy single/multiple (N/A, 9/14/2016); Neck surgery; pr egd transoral biopsy single/multiple (N/A, 4/18/2018); Skin cancer excision; pr lap,cholecystectomy (N/A, 5/14/2018); Cholecystectomy; Back surgery; [place] myelogram lumbar (3/28/2019); Arthrodesis; and Other surgical history  Her family history includes Angina in her mother; Arthritis in her mother; Cancer in her father; Coronary artery disease in her mother; Cystic fibrosis in her father; Diabetes in her brother, mother, and sister; No Known Problems in her brother; Other in her brother; Rheum arthritis in her sister  She  reports that she quit smoking about 18 years ago  Her smoking use included cigarettes  She has a 20 00 pack-year smoking history  She has never used smokeless tobacco  She reports that she does not drink alcohol and does not use drugs  Current Outpatient Medications   Medication Sig Dispense Refill    baclofen 10 mg tablet Take 10 mg by mouth daily      buPROPion (WELLBUTRIN) 75 mg tablet Take 1 tablet (75 mg total) by mouth in the morning and 1 tablet (75 mg total) in the evening  180 tablet 0    Calcium Citrate-Vitamin D 315-250 MG-UNIT TABS Take 1 tablet by mouth 2 (two) times a day       Cholecalciferol 25 MCG (1000 UT) CHEW Chew      escitalopram (LEXAPRO) 20 mg tablet TAKE 1 TABLET BY MOUTH EVERY DAY IN THE MORNING 90 tablet 1    fenofibrate 160 MG tablet Take 1 tablet (160 mg total) by mouth daily 90 tablet 3    fluticasone (FLONASE) 50 mcg/act nasal spray 1 spray into each nostril daily 16 g 0    gabapentin (NEURONTIN) 100 mg capsule 100 mg 5 (five) times a day       HYDROmorphone (DILAUDID) 4 mg tablet TAKE 1 TABLET BY MOUTH EVERY 6 HOURS AS NEEDED    FILL 5/8/2020      loratadine (CLARITIN) 10 mg tablet Take 10 mg by mouth daily        Multiple Vitamins-Minerals (BARIATRIC FUSION PO) Take 1 tablet by mouth daily      ondansetron (ZOFRAN) 4 mg tablet Take 1 tablet (4 mg total) by mouth every 8 (eight) hours as needed for nausea or vomiting 30 tablet 0    oxybutynin (DITROPAN) 5 mg tablet TAKE 1 TABLET (5 MG TOTAL) BY MOUTH 3 (THREE) TIMES A DAY AS NEEDED (BLADDER SPASMS) 270 tablet 1    pantoprazole (PROTONIX) 40 mg tablet Take 1 tablet (40 mg total) by mouth daily before breakfast 30 tablet 0    sucralfate (CARAFATE) 1 g/10 mL suspension Take 10 mL (1 g total) by mouth 4 (four) times a day (with meals and at bedtime) 414 mL 0     No current facility-administered medications for this visit  Current Outpatient Medications on File Prior to Visit   Medication Sig    baclofen 10 mg tablet Take 10 mg by mouth daily    buPROPion (WELLBUTRIN) 75 mg tablet Take 1 tablet (75 mg total) by mouth in the morning and 1 tablet (75 mg total) in the evening   Calcium Citrate-Vitamin D 315-250 MG-UNIT TABS Take 1 tablet by mouth 2 (two) times a day     Cholecalciferol 25 MCG (1000 UT) CHEW Chew    escitalopram (LEXAPRO) 20 mg tablet TAKE 1 TABLET BY MOUTH EVERY DAY IN THE MORNING    fenofibrate 160 MG tablet Take 1 tablet (160 mg total) by mouth daily    fluticasone (FLONASE) 50 mcg/act nasal spray 1 spray into each nostril daily    gabapentin (NEURONTIN) 100 mg capsule 100 mg 5 (five) times a day     HYDROmorphone (DILAUDID) 4 mg tablet TAKE 1 TABLET BY MOUTH EVERY 6 HOURS AS NEEDED    FILL 5/8/2020    loratadine (CLARITIN) 10 mg tablet Take 10 mg by mouth daily      Multiple Vitamins-Minerals (BARIATRIC FUSION PO) Take 1 tablet by mouth daily    ondansetron (ZOFRAN) 4 mg tablet Take 1 tablet (4 mg total) by mouth every 8 (eight) hours as needed for nausea or vomiting    oxybutynin (DITROPAN) 5 mg tablet TAKE 1 TABLET (5 MG TOTAL) BY MOUTH 3 (THREE) TIMES A DAY AS NEEDED (BLADDER SPASMS)    [DISCONTINUED] omeprazole (PriLOSEC) 20 mg delayed release capsule TAKE 1 CAPSULE BY MOUTH EVERY DAY     No current facility-administered medications on file prior to visit  She has No Known Allergies       Review of Systems   Constitutional: Negative for chills, fatigue and fever  HENT: Negative for congestion, ear pain, hearing loss, nosebleeds, postnasal drip, rhinorrhea, sinus pressure, sinus pain, sneezing and sore throat  Eyes: Negative for pain, discharge, itching and visual disturbance  Respiratory: Negative for cough, chest tightness, shortness of breath and wheezing  Cardiovascular: Negative for chest pain, palpitations and leg swelling  Gastrointestinal: Positive for abdominal pain  Negative for blood in stool, constipation, diarrhea, nausea and vomiting  Genitourinary: Negative for frequency and urgency  Neurological: Negative for dizziness, light-headedness and numbness  Objective:      /80 (BP Location: Left arm, Patient Position: Sitting)   Pulse 57   Temp 99 3 °F (37 4 °C)   Ht 5' 3 5" (1 613 m)   Wt 81 2 kg (179 lb)   SpO2 95%   BMI 31 21 kg/m²          Physical Exam  Vitals and nursing note reviewed  Constitutional:       General: She is not in acute distress  Appearance: Normal appearance  HENT:      Head: Normocephalic and atraumatic  Nose: Nose normal    Eyes:      Pupils: Pupils are equal, round, and reactive to light  Cardiovascular:      Rate and Rhythm: Normal rate and regular rhythm  Heart sounds: Normal heart sounds  No murmur heard  Pulmonary:      Effort: Pulmonary effort is normal  No respiratory distress  Breath sounds: Normal breath sounds  No wheezing, rhonchi or rales  Abdominal:      General: Bowel sounds are normal  There is no distension  Palpations: Abdomen is soft  There is no mass  Tenderness: There is abdominal tenderness in the epigastric area  There is no right CVA tenderness, left CVA tenderness, guarding or rebound  Musculoskeletal:         General: Normal range of motion  Cervical back: Normal range of motion and neck supple     Skin: General: Skin is warm and dry  Neurological:      Mental Status: She is alert and oriented to person, place, and time     Psychiatric:         Mood and Affect: Mood and affect normal

## 2022-07-15 DIAGNOSIS — F32.5 MAJOR DEPRESSIVE DISORDER WITH SINGLE EPISODE, IN FULL REMISSION (HCC): ICD-10-CM

## 2022-07-15 DIAGNOSIS — R11.0 NAUSEA: ICD-10-CM

## 2022-07-15 DIAGNOSIS — K29.00 ACUTE GASTRITIS WITHOUT HEMORRHAGE, UNSPECIFIED GASTRITIS TYPE: ICD-10-CM

## 2022-07-15 RX ORDER — SUCRALFATE ORAL 1 G/10ML
1 SUSPENSION ORAL
Qty: 414 ML | Refills: 0 | Status: SHIPPED | OUTPATIENT
Start: 2022-07-15 | End: 2022-07-21

## 2022-07-15 RX ORDER — ONDANSETRON 4 MG/1
4 TABLET, FILM COATED ORAL EVERY 8 HOURS PRN
Qty: 30 TABLET | Refills: 0 | Status: SHIPPED | OUTPATIENT
Start: 2022-07-15 | End: 2022-09-02 | Stop reason: SDUPTHER

## 2022-07-15 RX ORDER — ESCITALOPRAM OXALATE 20 MG/1
20 TABLET ORAL EVERY MORNING
Qty: 90 TABLET | Refills: 0 | Status: SHIPPED | OUTPATIENT
Start: 2022-07-15 | End: 2022-10-06

## 2022-07-21 DIAGNOSIS — K29.00 ACUTE GASTRITIS WITHOUT HEMORRHAGE, UNSPECIFIED GASTRITIS TYPE: ICD-10-CM

## 2022-07-21 RX ORDER — SUCRALFATE ORAL 1 G/10ML
SUSPENSION ORAL
Qty: 414 ML | Refills: 0 | Status: SHIPPED | OUTPATIENT
Start: 2022-07-21 | End: 2022-08-01

## 2022-07-22 DIAGNOSIS — K29.00 ACUTE GASTRITIS WITHOUT HEMORRHAGE, UNSPECIFIED GASTRITIS TYPE: ICD-10-CM

## 2022-07-22 DIAGNOSIS — K21.9 GASTROESOPHAGEAL REFLUX DISEASE WITHOUT ESOPHAGITIS: ICD-10-CM

## 2022-07-22 RX ORDER — PANTOPRAZOLE SODIUM 40 MG/1
TABLET, DELAYED RELEASE ORAL
Qty: 30 TABLET | Refills: 0 | Status: SHIPPED | OUTPATIENT
Start: 2022-07-22 | End: 2022-08-14 | Stop reason: SDUPTHER

## 2022-07-31 DIAGNOSIS — K29.00 ACUTE GASTRITIS WITHOUT HEMORRHAGE, UNSPECIFIED GASTRITIS TYPE: ICD-10-CM

## 2022-08-01 RX ORDER — SUCRALFATE ORAL 1 G/10ML
SUSPENSION ORAL
Qty: 414 ML | Refills: 0 | Status: SHIPPED | OUTPATIENT
Start: 2022-08-01 | End: 2022-09-02 | Stop reason: SDUPTHER

## 2022-08-07 DIAGNOSIS — J01.90 ACUTE SINUSITIS, RECURRENCE NOT SPECIFIED, UNSPECIFIED LOCATION: ICD-10-CM

## 2022-08-08 ENCOUNTER — OFFICE VISIT (OUTPATIENT)
Dept: FAMILY MEDICINE CLINIC | Facility: CLINIC | Age: 62
End: 2022-08-08
Payer: MEDICARE

## 2022-08-08 VITALS
SYSTOLIC BLOOD PRESSURE: 148 MMHG | WEIGHT: 180 LBS | HEIGHT: 64 IN | DIASTOLIC BLOOD PRESSURE: 87 MMHG | TEMPERATURE: 98 F | HEART RATE: 61 BPM | BODY MASS INDEX: 30.73 KG/M2 | OXYGEN SATURATION: 96 %

## 2022-08-08 DIAGNOSIS — J01.90 ACUTE SINUSITIS, RECURRENCE NOT SPECIFIED, UNSPECIFIED LOCATION: ICD-10-CM

## 2022-08-08 PROBLEM — F32.5 MAJOR DEPRESSIVE DISORDER WITH SINGLE EPISODE, IN FULL REMISSION (HCC): Status: RESOLVED | Noted: 2022-04-21 | Resolved: 2022-08-08

## 2022-08-08 PROCEDURE — 99214 OFFICE O/P EST MOD 30 MIN: CPT | Performed by: FAMILY MEDICINE

## 2022-08-08 RX ORDER — FLUTICASONE PROPIONATE 50 MCG
1 SPRAY, SUSPENSION (ML) NASAL DAILY
Qty: 16 G | Refills: 0 | Status: SHIPPED | OUTPATIENT
Start: 2022-08-08

## 2022-08-08 RX ORDER — AMOXICILLIN AND CLAVULANATE POTASSIUM 875; 125 MG/1; MG/1
1 TABLET, FILM COATED ORAL EVERY 12 HOURS SCHEDULED
Qty: 14 TABLET | Refills: 0 | Status: SHIPPED | OUTPATIENT
Start: 2022-08-08 | End: 2022-08-15

## 2022-08-08 NOTE — PROGRESS NOTES
Jan Vigil 1960 female MRN: [id number]      ASSESSMENT/PLAN  Problem List Items Addressed This Visit    None     Visit Diagnoses     Acute sinusitis, recurrence not specified, unspecified location        Relevant Medications    amoxicillin-clavulanate (AUGMENTIN) 875-125 mg per tablet        History/symptoms suggestive of sinusitis -- will start Augmentin, continue supportive care with good hydration, Flonase  Future Appointments   Date Time Provider Yanelis Carcamo   8/18/2022  4:00 PM Daiana Helton DO [facility] [place]   9/12/2022 10:45 AM Hubert Shi PA-C [facility]          SUBJECTIVE  CC: Sinus Congestion (Patient has had symptoms for 2 weeks  She has tested for Covid at home 3 times and all came back negative)      HPI:  Jan Vigil is a 63 y o  female who presents due to concern for sinus infection  2 week history of:  Sinus congestion, pain/pressure, rhinorrhea   No associated fever   Tried Tylenol, OTC cold medicine, Mucinex without relief   Home COVID (-)     Review of Systems   Constitutional: Negative for fever  HENT: Positive for congestion, ear pain (occasional), rhinorrhea, sinus pressure and sinus pain          Historical Information   The patient history was reviewed and updated as follows:    Past Medical History:   Diagnosis Date    Abdominal pain, epigastric 3/23/2018    Added automatically from request for surgery [id number]    Abnormal x-ray of lumbar spine 11/3/2015    Acute cystitis with hematuria 4/7/2020    Bariatric surgery status     Basal cell carcinoma     basil cell carcinoma- in remission    Benign essential hypertension 6/12/2012    Breakdown [facility]mechanical) of internal [facility] 3/1/2019    Calculus of bile duct with cholecystitis without obstruction 4/27/2018    Added automatically from request for surgery [id number]    Cellulitis of finger 12/3/2015    Degenerative lumbar disc     Digital mucinous cyst 2015    DMII (diabetes mellitus, type 2) ([facility] ) 2013    Gross hematuria 3/19/2019    Hiatal hernia     History of transfusion     Post-op spinal surgery    Low back pain     Lower urinary tract infectious disease 3/27/2015    Medicare annual wellness visit, initial 2019    Obesity     Resolved 10/6/2016     Overactive bladder     Postsurgical malabsorption     Recurrent UTI 3/19/2019    Spinal stenosis     [facility]     Tachycardia     Resolved 2016     Type 2 diabetes mellitus ([facility] )     Last assesssed 2018     Wears glasses      Past Surgical History:   Procedure Laterality Date    APPENDECTOMY      ARTHRODESIS      Lumbar - Last assessed 2018    [facility] BACK SURGERY      Lumbar (3 surgeries)- two levels fused, clean out from infection, then fusion of 7 levels (last surgery in )   [facility]      x2    CERVICAL SPINE SURGERY      Fusion of C2-C7 over a period of 3 surgeries ( first)     SECTION      X2    CHOLECYSTECTOMY      FL MYELOGRAM LUMBAR  3/28/2019    INSERTION / PLACEMENT / REVISION NEUROSTIMULATOR      X2- both were removed    NECK SURGERY      OTHER SURGICAL HISTORY      Catheter ablation     RI EGD TRANSORAL BIOPSY SINGLE/MULTIPLE N/A 2016    Procedure: ESOPHAGOGASTRODUODENOSCOPY (EGD); Surgeon: Ct Maria MD;  Location: [facility]; Service: Bariatrics    RI EGD TRANSORAL BIOPSY SINGLE/MULTIPLE N/A 2018    Procedure: ESOPHAGOGASTRODUODENOSCOPY (EGD) with biopsy;  Surgeon: Ct Maria MD;  Location: [facility];   Service: Bariatrics    RI LAP GASTRIC BYPASS/BRAULIO-EN-Y N/A 10/25/2016    Procedure: BYPASS GASTRIC  BRAULIO-EN-Y LAPAROSCOPIC, WITH INTRA OP EGD;  Surgeon: Ct Maria MD;  [facility]: [facility];  Service: Stacey Miller RI LAP,CHOLECYSTECTOMY N/A 2018    Procedure: CHOLECYSTECTOMY LAPAROSCOPIC;  Surgeon: Marc Vaca MD;  Location: [facility];  Service: Bariatrics    SKIN CANCER EXCISION      TONSILLECTOMY      TUBAL LIGATION      WISDOM TOOTH EXTRACTION       Family History   Problem Relation Age of Onset    Cancer Father         Lung    Cystic fibrosis Father     Arthritis Mother         Rheumatoid    Angina Mother     Coronary artery disease Mother     Diabetes Mother     Rheum arthritis Sister     Diabetes Sister     Other Brother         Back problems     Diabetes Brother     No Known Problems Brother       Social History   Social History     Substance and Sexual Activity   Alcohol Use No     Social History     Substance and Sexual Activity   Drug Use No     Social History     Tobacco Use   Smoking Status Former Smoker    Packs/day:  00    Years: 20 00    Pack years: 20     Types: Cigarettes    Quit date:     Years since quittin 6   Smokeless Tobacco Never Used       Medications:     Current Outpatient Medications:     amoxicillin-clavulanate (AUGMENTIN) 875-125 mg per tablet, Take 1 tablet by mouth every 12 (twelve) hours for 7 days, Disp: 14 tablet, Rfl: 0    baclofen 10 mg tablet, Take 10 mg by mouth daily, Disp: , Rfl:     buPROPion (WELLBUTRIN) 75 mg tablet, Take 1 tablet (75 mg total) by mouth in the morning and 1 tablet (75 mg total) in the evening , Disp: 180 tablet, Rfl: 0    Calcium Citrate-Vitamin D 315-250 MG-UNIT TABS, Take 1 tablet by mouth 2 (two) times a day , Disp: , Rfl:     Cholecalciferol 25 MCG (1000 UT) CHEW, Chew, Disp: , Rfl:     escitalopram (LEXAPRO) 20 mg tablet, Take 1 tablet (20 mg total) by mouth every morning, Disp: 90 tablet, Rfl: 0    fenofibrate 160 MG tablet, Take 1 tablet (160 mg total) by mouth daily, Disp: 90 tablet, Rfl: 3    fluticasone (FLONASE) 50 mcg/act nasal spray, 1 spray into each nostril daily, Disp: 16 g, Rfl: 0    gabapentin (NEURONTIN) 100 mg capsule, 100 mg 5 (five) times a day , Disp: , Rfl:     HYDROmorphone (DILAUDID) 4 mg tablet, TAKE 1 TABLET BY MOUTH EVERY 6 HOURS AS NEEDED    FILL 5/8/2020, Disp: , Rfl:     loratadine (CLARITIN) 10 mg tablet, Take 10 mg by mouth daily  , Disp: , Rfl:     Multiple Vitamins-Minerals (BARIATRIC FUSION PO), Take 1 tablet by mouth daily, Disp: , Rfl:     ondansetron (ZOFRAN) 4 mg tablet, Take 1 tablet (4 mg total) by mouth every 8 (eight) hours as needed for nausea or vomiting, Disp: 30 tablet, Rfl: 0    oxybutynin (DITROPAN) 5 mg tablet, TAKE 1 TABLET (5 MG TOTAL) BY MOUTH 3 (THREE) TIMES A DAY AS NEEDED (BLADDER SPASMS), Disp: 270 tablet, Rfl: 1    pantoprazole (PROTONIX) 40 mg tablet, take 1 tablet by mouth daily BEFORE BREAKFAST, Disp: 30 tablet, Rfl: 0    sucralfate (CARAFATE) 1 g/10 mL suspension, take 10 milliliters by mouth four times a day (WITH MEALS AND AT BEDTIME), Disp: 414 mL, Rfl: 0  No Known Allergies    OBJECTIVE    Vitals:   Vitals:    08/08/22 1309   BP: 148/87   Pulse: 61   Temp: 98 °F (36 7 °C)   SpO2: 96%   Weight: 81 6 kg (180 lb)   Height: 5' 3 5" (1 613 m)           Physical Exam  Vitals and nursing note reviewed  Constitutional:       General: She is not in acute distress  Appearance: Normal appearance  HENT:      Head: Normocephalic and atraumatic  Right Ear: Tympanic membrane, ear canal and external ear normal  Tympanic membrane is not erythematous, retracted or bulging  Left Ear: Tympanic membrane, ear canal and external ear normal  Tympanic membrane is not erythematous, retracted or bulging  Nose: No mucosal edema or rhinorrhea  Right Sinus: Maxillary sinus tenderness and frontal sinus tenderness present  Left Sinus: Maxillary sinus tenderness and frontal sinus tenderness present  Mouth/Throat:      Mouth: Mucous membranes are moist       Pharynx: No oropharyngeal exudate or posterior oropharyngeal erythema     Eyes:      Conjunctiva/sclera: Conjunctivae normal    Cardiovascular:      Rate and Rhythm: Normal rate and regular rhythm  Pulmonary:      Effort: Pulmonary effort is normal  No respiratory distress  Breath sounds: Normal breath sounds  No decreased breath sounds, wheezing or rales  Lymphadenopathy:      Cervical: No cervical adenopathy  Skin:     General: Skin is warm and dry  Neurological:      General: No focal deficit present  Mental Status: She is alert     Psychiatric:         Mood and Affect: Mood normal                     DO Lashonda Sandoval [address] [facility]   8/8/2022  1:33 PM

## 2022-08-14 DIAGNOSIS — K21.9 GASTROESOPHAGEAL REFLUX DISEASE WITHOUT ESOPHAGITIS: ICD-10-CM

## 2022-08-14 DIAGNOSIS — G89.4 CHRONIC PAIN SYNDROME: ICD-10-CM

## 2022-08-14 DIAGNOSIS — K29.00 ACUTE GASTRITIS WITHOUT HEMORRHAGE, UNSPECIFIED GASTRITIS TYPE: ICD-10-CM

## 2022-08-15 RX ORDER — BUPROPION HYDROCHLORIDE 75 MG/1
75 TABLET ORAL 2 TIMES DAILY
Qty: 180 TABLET | Refills: 0 | Status: SHIPPED | OUTPATIENT
Start: 2022-08-15

## 2022-08-15 RX ORDER — PANTOPRAZOLE SODIUM 40 MG/1
40 TABLET, DELAYED RELEASE ORAL
Qty: 30 TABLET | Refills: 0 | Status: SHIPPED | OUTPATIENT
Start: 2022-08-15 | End: 2022-09-07

## 2022-08-18 ENCOUNTER — OFFICE VISIT (OUTPATIENT)
Dept: GASTROENTEROLOGY | Facility: CLINIC | Age: 62
End: 2022-08-18
Payer: MEDICARE

## 2022-08-18 VITALS
SYSTOLIC BLOOD PRESSURE: 128 MMHG | BODY MASS INDEX: 30.73 KG/M2 | DIASTOLIC BLOOD PRESSURE: 78 MMHG | HEIGHT: 64 IN | WEIGHT: 180 LBS | HEART RATE: 78 BPM | OXYGEN SATURATION: 91 %

## 2022-08-18 DIAGNOSIS — K29.00 ACUTE GASTRITIS WITHOUT HEMORRHAGE, UNSPECIFIED GASTRITIS TYPE: ICD-10-CM

## 2022-08-18 DIAGNOSIS — K21.9 GASTROESOPHAGEAL REFLUX DISEASE WITHOUT ESOPHAGITIS: ICD-10-CM

## 2022-08-18 DIAGNOSIS — Z12.11 SCREENING FOR COLON CANCER: Primary | ICD-10-CM

## 2022-08-18 PROCEDURE — 99204 OFFICE O/P NEW MOD 45 MIN: CPT | Performed by: INTERNAL MEDICINE

## 2022-08-18 NOTE — PROGRESS NOTES
[facility] - Outpatient Consultation  Nadeen Sandifer 63 y o  female MRN: [id number]  Encounter: [id number]          ASSESSMENT AND PLAN:      1  Acute gastritis without hemorrhage, unspecified gastritis type  - Ambulatory Referral to Gastroenterology  - EGD; Future    2  Gastroesophageal reflux disease without esophagitis the  - Ambulatory Referral to Gastroenterology  - EGD; Future    3  Screening for colon cancer  - Colonoscopy; Future    ______________________________________________________________________    HPI:  This 51-year-old female comes the office today for evaluation of a recurring epigastric abdominal discomfort and pain  This pain is associated with eating certain meals  She also states she feels the pain which she takes a sinus medication  There was also postprandial nausea but no vomiting  She denies any heartburn or dysphagia  She denies bloating or gaseousness  There has been no diarrhea or constipation  She underwent gastric bypass about 6 years ago and underwent a cholecystectomy 1 year later  She has never undergone colonoscopy in the past   There is no family history for stomach, esophageal, or colon cancer  She had undergone Cologuard testing in 2019 which was negative  She denies any rectal bleeding  REVIEW OF SYSTEMS:    CONSTITUTIONAL: Denies any fever, chills, rigors, and weight loss  HEENT: No earache or tinnitus  Denies hearing loss or visual disturbances  CARDIOVASCULAR: No chest pain or palpitations  RESPIRATORY: Denies any cough, hemoptysis, shortness of breath or dyspnea on exertion  GASTROINTESTINAL: As noted in the History of Present Illness  GENITOURINARY: No problems with urination  Denies any hematuria or dysuria  NEUROLOGIC: No dizziness or vertigo, denies headaches  MUSCULOSKELETAL: Denies any muscle or joint pain  SKIN: Denies skin rashes or itching     ENDOCRINE: Denies excessive thirst  Denies intolerance to heat or cold   PSYCHOSOCIAL: Denies depression or anxiety  Denies any recent memory loss         Historical Information   Past Medical History:   Diagnosis Date    Abdominal pain, epigastric 3/23/2018    Added automatically from request for surgery [id number]    Abnormal x-ray of lumbar spine 11/3/2015    Acute cystitis with hematuria 2020    Bariatric surgery status     Basal cell carcinoma     basil cell carcinoma- in remission    Benign essential hypertension 2012    Breakdown (mechanical) of internal fixation device of vertebrae, [facility] ) 3/1/2019    Calculus of bile duct with cholecystitis without obstruction 2018    Added automatically from request for surgery [id number]    Cellulitis of finger 12/3/2015    Degenerative lumbar disc     Digital mucinous cyst 2015    DMII (diabetes mellitus, type 2) ([facility] ) 2013    Gross hematuria 3/19/2019    Hiatal hernia     History of transfusion 2014    Post-op spinal surgery    Low back pain     Lower urinary tract infectious disease 3/27/2015    Medicare annual wellness visit, initial 2019    Obesity     Resolved 10/6/2016     Overactive bladder     Postsurgical malabsorption     Recurrent UTI 3/19/2019    Spinal stenosis     [facility]     Tachycardia     Resolved 2016     Type 2 diabetes mellitus ([facility] )     Last assesssed 2018     Wears glasses      Past Surgical History:   Procedure Laterality Date    APPENDECTOMY      ARTHRODESIS      Lumbar - Last assessed 2018    Victorino Nix BACK SURGERY      Lumbar (3 surgeries)- two levels fused, clean out from infection, then fusion of 7 levels (last surgery in )   [facility]      x2    CERVICAL SPINE SURGERY      Fusion of C2-C7 over a period of 3 surgeries ( first)     SECTION      X2    CHOLECYSTECTOMY      FL MYELOGRAM LUMBAR  3/28/2019    INSERTION / PLACEMENT / REVISION NEUROSTIMULATOR      X2- both were removed    NECK SURGERY      OTHER SURGICAL HISTORY      Catheter ablation     AR EGD TRANSORAL BIOPSY SINGLE/MULTIPLE N/A 2016    Procedure: ESOPHAGOGASTRODUODENOSCOPY (EGD); Surgeon: Sophia Dominguez MD;  [facility]; Service: Bariatrics    AR EGD TRANSORAL BIOPSY SINGLE/MULTIPLE N/A 2018    Procedure: ESOPHAGOGASTRODUODENOSCOPY (EGD) with biopsy;  Surgeon: Sophia Dominguez MD;  [facility];   Service: Bariatrics    AR LAP GASTRIC BYPASS/BRAULIO-EN-Y N/A 10/25/2016    Procedure: BYPASS GASTRIC  BRAULIO-EN-Y LAPAROSCOPIC, WITH INTRA OP EGD;  Surgeon: Sophia Dominguez MD;  [facility];  Service: Bariatrics    AR LAP,CHOLECYSTECTOMY N/A 2018    Procedure: CHOLECYSTECTOMY LAPAROSCOPIC;  Surgeon: Sophia Dominguez MD;  [facility];  Service: Kristina Cory SKIN CANCER EXCISION      TONSILLECTOMY      TUBAL LIGATION      WISDOM TOOTH EXTRACTION       Social History   Social History     Substance and Sexual Activity   Alcohol Use No     Social History     Substance and Sexual Activity   Drug Use No     Social History     Tobacco Use   Smoking Status Former Smoker    Packs/day: 1 00    Years: 20 00    Pack years: 20 00    Types: Cigarettes    Quit date:     Years since quittin 6   Smokeless Tobacco Never Used     Family History   Problem Relation Age of Onset    Cancer Father         Lung    Cystic fibrosis Father     Arthritis Mother         Rheumatoid    Angina Mother     Coronary artery disease Mother     Diabetes Mother     Rheum arthritis Sister     Diabetes Sister     Other Brother         Back problems     Diabetes Brother     No Known Problems Brother        Meds/Allergies       Current Outpatient Medications:     baclofen 10 mg tablet    buPROPion (WELLBUTRIN) 75 mg tablet    Calcium Citrate-Vitamin D 315-250 MG-UNIT TABS    Cholecalciferol 25 MCG (1000 UT) CHEW    escitalopram (LEXAPRO) 20 mg tablet    fenofibrate 160 MG tablet    fluticasone (FLONASE) 50 mcg/act nasal spray    gabapentin (NEURONTIN) 100 mg capsule    HYDROmorphone (DILAUDID) 4 mg tablet    loratadine (CLARITIN) 10 mg tablet    Multiple Vitamins-Minerals (BARIATRIC FUSION PO)    ondansetron (ZOFRAN) 4 mg tablet    oxybutynin (DITROPAN) 5 mg tablet    pantoprazole (PROTONIX) 40 mg tablet    sucralfate (CARAFATE) 1 g/10 mL suspension    No Known Allergies        Objective     Blood pressure 128/78, pulse 78, height 5' 3 5" (1 613 m), weight 81 6 kg (180 lb), SpO2 91 %, not currently breastfeeding  Body mass index is 31 39 kg/m²  PHYSICAL EXAM:      General Appearance:   Alert, cooperative, no distress   HEENT:   Normocephalic, atraumatic, anicteric      Neck:  Supple, symmetrical, trachea midline   Lungs:   Clear to auscultation bilaterally; no rales, rhonchi or wheezing; respirations unlabored    Heart[de-identified]   Regular rate and rhythm; no murmur, rub, or gallop  Abdomen:   Soft, non-tender, non-distended; normal bowel sounds; no masses, no organomegaly    Genitalia:   Deferred    Rectal:   Deferred    Extremities:  No cyanosis, clubbing or edema    Pulses:  2+ and symmetric    Skin:  No jaundice, rashes, or lesions    Lymph nodes:  No palpable cervical lymphadenopathy        Lab Results:   No visits with results within 1 Day(s) from this visit     Latest known visit with results is:   Lab on 02/26/2022   Component Date Value    Sodium 02/26/2022 140     Potassium 02/26/2022 4 2     Chloride 02/26/2022 107     CO2 02/26/2022 28     ANION GAP 02/26/2022 5     BUN 02/26/2022 14     Creatinine 02/26/2022 1 00     Glucose, Fasting 02/26/2022 99     Calcium 02/26/2022 9 4     AST 02/26/2022 34     ALT 02/26/2022 28     Alkaline Phosphatase 02/26/2022 46     Total Protein 02/26/2022 7 4     Albumin 02/26/2022 3 6     Total Bilirubin 02/26/2022 0 59     eGFR 02/26/2022 61     Cholesterol 02/26/2022 172     Triglycerides 02/26/2022 119     HDL, Direct 02/26/2022 55     LDL Calculated 02/26/2022 93     Non-HDL-Chol (CHOL-HDL) 02/26/2022 117          Radiology Results:   No results found  Answers for HPI/ROS submitted by the patient on 8/14/2022  Chronicity: new  Onset: 1 to 4 weeks ago  Onset quality: gradual  Frequency: daily  Episode duration: 1 hours  Progression since onset: waxing and waning  Pain location: epigastric region  Pain - numeric: 5/10  Pain quality: aching  Radiates to: does not radiate  nausea:  Yes  Aggravated by: eating, NSAIDs  Relieved by: nothing

## 2022-08-18 NOTE — PATIENT INSTRUCTIONS
Scheduled date of EGD/colonoscopy (as of today):10/27/22  Physician performing EGD/colonoscopy:Ananda  Location of EGD/colonoscopy:[facility]  Desired bowel prep reviewed with patient:Saskia/Miralax  Instructions reviewed with patient by:Bryan penny  Clearances:   none

## 2022-09-02 DIAGNOSIS — R11.0 NAUSEA: ICD-10-CM

## 2022-09-02 RX ORDER — ONDANSETRON 4 MG/1
4 TABLET, FILM COATED ORAL EVERY 8 HOURS PRN
Qty: 30 TABLET | Refills: 0 | Status: SHIPPED | OUTPATIENT
Start: 2022-09-02

## 2022-09-09 DIAGNOSIS — K29.00 ACUTE GASTRITIS WITHOUT HEMORRHAGE, UNSPECIFIED GASTRITIS TYPE: ICD-10-CM

## 2022-09-09 RX ORDER — SUCRALFATE ORAL 1 G/10ML
SUSPENSION ORAL
Qty: 414 ML | Refills: 0 | Status: SHIPPED | OUTPATIENT
Start: 2022-09-09 | End: 2022-09-19

## 2022-09-12 ENCOUNTER — OFFICE VISIT (OUTPATIENT)
Dept: UROLOGY | Facility: CLINIC | Age: 62
End: 2022-09-12
Payer: MEDICARE

## 2022-09-12 VITALS
HEIGHT: 64 IN | BODY MASS INDEX: 30.9 KG/M2 | SYSTOLIC BLOOD PRESSURE: 134 MMHG | DIASTOLIC BLOOD PRESSURE: 82 MMHG | WEIGHT: 181 LBS

## 2022-09-12 DIAGNOSIS — N39.0 RECURRENT UTI: Primary | ICD-10-CM

## 2022-09-12 LAB
POST-VOID RESIDUAL VOLUME, ML POC: 32 ML
SL AMB  POCT GLUCOSE, UA: ABNORMAL
SL AMB LEUKOCYTE ESTERASE,UA: ABNORMAL
SL AMB POCT BILIRUBIN,UA: ABNORMAL
SL AMB POCT BLOOD,UA: ABNORMAL
SL AMB POCT CLARITY,UA: ABNORMAL
SL AMB POCT COLOR,UA: YELLOW
SL AMB POCT KETONES,UA: ABNORMAL
SL AMB POCT NITRITE,UA: ABNORMAL
SL AMB POCT PH,UA: 5
SL AMB POCT SPECIFIC GRAVITY,UA: 1.01
SL AMB POCT URINE PROTEIN: 30
SL AMB POCT UROBILINOGEN: 0.2

## 2022-09-12 PROCEDURE — 81002 URINALYSIS NONAUTO W/O SCOPE: CPT | Performed by: PHYSICIAN ASSISTANT

## 2022-09-12 PROCEDURE — 51798 US URINE CAPACITY MEASURE: CPT | Performed by: PHYSICIAN ASSISTANT

## 2022-09-12 PROCEDURE — 99213 OFFICE O/P EST LOW 20 MIN: CPT | Performed by: PHYSICIAN ASSISTANT

## 2022-09-12 NOTE — PROGRESS NOTES
9/12/2022      Chief Complaint   Patient presents with   Carrolyn Shutter Hematuria    Urinary Tract Infection         Assessment and Plan  1  History of gross hematuria s/p negative hematuria workup (6/27/19)  - UA today negative for leukocytes, nitrites, blood    2  History of UTI  - Using topical estrogen 3 times weekly  - Asymptomatic at this time    3  Urinary urgency  4  Bladder spasms  - Using oxybutynin 5 mg as needed for bladder spasms  - PVR today 22 mL  - Overall happy with symptoms  - Will follow up in 1 year for symptom reassessment  - Will call with any questions or concerns  - All questions answered; patient understands and agrees with plan      History of Present Illness  Jonas Pichardo is a 63 y o  female patient with history of gross hematuria, UTI, urinary urgency, and bladder spasms here for follow up  She is known to Dr Douglas Sosa  Patient underwent negative hematuria workup in June 2019  There were no abnormal findings noted on cystoscopic evaluation or upper tract imaging  Denies gross hematuria over past year  Using topical estrogen 3 times weekly  Denies UTIs  Patient using oxybutynin 5 mg as needed for bladder spasms  Has increased water intake and decreased caffeine use  Doing well overall  No complaints  Review of Systems   Constitutional: Negative for activity change, appetite change, chills and fever  HENT: Negative for congestion and trouble swallowing  Respiratory: Negative for cough and shortness of breath  Cardiovascular: Negative for chest pain, palpitations and leg swelling  Gastrointestinal: Negative for abdominal pain, constipation, diarrhea, nausea and vomiting  Genitourinary: Negative for difficulty urinating, dysuria, flank pain, frequency, hematuria and urgency  Musculoskeletal: Negative for back pain and gait problem  Skin: Negative for wound  Allergic/Immunologic: Negative for immunocompromised state     Neurological: Negative for dizziness and syncope  Hematological: Does not bruise/bleed easily  Psychiatric/Behavioral: Negative for confusion  All other systems reviewed and are negative  Vitals  Vitals:    09/12/22 1033   BP: 134/82   BP Location: Left arm   Patient Position: Sitting   Cuff Size: Adult   Weight: 82 1 kg (181 lb)   Height: 5' 3 5" (1 613 m)       Physical Exam  Constitutional:       Appearance: Normal appearance  HENT:      Head: Normocephalic  Pulmonary:      Effort: Pulmonary effort is normal    Musculoskeletal:      Cervical back: Normal range of motion  Skin:     General: Skin is warm and dry  Neurological:      General: No focal deficit present  Mental Status: She is alert and oriented to person, place, and time  Psychiatric:         Mood and Affect: Mood normal          Behavior: Behavior normal          Thought Content:  Thought content normal          Judgment: Judgment normal            Past History  Past Medical History:   Diagnosis Date    Abdominal pain, epigastric 3/23/2018    Added automatically from request for surgery [id number]    Abnormal x-ray of lumbar spine 11/3/2015    Acute cystitis with hematuria 4/7/2020    Bariatric surgery status     Basal cell carcinoma     basil cell carcinoma- in remission    Benign essential hypertension 6/12/2012    Breakdown (mechanical) of internal fixation device of vertebrae, [facility] ([facility] ) 3/1/2019    Calculus of bile duct with cholecystitis without obstruction 4/27/2018    Added automatically from request for surgery [id number]    Cellulitis of finger 12/3/2015    Degenerative lumbar disc     Digital mucinous cyst 6/24/2015    DMII (diabetes mellitus, type 2) ([facility] ) 11/8/2013    Gross hematuria 3/19/2019    Hiatal hernia     History of transfusion 2014    Post-op spinal surgery    Low back pain     Lower urinary tract infectious disease 3/27/2015    Medicare annual wellness visit, initial 11/27/2019    Obesity     Resolved 10/6/2016     Overactive bladder     Postsurgical malabsorption     Recurrent UTI 3/19/2019    Spinal stenosis     [facility]     Tachycardia     Resolved 2016     Type 2 diabetes mellitus ([address] )     Last assesssed 2018     Wears glasses      Social History     Socioeconomic History    Marital status:      Spouse name: None    Number of children: None    Years of education: Completed 4th year of college     Highest education level: None   Occupational History    Occupation: Realtor    Tobacco Use    Smoking status: Former Smoker     Packs/day: 1 00     Years: 20 00     Pack years: 20 00     Types: Cigarettes     Quit date:      Years since quittin 7    Smokeless tobacco: Never Used   Vaping Use    Vaping Use: Never used   Substance and Sexual Activity    Alcohol use: No    Drug use: No    Sexual activity: Not Currently   Other Topics Concern    None   Social History Narrative    Lives with son     Works as       Social Determinants of Health     Financial Resource Strain: Not on file   Food Insecurity: Not on file   Transportation Needs: Not on file   Physical Activity: Not on file   Stress: Not on file   Social Connections: Not on file   Intimate Partner Violence: Not on file   Housing Stability: Not on file     Social History     Tobacco Use   Smoking Status Former Smoker    Packs/day: 1 00    Years: 20 00    Pack years: 20 00    Types: Cigarettes    Quit date:     Years since quittin 7   Smokeless Tobacco Never Used     Family History   Problem Relation Age of Onset    Cancer Father         Lung    Cystic fibrosis Father     Arthritis Mother         Rheumatoid    Angina Mother     Coronary artery disease Mother     Diabetes Mother     Rheum arthritis Sister     Diabetes Sister     Other Brother         Back problems     Diabetes Brother     No Known Problems Brother        The following portions of the patient's history were reviewed and updated as appropriate: allergies, current medications, past medical history, past social history, past surgical history and problem list     Results  Recent Results (from the past 1 hour(s))   POCT urine dip    Collection Time: 09/12/22 10:42 AM   Result Value Ref Range    LEUKOCYTE ESTERASE,UA 3+     NITRITE,UA +     SL AMB POCT UROBILINOGEN 0 2     POCT URINE PROTEIN 30      PH,UA 5     BLOOD,UA 1+     SPECIFIC GRAVITY,UA 1 010     KETONES,UA -     BILIRUBIN,UA -     GLUCOSE, UA -      COLOR,UA yellow     CLARITY,UA turbid    POCT Measure PVR    Collection Time: 09/12/22 10:43 AM   Result Value Ref Range    POST-VOID RESIDUAL VOLUME, ML POC 32 mL   ]  No results found for: PSA  Lab Results   Component Value Date    GLUCOSE 122 03/02/2015    CALCIUM 9 4 02/26/2022     03/02/2015    K 4 2 02/26/2022    CO2 28 02/26/2022     02/26/2022    BUN 14 02/26/2022    CREATININE 1 00 02/26/2022     Lab Results   Component Value Date    WBC 7 60 09/04/2021    HGB 13 5 09/04/2021    HCT 40 5 09/04/2021    MCV 93 09/04/2021     09/04/2021       Blanca Andres PA-C

## 2022-09-15 ENCOUNTER — HOSPITAL ENCOUNTER (OUTPATIENT)
Dept: CT IMAGING | Facility: HOSPITAL | Age: 62
End: 2022-09-15
Payer: MEDICARE

## 2022-09-15 DIAGNOSIS — M47.817 SPONDYLOSIS WITHOUT MYELOPATHY OR RADICULOPATHY, LUMBOSACRAL REGION: ICD-10-CM

## 2022-09-15 PROCEDURE — 72131 CT LUMBAR SPINE W/O DYE: CPT

## 2022-09-15 PROCEDURE — G1004 CDSM NDSC: HCPCS

## 2022-09-19 DIAGNOSIS — K29.00 ACUTE GASTRITIS WITHOUT HEMORRHAGE, UNSPECIFIED GASTRITIS TYPE: ICD-10-CM

## 2022-09-19 RX ORDER — SUCRALFATE ORAL 1 G/10ML
SUSPENSION ORAL
Qty: 420 ML | Refills: 1 | Status: SHIPPED | OUTPATIENT
Start: 2022-09-19

## 2022-09-29 ENCOUNTER — OFFICE VISIT (OUTPATIENT)
Dept: OBGYN CLINIC | Facility: MEDICAL CENTER | Age: 62
End: 2022-09-29
Payer: MEDICARE

## 2022-09-29 VITALS
HEIGHT: 64 IN | BODY MASS INDEX: 30.56 KG/M2 | DIASTOLIC BLOOD PRESSURE: 79 MMHG | HEART RATE: 73 BPM | WEIGHT: 179 LBS | SYSTOLIC BLOOD PRESSURE: 124 MMHG

## 2022-09-29 DIAGNOSIS — M54.16 LUMBAR RADICULOPATHY: ICD-10-CM

## 2022-09-29 DIAGNOSIS — M16.11 PRIMARY OSTEOARTHRITIS OF ONE HIP, RIGHT: Primary | ICD-10-CM

## 2022-09-29 DIAGNOSIS — M25.551 GREATER TROCHANTERIC PAIN SYNDROME OF RIGHT LOWER EXTREMITY: ICD-10-CM

## 2022-09-29 PROCEDURE — 99213 OFFICE O/P EST LOW 20 MIN: CPT | Performed by: PHYSICAL MEDICINE & REHABILITATION

## 2022-10-06 ENCOUNTER — OFFICE VISIT (OUTPATIENT)
Dept: OBGYN CLINIC | Facility: MEDICAL CENTER | Age: 62
End: 2022-10-06
Payer: MEDICARE

## 2022-10-06 VITALS
HEART RATE: 65 BPM | HEIGHT: 64 IN | WEIGHT: 178 LBS | SYSTOLIC BLOOD PRESSURE: 137 MMHG | BODY MASS INDEX: 30.39 KG/M2 | DIASTOLIC BLOOD PRESSURE: 83 MMHG

## 2022-10-06 DIAGNOSIS — M16.11 PRIMARY OSTEOARTHRITIS OF ONE HIP, RIGHT: Primary | ICD-10-CM

## 2022-10-06 DIAGNOSIS — M25.551 GREATER TROCHANTERIC PAIN SYNDROME OF RIGHT LOWER EXTREMITY: ICD-10-CM

## 2022-10-06 DIAGNOSIS — F32.5 MAJOR DEPRESSIVE DISORDER WITH SINGLE EPISODE, IN FULL REMISSION (HCC): ICD-10-CM

## 2022-10-06 PROCEDURE — 20611 DRAIN/INJ JOINT/BURSA W/US: CPT | Performed by: PHYSICAL MEDICINE & REHABILITATION

## 2022-10-06 PROCEDURE — 99213 OFFICE O/P EST LOW 20 MIN: CPT | Performed by: PHYSICAL MEDICINE & REHABILITATION

## 2022-10-06 RX ORDER — LIDOCAINE HYDROCHLORIDE 10 MG/ML
3 INJECTION, SOLUTION INFILTRATION; PERINEURAL
Status: COMPLETED | OUTPATIENT
Start: 2022-10-06 | End: 2022-10-06

## 2022-10-06 RX ORDER — ESCITALOPRAM OXALATE 20 MG/1
TABLET ORAL
Qty: 90 TABLET | Refills: 0 | Status: SHIPPED | OUTPATIENT
Start: 2022-10-06

## 2022-10-06 RX ORDER — TRIAMCINOLONE ACETONIDE 40 MG/ML
80 INJECTION, SUSPENSION INTRA-ARTICULAR; INTRAMUSCULAR
Status: COMPLETED | OUTPATIENT
Start: 2022-10-06 | End: 2022-10-06

## 2022-10-06 RX ADMIN — TRIAMCINOLONE ACETONIDE 80 MG: 40 INJECTION, SUSPENSION INTRA-ARTICULAR; INTRAMUSCULAR at 09:50

## 2022-10-06 RX ADMIN — LIDOCAINE HYDROCHLORIDE 3 ML: 10 INJECTION, SOLUTION INFILTRATION; PERINEURAL at 09:50

## 2022-10-06 NOTE — PROGRESS NOTES
1  Primary osteoarthritis of one hip, right     2  Greater trochanteric pain syndrome of right lower extremity       Orders Placed This Encounter   Procedures    Large joint arthrocentesis        Impression:  Patient with history of lumbar fusion is here in follow up of chronic right thigh pain likely multifactorial and secondary to greater trochanteric pain syndrome and hip osteoarthritis   Also possible that this is due to spinal etiology or SI joint mediated   She is neurologically intact   Patient had a greater trochanteric steroid injection on 6/24/2021 and a hip joint injection about a year ago   She has been going through physical therapy and home exercise program  Yue Klein is currently seeing pain management and recently had a lumbar CT with no interval change  We proceeded with a right hip USGI  I will see her back if needed      Imaging Studies (I personally reviewed images in PACS and report):  Right hip x-rays most recent to this encounter reviewed   These images show moderate osteoarthritis  No follow-ups on file  Patient is in agreement with the above plan  HPI:  Aiyana Sims is a 63 y o  female  who presents in follow up  Here for   Chief Complaint   Patient presents with    Right Hip - Pain       Since last visit: See above      Following history reviewed and updated:  Past Medical History:   Diagnosis Date    Abdominal pain, epigastric 3/23/2018    Added automatically from request for surgery [id number]    Abnormal x-ray of lumbar spine 11/3/2015    Acute cystitis with hematuria 4/7/2020    Bariatric surgery status     Basal cell carcinoma     basil cell carcinoma- in remission    Benign essential hypertension 6/12/2012    Breakdown (mechanical) of internal fixation device of vertebrae, initial encounter ([facility] [address] ) 3/1/2019    Calculus of bile duct with cholecystitis without obstruction 4/27/2018    Added automatically from request for surgery [id number]    Cellulitis of finger 12/3/2015    Degenerative lumbar disc     Digital mucinous cyst 2015    DMII (diabetes mellitus, type 2) ([facility] ) 2013    Gross hematuria 3/19/2019    Hiatal hernia     History of transfusion     Post-op spinal surgery    Low back pain     Lower urinary tract infectious disease 3/27/2015    Medicare annual wellness visit, initial 2019    Obesity     Resolved 10/6/2016     Overactive bladder     Postsurgical malabsorption     Recurrent UTI 3/19/2019    Spinal stenosis     [facility]     Tachycardia     Resolved 2016     Type 2 diabetes mellitus ([facility] )     Last assesssed 2018     Wears glasses      Past Surgical History:   Procedure Laterality Date    APPENDECTOMY      ARTHRODESIS      Lumbar - Last assessed 2018    Drew Simpson BACK SURGERY      Lumbar (3 surgeries)- two levels fused, clean out from infection, then fusion of 7 levels (last surgery in )   [facility]      x2    CERVICAL SPINE SURGERY      Fusion of C2-C7 over a period of 3 surgeries ( first)     SECTION      X2    CHOLECYSTECTOMY      FL MYELOGRAM LUMBAR  3/28/2019    INSERTION / PLACEMENT / REVISION NEUROSTIMULATOR      X2- both were removed    NECK SURGERY      OTHER SURGICAL HISTORY      Catheter ablation     SD EGD TRANSORAL BIOPSY SINGLE/MULTIPLE N/A 2016    Procedure: ESOPHAGOGASTRODUODENOSCOPY (EGD); Surgeon: Schuyler Pichardo MD;  [facility]; Service: Bariatrics    SD EGD TRANSORAL BIOPSY SINGLE/MULTIPLE N/A 2018    Procedure: ESOPHAGOGASTRODUODENOSCOPY (EGD) with biopsy;  Surgeon: Schuyler Pichardo MD;  [facility];   Service: Bariatrics    SD LAP GASTRIC BYPASS/BRAULIO-EN-Y N/A 10/25/2016    Procedure: BYPASS GASTRIC  BRAULIO-EN-Y LAPAROSCOPIC, WITH INTRA OP EGD;  Surgeon: Schuyler Pichardo MD;  [facility];  Service: Bariatrics    SD LAP,CHOLECYSTECTOMY N/A 2018 Procedure: CHOLECYSTECTOMY LAPAROSCOPIC;  Surgeon: Nacho Cardona MD;  [facility];  Service: Bariatrics    SKIN CANCER EXCISION      TONSILLECTOMY      TUBAL LIGATION      WISDOM TOOTH EXTRACTION       Social History   Social History     Substance and Sexual Activity   Alcohol Use No     Social History     Substance and Sexual Activity   Drug Use No     Social History     Tobacco Use   Smoking Status Former Smoker    Packs/day: 1 00    Years: 20 00    Pack years: 20 00    Types: Cigarettes    Quit date:     Years since quittin 7   Smokeless Tobacco Never Used     Family History   Problem Relation Age of Onset    Cancer Father         Lung    Cystic fibrosis Father     Arthritis Mother         Rheumatoid    Angina Mother     Coronary artery disease Mother     Diabetes Mother     Rheum arthritis Sister     Diabetes Sister     Other Brother         Back problems     Diabetes Brother     No Known Problems Brother      No Known Allergies     Constitutional:  /83   Pulse 65   Ht 5' 3 5" (1 613 m)   Wt 80 7 kg (178 lb)   BMI 31 04 kg/m²    General: NAD  Eyes: Clear sclerae  ENT: No inflammation, lesion, or mass of lips  No tracheal deviation  Musculoskeletal: As mentioned below  Integumentary: No visible rashes or skin lesions  Pulmonary/Chest: Effort normal  No respiratory distress  Neuro: CN's grossly intact, MODI  Psych: Normal affect and judgement  Vascular: WWP  Right Hip Exam     Tenderness   The patient is experiencing tenderness in the anterior  Range of Motion   Internal rotation: abnormal     Other   Erythema: absent  Scars: absent  Sensation: normal  Pulse: present    Comments:  Positive Stinchfield and log roll  Large joint arthrocentesis: R hip joint  Universal Protocol:  Procedure performed by:  Consent: Verbal consent obtained  Written consent not obtained    Consent given by: patient  Timeout called at: 10/6/2022 9:49 AM   Patient understanding: patient states understanding of the procedure being performed  Site marked: the operative site was marked  Radiology Images displayed and confirmed  If images not available, report reviewed: imaging studies available  Patient identity confirmed: verbally with patient    Supporting Documentation  Indications: pain and diagnostic evaluation   Procedure Details  Location: hip - R hip joint  Ultrasound guidance: yes (US guidance was used to find the area of interest )  Medications administered: 3 mL lidocaine 1 %; 80 mg triamcinolone acetonide 40 mg/mL    Patient tolerance: patient tolerated the procedure well with no immediate complications  Dressing:  Sterile dressing applied    The right hip joint was visualized with ultrasound and injected with steroid/anesthetic solution as indicated  Prior to the injection, the ultrasound was used to evaluate for any neural or vascular structures  Care was taken to avoid these structures  The images (and video if taken) were saved to the Tap[employer] ultrasound system  Prior to the procedure, the patient was informed of the following risks in layman terms:    - Risk of bleeding since a needle is involved  - Risk of infection (1/10,000 chance as per recent studies)  Signs/symptoms were discussed and they would prompt an urgent evaluation at an emergency department   - Risk of pigmentation or skin dimpling in the skin (2-3% chance as per recent studies) from the steroid  - Risk of increased pain from steroid flare (1% chance as per recent studies) that typically lasts 24-48 hours  - Risk of increased blood sugars from the steroid medication that can last for a few weeks  If the patient is a diabetic or pre-diabetic, they were encouraged to closely monitor their blood sugars and discuss with PCP if elevated more than usual or if having symptoms      After going over these risks, we decided that the benefits outweigh the risks and proceeded with the procedure

## 2022-10-24 ENCOUNTER — TELEPHONE (OUTPATIENT)
Dept: GASTROENTEROLOGY | Facility: CLINIC | Age: 62
End: 2022-10-24

## 2022-10-24 NOTE — TELEPHONE ENCOUNTER
Patient returned call has to reschl -covid    Scheduled date of EGD/colonoscopy (as of today):1/16/23  Physician performing EGD/colonoscopy:Ananda  Location of EGD/colonoscopy:[facility] bowel prep reviewed with patient:Saskia/Miralax  Instructions reviewed with patient by:Bryan penny  Clearances:   none

## 2022-10-24 NOTE — TELEPHONE ENCOUNTER
Patients GI provider:  Dr Dina Sanchez    Number to return call: [phone]    Reason for call: Pt calling to reschedule colonoscopy and EGD    Scheduled procedure/appointment date if applicable: Procedure 39/33/68

## 2022-11-06 DIAGNOSIS — G89.4 CHRONIC PAIN SYNDROME: ICD-10-CM

## 2022-11-07 RX ORDER — BUPROPION HYDROCHLORIDE 75 MG/1
TABLET ORAL
Qty: 180 TABLET | Refills: 0 | Status: SHIPPED | OUTPATIENT
Start: 2022-11-07

## 2022-11-14 ENCOUNTER — OFFICE VISIT (OUTPATIENT)
Dept: FAMILY MEDICINE CLINIC | Facility: CLINIC | Age: 62
End: 2022-11-14

## 2022-11-14 VITALS
BODY MASS INDEX: 31.21 KG/M2 | TEMPERATURE: 97.4 F | OXYGEN SATURATION: 98 % | DIASTOLIC BLOOD PRESSURE: 78 MMHG | HEART RATE: 76 BPM | SYSTOLIC BLOOD PRESSURE: 140 MMHG | WEIGHT: 179 LBS

## 2022-11-14 DIAGNOSIS — J01.00 ACUTE NON-RECURRENT MAXILLARY SINUSITIS: ICD-10-CM

## 2022-11-14 DIAGNOSIS — J01.90 ACUTE SINUSITIS, RECURRENCE NOT SPECIFIED, UNSPECIFIED LOCATION: Primary | ICD-10-CM

## 2022-11-14 RX ORDER — AMOXICILLIN AND CLAVULANATE POTASSIUM 875; 125 MG/1; MG/1
1 TABLET, FILM COATED ORAL EVERY 12 HOURS SCHEDULED
Qty: 10 TABLET | Refills: 0 | Status: SHIPPED | OUTPATIENT
Start: 2022-11-14 | End: 2022-11-19

## 2022-11-14 RX ORDER — FLUTICASONE PROPIONATE 50 MCG
1 SPRAY, SUSPENSION (ML) NASAL DAILY
Qty: 16 G | Refills: 3 | Status: SHIPPED | OUTPATIENT
Start: 2022-11-14

## 2022-12-06 DIAGNOSIS — R11.0 NAUSEA: ICD-10-CM

## 2022-12-06 DIAGNOSIS — K21.9 GASTROESOPHAGEAL REFLUX DISEASE WITHOUT ESOPHAGITIS: ICD-10-CM

## 2022-12-06 DIAGNOSIS — K29.00 ACUTE GASTRITIS WITHOUT HEMORRHAGE, UNSPECIFIED GASTRITIS TYPE: ICD-10-CM

## 2022-12-06 RX ORDER — ONDANSETRON 4 MG/1
4 TABLET, FILM COATED ORAL EVERY 8 HOURS PRN
Qty: 30 TABLET | Refills: 0 | Status: SHIPPED | OUTPATIENT
Start: 2022-12-06

## 2022-12-06 RX ORDER — PANTOPRAZOLE SODIUM 40 MG/1
40 TABLET, DELAYED RELEASE ORAL
Qty: 30 TABLET | Refills: 0 | Status: SHIPPED | OUTPATIENT
Start: 2022-12-06

## 2022-12-09 ENCOUNTER — TELEPHONE (OUTPATIENT)
Dept: FAMILY MEDICINE CLINIC | Facility: CLINIC | Age: 62
End: 2022-12-09

## 2022-12-09 DIAGNOSIS — R39.9 UTI SYMPTOMS: Primary | ICD-10-CM

## 2022-12-09 RX ORDER — NITROFURANTOIN 25; 75 MG/1; MG/1
100 CAPSULE ORAL 2 TIMES DAILY
Qty: 10 CAPSULE | Refills: 0 | Status: SHIPPED | OUTPATIENT
Start: 2022-12-09 | End: 2022-12-14

## 2022-12-09 NOTE — TELEPHONE ENCOUNTER
Sent in script for macrobid (can cause GI upset) -- if symptoms do not improve, should follow up in office

## 2022-12-09 NOTE — TELEPHONE ENCOUNTER
Karen Bran just called and said that she has a uti and would like to know if you can call in something for her  Even if its for a couple of days to get her through the weekend  Pressure, pain, frequency, [facility]  Call when done

## 2022-12-28 ENCOUNTER — OFFICE VISIT (OUTPATIENT)
Dept: OBGYN CLINIC | Facility: MEDICAL CENTER | Age: 62
End: 2022-12-28

## 2022-12-28 VITALS
DIASTOLIC BLOOD PRESSURE: 83 MMHG | HEIGHT: 64 IN | WEIGHT: 183 LBS | HEART RATE: 121 BPM | BODY MASS INDEX: 31.24 KG/M2 | SYSTOLIC BLOOD PRESSURE: 132 MMHG

## 2022-12-28 DIAGNOSIS — M25.551 GREATER TROCHANTERIC PAIN SYNDROME OF RIGHT LOWER EXTREMITY: Primary | ICD-10-CM

## 2022-12-28 DIAGNOSIS — M16.11 PRIMARY OSTEOARTHRITIS OF ONE HIP, RIGHT: ICD-10-CM

## 2022-12-28 RX ORDER — TRIAMCINOLONE ACETONIDE 40 MG/ML
80 INJECTION, SUSPENSION INTRA-ARTICULAR; INTRAMUSCULAR
Status: COMPLETED | OUTPATIENT
Start: 2022-12-28 | End: 2022-12-28

## 2022-12-28 RX ORDER — ROPIVACAINE HYDROCHLORIDE 5 MG/ML
10 INJECTION, SOLUTION EPIDURAL; INFILTRATION; PERINEURAL
Status: COMPLETED | OUTPATIENT
Start: 2022-12-28 | End: 2022-12-28

## 2022-12-28 RX ADMIN — ROPIVACAINE HYDROCHLORIDE 10 ML: 5 INJECTION, SOLUTION EPIDURAL; INFILTRATION; PERINEURAL at 15:06

## 2022-12-28 RX ADMIN — TRIAMCINOLONE ACETONIDE 80 MG: 40 INJECTION, SUSPENSION INTRA-ARTICULAR; INTRAMUSCULAR at 15:06

## 2022-12-28 NOTE — PROGRESS NOTES
1  Greater trochanteric pain syndrome of right lower extremity        2  Primary osteoarthritis of one hip, right          Orders Placed This Encounter   Procedures   • Large joint arthrocentesis        Impression:  Patient with history of lumbar fusion is here in follow up of chronic right thigh pain likely multifactorial and secondary to greater trochanteric pain syndrome and hip osteoarthritis   Also possible that this is due to spinal etiology or SI joint mediated   She is neurologically intact   Treatment has included greater trochanteric steroid injection and right hip USGI  She is seeing pain management at outside facility for her spine pain  We proceeded with a repeat GT injection today  We dicussed a HEP  I will see her back if needed      Imaging Studies (I personally reviewed images in PACS and report):  Right hip x-rays most recent to this encounter reviewed   These images show moderate osteoarthritis  Return if symptoms worsen or fail to improve  Patient is in agreement with the above plan  HPI:  Rachel Heart is a 63 y o  female  who presents in follow up  Here for   Chief Complaint   Patient presents with   • Right Hip - Follow-up, Pain       Since last visit: See above      Following history reviewed and updated:  Past Medical History:   Diagnosis Date   • Abdominal pain, epigastric 3/23/2018    Added automatically from request for surgery [id number]   • Abnormal x-ray of lumbar spine 11/3/2015   • Acute cystitis with hematuria 4/7/2020   • Bariatric surgery status    • Basal cell carcinoma     basil cell carcinoma- in remission   • Benign essential hypertension 6/12/2012   • Breakdown (mechanical) of internal fixation device of vertebrae, initial encounter ([facility] ) 3/1/2019   • Calculus of bile duct with cholecystitis without obstruction 4/27/2018    Added automatically from request for surgery [id number]   • Cellulitis of finger 12/3/2015   • Degenerative lumbar disc    • Digital mucinous cyst 2015   • DMII (diabetes mellitus, type 2) ([facility] ) 2013   • Gross hematuria 3/19/2019   • Hiatal hernia    • History of transfusion 2014    Post-op spinal surgery   • Low back pain    • Lower urinary tract infectious disease 3/27/2015   • Medicare annual wellness visit, initial 2019   • Obesity     Resolved 10/6/2016    • Overactive bladder    • Postsurgical malabsorption    • Recurrent UTI 3/19/2019   • Spinal stenosis    • SVT (supraventricular tachycardia) ([facility])    • Tachycardia     Resolved 2016    • Type 2 diabetes mellitus ([facility] )     Last assesssed 2018    • Wears glasses      Past Surgical History:   Procedure Laterality Date   • APPENDECTOMY     • ARTHRODESIS      Lumbar - Last assessed 2018    • BACK SURGERY      Lumbar (3 surgeries)- two levels fused, clean out from infection, then fusion of 7 levels (last surgery in )   • [address], [address]      x2   • CERVICAL SPINE SURGERY      Fusion of C2-C7 over a period of 3 surgeries ( first)   •  SECTION      X2   • CHOLECYSTECTOMY     • FL MYELOGRAM LUMBAR  3/28/2019   • INSERTION / PLACEMENT / REVISION NEUROSTIMULATOR      X2- both were removed   • NECK SURGERY     • OTHER SURGICAL HISTORY      Catheter ablation    • MT EGD TRANSORAL BIOPSY SINGLE/MULTIPLE N/A 2016    Procedure: ESOPHAGOGASTRODUODENOSCOPY (EGD); Surgeon: Antonia Rodríguez MD;  [facility]; Service: Bariatrics   • MT EGD TRANSORAL BIOPSY SINGLE/MULTIPLE N/A 2018    Procedure: ESOPHAGOGASTRODUODENOSCOPY (EGD) with biopsy;  Surgeon: Antonia Rodríguez MD;  [facility]: [facility];   Service: Bariatrics   • MT LAPAROSCOPY SURG CHOLECYSTECTOMY N/A 2018    Procedure: CHOLECYSTECTOMY LAPAROSCOPIC;  Surgeon: Antonia Rodríguez MD;  [facility];  Service: Bariatrics   • MT LAPS GSTR RSTCV PX W/BYP BRAULIO-EN-Y LIMB <150 CM N/A 10/25/2016    Procedure: BYPASS GASTRIC  BRAULIO-EN-Y LAPAROSCOPIC, WITH INTRA OP EGD;  Surgeon: Susan Arboleda MD;  [facility];  Service: Bariatrics   • SKIN CANCER EXCISION     • TONSILLECTOMY     • TUBAL LIGATION     • WISDOM TOOTH EXTRACTION       Social History   Social History     Substance and Sexual Activity   Alcohol Use No     Social History     Substance and Sexual Activity   Drug Use No     Social History     Tobacco Use   Smoking Status Former   • Packs/day: 1 00   • Years: 20 00   • Pack years: 20 00   • Types: Cigarettes   • Quit date:    • Years since quittin 0   Smokeless Tobacco Never     Family History   Problem Relation Age of Onset   • Cancer Father         Lung   • Cystic fibrosis Father    • Arthritis Mother         Rheumatoid   • Angina Mother    • Coronary artery disease Mother    • Diabetes Mother    • Rheum arthritis Sister    • Diabetes Sister    • Other Brother         Back problems    • Diabetes Brother    • No Known Problems Brother      No Known Allergies     Constitutional:  /83   Pulse (!) 121   Ht 5' 3 5" (1 613 m)   Wt 83 kg (183 lb)   BMI 31 91 kg/m²    General: NAD  Eyes: Clear sclerae  ENT: No inflammation, lesion, or mass of lips  No tracheal deviation  Musculoskeletal: As mentioned below  Integumentary: No visible rashes or skin lesions  Pulmonary/Chest: Effort normal  No respiratory distress  Neuro: CN's grossly intact, MODI  Psych: Normal affect and judgement  Vascular: WWP  Right Hip Exam     Tenderness   The patient is experiencing tenderness in the greater trochanter  Range of Motion   Internal rotation: normal     Other   Erythema: absent  Scars: absent  Sensation: normal  Pulse: present      Left Hip Exam     Range of Motion   Internal rotation: normal              Large joint arthrocentesis: R greater trochanteric bursa  Universal Protocol:  Procedure performed by: (Chaperone present)  Consent: Verbal consent obtained  Written consent not obtained    Risks and benefits: risks, benefits and alternatives were discussed  Consent given by: patient  Timeout called at: 12/28/2022 3:05 PM   Patient understanding: patient states understanding of the procedure being performed  Site marked: the operative site was marked  Radiology Images displayed and confirmed  If images not available, report reviewed: imaging studies available  Patient identity confirmed: verbally with patient    Supporting Documentation  Indications: pain   Procedure Details  Location: hip - R greater trochanteric bursa  Needle size: 20 G (20G 3 5'')  Ultrasound guidance: no  Approach: Lateral to medial approach  Medications administered: 80 mg triamcinolone acetonide 40 mg/mL; 10 mL ropivacaine 0 5 %    Patient tolerance: patient tolerated the procedure well with no immediate complications  Dressing:  Sterile dressing applied    A modified tenotomy was performed by redirecting the needle in three directions and dispersing the medication equally in all three directions  There was little to no resistance encountered during the injection  Risks of this procedure include:    - Risk of bleeding since a needle is involved  - Risk of infection (1/10,000 chance as per recent studies)  Signs/symptoms were discussed and they would prompt an urgent evaluation at an emergency department   - Risk of pigmentation or skin dimpling in the skin (2-3% chance as per recent studies) from the steroid  - Risk of increased pain from steroid flare (1% chance as per recent studies) that typically lasts 24-48 hours  - Risk of increased blood sugars from the steroid medication that can last for a few weeks  If the patient is a diabetic or pre-diabetic, they were encouraged to closely monitor their blood sugars and discuss with PCP if elevated more than usual or if having symptoms  The benefits outweigh the risks and so the procedure was completed

## 2022-12-30 DIAGNOSIS — K21.9 GASTROESOPHAGEAL REFLUX DISEASE WITHOUT ESOPHAGITIS: ICD-10-CM

## 2022-12-30 DIAGNOSIS — K29.00 ACUTE GASTRITIS WITHOUT HEMORRHAGE, UNSPECIFIED GASTRITIS TYPE: ICD-10-CM

## 2022-12-30 RX ORDER — PANTOPRAZOLE SODIUM 40 MG/1
TABLET, DELAYED RELEASE ORAL
Qty: 30 TABLET | Refills: 0 | Status: SHIPPED | OUTPATIENT
Start: 2022-12-30

## 2023-01-04 DIAGNOSIS — F32.5 MAJOR DEPRESSIVE DISORDER WITH SINGLE EPISODE, IN FULL REMISSION (HCC): ICD-10-CM

## 2023-01-04 RX ORDER — ESCITALOPRAM OXALATE 20 MG/1
TABLET ORAL
Qty: 90 TABLET | Refills: 0 | Status: SHIPPED | OUTPATIENT
Start: 2023-01-04

## 2023-01-11 ENCOUNTER — OFFICE VISIT (OUTPATIENT)
Dept: FAMILY MEDICINE CLINIC | Facility: CLINIC | Age: 63
End: 2023-01-11

## 2023-01-11 VITALS
TEMPERATURE: 97.8 F | HEIGHT: 64 IN | HEART RATE: 72 BPM | WEIGHT: 182.2 LBS | BODY MASS INDEX: 31.1 KG/M2 | DIASTOLIC BLOOD PRESSURE: 84 MMHG | OXYGEN SATURATION: 97 % | SYSTOLIC BLOOD PRESSURE: 130 MMHG

## 2023-01-11 DIAGNOSIS — R09.81 NASAL SINUS CONGESTION: Primary | ICD-10-CM

## 2023-01-11 DIAGNOSIS — F11.20 CONTINUOUS OPIOID DEPENDENCE (HCC): ICD-10-CM

## 2023-01-11 LAB
SARS-COV-2 AG UPPER RESP QL IA: NEGATIVE
VALID CONTROL: NORMAL

## 2023-01-11 RX ORDER — AMOXICILLIN 875 MG/1
875 TABLET, COATED ORAL 2 TIMES DAILY
Qty: 20 TABLET | Refills: 0 | Status: SHIPPED | OUTPATIENT
Start: 2023-01-11 | End: 2023-01-21

## 2023-01-11 NOTE — PROGRESS NOTES
Uziel Huerta 1960 female MRN: [id number]    Acute Visit        ASSESSMENT/PLAN  Problem List Items Addressed This Visit        Respiratory    Nasal sinus congestion - Primary    Relevant Medications    amoxicillin (AMOXIL) 875 mg tablet    Other Relevant Orders    POCT Rapid Covid Ag (Completed)       Other    Continuous opioid dependence ([address] )     Pt on Hydromorphone - seeing pain specialist            COVID-negative  Will treat with amoxicillin for sinusitis  Continue OTC treatments as needed    Future Appointments   Date Time Provider Yanelis Carcamo   4/17/2023  3:40 PM DO PAULIE [facility]        SUBJECTIVE  CC: Earache and Sinus Problem       She has had congestion for a couple of weeks, sore throat, hoarseness  Tried otc tylenol, sudafed  Getting worse  Took COVID test on Sunday was negative  Rapid covid in office negative  Sore Throat   This is a new problem  The current episode started in the past 7 days  The problem has been gradually worsening  The pain is worse on the right side  The pain is at a severity of 7/10  Associated symptoms include congestion, headaches and a hoarse voice  Pertinent negatives include no abdominal pain, coughing, diarrhea, drooling, ear discharge, ear pain, plugged ear sensation, neck pain, shortness of breath, stridor, swollen glands, trouble swallowing or vomiting  She has had no exposure to strep or mono  Uziel Huerta is a 63 y o  female who presented for an acute visit complaining of  Review of Systems   HENT: Positive for congestion, hoarse voice and sore throat  Negative for drooling, ear discharge, ear pain and trouble swallowing  Respiratory: Negative for cough, shortness of breath and stridor  Gastrointestinal: Negative for abdominal pain, diarrhea and vomiting  Musculoskeletal: Positive for arthralgias and back pain  Negative for neck pain  Neurological: Positive for headaches         Historical Information   The patient history was reviewed as follows:  Past Medical History:   Diagnosis Date   • Abdominal pain, epigastric 3/23/2018    Added automatically from request for surgery [id number]   • Abnormal x-ray of lumbar spine 11/3/2015   • Acute cystitis with hematuria 2020   • Bariatric surgery status    • Basal cell carcinoma     basil cell carcinoma- in remission   • Benign essential hypertension 2012   • Breakdown (mechanical) of internal fixation device of vertebrae, initial encounter ([facility] ) 3/1/2019   • Calculus of bile duct with cholecystitis without obstruction 2018    Added automatically from request for surgery [id number]   • Cellulitis of finger 12/3/2015   • Degenerative lumbar disc    • Digital mucinous cyst 2015   • DMII (diabetes mellitus, type 2) ([facility] [address] ) 2013   • Gross hematuria 3/19/2019   • Hiatal hernia    • History of transfusion 2014    Post-op spinal surgery   • Low back pain    • Lower urinary tract infectious disease 3/27/2015   • Medicare annual wellness visit, initial 2019   • Obesity     Resolved 10/6/2016    • Overactive bladder    • Postsurgical malabsorption    • Recurrent UTI 3/19/2019   • Spinal stenosis    • SVT (supraventricular tachycardia) ([facility])    • Tachycardia     Resolved 2016    • Type 2 diabetes mellitus ([facility] )     Last assesssed 2018    • Wears glasses      Past Surgical History:   Procedure Laterality Date   • APPENDECTOMY     • ARTHRODESIS      Lumbar - Last assessed 2018    • BACK SURGERY      Lumbar (3 surgeries)- two levels fused, clean out from infection, then fusion of 7 levels (last surgery in )   • CARDIAC ELECTROPHYSIOLOGY STUDY AND ABLATION     • CARPAL TUNNEL RELEASE      x2   • CERVICAL SPINE SURGERY      Fusion of C2-C7 over a period of 3 surgeries ( first)   •  SECTION      X2   • CHOLECYSTECTOMY     • FL MYELOGRAM LUMBAR  3/28/2019   • INSERTION / PLACEMENT / REVISION NEUROSTIMULATOR      X2- both were removed   • NECK SURGERY • OTHER SURGICAL HISTORY      Catheter ablation    • WA EGD TRANSORAL BIOPSY SINGLE/MULTIPLE N/A 2016    Procedure: ESOPHAGOGASTRODUODENOSCOPY (EGD); Surgeon: Jo Miller MD;  [facility]; Service: Bariatrics   • WA EGD TRANSORAL BIOPSY SINGLE/MULTIPLE N/A 2018    Procedure: ESOPHAGOGASTRODUODENOSCOPY (EGD) with biopsy;  Surgeon: Jo Miller MD;  [facility];   Service: Bariatrics   • WA LAPAROSCOPY SURG CHOLECYSTECTOMY N/A 2018    Procedure: CHOLECYSTECTOMY LAPAROSCOPIC;  Surgeon: Jo Miller MD;  [facility];  Service: Bariatrics   • WA LAPS GSTR RSTCV PX W/BYP BRAULIO-EN-Y LIMB <150 CM N/A 10/25/2016    Procedure: BYPASS GASTRIC  BRAULIO-EN-Y LAPAROSCOPIC, WITH INTRA OP EGD;  Surgeon: Jo Miller MD;  [facility];  Service: Bariatrics   • SKIN CANCER EXCISION     • TONSILLECTOMY     • TUBAL LIGATION     • WISDOM TOOTH EXTRACTION       Family History   Problem Relation Age of Onset   • Cancer Father         Lung   • Cystic fibrosis Father    • Arthritis Mother         Rheumatoid   • Angina Mother    • Coronary artery disease Mother    • Diabetes Mother    • Rheum arthritis Sister    • Diabetes Sister    • Other Brother         Back problems    • Diabetes Brother    • No Known Problems Brother       Social History   Social History     Substance and Sexual Activity   Alcohol Use No     Social History     Substance and Sexual Activity   Drug Use No     Social History     Tobacco Use   Smoking Status Former   • Packs/day: 1 00   • Years: 20 00   • Pack years: 20 00   • Types: Cigarettes   • Quit date:    • Years since quittin 0   Smokeless Tobacco Never       Medications:   Meds/Allergies   Current Outpatient Medications   Medication Sig Dispense Refill   • amoxicillin (AMOXIL) 875 mg tablet Take 1 tablet (875 mg total) by mouth 2 (two) times a day for 10 days 20 tablet 0   • baclofen 10 mg tablet Take 10 mg by mouth daily     • buPROPion [facility]) 75 mg tablet take 1 tablet by mouth twice a day 180 tablet 0   • Calcium Citrate-Vitamin D 315-250 MG-UNIT TABS Take 1 tablet by mouth 2 (two) times a day      • Cholecalciferol 25 MCG (1000 UT) CHEW Chew     • escitalopram (LEXAPRO) 20 mg tablet take 1 tablet by mouth every morning 90 tablet 0   • fenofibrate 160 MG tablet Take 1 tablet (160 mg total) by mouth daily 90 tablet 3   • fluticasone (FLONASE) 50 mcg/act nasal spray 1 spray into each nostril daily 16 g 3   • gabapentin (NEURONTIN) 100 mg capsule 100 mg 5 (five) times a day      • HYDROmorphone (DILAUDID) 4 mg tablet TAKE 1 TABLET BY MOUTH EVERY 6 HOURS AS NEEDED    FILL 5/8/2020     • loratadine (CLARITIN) 10 mg tablet Take 10 mg by mouth daily  • Multiple Vitamins-Minerals (BARIATRIC FUSION PO) Take 1 tablet by mouth daily     • ondansetron (ZOFRAN) 4 mg tablet Take 1 tablet (4 mg total) by mouth every 8 (eight) hours as needed for nausea or vomiting 30 tablet 0   • oxybutynin (DITROPAN) 5 mg tablet TAKE 1 TABLET (5 MG TOTAL) BY MOUTH 3 (THREE) TIMES A DAY AS NEEDED (BLADDER SPASMS) 270 tablet 1   • pantoprazole (PROTONIX) 40 mg tablet take 1 tablet by mouth daily before breakfast 30 tablet 0   • sucralfate (CARAFATE) 1 g/10 mL suspension take 10 milliliter by mouth four times a day with meals and at bedtime 420 mL 1     No current facility-administered medications for this visit  No Known Allergies    OBJECTIVE  Vitals:   Vitals:    01/11/23 0902   BP: 130/84   BP Location: Left arm   Patient Position: Sitting   Pulse: 72   Temp: 97 8 °F (36 6 °C)   TempSrc: Temporal   SpO2: 97%   Weight: 82 6 kg (182 lb 3 2 oz)   Height: 5' 3 5" (1 613 m)       Invasive Devices     Peripheral Intravenous Line  Duration           Peripheral IV 04/16/19 Right Antecubital 1365 days                Physical Exam  Vitals and nursing note reviewed  Constitutional:       General: She is not in acute distress  Appearance: She is well-developed  Comments: Voice is hoarse   HENT:      Right Ear: Hearing, tympanic membrane, ear canal and external ear normal       Left Ear: Hearing, tympanic membrane, ear canal and external ear normal       Nose:      Right Sinus: Frontal sinus tenderness present  Left Sinus: Frontal sinus tenderness present  Mouth/Throat:      Pharynx: Uvula midline  No oropharyngeal exudate or posterior oropharyngeal erythema  Comments: Clear PND  Eyes:      Conjunctiva/sclera: Conjunctivae normal       Pupils: Pupils are equal, round, and reactive to light  Cardiovascular:      Rate and Rhythm: Normal rate  Heart sounds: Normal heart sounds  No murmur heard  No friction rub  No gallop  Pulmonary:      Effort: Pulmonary effort is normal  No respiratory distress  Breath sounds: Normal breath sounds  No wheezing or rales  Lymphadenopathy:      Cervical: No cervical adenopathy  Skin:     General: Skin is warm  Findings: No rash  Neurological:      Mental Status: She is alert and oriented to person, place, and time  Psychiatric:         Behavior: Behavior normal          Thought Content:  Thought content normal          Judgment: Judgment normal

## 2023-01-13 PROBLEM — J01.90 ACUTE SINUSITIS: Status: RESOLVED | Noted: 2022-11-14 | Resolved: 2023-01-13

## 2023-01-28 DIAGNOSIS — K29.00 ACUTE GASTRITIS WITHOUT HEMORRHAGE, UNSPECIFIED GASTRITIS TYPE: ICD-10-CM

## 2023-01-28 DIAGNOSIS — K21.9 GASTROESOPHAGEAL REFLUX DISEASE WITHOUT ESOPHAGITIS: ICD-10-CM

## 2023-01-30 RX ORDER — PANTOPRAZOLE SODIUM 40 MG/1
TABLET, DELAYED RELEASE ORAL
Qty: 30 TABLET | Refills: 0 | Status: SHIPPED | OUTPATIENT
Start: 2023-01-30

## 2023-02-01 DIAGNOSIS — G89.4 CHRONIC PAIN SYNDROME: ICD-10-CM

## 2023-02-01 DIAGNOSIS — Z12.11 SCREEN FOR COLON CANCER: Primary | ICD-10-CM

## 2023-02-01 RX ORDER — BUPROPION HYDROCHLORIDE 75 MG/1
TABLET ORAL
Qty: 180 TABLET | Refills: 0 | Status: SHIPPED | OUTPATIENT
Start: 2023-02-01

## 2023-02-09 ENCOUNTER — OFFICE VISIT (OUTPATIENT)
Dept: FAMILY MEDICINE CLINIC | Facility: CLINIC | Age: 63
End: 2023-02-09

## 2023-02-09 VITALS
HEART RATE: 56 BPM | DIASTOLIC BLOOD PRESSURE: 90 MMHG | SYSTOLIC BLOOD PRESSURE: 128 MMHG | BODY MASS INDEX: 31.24 KG/M2 | HEIGHT: 64 IN | WEIGHT: 183 LBS | TEMPERATURE: 97.6 F | OXYGEN SATURATION: 98 %

## 2023-02-09 DIAGNOSIS — J32.9 CHRONIC CONGESTION OF PARANASAL SINUS: Primary | ICD-10-CM

## 2023-02-09 RX ORDER — AMOXICILLIN 875 MG/1
875 TABLET, COATED ORAL 2 TIMES DAILY
Qty: 20 TABLET | Refills: 0 | Status: SHIPPED | OUTPATIENT
Start: 2023-02-09 | End: 2023-02-19

## 2023-02-09 NOTE — PROGRESS NOTES
Assessment/Plan:         Problem List Items Addressed This Visit        Respiratory    Chronic congestion of paranasal sinus - Primary     Will treat with Augmentin  She has had chronic sinus issues and I would like her to get a CT and follow up with ENT  Suspect allergies are contributing to her symptoms  Continue Flonase and Claritin         Relevant Medications    amoxicillin (AMOXIL) 875 mg tablet    Other Relevant Orders    Ambulatory Referral to Otolaryngology    CT sinus wo contrast         Subjective:      Patient ID: Eboni Bustamante is a 63 y o  female  She is here for chronic sinus symptoms  She was treated in January, November, and August with abx for sinusitis  In January she took Amoxicillin, states her symptoms cleared up  Then 10 days later her symptoms came back  She also took Augmentin in November and the same thing happened  She says "I can't remember the last day I wasn't congested"  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 and they get her sick  She is taking Claritin and Flonase daily  She has multiple pets at home  Has never had CT sinuses or allergy testing        The following portions of the patient's history were reviewed and updated as appropriate:   Past Medical History:  She has a past medical history of Abdominal pain, epigastric (3/23/2018), Abnormal x-ray of lumbar spine (11/3/2015), Acute cystitis with hematuria (4/7/2020), Bariatric surgery status, Basal cell carcinoma, Benign essential hypertension (6/12/2012), Breakdown (mechanical) of internal fixation device of vertebrae, initial encounter ([facility] ) (3/1/2019), Calculus of bile duct with cholecystitis without obstruction (4/27/2018), Cellulitis of finger (12/3/2015), Degenerative lumbar disc, Digital mucinous cyst (6/24/2015), DMII (diabetes mellitus, type 2) ([facility] ) (11/8/2013), Gross hematuria (3/19/2019), Hiatal hernia, History of transfusion (2014), Low back pain, Lower urinary tract infectious disease (3/27/2015), Medicare annual wellness visit, initial (2019), Obesity, Overactive bladder, Postsurgical malabsorption, Recurrent UTI (3/19/2019), Spinal stenosis, SVT (supraventricular tachycardia) ([facility] ), Tachycardia, Type 2 diabetes mellitus ([facility] ), and Wears glasses  ,  _______________________________________________________________________  Medical Problems:  does not have any pertinent problems on file ,  _______________________________________________________________________  Past Surgical History:   has a past surgical history that includes Appendectomy; Insertion / placement / revision neurostimulator; Cervical spine surgery; Cardiac electrophysiology study and ablation;  section; Tubal ligation; Carpal tunnel release; Tonsillectomy; [place] tooth extraction; pr laps gstr rstcv px w/byp danielle-en-y limb <150 cm (N/A, 10/25/2016); pr egd transoral biopsy single/multiple (N/A, 2016); Neck surgery; pr egd transoral biopsy single/multiple (N/A, 2018); Skin cancer excision; pr laparoscopy surg cholecystectomy (N/A, 2018); Cholecystectomy; Back surgery; FL myelogram lumbar (3/28/2019); Arthrodesis; and Other surgical history  ,  _______________________________________________________________________  Family History:  family history includes Angina in her mother; Arthritis in her mother; Cancer in her father; Coronary artery disease in her mother; Cystic fibrosis in her father; Diabetes in her brother, mother, and sister; No Known Problems in her brother; Other in her brother; Rheum arthritis in her sister  ,  _______________________________________________________________________  Social History:   reports that she quit smoking about 19 years ago  Her smoking use included cigarettes  She has a 20 00 pack-year smoking history   She has never used smokeless tobacco  She reports that she does not drink alcohol and does not use drugs ,  _______________________________________________________________________  Allergies:  has No Known Allergies     _______________________________________________________________________  Current Outpatient Medications   Medication Sig Dispense Refill   • amoxicillin (AMOXIL) 875 mg tablet Take 1 tablet (875 mg total) by mouth 2 (two) times a day for 10 days 20 tablet 0   • baclofen 10 mg tablet Take 10 mg by mouth daily     • buPROPion (WELLBUTRIN) 75 mg tablet take 1 tablet by mouth twice a day 180 tablet 0   • Calcium Citrate-Vitamin D 315-250 MG-UNIT TABS Take 1 tablet by mouth 2 (two) times a day      • Cholecalciferol 25 MCG (1000 UT) CHEW Chew     • escitalopram (LEXAPRO) 20 mg tablet take 1 tablet by mouth every morning 90 tablet 0   • fenofibrate 160 MG tablet Take 1 tablet (160 mg total) by mouth daily 90 tablet 3   • fluticasone (FLONASE) 50 mcg/act nasal spray 1 spray into each nostril daily 16 g 3   • gabapentin (NEURONTIN) 100 mg capsule 100 mg 5 (five) times a day      • HYDROmorphone (DILAUDID) 4 mg tablet TAKE 1 TABLET BY MOUTH EVERY 6 HOURS AS NEEDED    FILL 5/8/2020     • loratadine (CLARITIN) 10 mg tablet Take 10 mg by mouth daily       • Multiple Vitamins-Minerals (BARIATRIC FUSION PO) Take 1 tablet by mouth daily     • ondansetron (ZOFRAN) 4 mg tablet Take 1 tablet (4 mg total) by mouth every 8 (eight) hours as needed for nausea or vomiting 90 tablet 1   • oxybutynin (DITROPAN) 5 mg tablet TAKE 1 TABLET (5 MG TOTAL) BY MOUTH 3 (THREE) TIMES A DAY AS NEEDED (BLADDER SPASMS) 270 tablet 1   • pantoprazole (PROTONIX) 40 mg tablet take 1 tablet by mouth daily before breakfast 30 tablet 0   • sucralfate (CARAFATE) 1 g/10 mL suspension take 10 milliliter by mouth four times a day with meals and at bedtime 420 mL 1     No current facility-administered medications for this visit      _______________________________________________________________________  Review of Systems   Constitutional: Negative for chills and fever  HENT: Positive for congestion, ear pain, postnasal drip, sinus pressure, sinus pain and sneezing  Negative for sore throat  Respiratory: Negative for cough, shortness of breath and wheezing  Gastrointestinal: Negative for abdominal pain and nausea  Allergic/Immunologic: Positive for environmental allergies  Objective:  Vitals:    02/09/23 1551   BP: 128/90   BP Location: Left arm   Patient Position: Sitting   Cuff Size: Large   Pulse: 56   Temp: 97 6 °F (36 4 °C)   SpO2: 98%   Weight: 83 kg (183 lb)   Height: 5' 3 5" (1 613 m)     Body mass index is 31 91 kg/m²  Physical Exam  Vitals and nursing note reviewed  Constitutional:       General: She is not in acute distress  Appearance: She is well-developed  HENT:      Right Ear: Hearing, tympanic membrane, ear canal and external ear normal  There is no impacted cerumen  Left Ear: Hearing, tympanic membrane, ear canal and external ear normal  There is no impacted cerumen  Nose: No congestion or rhinorrhea  Right Sinus: Maxillary sinus tenderness and frontal sinus tenderness present  Left Sinus: Maxillary sinus tenderness and frontal sinus tenderness present  Mouth/Throat:      Mouth: Mucous membranes are moist       Pharynx: Oropharynx is clear  Uvula midline  No oropharyngeal exudate or posterior oropharyngeal erythema  Eyes:      Conjunctiva/sclera: Conjunctivae normal       Pupils: Pupils are equal, round, and reactive to light  Cardiovascular:      Rate and Rhythm: Normal rate  Heart sounds: Normal heart sounds  No murmur heard  No friction rub  No gallop  Pulmonary:      Effort: Pulmonary effort is normal  No respiratory distress  Breath sounds: Normal breath sounds  No wheezing or rales  Lymphadenopathy:      Cervical: No cervical adenopathy  Skin:     General: Skin is warm  Findings: No rash     Neurological:      Mental Status: She is alert and oriented to person, place, and time  Psychiatric:         Behavior: Behavior normal          Thought Content:  Thought content normal          Judgment: Judgment normal

## 2023-02-09 NOTE — ASSESSMENT & PLAN NOTE
Will treat with Augmentin  She has had chronic sinus issues and I would like her to get a CT and follow up with ENT  Suspect allergies are contributing to her symptoms    Continue Flonase and Claritin

## 2023-02-10 ENCOUNTER — TELEPHONE (OUTPATIENT)
Dept: ADMINISTRATIVE | Facility: OTHER | Age: 63
End: 2023-02-10

## 2023-02-10 NOTE — TELEPHONE ENCOUNTER
----- Message from [address] sent at 2/9/2023  4:18 PM EST -----  Regarding: Pap Smear (HPV) aka Cervical Cancer Screening  02/09/23 4:18 PM    Hello, our patient No patient name on file  has had Pap Smear (HPV) aka Cervical Cancer Screening completed/performed  Please assist in updating the patient chart by making an External outreach to Dr Pankaj Durant  [id number] [facility] facility located in [place], [place]      Thank you,  Alfred LLOYD

## 2023-02-10 NOTE — PROGRESS NOTES
Name: Elba Antony      : 1960      MRN: [id number]  Encounter Provider: José Antonio Lizarraga MD  Encounter Date: 2022   Encounter department: [address] [place] [address]     1  Acute non-recurrent maxillary sinusitis  -     amoxicillin-clavulanate (Augmentin) 875-125 mg per tablet; Take 1 tablet by mouth every 12 (twelve) hours for 5 days    2  Acute sinusitis, recurrence not specified, unspecified location  -     fluticasone (FLONASE) 50 mcg/act nasal spray; 1 spray into each nostril daily    Follow up as needed       Subjective     Patient is here because she has been having sinus pressure and left ear discomfort  denies any fevers or cough  Had grandchildren last week and they had RSV  Sore Throat   This is a new problem  The current episode started yesterday  The problem has been gradually worsening  The pain is worse on the left side  There has been no fever  The fever has been present for less than 1 day  The pain is at a severity of 7/10  Associated symptoms include ear pain, headaches and a hoarse voice  Pertinent negatives include no congestion, coughing, diarrhea, ear discharge or shortness of breath  She has had no exposure to strep or mono  Review of Systems   Constitutional: Negative for activity change, appetite change, fatigue and fever  HENT: Positive for ear pain, hoarse voice, postnasal drip, sinus pressure, sinus pain and sore throat  Negative for congestion and ear discharge  Respiratory: Negative for cough and shortness of breath  Cardiovascular: Negative for chest pain and palpitations  Gastrointestinal: Negative for diarrhea and nausea  Musculoskeletal: Negative for arthralgias and back pain  Skin: Negative for color change and rash  Neurological: Positive for headaches  Negative for dizziness  Psychiatric/Behavioral: Negative for agitation and behavioral problems         Past Medical History:   Diagnosis Date   • Abdominal pain, epigastric 3/23/2018    Added automatically from request for surgery [id number]   • Abnormal x-ray of lumbar spine 11/3/2015   • Acute cystitis with hematuria 2020   • Bariatric surgery status    • Basal cell carcinoma     basil cell carcinoma- in remission   • Benign essential hypertension 2012   • Breakdown (mechanical) of internal fixation device of vertebrae, initial encounter ([facility] ) 3/1/2019   • Calculus of bile duct with cholecystitis without obstruction 2018    Added automatically from request for surgery [id number]   • Cellulitis of finger 12/3/2015   • Degenerative lumbar disc    • Digital mucinous cyst 2015   • DMII (diabetes mellitus, type 2) ([facility] ) 2013   • Gross hematuria 3/19/2019   • Hiatal hernia    • History of transfusion 2014    Post-op spinal surgery   • Low back pain    • Lower urinary tract infectious disease 3/27/2015   • Medicare annual wellness visit, initial 2019   • Obesity     Resolved 10/6/2016    • Overactive bladder    • Postsurgical malabsorption    • Recurrent UTI 3/19/2019   • Spinal stenosis    • SVT (supraventricular tachycardia) ([facility])    • Tachycardia     Resolved 2016    • Type 2 diabetes mellitus ([facility] )     Last assesssed 2018    • Wears glasses      Past Surgical History:   Procedure Laterality Date   • APPENDECTOMY     • ARTHRODESIS      Lumbar - Last assessed 2018    • BACK SURGERY      Lumbar (3 surgeries)- two levels fused, clean out from infection, then fusion of 7 levels (last surgery in )   • CARDIAC ELECTROPHYSIOLOGY STUDY AND ABLATION     • CARPAL TUNNEL RELEASE      x2   • CERVICAL SPINE SURGERY      Fusion of C2-C7 over a period of 3 surgeries ( first)   •  SECTION      X2   • CHOLECYSTECTOMY     • FL MYELOGRAM LUMBAR  3/28/2019   • INSERTION / PLACEMENT / REVISION NEUROSTIMULATOR      X2- both were removed   • NECK SURGERY     • OTHER SURGICAL HISTORY      Catheter ablation    • NV EGD TRANSORAL BIOPSY SINGLE/MULTIPLE N/A 2016    Procedure: ESOPHAGOGASTRODUODENOSCOPY (EGD); Surgeon: Nena Karimi MD;  Location: [facility]; Service: Bariatrics   • LA EGD TRANSORAL BIOPSY SINGLE/MULTIPLE N/A 2018    Procedure: ESOPHAGOGASTRODUODENOSCOPY (EGD) with biopsy;  Surgeon: Nena Karimi MD;  Location: [facility];   Service: Bariatrics   • LA LAP GASTRIC BYPASS/BRAULIO-EN-Y N/A 10/25/2016    Procedure: BYPASS GASTRIC  BRAULIO-EN-Y LAPAROSCOPIC, WITH INTRA OP EGD;  Surgeon: Nena Karimi MD;  Location: [facility];  Service: Bariatrics   • LA LAP,CHOLECYSTECTOMY N/A 2018    Procedure: Flavia Froylan;  Surgeon: Nena Karimi MD;  [facility]: [facility];  Service: Bariatrics   • SKIN CANCER EXCISION     • TONSILLECTOMY     • TUBAL LIGATION     • WISDOM TOOTH EXTRACTION       Family History   Problem Relation Age of Onset   • Cancer Father         Lung   • Cystic fibrosis Father    • Arthritis Mother         Rheumatoid   • Angina Mother    • Coronary artery disease Mother    • Diabetes Mother    • Rheum arthritis Sister    • Diabetes Sister    • Other Brother         Back problems    • Diabetes Brother    • No Known Problems Brother      Social History     Socioeconomic History   • Marital status:      Spouse name: Not on file   • Number of children: Not on file   • Years of education: Completed 4th year of college    • Highest education level: Not on file   Occupational History   • Occupation: Realtor    Tobacco Use   • Smoking status: Former Smoker     Packs/day: 1 00     Years: 20 00     Pack years: 20 00     Types: Cigarettes     Quit date:      Years since quittin 8   • Smokeless tobacco: Never Used   Vaping Use   • Vaping Use: Never used   Substance and Sexual Activity   • Alcohol use: No   • Drug use: No   • Sexual activity: Not Currently   Other Topics Concern   • Not on file   Social History Narrative    Lives with son     Works as       Social Determinants of Health Financial Resource Strain: Not on file   Food Insecurity: Not on file   Transportation Needs: Not on file   Physical Activity: Not on file   Stress: Not on file   Social Connections: Not on file   Intimate Partner Violence: Not on file   Housing Stability: Not on file     Current Outpatient Medications on File Prior to Visit   Medication Sig   • baclofen 10 mg tablet Take 10 mg by mouth daily   • buPROPion (WELLBUTRIN) 75 mg tablet take 1 tablet by mouth twice a day   • Calcium Citrate-Vitamin D 315-250 MG-UNIT TABS Take 1 tablet by mouth 2 (two) times a day    • Cholecalciferol 25 MCG (1000 UT) CHEW Chew   • escitalopram (LEXAPRO) 20 mg tablet take 1 tablet by mouth every morning   • fenofibrate 160 MG tablet Take 1 tablet (160 mg total) by mouth daily   • gabapentin (NEURONTIN) 100 mg capsule 100 mg 5 (five) times a day    • HYDROmorphone (DILAUDID) 4 mg tablet TAKE 1 TABLET BY MOUTH EVERY 6 HOURS AS NEEDED    FILL 5/8/2020   • loratadine (CLARITIN) 10 mg tablet Take 10 mg by mouth daily     • Multiple Vitamins-Minerals (BARIATRIC FUSION PO) Take 1 tablet by mouth daily   • ondansetron (ZOFRAN) 4 mg tablet Take 1 tablet (4 mg total) by mouth every 8 (eight) hours as needed for nausea or vomiting   • oxybutynin (DITROPAN) 5 mg tablet TAKE 1 TABLET (5 MG TOTAL) BY MOUTH 3 (THREE) TIMES A DAY AS NEEDED (BLADDER SPASMS)   • pantoprazole (PROTONIX) 40 mg tablet take 1 tablet by mouth daily before breakfast   • sucralfate (CARAFATE) 1 g/10 mL suspension take 10 milliliter by mouth four times a day with meals and at bedtime   • [DISCONTINUED] fluticasone (FLONASE) 50 mcg/act nasal spray 1 spray into each nostril daily     No Known Allergies  Immunization History   Administered Date(s) Administered   • COVID-19 PFIZER VACCINE 0 3 ML IM 04/13/2021, 05/04/2021, 12/19/2021   • INFLUENZA 10/20/2017, 09/29/2021   • Influenza Injectable, MDCK, Preservative Free, Quadrivalent, 0 5 mL 10/03/2020   • Influenza, injectable, quadrivalent, preservative free 0 5 mL 09/22/2018, 10/06/2019   • Tdap 02/24/2018   • Tuberculin Skin Test-PPD Intradermal 01/28/2016   • Zoster 10/25/2014       Objective     /78   Pulse 76   Temp (!) 97 4 °F (36 3 °C)   Wt 81 2 kg (179 lb)   SpO2 98%   BMI 31 21 kg/m²     Physical Exam  Constitutional:       General: She is not in acute distress  Appearance: She is well-developed  She is not diaphoretic  HENT:      Head: Normocephalic and atraumatic  Nose: Nose normal    Eyes:      Conjunctiva/sclera: Conjunctivae normal       Pupils: Pupils are equal, round, and reactive to light  Cardiovascular:      Rate and Rhythm: Normal rate and regular rhythm  Heart sounds: Normal heart sounds  No murmur heard  Pulmonary:      Effort: Pulmonary effort is normal  No respiratory distress  Breath sounds: Normal breath sounds  No wheezing  Abdominal:      General: Bowel sounds are normal  There is no distension  Palpations: Abdomen is soft  Tenderness: There is no abdominal tenderness  Skin:     General: Skin is warm and dry  Findings: No erythema or rash  Neurological:      Mental Status: She is alert and oriented to person, place, and time         Johny Sweet MD yes

## 2023-02-10 NOTE — TELEPHONE ENCOUNTER
Upon review of the In Basket request and the patient's chart, initial outreach has been made via telephone call to facility  Please see Contacts section for details       Thank you  Trisha Whitley MA

## 2023-02-16 ENCOUNTER — HOSPITAL ENCOUNTER (OUTPATIENT)
Dept: CT IMAGING | Facility: CLINIC | Age: 63
Discharge: HOME | End: 2023-02-16

## 2023-02-16 DIAGNOSIS — J32.9 CHRONIC CONGESTION OF PARANASAL SINUS: ICD-10-CM

## 2023-02-21 LAB — COLOGUARD RESULT REPORTABLE: NEGATIVE

## 2023-02-26 DIAGNOSIS — K29.00 ACUTE GASTRITIS WITHOUT HEMORRHAGE, UNSPECIFIED GASTRITIS TYPE: ICD-10-CM

## 2023-02-26 DIAGNOSIS — K21.9 GASTROESOPHAGEAL REFLUX DISEASE WITHOUT ESOPHAGITIS: ICD-10-CM

## 2023-02-27 RX ORDER — PANTOPRAZOLE SODIUM 40 MG/1
TABLET, DELAYED RELEASE ORAL
Qty: 30 TABLET | Refills: 0 | Status: SHIPPED | OUTPATIENT
Start: 2023-02-27

## 2023-03-26 DIAGNOSIS — K21.9 GASTROESOPHAGEAL REFLUX DISEASE WITHOUT ESOPHAGITIS: ICD-10-CM

## 2023-03-26 DIAGNOSIS — K29.00 ACUTE GASTRITIS WITHOUT HEMORRHAGE, UNSPECIFIED GASTRITIS TYPE: ICD-10-CM

## 2023-03-26 DIAGNOSIS — F32.5 MAJOR DEPRESSIVE DISORDER WITH SINGLE EPISODE, IN FULL REMISSION (HCC): ICD-10-CM

## 2023-03-27 RX ORDER — PANTOPRAZOLE SODIUM 40 MG/1
TABLET, DELAYED RELEASE ORAL
Qty: 30 TABLET | Refills: 0 | Status: SHIPPED | OUTPATIENT
Start: 2023-03-27

## 2023-03-27 RX ORDER — ESCITALOPRAM OXALATE 20 MG/1
TABLET ORAL
Qty: 90 TABLET | Refills: 0 | Status: SHIPPED | OUTPATIENT
Start: 2023-03-27

## 2023-04-07 ENCOUNTER — RA CDI HCC (OUTPATIENT)
Dept: OTHER | Facility: HOSPITAL | Age: 63
End: 2023-04-07

## 2023-04-07 NOTE — PROGRESS NOTES
[facility] [address]  coding opportunities       Chart reviewed, no opportunity found: CHART REVIEWED, NO OPPORTUNITY FOUND        Patients Insurance     Medicare Insurance: Medicare

## 2023-04-17 PROBLEM — M25.531 PAIN IN RIGHT WRIST: Status: RESOLVED | Noted: 2022-03-24 | Resolved: 2023-04-17

## 2023-04-20 DIAGNOSIS — M25.50 ARTHRALGIA, UNSPECIFIED JOINT: Primary | ICD-10-CM

## 2023-04-22 ENCOUNTER — APPOINTMENT (OUTPATIENT)
Dept: LAB | Facility: CLINIC | Age: 63
End: 2023-04-22

## 2023-04-22 DIAGNOSIS — I10 ESSENTIAL HYPERTENSION: ICD-10-CM

## 2023-04-22 DIAGNOSIS — E55.9 VITAMIN D INSUFFICIENCY: ICD-10-CM

## 2023-04-22 DIAGNOSIS — Z98.84 BARIATRIC SURGERY STATUS: ICD-10-CM

## 2023-04-22 DIAGNOSIS — Z90.3 POSTGASTRECTOMY MALABSORPTION: ICD-10-CM

## 2023-04-22 DIAGNOSIS — M25.50 ARTHRALGIA, UNSPECIFIED JOINT: ICD-10-CM

## 2023-04-22 DIAGNOSIS — K91.2 POSTGASTRECTOMY MALABSORPTION: ICD-10-CM

## 2023-04-22 DIAGNOSIS — F41.9 ANXIETY: ICD-10-CM

## 2023-04-22 DIAGNOSIS — E78.2 MIXED HYPERLIPIDEMIA: ICD-10-CM

## 2023-04-22 LAB
25(OH)D3 SERPL-MCNC: 42.1 NG/ML (ref 30–100)
ALBUMIN SERPL BCP-MCNC: 3.9 G/DL (ref 3.5–5)
ALP SERPL-CCNC: 51 U/L (ref 46–116)
ALT SERPL W P-5'-P-CCNC: 29 U/L (ref 12–78)
ANION GAP SERPL CALCULATED.3IONS-SCNC: 3 MMOL/L (ref 4–13)
AST SERPL W P-5'-P-CCNC: 34 U/L (ref 5–45)
BASOPHILS # BLD AUTO: 0.08 THOUSANDS/ΜL (ref 0–0.1)
BASOPHILS NFR BLD AUTO: 2 % (ref 0–1)
BILIRUB SERPL-MCNC: 0.29 MG/DL (ref 0.2–1)
BUN SERPL-MCNC: 12 MG/DL (ref 5–25)
CALCIUM SERPL-MCNC: 9.9 MG/DL (ref 8.3–10.1)
CHLORIDE SERPL-SCNC: 107 MMOL/L (ref 96–108)
CHOLEST SERPL-MCNC: 176 MG/DL
CO2 SERPL-SCNC: 27 MMOL/L (ref 21–32)
CREAT SERPL-MCNC: 0.92 MG/DL (ref 0.6–1.3)
CRP SERPL QL: <3 MG/L
EOSINOPHIL # BLD AUTO: 0.13 THOUSAND/ΜL (ref 0–0.61)
EOSINOPHIL NFR BLD AUTO: 3 % (ref 0–6)
ERYTHROCYTE [DISTWIDTH] IN BLOOD BY AUTOMATED COUNT: 11.8 % (ref 11.6–15.1)
EST. AVERAGE GLUCOSE BLD GHB EST-MCNC: 108 MG/DL
FERRITIN SERPL-MCNC: 43 NG/ML (ref 8–388)
FOLATE SERPL-MCNC: >20 NG/ML (ref 3.1–17.5)
GFR SERPL CREATININE-BSD FRML MDRD: 67 ML/MIN/1.73SQ M
GLUCOSE P FAST SERPL-MCNC: 101 MG/DL (ref 65–99)
HBA1C MFR BLD: 5.4 %
HCT VFR BLD AUTO: 39.7 % (ref 34.8–46.1)
HDLC SERPL-MCNC: 67 MG/DL
HGB BLD-MCNC: 13.4 G/DL (ref 11.5–15.4)
IMM GRANULOCYTES # BLD AUTO: 0.01 THOUSAND/UL (ref 0–0.2)
IMM GRANULOCYTES NFR BLD AUTO: 0 % (ref 0–2)
IRON SATN MFR SERPL: 25 % (ref 15–50)
IRON SERPL-MCNC: 102 UG/DL (ref 50–170)
LDLC SERPL CALC-MCNC: 89 MG/DL (ref 0–100)
LYMPHOCYTES # BLD AUTO: 1.97 THOUSANDS/ΜL (ref 0.6–4.47)
LYMPHOCYTES NFR BLD AUTO: 42 % (ref 14–44)
MCH RBC QN AUTO: 30.6 PG (ref 26.8–34.3)
MCHC RBC AUTO-ENTMCNC: 33.8 G/DL (ref 31.4–37.4)
MCV RBC AUTO: 91 FL (ref 82–98)
MONOCYTES # BLD AUTO: 0.42 THOUSAND/ΜL (ref 0.17–1.22)
MONOCYTES NFR BLD AUTO: 9 % (ref 4–12)
NEUTROPHILS # BLD AUTO: 2.14 THOUSANDS/ΜL (ref 1.85–7.62)
NEUTS SEG NFR BLD AUTO: 44 % (ref 43–75)
NONHDLC SERPL-MCNC: 109 MG/DL
NRBC BLD AUTO-RTO: 0 /100 WBCS
PLATELET # BLD AUTO: 396 THOUSANDS/UL (ref 149–390)
PMV BLD AUTO: 9.5 FL (ref 8.9–12.7)
POTASSIUM SERPL-SCNC: 4.1 MMOL/L (ref 3.5–5.3)
PROT SERPL-MCNC: 7.5 G/DL (ref 6.4–8.4)
RBC # BLD AUTO: 4.38 MILLION/UL (ref 3.81–5.12)
SODIUM SERPL-SCNC: 137 MMOL/L (ref 135–147)
TIBC SERPL-MCNC: 401 UG/DL (ref 250–450)
TRIGL SERPL-MCNC: 100 MG/DL
TSH SERPL DL<=0.05 MIU/L-ACNC: 1.08 UIU/ML (ref 0.45–4.5)
VIT B12 SERPL-MCNC: 1280 PG/ML (ref 100–900)
WBC # BLD AUTO: 4.75 THOUSAND/UL (ref 4.31–10.16)

## 2023-04-23 LAB — RHEUMATOID FACT SER QL LA: NEGATIVE

## 2023-04-24 LAB — ANA TITR SER IF: NEGATIVE {TITER}

## 2023-04-27 DIAGNOSIS — G89.4 CHRONIC PAIN SYNDROME: ICD-10-CM

## 2023-04-27 RX ORDER — BUPROPION HYDROCHLORIDE 75 MG/1
TABLET ORAL
Qty: 180 TABLET | Refills: 0 | Status: SHIPPED | OUTPATIENT
Start: 2023-04-27

## 2023-04-28 DIAGNOSIS — K29.00 ACUTE GASTRITIS WITHOUT HEMORRHAGE, UNSPECIFIED GASTRITIS TYPE: ICD-10-CM

## 2023-04-28 DIAGNOSIS — K21.9 GASTROESOPHAGEAL REFLUX DISEASE WITHOUT ESOPHAGITIS: ICD-10-CM

## 2023-04-28 RX ORDER — PANTOPRAZOLE SODIUM 40 MG/1
TABLET, DELAYED RELEASE ORAL
Qty: 30 TABLET | Refills: 0 | Status: SHIPPED | OUTPATIENT
Start: 2023-04-28

## 2023-05-02 DIAGNOSIS — R11.0 NAUSEA: ICD-10-CM

## 2023-05-03 RX ORDER — ONDANSETRON 4 MG/1
4 TABLET, FILM COATED ORAL EVERY 8 HOURS PRN
Qty: 90 TABLET | Refills: 0 | Status: SHIPPED | OUTPATIENT
Start: 2023-05-03

## 2023-05-05 NOTE — PROGRESS NOTES
Daily Note     Today's date: 2021  Patient name: Yasmin Ragsdale  : 1960  MRN: [id number]  Referring provider: Marielos Alexander DO  Dx:   Encounter Diagnosis     ICD-10-CM    1  Pain in right hip  M25 551        Start Time: 1630  Stop Time: 171  Total time in clinic (min): 45 minutes    Subjective: Patient states, "Sometimes I try and hold my granddaughter (1 y o) on my R hip and that is bothersome"  Objective: See treatment diary below    Assessment: Patient able to tolerate very light pressure during IASTM  Less tender post manual intervention  No adverse effect from exercise program  Occasional increase in radicular symptoms which does not last long  Plan: Continue per plan of care  Precautions: DM, Tachycardia, Obesity, spinal stenosis    Manuals            Sciatic neuromobility  JM           IASTM right ITB  JM                                     Neuro Re-Ed             Pt education Hip and spine anatomy  Sciatica    Hip DJD            Webslide rows H,M,L             [employer]                                                                              Ther Ex             Nustep  L3 8'           Right ITB stretch standing  Man  ual  30"x3           Standing hip 3 ways right  2x10 ea           Supine bridges  2x10           Supine HL clamshells with t-band  RTB  5"x15           Sidelying clamshells right  5"x15           Supine sciatic nerve flossing x20 x20           Supine piriformis stretch right 3x30" 3x30"           Ther Activity                                       Gait Training                                       Modalities
NEGATIVE

## 2023-05-11 ENCOUNTER — OFFICE VISIT (OUTPATIENT)
Dept: OBGYN CLINIC | Facility: MEDICAL CENTER | Age: 63
End: 2023-05-11

## 2023-05-11 VITALS
HEIGHT: 64 IN | WEIGHT: 182 LBS | HEART RATE: 91 BPM | BODY MASS INDEX: 31.07 KG/M2 | DIASTOLIC BLOOD PRESSURE: 84 MMHG | SYSTOLIC BLOOD PRESSURE: 143 MMHG

## 2023-05-11 DIAGNOSIS — M79.642 LEFT HAND PAIN: ICD-10-CM

## 2023-05-11 DIAGNOSIS — M18.0 OSTEOARTHRITIS OF CARPOMETACARPAL (CMC) JOINT OF BOTH THUMBS: Primary | ICD-10-CM

## 2023-05-11 DIAGNOSIS — E78.5 HYPERLIPIDEMIA, UNSPECIFIED HYPERLIPIDEMIA TYPE: ICD-10-CM

## 2023-05-11 NOTE — PATIENT INSTRUCTIONS
You can obtain diclofenac cream/gel/ointment over the counter  Many brands make this medication and they include Voltaren, Aspercreme and Aleve  You can use this up to 3 times per day  It is best to wash the area with water, pat dry and then apply  The cream absorbs better after this  Be careful not to let any pets or children get in contact with the medication as it can be harmful to them  If you develop a skin reaction, stop using the cream      Room temperature/warm soaks with epsom salt can help with muscle tightness/cramping  Epsom salt releases magnesium which can be helpful

## 2023-05-11 NOTE — PROGRESS NOTES
1  Osteoarthritis of carpometacarpal (CMC) joint of both thumbs        2  Left hand pain          No orders of the defined types were placed in this encounter  Impression:  Bilateral first and second digit pain likely secondary to [facility] joint osteoarthritis  We discussed different treatment options and decided to proceed with Comfort Cool glove, Voltaren gel and Epsom salt soaks  The patient wants to hold off on any type of injection for now  She recently started oral steroids which should help with this  If her hands continue to be symptomatic, we will obtain bilateral hand x-rays  Can also consider ultrasound-guided left CMC joint injection  Patient with history of lumbar fusion is here in follow up of chronic right thigh pain likely multifactorial and secondary to greater trochanteric pain syndrome and hip osteoarthritis   Also possible that this is due to spinal etiology or SI joint mediated  Anjali Solano is neurologically intact   Treatment has included greater trochanteric steroid injection and right hip USGI  She is seeing pain management at outside facility for her spine pain  On follow-up visit, can consider repeat greater trochanteric injection      Imaging Studies (I personally reviewed images in PACS and report):  Right hip x-rays most recent to this encounter reviewed   These images show moderate osteoarthritis  No follow-ups on file  Patient is in agreement with the above plan  HPI:  Polly Majano is a 63 y o  female  who presents for evaluation of   Chief Complaint   Patient presents with   • Left Hand - Pain       Onset/Mechanism: Pain in the index and thumb when she is turning something  Started a few weeks ago  Location: 1st and 2nd digit on both sides  Radiation: Denies  Provocative: Turning something  Severity: Aching pain  Associated Symptoms: Denies  Treatment so far: No recent treatment      Following history reviewed and updated:  Past Medical History:   Diagnosis Date   • Abdominal pain, epigastric 3/23/2018    Added automatically from request for surgery [id number]   • Abnormal x-ray of lumbar spine 11/3/2015   • Acute cystitis with hematuria 2020   • Bariatric surgery status    • Basal cell carcinoma     basil cell carcinoma- in remission   • Benign essential hypertension 2012   • Breakdown (mechanical) of internal fixation device of vertebrae, [facility] ) 3/1/2019   • Calculus of bile duct with cholecystitis without obstruction 2018    Added automatically from request for surgery [id number]   • Cellulitis of finger 12/3/2015   • Degenerative lumbar disc    • Digital mucinous cyst 2015   • DMII (diabetes mellitus, type 2) ([facility] ) 2013   • Gross hematuria 3/19/2019   • Hiatal hernia    • History of transfusion 2014    Post-op spinal surgery   • Low back pain    • Lower urinary tract infectious disease 3/27/2015   • Medicare annual wellness visit, initial 2019   • Obesity     Resolved 10/6/2016    • Overactive bladder    • Postsurgical malabsorption    • Recurrent UTI 3/19/2019   • Spinal stenosis    • SVT (supraventricular tachycardia) ([facility])    • Tachycardia     Resolved 2016    • Type 2 diabetes mellitus ([facility] )     Last assesssed 2018    • Wears glasses      Past Surgical History:   Procedure Laterality Date   • APPENDECTOMY     • ARTHRODESIS      Lumbar - Last assessed 2018    • BACK SURGERY      Lumbar (3 surgeries)- two levels fused, clean out from infection, then fusion of 7 levels (last surgery in )   • CARDIAC ELECTROPHYSIOLOGY STUDY AND ABLATION     • CARPAL TUNNEL RELEASE      x2   • CERVICAL SPINE SURGERY      Fusion of C2-C7 over a period of 3 surgeries ( first)   •  SECTION      X2   • CHOLECYSTECTOMY     • FL MYELOGRAM LUMBAR  3/28/2019   • INSERTION / PLACEMENT / REVISION NEUROSTIMULATOR      X2- both were removed   • NECK SURGERY     • OTHER SURGICAL HISTORY      Catheter ablation    • AK EGD TRANSORAL BIOPSY "SINGLE/MULTIPLE N/A 2016    Procedure: ESOPHAGOGASTRODUODENOSCOPY (EGD); Surgeon: Renae Coppola MD;  Location: [facility]; Service: Bariatrics   • NM EGD TRANSORAL BIOPSY SINGLE/MULTIPLE N/A 2018    Procedure: ESOPHAGOGASTRODUODENOSCOPY (EGD) with biopsy;  Surgeon: Renae Coppola MD;  [facility]: [facility]; Service: Bariatrics   • NM LAPAROSCOPY SURG CHOLECYSTECTOMY N/A 2018    Procedure: CHOLECYSTECTOMY LAPAROSCOPIC;  Surgeon: Renae Coppola MD;  [facility];  Service: Bariatrics   • NM LAPS GSTR RSTCV PX W/BYP BRAULIO-EN-Y LIMB <150 CM N/A 10/25/2016    Procedure: BYPASS GASTRIC  BRAULIO-EN-Y LAPAROSCOPIC, WITH INTRA OP EGD;  Surgeon: Renae Coppola MD;  [facility];  Service: Bariatrics   • SKIN CANCER EXCISION     • TONSILLECTOMY     • TUBAL LIGATION     • WISDOM TOOTH EXTRACTION       Social History   Social History     Substance and Sexual Activity   Alcohol Use No     Social History     Substance and Sexual Activity   Drug Use No     Social History     Tobacco Use   Smoking Status Former   • Packs/day: 1 00   • Years: 20 00   • Pack years: 20 00   • Types: Cigarettes   • Quit date:    • Years since quittin 3   Smokeless Tobacco Never     Family History   Problem Relation Age of Onset   • Arthritis Mother         Rheumatoid   • Angina Mother    • Coronary artery disease Mother    • Diabetes Mother    • Cancer Father         Lung   • Cystic fibrosis Father    • Rheum arthritis Sister    • Diabetes Sister    • Other Brother         Back problems    • Diabetes Brother    • No Known Problems Brother    • Hypertension Family    • Heart disease Family      No Known Allergies     Constitutional:  /84   Pulse 91   Ht 5' 3 5\" (1 613 m)   Wt 82 6 kg (182 lb)   BMI 31 73 kg/m²    General: NAD  Eyes: Anicteric sclerae  Neck: Supple  Lungs: Unlabored breathing  Cardiovascular: No lower extremity edema  Skin: Intact without erythema    Neurologic: Sensation intact to " light touch  Psychiatric: Mood and affect are appropriate      Left Hand Exam     Other   Erythema: absent  Scars: absent  Sensation: normal  Pulse: present             Procedures

## 2023-05-12 RX ORDER — FENOFIBRATE 160 MG/1
160 TABLET ORAL DAILY
Qty: 90 TABLET | Refills: 0 | Status: SHIPPED | OUTPATIENT
Start: 2023-05-12

## 2023-05-24 ENCOUNTER — HOSPITAL ENCOUNTER (OUTPATIENT)
Dept: RADIOLOGY | Facility: MEDICAL CENTER | Age: 63
Discharge: HOME | End: 2023-05-24

## 2023-05-24 VITALS — WEIGHT: 182 LBS | HEIGHT: 64 IN | BODY MASS INDEX: 31.07 KG/M2

## 2023-05-24 DIAGNOSIS — Z12.31 ENCOUNTER FOR SCREENING MAMMOGRAM FOR MALIGNANT NEOPLASM OF BREAST: ICD-10-CM

## 2023-05-29 DIAGNOSIS — K21.9 GASTROESOPHAGEAL REFLUX DISEASE WITHOUT ESOPHAGITIS: ICD-10-CM

## 2023-05-29 DIAGNOSIS — K29.00 ACUTE GASTRITIS WITHOUT HEMORRHAGE, UNSPECIFIED GASTRITIS TYPE: ICD-10-CM

## 2023-05-30 RX ORDER — PANTOPRAZOLE SODIUM 40 MG/1
TABLET, DELAYED RELEASE ORAL
Qty: 30 TABLET | Refills: 0 | Status: SHIPPED | OUTPATIENT
Start: 2023-05-30 | End: 2023-06-07 | Stop reason: SDUPTHER

## 2023-06-07 DIAGNOSIS — K21.9 GASTROESOPHAGEAL REFLUX DISEASE WITHOUT ESOPHAGITIS: ICD-10-CM

## 2023-06-07 DIAGNOSIS — K29.00 ACUTE GASTRITIS WITHOUT HEMORRHAGE, UNSPECIFIED GASTRITIS TYPE: ICD-10-CM

## 2023-06-08 RX ORDER — PANTOPRAZOLE SODIUM 40 MG/1
40 TABLET, DELAYED RELEASE ORAL
Qty: 90 TABLET | Refills: 3 | Status: SHIPPED | OUTPATIENT
Start: 2023-06-08

## 2023-06-15 ENCOUNTER — OFFICE VISIT (OUTPATIENT)
Dept: OBGYN CLINIC | Facility: MEDICAL CENTER | Age: 63
End: 2023-06-15
Payer: COMMERCIAL

## 2023-06-15 VITALS
DIASTOLIC BLOOD PRESSURE: 81 MMHG | HEIGHT: 64 IN | HEART RATE: 74 BPM | BODY MASS INDEX: 30.73 KG/M2 | WEIGHT: 180 LBS | SYSTOLIC BLOOD PRESSURE: 117 MMHG

## 2023-06-15 DIAGNOSIS — M16.11 PRIMARY OSTEOARTHRITIS OF ONE HIP, RIGHT: ICD-10-CM

## 2023-06-15 DIAGNOSIS — M25.551 GREATER TROCHANTERIC PAIN SYNDROME OF RIGHT LOWER EXTREMITY: ICD-10-CM

## 2023-06-15 DIAGNOSIS — M51.36 LUMBAR DEGENERATIVE DISC DISEASE: ICD-10-CM

## 2023-06-15 DIAGNOSIS — M54.16 LUMBAR RADICULOPATHY: Primary | ICD-10-CM

## 2023-06-15 DIAGNOSIS — M79.642 LEFT HAND PAIN: ICD-10-CM

## 2023-06-15 PROCEDURE — 20610 DRAIN/INJ JOINT/BURSA W/O US: CPT | Performed by: PHYSICAL MEDICINE & REHABILITATION

## 2023-06-15 PROCEDURE — 99213 OFFICE O/P EST LOW 20 MIN: CPT | Performed by: PHYSICAL MEDICINE & REHABILITATION

## 2023-06-15 RX ORDER — ROPIVACAINE HYDROCHLORIDE 5 MG/ML
10 INJECTION, SOLUTION EPIDURAL; INFILTRATION; PERINEURAL
Status: COMPLETED | OUTPATIENT
Start: 2023-06-15 | End: 2023-06-15

## 2023-06-15 RX ORDER — TRIAMCINOLONE ACETONIDE 40 MG/ML
80 INJECTION, SUSPENSION INTRA-ARTICULAR; INTRAMUSCULAR
Status: COMPLETED | OUTPATIENT
Start: 2023-06-15 | End: 2023-06-15

## 2023-06-15 RX ADMIN — ROPIVACAINE HYDROCHLORIDE 10 ML: 5 INJECTION, SOLUTION EPIDURAL; INFILTRATION; PERINEURAL at 10:00

## 2023-06-15 RX ADMIN — TRIAMCINOLONE ACETONIDE 80 MG: 40 INJECTION, SUSPENSION INTRA-ARTICULAR; INTRAMUSCULAR at 10:00

## 2023-06-15 NOTE — PROGRESS NOTES
1  Lumbar radiculopathy        2  Primary osteoarthritis of one hip, right        3  Lumbar degenerative disc disease        4  Greater trochanteric pain syndrome of right lower extremity        5  Left hand pain          No orders of the defined types were placed in this encounter  Impression:  Patient is here in follow up of bilateral first and second digit pain likely secondary to [facility] joint osteoarthritis  Treatment has included Comfort Cool glove, Voltaren gel and Epsom salt soaks  The patient wants to hold off on any type of injection for now  If her hands continue to be symptomatic, we will obtain bilateral hand x-rays  Most of her symptoms are now along her 2nd digit due to overuse      Patient with history of lumbar fusion is here in follow up of chronic right thigh pain likely multifactorial and secondary to greater trochanteric pain syndrome and hip osteoarthritis   Also possible that this is due to spinal etiology or SI joint mediated  Lorrayne Arts is neurologically intact   Treatment has included greater trochanteric steroid injection and right hip USGI   She is seeing pain management at outside facility for her spine pain  Today, we decided to proceed with a right greater trochanteric injection  I will see her back if needed  Imaging Studies (I personally reviewed images in PACS and report):  Right hip x-rays most recent to this encounter reviewed   These images show moderate osteoarthritis  No follow-ups on file  No follow-ups on file  Patient is in agreement with the above plan  HPI:  Chrissy Wheeler is a 63 y o  female  who presents in follow up  Here for   Chief Complaint   Patient presents with   • Right Hip - Follow-up       Since last visit: See above      Following history reviewed and updated:  Past Medical History:   Diagnosis Date   • Abdominal pain, epigastric 3/23/2018    Added automatically from request for surgery [id number]   • Abnormal x-ray of lumbar spine 11/3/2015   • Acute cystitis with hematuria 2020   • Bariatric surgery status    • Basal cell carcinoma     basil cell carcinoma- in remission   • Benign essential hypertension 2012   • Breakdown (mechanical) of internal fixation device of vertebrae, [facility] encounter ([facility] ) 3/1/2019   • Calculus of bile duct with cholecystitis without obstruction 2018    Added automatically from request for surgery [id number]   • Cellulitis of finger 12/3/2015   • Degenerative lumbar disc    • Digital mucinous cyst 2015   • DMII (diabetes mellitus, type 2) ([facility] ) 2013   • Gross hematuria 3/19/2019   • Hiatal hernia    • History of transfusion 2014    Post-op spinal surgery   • Low back pain    • Lower urinary tract infectious disease 3/27/2015   • Medicare annual wellness visit, initial 2019   • Obesity     Resolved 10/6/2016    • Overactive bladder    • Postsurgical malabsorption    • Recurrent UTI 3/19/2019   • Spinal stenosis    • SVT (supraventricular tachycardia) ([facility])    • Tachycardia     Resolved 2016    • Type 2 diabetes mellitus ([facility] )     Last assesssed 2018    • Wears glasses      Past Surgical History:   Procedure Laterality Date   • APPENDECTOMY     • ARTHRODESIS      Lumbar - Last assessed 2018    • BACK SURGERY      Lumbar (3 surgeries)- two levels fused, clean out from infection, then fusion of 7 levels (last surgery in )   • CARDIAC ELECTROPHYSIOLOGY STUDY AND ABLATION     • CARPAL TUNNEL RELEASE      x2   • CERVICAL SPINE SURGERY      Fusion of C2-C7 over a period of 3 surgeries ( first)   •  SECTION      X2   • CHOLECYSTECTOMY     • FL MYELOGRAM LUMBAR  3/28/2019   • INSERTION / PLACEMENT / REVISION NEUROSTIMULATOR      X2- both were removed   • NECK SURGERY     • OTHER SURGICAL HISTORY      Catheter ablation    • MN EGD TRANSORAL BIOPSY SINGLE/MULTIPLE N/A 2016    Procedure: ESOPHAGOGASTRODUODENOSCOPY (EGD); Surgeon: Cathy Ahmadi MD;  Location: [facility];   Service: "Bariatrics   • MO EGD TRANSORAL BIOPSY SINGLE/MULTIPLE N/A 2018    Procedure: ESOPHAGOGASTRODUODENOSCOPY (EGD) with biopsy;  Surgeon: Nilsa Tbaor MD;  Location: [facility]; Service: Bariatrics   • MO LAPAROSCOPY SURG CHOLECYSTECTOMY N/A 2018    Procedure: CHOLECYSTECTOMY LAPAROSCOPIC;  Surgeon: Nilsa Tabor MD;  [facility];  Service: Bariatrics   • MO LAPS GSTR RSTCV PX W/BYP BRAULIO-EN-Y LIMB <150 CM N/A 10/25/2016    Procedure: BYPASS GASTRIC  BRAULIO-EN-Y LAPAROSCOPIC, WITH INTRA OP EGD;  Surgeon: Nilsa Tabor MD;  [facility];  Service: Bariatrics   • SKIN CANCER EXCISION     • TONSILLECTOMY     • TUBAL LIGATION     • WISDOM TOOTH EXTRACTION       Social History   Social History     Substance and Sexual Activity   Alcohol Use No     Social History     Substance and Sexual Activity   Drug Use No     Social History     Tobacco Use   Smoking Status Former   • Packs/day: 1 00   • Years: 20 00   • Total pack years: 20 00   • Types: Cigarettes   • Quit date:    • Years since quittin 4   Smokeless Tobacco Never     Family History   Problem Relation Age of Onset   • Arthritis Mother         Rheumatoid   • Angina Mother    • Coronary artery disease Mother    • Diabetes Mother    • Cancer Father         Lung   • Cystic fibrosis Father    • Rheum arthritis Sister    • Diabetes Sister    • No Known Problems Maternal Grandmother    • No Known Problems Maternal Grandfather    • No Known Problems Paternal Grandmother    • No Known Problems Paternal Grandfather    • Other Brother         Back problems    • Diabetes Brother    • No Known Problems Brother    • Hypertension Family    • Heart disease Family    • No Known Problems Son    • No Known Problems Son      No Known Allergies     Constitutional:  /81   Pulse 74   Ht 5' 3 5\" (1 613 m)   Wt 81 6 kg (180 lb)   BMI 31 39 kg/m²    General: NAD  Eyes: Clear sclerae  ENT: No inflammation, lesion, or mass of lips    No tracheal " deviation  Musculoskeletal: As mentioned below  Integumentary: No visible rashes or skin lesions  Pulmonary/Chest: Effort normal  No respiratory distress  Neuro: CN's grossly intact, MODI  Psych: Normal affect and judgement  Vascular: WWP  Right Hip Exam     Tenderness   The patient is experiencing tenderness in the greater trochanter  Range of Motion   Internal rotation: abnormal     Tests   SUDHAKAR: positive    Other   Erythema: absent  Scars: absent  Sensation: normal  Pulse: present             Large joint arthrocentesis: R greater trochanteric bursa  Universal Protocol:  Procedure performed by: (Chaperone present)  Consent: Verbal consent obtained  Written consent not obtained  Risks and benefits: risks, benefits and alternatives were discussed  Consent given by: patient  Timeout called at: 6/15/2023 10:12 AM   Patient understanding: patient states understanding of the procedure being performed  Site marked: the operative site was marked  Radiology Images displayed and confirmed  If images not available, report reviewed: imaging studies available  Patient identity confirmed: verbally with patient    Supporting Documentation  Indications: pain   Procedure Details  Location: hip - R greater trochanteric bursa  Needle size: 20 G (20G 3 5'')  Ultrasound guidance: no  Approach: Lateral to medial approach  Medications administered: 80 mg triamcinolone acetonide 40 mg/mL; 10 mL ropivacaine 0 5 %    Patient tolerance: patient tolerated the procedure well with no immediate complications  Dressing:  Sterile dressing applied    A modified tenotomy was performed by redirecting the needle in three directions and dispersing the medication equally in all three directions  There was little to no resistance encountered during the injection  Risks of this procedure include:    - Risk of bleeding since a needle is involved  - Risk of infection (1/10,000 chance as per recent studies)    Signs/symptoms were discussed and they would prompt an urgent evaluation at an emergency department   - Risk of pigmentation or skin dimpling in the skin (2-3% chance as per recent studies) from the steroid  - Risk of increased pain from steroid flare (1% chance as per recent studies) that typically lasts 24-48 hours  - Risk of increased blood sugars from the steroid medication that can last for a few weeks  If the patient is a diabetic or pre-diabetic, they were encouraged to closely monitor their blood sugars and discuss with PCP if elevated more than usual or if having symptoms  The benefits outweigh the risks and so the procedure was completed

## 2023-06-20 DIAGNOSIS — F32.5 MAJOR DEPRESSIVE DISORDER WITH SINGLE EPISODE, IN FULL REMISSION (HCC): ICD-10-CM

## 2023-06-20 RX ORDER — ESCITALOPRAM OXALATE 20 MG/1
TABLET ORAL
Qty: 90 TABLET | Refills: 0 | Status: SHIPPED | OUTPATIENT
Start: 2023-06-20

## 2023-06-21 ENCOUNTER — HOSPITAL ENCOUNTER (OUTPATIENT)
Dept: MAMMOGRAPHY | Facility: CLINIC | Age: 63
Discharge: HOME | End: 2023-06-21
Payer: COMMERCIAL

## 2023-06-21 ENCOUNTER — HOSPITAL ENCOUNTER (OUTPATIENT)
Dept: ULTRASOUND IMAGING | Facility: CLINIC | Age: 63
Discharge: HOME | End: 2023-06-21
Payer: COMMERCIAL

## 2023-06-21 DIAGNOSIS — R92.8 ABNORMAL MAMMOGRAM: ICD-10-CM

## 2023-06-21 PROCEDURE — 77065 DX MAMMO INCL CAD UNI: CPT

## 2023-06-21 PROCEDURE — 76642 ULTRASOUND BREAST LIMITED: CPT

## 2023-06-21 PROCEDURE — G0279 TOMOSYNTHESIS, MAMMO: HCPCS

## 2023-06-21 NOTE — PROGRESS NOTES
Met with patient and Dr. Sae Lugo regarding recommendation for;    __X_ RIGHT ______LEFT      ___X__Ultrasound guided  ______Stereotactic breast biopsy. __X___Verbalized understanding.       Blood thinners:  No: __X___ Yes: ______ What:                 Biopsy teaching sheet given:  Yes: ___X___ No: ________    Pt given contact information and adv to call with any questions/needs

## 2023-06-26 DIAGNOSIS — J01.90 ACUTE SINUSITIS, RECURRENCE NOT SPECIFIED, UNSPECIFIED LOCATION: ICD-10-CM

## 2023-06-26 RX ORDER — FLUTICASONE PROPIONATE 50 MCG
SPRAY, SUSPENSION (ML) NASAL
Qty: 16 G | Refills: 3 | Status: SHIPPED | OUTPATIENT
Start: 2023-06-26

## 2023-07-07 ENCOUNTER — VBI (OUTPATIENT)
Dept: ADMINISTRATIVE | Facility: OTHER | Age: 63
End: 2023-07-07

## 2023-07-12 ENCOUNTER — HOSPITAL ENCOUNTER (OUTPATIENT)
Dept: MAMMOGRAPHY | Facility: CLINIC | Age: 63
Discharge: HOME | End: 2023-07-12

## 2023-07-12 ENCOUNTER — HOSPITAL ENCOUNTER (OUTPATIENT)
Dept: ULTRASOUND IMAGING | Facility: CLINIC | Age: 63
Discharge: HOME | End: 2023-07-12
Admitting: RADIOLOGY
Payer: COMMERCIAL

## 2023-07-12 VITALS — DIASTOLIC BLOOD PRESSURE: 68 MMHG | SYSTOLIC BLOOD PRESSURE: 134 MMHG | HEART RATE: 60 BPM

## 2023-07-12 DIAGNOSIS — R92.8 ABNORMAL MAMMOGRAM: ICD-10-CM

## 2023-07-12 PROCEDURE — 88305 TISSUE EXAM BY PATHOLOGIST: CPT | Performed by: PATHOLOGY

## 2023-07-12 PROCEDURE — 19083 BX BREAST 1ST LESION US IMAG: CPT

## 2023-07-12 PROCEDURE — A4648 IMPLANTABLE TISSUE MARKER: HCPCS

## 2023-07-12 RX ORDER — LIDOCAINE HYDROCHLORIDE 10 MG/ML
5 INJECTION, SOLUTION EPIDURAL; INFILTRATION; INTRACAUDAL; PERINEURAL ONCE
Status: COMPLETED | OUTPATIENT
Start: 2023-07-12 | End: 2023-07-12

## 2023-07-12 RX ADMIN — LIDOCAINE HYDROCHLORIDE 5 ML: 10 INJECTION, SOLUTION EPIDURAL; INFILTRATION; INTRACAUDAL; PERINEURAL at 14:10

## 2023-07-12 NOTE — PROGRESS NOTES
Ice pack given:    ___x__yes _____no    Discharge instructions signed by patient:    _____yes __x___no    Discharge instructions given to patient:    __x___yes _____no    Discharged via:    __x___ambulatory    _____wheelchair    _____stretcher    Stable on discharge:    ___x__yes ____no  Patient would like results over the phone. Ok to leave a message.

## 2023-07-12 NOTE — PROGRESS NOTES
Procedure type:    __x___ultrasound guided _____stereotactic    Breast:    _____Left __x___Right    Location: 10 o'clock arad     Needle: 12 gauge obed    # of passes: 3     Clip: Swetha Sparks    Performed by: Dr. Giorgi Chaidez held for 5 minutes by: Truman Quarles     Sterprashant Strips:    __x___yes _____no    Band aid:    ___x__yes_____no    Tape and guaze:    _____yes ___x__no    Tolerated procedure:    ___x__yes _____no

## 2023-07-13 PROCEDURE — 88305 TISSUE EXAM BY PATHOLOGIST: CPT | Performed by: PATHOLOGY

## 2023-07-13 NOTE — PROGRESS NOTES
Post procedure call completed    Bleeding: _____yes __X___no (pt denies)    Pain: _____yes ___X___no (pt with soreness, used ice, no need for OTC pain meds)    Redness/Swelling: ______yes ___X___no (pt denies)    Band aid removed: _____yes ___X__no (discussed removing when she showers)    Steri-Strips intact: ___X___yes _____no (discussed with patient to remove steri strips on Monday if they have not come off on their own)    Pt with no questions at this time, adv will call when results available, adv to call with any questions or concerns, has name/# for contact

## 2023-07-14 ENCOUNTER — TELEPHONE (OUTPATIENT)
Dept: MAMMOGRAPHY | Facility: CLINIC | Age: 63
End: 2023-07-14

## 2023-07-25 DIAGNOSIS — G89.4 CHRONIC PAIN SYNDROME: ICD-10-CM

## 2023-07-25 RX ORDER — BUPROPION HYDROCHLORIDE 75 MG/1
TABLET ORAL
Qty: 180 TABLET | Refills: 0 | Status: SHIPPED | OUTPATIENT
Start: 2023-07-25

## 2023-08-05 DIAGNOSIS — E78.5 HYPERLIPIDEMIA, UNSPECIFIED HYPERLIPIDEMIA TYPE: ICD-10-CM

## 2023-08-07 RX ORDER — FENOFIBRATE 160 MG/1
160 TABLET ORAL DAILY
Qty: 90 TABLET | Refills: 0 | Status: SHIPPED | OUTPATIENT
Start: 2023-08-07

## 2023-08-11 ENCOUNTER — TELEPHONE (OUTPATIENT)
Dept: FAMILY MEDICINE CLINIC | Facility: CLINIC | Age: 63
End: 2023-08-11

## 2023-08-11 NOTE — TELEPHONE ENCOUNTER
Persistent cough. . very congested. . not able to cough anything up. . Taking day quil then night quil. . musinex made it worse,  chest & head hurting probably from coughing so much. .  She called for an appt. . none available. Please advise.

## 2023-08-12 ENCOUNTER — OFFICE VISIT (OUTPATIENT)
Dept: URGENT CARE | Facility: CLINIC | Age: 63
End: 2023-08-12
Payer: COMMERCIAL

## 2023-08-12 VITALS
TEMPERATURE: 97.4 F | DIASTOLIC BLOOD PRESSURE: 67 MMHG | RESPIRATION RATE: 18 BRPM | HEART RATE: 72 BPM | SYSTOLIC BLOOD PRESSURE: 137 MMHG | OXYGEN SATURATION: 97 %

## 2023-08-12 DIAGNOSIS — J20.9 ACUTE BRONCHITIS, UNSPECIFIED ORGANISM: Primary | ICD-10-CM

## 2023-08-12 PROCEDURE — G0463 HOSPITAL OUTPT CLINIC VISIT: HCPCS | Performed by: PHYSICIAN ASSISTANT

## 2023-08-12 PROCEDURE — 99213 OFFICE O/P EST LOW 20 MIN: CPT | Performed by: PHYSICIAN ASSISTANT

## 2023-08-12 PROCEDURE — S9083 URGENT CARE CENTER GLOBAL: HCPCS | Performed by: PHYSICIAN ASSISTANT

## 2023-08-12 RX ORDER — ALBUTEROL SULFATE 90 UG/1
2 AEROSOL, METERED RESPIRATORY (INHALATION) EVERY 6 HOURS PRN
Qty: 8 G | Refills: 0 | Status: SHIPPED | OUTPATIENT
Start: 2023-08-12

## 2023-08-12 RX ORDER — BENZONATATE 100 MG/1
100 CAPSULE ORAL 3 TIMES DAILY PRN
Qty: 20 CAPSULE | Refills: 0 | Status: SHIPPED | OUTPATIENT
Start: 2023-08-12

## 2023-08-12 RX ORDER — AZITHROMYCIN 250 MG/1
TABLET, FILM COATED ORAL
Qty: 6 TABLET | Refills: 0 | Status: SHIPPED | OUTPATIENT
Start: 2023-08-12 | End: 2023-08-16

## 2023-08-12 NOTE — PROGRESS NOTES
[facility]        NAME: Omar Andrade is a 63 y.o. female  : 1960    MRN: [id number]  DATE: 2023  TIME: 10:35 AM    Assessment and Plan   Acute bronchitis, unspecified organism [J20.9]  1. Acute bronchitis, unspecified organism  azithromycin (ZITHROMAX) 250 mg tablet    albuterol (PROVENTIL HFA,VENTOLIN HFA) 90 mcg/act inhaler    benzonatate (TESSALON PERLES) 100 mg capsule            Patient Instructions   Patient Instructions   Follow-up with your PCP if symptoms worsen. Follow up with PCP in 3-5 days. Proceed to  ER if symptoms worsen. Chief Complaint     Chief Complaint   Patient presents with   • Cough     Cough started approx 8 days ago. Getting worse. Today woke up with ear pain as well. Took mucinex with little relief          History of Present Illness       The patient is a 72-year-old female presenting today for a cough, HA, PND and congestion x8 days. She reports she feels like the cough is worsening. Woke up this morning with ear pain. Denies fevers or chills. Cough  This is a new problem. The current episode started in the past 7 days. The problem has been gradually worsening. The problem occurs constantly. The cough is productive of sputum. Associated symptoms include ear pain, headaches, nasal congestion and postnasal drip. Pertinent negatives include no chest pain, chills, ear congestion, fever, heartburn, hemoptysis, myalgias, rash, rhinorrhea, sore throat, shortness of breath, sweats, weight loss or wheezing. Review of Systems   Review of Systems   Constitutional: Negative for chills, fever and weight loss. HENT: Positive for ear pain and postnasal drip. Negative for rhinorrhea and sore throat. Respiratory: Positive for cough. Negative for hemoptysis, shortness of breath and wheezing. Cardiovascular: Negative for chest pain. Gastrointestinal: Negative for heartburn. Musculoskeletal: Negative for myalgias. Skin: Negative for rash. Neurological: Positive for headaches. Current Medications       Current Outpatient Medications:   •  albuterol (PROVENTIL HFA,VENTOLIN HFA) 90 mcg/act inhaler, Inhale 2 puffs every 6 (six) hours as needed for wheezing, Disp: 8 g, Rfl: 0  •  azithromycin (ZITHROMAX) 250 mg tablet, Take 2 tablets today then 1 tablet daily x 4 days, Disp: 6 tablet, Rfl: 0  •  baclofen 10 mg tablet, Take 10 mg by mouth daily, Disp: , Rfl:   •  benzonatate (TESSALON PERLES) 100 mg capsule, Take 1 capsule (100 mg total) by mouth 3 (three) times a day as needed for cough, Disp: 20 capsule, Rfl: 0  •  buPROPion (WELLBUTRIN) 75 mg tablet, take 1 tablet by mouth twice a day, Disp: 180 tablet, Rfl: 0  •  Calcium Citrate-Vitamin D 315-250 MG-UNIT TABS, Take 1 tablet by mouth 2 (two) times a day , Disp: , Rfl:   •  Cholecalciferol 25 MCG (1000 UT) CHEW, Chew, Disp: , Rfl:   •  escitalopram (LEXAPRO) 20 mg tablet, take 1 tablet by mouth every morning, Disp: 90 tablet, Rfl: 0  •  famotidine (PEPCID) 40 MG tablet, Take 1 tablet (40 mg total) by mouth daily at bedtime, Disp: 90 tablet, Rfl: 3  •  fenofibrate 160 MG tablet, take 1 tablet by mouth once daily, Disp: 90 tablet, Rfl: 0  •  fluticasone (FLONASE) 50 mcg/act nasal spray, instill 1 spray into each nostril daily, Disp: 16 g, Rfl: 3  •  gabapentin (NEURONTIN) 100 mg capsule, 100 mg 5 (five) times a day , Disp: , Rfl:   •  HYDROmorphone (DILAUDID) 4 mg tablet, TAKE 1 TABLET BY MOUTH EVERY 6 HOURS AS NEEDED. .. FILL 5/8/2020, Disp: , Rfl:   •  loratadine (CLARITIN) 10 mg tablet, Take 10 mg by mouth daily. , Disp: , Rfl:   •  Multiple Vitamins-Minerals (BARIATRIC FUSION PO), Take 1 tablet by mouth daily, Disp: , Rfl:   •  ondansetron (ZOFRAN) 4 mg tablet, Take 1 tablet (4 mg total) by mouth every 8 (eight) hours as needed for nausea or vomiting, Disp: 90 tablet, Rfl: 0  •  oxybutynin (DITROPAN) 5 mg tablet, TAKE 1 TABLET (5 MG TOTAL) BY MOUTH 3 (THREE) TIMES A DAY AS NEEDED (BLADDER SPASMS), Disp: 270 tablet, Rfl: 1  •  pantoprazole (PROTONIX) 40 mg tablet, Take 1 tablet (40 mg total) by mouth daily before breakfast, Disp: 90 tablet, Rfl: 3  •  sucralfate (CARAFATE) 1 g/10 mL suspension, take 10 milliliter by mouth four times a day with meals and at bedtime (Patient not taking: Reported on 8/12/2023), Disp: 420 mL, Rfl: 1    Current Allergies     Allergies as of 08/12/2023   • (No Known Allergies)            The following portions of the patient's history were reviewed and updated as appropriate: allergies, current medications, past family history, past medical history, past social history, past surgical history and problem list.     Past Medical History:   Diagnosis Date   • Abdominal pain, epigastric 3/23/2018    Added automatically from request for surgery [id number]   • Abnormal x-ray of lumbar spine 11/3/2015   • Acute cystitis with hematuria 4/7/2020   • Bariatric surgery status    • Basal cell carcinoma     basil cell carcinoma- in remission   • Benign essential hypertension 6/12/2012   • Breakdown (mechanical) of internal fixation device of vertebrae, initial encounter ([address]) 3/1/2019   • Calculus of bile duct with cholecystitis without obstruction 4/27/2018    Added automatically from request for surgery [id number]   • Cellulitis of finger 12/3/2015   • Degenerative lumbar disc    • Digital mucinous cyst 6/24/2015   • DMII (diabetes mellitus, type 2) ([address]) 11/8/2013   • Gross hematuria 3/19/2019   • Hiatal hernia    • History of transfusion 2014    Post-op spinal surgery   • Low back pain    • Lower urinary tract infectious disease 3/27/2015   • Medicare annual wellness visit, initial 11/27/2019   • Obesity     Resolved 10/6/2016    • Overactive bladder    • Postsurgical malabsorption    • Recurrent UTI 3/19/2019   • Spinal stenosis    • SVT (supraventricular tachycardia) ([facility])    • Tachycardia     Resolved 5/4/2016    • Type 2 diabetes mellitus ([address])     Last assesssed 1/18/2018    • Wears glasses        Past Surgical History:   Procedure Laterality Date   • APPENDECTOMY     • ARTHRODESIS      Lumbar - Last assessed 2018    • BACK SURGERY      Lumbar (3 surgeries)- two levels fused, clean out from infection, then fusion of 7 levels (last surgery in )   • [address]      x2   • CERVICAL SPINE SURGERY      Fusion of C2-C7 over a period of 3 surgeries ( first)   •  SECTION      X2   • CHOLECYSTECTOMY     • FL MYELOGRAM LUMBAR  3/28/2019   • INSERTION / Eduin Shadow / REVISION NEUROSTIMULATOR      X2- both were removed   • NECK SURGERY     • OTHER SURGICAL HISTORY      Catheter ablation    • [place] EGD TRANSORAL BIOPSY SINGLE/MULTIPLE N/A 2016    Procedure: ESOPHAGOGASTRODUODENOSCOPY (EGD); Surgeon: Josué Carrillo MD;  Location: [facility]; Service: Bariatrics   • [place] EGD TRANSORAL BIOPSY SINGLE/MULTIPLE N/A 2018    Procedure: ESOPHAGOGASTRODUODENOSCOPY (EGD) with biopsy;  Surgeon: Josué Carrillo MD;  Location: [facility];   Service: Bariatrics   • AZ LAPAROSCOPY SURG CHOLECYSTECTOMY N/A 2018    Procedure: CHOLECYSTECTOMY LAPAROSCOPIC;  Surgeon: Josué Carrillo MD;  [facility]: [facility];  Service: Bariatrics   • AZ LAPS GSTR RSTCV PX W/BYP BRAULIO-EN-Y LIMB <150 CM N/A 10/25/2016    Procedure: BYPASS GASTRIC  BRAULIO-EN-Y LAPAROSCOPIC, WITH INTRA OP EGD;  Surgeon: Josué Carrillo MD;  [facility]: [facility];  Service: Bariatrics   • SKIN CANCER EXCISION     • TONSILLECTOMY     • TUBAL LIGATION     • US GUIDED BREAST BIOPSY RIGHT COMPLETE Right 2023   • WISDOM TOOTH EXTRACTION         Family History   Problem Relation Age of Onset   • Arthritis Mother         Rheumatoid   • Angina Mother    • Coronary artery disease Mother    • Diabetes Mother    • Cancer Father         Lung   • Cystic fibrosis Father    • Rheum arthritis Sister    • Diabetes Sister    • No Known Problems Maternal Grandmother    • No Known Problems Maternal Grandfather    • No Known Problems Paternal Grandmother    • No Known Problems Paternal Grandfather    • Other Brother         Back problems    • Diabetes Brother    • No Known Problems Brother    • Hypertension Family    • Heart disease Family    • No Known Problems Son    • No Known Problems Son          Medications have been verified. Objective   /67   Pulse 72   Temp (!) 97.4 °F (36.3 °C)   Resp 18   SpO2 97%        Physical Exam     Physical Exam  Vitals and nursing note reviewed. Constitutional:       General: She is not in acute distress. Appearance: Normal appearance. She is well-developed and normal weight. She is not ill-appearing, toxic-appearing or diaphoretic. HENT:      Head: Normocephalic and atraumatic. Right Ear: Tympanic membrane, ear canal and external ear normal. No drainage, swelling or tenderness. No middle ear effusion. There is no impacted cerumen. Tympanic membrane is not erythematous. Left Ear: Tympanic membrane, ear canal and external ear normal. No drainage, swelling or tenderness. No middle ear effusion. There is no impacted cerumen. Tympanic membrane is not erythematous. Nose: Nose normal. No congestion or rhinorrhea. Mouth/Throat:      Mouth: Mucous membranes are moist. No oral lesions. Pharynx: Oropharynx is clear. Uvula midline. No pharyngeal swelling, oropharyngeal exudate, posterior oropharyngeal erythema or uvula swelling. Tonsils: No tonsillar exudate or tonsillar abscesses. 0 on the right. 0 on the left. Eyes:      Extraocular Movements:      Right eye: Normal extraocular motion. Left eye: Normal extraocular motion. Conjunctiva/sclera: Conjunctivae normal.      Pupils: Pupils are equal, round, and reactive to light. Cardiovascular:      Rate and Rhythm: Normal rate and regular rhythm. Heart sounds: Normal heart sounds. No murmur heard. No friction rub. No gallop.    Pulmonary:      Effort: Pulmonary effort is normal. No respiratory distress. Breath sounds: No stridor. Rhonchi (right upper lobe ) present. No wheezing or rales. Chest:      Chest wall: No tenderness. Skin:     General: Skin is warm and dry. Capillary Refill: Capillary refill takes less than 2 seconds. Neurological:      Mental Status: She is alert.

## 2023-08-21 ENCOUNTER — OFFICE VISIT (OUTPATIENT)
Dept: FAMILY MEDICINE CLINIC | Facility: CLINIC | Age: 63
End: 2023-08-21
Payer: COMMERCIAL

## 2023-08-21 VITALS
DIASTOLIC BLOOD PRESSURE: 84 MMHG | SYSTOLIC BLOOD PRESSURE: 122 MMHG | TEMPERATURE: 97.6 F | HEART RATE: 60 BPM | HEIGHT: 64 IN | OXYGEN SATURATION: 96 % | BODY MASS INDEX: 31.07 KG/M2 | WEIGHT: 182 LBS

## 2023-08-21 DIAGNOSIS — R05.8 POST-VIRAL COUGH SYNDROME: Primary | ICD-10-CM

## 2023-08-21 PROCEDURE — 99213 OFFICE O/P EST LOW 20 MIN: CPT | Performed by: FAMILY MEDICINE

## 2023-08-21 RX ORDER — DEXTROMETHORPHAN HYDROBROMIDE AND PROMETHAZINE HYDROCHLORIDE 15; 6.25 MG/5ML; MG/5ML
5 SYRUP ORAL 4 TIMES DAILY PRN
Qty: 250 ML | Refills: 1 | Status: SHIPPED | OUTPATIENT
Start: 2023-08-21

## 2023-08-21 NOTE — PROGRESS NOTES
Yojana Berrios 1960 female MRN: [id number]    Acute Visit        ASSESSMENT/PLAN  Problem List Items Addressed This Visit        Respiratory    Post-viral cough syndrome - Primary     Discussed cough from URI can last up to 8 weeks will treat w/ cough med and call if not improving in that time frame, then consider further work up (CXR, etc)         Relevant Medications    promethazine-dextromethorphan (PHENERGAN-DM) 6.25-15 mg/5 mL oral syrup             Future Appointments   Date Time Provider [address]   12/13/2023  4:30 PM Vicente Kong MD [address] [facility]  SPA        SUBJECTIVE  CC: Cough       Here for a cough for a few weeks. Was seen at urgent care -treated w/ Z pack, Albuterol, tessalon. Took home covid test-negative. Is currently taking allergy med, nasal spray    Cough  This is a recurrent problem. The current episode started 1 to 4 weeks ago. The problem has been unchanged. The problem occurs hourly. The cough is productive of sputum. Associated symptoms include chest pain, headaches, myalgias, nasal congestion, postnasal drip and shortness of breath. Pertinent negatives include no chills, ear congestion, ear pain, fever, heartburn, hemoptysis, rash, rhinorrhea, sore throat, sweats, weight loss or wheezing. Nothing aggravates the symptoms. Her past medical history is significant for environmental allergies. Yojana Berrios is a 63 y.o. female who presented for an acute visit complaining of  Review of Systems   Constitutional: Negative for chills, fever and weight loss. HENT: Positive for postnasal drip. Negative for congestion, ear pain, rhinorrhea and sore throat. Respiratory: Positive for cough and shortness of breath. Negative for hemoptysis and wheezing. Cardiovascular: Positive for chest pain. Gastrointestinal: Negative for heartburn. Musculoskeletal: Positive for myalgias. Skin: Negative for rash. Allergic/Immunologic: Positive for environmental allergies. Neurological: Positive for headaches.        Historical Information   The patient history was reviewed as follows:  Past Medical History:   Diagnosis Date   • Abdominal pain, epigastric 3/23/2018    Added automatically from request for surgery [id number]   • Abnormal x-ray of lumbar spine 11/3/2015   • Acute cystitis with hematuria 2020   • Bariatric surgery status    • Basal cell carcinoma     basil cell carcinoma- in remission   • Benign essential hypertension 2012   • Breakdown (mechanical) of internal fixation device of vertebrae, initial encounter ([address]) 3/1/2019   • Calculus of bile duct with cholecystitis without obstruction 2018    Added automatically from request for surgery [id number]   • Cellulitis of finger 12/3/2015   • Degenerative lumbar disc    • Digital mucinous cyst 2015   • DMII (diabetes mellitus, type 2) ([address]) 2013   • Gross hematuria 3/19/2019   • Hiatal hernia    • History of transfusion 2014    Post-op spinal surgery   • Low back pain    • Lower urinary tract infectious disease 3/27/2015   • Medicare annual wellness visit, initial 2019   • Obesity     Resolved 10/6/2016    • Overactive bladder    • Postsurgical malabsorption    • Recurrent UTI 3/19/2019   • Spinal stenosis    • SVT (supraventricular tachycardia) ([facility])    • Tachycardia     Resolved 2016    • Type 2 diabetes mellitus ([address])     Last assesssed 2018    • Wears glasses      Past Surgical History:   Procedure Laterality Date   • APPENDECTOMY     • ARTHRODESIS      Lumbar - Last assessed 2018    • BACK SURGERY      Lumbar (3 surgeries)- two levels fused, clean out from infection, then fusion of 7 levels (last surgery in )   • CARDIAC ELECTROPHYSIOLOGY STUDY AND ABLATION     • CARPAL TUNNEL RELEASE      x2   • CERVICAL SPINE SURGERY      Fusion of C2-C7 over a period of 3 surgeries ( first)   •  SECTION      X2   • CHOLECYSTECTOMY     • FL MYELOGRAM LUMBAR  3/28/2019   • INSERTION / PLACEMENT / REVISION NEUROSTIMULATOR      X2- both were removed   • NECK SURGERY     • OTHER SURGICAL HISTORY      Catheter ablation    • NM EGD TRANSORAL BIOPSY SINGLE/MULTIPLE N/A 9/14/2016    Procedure: ESOPHAGOGASTRODUODENOSCOPY (EGD); Surgeon: Willard Rosenbaum MD;  Location: [facility]; Service: Bariatrics   • NM EGD TRANSORAL BIOPSY SINGLE/MULTIPLE N/A 4/18/2018    Procedure: ESOPHAGOGASTRODUODENOSCOPY (EGD) with biopsy;  Surgeon: Willard Rosenbaum MD;  [facility]: [facility];   Service: Bariatrics   • NM LAPAROSCOPY SURG CHOLECYSTECTOMY N/A 5/14/2018    Procedure: CHOLECYSTECTOMY LAPAROSCOPIC;  Surgeon: Willard Rosenbaum MD;  [facility];  Service: Bariatrics   • NM LAPS [address] W/BYP BRAULIO-EN-Y LIMB <150 CM N/A 10/25/2016    Procedure: BYPASS GASTRIC  BRAULIO-EN-Y LAPAROSCOPIC, WITH INTRA OP EGD;  Surgeon: Willard Rosenbaum MD;  [facility];  Service: Bariatrics   • SKIN CANCER EXCISION     • TONSILLECTOMY     • TUBAL LIGATION     • US GUIDED BREAST BIOPSY RIGHT COMPLETE Right 7/12/2023   • WISDOM TOOTH EXTRACTION       Family History   Problem Relation Age of Onset   • Arthritis Mother         Rheumatoid   • Angina Mother    • Coronary artery disease Mother    • Diabetes Mother    • Cancer Father         Lung   • Cystic fibrosis Father    • Rheum arthritis Sister    • Diabetes Sister    • No Known Problems Maternal Grandmother    • No Known Problems Maternal Grandfather    • No Known Problems Paternal Grandmother    • No Known Problems Paternal Grandfather    • Other Brother         Back problems    • Diabetes Brother    • No Known Problems Brother    • Hypertension Family    • Heart disease Family    • No Known Problems Son    • No Known Problems Son       Social History   Social History     Substance and Sexual Activity   Alcohol Use No     Social History     Substance and Sexual Activity   Drug Use No     Social History     Tobacco Use   Smoking Status Former   • Packs/day: 1.00   • Years: 20.00   • Total pack years: 20.00   • Types: Cigarettes   • Quit date:    • Years since quittin.6   Smokeless Tobacco Never       Medications:   Meds/Allergies   Current Outpatient Medications   Medication Sig Dispense Refill   • promethazine-dextromethorphan (PHENERGAN-DM) 6.25-15 mg/5 mL oral syrup Take 5 mL by mouth 4 (four) times a day as needed for cough 250 mL 1   • albuterol (PROVENTIL HFA,VENTOLIN HFA) 90 mcg/act inhaler Inhale 2 puffs every 6 (six) hours as needed for wheezing 8 g 0   • baclofen 10 mg tablet Take 10 mg by mouth daily     • buPROPion (WELLBUTRIN) 75 mg tablet take 1 tablet by mouth twice a day 180 tablet 0   • Calcium Citrate-Vitamin D 315-250 MG-UNIT TABS Take 1 tablet by mouth 2 (two) times a day      • Cholecalciferol 25 MCG (1000 UT) CHEW Chew     • escitalopram (LEXAPRO) 20 mg tablet take 1 tablet by mouth every morning 90 tablet 0   • famotidine (PEPCID) 40 MG tablet Take 1 tablet (40 mg total) by mouth daily at bedtime 90 tablet 3   • fenofibrate 160 MG tablet take 1 tablet by mouth once daily 90 tablet 0   • fluticasone (FLONASE) 50 mcg/act nasal spray instill 1 spray into each nostril daily 16 g 3   • gabapentin (NEURONTIN) 100 mg capsule 100 mg 5 (five) times a day      • HYDROmorphone (DILAUDID) 4 mg tablet TAKE 1 TABLET BY MOUTH EVERY 6 HOURS AS NEEDED. .. FILL 2020     • loratadine (CLARITIN) 10 mg tablet Take 10 mg by mouth daily.      • Multiple Vitamins-Minerals (BARIATRIC FUSION PO) Take 1 tablet by mouth daily     • ondansetron (ZOFRAN) 4 mg tablet Take 1 tablet (4 mg total) by mouth every 8 (eight) hours as needed for nausea or vomiting 90 tablet 0   • oxybutynin (DITROPAN) 5 mg tablet TAKE 1 TABLET (5 MG TOTAL) BY MOUTH 3 (THREE) TIMES A DAY AS NEEDED (BLADDER SPASMS) 270 tablet 1   • pantoprazole (PROTONIX) 40 mg tablet Take 1 tablet (40 mg total) by mouth daily before breakfast 90 tablet 3   • sucralfate (CARAFATE) 1 g/10 mL suspension take 10 milliliter by mouth four times a day with meals and at bedtime (Patient not taking: Reported on 8/12/2023) 420 mL 1     No current facility-administered medications for this visit. No Known Allergies    OBJECTIVE  Vitals:   Vitals:    08/21/23 1605   BP: 122/84   Pulse: 60   Temp: 97.6 °F (36.4 °C)   SpO2: 96%   Weight: 82.6 kg (182 lb)   Height: 5' 3.5" (1.613 m)       Invasive Devices     Peripheral Intravenous Line  Duration           Peripheral IV 04/16/19 Right Antecubital 1588 days                Physical Exam  Vitals and nursing note reviewed. Constitutional:       General: She is not in acute distress. Appearance: She is well-developed. HENT:      Right Ear: Hearing, tympanic membrane, ear canal and external ear normal.      Left Ear: Hearing, tympanic membrane, ear canal and external ear normal.      Nose: Nose normal.      Mouth/Throat:      Pharynx: Uvula midline. No oropharyngeal exudate or posterior oropharyngeal erythema. Eyes:      Conjunctiva/sclera: Conjunctivae normal.      Pupils: Pupils are equal, round, and reactive to light. Cardiovascular:      Rate and Rhythm: Normal rate. Heart sounds: Normal heart sounds. No murmur heard. No friction rub. No gallop. Pulmonary:      Effort: Pulmonary effort is normal. No respiratory distress. Breath sounds: Normal breath sounds. No stridor. No wheezing, rhonchi or rales. Lymphadenopathy:      Cervical: No cervical adenopathy. Skin:     General: Skin is warm. Findings: No rash. Neurological:      Mental Status: She is alert and oriented to person, place, and time. Psychiatric:         Behavior: Behavior normal.         Thought Content: Thought content normal.         Judgment: Judgment normal.          Lab:  I have personally reviewed all pertinent results.

## 2023-08-21 NOTE — ASSESSMENT & PLAN NOTE
Discussed cough from URI can last up to 8 weeks will treat w/ cough med and call if not improving in that time frame, then consider further work up (CXR, etc)

## 2023-09-05 DIAGNOSIS — R11.0 NAUSEA: ICD-10-CM

## 2023-09-06 RX ORDER — ONDANSETRON 4 MG/1
4 TABLET, FILM COATED ORAL EVERY 8 HOURS PRN
Qty: 90 TABLET | Refills: 0 | Status: SHIPPED | OUTPATIENT
Start: 2023-09-06

## 2023-09-07 DIAGNOSIS — N32.81 OAB (OVERACTIVE BLADDER): ICD-10-CM

## 2023-09-08 DIAGNOSIS — L98.9 SKIN LESIONS: Primary | ICD-10-CM

## 2023-09-08 RX ORDER — OXYBUTYNIN CHLORIDE 5 MG/1
5 TABLET ORAL 3 TIMES DAILY PRN
Qty: 270 TABLET | Refills: 0 | Status: SHIPPED | OUTPATIENT
Start: 2023-09-08

## 2023-09-11 ENCOUNTER — TELEPHONE (OUTPATIENT)
Age: 63
End: 2023-09-11

## 2023-09-12 ENCOUNTER — EVALUATION (OUTPATIENT)
Dept: PHYSICAL THERAPY | Facility: CLINIC | Age: 63
End: 2023-09-12
Payer: COMMERCIAL

## 2023-09-12 DIAGNOSIS — M47.892 OTHER SPONDYLOSIS, CERVICAL REGION: ICD-10-CM

## 2023-09-12 DIAGNOSIS — M16.11 PRIMARY OSTEOARTHRITIS OF RIGHT HIP: ICD-10-CM

## 2023-09-12 DIAGNOSIS — M96.1 POST LAMINECTOMY SYNDROME: ICD-10-CM

## 2023-09-12 DIAGNOSIS — M47.817 LUMBOSACRAL SPONDYLOSIS WITHOUT MYELOPATHY: Primary | ICD-10-CM

## 2023-09-12 PROCEDURE — 97161 PT EVAL LOW COMPLEX 20 MIN: CPT

## 2023-09-12 PROCEDURE — 97110 THERAPEUTIC EXERCISES: CPT

## 2023-09-12 NOTE — LETTER
2023    Chano Mcgraw PA-C  [id number] [facility] [address]    Patient: Olman Stovall   YOB: 1960   Date of Visit: 2023     Encounter Diagnosis     ICD-10-CM    1. Lumbosacral spondylosis without myelopathy  M47.817       2. Post laminectomy syndrome  M96.1       3. Primary osteoarthritis of right hip  M16.11       4. Other spondylosis, cervical region  M47.892           Dear Dr. Isael Conn: Thank you for your recent referral of Olman Stovall. Please review the attached evaluation summary from Denita's recent visit. Please verify that you agree with the plan of care by signing the attached order. If you have any questions or concerns, please do not hesitate to call. I sincerely appreciate the opportunity to share in the care of one of your patients and hope to have another opportunity to work with you in the near future. Sincerely,    Hoang Chiang, PT      Referring Provider:      I certify that I have read the below Plan of Care and certify the need for these services furnished under this plan of treatment while under my care. Chano Mcgraw PA-C  [address] [facility] [address]  Via Fax: [phone]          PT Evaluation     Today's date: 2023  Patient name: Olman Stovall  : 1960  MRN: [id number]  Referring provider: Chano Mcgraw PA-C  Dx:   Encounter Diagnosis     ICD-10-CM    1. Lumbosacral spondylosis without myelopathy  M47.817       2. Post laminectomy syndrome  M96.1       3. Primary osteoarthritis of right hip  M16.11       4.  Other spondylosis, cervical region  M47.892                      Assessment  Assessment details: Olman Stovall is a 63 y.o. female presenting to physical therapy for an initial evaluation with a MD referral of lumbosacral spondylosis without myelopathy, post laminectomy syndrome, primary osteoarthritis of right hip, other spondylosis, cervical region. The patient demonstrates decreased lumbar and right hip ROM and decreased core stabilization and right hip strength. She displayed significant hamstring and hip flexor tightness on the right side as well as TTP to the right TFL and greater trochanter. Subjectively, she reports poor tolerance for all standing and walking as she is limited to approximately 10 minutes before needing to sit due to pain. These deficits have limited the patient's ability to complete ADLs, household cleaning and laundry, vocational responsibilities, community level ambulation, and recreational activities. This patient would benefit from OP PT services to address their impairments and functional limitations to maximize functional mobility. The patient was educated on importance of both core stabilization as well as improved hip strength to facilitate better tolerance for weightbearing activities. She was also advised on upright trunk posture during weightbearing activities to decrease additional stresses on the spine. She was provided a HEP for low impact hip strengthening and demonstrated/verbalized understanding all education. Impairments: abnormal gait, abnormal or restricted ROM, activity intolerance, impaired balance, impaired physical strength, lacks appropriate home exercise program, pain with function, weight-bearing intolerance and poor posture     Symptom irritability: moderateUnderstanding of Dx/Px/POC: excellent   Prognosis: good    Goals  STG to be achieved in 4 weeks: The patient will report a decrease in right hip "at worst" subjective pain rating score to at least 5/10 to allow for improved tolerance for weightbearing activities. The patient will increase right hip ER AROM to at least 10 degrees to allow for improved functional mobility. The patient will increase right hip abduction MMT score to at least 4-/5 to allow for improved functional mobility. LTG to be achieved by DC:    The patient will be independent in comprehensive HEP. The patient will report a decrease in right hip "at worst" subjective pain rating score to at least 3/10 to allow for improved tolerance to functional activities. The patient will increase all right hip MMT score to [facility] to allow for improved functional mobility. The patient will report improved tolerance to standing and walking to at least 30 minutes to allow for improved functional mobility and completion of ADLs. The patient will be able to ambulate at least one mile without rest breaks to allow for participation/sight seeing in her trip to [facility]. Plan  Patient would benefit from: skilled physical therapy  Planned modality interventions: thermotherapy: hydrocollator packs, TENS and cryotherapy  Planned therapy interventions: manual therapy, joint mobilization, balance/weight bearing training, neuromuscular re-education, patient education, flexibility, strengthening, stretching, therapeutic activities, therapeutic exercise, gait training, home exercise program and abdominal trunk stabilization  Frequency: 2x week  Duration in weeks: 12  Plan of Care beginning date: 9/12/2023  Plan of Care expiration date: 12/5/2023  Treatment plan discussed with: patient        Subjective Evaluation    History of Present Illness  Mechanism of injury: Chano Butler presents to therapy with a long standing history of low back pain and right hip pain. She reports that she is going to [facility] in 5 weeks and is concerned about her ability to stand and walk for her trip. She reports that her current tolerance for standing/walking is approximately 10 minutes. She states that when she is walking, she finds herself walking hunched over because this helps to take away some of the pain. She states that she hasn't been able to do much cleaning around her house over the past month or so because of the pain in both her back and hip.  She reports that while folding her laundry she has to sit to snf through to finish folding it. She is not lifting anything >8 lbs due to her previous neck and back fusions. She has received PT for her back and neck following the fusions as well as injections with pain management. She has been receiving injections in her right hip every 3-4 months for bursitis. She reports that the pain in her lower back occurs in the right side more than the left, with more recent onset of upper back pain which she feels is due to her walking hunched over. She denies numbness/tingling in her legs. She reports a "soreness" specifically in the posterior lateral aspect of her right hip with significant tenderness to touch in the lateral aspect of the hip radiating into the lateral thigh. Xray lumbar spine on 21 "Stable fusion hardware from L1 through S1 with pedicle screws and vertical stabilizing rods at all levels. Stable fracture of the right S1 pedicle screw. Cholecystectomy clips noted. Neurostimulator leads are again noted.     There are 5 non rib bearing lumbar vertebral bodies.      There is no evidence of acute fracture or destructive osseous lesion. Laminectomy defect from L2 through L5.     Mild degenerative anterolisthesis of L4 on L5 and mild degenerative retrolistheses of L1 on L2 and L2 on L3, stable. Alignment otherwise unremarkable. "      Xray of right hip on 21 "no acute fracture or dislocation. Mild to moderate right hip osteoarthritis is seen"    Xray cervical spine 22 "Straightening with posterior cervical spine fusion from C2 to C7. ACDF at C4-C5 with disc prosthesis.   Spinal stimulator lead at C2-C3 .loss of disc height at C3-C4."      Patient Goals  Patient goals for therapy: decreased pain and increased strength  Patient goal: Increased strength for long distance walking for trip to [facility]   Pain  Current pain ratin  At best pain ratin  At worst pain ratin  Location: right side of lower back and right posterior lateral hip Quality: burning and dull ache  Relieving factors: medications and change in position  Aggravating factors: standing, walking and lifting    Social Support  Steps to enter house: yes  Stairs in house: yes   Patient lives at: [address] [place] [address]. Lives with: adult children (Son )    Employment status: working ( for [employer])    Diagnostic Tests  X-ray: abnormal  Treatments  Previous treatment: medication, injection treatment and physical therapy  Current treatment: physical therapy        Objective     Palpation   Left   No palpable tenderness to the piriformis, proximal biceps femoris, proximal semimembranosus, proximal semitendinosus and TFL. Right   No palpable tenderness to the piriformis, proximal biceps femoris, proximal semimembranosus and proximal semitendinosus. Tenderness of the iliopsoas and TFL. Tenderness     Right Hip   Tenderness in the ASIS and greater trochanter.      Neurological Testing     Sensation     Lumbar   Left   Intact: light touch    Right   Diminished: light touch    Comments   Right light touch: Diminished to right lateral thigh    Reflexes   Left   Patellar (L4): brisk (3+)  Achilles (S1): absent (0)    Right   Patellar (L4): absent (0)  Achilles (S1): absent (0)  Clonus sign: negative    Active Range of Motion     Lumbar   Flexion:  with pain Restriction level: moderate  Extension:  with pain Restriction level: maximal  Left lateral flexion:  Restriction level: minimal  Right lateral flexion:  with pain Restriction level: minimal  Left rotation:  Restriction level: moderate  Right rotation:  with pain Restriction level: moderate  Left Hip   External rotation (90/90): 12 degrees   Internal rotation (90/90): 13 degrees     Right Hip   Flexion: 100 degrees   Abduction: 15 degrees   External rotation (90/90): 8 degrees   Internal rotation (90/90): 10 degrees with pain    Passive Range of Motion     Right Hip   Flexion: 108 degrees   Abduction: 20 degrees with pain  External rotation (90/90): 12 degrees   Internal rotation (90/90): 10 degrees with pain    Strength/Myotome Testing     Left Hip   Planes of Motion   Flexion: 4+  Left hip extension strength: unable to lay prone. Abduction: 4-  External rotation: 4  Internal rotation: 4    Right Hip   Planes of Motion   Flexion: 4-  Right hip extension strength: unable to lay prone. Abduction: 3+  External rotation: 3+  Internal rotation: 4-    Left Knee   Flexion: 5  Extension: 5    Right Knee   Flexion: 5  Extension: 5    Left Ankle/Foot   Dorsiflexion: 5  Plantar flexion: 5    Right Ankle/Foot   Dorsiflexion: 5  Plantar flexion: 5    Tests     Left Hip   SLR: Knee extension: 70. Right Hip   Positive SUDHAKAR. Negative FADIR. Modified Tyler: Positive. Hip flexion: 10. SLR: Positive. Knee extension: 65.     Ambulation   Weight-Bearing Status   Assistive device used: none    Observational Gait   Gait: antalgic   Decreased right stance time. Additional Observational Gait Details  Forward flexed posture during ambulation            Precautions: L1-S1 fusion, C2-C7 fusion >10 years  Access Code: [email or web address]    Back  Hip    POC expires Auth Status Unit limit Start date  Expiration date PT/OT + Visit Limit?  [employer]   12/5/23    Re-eval by 10/12 Pending N/A 9/12 Pending Bola Levers primary and secondary               Visit/Unit Tracking  AUTH Status:  Date 9/12              Pending Used 1               Remaining                    Manuals 9/12            Gentle foam roll to R TFL/ITB                                                    Neuro Re-Ed             TA Bridges HEP            TA Marches             Sidelying clamshells with TB             TA TB rows             TA TB pulldowns             TA TB trunk anti-rotation             TA TB seated multifidus rotation                          Ther Ex             Nustep for lumbar and hip ROM             Supine SLR HEP            Sidelying SLR HEP            Standing hip 3 ways with TB             Hip flexor and hamstring stretch at step                          Pt education PT POC, HEP, lumbar/hip anatomy                         Ther Activity             TG squats                          Gait Training                                       Modalities

## 2023-09-12 NOTE — PROGRESS NOTES
PT Evaluation     Today's date: 2023  Patient name: Yesenia Nicole  : 1960  MRN: [id number]  Referring provider: Silvia Sterling PA-C  Dx:   Encounter Diagnosis     ICD-10-CM    1. Lumbosacral spondylosis without myelopathy  M47.817       2. Post laminectomy syndrome  M96.1       3. Primary osteoarthritis of right hip  M16.11       4. Other spondylosis, cervical region  M47.892                      Assessment  Assessment details: Yesenia Nicole is a 63 y.o. female presenting to physical therapy for an initial evaluation with a MD referral of lumbosacral spondylosis without myelopathy, post laminectomy syndrome, primary osteoarthritis of right hip, other spondylosis, cervical region. The patient demonstrates decreased lumbar and right hip ROM and decreased core stabilization and right hip strength. She displayed significant hamstring and hip flexor tightness on the right side as well as TTP to the right TFL and greater trochanter. Subjectively, she reports poor tolerance for all standing and walking as she is limited to approximately 10 minutes before needing to sit due to pain. These deficits have limited the patient's ability to complete ADLs, household cleaning and laundry, vocational responsibilities, community level ambulation, and recreational activities. This patient would benefit from OP PT services to address their impairments and functional limitations to maximize functional mobility. The patient was educated on importance of both core stabilization as well as improved hip strength to facilitate better tolerance for weightbearing activities. She was also advised on upright trunk posture during weightbearing activities to decrease additional stresses on the spine. She was provided a HEP for low impact hip strengthening and demonstrated/verbalized understanding all education.     Impairments: abnormal gait, abnormal or restricted ROM, activity intolerance, impaired balance, impaired physical strength, lacks appropriate home exercise program, pain with function, weight-bearing intolerance and poor posture     Symptom irritability: moderateUnderstanding of Dx/Px/POC: excellent   Prognosis: good    Goals  STG to be achieved in 4 weeks: The patient will report a decrease in right hip "at worst" subjective pain rating score to at least 5/10 to allow for improved tolerance for weightbearing activities. The patient will increase right hip ER AROM to at least 10 degrees to allow for improved functional mobility. The patient will increase right hip abduction MMT score to at least 4-/5 to allow for improved functional mobility. LTG to be achieved by DC: The patient will be independent in comprehensive [facility]. The patient will report a decrease in right hip "at worst" subjective pain rating score to at least 3/10 to allow for improved tolerance to functional activities. The patient will increase all right [facility] to allow for improved functional mobility. The patient will report improved tolerance to standing and walking to at least 30 minutes to allow for improved functional mobility and completion of ADLs. The patient will be able to ambulate at least one mile without rest breaks to allow for participation/sight seeing in her trip to [facility].       Plan  Patient would benefit from: skilled physical therapy  Planned modality interventions: thermotherapy: hydrocollator packs, TENS and cryotherapy  Planned therapy interventions: manual therapy, joint mobilization, balance/weight bearing training, neuromuscular re-education, patient education, flexibility, strengthening, stretching, therapeutic activities, therapeutic exercise, gait training, home exercise program and abdominal trunk stabilization  Frequency: 2x week  Duration in weeks: 12  Plan of Care beginning date: 9/12/2023  Plan of Care expiration date: 12/5/2023  Treatment plan discussed with: patient        Subjective Evaluation    History of Present Illness  Mechanism of injury: Nikki Barker presents to therapy with a long standing history of low back pain and right hip pain. She reports that she is going to [facility] in 5 weeks and is concerned about her ability to stand and walk for her trip. She reports that her current tolerance for standing/walking is approximately 10 minutes. She states that when she is walking, she finds herself walking hunched over because this helps to take away some of the pain. She states that she hasn't been able to do much cleaning around her house over the past month or so because of the pain in both her back and hip. She reports that while folding her laundry she has to sit to [employer] through to finish folding it. She is not lifting anything >8 lbs due to her previous neck and back fusions. She has received PT for her back and neck following the fusions as well as injections with pain management. She has been receiving injections in her right hip every 3-4 months for bursitis. She reports that the pain in her lower back occurs in the right side more than the left, with more recent onset of upper back pain which she feels is due to her walking hunched over. She denies numbness/tingling in her legs. She reports a "soreness" specifically in the posterior lateral aspect of her right hip with significant tenderness to touch in the lateral aspect of the hip radiating into the lateral thigh. Xray lumbar spine on 2/26/21 "Stable fusion hardware from L1 through S1 with pedicle screws and vertical stabilizing rods at all levels. Stable fracture of the right S1 pedicle screw. Cholecystectomy clips noted. Neurostimulator leads are again noted.     There are 5 non rib bearing lumbar vertebral bodies.      There is no evidence of acute fracture or destructive osseous lesion.   Laminectomy defect from L2 through L5.     Mild degenerative anterolisthesis of L4 on L5 and mild degenerative retrolistheses of L1 on L2 and L2 on L3, stable. Alignment otherwise unremarkable. "      Xray of right hip on 21 "no acute fracture or dislocation. Mild to moderate right hip osteoarthritis is seen"    Xray cervical spine 22 "Straightening with posterior cervical spine fusion from C2 to C7. ACDF at C4-C5 with disc prosthesis. Spinal stimulator lead at C2-C3 .loss of disc height at C3-C4."      Patient Goals  Patient goals for therapy: decreased pain and increased strength  Patient goal: Increased strength for long distance walking for trip to [place]   Pain  Current pain ratin  At best pain ratin  At worst pain ratin  Location: right side of lower back and right posterior lateral hip   Quality: burning and dull ache  Relieving factors: medications and change in position  Aggravating factors: standing, walking and lifting    Social Support  Steps to enter house: yes  Stairs in house: yes   Patient lives at: [address] [place] [address]. Lives with: adult children (Son )    Employment status: working ( for [employer])    Diagnostic Tests  X-ray: abnormal  Treatments  Previous treatment: medication, injection treatment and physical therapy  Current treatment: physical therapy        Objective     Palpation   Left   No palpable tenderness to the piriformis, proximal biceps femoris, proximal semimembranosus, proximal semitendinosus and TFL. Right   No palpable tenderness to the piriformis, proximal biceps femoris, proximal semimembranosus and proximal semitendinosus. Tenderness of the iliopsoas and TFL. Tenderness     Right Hip   Tenderness in the ASIS and greater trochanter.      Neurological Testing     Sensation     Lumbar   Left   Intact: light touch    Right   Diminished: light touch    Comments   Right light touch: Diminished to right lateral thigh    Reflexes   Left   Patellar (L4): brisk (3+)  Achilles (S1): absent (0)    Right   Patellar (L4): absent (0)  Achilles (S1): absent (0)  Clonus sign: negative    Active Range of Motion     Lumbar   Flexion:  with pain Restriction level: moderate  Extension:  with pain Restriction level: maximal  Left lateral flexion:  Restriction level: minimal  Right lateral flexion:  with pain Restriction level: minimal  Left rotation:  Restriction level: moderate  Right rotation:  with pain Restriction level: moderate  Left Hip   External rotation (90/90): 12 degrees   Internal rotation (90/90): 13 degrees     Right Hip   Flexion: 100 degrees   Abduction: 15 degrees   External rotation (90/90): 8 degrees   Internal rotation (90/90): 10 degrees with pain    Passive Range of Motion     Right Hip   Flexion: 108 degrees   Abduction: 20 degrees with pain  External rotation (90/90): 12 degrees   Internal rotation (90/90): 10 degrees with pain    Strength/Myotome Testing     Left Hip   Planes of Motion   Flexion: 4+  Left hip extension strength: unable to lay prone. Abduction: 4-  External rotation: 4  Internal rotation: 4    Right Hip   Planes of Motion   Flexion: 4-  Right hip extension strength: unable to lay prone. Abduction: 3+  External rotation: 3+  Internal rotation: 4-    Left Knee   Flexion: 5  Extension: 5    Right Knee   Flexion: 5  Extension: 5    Left Ankle/Foot   Dorsiflexion: 5  Plantar flexion: 5    Right Ankle/Foot   Dorsiflexion: 5  Plantar flexion: 5    Tests     Left Hip   SLR: Knee extension: 70. Right Hip   Positive SUDHAKAR. Negative FADIR. Modified Tyler: Positive. Hip flexion: 10. SLR: Positive. Knee extension: 65.     Ambulation   Weight-Bearing Status   Assistive device used: none    Observational Gait   Gait: antalgic   Decreased right stance time. Additional Observational Gait Details  Forward flexed posture during ambulation             Precautions: L1-S1 fusion, C2-C7 fusion >10 years  Access Code: [email or web address]    Back  Hip    POC expires Auth Status Unit limit Start date  Expiration date PT/OT + Visit Limit?  [address] [facility] [address] 12/5/23    Re-eval by 10/12 Pending N/A 9/12 Pending Mia Damian primary and secondary               Visit/Unit Tracking  AUTH Status:  Date 9/12              Pending Used 1               Remaining                    Manuals 9/12            Gentle foam roll to R TFL/ITB                                                    Neuro Re-Ed             TA Bridges HEP            TA Marches             Sidelying clamshells with TB             TA TB rows             TA TB pulldowns             TA TB trunk anti-rotation             TA TB seated multifidus rotation                          Ther Ex             Nustep for lumbar and hip ROM             Supine SLR HEP            Sidelying SLR HEP            Standing hip 3 ways with TB             Hip flexor and hamstring stretch at step                          Pt education PT POC, HEP, lumbar/hip anatomy                         Ther Activity             TG squats                          Gait Training                                       Modalities

## 2023-09-13 DIAGNOSIS — F32.5 MAJOR DEPRESSIVE DISORDER WITH SINGLE EPISODE, IN FULL REMISSION (HCC): ICD-10-CM

## 2023-09-13 RX ORDER — ESCITALOPRAM OXALATE 20 MG/1
TABLET ORAL
Qty: 90 TABLET | Refills: 0 | Status: SHIPPED | OUTPATIENT
Start: 2023-09-13

## 2023-09-18 ENCOUNTER — OFFICE VISIT (OUTPATIENT)
Dept: PHYSICAL THERAPY | Facility: CLINIC | Age: 63
End: 2023-09-18
Payer: COMMERCIAL

## 2023-09-18 DIAGNOSIS — M47.892 OTHER SPONDYLOSIS, CERVICAL REGION: ICD-10-CM

## 2023-09-18 DIAGNOSIS — M47.817 LUMBOSACRAL SPONDYLOSIS WITHOUT MYELOPATHY: Primary | ICD-10-CM

## 2023-09-18 DIAGNOSIS — M16.11 PRIMARY OSTEOARTHRITIS OF RIGHT HIP: ICD-10-CM

## 2023-09-18 DIAGNOSIS — M96.1 POST LAMINECTOMY SYNDROME: ICD-10-CM

## 2023-09-18 PROCEDURE — 97112 NEUROMUSCULAR REEDUCATION: CPT

## 2023-09-18 PROCEDURE — 97110 THERAPEUTIC EXERCISES: CPT

## 2023-09-18 NOTE — PROGRESS NOTES
Daily Note     Today's date: 2023  Patient name: Macey Ba  : 1960  MRN: [id number]  Referring provider: Javid Corrigan PA-C  Dx:   Encounter Diagnosis     ICD-10-CM    1. Lumbosacral spondylosis without myelopathy  M47.817       2. Post laminectomy syndrome  M96.1       3. Primary osteoarthritis of right hip  M16.11       4. Other spondylosis, cervical region  M47.892           Start Time: 1614  Stop Time: 1700  Total time in clinic (min): 46 minutes    Subjective: pt noted no changes since IE but did noted having a bad hip. Pt note that she does have a hip injection     Objective: See treatment diary below      Assessment: Continued with treatment session with focus on LB as well as R hip. Pt noted that she did have some increase in challenge and discomfort with SLR on R verse L this visit. Progressions made as able this visit. Tolerated treatment well. Patient exhibited good technique with therapeutic exercises and would benefit from continued PT. S/p treatment session,noted some soreness. Education reviewed with verbal agreement and understanding.   - HEP compliance   - DOMS 24 to 48 hours of muscle soreness     Plan: Continue per plan of care. Precautions: L1-S1 fusion, C2-C7 fusion >10 years  Access Code: [email or web address]    Back  Hip    POC expires Auth Status Unit limit Start date  Expiration date PT/OT + Visit Limit? [address] [place] [address]   23    Re-eval by 10/12 Approved  N/A 23 BOMN primary and secondary               Visit/Unit Tracking  AUTH Status:  Date              Approved 8 visits  Used 1 2              Remaining  7 6                 Manuals            Gentle foam roll to R TFL/ITB  declined.                                                    Neuro Re-Ed             TA Bridges HEP 2x 10 3"            TA Marches  2x 10            Sidelying clamshells with TB  hooklying            TA TB rows  GTB 2x 10            TA TB pulldowns  GTB 2x 10 TA TB trunk anti-rotation             TA TB seated multifidus rotation                          Ther Ex             Nustep for lumbar and hip ROM  10 min L4 with UE at 8            Supine SLR HEP 2x 10            Sidelying SLR HEP 2x 10            Standing hip 3 ways with TB             Hip flexor and hamstring stretch at step  30"x  3                        Pt education PT POC, HEP, lumbar/hip anatomy                         Ther Activity             TG squats                          Gait Training                                       Modalities

## 2023-09-20 ENCOUNTER — APPOINTMENT (OUTPATIENT)
Dept: PHYSICAL THERAPY | Facility: CLINIC | Age: 63
End: 2023-09-20
Payer: COMMERCIAL

## 2023-09-21 ENCOUNTER — OFFICE VISIT (OUTPATIENT)
Dept: PHYSICAL THERAPY | Facility: CLINIC | Age: 63
End: 2023-09-21
Payer: COMMERCIAL

## 2023-09-21 DIAGNOSIS — M47.892 OTHER SPONDYLOSIS, CERVICAL REGION: ICD-10-CM

## 2023-09-21 DIAGNOSIS — M96.1 POST LAMINECTOMY SYNDROME: ICD-10-CM

## 2023-09-21 DIAGNOSIS — M16.11 PRIMARY OSTEOARTHRITIS OF RIGHT HIP: ICD-10-CM

## 2023-09-21 DIAGNOSIS — M47.817 LUMBOSACRAL SPONDYLOSIS WITHOUT MYELOPATHY: Primary | ICD-10-CM

## 2023-09-21 PROCEDURE — 97110 THERAPEUTIC EXERCISES: CPT

## 2023-09-21 PROCEDURE — 97112 NEUROMUSCULAR REEDUCATION: CPT

## 2023-09-21 NOTE — PROGRESS NOTES
Daily Note     Today's date: 2023  Patient name: Abhijeet Estes  : 1960  MRN: [id number]  Referring provider: Preeti Jacobson PA-C  Dx:   Encounter Diagnosis     ICD-10-CM    1. Lumbosacral spondylosis without myelopathy  M47.817       2. Post laminectomy syndrome  M96.1       3. Primary osteoarthritis of right hip  M16.11       4. Other spondylosis, cervical region  M47.892           Start Time: 0800  Stop Time: 08  Total time in clinic (min): 39 minutes    Subjective: Pt noted that her R hip was really bothering her after last treatment session and she was having a hard time walking the next day. Next follow up with ortho is 10/11/23. Objective: See treatment diary below      Assessment: Continued with treatment session with focus on  LB /core /postural awareness and control >  R hip due to flare up on pts R hip after last treatment session. Tolerated treatment well. Was able to complete proper TA activation with verbal cues t/o session. Advised patient to completed pain free range of motion with her R hip t/o mat table exercises. Patient exhibited good technique with therapeutic exercises and would benefit from continued PT. S/P treatment pt did report soreness in her R hip as well as LB. Education reviewed with verbal agreement and understanding.   - HEP compliance, received GTB for HEP as well this visit. Declined a new paper copy of HEP at this time but did update electronically with [employer] access code. - DOMS 24 to 48 hours of muscle soreness s/p progressions.   - Modalities as needed HP/ CP 10 - 20 min on and 60 min off prior to reactivation if needed. Plan: Continue per plan of care. NV for PT scheduled for . Precautions: L1-S1 fusion, C2-C7 fusion >10 years  Access Code: [email or web address] - updated on 23. Back  Hip    POC expires Auth Status Unit limit Start date  Expiration date PT/OT + Visit Limit?  [employer]   23    Re-eval by 10/12 Approved  N/A 9/12 12/31/23 BOMN primary and secondary               Visit/Unit Tracking  AUTH Status:  Date 9/12 9/18 9/21            Approved 8 visits  Used 1 2 3             Remaining  7 6 5                Manuals 9/12 9/18 9/21          Gentle foam roll to R TFL/ITB  declined. declined.                                                   Neuro Re-Ed             TA Bridges HEP 2x 10 3"  2x 10 3"           TA Marches  2x 10  2x 10           Sidelying clamshells with TB  hooklying  Hold           PB press TA    5" 2x 10           PPT    5" 2x 10           TA TB rows  GTB 2x 10  GTB 2x 10           TA TB pulldowns  GTB 2x 10  GTB 2x 10           TA TB trunk anti-rotation   RTB  10"x 10            TA TB seated multifidus rotation   NV                       Ther Ex             Nustep for lumbar and hip ROM  10 min L4 with UE at 8  10 min L5 with UE          Supine SLR HEP 2x 10  Hold           Sidelying SLR HEP 2x 10  Hold           Standing hip 3 ways with TB   Hold           Hip flexor and hamstring stretch at step  30"x  3 Hold           LTR    10"x 10           Pt education PT POC, HEP, lumbar/hip anatomy  DOMS and HEP                        Ther Activity             TG squats                          Gait Training                                       Modalities

## 2023-09-25 ENCOUNTER — OFFICE VISIT (OUTPATIENT)
Dept: PHYSICAL THERAPY | Facility: CLINIC | Age: 63
End: 2023-09-25
Payer: COMMERCIAL

## 2023-09-25 DIAGNOSIS — M47.817 LUMBOSACRAL SPONDYLOSIS WITHOUT MYELOPATHY: Primary | ICD-10-CM

## 2023-09-25 DIAGNOSIS — M96.1 POST LAMINECTOMY SYNDROME: ICD-10-CM

## 2023-09-25 DIAGNOSIS — M47.892 OTHER SPONDYLOSIS, CERVICAL REGION: ICD-10-CM

## 2023-09-25 DIAGNOSIS — M16.11 PRIMARY OSTEOARTHRITIS OF RIGHT HIP: ICD-10-CM

## 2023-09-25 PROCEDURE — 97110 THERAPEUTIC EXERCISES: CPT

## 2023-09-25 PROCEDURE — 97112 NEUROMUSCULAR REEDUCATION: CPT

## 2023-09-25 NOTE — PROGRESS NOTES
Daily Note     Today's date: 2023  Patient name: Hollie Wolff  : 1960  MRN: [id number]  Referring provider: Truman Burger PA-C  Dx:   Encounter Diagnosis     ICD-10-CM    1. Lumbosacral spondylosis without myelopathy  M47.817       2. Post laminectomy syndrome  M96.1       3. Primary osteoarthritis of right hip  M16.11       4. Other spondylosis, cervical region  M47.892           Start Time: 1630  Stop Time: 1710  Total time in clinic (min): 40 minutes    Subjective: Pt Noted no changes in pain since last treatment session. Pt noted that she feels like if she was able to oli the injection in her R hip it would feel better. Pt noted that she is not scheduled for the injection until October. Next follow up with ortho is 10/11/23. Objective: See treatment diary below      Assessment: Continued with treatment session with focus on  LB /core /postural awareness and control >  R hip on this date. Tolerated treatment well. Notes some soreness in her LB s/p exercises completed today. Patient demonstrated fatigue post treatment, exhibited good technique with therapeutic exercises and would benefit from continued PT    Education reviewed with verbal agreement and understanding.   - HEP compliance, received GTB for HEP as well this visit. Declined a new paper copy of HEP at this time but did update electronically with [employer] access code. - DOMS 24 to 48 hours of muscle soreness s/p progressions.   - Modalities as needed HP/ CP 10 - 20 min on and 60 min off prior to reactivation if needed. Plan: Continue per plan of care. Precautions: L1-S1 fusion, C2-C7 fusion >10 years  Access Code: [email or web address] - updated on 23. Back  Hip    POC expires Auth Status Unit limit Start date  Expiration date PT/OT + Visit Limit?  [employer]   23    Re-eval by 10/12 Approved  N/A 23 BOMN primary and secondary               Visit/Unit Tracking  AUTH Status:  Date  9/25           Approved 8 visits  Used 1 2 3 4            Remaining  7 6 5 4               Manuals 9/12 9/18 9/21 9/25         Gentle foam roll to R TFL/ITB  declined. declined.                                                   Neuro Re-Ed             TA Bridges HEP 2x 10 3"  2x 10 3"  2x 10 3"          TA Marches  2x 10  2x 10  2x 10          Sidelying clamshells with TB  hooklying  Hold           PB press TA    5" 2x 10  5" 2x 10          PPT    5" 2x 10  5" 2x 10          TA TB rows  GTB 2x 10  GTB 2x 10  GTB 2 x10          TA TB pulldowns  GTB 2x 10  GTB 2x 10  GTB 2 10          TA TB trunk anti-rotation   RTB  10"x 10   RTB 10" x 10          TA TB seated multifidus rotation   NV                       Ther Ex             Nustep for lumbar and hip ROM  10 min L4 with UE at 8  10 min L5 with UE 10 min L4 with UE          Supine SLR HEP 2x 10  Hold           Sidelying SLR HEP 2x 10  Hold           Standing hip 3 ways with TB   Hold           Hip flexor and hamstring stretch at step  30"x  3 Hold           LTR    10"x 10  10" x10          Pt education PT POC, HEP, lumbar/hip anatomy  DOMS and HEP                        Ther Activity             TG squats                          Gait Training                                       Modalities

## 2023-09-27 ENCOUNTER — OFFICE VISIT (OUTPATIENT)
Dept: PHYSICAL THERAPY | Facility: CLINIC | Age: 63
End: 2023-09-27
Payer: COMMERCIAL

## 2023-09-27 DIAGNOSIS — M16.11 PRIMARY OSTEOARTHRITIS OF RIGHT HIP: ICD-10-CM

## 2023-09-27 DIAGNOSIS — M47.892 OTHER SPONDYLOSIS, CERVICAL REGION: ICD-10-CM

## 2023-09-27 DIAGNOSIS — M47.817 LUMBOSACRAL SPONDYLOSIS WITHOUT MYELOPATHY: Primary | ICD-10-CM

## 2023-09-27 DIAGNOSIS — M96.1 POST LAMINECTOMY SYNDROME: ICD-10-CM

## 2023-09-27 PROCEDURE — 97112 NEUROMUSCULAR REEDUCATION: CPT

## 2023-09-27 PROCEDURE — 97110 THERAPEUTIC EXERCISES: CPT

## 2023-09-27 NOTE — PROGRESS NOTES
Daily Note    Today's date: 23  Patient name: Yesenia Nicole  : 1960  MRN: [id number]  Referring provider: Silvia Sterling PA-C  Dx:   Encounter Diagnosis     ICD-10-CM    1. Lumbosacral spondylosis without myelopathy  M47.817       2. Post laminectomy syndrome  M96.1       3. Primary osteoarthritis of right hip  M16.11       4. Other spondylosis, cervical region  M47.892           Start Time: 1630  Stop Time: 1708  Total time in clinic (min): 38 minutes      Subjective: Chiquita Galarza reports no c/o today. Objective: See treatment diary below. Assessment: Chiquita Galarza tolerated treatment well with consistent cuing throughout. TE's were performed with increased reps and increased resistance. No new TE's were performed today. Following treatment, the patient demonstrated fatigue post treatment, exhibited good technique with therapeutic exercises and would benefit from continued PT. Plan: Continue per plan of care. Progress treatment as tolerated. Precautions: L1-S1 fusion, C2-C7 fusion >10 years  Access Code: [email or web address] - updated on 23. Back  Hip    POC expires Auth Status Unit limit Start date  Expiration date PT/OT + Visit Limit? [address] [place] [address]   23    Re-eval by 10/12 Approved  N/A 23 BOMN primary and secondary               Visit/Unit Tracking  AUTH Status:  Date            Approved 8 visits  Used 1 2 3 4            Remaining  7 6 5 4               Manuals         Gentle foam roll to R TFL/ITB  declined. declined.                                                   Neuro Re-Ed             TA Bridges HEP 2x 10 3"  2x 10 3"  2x 10 3"  2x 10 3"         TA Marches  2x 10  2x 10  2x 10  2x 10 ea        Sidelying clamshells with TB  hooklying  Hold           PB press TA    5" 2x 10  5" 2x 10  5" 2x 10         PPT    5" 2x 10  5" 2x 10  5" 2x10        TA TB rows  GTB 2x 10  GTB 2x 10  GTB 2 x10  2x 10 3"         TA TB pulldowns GTB 2x 10  GTB 2x 10  GTB 2 10  2x 10 3"         TA TB trunk anti-rotation   RTB  10"x 10   RTB 10" x 10  RTB 10" x 10         TA TB seated multifidus rotation   NV                       Ther Ex             Nustep for lumbar and hip ROM  10 min L4 with UE at 8  10 min L5 with UE 10 min L4 with UE  10' L4        Supine SLR HEP 2x 10  Hold           Sidelying SLR HEP 2x 10  Hold           Standing hip 3 ways with TB   Hold           Hip flexor and hamstring stretch at step  30"x  3 Hold   60" ea        LTR    10"x 10  10" x10  10" x10         Pt education PT POC, HEP, lumbar/hip anatomy  DOMS and HEP                        Ther Activity             TG squats                          Gait Training                                       Modalities                                            Elise Holland, PT  9/27/2023,5:13 PM

## 2023-10-02 ENCOUNTER — VBI (OUTPATIENT)
Dept: ADMINISTRATIVE | Facility: OTHER | Age: 63
End: 2023-10-02

## 2023-10-02 ENCOUNTER — OFFICE VISIT (OUTPATIENT)
Dept: PHYSICAL THERAPY | Facility: CLINIC | Age: 63
End: 2023-10-02
Payer: COMMERCIAL

## 2023-10-02 DIAGNOSIS — M47.817 LUMBOSACRAL SPONDYLOSIS WITHOUT MYELOPATHY: Primary | ICD-10-CM

## 2023-10-02 DIAGNOSIS — M96.1 POST LAMINECTOMY SYNDROME: ICD-10-CM

## 2023-10-02 DIAGNOSIS — M47.892 OTHER SPONDYLOSIS, CERVICAL REGION: ICD-10-CM

## 2023-10-02 DIAGNOSIS — M16.11 PRIMARY OSTEOARTHRITIS OF RIGHT HIP: ICD-10-CM

## 2023-10-02 PROCEDURE — 97112 NEUROMUSCULAR REEDUCATION: CPT

## 2023-10-02 PROCEDURE — 97110 THERAPEUTIC EXERCISES: CPT

## 2023-10-02 NOTE — PROGRESS NOTES
Daily Note     Today's date: 10/2/2023  Patient name: Alexandria Hughes  : 1960  MRN: [id number]  Referring provider: Carlos Eduardo Damon PA-C  Dx:   Encounter Diagnosis     ICD-10-CM    1. Lumbosacral spondylosis without myelopathy  M47.817       2. Post laminectomy syndrome  M96.1       3. Primary osteoarthritis of right hip  M16.11       4. Other spondylosis, cervical region  M47.892           Start Time: 1630  Stop Time: 1715  Total time in clinic (min): 45 minutes    Subjective: pt noted that her R hip has been bothering her since last treatment session. Pt reported that the main focus of her pain in R hip. Next follow up with ortho is 10/11/23.     Objective: See treatment diary below      Assessment:  Continued with treatment session with focus on LB/ core strengthening primary this visit. Tolerated treatment well. Patient exhibited good technique with therapeutic exercises and would benefit from continued PT. S/p treatment session, Pt noted that she feels like her back has been feeling better but her hip is still the same. Education reviewed with verbal agreement and understanding.   - HEP compliance, received GTB for HEP as well this visit. Declined a new paper copy of HEP at this time but did update electronically with [employer] access code.   - DOMS 24 to 48 hours of muscle soreness s/p progressions.   - Modalities as needed HP/ CP 10 - 20 min on and 60 min off prior to reactivation if needed.     Plan: Continue per plan of care. Precautions: L1-S1 fusion, C2-C7 fusion >10 years  Access Code: [email or web address] - updated on 23. Back  Hip    POC expires Auth Status Unit limit Start date  Expiration date PT/OT + Visit Limit?  [address] [place] [address]   23    Re-eval by 10/12 Approved  N/A 23 BOMN primary and secondary               Visit/Unit Tracking  AUTH Status:  Date 9/12 9/18 9/21 9/25 10/2          Approved 8 visits  Used 1 2 3 4 5           Remaining  7 6 5 4 3 Manuals 9/12 9/18 9/21 9/25 9/27 10/2       Gentle foam roll to R TFL/ITB  declined. declined. Neuro Re-Ed             TA Bridges HEP 2x 10 3"  2x 10 3"  2x 10 3"  2x 10 3"  2x 10 3"       TA Marches  2x 10  2x 10  2x 10  2x 10 ea 2x 10        Sidelying clamshells with TB  hooklying  Hold           PB press TA    5" 2x 10  5" 2x 10  5" 2x 10  5" 2 x10        PPT    5" 2x 10  5" 2x 10  5" 2x10 5" 3x 10        TA TB rows  GTB 2x 10  GTB 2x 10  GTB 2 x10  GTB  2x 10 3"  GTB 2x 10        TA TB pulldowns  GTB 2x 10  GTB 2x 10  GTB 2 10  GTB  2x 10 3"  GTB 2x 10        TA TB trunk anti-rotation   RTB  10"x 10   RTB 10" x 10  RTB 10" x 10  RTB 10"x  10        TA TB seated multifidus rotation   NV                       Ther Ex             Nustep for lumbar and hip ROM  10 min L4 with UE at 8  10 min L5 with UE 10 min L4 with UE  10' L4 10' L4        Supine SLR HEP 2x 10  Hold           Sidelying SLR HEP 2x 10  Hold           Standing hip 3 ways with TB   Hold           Hip flexor and hamstring stretch at step  30"x  3 Hold   60" ea  modified 30"  3 on mat table.         LTR    10"x 10  10" x10  10" x10  10" x 10        Pt education PT POC, HEP, lumbar/hip anatomy  DOMS and HEP                        Ther Activity             TG squats                          Gait Training                                       Modalities

## 2023-10-04 ENCOUNTER — APPOINTMENT (OUTPATIENT)
Dept: PHYSICAL THERAPY | Facility: CLINIC | Age: 63
End: 2023-10-04
Payer: COMMERCIAL

## 2023-10-05 ENCOUNTER — OFFICE VISIT (OUTPATIENT)
Dept: PHYSICAL THERAPY | Facility: CLINIC | Age: 63
End: 2023-10-05
Payer: COMMERCIAL

## 2023-10-05 DIAGNOSIS — M96.1 POST LAMINECTOMY SYNDROME: ICD-10-CM

## 2023-10-05 DIAGNOSIS — M16.11 PRIMARY OSTEOARTHRITIS OF RIGHT HIP: ICD-10-CM

## 2023-10-05 DIAGNOSIS — M47.892 OTHER SPONDYLOSIS, CERVICAL REGION: ICD-10-CM

## 2023-10-05 DIAGNOSIS — M47.817 LUMBOSACRAL SPONDYLOSIS WITHOUT MYELOPATHY: Primary | ICD-10-CM

## 2023-10-05 PROCEDURE — 97112 NEUROMUSCULAR REEDUCATION: CPT

## 2023-10-05 PROCEDURE — 97110 THERAPEUTIC EXERCISES: CPT

## 2023-10-05 NOTE — PROGRESS NOTES
Daily Note     Today's date: 10/5/2023  Patient name: Macey Ba  : 1960  MRN: [id number]  Referring provider: Javid Corrigan PA-C  Dx:   Encounter Diagnosis     ICD-10-CM    1. Lumbosacral spondylosis without myelopathy  M47.817       2. Post laminectomy syndrome  M96.1       3. Primary osteoarthritis of right hip  M16.11       4. Other spondylosis, cervical region  M47.892                      Subjective: Patient reports that she hasn't been able to tolerate any hip strengthening because every exercise was just aggravating her hip more. She does feel that the exercises are helpful for her back and to loosen this up, however it does tighten up again when she doesn't exercise. She is scheduled for an injection in her hip on 10/11/23. Objective: See treatment diary below      Assessment: Tolerated treatment well. Able to progress with additional reps for core stabilization exercises as noted below with good tolerance. She does continue to report increased pain in the hip and lower back with standing required to complete the standing exercises. Patient demonstrated fatigue post treatment, exhibited good technique with therapeutic exercises and would benefit from continued PT      Plan: Continue per plan of care. Precautions: L1-S1 fusion, C2-C7 fusion >10 years  Access Code: [email or web address] - updated on 23. Back  Hip    POC expires Auth Status Unit limit Start date  Expiration date PT/OT + Visit Limit? [address] [place] [address]   23    Re-eval by 10/12 Approved  N/A 23 BOMN primary and secondary               Visit/Unit Tracking  AUTH Status:  Date 9/12 9/18 9/21 9/25 10/2 10/5         Approved 8 visits  Used 1 2 3 4 5 6          Remaining  7 6 5 4 3 2             Manuals 9/12 9/18 9/21 9/25 9/27 10/2 10/5      Gentle foam roll to R TFL/ITB  declined. declined.                                                   Neuro Re-Ed             TA Bridges HEP 2x 10 3"  2x 10 3"  2x 10 3"  2x 10 3"  2x 10 3" 3" 2x10      TA Marches  2x 10  2x 10  2x 10  2x 10 ea 2x 10        Sidelying clamshells with TB  hooklying  Hold           PB press TA    5" 2x 10  5" 2x 10  5" 2x 10  5" 2 x10  5" 2x10      PPT    5" 2x 10  5" 2x 10  5" 2x10 5" 3x 10  5"x30      TA TB rows  GTB 2x 10  GTB 2x 10  GTB 2 x10  GTB  2x 10 3"  GTB 2x 10  GTB 5" 3x10      TA TB pulldowns  GTB 2x 10  GTB 2x 10  GTB 2 10  GTB  2x 10 3"  GTB 2x 10  GTB 5" 3x10      TA TB trunk anti-rotation   RTB  10"x 10   RTB 10" x 10  RTB 10" x 10  RTB 10"x  10  GTB 10"x10 ea      TA TB seated multifidus rotation   NV    GTB 3" x10 ea                    Ther Ex             Nustep for lumbar and hip ROM  10 min L4 with UE at 8  10 min L5 with UE 10 min L4 with UE  10' L4 10' L4  L4 10'      Supine SLR HEP 2x 10  Hold           Sidelying SLR HEP 2x 10  Hold           Standing hip 3 ways with TB   Hold           Hip flexor and hamstring stretch at step  30"x  3 Hold   60" ea  modified 30"  3 on mat table.   3x30" ea      LTR    10"x 10  10" x10  10" x10  10" x 10  10"x10      Pt education PT POC, HEP, lumbar/hip anatomy  DOMS and HEP                        Ther Activity             TG squats                          Gait Training                                       Modalities

## 2023-10-09 ENCOUNTER — EVALUATION (OUTPATIENT)
Dept: PHYSICAL THERAPY | Facility: CLINIC | Age: 63
End: 2023-10-09

## 2023-10-09 DIAGNOSIS — M47.892 OTHER SPONDYLOSIS, CERVICAL REGION: ICD-10-CM

## 2023-10-09 DIAGNOSIS — M47.817 LUMBOSACRAL SPONDYLOSIS WITHOUT MYELOPATHY: Primary | ICD-10-CM

## 2023-10-09 DIAGNOSIS — M96.1 POST LAMINECTOMY SYNDROME: ICD-10-CM

## 2023-10-09 DIAGNOSIS — M16.11 PRIMARY OSTEOARTHRITIS OF RIGHT HIP: ICD-10-CM

## 2023-10-09 PROCEDURE — 97112 NEUROMUSCULAR REEDUCATION: CPT

## 2023-10-09 PROCEDURE — 97140 MANUAL THERAPY 1/> REGIONS: CPT

## 2023-10-09 PROCEDURE — 97110 THERAPEUTIC EXERCISES: CPT

## 2023-10-09 NOTE — LETTER
2023    Gely Sexton [facility] [id number]    Patient: Adelso Hay   YOB: 1960   Date of Visit: 10/9/2023     Encounter Diagnosis     ICD-10-CM    1. Lumbosacral spondylosis without myelopathy  M47.817       2. Post laminectomy syndrome  M96.1       3. Primary osteoarthritis of right hip  M16.11       4. Other spondylosis, cervical region  M47.892           Dear Dr. Dorian Meyer: Thank you for your recent referral of Adelso Hay. Please review the attached evaluation summary from Denita's recent visit. Please verify that you agree with the plan of care by signing the attached order. If you have any questions or concerns, please do not hesitate to call. I sincerely appreciate the opportunity to share in the care of one of your patients and hope to have another opportunity to work with you in the near future. Sincerely,    Myranda Smith, PT      Referring Provider:      I certify that I have read the below Plan of Care and certify the need for these services furnished under this plan of treatment while under my care. Gely Sexton PA-C  [address] [place] [id number]  Via Fax: [phone]          PT Re-Evaluation     Today's date: 10/9/2023  Patient name: Adelso Hay  : 1960  MRN: [id number]  Referring provider: Gely Sexton PA-C  Dx:   Encounter Diagnosis     ICD-10-CM    1. Lumbosacral spondylosis without myelopathy  M47.817       2. Post laminectomy syndrome  M96.1       3. Primary osteoarthritis of right hip  M16.11       4.  Other spondylosis, cervical region  M47.892                      Assessment  Assessment details: Adelso Hay is a 63 y.o. female presenting to physical therapy for a reevaluation with a MD referral of lumbosacral spondylosis without myelopathy, post laminectomy syndrome, primary osteoarthritis of right hip, other spondylosis, cervical region. Since her initial evaluation, she reports minimal to moderate improvement in her back and hip. She demonstrates minimal improvements in her lumbar ROM as well as hip ROM, as well as improved hip strength and core stabilization. She reports decreased pain and tightness in her back following completion of her exercises in therapy or HEP. Strengthening exercises for the right hip have largely been held at this time secondary to poor tolerance with increased pain during and afterwards. Despite her improvements, she continues to have decreased ROM, decreased hip strength and core stabilization, and decreased tolerance for standing/walking leading to limitations in their ability to complete ADLs, household cleaning and laundry, vocational responsibilities, community level ambulation, and recreational activities. This patient would benefit from continued PT services to address their impairments and functional limitations to maximize functional outcome. Impairments: abnormal gait, abnormal or restricted ROM, activity intolerance, impaired balance, impaired physical strength, lacks appropriate home exercise program, pain with function, weight-bearing intolerance and poor posture     Symptom irritability: moderateUnderstanding of Dx/Px/POC: excellent   Prognosis: good    Goals  STG to be achieved in 4 weeks: The patient will report a decrease in right hip "at worst" subjective pain rating score to at least 5/10 to allow for improved tolerance for weightbearing activities. NOT MET  The patient will increase right hip ER AROM to at least 10 degrees to allow for improved functional mobility. NOT MET  The patient will increase right hip abduction MMT score to at least 4-/5 to allow for improved functional mobility. MET    LTG to be achieved by DC: ALL NOT MET PROGRESSING  The patient will be independent in comprehensive HEP.    The patient will report a decrease in right hip "at worst" subjective pain rating score to at least 3/10 to allow for improved tolerance to functional activities. The patient will increase all right hip MMT score [facility] to allow for improved functional mobility. The patient will report improved tolerance to standing and walking to at least 30 minutes to allow for improved functional mobility and completion of ADLs. The patient will be able to ambulate at least one mile without rest breaks to allow for participation/sight seeing in her trip to [facility]. Plan  Patient would benefit from: skilled physical therapy  Planned modality interventions: thermotherapy: hydrocollator packs, TENS and cryotherapy  Planned therapy interventions: manual therapy, joint mobilization, balance/weight bearing training, neuromuscular re-education, patient education, flexibility, strengthening, stretching, therapeutic activities, therapeutic exercise, gait training, home exercise program and abdominal trunk stabilization  Frequency: 2x week  Duration in weeks: 12  Plan of Care beginning date: 9/12/2023  Plan of Care expiration date: 12/5/2023  Treatment plan discussed with: patient        Subjective Evaluation    History of Present Illness  Mechanism of injury: Aure Garibay reports minimal to moderate improvement since beginning PT services. She reports that after completing her exercises, she feels that her back loosens up and is less painful. She reports that this relief in symptoms does last approximately one day. She has been trying to complete her exercises outside of therapy but has been busy. She reports that her hip hasn't improved much since starting therapy as she hasn't been able to complete any exercises for it due to the amount of pain she has during or afterwards. Her standing tolerance is still approximately 10 minutes because her hip pain continues to be very intense and limit her from tolerating more.  She reports continued difficulties with performing her regular cleaning and folding laundry because of pain, often requiring her to sit to do so. She is scheduled for an injection in her right hip on 10/11/23.      Patient Goals  Patient goals for therapy: decreased pain and increased strength  Patient goal: Increased strength for long distance walking for trip to [place]   Pain  Current pain ratin  At best pain ratin  At worst pain ratin  Location: right side of lower back and right posterior lateral hip   Quality: burning and dull ache  Relieving factors: medications and change in position  Aggravating factors: standing, walking and lifting    Social Support  Steps to enter house: yes  Stairs in house: yes   Patient lives at: [address] [place] [address]. Lives with: adult children (Son )    Employment status: working ( for [employer])    Diagnostic Tests  X-ray: abnormal  Treatments  Previous treatment: medication, injection treatment and physical therapy  Current treatment: physical therapy        Objective     Palpation   Left   No palpable tenderness to the piriformis, proximal biceps femoris, proximal semimembranosus, proximal semitendinosus and TFL. Right   No palpable tenderness to the piriformis, proximal biceps femoris, proximal semimembranosus and proximal semitendinosus. Tenderness of the iliopsoas and TFL. Tenderness     Right Hip   Tenderness in the ASIS and greater trochanter.      Neurological Testing     Sensation     Lumbar   Left   Intact: light touch    Right   Diminished: light touch    Comments   Right light touch: Diminished to right lateral thigh    Reflexes   Left   Patellar (L4): brisk (3+)  Achilles (S1): absent (0)    Right   Patellar (L4): absent (0)  Achilles (S1): absent (0)  Clonus sign: negative    Active Range of Motion     Lumbar   Flexion:  with pain Restriction level: minimal  Extension:  with pain Restriction level: maximal  Left lateral flexion:  Restriction level: minimal  Right lateral flexion:  Restriction level: minimal  Left rotation:  with pain Restriction level: moderate  Right rotation:  Restriction level: moderate  Left Hip   External rotation (90/90): 12 degrees   Internal rotation (90/90): 13 degrees     Right Hip   Flexion: 109 degrees   Abduction: 15 degrees   External rotation (90/90): 8 degrees   Internal rotation (90/90): 12 degrees with pain    Passive Range of Motion     Right Hip   Abduction: 20 degrees with pain  External rotation (90/90): 12 degrees   Internal rotation (90/90): 12 degrees with pain    Strength/Myotome Testing     Left Hip   Planes of Motion   Flexion: 4+  Left hip extension strength: unable to lay prone. Abduction: 4-  External rotation: 4  Internal rotation: 4    Right Hip   Planes of Motion   Flexion: 4  Right hip extension strength: unable to lay prone. Abduction: 4-  External rotation: 3+  Internal rotation: 4-    Left Knee   Flexion: 5  Extension: 5    Right Knee   Flexion: 5  Extension: 5    Left Ankle/Foot   Dorsiflexion: 5  Plantar flexion: 5    Right Ankle/Foot   Dorsiflexion: 5  Plantar flexion: 5    Tests     Left Hip   SLR: Knee extension: 70. Right Hip   Positive SUDHAKAR. Negative FADIR. Modified Tyler: Positive. Hip flexion: 10. SLR: Positive. Knee extension: 65.     Ambulation   Weight-Bearing Status   Assistive device used: none    Observational Gait   Gait: antalgic   Decreased right stance time. Additional Observational Gait Details  Forward flexed posture during ambulation            Precautions: L1-S1 fusion, C2-C7 fusion >10 years  Access Code: [email or web address]    Back  Hip    POC expires Auth Status Unit limit Start date  Expiration date PT/OT + Visit Limit?  [address] [place] [address]   12/5/23    Re-eval by 10/12 Approved  N/A 9/12 12/31/23 BOMN primary and secondary               Visit/Unit Tracking  AUTH Status:  Date 9/12 9/18 9/21 9/25 10/2 10/5 10/9        Approved 8 visits  Used 1 2 3 4 5 6 7         Remaining  7 6 5 4 3 2 1            Manuals 9/12 9/18 9/21 9/25 9/27 10/2 10/5 10/9     Gentle foam roll to R TFL/ITB  declined. declined. Reassessment        BE                  Neuro Re-Ed             TA Bridges HEP 2x 10 3"  2x 10 3"  2x 10 3"  2x 10 3"  2x 10 3" 3" 2x10 3" 2x10     TA Marches  2x 10  2x 10  2x 10  2x 10 ea 2x 10        Sidelying clamshells with TB  hooklying  Hold           PB press TA    5" 2x 10  5" 2x 10  5" 2x 10  5" 2 x10  5" 2x10      PPT    5" 2x 10  5" 2x 10  5" 2x10 5" 3x 10  5"x30 5"x30     TA TB rows  GTB 2x 10  GTB 2x 10  GTB 2 x10  GTB  2x 10 3"  GTB 2x 10  GTB 5" 3x10      TA TB pulldowns  GTB 2x 10  GTB 2x 10  GTB 2 10  GTB  2x 10 3"  GTB 2x 10  GTB 5" 3x10      TA TB trunk anti-rotation   RTB  10"x 10   RTB 10" x 10  RTB 10" x 10  RTB 10"x  10  GTB 10"x10 ea      TA TB seated multifidus rotation   NV    GTB 3" x10 ea                    Ther Ex             Nustep for lumbar and hip ROM  10 min L4 with UE at 8  10 min L5 with UE 10 min L4 with UE  10' L4 10' L4  L4 10' L4 10'     Supine SLR HEP 2x 10  Hold           Sidelying SLR HEP 2x 10  Hold           Standing hip 3 ways with TB   Hold           Hip flexor and hamstring stretch at step  30"x  3 Hold   60" ea  modified 30"  3 on mat table.   3x30" ea      LTR    10"x 10  10" x10  10" x10  10" x 10  10"x10 10"x10      Pt education PT POC, HEP, lumbar/hip anatomy  DOMS and HEP      PT POC, hip strengthening following injection                  Ther Activity             TG squats                          Gait Training                                       Modalities

## 2023-10-09 NOTE — PROGRESS NOTES
PT Re-Evaluation     Today's date: 10/9/2023  Patient name: Beto Jacobo  : 1960  MRN: [id number]  Referring provider: Wood Price PA-C  Dx:   Encounter Diagnosis     ICD-10-CM    1. Lumbosacral spondylosis without myelopathy  M47.817       2. Post laminectomy syndrome  M96.1       3. Primary osteoarthritis of right hip  M16.11       4. Other spondylosis, cervical region  M47.892                      Assessment  Assessment details: Beto Jacobo is a 63 y.o. female presenting to physical therapy for a reevaluation with a MD referral of lumbosacral spondylosis without myelopathy, post laminectomy syndrome, primary osteoarthritis of right hip, other spondylosis, cervical region. Since her initial evaluation, she reports minimal to moderate improvement in her back and hip. She demonstrates minimal improvements in her lumbar ROM as well as hip ROM, as well as improved hip strength and core stabilization. She reports decreased pain and tightness in her back following completion of her exercises in therapy or HEP. Strengthening exercises for the right hip have largely been held at this time secondary to poor tolerance with increased pain during and afterwards. Despite her improvements, she continues to have decreased ROM, decreased hip strength and core stabilization, and decreased tolerance for standing/walking leading to limitations in their ability to complete ADLs, household cleaning and laundry, vocational responsibilities, community level ambulation, and recreational activities. This patient would benefit from continued PT services to address their impairments and functional limitations to maximize functional outcome.    Impairments: abnormal gait, abnormal or restricted ROM, activity intolerance, impaired balance, impaired physical strength, lacks appropriate home exercise program, pain with function, weight-bearing intolerance and poor posture     Symptom irritability: moderateUnderstanding of Dx/Px/POC: excellent   Prognosis: good    Goals  STG to be achieved in 4 weeks: The patient will report a decrease in right hip "at worst" subjective pain rating score to at least 5/10 to allow for improved tolerance for weightbearing activities. NOT MET  The patient will increase right hip ER AROM to at least 10 degrees to allow for improved functional mobility. NOT MET  The patient will increase right hip abduction MMT score to at least 4-/5 to allow for improved functional mobility. MET    LTG to be achieved by DC: ALL NOT MET PROGRESSING  The patient will be independent in comprehensive [facility]. The patient will report a decrease in right hip "at worst" subjective pain rating score to at least 3/10 to allow for improved tolerance to functional activities. The patient will increase all right hip MMT score to [facility] to allow for improved functional mobility. The patient will report improved tolerance to standing and walking to at least 30 minutes to allow for improved functional mobility and completion of ADLs. The patient will be able to ambulate at least one mile without rest breaks to allow for participation/sight seeing in her trip to [facility]. Plan  Patient would benefit from: skilled physical therapy  Planned modality interventions: thermotherapy: hydrocollator packs, TENS and cryotherapy  Planned therapy interventions: manual therapy, joint mobilization, balance/weight bearing training, neuromuscular re-education, patient education, flexibility, strengthening, stretching, therapeutic activities, therapeutic exercise, gait training, home exercise program and abdominal trunk stabilization  Frequency: 2x week  Duration in weeks: 12  Plan of Care beginning date: 9/12/2023  Plan of Care expiration date: 12/5/2023  Treatment plan discussed with: patient        Subjective Evaluation    History of Present Illness  Mechanism of injury: Chelsea Del Cid reports minimal to moderate improvement since beginning PT services. She reports that after completing her exercises, she feels that her back loosens up and is less painful. She reports that this relief in symptoms does last approximately one day. She has been trying to complete her exercises outside of therapy but has been busy. She reports that her hip hasn't improved much since starting therapy as she hasn't been able to complete any exercises for it due to the amount of pain she has during or afterwards. Her standing tolerance is still approximately 10 minutes because her hip pain continues to be very intense and limit her from tolerating more. She reports continued difficulties with performing her regular cleaning and folding laundry because of pain, often requiring her to sit to do so. She is scheduled for an injection in her right hip on 10/11/23.      Patient Goals  Patient goals for therapy: decreased pain and increased strength  Patient goal: Increased strength for long distance walking for trip to [facility]   Pain  Current pain ratin  At best pain ratin  At worst pain ratin  Location: right side of lower back and right posterior lateral hip   Quality: burning and dull ache  Relieving factors: medications and change in position  Aggravating factors: standing, walking and lifting    Social Support  Steps to enter house: yes  Stairs in house: yes   Patient lives at: [address] [place] [address]. Lives with: adult children (Son )    Employment status: working ( for [employer])    Diagnostic Tests  X-ray: abnormal  Treatments  Previous treatment: medication, injection treatment and physical therapy  Current treatment: physical therapy        Objective     Palpation   Left   No palpable tenderness to the piriformis, proximal biceps femoris, proximal semimembranosus, proximal semitendinosus and TFL. Right   No palpable tenderness to the piriformis, proximal biceps femoris, proximal semimembranosus and proximal semitendinosus. Tenderness of the iliopsoas and TFL. Tenderness     Right Hip   Tenderness in the ASIS and greater trochanter. Neurological Testing     Sensation     Lumbar   Left   Intact: light touch    Right   Diminished: light touch    Comments   Right light touch: Diminished to right lateral thigh    Reflexes   Left   Patellar (L4): brisk (3+)  Achilles (S1): absent (0)    Right   Patellar (L4): absent (0)  Achilles (S1): absent (0)  Clonus sign: negative    Active Range of Motion     Lumbar   Flexion:  with pain Restriction level: minimal  Extension:  with pain Restriction level: maximal  Left lateral flexion:  Restriction level: minimal  Right lateral flexion:  Restriction level: minimal  Left rotation:  with pain Restriction level: moderate  Right rotation:  Restriction level: moderate  Left Hip   External rotation (90/90): 12 degrees   Internal rotation (90/90): 13 degrees     Right Hip   Flexion: 109 degrees   Abduction: 15 degrees   External rotation (90/90): 8 degrees   Internal rotation (90/90): 12 degrees with pain    Passive Range of Motion     Right Hip   Abduction: 20 degrees with pain  External rotation (90/90): 12 degrees   Internal rotation (90/90): 12 degrees with pain    Strength/Myotome Testing     Left Hip   Planes of Motion   Flexion: 4+  Left hip extension strength: unable to lay prone. Abduction: 4-  External rotation: 4  Internal rotation: 4    Right Hip   Planes of Motion   Flexion: 4  Right hip extension strength: unable to lay prone. Abduction: 4-  External rotation: 3+  Internal rotation: 4-    Left Knee   Flexion: 5  Extension: 5    Right Knee   Flexion: 5  Extension: 5    Left Ankle/Foot   Dorsiflexion: 5  Plantar flexion: 5    Right Ankle/Foot   Dorsiflexion: 5  Plantar flexion: 5    Tests     Left Hip   SLR: Knee extension: 70. Right Hip   Positive SUDHAKAR. Negative FADIR. Modified Tyler: Positive. Hip flexion: 10. SLR: Positive.  Knee extension: 65.     Ambulation   Weight-Bearing Status   Assistive device used: none    Observational Gait   Gait: antalgic   Decreased right stance time. Additional Observational Gait Details  Forward flexed posture during ambulation             Precautions: L1-S1 fusion, C2-C7 fusion >10 years  Access Code: [email or web address]    Back  Hip    POC expires Auth Status Unit limit Start date  Expiration date PT/OT + Visit Limit? [facility]   12/5/23    Re-eval by 10/12 Approved  N/A 9/12 12/31/23 BOMN primary and secondary               Visit/Unit Tracking  AUTH Status:  Date 9/12 9/18 9/21 9/25 10/2 10/5 10/9        Approved 8 visits  Used 1 2 3 4 5 6 7         Remaining  7 6 5 4 3 2 1            Manuals 9/12 9/18 9/21 9/25 9/27 10/2 10/5 10/9     Gentle foam roll to R TFL/ITB  declined. declined. Reassessment        BE                  Neuro Re-Ed             TA Bridges HEP 2x 10 3"  2x 10 3"  2x 10 3"  2x 10 3"  2x 10 3" 3" 2x10 3" 2x10     TA Marches  2x 10  2x 10  2x 10  2x 10 ea 2x 10        Sidelying clamshells with TB  hooklying  Hold           PB press TA    5" 2x 10  5" 2x 10  5" 2x 10  5" 2 x10  5" 2x10      PPT    5" 2x 10  5" 2x 10  5" 2x10 5" 3x 10  5"x30 5"x30     TA TB rows  GTB 2x 10  GTB 2x 10  GTB 2 x10  GTB  2x 10 3"  GTB 2x 10  GTB 5" 3x10      TA TB pulldowns  GTB 2x 10  GTB 2x 10  GTB 2 10  GTB  2x 10 3"  GTB 2x 10  GTB 5" 3x10      TA TB trunk anti-rotation   RTB  10"x 10   RTB 10" x 10  RTB 10" x 10  RTB 10"x  10  GTB 10"x10 ea      TA TB seated multifidus rotation   NV    GTB 3" x10 ea                    Ther Ex             Nustep for lumbar and hip ROM  10 min L4 with UE at 8  10 min L5 with UE 10 min L4 with UE  10' L4 10' L4  L4 10' L4 10'     Supine SLR HEP 2x 10  Hold           Sidelying SLR HEP 2x 10  Hold           Standing hip 3 ways with TB   Hold           Hip flexor and hamstring stretch at step  30"x  3 Hold   60" ea  modified 30"  3 on mat table.   3x30" ea      LTR    10"x 10  10" x10  10" x10  10" x 10  10"x10 10"x10      Pt education PT POC, HEP, lumbar/hip anatomy  DOMS and HEP      PT POC, hip strengthening following injection                  Ther Activity             TG squats                          Gait Training                                       Modalities 06-Jun-2019 11:41

## 2023-10-11 ENCOUNTER — APPOINTMENT (OUTPATIENT)
Dept: PHYSICAL THERAPY | Facility: CLINIC | Age: 63
End: 2023-10-11
Payer: COMMERCIAL

## 2023-10-13 ENCOUNTER — OFFICE VISIT (OUTPATIENT)
Dept: OBGYN CLINIC | Facility: CLINIC | Age: 63
End: 2023-10-13
Payer: COMMERCIAL

## 2023-10-13 DIAGNOSIS — G89.4 CHRONIC PAIN SYNDROME: ICD-10-CM

## 2023-10-13 DIAGNOSIS — M25.551 GREATER TROCHANTERIC PAIN SYNDROME OF RIGHT LOWER EXTREMITY: Primary | ICD-10-CM

## 2023-10-13 PROCEDURE — 99213 OFFICE O/P EST LOW 20 MIN: CPT | Performed by: PHYSICAL MEDICINE & REHABILITATION

## 2023-10-13 PROCEDURE — 20610 DRAIN/INJ JOINT/BURSA W/O US: CPT | Performed by: PHYSICAL MEDICINE & REHABILITATION

## 2023-10-13 RX ORDER — ROPIVACAINE HYDROCHLORIDE 5 MG/ML
10 INJECTION, SOLUTION EPIDURAL; INFILTRATION; PERINEURAL
Status: COMPLETED | OUTPATIENT
Start: 2023-10-13 | End: 2023-10-13

## 2023-10-13 RX ORDER — BUPROPION HYDROCHLORIDE 75 MG/1
75 TABLET ORAL 2 TIMES DAILY
Qty: 180 TABLET | Refills: 0 | Status: SHIPPED | OUTPATIENT
Start: 2023-10-13

## 2023-10-13 RX ORDER — TRIAMCINOLONE ACETONIDE 40 MG/ML
80 INJECTION, SUSPENSION INTRA-ARTICULAR; INTRAMUSCULAR
Status: COMPLETED | OUTPATIENT
Start: 2023-10-13 | End: 2023-10-13

## 2023-10-13 RX ADMIN — TRIAMCINOLONE ACETONIDE 80 MG: 40 INJECTION, SUSPENSION INTRA-ARTICULAR; INTRAMUSCULAR at 09:00

## 2023-10-13 RX ADMIN — ROPIVACAINE HYDROCHLORIDE 10 ML: 5 INJECTION, SOLUTION EPIDURAL; INFILTRATION; PERINEURAL at 09:00

## 2023-10-13 NOTE — PROGRESS NOTES
1. Greater trochanteric pain syndrome of right lower extremity  Large joint arthrocentesis: R greater trochanteric bursa        Orders Placed This Encounter   Procedures    Large joint arthrocentesis: R greater trochanteric bursa      Impression:  Patient is here in follow up of bilateral first and second digit pain likely secondary to [facility] joint osteoarthritis. Treatment has included Comfort Cool glove, Voltaren gel and Epsom salt soaks. The patient wants to hold off on any type of injection for now. If her hands continue to be symptomatic, we will obtain bilateral hand x-rays. Most of her symptoms are now along her 2nd digit due to overuse. Patient with history of lumbar fusion is here in follow up of chronic right thigh pain likely multifactorial and secondary to greater trochanteric pain syndrome and hip osteoarthritis. Also possible that this is due to spinal etiology or SI joint mediated. She is neurologically intact. Treatment has included greater trochanteric steroid injection and right hip USGI. She is seeing pain management at outside facility for her spine pain. Today, we decided to proceed with a repeat right greater trochanteric injection. I will see her back in 1 week for possible right hip USG. She is going away to [place]. Imaging Studies (I personally reviewed images in PACS and report):  Right hip x-rays most recent to this encounter reviewed. These images show moderate osteoarthritis. No follow-ups on file. Patient is in agreement with the above plan. HPI:  Dwaine Sierra is a 63 y.o. female  who presents in follow up. Here for No chief complaint on file. Since last visit: See above.     Following history reviewed and updated:  Past Medical History:   Diagnosis Date    Abdominal pain, epigastric 3/23/2018    Added automatically from request for surgery [id number]    Abnormal x-ray of lumbar spine 11/3/2015    Acute cystitis with hematuria 4/7/2020    Bariatric surgery status     Basal cell carcinoma     basil cell carcinoma- in remission    Benign essential hypertension 2012    Breakdown (mechanical) of internal fixation device of vertebrae, initial encounter ([address]) 3/1/2019    Calculus of bile duct with cholecystitis without obstruction 2018    Added automatically from request for surgery [id number]    Cellulitis of finger 12/3/2015    Degenerative lumbar disc     Digital mucinous cyst 2015    DMII (diabetes mellitus, type 2) ([address]) 2013    Gross hematuria 3/19/2019    Hiatal hernia     History of transfusion     Post-op spinal surgery    Low back pain     Lower urinary tract infectious disease 3/27/2015    Medicare annual wellness visit, initial 2019    Obesity     Resolved 10/6/2016     Overactive bladder     Postsurgical malabsorption     Recurrent UTI 3/19/2019    Spinal stenosis     SVT (supraventricular tachycardia)     Tachycardia     Resolved 2016     Type 2 diabetes mellitus ([address])     Last assesssed 2018     Wears glasses      Past Surgical History:   Procedure Laterality Date    APPENDECTOMY      ARTHRODESIS      Lumbar - Last assessed 2018     BACK SURGERY      Lumbar (3 surgeries)- two levels fused, clean out from infection, then fusion of 7 levels (last surgery in )    [address] [facility]      x2    CERVICAL SPINE SURGERY      Fusion of C2-C7 over a period of 3 surgeries ( first)     SECTION      X2    CHOLECYSTECTOMY      FL MYELOGRAM LUMBAR  3/28/2019    INSERTION / PLACEMENT / REVISION NEUROSTIMULATOR      X2- both were removed    NECK SURGERY      OTHER SURGICAL HISTORY      Catheter ablation     [place] EGD TRANSORAL BIOPSY SINGLE/MULTIPLE N/A 2016    Procedure: ESOPHAGOGASTRODUODENOSCOPY (EGD); Surgeon: Hayde Mariee MD;  [facility];   Service: Bariatrics    [place] EGD TRANSORAL BIOPSY SINGLE/MULTIPLE N/A 2018    Procedure: ESOPHAGOGASTRODUODENOSCOPY (EGD) with biopsy;  Surgeon: Kody Torres MD;  [facility]; Service: Bariatrics    [place] LAPAROSCOPY SURG CHOLECYSTECTOMY N/A 2018    Procedure: CHOLECYSTECTOMY LAPAROSCOPIC;  Surgeon: Kody Torres MD;  [facility];  Service: Bariatrics    [place] LAPS GSTR RSTCV PX W/BYP BRAULIO-EN-Y LIMB <150 CM N/A 10/25/2016    Procedure: BYPASS GASTRIC  BRAULIO-EN-Y LAPAROSCOPIC, WITH INTRA OP EGD;  Surgeon: Kody Torres MD;  [facility];  Service: Bariatrics    SKIN CANCER EXCISION      TONSILLECTOMY      TUBAL LIGATION      US GUIDED BREAST BIOPSY RIGHT COMPLETE Right 2023    WISDOM TOOTH EXTRACTION       Social History   Social History     Substance and Sexual Activity   Alcohol Use No     Social History     Substance and Sexual Activity   Drug Use No     Social History     Tobacco Use   Smoking Status Former    Packs/day: 1.00    Years: 20.00    Total pack years: 20.00    Types: Cigarettes    Quit date:     Years since quittin.7   Smokeless Tobacco Never     Family History   Problem Relation Age of Onset    Arthritis Mother         Rheumatoid    Angina Mother     Coronary artery disease Mother     Diabetes Mother     Cancer Father         Lung    Cystic fibrosis Father     Rheum arthritis Sister     Diabetes Sister     No Known Problems Maternal Grandmother     No Known Problems Maternal Grandfather     No Known Problems Paternal Grandmother     No Known Problems Paternal Grandfather     Other Brother         Back problems     Diabetes Brother     No Known Problems Brother     Hypertension Family     Heart disease Family     No Known Problems Son     No Known Problems Son      No Known Allergies     Constitutional:  There were no vitals taken for this visit. General: NAD. Eyes: Clear sclerae. ENT: No inflammation, lesion, or mass of lips. No tracheal deviation. Musculoskeletal: As mentioned below.   Integumentary: No visible rashes or skin lesions. Pulmonary/Chest: Effort normal. No respiratory distress. Neuro: CN's grossly intact, MODI. Psych: Normal affect and judgement. Vascular: WWP. Right Hip Exam     Tenderness   The patient is experiencing tenderness in the greater trochanter. Other   Erythema: absent  Scars: absent  Sensation: normal  Pulse: present             Large joint arthrocentesis: R greater trochanteric bursa  Universal Protocol:  Procedure performed by: (Chaperone present)  Consent: Verbal consent obtained. Written consent not obtained. Risks and benefits: risks, benefits and alternatives were discussed  Consent given by: patient  Timeout called at: 10/13/2023 8:45 AM.  Patient understanding: patient states understanding of the procedure being performed  Site marked: the operative site was marked  Radiology Images displayed and confirmed. If images not available, report reviewed: imaging studies available  Patient identity confirmed: verbally with patient  Supporting Documentation  Indications: pain   Procedure Details  Location: hip - R greater trochanteric bursa  Needle size: 20 G (20G 3.5'')  Ultrasound guidance: no  Approach: Lateral to medial approach. Medications administered: 80 mg triamcinolone acetonide 40 mg/mL; 10 mL ropivacaine 0.5 %    Patient tolerance: patient tolerated the procedure well with no immediate complications  Dressing:  Sterile dressing applied    A modified tenotomy was performed by redirecting the needle in three directions and dispersing the medication equally in all three directions. There was little to no resistance encountered during the injection. Risks of this procedure include:    - Risk of bleeding since a needle is involved. - Risk of infection (1/10,000 chance as per recent studies).   Signs/symptoms were discussed and they would prompt an urgent evaluation at an emergency department.  - Risk of pigmentation or skin dimpling in the skin (2-3% chance as per recent studies) from the steroid. - Risk of increased pain from steroid flare (1% chance as per recent studies) that typically lasts 24-48 hours. - Risk of increased blood sugars from the steroid medication that can last for a few weeks. If the patient is a diabetic or pre-diabetic, they were encouraged to closely monitor their blood sugars and discuss with PCP if elevated more than usual or if having symptoms. The benefits outweigh the risks and so the procedure was completed.

## 2023-10-17 ENCOUNTER — APPOINTMENT (OUTPATIENT)
Dept: PHYSICAL THERAPY | Facility: CLINIC | Age: 63
End: 2023-10-17
Payer: COMMERCIAL

## 2023-10-18 ENCOUNTER — OFFICE VISIT (OUTPATIENT)
Dept: OBGYN CLINIC | Facility: MEDICAL CENTER | Age: 63
End: 2023-10-18

## 2023-10-18 VITALS — SYSTOLIC BLOOD PRESSURE: 162 MMHG | DIASTOLIC BLOOD PRESSURE: 79 MMHG | HEART RATE: 71 BPM

## 2023-10-18 DIAGNOSIS — M25.551 GREATER TROCHANTERIC PAIN SYNDROME OF RIGHT LOWER EXTREMITY: ICD-10-CM

## 2023-10-18 DIAGNOSIS — M16.11 PRIMARY OSTEOARTHRITIS OF ONE HIP, RIGHT: Primary | ICD-10-CM

## 2023-10-18 RX ORDER — TRIAMCINOLONE ACETONIDE 40 MG/ML
80 INJECTION, SUSPENSION INTRA-ARTICULAR; INTRAMUSCULAR
Status: COMPLETED | OUTPATIENT
Start: 2023-10-18 | End: 2023-10-18

## 2023-10-18 RX ORDER — ROPIVACAINE HYDROCHLORIDE 5 MG/ML
10 INJECTION, SOLUTION EPIDURAL; INFILTRATION; PERINEURAL
Status: COMPLETED | OUTPATIENT
Start: 2023-10-18 | End: 2023-10-18

## 2023-10-18 RX ADMIN — ROPIVACAINE HYDROCHLORIDE 10 ML: 5 INJECTION, SOLUTION EPIDURAL; INFILTRATION; PERINEURAL at 11:30

## 2023-10-18 RX ADMIN — TRIAMCINOLONE ACETONIDE 80 MG: 40 INJECTION, SUSPENSION INTRA-ARTICULAR; INTRAMUSCULAR at 11:30

## 2023-10-18 NOTE — PROGRESS NOTES
1. Primary osteoarthritis of one hip, right  Large joint arthrocentesis: R hip joint      2. Greater trochanteric pain syndrome of right lower extremity          Orders Placed This Encounter   Procedures    Large joint arthrocentesis: R hip joint        Impression:  Patient is here in follow up of bilateral first and second digit pain likely secondary to [facility] joint osteoarthritis. Treatment has included Comfort Cool glove, Voltaren gel and Epsom salt soaks. The patient wants to hold off on any type of injection for now. If her hands continue to be symptomatic, we will obtain bilateral hand x-rays. Most of her symptoms are now along her 2nd digit due to overuse. Patient with history of lumbar fusion is here in follow up of chronic right thigh pain likely multifactorial and secondary to greater trochanteric pain syndrome and hip osteoarthritis. Also possible that this is due to spinal etiology or SI joint mediated. She is neurologically intact. Treatment has included greater trochanteric steroid injection and right hip USGI. She is seeing pain management at outside facility for her spine pain. Today, we decided to proceed with a repeat right hip USGI. She is going away to [place]. Imaging Studies (I personally reviewed images in PACS and report):  Right hip x-rays most recent to this encounter reviewed. These images show moderate osteoarthritis. No follow-ups on file. Patient is in agreement with the above plan. HPI:  Janis Boyd is a 63 y.o. female  who presents in follow up. Here for   Chief Complaint   Patient presents with    Right Hip - Pain, Injections       Since last visit: See above.     Following history reviewed and updated:  Past Medical History:   Diagnosis Date    Abdominal pain, epigastric 3/23/2018    Added automatically from request for surgery [id number]    Abnormal x-ray of lumbar spine 11/3/2015    Acute cystitis with hematuria 4/7/2020    Bariatric surgery status     Basal cell carcinoma     basil cell carcinoma- in remission    Benign essential hypertension 2012    Breakdown (mechanical) of internal fixation device of vertebrae, initial encounter ([address]) 3/1/2019    Calculus of bile duct with cholecystitis without obstruction 2018    Added automatically from request for surgery [id number]    Cellulitis of finger 12/3/2015    Degenerative lumbar disc     Digital mucinous cyst 2015    DMII (diabetes mellitus, type 2) ([address]) 2013    Gross hematuria 3/19/2019    Hiatal hernia     History of transfusion     Post-op spinal surgery    Low back pain     Lower urinary tract infectious disease 3/27/2015    Medicare annual wellness visit, initial 2019    Obesity     Resolved 10/6/2016     Overactive bladder     Postsurgical malabsorption     Recurrent UTI 3/19/2019    Spinal stenosis     SVT (supraventricular tachycardia)     Tachycardia     Resolved 2016     Type 2 diabetes mellitus ([address])     Last assesssed 2018     Wears glasses      Past Surgical History:   Procedure Laterality Date    APPENDECTOMY      ARTHRODESIS      Lumbar - Last assessed 2018     BACK SURGERY      Lumbar (3 surgeries)- two levels fused, clean out from infection, then fusion of 7 levels (last surgery in )    [address] [facility]      x2    CERVICAL SPINE SURGERY      Fusion of C2-C7 over a period of 3 surgeries ( first)     SECTION      X2    CHOLECYSTECTOMY      FL MYELOGRAM LUMBAR  3/28/2019    INSERTION / PLACEMENT / REVISION NEUROSTIMULATOR      X2- both were removed    NECK SURGERY      OTHER SURGICAL HISTORY      Catheter ablation     [place] EGD TRANSORAL BIOPSY SINGLE/MULTIPLE N/A 2016    Procedure: ESOPHAGOGASTRODUODENOSCOPY (EGD); Surgeon: Leena Linares MD;  [facility];   Service: Bariatrics    [place] EGD TRANSORAL BIOPSY SINGLE/MULTIPLE N/A 2018    Procedure: ESOPHAGOGASTRODUODENOSCOPY (EGD) with biopsy;  Surgeon: Mervat Cox MD;  [facility]; Service: Bariatrics    [place] LAPAROSCOPY SURG CHOLECYSTECTOMY N/A 2018    Procedure: CHOLECYSTECTOMY LAPAROSCOPIC;  Surgeon: Mervat Cox MD;  [facility];  Service: Bariatrics    [place] LAPS GSTR RSTCV PX W/BYP BRAULIO-EN-Y LIMB <150 CM N/A 10/25/2016    Procedure: BYPASS GASTRIC  BRAULIO-EN-Y LAPAROSCOPIC, WITH INTRA OP EGD;  Surgeon: Mervat Cox MD;  [facility];  Service: Bariatrics    SKIN CANCER EXCISION      TONSILLECTOMY      TUBAL LIGATION      US GUIDED BREAST BIOPSY RIGHT COMPLETE Right 2023    WISDOM TOOTH EXTRACTION       Social History   Social History     Substance and Sexual Activity   Alcohol Use No     Social History     Substance and Sexual Activity   Drug Use No     Social History     Tobacco Use   Smoking Status Former    Packs/day: 1.00    Years: 20.00    Total pack years: 20.00    Types: Cigarettes    Quit date:     Years since quittin.8   Smokeless Tobacco Never     Family History   Problem Relation Age of Onset    Arthritis Mother         Rheumatoid    Angina Mother     Coronary artery disease Mother     Diabetes Mother     Cancer Father         Lung    Cystic fibrosis Father     Rheum arthritis Sister     Diabetes Sister     No Known Problems Maternal Grandmother     No Known Problems Maternal Grandfather     No Known Problems Paternal Grandmother     No Known Problems Paternal Grandfather     Other Brother         Back problems     Diabetes Brother     No Known Problems Brother     Hypertension Family     Heart disease Family     No Known Problems Son     No Known Problems Son      No Known Allergies     Constitutional:  /79   Pulse 71    General: NAD. Eyes: Clear sclerae. ENT: No inflammation, lesion, or mass of lips. No tracheal deviation. Musculoskeletal: As mentioned below. Integumentary: No visible rashes or skin lesions. Pulmonary/Chest: Effort normal. No respiratory distress. Neuro: CN's grossly intact, MODI. Psych: Normal affect and judgement. Vascular: WWP. Right Hip Exam     Tenderness   The patient is experiencing tenderness in the greater trochanter. Range of Motion   Internal rotation:  abnormal     Other   Erythema: absent  Scars: absent  Sensation: normal  Pulse: present             Large joint arthrocentesis: R hip joint  Universal Protocol:  Procedure performed by:  Consent: Verbal consent obtained. Written consent not obtained. Consent given by: patient  Timeout called at: 10/18/2023 11:36 AM.  Patient understanding: patient states understanding of the procedure being performed  Site marked: the operative site was marked  Radiology Images displayed and confirmed. If images not available, report reviewed: imaging studies available  Patient identity confirmed: verbally with patient  Supporting Documentation  Indications: pain and diagnostic evaluation   Procedure Details  Location: hip - R hip joint  Ultrasound guidance: yes (US guidance was used to find the area of interest.)  Medications administered: 80 mg triamcinolone acetonide 40 mg/mL; 10 mL ropivacaine 0.5 %    Patient tolerance: patient tolerated the procedure well with no immediate complications  Dressing:  Sterile dressing applied    The right hip joint was visualized with ultrasound and injected with steroid/anesthetic solution as indicated. Prior to the injection, the ultrasound was used to evaluate for any neural or vascular structures. Care was taken to avoid these structures. The images (and video if taken) were saved to the [employer] ultrasound system. Risks of this procedure include:    - Risk of bleeding since a needle is involved. - Risk of infection (1/10,000 chance as per recent studies).   Signs/symptoms were discussed and they would prompt an urgent evaluation at an emergency department.  - Risk of pigmentation or skin dimpling in the skin (2-3% chance as per recent studies) from the steroid. - Risk of increased pain from steroid flare (1% chance as per recent studies) that typically lasts 24-48 hours. - Risk of increased blood sugars from the steroid medication that can last for a few weeks. If the patient is a diabetic or pre-diabetic, they were encouraged to closely monitor their blood sugars and discuss with PCP if elevated more than usual or if having symptoms. We decided that the benefits outweigh the risks and so we proceeded with the procedure.

## 2023-10-26 NOTE — PROGRESS NOTES
1  Primary osteoarthritis of one hip, right     2  Lumbar radiculopathy     3  Greater trochanteric pain syndrome of right lower extremity       No orders of the defined types were placed in this encounter  Impression:  Patient with history of lumbar fusion is here in follow up of chronic right thigh pain likely multifactorial and secondary to greater trochanteric pain syndrome and hip osteoarthritis   Also possible that this is due to spinal etiology or SI joitn mediated   She is neurologically intact   Patient had a greater trochanteric steroid injection on 6/24/2021 and a hip joint injection about a year ago   She has been going through physical therapy and home exercise program   She is currently seeing pain management and recently had a lumbar CT with no interval change  We will have her scheduled for a right hip USGI       Imaging Studies (I personally reviewed images in PACS and report):  Right hip x-rays most recent to this encounter reviewed   These images show moderate osteoarthritis  No follow-ups on file  Patient is in agreement with the above plan  HPI:  Adriana Byers is a 63 y o  female  who presents in follow up  Here for   Chief Complaint   Patient presents with    Right Hip - Pain     Move to groin area  Since last visit: See above      Following history reviewed and updated:  Past Medical History:   Diagnosis Date    Abdominal pain, epigastric 3/23/2018    Added automatically from request for surgery [id number]    Abnormal x-ray of lumbar spine 11/3/2015    Acute cystitis with hematuria 4/7/2020    Bariatric surgery status     Basal cell carcinoma     basil cell carcinoma- in remission    Benign essential hypertension 6/12/2012    Breakdown (mechanical) of internal fixation device of [facility]e, [facility] ) 3/1/2019    Calculus of bile duct with cholecystitis without obstruction 4/27/2018    Added automatically from request for surgery [id number]    Cellulitis of finger 12/3/2015    Degenerative lumbar disc     Digital mucinous cyst 2015    DMII (diabetes mellitus, type 2) ([facility] ) 2013    Gross hematuria 3/19/2019    Hiatal hernia     History of transfusion     Post-op spinal surgery    Low back pain     Lower urinary tract infectious disease 3/27/2015    Medicare annual wellness visit, initial 2019    Obesity     Resolved 10/6/2016     Overactive bladder     Postsurgical malabsorption     Recurrent UTI 3/19/2019    Spinal stenosis     [facility]     Tachycardia     Resolved 2016     Type 2 diabetes mellitus ([facility] )     Last assesssed 2018     Wears glasses      Past Surgical History:   Procedure Laterality Date    APPENDECTOMY      ARTHRODESIS      Lumbar - Last assessed 2018    [facility] BACK SURGERY      Lumbar (3 surgeries)- two levels fused, clean out from infection, then fusion of 7 levels (last surgery in )   [facility]      x2    CERVICAL SPINE SURGERY      Fusion of C2-C7 over a period of 3 surgeries ( first)     SECTION      X2    CHOLECYSTECTOMY      FL MYELOGRAM LUMBAR  3/28/2019    INSERTION / PLACEMENT / REVISION NEUROSTIMULATOR      X2- both were removed    NECK SURGERY      OTHER SURGICAL HISTORY      Catheter ablation     [place] TRANSORAL BIOPSY SINGLE/MULTIPLE N/A 2016    Procedure: ESOPHAGOGASTRODUODENOSCOPY (EGD); Surgeon: Jenni Kam MD;  [facility]; Service: Bariatrics    [place] TRANSORAL BIOPSY SINGLE/MULTIPLE N/A 2018    Procedure: ESOPHAGOGASTRODUODENOSCOPY (EGD) with biopsy;  Surgeon: Jenni Kam MD;  [facility];   Service: Bariatrics    [place] LAP GASTRIC BYPASS/BRAULIO-EN-Y N/A 10/25/2016    Procedure: BYPASS GASTRIC  BRAULIO-EN-Y LAPAROSCOPIC, WITH INTRA OP EGD;  Surgeon: Jenni Kam MD;  [facility];  Service: [place] N/A 2018    Procedure: CHOLECYSTECTOMY LAPAROSCOPIC;  Surgeon: Jen Guallpa MD;  [facility];  Service: Bariatrics    SKIN CANCER EXCISION      TONSILLECTOMY      TUBAL LIGATION      WISDOM TOOTH EXTRACTION       Social History   Social History     Substance and Sexual Activity   Alcohol Use No     Social History     Substance and Sexual Activity   Drug Use No     Social History     Tobacco Use   Smoking Status Former Smoker    Packs/day: 1 00    Years: 20 00    Pack years: 20 00    Types: Cigarettes    Quit date:     Years since quittin 7   Smokeless Tobacco Never Used     Family History   Problem Relation Age of Onset    Cancer Father         Lung    Cystic fibrosis Father     Arthritis Mother         Rheumatoid    Angina Mother     Coronary artery disease Mother     Diabetes Mother     Rheum arthritis Sister     Diabetes Sister     Other Brother         Back problems     Diabetes Brother     No Known Problems Brother      No Known Allergies     Constitutional:  /79   Pulse 73   Ht 5' 3 5" (1 613 m)   Wt 81 2 kg (179 lb)   BMI 31 21 kg/m²    General: NAD  Eyes: Clear sclerae  ENT: No inflammation, lesion, or mass of lips  No tracheal deviation  Musculoskeletal: As mentioned below  Integumentary: No visible rashes or skin lesions  Pulmonary/Chest: Effort normal  No respiratory distress  Neuro: CN's grossly intact, MODI  Psych: Normal affect and judgement  Vascular: WWP  Right Hip Exam     Tenderness   The patient is experiencing tenderness in the greater trochanter and anterior  Range of Motion   External rotation: abnormal   Internal rotation: abnormal     Tests   SUDHAKAR: positive    Other   Erythema: absent  Scars: absent  Sensation: normal  Pulse: present    Comments:  Positive [place]  Normal log roll  Normal FADDIR while supine               Procedures none

## 2023-10-27 ENCOUNTER — TELEPHONE (OUTPATIENT)
Age: 63
End: 2023-10-27

## 2023-10-27 NOTE — TELEPHONE ENCOUNTER
[facility]    Doctor: Jamila Johansen    Reason for call: Calling to verify the appointment time and location.     Call back#: n/a

## 2023-10-28 ENCOUNTER — HOSPITAL ENCOUNTER (OUTPATIENT)
Dept: RADIOLOGY | Facility: HOSPITAL | Age: 63
Discharge: HOME | End: 2023-10-28
Payer: COMMERCIAL

## 2023-10-28 ENCOUNTER — OFFICE VISIT (OUTPATIENT)
Dept: OBGYN CLINIC | Facility: CLINIC | Age: 63
End: 2023-10-28
Payer: COMMERCIAL

## 2023-10-28 VITALS — BODY MASS INDEX: 31.73 KG/M2 | HEART RATE: 72 BPM | HEIGHT: 64 IN | OXYGEN SATURATION: 96 %

## 2023-10-28 DIAGNOSIS — M25.572 BILATERAL ANKLE PAIN, UNSPECIFIED CHRONICITY: ICD-10-CM

## 2023-10-28 DIAGNOSIS — M25.571 BILATERAL ANKLE PAIN, UNSPECIFIED CHRONICITY: ICD-10-CM

## 2023-10-28 DIAGNOSIS — M79.671 BILATERAL FOOT PAIN: ICD-10-CM

## 2023-10-28 DIAGNOSIS — M79.672 BILATERAL FOOT PAIN: ICD-10-CM

## 2023-10-28 DIAGNOSIS — M79.671 BILATERAL FOOT PAIN: Primary | ICD-10-CM

## 2023-10-28 DIAGNOSIS — M79.672 BILATERAL FOOT PAIN: Primary | ICD-10-CM

## 2023-10-28 DIAGNOSIS — S82.832A OTHER CLOSED FRACTURE OF DISTAL END OF LEFT FIBULA, INITIAL ENCOUNTER: ICD-10-CM

## 2023-10-28 PROCEDURE — 73630 X-RAY EXAM OF FOOT: CPT

## 2023-10-28 PROCEDURE — 73610 X-RAY EXAM OF ANKLE: CPT

## 2023-10-28 PROCEDURE — 99213 OFFICE O/P EST LOW 20 MIN: CPT | Performed by: PHYSICIAN ASSISTANT

## 2023-10-28 NOTE — PROGRESS NOTES
Assessment/Plan   Diagnoses and all orders for this visit:        Bilateral ankle pain, unspecified chronicity    Subtle, nondisplaced fracture of distal end of left fibula, initial encounter    Right ankle sprain    - Left ankle CAM boot  - Right ankle continue brace  - Protected weightbearing with crutches  - Elevate, ice  - Follow up with Dr. Iliana Lauren or Dr. Larry Conley next week          Subjective   Patient ID: Macey Ba is a 66 y.o. female. Vitals:    10/28/23 1020   Pulse: 72   SpO2: 96%     65yo female comes in for an evaluation of her b/l feet. She as a history including DM, 5-level lumbar fusion, and chronic pain syndrome. She was injured last week when she fell while in [facility]. The left is much worse than the right. (She brought a disc, but the images do not open today.)  She was given an splint for the left, brace for the right, crutches, and was started on heparin. The pain is dull in character, mild/mod in severity, pain does not radiate and is not associated with numbness.           The following portions of the patient's history were reviewed and updated as appropriate: allergies, current medications, past family history, past medical history, past social history, past surgical history, and problem list.    Review of Systems  Ortho Exam  Past Medical History:   Diagnosis Date    Abdominal pain, epigastric 3/23/2018    Added automatically from request for surgery [id number]    Abnormal x-ray of lumbar spine 11/3/2015    Acute cystitis with hematuria 4/7/2020    Bariatric surgery status     Basal cell carcinoma     basil cell carcinoma- in remission    Benign essential hypertension 6/12/2012    Breakdown (mechanical) of internal fixation device of vertebrae, initial encounter ([address]) 3/1/2019    Calculus of bile duct with cholecystitis without obstruction 4/27/2018    Added automatically from request for surgery [id number]    Cellulitis of finger 12/3/2015    Degenerative lumbar disc     Digital mucinous cyst 2015    DMII (diabetes mellitus, type 2) ([address]) 2013    Gross hematuria 3/19/2019    Hiatal hernia     History of transfusion     Post-op spinal surgery    Low back pain     Lower urinary tract infectious disease 3/27/2015    Medicare annual wellness visit, initial 2019    Obesity     Resolved 10/6/2016     Overactive bladder     Postsurgical malabsorption     Recurrent UTI 3/19/2019    Spinal stenosis     SVT (supraventricular tachycardia)     Tachycardia     Resolved 2016     Type 2 diabetes mellitus ([address])     Last assesssed 2018     Wears glasses      Past Surgical History:   Procedure Laterality Date    APPENDECTOMY      ARTHRODESIS      Lumbar - Last assessed 2018     BACK SURGERY      Lumbar (3 surgeries)- two levels fused, clean out from infection, then fusion of 7 levels (last surgery [place]    [address] [facility] [place]    CERVICAL SPINE SURGERY      Fusion of C2-C7 over a period of 3 surgeries ( first)     SECTION      X2    CHOLECYSTECTOMY      FL MYELOGRAM LUMBAR  3/28/2019    INSERTION / Deanne Lean / REVISION NEUROSTIMULATOR      X2- both were removed    NECK SURGERY      OTHER SURGICAL HISTORY      Catheter ablation     UT EGD TRANSORAL BIOPSY SINGLE/MULTIPLE N/A 2016    Procedure: ESOPHAGOGASTRODUODENOSCOPY (EGD); Surgeon: Chelly Kaminski MD;  Location: [facility]; Service: Bariatrics    UT EGD TRANSORAL BIOPSY SINGLE/MULTIPLE N/A 2018    Procedure: ESOPHAGOGASTRODUODENOSCOPY (EGD) with biopsy;  Surgeon: Chelly Kaminski MD;  Location: [facility];   Service: Bariatrics    UT LAPAROSCOPY SURG CHOLECYSTECTOMY N/A 2018    Procedure: CHOLECYSTECTOMY LAPAROSCOPIC;  Surgeon: Chelly Kaminski MD;  Location: [facility];  Service: [address] [facility] W/BYP BRAULIO-EN-Y LIMB <150 CM N/A 10/25/2016    Procedure: BYPASS GASTRIC  BRAULIO-EN-Y LAPAROSCOPIC, WITH INTRA OP EGD;  Surgeon: Boyd Sanchez Trupti Hicks MD;  Location: [facility] OR;  Service: Bariatrics    SKIN CANCER EXCISION      TONSILLECTOMY      TUBAL LIGATION      US GUIDED BREAST BIOPSY RIGHT COMPLETE Right 2023    WISDOM TOOTH EXTRACTION       Family History   Problem Relation Age of Onset    Arthritis Mother         Rheumatoid    Angina Mother     Coronary artery disease Mother     Diabetes Mother     Cancer Father         Lung    Cystic fibrosis Father     Rheum arthritis Sister     Diabetes Sister     No Known Problems Maternal Grandmother     No Known Problems Maternal Grandfather     No Known Problems Paternal Grandmother     No Known Problems Paternal Grandfather     Other Brother         Back problems     Diabetes Brother     No Known Problems Brother     Hypertension Family     Heart disease Family     No Known Problems Son     No Known Problems Son      Social History     Occupational History    Occupation: [employer]    Tobacco Use    Smoking status: Former     Packs/day: 1.00     Years: 20.00     Total pack years: 20.00     Types: Cigarettes     Quit date:      Years since quittin.    Smokeless tobacco: Never   Vaping Use    Vaping Use: Never used   Substance and Sexual Activity    Alcohol use: No    Drug use: No    Sexual activity: Not Currently       Review of Systems   Constitutional: Negative. HENT: Negative. Eyes: Negative. Respiratory: Negative. Cardiovascular: Negative. Gastrointestinal: Negative. Endocrine: Negative. Genitourinary: Negative. Musculoskeletal: As below. .   Allergic/Immunologic: Negative. Neurological: Negative. Hematological: Negative. Psychiatric/Behavioral: Negative. Objective   Physical Exam      Xrays  I have personally reviewed pertinent films in PACS and my interpretation is Feet - no acute displaced fractures. Right ankle - tiny malleolar avulsions, non-acute appearing.   Left ankle - subtle, oblique, distal fibular fracture only seen on lateral view - this corresponds clinically to her specific tenderness. .      Constitutional: Awake, Alert, Oriented  Eyes: EOMI  Psych: Mood and affect appropriate  Heart: regular rate   Lungs: No audible wheezing  Abdomen: No guarding  Lymph: no lymphedema            bilateral ankle, foot:  Appearance  no swelling  Ecchymosis of the lateral ankles b/l  Palpation  Right: No tenderness about the medial / lateral malleoli, deltoid, proximal fibula, atf, cf, ptf, talus, achilles or 5th MT  Left: + specific tenderness just proximal to the lateral malleolus.   No tenderness about the medial malleolus deltoid, proximal fibula, atf, cf, ptf, talus, achilles or 5th MT  No tenderness b/l midfoot, forefoot  ROM  Left ankle limited in all planes  Right ankle dorsiflexion 5, plantarflexion 30  Special Tests  Negative anterior drawer  Motor  limited by pain  NVI distally

## 2023-11-01 ENCOUNTER — OFFICE VISIT (OUTPATIENT)
Dept: OBGYN CLINIC | Facility: CLINIC | Age: 63
End: 2023-11-01
Payer: COMMERCIAL

## 2023-11-01 VITALS
HEIGHT: 65 IN | HEART RATE: 85 BPM | SYSTOLIC BLOOD PRESSURE: 162 MMHG | WEIGHT: 185 LBS | BODY MASS INDEX: 30.82 KG/M2 | DIASTOLIC BLOOD PRESSURE: 82 MMHG

## 2023-11-01 DIAGNOSIS — S82.832A OTHER CLOSED FRACTURE OF DISTAL END OF LEFT FIBULA, INITIAL ENCOUNTER: Primary | ICD-10-CM

## 2023-11-01 PROCEDURE — 27786 TREATMENT OF ANKLE FRACTURE: CPT | Performed by: ORTHOPAEDIC SURGERY

## 2023-11-01 PROCEDURE — 99214 OFFICE O/P EST MOD 30 MIN: CPT | Performed by: ORTHOPAEDIC SURGERY

## 2023-11-01 NOTE — PATIENT INSTRUCTIONS
Weightbearing as tolerated in CAM boot  Do not need to wear the boot for sleep or showering but should wear it any time you are walking on it. Recommend taking the following supplements: Vitamin D3- 4000 units per day and Calcium 1200 mg per day. This will help with bone healing. Avoid NSAIDs like Motrin/Aleve/Ibuprofen. Avoid steroids/nicotine products/ rheumatoid medications like DMARDs if possible. Aspirin 81mg daily for blood clot prevention.      Knee high compression stocking 20-30mm HG of pressure

## 2023-11-01 NOTE — PROGRESS NOTES
Levie Bumpers, M.D. Attending, Orthopaedic Surgery  Foot and [address] [facility]      ORTHOPAEDIC FOOT AND ANKLE CLINIC VISIT     Assessment:     Encounter Diagnosis   Name Primary? Other closed fracture of distal end of left fibula, initial encounter Yes            Plan:   The patient verbalized understanding of exam findings and treatment plan. We engaged in the shared decision-making process and treatment options were discussed at length with the patient. Surgical and conservative management discussed today along with risks and benefits. Patient has a nondisplaced, oblique fracture of her left distal fibula sustained on 10/22/23  She was instructed to be WBAT in CAM boot for 6 weeks. Obtain a compression stocking for swelling control. Also, instructed to ice and elevate. Aspirin 81 MG daily for DVT ppx  Repeat weight bearing x rays of the left ankle will be obtained at her next visit. Return in about 1 week (around 11/8/2023). History of Present Illness:   Chief Complaint:   Chief Complaint   Patient presents with    Right Ankle - Pain     Patient had a fall in [facility] on 10/22/23. She hurt her left ankle      Viviana York is a 66 y.o. female who is being seen for left ankle pain. Patient was injured after a fall in [facility] on 10/22/23 . Pain is localized at lateral ankle with minimal radiating and described as sharp and severe. Patient denies numbness, tingling or radicular pain. Denies history of neuropathy. Patient does not smoke, does not have diabetes and does take blood thinners. Patient denies family history of anesthesia complications and has not had any complications with anesthesia.      Pain/symptom timing:  Worse during the day when active  Pain/symptom context:  Worse with activites and work  Pain/symptom modifying factors:  Rest makes better, activities make worse  Pain/symptom associated signs/symptoms: none    Prior treatment   NSAIDsYes   Injections No Bracing/Orthotics Yes    Physical Therapy No     Orthopedic Surgical History:   See below    Past Medical, Surgical and Social History:  Past Medical History:  has a past medical history of Abdominal pain, epigastric (3/23/2018), Abnormal x-ray of lumbar spine (11/3/2015), Acute cystitis with hematuria (2020), Bariatric surgery status, Basal cell carcinoma, Benign essential hypertension (2012), Breakdown (mechanical) of internal fixation device of vertebrae, initial encounter ([address]) (3/1/2019), Calculus of bile duct with cholecystitis without obstruction (2018), Cellulitis of finger (12/3/2015), Degenerative lumbar disc, Digital mucinous cyst (2015), DMII (diabetes mellitus, type 2) ([address]) (2013), Gross hematuria (3/19/2019), Hiatal hernia, History of transfusion (), Low back pain, Lower urinary tract infectious disease (3/27/2015), Medicare annual wellness visit, initial (2019), Obesity, Overactive bladder, Postsurgical malabsorption, Recurrent UTI (3/19/2019), Spinal stenosis, SVT (supraventricular tachycardia), Tachycardia, Type 2 diabetes mellitus ([address]), and Wears glasses. Problem List: does not have any pertinent problems on file. Past Surgical History:  has a past surgical history that includes Appendectomy; Insertion / placement / revision neurostimulator; Cervical spine surgery; Cardiac electrophysiology study and ablation;  section; Tubal ligation; Carpal tunnel release; Tonsillectomy; Souris tooth extraction; pr laps gstr rstcv px w/byp danielle-en-y limb <150 cm (N/A, 10/25/2016); pr egd transoral biopsy single/multiple (N/A, 2016); Neck surgery; pr egd transoral biopsy single/multiple (N/A, 2018); Skin cancer excision; pr laparoscopy surg cholecystectomy (N/A, 2018); Cholecystectomy; Back surgery; FL myelogram lumbar (3/28/2019); Arthrodesis; Other surgical history; and US guided breast biopsy right complete (Right, 2023).   Family History: family history includes Angina in her mother; Arthritis in her mother; Cancer in her father; Coronary artery disease in her mother; Cystic fibrosis in her father; Diabetes in her brother, mother, and sister; Heart disease in her family; Hypertension in her family; No Known Problems in her brother, maternal grandfather, maternal grandmother, paternal grandfather, paternal grandmother, son, and son; Other in her brother; Rheum arthritis in her sister. Social History:  reports that she quit smoking about 19 years ago. Her smoking use included cigarettes. She has a 20.00 pack-year smoking history. She has never used smokeless tobacco. She reports that she does not drink alcohol and does not use drugs. Current Medications: has a current medication list which includes the following prescription(s): albuterol, baclofen, bupropion, calcium citrate-vitamin d, cholecalciferol, escitalopram, famotidine, fenofibrate, fluticasone, gabapentin, hydromorphone, loratadine, multiple vitamins-minerals, ondansetron, oxybutynin, pantoprazole, promethazine-dextromethorphan, and sucralfate. Allergies: has No Known Allergies. Review of Systems:  General- denies fever/chills  HEENT- denies hearing loss or sore throat  Eyes- denies eye pain or visual disturbances, denies red eyes  Respiratory- denies cough or SOB  Cardio- denies chest pain or palpitations  GI- denies abdominal pain  Endocrine- denies urinary frequency  Urinary- denies pain with urination  Musculoskeletal- Negative except noted above  Skin- denies rashes or wounds  Neurological- denies dizziness or headache  Psychiatric- denies anxiety or difficulty concentrating    Physical Exam:   /82   Pulse 85   Ht 5' 4.5" (1.638 m)   Wt 83.9 kg (185 lb)   BMI 31.26 kg/m²   General/Constitutional: No apparent distress: well-nourished and well developed.   Eyes: normal ocular motion  Cardio: RRR, Normal S1S2, No m/r/g  Lymphatic: No appreciable lymphadenopathy  Respiratory: Non-labored breathing, CTA b/l no w/c/r  Vascular: No edema, swelling or tenderness, except as noted in detailed exam.  Integumentary: No impressive skin lesions present, except as noted in detailed exam.  Neuro: No ataxia or tremors noted  Psych: Normal mood and affect, oriented to person, place and time. Appropriate affect. Musculoskeletal: Normal, except as noted in detailed exam and in HPI. Examination    Left    Gait Antalgic in CAM boot   Musculoskeletal Tender to palpation at lateral malleolus    Skin Normal.      Nails Normal    Range of Motion  20 degrees dorsiflexion, 30 degrees plantarflexion  Subtalar motion: normal    Stability Stable    Muscle Strength 5/5 tibialis anterior  5/5 gastrocnemius-soleus  5/5 posterior tibialis  5/5 peroneal/eversion strength  5/5 EHL  5/5 FHL    Neurologic Normal    Sensation Intact to light touch throughout sural, saphenous, superficial peroneal, deep peroneal and medial/lateral plantar nerve distributions. Bottineau-Shayy 5.07 filament (10g) testing deferred. Cardiovascular Brisk capillary refill < 2 seconds,intact DP and PT pulses    Special Tests None      Imaging Studies:   3 views of the left ankle were taken, reviewed and interpreted independently that demonstrate nondisplaced distal fibula fracture. No medial clear space widening. Reviewed by me personally. Magnolia Bi. Lachman, MD  Foot & Ankle Surgery   Department of [address] [place] [address] personally performed the service. Magnolia Bi. Lachman, MD    Fracture / Dislocation Treatment    Date/Time: 11/1/2023 8:00 AM    Performed by: Pam Cruz MD  Authorized by: Pam Cruz MD    Patient Location:  [facility] Protocol:  Consent: Verbal consent obtained. Risks and benefits: risks, benefits and alternatives were discussed  Consent given by: patient  Site marked: the operative site was marked  Radiology Images displayed and confirmed.  If images not available, report reviewed: imaging studies available  Patient identity confirmed: verbally with patient    Injury location:  Ankle  Location details:  Left ankle  Injury type:  Fracture  Fracture type: lateral malleolus    Fracture type: lateral malleolus    Neurovascular status: Neurovascularly intact    Distal perfusion: normal    Neurological function: normal    Range of motion: reduced    Manipulation performed?: No    Immobilization:  Other (comment) (CAM boot)  Neurovascular status: Neurovascularly intact    Distal perfusion: normal    Neurological function: normal    Range of motion: unchanged    Patient tolerance:  Patient tolerated the procedure well with no immediate complications        Scribe Attestation      I,:  Royal Ibrahim am acting as a scribe while in the presence of the attending physician.:       I,:  Levie Bumpers, MD personally performed the services described in this documentation    as scribed in my presence.:

## 2023-11-02 ENCOUNTER — HOSPITAL ENCOUNTER (OUTPATIENT)
Dept: RADIOLOGY | Facility: HOSPITAL | Age: 63
End: 2023-11-02
Attending: STUDENT IN AN ORGANIZED HEALTH CARE EDUCATION/TRAINING PROGRAM
Payer: COMMERCIAL

## 2023-11-02 ENCOUNTER — APPOINTMENT (OUTPATIENT)
Dept: PHYSICAL THERAPY | Facility: CLINIC | Age: 63
End: 2023-11-02
Payer: COMMERCIAL

## 2023-11-02 ENCOUNTER — OFFICE VISIT (OUTPATIENT)
Dept: OBGYN CLINIC | Facility: CLINIC | Age: 63
End: 2023-11-02
Payer: COMMERCIAL

## 2023-11-02 VITALS — WEIGHT: 185 LBS | HEIGHT: 65 IN | BODY MASS INDEX: 30.82 KG/M2

## 2023-11-02 DIAGNOSIS — M25.531 PAIN IN RIGHT WRIST: ICD-10-CM

## 2023-11-02 DIAGNOSIS — T14.8XXA BONE BRUISE: Primary | ICD-10-CM

## 2023-11-02 PROCEDURE — 99213 OFFICE O/P EST LOW 20 MIN: CPT | Performed by: STUDENT IN AN ORGANIZED HEALTH CARE EDUCATION/TRAINING PROGRAM

## 2023-11-02 PROCEDURE — 73110 X-RAY EXAM OF WRIST: CPT

## 2023-11-02 RX ORDER — LIDOCAINE 50 MG/G
OINTMENT TOPICAL AS NEEDED
Qty: 30 G | Refills: 2 | Status: SHIPPED | OUTPATIENT
Start: 2023-11-02

## 2023-11-02 NOTE — PROGRESS NOTES
ORTHOPAEDIC HAND, WRIST, AND ELBOW OFFICE  VISIT       ASSESSMENT/PLAN:      Diagnoses and all orders for this visit:    Bone bruise  -     Durable Medical Equipment  -     lidocaine (XYLOCAINE) 5 % ointment; Apply topically as needed for mild pain    Pain in right wrist  -     XR wrist 3+ vw right; Future          66 y.o. female with right base of thumb bone contusion, DOI 10/22/23  Treatment options and expected outcomes were discussed. The patient verbalized understanding of exam findings and treatment plan. The patient was given the opportunity to ask questions. Questions were answered to the patient's satisfaction. X-rays were reviewed in the office today which demonstrate no obvious fractures or dislocations. I discussed she likely sustained a bone bruise. The patient was fitted and provided with a right thumb spica brace she can use as needed. A prescription was provided for a Lidocaine cream and she was instructed to use Voltaren Gel which she already has. She will follow up in 2 weeks for repeat evaluation and repeat x-rays right hand with ulnar deviation and scaphoid views. Follow Up:  2 weeks       To Do Next Visit:  X-rays right hand 5 views with ulnar deviation and scaphoid views. [facility] MD Latrell  Attending, Orthopaedic Surgery  Hand, Wrist, and Elbow Surgery  [facility]    ____________________________________________________________________________________________________________________________________________      CHIEF COMPLAINT:  Chief Complaint   Patient presents with   • Right Wrist - Pain       SUBJECTIVE:  Patient is a 66 y.o. LHD female who presents today for evaluation and treatment of right wrist pain. The patient states on 10/22/23 she had a fall while in [facility] landing onto her outstretched hand. The patient notes constant pain to the volar base of her thumb. She notes increased pain with  and opening jars.  She describes the pain as sharp and achy. She has been using a cock-up wrist brace she had at home without much relief. The patient is on Dilaudid.      Occupation-             I have personally reviewed all the relevant PMH, PSH, SH, FH, Medications and allergies      PAST MEDICAL HISTORY:  Past Medical History:   Diagnosis Date   • Abdominal pain, epigastric 3/23/2018    Added automatically from request for surgery [id number]   • Abnormal x-ray of lumbar spine 11/3/2015   • Acute cystitis with hematuria 4/7/2020   • Bariatric surgery status    • Basal cell carcinoma     basil cell carcinoma- in remission   • Benign essential hypertension 6/12/2012   • Breakdown (mechanical) of internal fixation device of vertebrae, initial encounter ([address]) 3/1/2019   • Calculus of bile duct with cholecystitis without obstruction 4/27/2018    Added automatically from request for surgery [id number]   • Cellulitis of finger 12/3/2015   • Degenerative lumbar disc    • Digital mucinous cyst 6/24/2015   • DMII (diabetes mellitus, type 2) ([address]) 11/8/2013   • Gross hematuria 3/19/2019   • Hiatal hernia    • History of transfusion 2014    Post-op spinal surgery   • Low back pain    • Lower urinary tract infectious disease 3/27/2015   • Medicare annual wellness visit, initial 11/27/2019   • Obesity     Resolved 10/6/2016    • Overactive bladder    • Postsurgical malabsorption    • Recurrent UTI 3/19/2019   • Spinal stenosis    • SVT (supraventricular tachycardia)    • Tachycardia     Resolved 5/4/2016    • Type 2 diabetes mellitus ([address])     Last assesssed 1/18/2018    • Wears glasses        PAST SURGICAL HISTORY:  Past Surgical History:   Procedure Laterality Date   • APPENDECTOMY     • ARTHRODESIS      Lumbar - Last assessed 1/4/2018    • BACK SURGERY      Lumbar (3 surgeries)- two levels fused, clean out from infection, then fusion of 7 levels (last surgery in 2014)   • CARDIAC ELECTROPHYSIOLOGY STUDY AND ABLATION     • CARPAL TUNNEL RELEASE      x2   • CERVICAL SPINE SURGERY      Fusion of C2-C7 over a period of 3 surgeries ( first)   •  SECTION      X2   • CHOLECYSTECTOMY     • FL MYELOGRAM LUMBAR  3/28/2019   • INSERTION / PLACEMENT / REVISION NEUROSTIMULATOR      X2- both were removed   • NECK SURGERY     • OTHER SURGICAL HISTORY      Catheter ablation    • SC EGD TRANSORAL BIOPSY SINGLE/MULTIPLE N/A 2016    Procedure: ESOPHAGOGASTRODUODENOSCOPY (EGD); Surgeon: Kody Torres MD;  Location: [facility]; Service: Bariatrics   • SC EGD TRANSORAL BIOPSY SINGLE/MULTIPLE N/A 2018    Procedure: ESOPHAGOGASTRODUODENOSCOPY (EGD) with biopsy;  Surgeon: Kody Torres MD;  Location: [facility];   Service: Bariatrics   • SC LAPAROSCOPY SURG CHOLECYSTECTOMY N/A 2018    Procedure: CHOLECYSTECTOMY LAPAROSCOPIC;  Surgeon: Kody Torres MD;  [facility];  Service: Bariatrics   • [facility] [address] [place] [address] W/BYP BRAULIO-EN-Y LIMB <150 CM N/A 10/25/2016    Procedure: BYPASS GASTRIC  BRAULIO-EN-Y LAPAROSCOPIC, WITH INTRA OP EGD;  Surgeon: Kody Torres MD;  [facility];  Service: Bariatrics   • SKIN CANCER EXCISION     • TONSILLECTOMY     • TUBAL LIGATION     • US GUIDED BREAST BIOPSY RIGHT COMPLETE Right 2023   • WISDOM TOOTH EXTRACTION         FAMILY HISTORY:  Family History   Problem Relation Age of Onset   • Arthritis Mother         Rheumatoid   • Angina Mother    • Coronary artery disease Mother    • Diabetes Mother    • Cancer Father         Lung   • Cystic fibrosis Father    • Rheum arthritis Sister    • Diabetes Sister    • No Known Problems Maternal Grandmother    • No Known Problems Maternal Grandfather    • No Known Problems Paternal Grandmother    • No Known Problems Paternal Grandfather    • Other Brother         Back problems    • Diabetes Brother    • No Known Problems Brother    • Hypertension Family    • Heart disease Family    • No Known Problems Son    • No Known Problems Son        SOCIAL HISTORY:  Social History Tobacco Use   • Smoking status: Former     Packs/day: 1.00     Years: 20.00     Total pack years: 20.00     Types: Cigarettes     Quit date:      Years since quittin.8   • Smokeless tobacco: Never   Vaping Use   • Vaping Use: Never used   Substance Use Topics   • Alcohol use: No   • Drug use: No       MEDICATIONS:    Current Outpatient Medications:   •  albuterol (PROVENTIL HFA,VENTOLIN HFA) 90 mcg/act inhaler, Inhale 2 puffs every 6 (six) hours as needed for wheezing, Disp: 8 g, Rfl: 0  •  baclofen 10 mg tablet, Take 10 mg by mouth daily, Disp: , Rfl:   •  buPROPion (WELLBUTRIN) 75 mg tablet, Take 1 tablet (75 mg total) by mouth 2 (two) times a day, Disp: 180 tablet, Rfl: 0  •  Calcium Citrate-Vitamin D 315-250 MG-UNIT TABS, Take 1 tablet by mouth 2 (two) times a day , Disp: , Rfl:   •  Cholecalciferol 25 MCG (1000 UT) CHEW, Chew, Disp: , Rfl:   •  escitalopram (LEXAPRO) 20 mg tablet, take 1 tablet by mouth every morning, Disp: 90 tablet, Rfl: 0  •  famotidine (PEPCID) 40 MG tablet, Take 1 tablet (40 mg total) by mouth daily at bedtime, Disp: 90 tablet, Rfl: 3  •  fenofibrate 160 MG tablet, take 1 tablet by mouth once daily, Disp: 90 tablet, Rfl: 0  •  fluticasone (FLONASE) 50 mcg/act nasal spray, instill 1 spray into each nostril daily, Disp: 16 g, Rfl: 3  •  gabapentin (NEURONTIN) 100 mg capsule, 100 mg 5 (five) times a day , Disp: , Rfl:   •  HYDROmorphone (DILAUDID) 4 mg tablet, TAKE 1 TABLET BY MOUTH EVERY 6 HOURS AS NEEDED. .. FILL 2020, Disp: , Rfl:   •  lidocaine (XYLOCAINE) 5 % ointment, Apply topically as needed for mild pain, Disp: 30 g, Rfl: 2  •  loratadine (CLARITIN) 10 mg tablet, Take 10 mg by mouth daily. , Disp: , Rfl:   •  Multiple Vitamins-Minerals (BARIATRIC FUSION PO), Take 1 tablet by mouth daily, Disp: , Rfl:   •  ondansetron (ZOFRAN) 4 mg tablet, Take 1 tablet (4 mg total) by mouth every 8 (eight) hours as needed for nausea or vomiting, Disp: 90 tablet, Rfl: 0  •  oxybutynin (DITROPAN) 5 mg tablet, Take 1 tablet (5 mg total) by mouth 3 (three) times a day as needed (bladder spasms), Disp: 270 tablet, Rfl: 0  •  pantoprazole (PROTONIX) 40 mg tablet, Take 1 tablet (40 mg total) by mouth daily before breakfast, Disp: 90 tablet, Rfl: 3  •  promethazine-dextromethorphan (PHENERGAN-DM) 6.25-15 mg/5 mL oral syrup, Take 5 mL by mouth 4 (four) times a day as needed for cough, Disp: 250 mL, Rfl: 1  •  sucralfate (CARAFATE) 1 g/10 mL suspension, take 10 milliliter by mouth four times a day with meals and at bedtime (Patient not taking: Reported on 8/12/2023), Disp: 420 mL, Rfl: 1    ALLERGIES:  No Known Allergies        REVIEW OF SYSTEMS:  Musculoskeletal:        As noted in HPI. All other systems reviewed and are negative. VITALS:  There were no vitals filed for this visit. LABS:  HgA1c:   Lab Results   Component Value Date    HGBA1C 5.4 04/22/2023     BMP:   Lab Results   Component Value Date    GLUCOSE 122 03/02/2015    CALCIUM 9.9 04/22/2023     03/02/2015    K 4.1 04/22/2023    CO2 27 04/22/2023     04/22/2023    BUN 12 04/22/2023    CREATININE 0.92 04/22/2023       _____________________________________________________  PHYSICAL EXAMINATION:  General: Well developed and well nourished, alert & oriented x 3, appears comfortable  Psychiatric: Normal  HEENT: Normocephalic, Atraumatic Trachea Midline, No torticollis  Pulmonary: No audible wheezing or respiratory distress   Abdomen/GI: Non tender, non distended   Cardiovascular: No pitting edema, 2+ radial pulse   Skin: No masses, erythema, lacerations, fluctation, ulcerations  Neurovascular: Sensation Intact to the Median, Ulnar, Radial Nerve, Motor Intact to the Median, Ulnar, Radial Nerve, and Pulses Intact  Musculoskeletal: Normal, except as noted in detailed exam and in HPI.       MUSCULOSKELETAL EXAMINATION:  Right hand  TTP base of thumb  Full composite fist  Full finger extension  Compartments soft  Brisk capillary refill    ___________________________________________________  STUDIES REVIEWED:  Xrays of the right hand were reviewed and independently interpreted in PACS by Dr. Tamra Fairbanks and demonstrate no fractures or dislocations.            PROCEDURES PERFORMED:  Procedures  No Procedures performed today    _____________________________________________________      Scribe Attestation    I,:  Mavis Marin MA am acting as a scribe while in the presence of the attending physician.:       I,:  Angela Leger MD personally performed the services described in this documentation    as scribed in my presence.:

## 2023-11-06 ENCOUNTER — APPOINTMENT (OUTPATIENT)
Dept: PHYSICAL THERAPY | Facility: CLINIC | Age: 63
End: 2023-11-06
Payer: COMMERCIAL

## 2023-11-07 DIAGNOSIS — E78.5 HYPERLIPIDEMIA, UNSPECIFIED HYPERLIPIDEMIA TYPE: ICD-10-CM

## 2023-11-07 RX ORDER — FENOFIBRATE 160 MG/1
160 TABLET ORAL DAILY
Qty: 90 TABLET | Refills: 0 | Status: SHIPPED | OUTPATIENT
Start: 2023-11-07

## 2023-11-08 ENCOUNTER — APPOINTMENT (OUTPATIENT)
Dept: PHYSICAL THERAPY | Facility: CLINIC | Age: 63
End: 2023-11-08
Payer: COMMERCIAL

## 2023-11-08 ENCOUNTER — OFFICE VISIT (OUTPATIENT)
Dept: OBGYN CLINIC | Facility: CLINIC | Age: 63
End: 2023-11-08

## 2023-11-08 ENCOUNTER — APPOINTMENT (OUTPATIENT)
Dept: RADIOLOGY | Facility: AMBULARY SURGERY CENTER | Age: 63
End: 2023-11-08
Attending: ORTHOPAEDIC SURGERY
Payer: COMMERCIAL

## 2023-11-08 DIAGNOSIS — S82.302D CLOSED FRACTURE OF DISTAL END OF FIBULA WITH TIBIA, LEFT, WITH ROUTINE HEALING, SUBSEQUENT ENCOUNTER: ICD-10-CM

## 2023-11-08 DIAGNOSIS — S82.832A OTHER CLOSED FRACTURE OF DISTAL END OF LEFT FIBULA, INITIAL ENCOUNTER: Primary | ICD-10-CM

## 2023-11-08 DIAGNOSIS — S82.832D CLOSED FRACTURE OF DISTAL END OF FIBULA WITH TIBIA, LEFT, WITH ROUTINE HEALING, SUBSEQUENT ENCOUNTER: ICD-10-CM

## 2023-11-08 PROCEDURE — 99024 POSTOP FOLLOW-UP VISIT: CPT | Performed by: ORTHOPAEDIC SURGERY

## 2023-11-08 PROCEDURE — 73610 X-RAY EXAM OF ANKLE: CPT

## 2023-11-08 NOTE — PROGRESS NOTES
Hanny Bolanos M.D. Attending, Orthopaedic Surgery  Foot and [facility]      ORTHOPAEDIC FOOT AND ANKLE CLINIC VISIT     Assessment:     Encounter Diagnoses   Name Primary? Other closed fracture of distal end of left fibula, initial encounter Yes    Closed fracture of distal end of fibula with tibia, left, with routine healing, subsequent encounter             Plan:   The patient verbalized understanding of exam findings and treatment plan. We engaged in the shared decision-making process and treatment options were discussed at length with the patient. Surgical and conservative management discussed today along with risks and benefits. Her fracture appears stable on weightbearing Xrays today. Continue WBAT in the CAM boot  Vit D and Ca  Aspirin for DVT ppx  Elevation and ice to control swelling. Compression stocking  Followup in 5 weeks      History of Present Illness:   Chief Complaint: left fibula fracture  Lucrecia Simpson is a 66 y.o. female who is being seen in follow-up for left fibula fracture. When we last saw she we recommended WBAT in CAM boot. Pain has only slightly improved. Residual pain is localized at fracture with minimal radiating and described as sharp and severe.       Pain/symptom timing:  Worse during the day when active  Pain/symptom context:  Worse with activites and work  Pain/symptom modifying factors:  Rest makes better, activities make worse  Pain/symptom associated signs/symptoms: none    Prior treatment   NSAIDsYes   Injections No   Bracing/Orthotics Yes    Physical Therapy No     Orthopedic Surgical History:   See below    Past Medical, Surgical and Social History:  Past Medical History:  has a past medical history of Abdominal pain, epigastric (3/23/2018), Abnormal x-ray of lumbar spine (11/3/2015), Acute cystitis with hematuria (4/7/2020), Bariatric surgery status, Basal cell carcinoma, Benign essential hypertension (6/12/2012), Breakdown (mechanical) of internal fixation device of vertebrae, initial encounter ([address]) (3/1/2019), Calculus of bile duct with cholecystitis without obstruction (2018), Cellulitis of finger (12/3/2015), Degenerative lumbar disc, Digital mucinous cyst (2015), DMII (diabetes mellitus, type 2) ([address]) (2013), Gross hematuria (3/19/2019), Hiatal hernia, History of transfusion (), Low back pain, Lower urinary tract infectious disease (3/27/2015), Medicare annual wellness visit, initial (2019), Obesity, Overactive bladder, Postsurgical malabsorption, Recurrent UTI (3/19/2019), Spinal stenosis, SVT (supraventricular tachycardia), Tachycardia, Type 2 diabetes mellitus ([address]), and Wears glasses. Problem List: does not have any pertinent problems on file. Past Surgical History:  has a past surgical history that includes Appendectomy; Insertion / placement / revision neurostimulator; Cervical spine surgery; Cardiac electrophysiology study and ablation;  section; Tubal ligation; Carpal tunnel release; Tonsillectomy; [place] tooth extraction; pr laps gstr rstcv px w/byp danielle-en-y limb <150 cm (N/A, 10/25/2016); pr egd transoral biopsy single/multiple (N/A, 2016); Neck surgery; pr egd transoral biopsy single/multiple (N/A, 2018); Skin cancer excision; pr laparoscopy surg cholecystectomy (N/A, 2018); Cholecystectomy; Back surgery; FL myelogram lumbar (3/28/2019); Arthrodesis; Other surgical history; and US guided breast biopsy right complete (Right, 2023). Family History: family history includes Angina in her mother; Arthritis in her mother; Cancer in her father; Coronary artery disease in her mother; Cystic fibrosis in her father; Diabetes in her brother, mother, and sister; Heart disease in her family; Hypertension in her family; No Known Problems in her brother, maternal grandfather, maternal grandmother, paternal grandfather, paternal grandmother, son, and son;  Other in her brother; Rheum arthritis in her sister. Social History:  reports that she quit smoking about 19 years ago. Her smoking use included cigarettes. She has a 20.00 pack-year smoking history. She has never used smokeless tobacco. She reports that she does not drink alcohol and does not use drugs. Current Medications: has a current medication list which includes the following prescription(s): albuterol, baclofen, bupropion, calcium citrate-vitamin d, cholecalciferol, escitalopram, famotidine, fenofibrate, fluticasone, gabapentin, hydromorphone, lidocaine, loratadine, multiple vitamins-minerals, ondansetron, oxybutynin, pantoprazole, promethazine-dextromethorphan, and sucralfate. Allergies: has No Known Allergies. Review of Systems:  General- denies fever/chills  HEENT- denies hearing loss or sore throat  Eyes- denies eye pain or visual disturbances, denies red eyes  Respiratory- denies cough or SOB  Cardio- denies chest pain or palpitations  GI- denies abdominal pain  Endocrine- denies urinary frequency  Urinary- denies pain with urination  Musculoskeletal- Negative except noted above  Skin- denies rashes or wounds  Neurological- denies dizziness or headache  Psychiatric- denies anxiety or difficulty concentrating    Physical Exam:   There were no vitals taken for this visit. General/Constitutional: No apparent distress: well-nourished and well developed. Eyes: normal ocular motion  Lymphatic: No appreciable lymphadenopathy  Respiratory: Non-labored breathing  Vascular: No edema, swelling or tenderness, except as noted in detailed exam.  Integumentary: No impressive skin lesions present, except as noted in detailed exam.  Neuro: No ataxia or tremors noted  Psych: Normal mood and affect, oriented to person, place and time. Appropriate affect. Musculoskeletal: Normal, except as noted in detailed exam and in HPI.     Examination    Left    Gait Antalgic   Musculoskeletal Tender to palpation at fracture site    Skin Normal. Nails Normal    Range of Motion  20 degrees dorsiflexion, 30 degrees plantarflexion  Subtalar motion: normal    Stability Stable    Muscle Strength 5/5 tibialis anterior  5/5 gastrocnemius-soleus  5/5 posterior tibialis  5/5 peroneal/eversion strength  5/5 EHL  5/5 FHL    Neurologic Normal    Sensation  Intact to light touch throughout sural, saphenous, superficial peroneal, deep peroneal and medial/lateral plantar nerve distributions. [place]-Shayy 5.07 filament (10g) testing deferred. Cardiovascular Brisk capillary refill < 2 seconds,intact DP and PT pulses    Special Tests None      Imaging Studies:     3 views of the Left ankle were obtained, reviewed and interpreted independently which demonstrate no interval displacement of lateral malleolus fracture. Reviewed by me personally. Barb Sages. Lachman, MD  Foot & Ankle Surgery   Department of [address] [place] [address] personally performed the service. Barb Sages. Lachman, MD

## 2023-11-08 NOTE — PATIENT INSTRUCTIONS
Weightbearing as tolerated in CAM boot  Do not need to wear the boot for sleep or showering but should wear it any time you are walking on it. Recommend taking the following supplements: Vitamin D3- 4000 units per day and Calcium 1200 mg per day. This will help with bone healing. Avoid NSAIDs like Motrin/Aleve/Ibuprofen. Avoid steroids/nicotine products/ rheumatoid medications like DMARDs if possible.     Aspirin BID for blood clot prevention    Knee high compression stocking 20/30mm HG of pressure

## 2023-11-13 ENCOUNTER — OFFICE VISIT (OUTPATIENT)
Dept: OBGYN CLINIC | Facility: CLINIC | Age: 63
End: 2023-11-13
Payer: COMMERCIAL

## 2023-11-13 ENCOUNTER — APPOINTMENT (OUTPATIENT)
Dept: PHYSICAL THERAPY | Facility: CLINIC | Age: 63
End: 2023-11-13
Payer: COMMERCIAL

## 2023-11-13 ENCOUNTER — HOSPITAL ENCOUNTER (OUTPATIENT)
Dept: RADIOLOGY | Facility: HOSPITAL | Age: 63
Discharge: HOME | End: 2023-11-13
Attending: STUDENT IN AN ORGANIZED HEALTH CARE EDUCATION/TRAINING PROGRAM
Payer: COMMERCIAL

## 2023-11-13 VITALS — WEIGHT: 185 LBS | BODY MASS INDEX: 30.82 KG/M2 | HEIGHT: 65 IN

## 2023-11-13 DIAGNOSIS — M25.531 PAIN IN RIGHT WRIST: ICD-10-CM

## 2023-11-13 DIAGNOSIS — S82.832A OTHER CLOSED FRACTURE OF DISTAL END OF LEFT FIBULA, INITIAL ENCOUNTER: ICD-10-CM

## 2023-11-13 DIAGNOSIS — T14.8XXA BONE BRUISE: Primary | ICD-10-CM

## 2023-11-13 PROCEDURE — 73110 X-RAY EXAM OF WRIST: CPT

## 2023-11-13 PROCEDURE — 99214 OFFICE O/P EST MOD 30 MIN: CPT | Performed by: STUDENT IN AN ORGANIZED HEALTH CARE EDUCATION/TRAINING PROGRAM

## 2023-11-13 RX ORDER — MELOXICAM 7.5 MG/1
7.5 TABLET ORAL DAILY
Qty: 14 TABLET | Refills: 0 | Status: SHIPPED | OUTPATIENT
Start: 2023-11-13

## 2023-11-13 RX ORDER — MELOXICAM 7.5 MG/1
7.5 TABLET ORAL DAILY
Qty: 14 TABLET | Refills: 0 | Status: CANCELLED | OUTPATIENT
Start: 2023-11-13

## 2023-11-13 NOTE — PROGRESS NOTES
ORTHOPAEDIC HAND, WRIST, AND ELBOW OFFICE  VISIT       ASSESSMENT/PLAN:      Diagnoses and all orders for this visit:    Other closed fracture of distal end of left fibula, initial encounter    Pain in right wrist  -     XR wrist 3+ vw right; Future          66 y.o. female with ***  Treatment options and expected outcomes were discussed. The patient verbalized understanding of exam findings and treatment plan. The patient was given the opportunity to ask questions. Questions were answered to the patient's satisfaction. The patient decided to move forward with ***via shared decision making.       Follow Up:  {gsfollowup:04071}       To Do Next Visit:  {To do next visit:82877}      Discussions:  {gsdiscussions:12981}      Nato Connelly MD  Attending, Orthopaedic Surgery  Hand, Wrist, and Elbow Surgery  [facility]    ____________________________________________________________________________________________________________________________________________      CHIEF COMPLAINT:  Chief Complaint   Patient presents with    Right Wrist - Follow-up       SUBJECTIVE:  Patient is a 66 y.o. ***HD female who presents today for follow up of ***           I have personally reviewed all the relevant PMH, PSH, SH, FH, Medications and allergies      PAST MEDICAL HISTORY:  Past Medical History:   Diagnosis Date    Abdominal pain, epigastric 3/23/2018    Added automatically from request for surgery [id number]    Abnormal x-ray of lumbar spine 11/3/2015    Acute cystitis with hematuria 4/7/2020    Bariatric surgery status     Basal cell carcinoma     basil cell carcinoma- in remission    Benign essential hypertension 6/12/2012    Breakdown (mechanical) of internal fixation device of vertebrae, initial encounter ([address]) 3/1/2019    Calculus of bile duct with cholecystitis without obstruction 4/27/2018    Added automatically from request for surgery [id number]    Cellulitis of finger 12/3/2015    Degenerative lumbar disc     Digital mucinous cyst 2015    DMII (diabetes mellitus, type 2) ([address]) 2013    Gross hematuria 3/19/2019    Hiatal hernia     History of transfusion     Post-op spinal surgery    Low back pain     Lower urinary tract infectious disease 3/27/2015    Medicare annual wellness visit, initial 2019    Obesity     Resolved 10/6/2016     Overactive bladder     Postsurgical malabsorption     Recurrent UTI 3/19/2019    Spinal stenosis     SVT (supraventricular tachycardia)     Tachycardia     Resolved 2016     Type 2 diabetes mellitus ([address])     Last assesssed 2018     Wears glasses        PAST SURGICAL HISTORY:  Past Surgical History:   Procedure Laterality Date    APPENDECTOMY      ARTHRODESIS      Lumbar - Last assessed 2018     BACK SURGERY      Lumbar (3 surgeries)- two levels fused, clean out from infection, then fusion of 7 levels (last surgery in )    [address] [facility]      x2    CERVICAL SPINE SURGERY      Fusion of C2-C7 over a period of 3 surgeries ( first)     SECTION      X2    CHOLECYSTECTOMY      FL MYELOGRAM LUMBAR  3/28/2019    INSERTION / Jihan Rosales / REVISION NEUROSTIMULATOR      X2- both were removed    NECK SURGERY      OTHER SURGICAL HISTORY      Catheter ablation     FL EGD TRANSORAL BIOPSY SINGLE/MULTIPLE N/A 2016    Procedure: ESOPHAGOGASTRODUODENOSCOPY (EGD); Surgeon: Deangelo Vargas MD;  [facility]; Service: Bariatrics    FL EGD TRANSORAL BIOPSY SINGLE/MULTIPLE N/A 2018    Procedure: ESOPHAGOGASTRODUODENOSCOPY (EGD) with biopsy;  Surgeon: Deangelo Vargas MD;  [facility];   Service: Bariatrics    FL LAPAROSCOPY SURG CHOLECYSTECTOMY N/A 2018    Procedure: CHOLECYSTECTOMY LAPAROSCOPIC;  Surgeon: Deangelo Vargas MD;  [facility];  Service: Bariatrics    FL LAPS GSTR RSTCV PX W/BYP BRAULIO-EN-Y LIMB <150 CM N/A 10/25/2016    Procedure: BYPASS GASTRIC BRAULIO-EN-Y LAPAROSCOPIC, WITH INTRA OP EGD;  Surgeon: Archana Lui MD;  [facility];  Service: Bariatrics    SKIN CANCER EXCISION      TONSILLECTOMY      TUBAL LIGATION      US GUIDED BREAST BIOPSY RIGHT COMPLETE Right 2023    WISDOM TOOTH EXTRACTION         FAMILY HISTORY:  Family History   Problem Relation Age of Onset    Arthritis Mother         Rheumatoid    Angina Mother     Coronary artery disease Mother     Diabetes Mother     Cancer Father         Lung    Cystic fibrosis Father     Rheum arthritis Sister     Diabetes Sister     No Known Problems Maternal Grandmother     No Known Problems Maternal Grandfather     No Known Problems Paternal Grandmother     No Known Problems Paternal Grandfather     Other Brother         Back problems     Diabetes Brother     No Known Problems Brother     Hypertension Family     Heart disease Family     No Known Problems Son     No Known Problems Son        SOCIAL HISTORY:  Social History     Tobacco Use    Smoking status: Former     Packs/day: 1.00     Years: 20.00     Total pack years: 20.00     Types: Cigarettes     Quit date:      Years since quittin.8    Smokeless tobacco: Never   Vaping Use    Vaping Use: Never used   Substance Use Topics    Alcohol use: No    Drug use: No       MEDICATIONS:    Current Outpatient Medications:     albuterol (PROVENTIL HFA,VENTOLIN HFA) 90 mcg/act inhaler, Inhale 2 puffs every 6 (six) hours as needed for wheezing, Disp: 8 g, Rfl: 0    baclofen 10 mg tablet, Take 10 mg by mouth daily, Disp: , Rfl:     buPROPion (WELLBUTRIN) 75 mg tablet, Take 1 tablet (75 mg total) by mouth 2 (two) times a day, Disp: 180 tablet, Rfl: 0    Calcium Citrate-Vitamin D 315-250 MG-UNIT TABS, Take 1 tablet by mouth 2 (two) times a day , Disp: , Rfl:     Cholecalciferol 25 MCG (1000 UT) CHEW, Chew, Disp: , Rfl:     escitalopram (LEXAPRO) 20 mg tablet, take 1 tablet by mouth every morning, Disp: 90 tablet, Rfl: 0    famotidine (PEPCID) 40 MG tablet, Take 1 tablet (40 mg total) by mouth daily at bedtime, Disp: 90 tablet, Rfl: 3    fenofibrate 160 MG tablet, take 1 tablet by mouth once daily, Disp: 90 tablet, Rfl: 0    fluticasone (FLONASE) 50 mcg/act nasal spray, instill 1 spray into each nostril daily, Disp: 16 g, Rfl: 3    gabapentin (NEURONTIN) 100 mg capsule, 100 mg 5 (five) times a day , Disp: , Rfl:     HYDROmorphone (DILAUDID) 4 mg tablet, TAKE 1 TABLET BY MOUTH EVERY 6 HOURS AS NEEDED. .. FILL 5/8/2020, Disp: , Rfl:     lidocaine (XYLOCAINE) 5 % ointment, Apply topically as needed for mild pain, Disp: 30 g, Rfl: 2    loratadine (CLARITIN) 10 mg tablet, Take 10 mg by mouth daily. , Disp: , Rfl:     Multiple Vitamins-Minerals (BARIATRIC FUSION PO), Take 1 tablet by mouth daily, Disp: , Rfl:     ondansetron (ZOFRAN) 4 mg tablet, Take 1 tablet (4 mg total) by mouth every 8 (eight) hours as needed for nausea or vomiting, Disp: 90 tablet, Rfl: 0    oxybutynin (DITROPAN) 5 mg tablet, Take 1 tablet (5 mg total) by mouth 3 (three) times a day as needed (bladder spasms), Disp: 270 tablet, Rfl: 0    pantoprazole (PROTONIX) 40 mg tablet, Take 1 tablet (40 mg total) by mouth daily before breakfast, Disp: 90 tablet, Rfl: 3    promethazine-dextromethorphan (PHENERGAN-DM) 6.25-15 mg/5 mL oral syrup, Take 5 mL by mouth 4 (four) times a day as needed for cough, Disp: 250 mL, Rfl: 1    sucralfate (CARAFATE) 1 g/10 mL suspension, take 10 milliliter by mouth four times a day with meals and at bedtime (Patient not taking: Reported on 8/12/2023), Disp: 420 mL, Rfl: 1    ALLERGIES:  No Known Allergies        REVIEW OF SYSTEMS:  Musculoskeletal:        As noted in HPI. All other systems reviewed and are negative. VITALS:  There were no vitals filed for this visit.     LABS:  HgA1c:   Lab Results   Component Value Date    HGBA1C 5.4 04/22/2023     BMP:   Lab Results   Component Value Date    GLUCOSE 122 03/02/2015    CALCIUM 9.9 04/22/2023     03/02/2015    K 4.1 04/22/2023    CO2 27 04/22/2023     04/22/2023    BUN 12 04/22/2023    CREATININE 0.92 04/22/2023       _____________________________________________________  PHYSICAL EXAMINATION:  General: Well developed and well nourished, alert & oriented x 3, appears comfortable  Psychiatric: Normal  HEENT: Normocephalic, Atraumatic Trachea Midline, No torticollis  Pulmonary: No audible wheezing or respiratory distress   Abdomen/GI: Non tender, non distended   Cardiovascular: No pitting edema, 2+ radial pulse   Skin: {Skin exam:68821}  Neurovascular: {Nerve Exam:70516::"Sensation Intact to the Median, Ulnar, Radial Nerve","Motor Intact to the Median, Ulnar, Radial Nerve","Pulses Intact"}  Musculoskeletal: Normal, except as noted in detailed exam and in HPI.       MUSCULOSKELETAL EXAMINATION:  ***    ___________________________________________________  STUDIES REVIEWED:  {gsstudiesreviewed:83465}         PROCEDURES PERFORMED:  Procedures  {Was Procdoc done:94439::"No Procedures performed today"}    _____________________________________________________      Scribe Attestation      I,:  Mavis Marin MA am acting as a scribe while in the presence of the attending physician.:       I,:  Karen Armstrong MD personally performed the services described in this documentation    as scribed in my presence.:

## 2023-11-13 NOTE — PROGRESS NOTES
ORTHOPAEDIC HAND, WRIST, AND ELBOW OFFICE  VISIT       ASSESSMENT/PLAN:      Diagnoses and all orders for this visit:    Bone bruise  -     Ambulatory Referral to PT/OT Hand Therapy; Future  -     meloxicam (Mobic) 7.5 mg tablet; Take 1 tablet (7.5 mg total) by mouth daily    Other closed fracture of distal end of left fibula, initial encounter    Pain in right wrist  -     XR wrist 3+ vw right; Future          66 y.o. female with right base of thumb bone contusion, DOI 10/22/23     Repeat x-rays performed in the office today are negative for any fractures or dislocations. I discussed the bump may be scar tissue. Treatment options were discussed in the form of formal therapy which she was agreeable to and a referral was provided for this today. She can continue with the thumb spica brace as needed. A prescription was provided for Mobic. She will follow up in 3 weeks for repeat evaluation. No new x-rays are needed. Follow Up:  3 weeks       To Do Next Visit:  Re-evaluation of current issue          Neida Olvera MD  Attending, Orthopaedic Surgery  Hand, Wrist, and Elbow Surgery  [facility]    ____________________________________________________________________________________________________________________________________________      CHIEF COMPLAINT:  Chief Complaint   Patient presents with   • Right Wrist - Follow-up       SUBJECTIVE:  Patient is a 66 y.o. LHD female who presents today for follow up of right wrist pain. The patient has a fall on 10/22/23 landing onto her outstretched cline. At her last visit, x-rays were negative for any fractures or dislocation and she was diagnosed with a base of thumb contusion. Today, she notes pain volar aspect of of thumb. She notes increased pain with pressure on her fingers and thumb. She has been using the thumb spica brace.  She also notes a bump to the volar aspect of her wrist.           I have personally reviewed all the relevant PMH, PSH, SH, FH, Medications and allergies      PAST MEDICAL HISTORY:  Past Medical History:   Diagnosis Date   • Abdominal pain, epigastric 3/23/2018    Added automatically from request for surgery [id number]   • Abnormal x-ray of lumbar spine 11/3/2015   • Acute cystitis with hematuria 2020   • Bariatric surgery status    • Basal cell carcinoma     basil cell carcinoma- in remission   • Benign essential hypertension 2012   • Breakdown (mechanical) of internal fixation device of vertebrae, initial encounter ([address]) 3/1/2019   • Calculus of bile duct with cholecystitis without obstruction 2018    Added automatically from request for surgery [id number]   • Cellulitis of finger 12/3/2015   • Degenerative lumbar disc    • Digital mucinous cyst 2015   • DMII (diabetes mellitus, type 2) ([address]) 2013   • Gross hematuria 3/19/2019   • Hiatal hernia    • History of transfusion 2014    Post-op spinal surgery   • Low back pain    • Lower urinary tract infectious disease 3/27/2015   • Medicare annual wellness visit, initial 2019   • Obesity     Resolved 10/6/2016    • Overactive bladder    • Postsurgical malabsorption    • Recurrent UTI 3/19/2019   • Spinal stenosis    • SVT (supraventricular tachycardia)    • Tachycardia     Resolved 2016    • Type 2 diabetes mellitus ([address])     Last assesssed 2018    • Wears glasses        PAST SURGICAL HISTORY:  Past Surgical History:   Procedure Laterality Date   • APPENDECTOMY     • ARTHRODESIS      Lumbar - Last assessed 2018    • BACK SURGERY      Lumbar (3 surgeries)- two levels fused, clean out from infection, then fusion of 7 levels (last surgery in )   • CARDIAC ELECTROPHYSIOLOGY STUDY AND ABLATION     • CARPAL TUNNEL RELEASE      x2   • CERVICAL SPINE SURGERY      Fusion of C2-C7 over a period of 3 surgeries ( first)   •  SECTION      X2   • CHOLECYSTECTOMY     • FL MYELOGRAM LUMBAR  3/28/2019   • INSERTION / PLACEMENT / REVISION NEUROSTIMULATOR X2- both were removed   • NECK SURGERY     • OTHER SURGICAL HISTORY      Catheter ablation    • AL EGD TRANSORAL BIOPSY SINGLE/MULTIPLE N/A 2016    Procedure: ESOPHAGOGASTRODUODENOSCOPY (EGD); Surgeon: Kody Torres MD;  [facility]; Service: Bariatrics   • AL EGD TRANSORAL BIOPSY SINGLE/MULTIPLE N/A 2018    Procedure: ESOPHAGOGASTRODUODENOSCOPY (EGD) with biopsy;  Surgeon: Kody Torres MD;  [facility];   Service: Bariatrics   • AL LAPAROSCOPY SURG CHOLECYSTECTOMY N/A 2018    Procedure: CHOLECYSTECTOMY LAPAROSCOPIC;  Surgeon: Kody Torres MD;  [facility];  Service: Bariatrics   • AL LAPS GSTR [address] W/BYP BRAULIO-EN-Y LIMB <150 CM N/A 10/25/2016    Procedure: BYPASS GASTRIC  BRAULIO-EN-Y LAPAROSCOPIC, WITH INTRA OP EGD;  Surgeon: Kody Torres MD;  [facility];  Service: Bariatrics   • SKIN CANCER EXCISION     • TONSILLECTOMY     • TUBAL LIGATION     • US GUIDED BREAST BIOPSY RIGHT COMPLETE Right 2023   • WISDOM TOOTH EXTRACTION         FAMILY HISTORY:  Family History   Problem Relation Age of Onset   • Arthritis Mother         Rheumatoid   • Angina Mother    • Coronary artery disease Mother    • Diabetes Mother    • Cancer Father         Lung   • Cystic fibrosis Father    • Rheum arthritis Sister    • Diabetes Sister    • No Known Problems Maternal Grandmother    • No Known Problems Maternal Grandfather    • No Known Problems Paternal Grandmother    • No Known Problems Paternal Grandfather    • Other Brother         Back problems    • Diabetes Brother    • No Known Problems Brother    • Hypertension Family    • Heart disease Family    • No Known Problems Son    • No Known Problems Son        SOCIAL HISTORY:  Social History     Tobacco Use   • Smoking status: Former     Packs/day: 1.00     Years: 20.00     Total pack years: 20.00     Types: Cigarettes     Quit date:      Years since quittin.8   • Smokeless tobacco: Never   Vaping Use   • Vaping Use: Never used   Substance Use Topics   • Alcohol use: No   • Drug use: No       MEDICATIONS:    Current Outpatient Medications:   •  albuterol (PROVENTIL HFA,VENTOLIN HFA) 90 mcg/act inhaler, Inhale 2 puffs every 6 (six) hours as needed for wheezing, Disp: 8 g, Rfl: 0  •  baclofen 10 mg tablet, Take 10 mg by mouth daily, Disp: , Rfl:   •  buPROPion (WELLBUTRIN) 75 mg tablet, Take 1 tablet (75 mg total) by mouth 2 (two) times a day, Disp: 180 tablet, Rfl: 0  •  Calcium Citrate-Vitamin D 315-250 MG-UNIT TABS, Take 1 tablet by mouth 2 (two) times a day , Disp: , Rfl:   •  Cholecalciferol 25 MCG (1000 UT) CHEW, Chew, Disp: , Rfl:   •  escitalopram (LEXAPRO) 20 mg tablet, take 1 tablet by mouth every morning, Disp: 90 tablet, Rfl: 0  •  famotidine (PEPCID) 40 MG tablet, Take 1 tablet (40 mg total) by mouth daily at bedtime, Disp: 90 tablet, Rfl: 3  •  fenofibrate 160 MG tablet, take 1 tablet by mouth once daily, Disp: 90 tablet, Rfl: 0  •  fluticasone (FLONASE) 50 mcg/act nasal spray, instill 1 spray into each nostril daily, Disp: 16 g, Rfl: 3  •  gabapentin (NEURONTIN) 100 mg capsule, 100 mg 5 (five) times a day , Disp: , Rfl:   •  HYDROmorphone (DILAUDID) 4 mg tablet, TAKE 1 TABLET BY MOUTH EVERY 6 HOURS AS NEEDED. .. FILL 5/8/2020, Disp: , Rfl:   •  lidocaine (XYLOCAINE) 5 % ointment, Apply topically as needed for mild pain, Disp: 30 g, Rfl: 2  •  loratadine (CLARITIN) 10 mg tablet, Take 10 mg by mouth daily. , Disp: , Rfl:   •  meloxicam (Mobic) 7.5 mg tablet, Take 1 tablet (7.5 mg total) by mouth daily, Disp: 14 tablet, Rfl: 0  •  Multiple Vitamins-Minerals (BARIATRIC FUSION PO), Take 1 tablet by mouth daily, Disp: , Rfl:   •  ondansetron (ZOFRAN) 4 mg tablet, Take 1 tablet (4 mg total) by mouth every 8 (eight) hours as needed for nausea or vomiting, Disp: 90 tablet, Rfl: 0  •  oxybutynin (DITROPAN) 5 mg tablet, Take 1 tablet (5 mg total) by mouth 3 (three) times a day as needed (bladder spasms), Disp: 270 tablet, Rfl: 0  •  pantoprazole (PROTONIX) 40 mg tablet, Take 1 tablet (40 mg total) by mouth daily before breakfast, Disp: 90 tablet, Rfl: 3  •  promethazine-dextromethorphan (PHENERGAN-DM) 6.25-15 mg/5 mL oral syrup, Take 5 mL by mouth 4 (four) times a day as needed for cough, Disp: 250 mL, Rfl: 1  •  sucralfate (CARAFATE) 1 g/10 mL suspension, take 10 milliliter by mouth four times a day with meals and at bedtime (Patient not taking: Reported on 8/12/2023), Disp: 420 mL, Rfl: 1    ALLERGIES:  No Known Allergies        REVIEW OF SYSTEMS:  Musculoskeletal:        As noted in HPI. All other systems reviewed and are negative. VITALS:  There were no vitals filed for this visit. LABS:  HgA1c:   Lab Results   Component Value Date    HGBA1C 5.4 04/22/2023     BMP:   Lab Results   Component Value Date    GLUCOSE 122 03/02/2015    CALCIUM 9.9 04/22/2023     03/02/2015    K 4.1 04/22/2023    CO2 27 04/22/2023     04/22/2023    BUN 12 04/22/2023    CREATININE 0.92 04/22/2023       _____________________________________________________  PHYSICAL EXAMINATION:  General: Well developed and well nourished, alert & oriented x 3, appears comfortable  Psychiatric: Normal  HEENT: Normocephalic, Atraumatic Trachea Midline, No torticollis  Pulmonary: No audible wheezing or respiratory distress   Abdomen/GI: Non tender, non distended   Cardiovascular: No pitting edema, 2+ radial pulse   Skin: No masses, erythema, lacerations, fluctation, ulcerations  Neurovascular: Sensation Intact to the Median, Ulnar, Radial Nerve, Motor Intact to the Median, Ulnar, Radial Nerve, and Pulses Intact  Musculoskeletal: Normal, except as noted in detailed exam and in HPI.       MUSCULOSKELETAL EXAMINATION:  Right wrist  Full finger ext  Non tender scaphoid  NTTP snuffbox  Compartments soft  Brisk capillary refill     ___________________________________________________  STUDIES REVIEWED:  Xrays of the right wrist were reviewed and independently interpreted in PACS by Dr. Mika Noguera and demonstrate no fractures or dislocations.           PROCEDURES PERFORMED:  Procedures  No Procedures performed today    _____________________________________________________      Scribe Attestation    I,:  Mavis Marin MA am acting as a scribe while in the presence of the attending physician.:       I,:  John Toth MD personally performed the services described in this documentation    as scribed in my presence.:

## 2023-11-15 ENCOUNTER — VBI (OUTPATIENT)
Dept: ADMINISTRATIVE | Facility: OTHER | Age: 63
End: 2023-11-15

## 2023-11-16 ENCOUNTER — APPOINTMENT (OUTPATIENT)
Dept: PHYSICAL THERAPY | Facility: CLINIC | Age: 63
End: 2023-11-16
Payer: COMMERCIAL

## 2023-11-21 ENCOUNTER — EVALUATION (OUTPATIENT)
Dept: OCCUPATIONAL THERAPY | Facility: CLINIC | Age: 63
End: 2023-11-21
Payer: COMMERCIAL

## 2023-11-21 DIAGNOSIS — T14.8XXA BONE BRUISE: ICD-10-CM

## 2023-11-21 PROCEDURE — 97140 MANUAL THERAPY 1/> REGIONS: CPT

## 2023-11-21 PROCEDURE — 97035 APP MDLTY 1+ULTRASOUND EA 15: CPT

## 2023-11-21 PROCEDURE — 97165 OT EVAL LOW COMPLEX 30 MIN: CPT

## 2023-11-21 NOTE — PROGRESS NOTES
OT Evaluation     Today's date: 2023  Patient name: Keena Hodges  : 1960  MRN: [id number]  Referring provider: Roland Barrow MD  Dx:   Encounter Diagnosis     ICD-10-CM    1. Bone bruise  T14. 8XXA Ambulatory Referral to PT/OT Hand Therapy                     Assessment  Assessment details: Keena Hodges is a 66 y.o., Left HD female referred to hand therapy for a right thumb and wrist bone bruise. Onset of injury 10/22/23 due to a fall on outstretched hand. Patient presents 23 with impaired ROM, strength of the right thumb and wrist.  Deficits also noted in pain, edema and functional use of the right UE. Patient is wearing a thumb spica splint prn for pain relief. Patient is a good candidate for OT services to abolish pain and edema and restore ROM, strength for a return to independence in daily tasks.     Impairments: abnormal or restricted ROM, activity intolerance, impaired physical strength, lacks appropriate home exercise program, pain with function and weight-bearing intolerance    Symptom irritability: moderateBarriers to therapy: Chronic pain syndrome; HTN; b/l thumb CMC jt OA; hx right radial nerve irritation  Understanding of Dx/Px/POC: excellent   Prognosis: good    Goals  STGs/LTGs (4-6 weeks)  Patient will demonstrate independence in a HEP to maintain ROM, strength, and function at discharge  Patient will report an average pain level of 0-1/10 to be independent in daily tasks  Patient will demonstrate RODGERS of thethumb to WNL to be independent in self care tasks  Patient will demonstrate pain free AROM of the wrist and forearm WFL to be independent in self care tasks  Patient will demonstrate 5/5 muscle strength in the wrist and forearm to be MI for meal prep  Patient will demonstrate right hand strength within 20% of the left hand to be MI for IADL tasks  Patient will demonstrate resolution of edema      Plan  Plan details: 23: Begin OT 1-2x/wk x 4-6 weeks  Patient would benefit from: skilled occupational therapy and custom splinting  Planned modality interventions: ultrasound, thermotherapy: hydrocollator packs, cryotherapy and thermotherapy: paraffin bath  Planned therapy interventions: activity modification, compression, graded exercise, home exercise program, therapeutic exercise, therapeutic activities, stretching, strengthening, patient education, orthotic fitting/training, manual therapy, IASTM, joint mobilization, kinesiology taping, massage and graded activity  Frequency: 1-2x/wk. Duration in weeks: 12  Plan of Care beginning date: 2023  Plan of Care expiration date: 2024  Treatment plan discussed with: patient        Subjective Evaluation    History of Present Illness  Date of onset: 10/22/2023  Mechanism of injury: trauma  Mechanism of injury: Maddie Soto is a 66 y.o., LHD female who reports persistent right wrist and thumb pain. The patient reports she injured her right hand on 10/22/23 after a fall onto her outstretched hand while in [facility]. Pain persisted and she began using a cock-up wrist brace she had at home without much relief. The patient is on Dilaudid for chronic pain. She was seen by Morey Litten for the pain. X rays were negative and patient diagnosed with a bone bruise. Patient was given a thumb spica splint to wear for pain mgt and has been given Mobic. She now referred to OT for evaluation and treatment of right hand pain. Quality of life: fair    Patient Goals  Patient goals for therapy: decreased edema, decreased pain, increased motion, increased strength, independence with ADLs/IADLs and return to sport/leisure activities    Pain  Current pain ratin  At best pain ratin  At worst pain ratin  Location: right palm and base of thumb  Quality: sharp  Relieving factors: medications, support and ice  Exacerbated by: weight bearing on palm, grasping.   Progression: improved    Social Support  Lives with: adult children    Employment status: working  Hand dominance: left      Diagnostic Tests  X-ray: normal  Treatments  Current treatment: occupational therapy      Objective     Observations     Right Wrist/Hand   Negative for edema. Additional Observation Details  11/21/23: Nodule/mass on volar wrist near lunate    Tenderness     Right Wrist/Hand   Tenderness in the lunate. No tenderness in the scaphoid, triquetrum and capitate. Neurological Testing     Sensation     Wrist/Hand     Right   Intact: light touch    Active Range of Motion     Right Wrist   Normal active range of motion    Right Thumb   Flexion     CMC: 10    MP: 50    DIP: 40  Palmar Abduction    CMC: 45  Radial Abduction    CMC: 50  Opposition: 11/21/23: RODGERS = 100  Kapandji score: 9 degrees    Additional Active Range of Motion Details  11/21/23: Pain at end range of wrist flex/ext  11/21/23: WNL    Swelling     Left Wrist/Hand   Circumference wrist: 15 cm    Right Wrist/Hand   Circumference wrist: 15.5 cm             Precautions: HTN; chronic pain syndrome; b/l thumb CMC OA         POC expires Unit limit Auth  expiration date PT/OT + Visit Limit?    2/16/24 NA 2/16/24 BOMN                 Visit/Unit Tracking  AUTH Status:  Date 11/21              RE due 12/22/23 Used 1               Remaining                     Date 11/21/23       Visit 1       Manuals        STM 7'       IASTM 5'       KT 3'               Neuro Re-Ed                                                                 Ther Ex        TGE        AROM wrist all planes x10       AROM thumb all planes x10                                               Ther Activity        Opposition        Translation        HEP POC;        KT wear, care, precautions;        AROM digits, wrist       Modalities        MHP        US 3.3MHz, 50%, 0.8w/cm2 8'

## 2023-11-24 ENCOUNTER — TELEPHONE (OUTPATIENT)
Age: 63
End: 2023-11-24

## 2023-11-24 NOTE — TELEPHONE ENCOUNTER
Caller: Patient    Doctor: [facility] [place]    Reason for call: Patient is calling stating her right ankle is still not improving. She did see Dr Reny Parra for her left ankle fracture. She is asking if a CT/MRI  can be ordered to see what is going on on the right ankle, she thinks something may be torn.     Call back#: [phone]

## 2023-11-28 ENCOUNTER — EVALUATION (OUTPATIENT)
Dept: PHYSICAL THERAPY | Facility: CLINIC | Age: 63
End: 2023-11-28
Payer: COMMERCIAL

## 2023-11-28 DIAGNOSIS — S82.302D CLOSED FRACTURE OF DISTAL END OF FIBULA WITH TIBIA, LEFT, WITH ROUTINE HEALING, SUBSEQUENT ENCOUNTER: ICD-10-CM

## 2023-11-28 DIAGNOSIS — S82.832D CLOSED FRACTURE OF DISTAL END OF FIBULA WITH TIBIA, LEFT, WITH ROUTINE HEALING, SUBSEQUENT ENCOUNTER: ICD-10-CM

## 2023-11-28 DIAGNOSIS — S82.832D OTHER CLOSED FRACTURE OF DISTAL END OF LEFT FIBULA WITH ROUTINE HEALING, SUBSEQUENT ENCOUNTER: Primary | ICD-10-CM

## 2023-11-28 DIAGNOSIS — S93.401D SPRAIN OF RIGHT ANKLE, UNSPECIFIED LIGAMENT, SUBSEQUENT ENCOUNTER: ICD-10-CM

## 2023-11-28 PROCEDURE — 97110 THERAPEUTIC EXERCISES: CPT

## 2023-11-28 PROCEDURE — 97161 PT EVAL LOW COMPLEX 20 MIN: CPT

## 2023-11-28 NOTE — PROGRESS NOTES
PT Evaluation     Today's date: 2023  Patient name: Olman Stovall  : 1960  MRN: [id number]  Referring provider: Rylee Fermin MD  Dx:   Encounter Diagnosis     ICD-10-CM    1. Other closed fracture of distal end of left fibula with routine healing, subsequent encounter  S82.832D Ambulatory Referral to Physical Therapy      2. Closed fracture of distal end of fibula with tibia, left, with routine healing, subsequent encounter  S82.302D Ambulatory Referral to Physical Therapy    S82.832D       3. Sprain of right ankle, unspecified ligament, subsequent encounter  S93.401D                      Assessment  Assessment details: Olman Stovall is a 66 y.o. female presenting to physical therapy for an initial evaluation s/p nondisplaced, oblique fracture of her left distal fibula and right ankle sprain sustained on 10/22/23 following a fall. The patient demonstrates decreased bilateral ankle AROM, left > right, decreased ankle strength, and limited tolerance for weightbearing activities. She is ambulating with ankle brace donned on the right and WBAT with a CAM boot on the left, no AD. She has mild swelling and TTP at the left ankle lateral malleolus and mild ecchymosis throughout the toes. She displays no TTP in the right ankle, however does have mild ecchymosis at the distal anterior tibia. These deficits have limited the patient's ability to complete ADLs, household chores, community ambulation, vocational responsibilities, and recreational activities. This patient would benefit from OP PT services to address their impairments and functional limitations to maximize functional mobility. The patient was educated on importance of improving ankle active and passive ROM within her tolerance to decrease stiffness and pain in preparation for ambulation once her CAM boot is DC. She was provided a HEP and demonstrated/verbalized understanding all education.    Impairments: abnormal gait, abnormal or restricted ROM, activity intolerance, impaired balance, impaired physical strength, lacks appropriate home exercise program, pain with function and weight-bearing intolerance    Symptom irritability: moderateUnderstanding of Dx/Px/POC: excellent   Prognosis: good    Goals  STG to be achieved in 4 weeks: The patient will report a decrease in left ankle "at worst" subjective pain rating score to at least 6/10 to allow for improved tolerance for weightbearing activities. The patient will increase left ankle dorsiflexion AROM to at least 5 degrees to allow for improved functional mobility. The patient will increase left ankle dorsiflexion MMT score to at least 3+/5 to allow for improved functional mobility. LTG to be achieved by DC: The patient will be independent in comprehensive [facility]. The patient will report no pain with usual activities. The patient will improve bilateral ankle AROM to [facility] to allow for improved functional mobility. The patient will increase bilateral ankle MMT score to [facility] to allow for improved functional mobility. The patient will be able to ambulate one mile without pain or difficulty. The patient will be able to navigate one full flight of stairs reciprocally without pain or difficulty.        Plan  Patient would benefit from: skilled physical therapy  Planned modality interventions: thermotherapy: hydrocollator packs, TENS and cryotherapy  Planned therapy interventions: manual therapy, joint mobilization, balance/weight bearing training, neuromuscular re-education, patient education, flexibility, strengthening, stretching, therapeutic activities, therapeutic exercise, gait training, home exercise program, IASTM, abdominal trunk stabilization and balance  Frequency: 2x week  Duration in weeks: 12  Plan of Care beginning date: 11/28/2023  Plan of Care expiration date: 2/20/2024  Treatment plan discussed with: patient        Subjective Evaluation    History of Present Illness  Mechanism of injury: Lauren Daily presents to therapy s/p nondisplaced, oblique fracture of her left distal fibula sustained on 10/22/23 following a fall in [facility]. She also sprained her right ankle during the fall. She saw orthopedics on 10/28/23 and was placed in an ankle brace on the right and a CAM boot on the left. Her last visit to orthopedics was on 23 where she was advised to continue WBAT in the CAM boot, use of aspirin to prevent DVT, and elevation/ice/compression stocking to control swelling. She reports that she is experiencing minimal to no pain in either of her ankles when walking, except for first thing in the morning despite having the boot on the left ankle. She reports that she has been limited with her standing and walking due to wearing the boot on the left foot. She says that her right hip has been bothering her more due to feeling uneven of having one foot in a boot and one foot in a sneaker. She says that at the grocery store she has been using a driveable cart to give her foot a break. 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            Patient Goals  Patient goals for therapy: decreased pain, increased strength, independence with ADLs/IADLs, increased motion and improved balance  Patient goal: walk normally again  Pain  Current pain ratin  At best pain ratin  At worst pain ratin  Location: lateral aspect of foot/ankle  Quality: dull ache and sharp  Relieving factors: change in position, medications and relaxation  Aggravating factors: standing, walking and stair climbing    Social Support  Steps to enter house: yes  Stairs in house: yes   Lives in: multiple-level home  Lives with: adult children    Employment status: working ()  Treatments  Previous treatment: medication  Current treatment: physical therapy        Objective     Observations     Additional Observation Details  Mild swelling at L lateral malleolus with mild ecchymosis throughout the toes  Mild bruising on lower anterior tibia on the R    Tenderness   Left Ankle/Foot   Tenderness in the lateral malleolus. No tenderness in the Achilles insertion, anterior ankle, fifth metatarsal base, medial calcaneus, medial malleolus, peroneal tendon, plantar fascia, posterior tibial tendon, posterior talofibular ligament, proximal Achilles and superior tibiofibular ligament. Right Ankle/Foot   No tenderness in the Achilles insertion, anterior ankle, anterior talofibular ligament, fifth metatarsal base, calcaneofibular ligament, deltoid ligament, first metatarsal head, lateral malleolus, medial calcaneus, medial malleolus, peroneal tendon, plantar fascia, posterior tibial tendon, posterior talofibular ligament, proximal Achilles and superior tibiofibular ligament.      Active Range of Motion   Left Ankle/Foot   Dorsiflexion (ke): -2 degrees with pain  Dorsiflexion (kf): 5 degrees with pain  Plantar flexion: 15 degrees with pain  Inversion: 8 degrees with pain  Eversion: 5 degrees with pain    Right Ankle/Foot   Dorsiflexion (ke): 5 degrees   Dorsiflexion (kf): 6 degrees   Plantar flexion: 50 degrees   Inversion: 30 degrees   Eversion: 15 degrees     Passive Range of Motion   Left Ankle/Foot    Dorsiflexion (ke): 2 degrees with pain  Dorsiflexion (kf): 5 degrees with pain  Plantar flexion: 30 degrees with pain  Inversion: 8 degrees with pain  Eversion: 5 degrees with pain    Right Ankle/Foot    Dorsiflexion (ke): 8 degrees   Dorsiflexion (kf): 15 degrees      Strength/Myotome Testing     Left Ankle/Foot   Dorsiflexion: 3  Plantar flexion: 3  Inversion: 3  Eversion: 3    Right Ankle/Foot   Dorsiflexion: 4+  Plantar flexion: 4+  Inversion: 4  Eversion: 4    Ambulation   Weight-Bearing Status   Weight-Bearing Status (Left): weight-bearing as tolerated   Weight-Bearing Status (Right): weight-bearing as tolerated    Assistive device used: none    Additional Weight-Bearing Status Details  Ankle brace donned on the R ankle, CAM boot donned on the L foot. Compression sock on the R foot with ace bandage on the L foot     Ambulation: Level Surfaces   Ambulation without assistive device: independent    Observational Gait   Gait: antalgic   Decreased left stance time and right stance time. Precautions: L1-S1 fusion, C2-C7 fusion >10 years, HTN, DM, Tachycardia  Access Code: [email or web address]    POC expires Unit limit Auth  expiration date PT/OT + Visit Limit?    2/20/24 N/A N/A BOMN                 Visit/Unit Tracking  AUTH Status:  Date 11/28              BOMN Used 1               Remaining                     Manuals 11/28            L ankle PROM             L calf stretch                                       Neuro Re-Ed                                                                                                        Ther Ex             Nustep with CAM boot for Wbing through L LE             Ankle 4 ways   NO TB L   TB on R only HEP L only            Seated BAPS board AROM L ankle only   Dflex/pflex, inv/ev, cw/ccw             Seated towel toe scrunches  L only HEP            Seated towel slides inv/eversion   L only             Seated ankle HR/TR B/L             Ankle alphabet HEP            Seated calf stretch with towel HEP            Pt education  PT POC, HEP, ROM vs strengthening            Ther Activity                                       Gait Training                                       Modalities

## 2023-11-30 ENCOUNTER — OFFICE VISIT (OUTPATIENT)
Dept: PHYSICAL THERAPY | Facility: CLINIC | Age: 63
End: 2023-11-30
Payer: COMMERCIAL

## 2023-11-30 DIAGNOSIS — S93.401D SPRAIN OF RIGHT ANKLE, UNSPECIFIED LIGAMENT, SUBSEQUENT ENCOUNTER: ICD-10-CM

## 2023-11-30 DIAGNOSIS — S82.302D CLOSED FRACTURE OF DISTAL END OF FIBULA WITH TIBIA, LEFT, WITH ROUTINE HEALING, SUBSEQUENT ENCOUNTER: ICD-10-CM

## 2023-11-30 DIAGNOSIS — S82.832D OTHER CLOSED FRACTURE OF DISTAL END OF LEFT FIBULA WITH ROUTINE HEALING, SUBSEQUENT ENCOUNTER: Primary | ICD-10-CM

## 2023-11-30 DIAGNOSIS — S82.832D CLOSED FRACTURE OF DISTAL END OF FIBULA WITH TIBIA, LEFT, WITH ROUTINE HEALING, SUBSEQUENT ENCOUNTER: ICD-10-CM

## 2023-11-30 PROCEDURE — 97140 MANUAL THERAPY 1/> REGIONS: CPT

## 2023-11-30 PROCEDURE — 97110 THERAPEUTIC EXERCISES: CPT

## 2023-11-30 NOTE — PROGRESS NOTES
Daily Note     Today's date: 2023  Patient name: Abhijeet Estes  : 1960  MRN: [id number]  Referring provider: Maria Luz Moctezuma MD  Dx:   Encounter Diagnosis     ICD-10-CM    1. Other closed fracture of distal end of left fibula with routine healing, subsequent encounter  S82.832D       2. Closed fracture of distal end of fibula with tibia, left, with routine healing, subsequent encounter  S82.302D     S82.832D       3. Sprain of right ankle, unspecified ligament, subsequent encounter  S93.401D                      Subjective: Patient reports that she did do her exercises for ROM of the left ankle and that she had slight pain with drawing a "Z" during her alphabet exercise. Objective: See treatment diary below      Assessment: Initiated treatment session as outlined below; tolerated treatment well. She was able to complete all exercises without increased pain in the left ankle. Demonstrates stiffness with left ankle PROM and mild calf tightness. Patient demonstrated fatigue post treatment, exhibited good technique with therapeutic exercises, and would benefit from continued PT      Plan: Continue per plan of care. Precautions: L1-S1 fusion, C2-C7 fusion >10 years, HTN, DM, Tachycardia  Access Code: [email or web address]    POC expires Unit limit Auth  expiration date PT/OT + Visit Limit?    24 N/A N/A BOMN                 Visit/Unit Tracking  AUTH Status:  Date              BOMN Used 1 2              Remaining                     Manuals            L ankle PROM  BE           L calf stretch  BE                                     Neuro Re-Ed                                                                                                        Ther Ex             Nustep with CAM boot for Wbing through L LE  L4 10'           Ankle 4 ways   NO TB L   TB on R only HEP L only            Seated BAPS board AROM L ankle only   Dflex/pflex, inv/ev, cw/ccw  20x ea           Seated towel toe scrunches  L only HEP 5"x20           Seated towel slides inv/eversion   L only  X20 ea            Seated ankle HR/TR B/L  x20           Ankle alphabet HEP 1x           Seated calf stretch with towel HEP Long sitting 3x30"           Pt education  PT POC, HEP, ROM vs strengthening            Ther Activity                                       Gait Training                                       Modalities

## 2023-12-01 DIAGNOSIS — N32.81 OAB (OVERACTIVE BLADDER): ICD-10-CM

## 2023-12-01 RX ORDER — OXYBUTYNIN CHLORIDE 5 MG/1
TABLET ORAL
Qty: 90 TABLET | Refills: 0 | Status: SHIPPED | OUTPATIENT
Start: 2023-12-01

## 2023-12-01 NOTE — TELEPHONE ENCOUNTER
Left vm for pt that there was a courtesy refill sent in for her, but that   she should be seen in the office for a follow up appt to contuine her medication refills. I provided a call back number for her to call back to schedule her follow up appt for med refill. ...        When pt calls back please schedule her with Pau Boyle for a yearly follow up

## 2023-12-04 ENCOUNTER — OFFICE VISIT (OUTPATIENT)
Dept: PHYSICAL THERAPY | Facility: CLINIC | Age: 63
End: 2023-12-04
Payer: COMMERCIAL

## 2023-12-04 ENCOUNTER — OFFICE VISIT (OUTPATIENT)
Dept: OCCUPATIONAL THERAPY | Facility: CLINIC | Age: 63
End: 2023-12-04
Payer: COMMERCIAL

## 2023-12-04 DIAGNOSIS — S93.401D SPRAIN OF RIGHT ANKLE, UNSPECIFIED LIGAMENT, SUBSEQUENT ENCOUNTER: ICD-10-CM

## 2023-12-04 DIAGNOSIS — S82.832D OTHER CLOSED FRACTURE OF DISTAL END OF LEFT FIBULA WITH ROUTINE HEALING, SUBSEQUENT ENCOUNTER: Primary | ICD-10-CM

## 2023-12-04 DIAGNOSIS — S82.302D CLOSED FRACTURE OF DISTAL END OF FIBULA WITH TIBIA, LEFT, WITH ROUTINE HEALING, SUBSEQUENT ENCOUNTER: ICD-10-CM

## 2023-12-04 DIAGNOSIS — T14.8XXA BONE BRUISE: Primary | ICD-10-CM

## 2023-12-04 DIAGNOSIS — S82.832D CLOSED FRACTURE OF DISTAL END OF FIBULA WITH TIBIA, LEFT, WITH ROUTINE HEALING, SUBSEQUENT ENCOUNTER: ICD-10-CM

## 2023-12-04 PROCEDURE — 97110 THERAPEUTIC EXERCISES: CPT

## 2023-12-04 PROCEDURE — 97140 MANUAL THERAPY 1/> REGIONS: CPT

## 2023-12-04 PROCEDURE — 97035 APP MDLTY 1+ULTRASOUND EA 15: CPT

## 2023-12-04 NOTE — PROGRESS NOTES
Daily Note     Today's date: 2023  Patient name: Dwaine Sierra  : 1960  MRN: [id number]  Referring provider: Pam Cruz MD  Dx:   Encounter Diagnosis     ICD-10-CM    1. Other closed fracture of distal end of left fibula with routine healing, subsequent encounter  S82.832D       2. Closed fracture of distal end of fibula with tibia, left, with routine healing, subsequent encounter  S82.302D     S82.832D       3. Sprain of right ankle, unspecified ligament, subsequent encounter  S93.401D                      Subjective: Patient reports that the other day she stood up from the couch and started walking on her foot without the boot on. She says that when she noticed she immediately went back to put on her boot. Objective: See treatment diary below      Assessment: Tolerated treatment well. Patient reported pain in medial aspect of left ankle with towel toe scrunches, however no pain reported with toe scrunches combined with towel inversion/eversion slides. Added TB for R ankle 4 ways to facilitate improved ankle strength. She was challenged with isolating ankle inversion/eversion ROM. Patient demonstrated fatigue post treatment, exhibited good technique with therapeutic exercises, and would benefit from continued PT      Plan: Continue per plan of care. Precautions: s/p nondisplaced, oblique fracture of left distal fibula and right ankle sprain sustained on 10/22/23. L1-S1 fusion, C2-C7 fusion >10 years, HTN, DM, Tachycardia  Access Code: [email or web address]    POC expires Unit limit Auth  expiration date PT/OT + Visit Limit?    24 N/A N/A BOMN                 Visit/Unit Tracking  AUTH Status:  Date             BOMN Used 1 2 3             Remaining                     Manuals           L ankle PROM  BE BE          L calf stretch  BE BE                                    Neuro Re-Ed Ther Ex             Nustep with CAM boot for Wbing through L LE  L4 10' L4 10'          Ankle 4 ways   NO TB L   TB on R only HEP L only  GTB x20 R only          Seated BAPS board AROM L ankle only   Dflex/pflex, inv/ev, cw/ccw  20x ea 20x          Seated towel toe scrunches  L only HEP 5"x20 5" x30          Seated towel slides inv/eversion   L only  X20 ea  X30 ea          Seated ankle HR/TR B/L  x20 X20 ea          Ankle alphabet HEP 1x           Seated calf stretch with towel HEP Long sitting 3x30" In chair 3x30"           Pt education  PT POC, HEP, ROM vs strengthening            Ther Activity                                       Gait Training                                       Modalities

## 2023-12-04 NOTE — PROGRESS NOTES
Daily Note     Today's date: 2023  Patient name: Cuauhtemoc Corrigan  : 1960  MRN: [id number]  Referring provider: Libby Sterling MD  Dx:   Encounter Diagnosis     ICD-10-CM    1. Bone bruise  T14. 8XXA                      Subjective: I didn't like the tape. It made me stiff      Objective: See treatment diary below      Assessment: Tolerated treatment well. Patient exhibited good technique with therapeutic exercises. Issued elastogel and tubigrip to wear over mass for pain mgt      Plan: Continue per plan of care. Precautions: HTN; chronic pain syndrome; b/l thumb CMC OA         POC expires Unit limit Auth  expiration date PT/OT + Visit Limit?    24 NA 24 BOMN                 Visit/Unit Tracking  AUTH Status:  Date              RE due 23 Used 1 2              Remaining                     Date 23      Visit 1 2      Manuals        STM 7' 5'      IASTM 5' 5'      KT 3' DC              Neuro Re-Ed                                                                 Ther Ex        TGE        AROM wrist all planes x10 AA5'      AROM thumb all planes x10 AA5'                                              Ther Activity        Opposition        Translation  Small beads 1 set      HEP POC; Elastogel and tg size C for pn mgt       KT wear, care, precautions;        AROM digits, wrist       Modalities        MHP  5'      US 3.3MHz, 50%, 0.8w/cm2 8' 8'

## 2023-12-07 ENCOUNTER — OFFICE VISIT (OUTPATIENT)
Dept: PHYSICAL THERAPY | Facility: CLINIC | Age: 63
End: 2023-12-07
Payer: COMMERCIAL

## 2023-12-07 DIAGNOSIS — S82.832D OTHER CLOSED FRACTURE OF DISTAL END OF LEFT FIBULA WITH ROUTINE HEALING, SUBSEQUENT ENCOUNTER: Primary | ICD-10-CM

## 2023-12-07 DIAGNOSIS — S93.401D SPRAIN OF RIGHT ANKLE, UNSPECIFIED LIGAMENT, SUBSEQUENT ENCOUNTER: ICD-10-CM

## 2023-12-07 DIAGNOSIS — S82.302D CLOSED FRACTURE OF DISTAL END OF FIBULA WITH TIBIA, LEFT, WITH ROUTINE HEALING, SUBSEQUENT ENCOUNTER: ICD-10-CM

## 2023-12-07 DIAGNOSIS — S82.832D CLOSED FRACTURE OF DISTAL END OF FIBULA WITH TIBIA, LEFT, WITH ROUTINE HEALING, SUBSEQUENT ENCOUNTER: ICD-10-CM

## 2023-12-07 PROCEDURE — 97110 THERAPEUTIC EXERCISES: CPT

## 2023-12-07 PROCEDURE — 97140 MANUAL THERAPY 1/> REGIONS: CPT

## 2023-12-07 NOTE — PROGRESS NOTES
Daily Note     Today's date: 2023  Patient name: Eda Herman  : 1960  MRN: [id number]  Referring provider: Maude Ross MD  Dx:   Encounter Diagnosis     ICD-10-CM    1. Other closed fracture of distal end of left fibula with routine healing, subsequent encounter  S82.832D       2. Closed fracture of distal end of fibula with tibia, left, with routine healing, subsequent encounter  S82.302D     S82.832D       3. Sprain of right ankle, unspecified ligament, subsequent encounter  S93.401D                      Subjective: Patient reports that she had a lot of pain in both of her ankles after last session but she isn't sure why. She is wondering if it's because of the weather. She has a follow up visit with her surgeon on 23 and is hoping to get the boot off at this time. Objective: See treatment diary below      Assessment: Tolerated treatment well. No progressions this session due to patient with increased pain upon arrival and after last session. No pain with left ankle PROM. Patient demonstrated fatigue post treatment, exhibited good technique with therapeutic exercises, and would benefit from continued PT      Plan: Continue per plan of care. Precautions: s/p nondisplaced, oblique fracture of left distal fibula and right ankle sprain sustained on 10/22/23. L1-S1 fusion, C2-C7 fusion >10 years, HTN, DM, Tachycardia  Access Code: [email or web address]    POC expires Unit limit Auth  expiration date PT/OT + Visit Limit?    24 N/A N/A BOMN                 Visit/Unit Tracking  AUTH Status:  Date            BOMN Used 1 2 3 4            Remaining                     Manuals          L ankle PROM  BE BE BE         L calf stretch  BE BE BE                                   Neuro Re-Ed                                                                                                        Ther Ex             Nustep with CAM boot for Wbing through L LE  L4 10' L4 10' L4 10'         Ankle 4 ways   NO TB L   TB on R only HEP L only  GTB x20 R only GTB x20 R only         Seated BAPS board AROM L ankle only   Dflex/pflex, inv/ev, cw/ccw  20x ea 20x x30         Seated towel toe scrunches  L only HEP 5"x20 5" x30 5" x30         Seated towel slides inv/eversion   L only  X20 ea  X30 ea X30 ea          Seated ankle HR/TR B/L  x20 X20 ea X30 ea          Ankle alphabet HEP 1x           Seated calf stretch with towel HEP Long sitting 3x30" In chair 3x30"  Long sitting 3x30"         Pt education  PT POC, HEP, ROM vs strengthening            Ther Activity                                       Gait Training                                       Modalities

## 2023-12-11 DIAGNOSIS — R11.0 NAUSEA: ICD-10-CM

## 2023-12-11 RX ORDER — ONDANSETRON 4 MG/1
4 TABLET, FILM COATED ORAL EVERY 8 HOURS PRN
Qty: 90 TABLET | Refills: 0 | Status: SHIPPED | OUTPATIENT
Start: 2023-12-11

## 2023-12-12 ENCOUNTER — OFFICE VISIT (OUTPATIENT)
Dept: PHYSICAL THERAPY | Facility: CLINIC | Age: 63
End: 2023-12-12
Payer: COMMERCIAL

## 2023-12-12 ENCOUNTER — OFFICE VISIT (OUTPATIENT)
Dept: OCCUPATIONAL THERAPY | Facility: CLINIC | Age: 63
End: 2023-12-12
Payer: COMMERCIAL

## 2023-12-12 DIAGNOSIS — S82.832D OTHER CLOSED FRACTURE OF DISTAL END OF LEFT FIBULA WITH ROUTINE HEALING, SUBSEQUENT ENCOUNTER: Primary | ICD-10-CM

## 2023-12-12 DIAGNOSIS — S82.302D CLOSED FRACTURE OF DISTAL END OF FIBULA WITH TIBIA, LEFT, WITH ROUTINE HEALING, SUBSEQUENT ENCOUNTER: ICD-10-CM

## 2023-12-12 DIAGNOSIS — T14.8XXA BONE BRUISE: Primary | ICD-10-CM

## 2023-12-12 DIAGNOSIS — F32.5 MAJOR DEPRESSIVE DISORDER WITH SINGLE EPISODE, IN FULL REMISSION (HCC): ICD-10-CM

## 2023-12-12 DIAGNOSIS — S82.832D CLOSED FRACTURE OF DISTAL END OF FIBULA WITH TIBIA, LEFT, WITH ROUTINE HEALING, SUBSEQUENT ENCOUNTER: ICD-10-CM

## 2023-12-12 DIAGNOSIS — S93.401D SPRAIN OF RIGHT ANKLE, UNSPECIFIED LIGAMENT, SUBSEQUENT ENCOUNTER: ICD-10-CM

## 2023-12-12 PROCEDURE — 97140 MANUAL THERAPY 1/> REGIONS: CPT

## 2023-12-12 PROCEDURE — 97110 THERAPEUTIC EXERCISES: CPT

## 2023-12-12 PROCEDURE — 97530 THERAPEUTIC ACTIVITIES: CPT

## 2023-12-12 RX ORDER — ESCITALOPRAM OXALATE 20 MG/1
TABLET ORAL
Qty: 90 TABLET | Refills: 0 | Status: SHIPPED | OUTPATIENT
Start: 2023-12-12

## 2023-12-12 NOTE — PROGRESS NOTES
Daily Note     Today's date: 2023  Patient name: Marcelo Lopez  : 1960  MRN: [id number]  Referring provider: Magdiel Huitron MD  Dx:   Encounter Diagnosis     ICD-10-CM    1. Bone bruise  T14. 8XXA                      Subjective: Patient reports that pain is only present when she moves it. Patient reports that she feels like the swelling has gone down and that elastogel and tubirgrip has been helpful. Objective: See treatment diary below      Assessment: Tolerated treatment well. Patient exhibited good technique with therapeutic exercises and would benefit from continued OT. Patient experienced mild pain during active wrist extension at site of bruise. Plan: Continue per plan of care. Precautions: s/p nondisplaced, oblique fracture of left distal fibula and right ankle sprain sustained on 10/22/23. L1-S1 fusion, C2-C7 fusion >10 years, HTN, DM, Tachycardia  Access Code: [email or web address]    POC expires Unit limit Auth  expiration date PT/OT + Visit Limit?    24 N/A N/A BOMN                 Visit/Unit Tracking  AUTH Status:  Date           BOMN Used 1 2 3 4 5           Remaining                   Date 23     Visit 1 2 3     Manuals        STM 7' 5'      IASTM 5' 5' 8'     KT 3' DC              Neuro Re-Ed                                                                 Ther Ex        TGE   x10 all motions     AROM wrist all planes x10 AA5' 3x10 e/f      AROM thumb all planes x10 AA5' x20 e/f  x20 abduction/adduction                                             Ther Activity        Opposition   Small beads oppose with each finger     Translation  Small beads 1 set Small beads 1 set     HEP POC; Elastogel and tg size C for pn mgt Provided new elastogel and TG size C      KT wear, care, precautions;        AROM digits, wrist       Modalities        MHP  5' 4'     US 3.3MHz, 50%, 0.8w/cm2 8' 8' 8'

## 2023-12-12 NOTE — PROGRESS NOTES
Daily Note     Today's date: 2023  Patient name: Marquis Cardenas  : 1960  MRN: [id number]  Referring provider: Angel Levine MD  Dx:   Encounter Diagnosis     ICD-10-CM    1. Other closed fracture of distal end of left fibula with routine healing, subsequent encounter  S82.832D       2. Closed fracture of distal end of fibula with tibia, left, with routine healing, subsequent encounter  S82.302D     S82.832D       3. Sprain of right ankle, unspecified ligament, subsequent encounter  S93.401D                      Subjective: Patient reports that she has been walking around her house without the CAM boot on the left foot because she is "so over wearing the boot". She has a follow up with her orthopedic tomorrow, 23. Objective: See treatment diary below      Assessment: Tolerated treatment well. Increased reps for ankle 4 ways with TB on the right ankle to facilitate improved ankle strength. No increased pain with progressions. Advised on use of CAM boot until cleared to DC by orthopedics. Patient demonstrated fatigue post treatment, exhibited good technique with therapeutic exercises, and would benefit from continued PT      Plan: Progress treatment as tolerated. Precautions: s/p nondisplaced, oblique fracture of left distal fibula and right ankle sprain sustained on 10/22/23. L1-S1 fusion, C2-C7 fusion >10 years, HTN, DM, Tachycardia  Access Code: [email or web address]    POC expires Unit limit Auth  expiration date PT/OT + Visit Limit?    24 N/A N/A BOMN                 Visit/Unit Tracking  AUTH Status:  Date           BOMN Used 1 2 3 4 5           Remaining                     Manuals         L ankle PROM  BE BE BE BE        L calf stretch  BE BE BE BE                                  Neuro Re-Ed                                                                                                        Ther Ex             Nustep with CAM boot for Wbing through L LE  L4 10' L4 10' L4 10' L4 10'        Ankle 4 ways   NO TB L   TB on R only HEP L only  GTB x20 R only GTB x20 R only GTB x30 R only         Seated BAPS board AROM L ankle only   Dflex/pflex, inv/ev, cw/ccw  20x ea 20x x30 X30         Seated towel toe scrunches  L only HEP 5"x20 5" x30 5" x30 5" x30         Seated towel slides inv/eversion   L only  X20 ea  X30 ea X30 ea  X30 ea         Seated ankle HR/TR B/L  x20 X20 ea X30 ea  X30 ea         Ankle alphabet HEP 1x           Seated calf stretch with towel HEP Long sitting 3x30" In chair 3x30"  Long sitting 3x30" Long sitting 3x30"        Pt education  PT POC, HEP, ROM vs strengthening            Ther Activity                                       Gait Training                                       Modalities

## 2023-12-13 ENCOUNTER — OFFICE VISIT (OUTPATIENT)
Dept: OBGYN CLINIC | Facility: CLINIC | Age: 63
End: 2023-12-13

## 2023-12-13 ENCOUNTER — APPOINTMENT (OUTPATIENT)
Dept: RADIOLOGY | Facility: AMBULARY SURGERY CENTER | Age: 63
End: 2023-12-13
Attending: ORTHOPAEDIC SURGERY
Payer: COMMERCIAL

## 2023-12-13 VITALS
DIASTOLIC BLOOD PRESSURE: 80 MMHG | HEIGHT: 65 IN | HEART RATE: 71 BPM | SYSTOLIC BLOOD PRESSURE: 137 MMHG | WEIGHT: 185 LBS | BODY MASS INDEX: 30.82 KG/M2

## 2023-12-13 DIAGNOSIS — S82.832A OTHER CLOSED FRACTURE OF DISTAL END OF LEFT FIBULA, INITIAL ENCOUNTER: ICD-10-CM

## 2023-12-13 DIAGNOSIS — S82.832A OTHER CLOSED FRACTURE OF DISTAL END OF LEFT FIBULA, INITIAL ENCOUNTER: Primary | ICD-10-CM

## 2023-12-13 PROCEDURE — 73610 X-RAY EXAM OF ANKLE: CPT

## 2023-12-13 PROCEDURE — 99024 POSTOP FOLLOW-UP VISIT: CPT | Performed by: ORTHOPAEDIC SURGERY

## 2023-12-13 NOTE — PATIENT INSTRUCTIONS
You may now begin weaning your boot and transitioning to a sneaker. It is important to do this gradually to avoid aggravating the healing process. Today, you may come out of the boot into a sneaker for 2 hours. 2. Tomorrow, you may come out of the boot into a sneaker for 4 hours,  3. The next day, you may come out of the boot into a sneaker for 6 hours. 4. Continue this (adding 2 hours per day) as you tolerate. For example, if you do 6 hours out of the boot into a sneaker and your foot swells more than usual at night and it is difficult to control the discomfort, do not advance to 8 hours the next day, stay at 6 hours until you are able to tolerate it. Elevation, Ice and tylenol and staying off of it at night will be important to aide in this transition out of the boot. Swelling and soreness are normal as you begin to do more with the injured leg.

## 2023-12-13 NOTE — PROGRESS NOTES
Mark Laura M.D. Attending, Orthopaedic Surgery  [facility]      ORTHOPAEDIC FOOT AND ANKLE CLINIC VISIT     Assessment:     Encounter Diagnosis   Name Primary? Other closed fracture of distal end of left fibula, initial encounter Yes            Plan:   The patient verbalized understanding of exam findings and treatment plan. We engaged in the shared decision-making process and treatment options were discussed at length with the patient. Surgical and conservative management discussed today along with risks and benefits. Patient is a f/u for nondisplaced oblique fracture of left distal fibula sustained on 10/22/23   Transition out of boot and into sneaker   Xray demonstrates osseous healing at fracture site   Continue PT/HEP as directed  Compression stocking for swelling control   Activity modification, OTC pain meds, ice, and elevation for pain control   Return in about 2 months (around 2/13/2024). WB ankle xray      History of Present Illness:   Chief Complaint:   Chief Complaint   Patient presents with    Left Ankle - Follow-up     Hortencia Claire is a 66 y.o. female who is being seen in follow-up for left ankle. When we last saw she we recommended WBAT in cam boot, physical therapy. Pain has  improved. Residual pain is localized at fx site  with minimal radiating and described as sharp and severe.       Pain/symptom timing:  Worse during the day when active  Pain/symptom context:  Worse with activites and work  Pain/symptom modifying factors:  Rest makes better, activities make worse  Pain/symptom associated signs/symptoms: none    Prior treatment   NSAIDsYes   Injections No   Bracing/Orthotics Yes    Physical Therapy Yes     Orthopedic Surgical History:   See below    Past Medical, Surgical and Social History:  Past Medical History:  has a past medical history of Abdominal pain, epigastric (3/23/2018), Abnormal x-ray of lumbar spine (11/3/2015), Acute cystitis with hematuria (2020), Bariatric surgery status, Basal cell carcinoma, Benign essential hypertension (2012), Breakdown (mechanical) of internal fixation device of vertebrae, initial encounter ([address]) (3/1/2019), Calculus of bile duct with cholecystitis without obstruction (2018), Cellulitis of finger (12/3/2015), Degenerative lumbar disc, Digital mucinous cyst (2015), DMII (diabetes mellitus, type 2) ([address]) (2013), Gross hematuria (3/19/2019), Hiatal hernia, History of transfusion (), Low back pain, Lower urinary tract infectious disease (3/27/2015), Medicare annual wellness visit, initial (2019), Obesity, Overactive bladder, Postsurgical malabsorption, Recurrent UTI (3/19/2019), Spinal stenosis, SVT (supraventricular tachycardia), Tachycardia, Type 2 diabetes mellitus ([address]), and Wears glasses. Problem List: does not have any pertinent problems on file. Past Surgical History:  has a past surgical history that includes Appendectomy; Insertion / placement / revision neurostimulator; Cervical spine surgery; Cardiac electrophysiology study and ablation;  section; Tubal ligation; Carpal tunnel release; Tonsillectomy; Poyntelle tooth extraction; pr laps gstr rstcv px w/byp danielle-en-y limb <150 cm (N/A, 10/25/2016); pr egd transoral biopsy single/multiple (N/A, 2016); Neck surgery; pr egd transoral biopsy single/multiple (N/A, 2018); Skin cancer excision; pr laparoscopy surg cholecystectomy (N/A, 2018); Cholecystectomy; Back surgery; [place] myelogram lumbar (3/28/2019); Arthrodesis; Other surgical history; and US guided breast biopsy right complete (Right, 2023). Family History: family history includes Angina in her mother; Arthritis in her mother; Cancer in her father; Coronary artery disease in her mother; Cystic fibrosis in her father; Diabetes in her brother, mother, and sister; Heart disease in her family; Hypertension in her family;  No Known Problems in her brother, maternal grandfather, maternal grandmother, paternal grandfather, paternal grandmother, son, and son; Other in her brother; Rheum arthritis in her sister. Social History:  reports that she quit smoking about 19 years ago. Her smoking use included cigarettes. She started smoking about 39 years ago. She has a 20.0 pack-year smoking history. She has never used smokeless tobacco. She reports that she does not drink alcohol and does not use drugs. Current Medications: has a current medication list which includes the following prescription(s): albuterol, baclofen, bupropion, calcium citrate-vitamin d, cholecalciferol, escitalopram, famotidine, fenofibrate, fluticasone, gabapentin, hydromorphone, lidocaine, loratadine, meloxicam, multiple vitamins-minerals, ondansetron, oxybutynin, pantoprazole, promethazine-dextromethorphan, and sucralfate. Allergies: has No Known Allergies. Review of Systems:  General- denies fever/chills  HEENT- denies hearing loss or sore throat  Eyes- denies eye pain or visual disturbances, denies red eyes  Respiratory- denies cough or SOB  Cardio- denies chest pain or palpitations  GI- denies abdominal pain  Endocrine- denies urinary frequency  Urinary- denies pain with urination  Musculoskeletal- Negative except noted above  Skin- denies rashes or wounds  Neurological- denies dizziness or headache  Psychiatric- denies anxiety or difficulty concentrating    Physical Exam:   /80 (BP Location: Left arm, Patient Position: Sitting, Cuff Size: Large)   Pulse 71   Ht 5' 4.5" (1.638 m)   Wt 83.9 kg (185 lb)   BMI 31.26 kg/m²   General/Constitutional: No apparent distress: well-nourished and well developed.   Eyes: normal ocular motion  Lymphatic: No appreciable lymphadenopathy  Respiratory: Non-labored breathing  Vascular: No edema, swelling or tenderness, except as noted in detailed exam.  Integumentary: No impressive skin lesions present, except as noted in detailed exam.  Neuro: No ataxia or tremors noted  Psych: Normal mood and affect, oriented to person, place and time. Appropriate affect. Musculoskeletal: Normal, except as noted in detailed exam and in HPI. Examination    Left    Gait Normal   Musculoskeletal Tender to palpation at fx site    Skin Normal.      Nails Normal    Range of Motion  15 degrees dorsiflexion, 30 degrees plantarflexion  Subtalar motion: normal    Stability Stable    Muscle Strength 5/5 tibialis anterior  5/5 gastrocnemius-soleus  5/5 posterior tibialis  5/5 peroneal/eversion strength  5/5 EHL  5/5 FHL    Neurologic Normal    Sensation  Intact to light touch throughout sural, saphenous, superficial peroneal, deep peroneal and medial/lateral plantar nerve distributions. Mershon-Shayy 5.07 filament (10g) testing  deferred. Cardiovascular Brisk capillary refill < 2 seconds,intact DP and PT pulses    Special Tests None      Imaging Studies:  3 views of the Left ankle were obtained, reviewed and interpreted independently which demonstrate osseous healing at fracture site. Reviewed by me personally. Scribe Attestation      I,:  Minal Taylor PA-C am acting as a scribe while in the presence of the attending physician.:       I,:  Almaz Rosen MD personally performed the services described in this documentation    as scribed in my presence.:               Sulema Perone. Lachman, MD  Foot & Ankle Surgery   Department of [address] [place] [address]      I personally performed the service. Chelsey Hoffman.  Lachman, MD

## 2023-12-14 ENCOUNTER — OFFICE VISIT (OUTPATIENT)
Dept: PHYSICAL THERAPY | Facility: CLINIC | Age: 63
End: 2023-12-14
Payer: COMMERCIAL

## 2023-12-14 DIAGNOSIS — S93.401D SPRAIN OF RIGHT ANKLE, UNSPECIFIED LIGAMENT, SUBSEQUENT ENCOUNTER: ICD-10-CM

## 2023-12-14 DIAGNOSIS — S82.302D CLOSED FRACTURE OF DISTAL END OF FIBULA WITH TIBIA, LEFT, WITH ROUTINE HEALING, SUBSEQUENT ENCOUNTER: ICD-10-CM

## 2023-12-14 DIAGNOSIS — S82.832D CLOSED FRACTURE OF DISTAL END OF FIBULA WITH TIBIA, LEFT, WITH ROUTINE HEALING, SUBSEQUENT ENCOUNTER: ICD-10-CM

## 2023-12-14 DIAGNOSIS — S82.832D OTHER CLOSED FRACTURE OF DISTAL END OF LEFT FIBULA WITH ROUTINE HEALING, SUBSEQUENT ENCOUNTER: Primary | ICD-10-CM

## 2023-12-14 PROCEDURE — 97530 THERAPEUTIC ACTIVITIES: CPT

## 2023-12-14 PROCEDURE — 97110 THERAPEUTIC EXERCISES: CPT

## 2023-12-14 PROCEDURE — 97140 MANUAL THERAPY 1/> REGIONS: CPT

## 2023-12-14 NOTE — PROGRESS NOTES
Daily Note     Today's date: 2023  Patient name: Julisa Grimes  : 1960  MRN: [id number]  Referring provider: Jennett Hodgkin, MD  Dx:   Encounter Diagnosis     ICD-10-CM    1. Other closed fracture of distal end of left fibula with routine healing, subsequent encounter  S82.832D       2. Closed fracture of distal end of fibula with tibia, left, with routine healing, subsequent encounter  S82.302D     S82.832D       3. Sprain of right ankle, unspecified ligament, subsequent encounter  S93.401D                      Subjective: She saw orthopedics yesterday, 23, and was advised that she may transition out of boot and into sneaker on the left foot. She was advised to add one hour each day to the time spent in the sneaker with today being a total of 2 hours. She presents today in the boot. Objective: See treatment diary below      Assessment: Tolerated treatment well. Able to progress patient with addition of fwd step ups and step downs with R LE to facilitate improved ankle strength functionally. She was able to complete with slight balance deficits when stepping with the L LE in the CAM boot, however no pain. Added light TB ankle 4 ways on the left to facilitate improved left ankle strength. Patient demonstrated fatigue post treatment, exhibited good technique with therapeutic exercises, and would benefit from continued PT      Plan: Continue per plan of care. Precautions: s/p nondisplaced, oblique fracture of left distal fibula and right ankle sprain sustained on 10/22/23. L1-S1 fusion, C2-C7 fusion >10 years, HTN, DM, Tachycardia  Access Code: [email or web address]    POC expires Unit limit Auth  expiration date PT/OT + Visit Limit?    24 N/A N/A BOMN                 Visit/Unit Tracking  AUTH Status:  Date           BOMN Used 1 2 3 4 5           Remaining                     Manuals        L ankle PROM  BE BE BE BE BE       L calf stretch  BE BE BE BE BE                                 Neuro Re-Ed             SLS B/L             Tandem stance              Rockerboard taps fwd/bwd, left/right                                                                  Ther Ex             Nustep with CAM boot for Wbing through L LE  L4 10' L4 10' L4 10' L4 10' L4 10'       Ankle 4 ways   TB on R only HEP L only  GTB x20 R only GTB x20 R only GTB x30 R only  R: GTB x30 ea    L: RTB x20 ea       Seated BAPS board AROM L ankle only   Dflex/pflex, inv/ev, cw/ccw  20x ea 20x x30 X30  X30        Seated towel toe scrunches  L only HEP 5"x20 5" x30 5" x30 5" x30         Seated towel slides inv/eversion   L only  X20 ea  X30 ea X30 ea  X30 ea         Seated ankle HR/TR B/L  x20 X20 ea X30 ea  X30 ea  X30ea  Standing       Ankle alphabet HEP 1x           Seated calf stretch with towel HEP Long sitting 3x30" In chair 3x30"  Long sitting 3x30" Long sitting 3x30" Long sitting 3x30"       Pt education  PT POC, HEP, ROM vs strengthening            Ther Activity             TG squats       NV       Fwd step downs       4" 2x10    R on step       Fwd step ups       4" 2x10  R only        Gait Training                                       Modalities

## 2023-12-19 ENCOUNTER — OFFICE VISIT (OUTPATIENT)
Dept: PHYSICAL THERAPY | Facility: CLINIC | Age: 63
End: 2023-12-19
Payer: COMMERCIAL

## 2023-12-19 DIAGNOSIS — S82.302D CLOSED FRACTURE OF DISTAL END OF FIBULA WITH TIBIA, LEFT, WITH ROUTINE HEALING, SUBSEQUENT ENCOUNTER: ICD-10-CM

## 2023-12-19 DIAGNOSIS — S82.832D OTHER CLOSED FRACTURE OF DISTAL END OF LEFT FIBULA WITH ROUTINE HEALING, SUBSEQUENT ENCOUNTER: Primary | ICD-10-CM

## 2023-12-19 DIAGNOSIS — S93.401D SPRAIN OF RIGHT ANKLE, UNSPECIFIED LIGAMENT, SUBSEQUENT ENCOUNTER: ICD-10-CM

## 2023-12-19 DIAGNOSIS — S82.832D CLOSED FRACTURE OF DISTAL END OF FIBULA WITH TIBIA, LEFT, WITH ROUTINE HEALING, SUBSEQUENT ENCOUNTER: ICD-10-CM

## 2023-12-19 PROCEDURE — 97140 MANUAL THERAPY 1/> REGIONS: CPT

## 2023-12-19 PROCEDURE — 97530 THERAPEUTIC ACTIVITIES: CPT

## 2023-12-19 PROCEDURE — 97110 THERAPEUTIC EXERCISES: CPT

## 2023-12-19 NOTE — PROGRESS NOTES
"Daily Note     Today's date: 2023  Patient name: Denita Brown  : 1960  MRN: [id number]  Referring provider: Lachman, James R, MD  Dx:   Encounter Diagnosis     ICD-10-CM    1. Other closed fracture of distal end of left fibula with routine healing, subsequent encounter  S82.832D       2. Closed fracture of distal end of fibula with tibia, left, with routine healing, subsequent encounter  S82.302D     S82.832D       3. Sprain of right ankle, unspecified ligament, subsequent encounter  S93.401D           Start Time: 1617  Stop Time: 1710  Total time in clinic (min): 53 minutes    Subjective: pt noted that both of her ankles are bothering her today.       Objective: See treatment diary below      Assessment:  Continued with treatment session with focus on L and R ankle. Pt was able to complete some exercises progressions this visit with sneakers applied. At this time minimal additional exercises added secondary to p!/ soreness in b/l ankles. Tolerated treatment well. Patient exhibited good technique with therapeutic exercises and would benefit from continued PT. S/p treatment session, noted no immediate changes in pain status.       Plan: Continue per plan of care.      Precautions: s/p nondisplaced, oblique fracture of left distal fibula and right ankle sprain sustained on 10/22/23.   L1-S1 fusion, C2-C7 fusion >10 years, HTN, DM, Tachycardia  Access Code: [email or web address]    POC expires Unit limit Auth  expiration date PT/OT + Visit Limit?   24 N/A N/A BOMN                 Visit/Unit Tracking  AUTH Status:  Date          BOMN Used 1 2 3 4 5 6          Remaining                     Manuals       L ankle PROM  BE BE BE BE BE SC R and L       L calf stretch  BE BE BE BE BE SC                                Neuro Re-Ed             SLS B/L       20\"x 2       Tandem stance              Rockerboard taps fwd/bwd, left/right                    " "                                              Ther Ex             Nustep with CAM boot for Wbing through L LE  L4 10' L4 10' L4 10' L4 10' L4 10' L4 10 min       Ankle 4 ways   TB on R only HEP L only  GTB x20 R only GTB x20 R only GTB x30 R only  R: GTB x30 ea    L: RTB x20 ea R: GTB x30 ea    L: RTB x20 ea      Seated BAPS board AROM L ankle only   Dflex/pflex, inv/ev, cw/ccw  20x ea 20x x30 X30  X30  30x       Seated towel toe scrunches  L only HEP 5\"x20 5\" x30 5\" x30 5\" x30         Seated towel slides inv/eversion   L only  X20 ea  X30 ea X30 ea  X30 ea         Seated ankle HR/TR B/L  x20 X20 ea X30 ea  X30 ea  X30ea  Standing       Ankle alphabet HEP 1x           Seated calf stretch with towel HEP Long sitting 3x30\" In chair 3x30\"  Long sitting 3x30\" Long sitting 3x30\" Long sitting 3x30\"       Pt education  PT POC, HEP, ROM vs strengthening            Ther Activity             TG squats       NV       Fwd step downs       4\" 2x10    R on step 4\" 10x b/l       Fwd step ups       4\" 2x10  R only  4\" 2x 10 L only       Gait Training                                       Modalities                                                "

## 2023-12-21 ENCOUNTER — OFFICE VISIT (OUTPATIENT)
Dept: PHYSICAL THERAPY | Facility: CLINIC | Age: 63
End: 2023-12-21
Payer: COMMERCIAL

## 2023-12-21 ENCOUNTER — OFFICE VISIT (OUTPATIENT)
Dept: OCCUPATIONAL THERAPY | Facility: CLINIC | Age: 63
End: 2023-12-21
Payer: COMMERCIAL

## 2023-12-21 DIAGNOSIS — T14.8XXA BONE BRUISE: Primary | ICD-10-CM

## 2023-12-21 DIAGNOSIS — S82.302D CLOSED FRACTURE OF DISTAL END OF FIBULA WITH TIBIA, LEFT, WITH ROUTINE HEALING, SUBSEQUENT ENCOUNTER: ICD-10-CM

## 2023-12-21 DIAGNOSIS — S93.401D SPRAIN OF RIGHT ANKLE, UNSPECIFIED LIGAMENT, SUBSEQUENT ENCOUNTER: ICD-10-CM

## 2023-12-21 DIAGNOSIS — S82.832D OTHER CLOSED FRACTURE OF DISTAL END OF LEFT FIBULA WITH ROUTINE HEALING, SUBSEQUENT ENCOUNTER: Primary | ICD-10-CM

## 2023-12-21 DIAGNOSIS — S82.832D CLOSED FRACTURE OF DISTAL END OF FIBULA WITH TIBIA, LEFT, WITH ROUTINE HEALING, SUBSEQUENT ENCOUNTER: ICD-10-CM

## 2023-12-21 PROCEDURE — 97110 THERAPEUTIC EXERCISES: CPT

## 2023-12-21 PROCEDURE — 97140 MANUAL THERAPY 1/> REGIONS: CPT

## 2023-12-21 PROCEDURE — 97530 THERAPEUTIC ACTIVITIES: CPT

## 2023-12-21 NOTE — PROGRESS NOTES
Daily Note     Today's date: 2023  Patient name: Denita Brown  : 1960  MRN: [id number]  Referring provider: Aric Garcia MD  Dx:   Encounter Diagnosis     ICD-10-CM    1. Bone bruise  T14.8XXA           Start Time: 1701  Stop Time: 1745  Total time in clinic (min): 44 minutes    Subjective: Patient reports that her pain is at a 3/10 today. Reports most pain when she goes to grab and pinch things.      Objective: See treatment diary below      Assessment: Tolerated treatment well. Patient exhibited good technique with therapeutic exercises and would benefit from continued OT. Ultrasound and IASTM softened bruise area and decreased pain.       Plan: Continue per plan of care.      Precautions: s/p nondisplaced, oblique fracture of left distal fibula and right ankle sprain sustained on 10/22/23.   L1-S1 fusion, C2-C7 fusion >10 years, HTN, DM, Tachycardia  Access Code: [email or web address]    POC expires Unit limit Auth  expiration date PT/OT + Visit Limit?   24 N/A N/A BOMN                          Visit/Unit Tracking  AUTH Status:  Date                   BOMN Used 1 2 3 4                    Remaining                               Date 23     Visit 1 2 3  4     Manuals             STM 7' 5'         IASTM 5' 5' 8'  10'     KT 3' DC                       Neuro Re-Ed                                                                                                                Ther Ex             TGE     x10 all motions  x10 all motions     AROM wrist all planes x10 AA5' 3x10 e/f   x30 e/f     AROM thumb all planes x10 AA5' x20 e/f  x20 abduction/adduction                                                                             Ther Activity             Opposition     Small beads oppose with each finger  Small beads 1/2 set oppose w/ each finger     Translation   Small beads 1 set Small beads 1 set  Small beads 1/2 set     HEP POC; Elastogel and tg  size C for pn mgt Provided new elastogel and TG size C         KT wear, care, precautions;             AROM digits, wrist           Modalities             MHP   5' 4'  6'     US 3.3MHz, Pulse  50%, 0.8w/cm2 8' 8' 8'  8'

## 2023-12-21 NOTE — PROGRESS NOTES
"Daily Note     Today's date: 2023  Patient name: Denita Brown  : 1960  MRN: [id number]  Referring provider: Lachman, James R, MD  Dx:   Encounter Diagnosis     ICD-10-CM    1. Other closed fracture of distal end of left fibula with routine healing, subsequent encounter  S82.832D       2. Closed fracture of distal end of fibula with tibia, left, with routine healing, subsequent encounter  S82.302D     S82.832D       3. Sprain of right ankle, unspecified ligament, subsequent encounter  S93.401D                      Subjective: Patient reports that she is in a sneaker all but 3 hours of her day. She says that she hasn't had much difficulty with increasing the time mostly because she is sitting for       Objective: See treatment diary below      Assessment: Tolerated treatment well. Patient continues to be challenged with step ups and step downs. Added TG squats to facilitate improved lower extremity strength functionally. No ankle pain reported with squats. Patient demonstrated fatigue post treatment, exhibited good technique with therapeutic exercises, and would benefit from continued PT      Plan: Continue per plan of care.      Precautions: s/p nondisplaced, oblique fracture of left distal fibula and right ankle sprain sustained on 10/22/23.   L1-S1 fusion, C2-C7 fusion >10 years, HTN, DM, Tachycardia  Access Code: [email or web address]    POC expires Unit limit Auth  expiration date PT/OT + Visit Limit?   24 N/A N/A BOMN                 Visit/Unit Tracking  AUTH Status:  Date         BOMN Used 1 2 3 4 5 6 7         Remaining                     Manuals      L ankle PROM  BE BE BE BE BE SC R and L  BE      L calf stretch  BE BE BE BE BE SC                                Neuro Re-Ed             SLS B/L       20\"x 2  2x20\" ea     Tandem stance         2x20\" foam      Rockerboard taps fwd/bwd, left/right                         " "                                         Ther Ex             Nustep with CAM boot for Wbing through L LE  L4 10' L4 10' L4 10' L4 10' L4 10' L4 10 min  L4 10'     Ankle 4 ways   TB on R only HEP L only  GTB x20 R only GTB x20 R only GTB x30 R only  R: GTB x30 ea    L: RTB x20 ea R: GTB x30 ea    L: RTB x20 ea R: GTB x30 ea    L: RTB x30 ea     Seated BAPS board AROM L ankle only   Dflex/pflex, inv/ev, cw/ccw  20x ea 20x x30 X30  X30  30x  DC     Seated towel toe scrunches  L only HEP 5\"x20 5\" x30 5\" x30 5\" x30    DC     Seated towel slides inv/eversion   L only  X20 ea  X30 ea X30 ea  X30 ea    DC     Seated ankle HR/TR B/L  x20 X20 ea X30 ea  X30 ea  X30ea  Standing       Ankle alphabet HEP 1x      DC     Seated calf stretch with towel HEP Long sitting 3x30\" In chair 3x30\"  Long sitting 3x30\" Long sitting 3x30\" Long sitting 3x30\"  DC     Pt education  PT POC, HEP, ROM vs strengthening            Ther Activity             TG squats       NV  L18 2x10     Fwd step downs       4\" 2x10    R on step 4\" 10x b/l  4\" 2x10 B/L     Fwd step ups       4\" 2x10  R only  4\" 2x 10 L only  6\" 2x10 B/L                  Gait Training                                       Modalities                                                  "

## 2023-12-26 ENCOUNTER — APPOINTMENT (OUTPATIENT)
Dept: OCCUPATIONAL THERAPY | Facility: CLINIC | Age: 63
End: 2023-12-26
Payer: COMMERCIAL

## 2023-12-26 ENCOUNTER — OFFICE VISIT (OUTPATIENT)
Dept: FAMILY MEDICINE CLINIC | Facility: CLINIC | Age: 63
End: 2023-12-26
Payer: COMMERCIAL

## 2023-12-26 ENCOUNTER — APPOINTMENT (OUTPATIENT)
Dept: PHYSICAL THERAPY | Facility: CLINIC | Age: 63
End: 2023-12-26
Payer: COMMERCIAL

## 2023-12-26 VITALS
SYSTOLIC BLOOD PRESSURE: 146 MMHG | WEIGHT: 182 LBS | DIASTOLIC BLOOD PRESSURE: 76 MMHG | HEIGHT: 65 IN | BODY MASS INDEX: 30.32 KG/M2 | OXYGEN SATURATION: 92 % | TEMPERATURE: 97.7 F | HEART RATE: 62 BPM

## 2023-12-26 DIAGNOSIS — J01.00 ACUTE NON-RECURRENT MAXILLARY SINUSITIS: Primary | ICD-10-CM

## 2023-12-26 DIAGNOSIS — Z87.81 HISTORY OF FIBULA FRACTURE: ICD-10-CM

## 2023-12-26 DIAGNOSIS — Z78.0 POSTMENOPAUSAL STATUS (AGE-RELATED) (NATURAL): ICD-10-CM

## 2023-12-26 DIAGNOSIS — Z78.0 POSTMENOPAUSAL ESTROGEN DEFICIENCY: ICD-10-CM

## 2023-12-26 PROCEDURE — 99213 OFFICE O/P EST LOW 20 MIN: CPT

## 2023-12-26 RX ORDER — AMOXICILLIN AND CLAVULANATE POTASSIUM 875; 125 MG/1; MG/1
1 TABLET, FILM COATED ORAL EVERY 12 HOURS SCHEDULED
Qty: 14 TABLET | Refills: 0 | Status: SHIPPED | OUTPATIENT
Start: 2023-12-26 | End: 2024-01-02

## 2023-12-26 NOTE — PROGRESS NOTES
Name: Denita Brown      : 1960      MRN: [id number]  Encounter Provider: Nikki Vilchis PA-C  Encounter Date: 2023   Encounter department: [facility]    Assessment & Plan     1. Acute non-recurrent maxillary sinusitis  -     amoxicillin-clavulanate (AUGMENTIN) 875-125 mg per tablet; Take 1 tablet by mouth every 12 (twelve) hours for 7 days    2. History of fibula fracture  -     DXA bone density spine hip and pelvis; Future; Expected date: 2023    3. Postmenopausal status (age-related) (natural)  -     DXA bone density spine hip and pelvis; Future; Expected date: 2023    Patient presents for evaluation of sinus congestion/pressure, sore throat, and clogged ears x 6-7 days. At home covid test negative. Physical exam unremarkable; lungs clear bilaterally. Patient does have bilateral maxillary sinus tenderness to palpation. Based on symptoms and exam findings suspect patient has sinusitis - treating with 7 day course of Augmentin (educated on potential side effects). Otherwise advised to continue supportive care and to make sure she stay hydrated. To call if symptoms worsen or persist.     Of note, patient's O2 today is stable at around 92-93% on room air. Her lungs are clear bilaterally and patient denies feeling shortness of breath or like she can't breath. Does not appear to be short of breath, no chest retractions or nasal flaring. Patient does have 20 year smoking hx which may be contributing to lower oxygen saturation level; upon review patient's O2 usually on the lower end around 94-96%. Did advise patient if she experiences any sob, trouble breathing, or chest pain to go right to the ER for further evaluation.     Patient had recent fx of her fibula; therefore qualifies for DXA scan to evaluate for osteoporosis. Ordered at today's visit.        Subjective      CC: sinus infection x 6-7 days     Patient presents for evaluation of sinus pressure/congestion, sore  throat, ears feel clogged, and headache x 6-7 days. Took at home covid test which was negative. She has been taking OTC sinus and headache medication which has not been helping much. She is eating and drinking without any issue.       Review of Systems   Constitutional:  Negative for appetite change, chills, diaphoresis, fatigue and fever.   HENT:  Positive for congestion, ear pain, rhinorrhea, sinus pressure, sinus pain and sore throat.    Respiratory:  Negative for cough, chest tightness, shortness of breath and wheezing.    Cardiovascular:  Negative for chest pain and palpitations.   Gastrointestinal:  Negative for abdominal pain, blood in stool, constipation, diarrhea, nausea and vomiting.   Neurological:  Positive for headaches. Negative for dizziness and light-headedness.       Current Outpatient Medications on File Prior to Visit   Medication Sig    albuterol (PROVENTIL HFA,VENTOLIN HFA) 90 mcg/act inhaler Inhale 2 puffs every 6 (six) hours as needed for wheezing    baclofen 10 mg tablet Take 10 mg by mouth daily    buPROPion (WELLBUTRIN) 75 mg tablet Take 1 tablet (75 mg total) by mouth 2 (two) times a day    Calcium Citrate-Vitamin D 315-250 MG-UNIT TABS Take 1 tablet by mouth 2 (two) times a day     Cholecalciferol 25 MCG (1000 UT) CHEW Chew    escitalopram (LEXAPRO) 20 mg tablet take 1 tablet by mouth every morning    famotidine (PEPCID) 40 MG tablet Take 1 tablet (40 mg total) by mouth daily at bedtime    fenofibrate 160 MG tablet take 1 tablet by mouth once daily    fluticasone (FLONASE) 50 mcg/act nasal spray instill 1 spray into each nostril daily    gabapentin (NEURONTIN) 100 mg capsule 100 mg 5 (five) times a day     HYDROmorphone (DILAUDID) 4 mg tablet TAKE 1 TABLET BY MOUTH EVERY 6 HOURS AS NEEDED...FILL 5/8/2020    lidocaine (XYLOCAINE) 5 % ointment Apply topically as needed for mild pain    loratadine (CLARITIN) 10 mg tablet Take 10 mg by mouth daily.    meloxicam (Mobic) 7.5 mg tablet Take 1  "tablet (7.5 mg total) by mouth daily    Multiple Vitamins-Minerals (BARIATRIC FUSION PO) Take 1 tablet by mouth daily    ondansetron (ZOFRAN) 4 mg tablet Take 1 tablet (4 mg total) by mouth every 8 (eight) hours as needed for nausea or vomiting    oxybutynin (DITROPAN) 5 mg tablet take 1 tablet by mouth three times a day if needed for bladder SPASM(S)    pantoprazole (PROTONIX) 40 mg tablet Take 1 tablet (40 mg total) by mouth daily before breakfast    promethazine-dextromethorphan (PHENERGAN-DM) 6.25-15 mg/5 mL oral syrup Take 5 mL by mouth 4 (four) times a day as needed for cough    sucralfate (CARAFATE) 1 g/10 mL suspension take 10 milliliter by mouth four times a day with meals and at bedtime (Patient not taking: Reported on 8/12/2023)       Objective     /76   Pulse 62   Temp 97.7 °F (36.5 °C)   Ht 5' 4.5\" (1.638 m)   Wt 82.6 kg (182 lb)   SpO2 92%   BMI 30.76 kg/m²     Physical Exam  Vitals reviewed.   Constitutional:       General: She is not in acute distress.     Appearance: Normal appearance. She is ill-appearing. She is not diaphoretic.   HENT:      Head: Normocephalic and atraumatic.      Right Ear: Tympanic membrane, ear canal and external ear normal. There is no impacted cerumen.      Left Ear: Tympanic membrane, ear canal and external ear normal. There is no impacted cerumen.      Nose: Congestion present. No rhinorrhea.      Right Sinus: Maxillary sinus tenderness present. No frontal sinus tenderness.      Left Sinus: Maxillary sinus tenderness present. No frontal sinus tenderness.      Mouth/Throat:      Mouth: Mucous membranes are moist.      Pharynx: No oropharyngeal exudate or posterior oropharyngeal erythema.   Eyes:      General:         Right eye: No discharge.         Left eye: No discharge.      Conjunctiva/sclera: Conjunctivae normal.   Cardiovascular:      Rate and Rhythm: Normal rate and regular rhythm.      Pulses: Normal pulses.      Heart sounds: Normal heart sounds. No " murmur heard.  Pulmonary:      Effort: Pulmonary effort is normal. No respiratory distress.      Breath sounds: Normal breath sounds. No stridor. No wheezing, rhonchi or rales.   Musculoskeletal:         General: Normal range of motion.      Cervical back: Normal range of motion and neck supple.   Lymphadenopathy:      Cervical: No cervical adenopathy.   Skin:     General: Skin is warm.   Neurological:      General: No focal deficit present.      Mental Status: She is alert.   Psychiatric:         Mood and Affect: Mood normal.       Nikki Vilchis PA-C

## 2023-12-28 ENCOUNTER — EVALUATION (OUTPATIENT)
Dept: PHYSICAL THERAPY | Facility: CLINIC | Age: 63
End: 2023-12-28
Payer: COMMERCIAL

## 2023-12-28 DIAGNOSIS — S82.302D CLOSED FRACTURE OF DISTAL END OF FIBULA WITH TIBIA, LEFT, WITH ROUTINE HEALING, SUBSEQUENT ENCOUNTER: ICD-10-CM

## 2023-12-28 DIAGNOSIS — S82.832D OTHER CLOSED FRACTURE OF DISTAL END OF LEFT FIBULA WITH ROUTINE HEALING, SUBSEQUENT ENCOUNTER: Primary | ICD-10-CM

## 2023-12-28 DIAGNOSIS — S93.401D SPRAIN OF RIGHT ANKLE, UNSPECIFIED LIGAMENT, SUBSEQUENT ENCOUNTER: ICD-10-CM

## 2023-12-28 DIAGNOSIS — S82.832D CLOSED FRACTURE OF DISTAL END OF FIBULA WITH TIBIA, LEFT, WITH ROUTINE HEALING, SUBSEQUENT ENCOUNTER: ICD-10-CM

## 2023-12-28 PROCEDURE — 97140 MANUAL THERAPY 1/> REGIONS: CPT

## 2023-12-28 PROCEDURE — 97110 THERAPEUTIC EXERCISES: CPT

## 2023-12-28 NOTE — PROGRESS NOTES
PT Re-Evaluation     Today's date: 2023  Patient name: Denita Brown  : 1960  MRN: [id number]  Referring provider: Lachman, James R, MD  Dx:   Encounter Diagnosis     ICD-10-CM    1. Other closed fracture of distal end of left fibula with routine healing, subsequent encounter  S82.832D       2. Closed fracture of distal end of fibula with tibia, left, with routine healing, subsequent encounter  S82.302D     S82.832D       3. Sprain of right ankle, unspecified ligament, subsequent encounter  S93.401D                      Assessment  Assessment details: Denita Brown is a 63 y.o. female presenting to physical therapy for a reevaluation with a MD referral of s/p nondisplaced, oblique fracture of her left distal fibula and right ankle sprain sustained on 10/22/23 following a fall. Since her initial evaluation, she reports 90% improvement in her right ankle and 80% improvement in her left ankle. The patient has demonstrated all right ankle AROM to WFL as well as improved left ankle AROM and passive ROM. She demonstrates improved ankle strength and subjectively reports improved functional mobility as she has DC use of the CAM boot for functional mobility on the left ankle. She reports continued stiffness of the left ankle and difficulty standing for >10 minutes at a time and navigating stairs due to increased ankle discomfort bilaterally. She continues to have decreased ankle strength bilaterally, left ankle AROM, and balance/ankle proprioception leading to limitations in their ability to complete ADLs, household chores, vocational responsibilities, and community navigation. This patient would benefit from continued PT services to address their impairments and functional limitations to maximize functional outcome.  She was provided an updated HEP and demonstrated/verbalized understanding it's completion.   Impairments: abnormal or restricted ROM, activity intolerance, impaired balance, impaired physical  "strength, lacks appropriate home exercise program, pain with function and weight-bearing intolerance    Symptom irritability: moderateUnderstanding of Dx/Px/POC: excellent   Prognosis: good    Goals  STG to be achieved in 4 weeks:   The patient will report a decrease in left ankle \"at worst\" subjective pain rating score to at least 6/10 to allow for improved tolerance for weightbearing activities. MET  The patient will increase left ankle dorsiflexion AROM to at least 5 degrees to allow for improved functional mobility. MET  The patient will increase left ankle dorsiflexion MMT score to at least 3+/5 to allow for improved functional mobility. MET    LTG to be achieved by DC: ALL NOT MET- PROGRESSING  The patient will be independent in comprehensive HEP.   The patient will report no pain with usual activities.   The patient will improve bilateral ankle AROM to WFL to allow for improved functional mobility.   The patient will increase bilateral ankle MMT score to WFL to allow for improved functional mobility.   The patient will be able to ambulate one mile without pain or difficulty.  The patient will be able to navigate one full flight of stairs reciprocally without pain or difficulty.       Plan  Patient would benefit from: skilled physical therapy  Planned modality interventions: thermotherapy: hydrocollator packs, TENS and cryotherapy  Planned therapy interventions: manual therapy, joint mobilization, balance/weight bearing training, neuromuscular re-education, patient education, flexibility, strengthening, stretching, therapeutic activities, therapeutic exercise, gait training, home exercise program, IASTM, abdominal trunk stabilization and balance  Frequency: 2x week  Duration in weeks: 12  Plan of Care beginning date: 11/28/2023  Plan of Care expiration date: 2/20/2024  Treatment plan discussed with: patient      Subjective Evaluation    History of Present Illness  Mechanism of injury: Denita reports 90% " improvement in her right ankle and 80% improvement in her left ankle since beginning PT services. She reports that her ROM of the foot feels back to normal again and she says that her strength does continue to improve. She also describes some mild stiffness in the left ankle. She has been able to transition fully out of the CAM boot and into her normal shoes without issue. She is not wearing any of her narrow sneakers because this is still uncomfortable for her. She says that her hightop sneakers or boots are the most comfortable thing to wear at this time. She did wear the boot on [place] because she knew she'd be on her feet for a long time and wanted to give her left foot a rest so it wasn't painful at night. She says that both of her ankles still bother her when walking for prolonged periods of time >10 minutes at a time or by the end of her work day. She also says that navigating stairs can be difficult for her to do due to a lack of strength in the left ankle and fear of re-injury. She has not walked on any uneven surfaces at this time so she cannot comment on how her ankles are in these situations.   Patient Goals  Patient goals for therapy: decreased pain, increased strength, independence with ADLs/IADLs, increased motion and improved balance  Patient goal: walk normally again  Pain  Current pain ratin  At best pain ratin  At worst pain ratin  Location: lateral aspect of left foot/ankle  Quality: dull ache  Relieving factors: change in position, medications and relaxation  Aggravating factors: standing, walking and stair climbing  Progression: improved    Social Support  Steps to enter house: yes  Stairs in house: yes   Lives in: multiple-level home  Lives with: adult children    Employment status: working ()  Treatments  Previous treatment: medication  Current treatment: physical therapy      Objective     Observations     Additional Observation Details  No ecchymosis or  swelling bilaterally     Tenderness   Left Ankle/Foot   Tenderness in the lateral malleolus. No tenderness in the Achilles insertion, anterior ankle, fifth metatarsal base, medial calcaneus, medial malleolus, peroneal tendon, plantar fascia, posterior tibial tendon, posterior talofibular ligament, proximal Achilles and superior tibiofibular ligament.     Right Ankle/Foot   No tenderness in the Achilles insertion, anterior ankle, anterior talofibular ligament, fifth metatarsal base, calcaneofibular ligament, deltoid ligament, first metatarsal head, lateral malleolus, medial calcaneus, medial malleolus, peroneal tendon, plantar fascia, posterior tibial tendon, posterior talofibular ligament, proximal Achilles and superior tibiofibular ligament.     Active Range of Motion   Left Ankle/Foot   Dorsiflexion (ke): 10 degrees   Dorsiflexion (kf): 10 degrees   Plantar flexion: 50 degrees with pain  Inversion: 30 degrees   Eversion: 15 degrees     Right Ankle/Foot   Dorsiflexion (ke): 10 degrees   Dorsiflexion (kf): 6 degrees   Plantar flexion: 60 degrees   Inversion: 40 degrees   Eversion: 18 degrees     Passive Range of Motion   Left Ankle/Foot    Dorsiflexion (ke): WFL  Dorsiflexion (kf): WFL  Plantar flexion: 55 degrees with pain  Inversion: 35 degrees   Eversion: 15 degrees     Strength/Myotome Testing     Left Ankle/Foot   Dorsiflexion: 4  Plantar flexion: 4  Inversion: 4  Eversion: 4    Right Ankle/Foot   Dorsiflexion: 4+  Plantar flexion: 4+  Inversion: 4+  Eversion: 4+    Ambulation   Weight-Bearing Status   Weight-Bearing Status (Left): weight-bearing as tolerated   Weight-Bearing Status (Right): weight-bearing as tolerated    Assistive device used: none    Ambulation: Level Surfaces   Ambulation without assistive device: independent    Observational Gait   Gait: within functional limits              Precautions: s/p nondisplaced, oblique fracture of left distal fibula and right ankle sprain sustained on 10/22/23.  "  L1-S1 fusion, C2-C7 fusion >10 years, HTN, DM, Tachycardia  Access Code: [email or web address]    POC expires Unit limit Auth  expiration date PT/OT + Visit Limit?   2/20/24 N/A N/A BOMN                 Visit/Unit Tracking  AUTH Status:  Date 11/28 11/30 12/4 12/7 12/12 12/19 12/21 12/28       BOMN Used 1 2 3 4 5 6 7 8        Remaining                     Manuals 11/28 11/30 12/4 12/7 12/12 12/14 12/19 12/21 12/28    L ankle PROM  BE BE BE BE BE SC R and L  BE      L calf stretch  BE BE BE BE BE SC  DC                 Re-evaluation         BE    Neuro Re-Ed             SLS B/L       20\"x 2  2x20\" ea     Tandem stance         2x20\" foam      Rockerboard taps fwd/bwd, left/right              Dani step overs fwd and lat             Tandem walk on foam                                        Ther Ex             Nustep for LE endurance   L4 10' L4 10' L4 10' L4 10' L4 10' L4 10 min  L4 10' L4 10'    Ankle 4 ways   TB on R only HEP L only  GTB x20 R only GTB x20 R only GTB x30 R only  R: GTB x30 ea    L: RTB x20 ea R: GTB x30 ea    L: RTB x20 ea R: GTB x30 ea    L: RTB x30 ea R: Blue   X20 ea    L: GTB 20x ea     Seated ankle HR/TR B/L  x20 X20 ea X30 ea  X30 ea  X30ea  Standing   Standing 10x ea    Pt education  PT POC, HEP, ROM vs strengthening        HEP review     Ther Activity             TG squats       NV  L18 2x10     Fwd step downs       4\" 2x10    R on step 4\" 10x b/l  4\" 2x10 B/L     Fwd step ups       4\" 2x10  R only  4\" 2x 10 L only  6\" 2x10 B/L                  Gait Training                                       Modalities                                              "

## 2023-12-30 DIAGNOSIS — N32.81 OAB (OVERACTIVE BLADDER): ICD-10-CM

## 2024-01-01 NOTE — PROGRESS NOTES
1/2/2024      Chief Complaint   Patient presents with    Follow-up         Assessment and Plan    63 y.o. female     1.  History of gross hematuria s/p negative hematuria workup (6/27/19)  - Denies gross hematuria this past year    2. History of UTI  - Using topical estrogen 3 times weekly  - Asymptomatic at this time     3. Urinary urgency  4. Bladder spasms  - Using oxybutynin 5 mg daily for bladder spasms  - PVR today 16 mL  - Call with any questions or concerns in the meantime  - All questions answered; patient understands and agrees with plan       History of Present Illness  Denita Brown is a 63 y.o. female patient with history of gross hematuria, UTI, urinary urgency, and bladder spasms  here for follow up.     Patient underwent negative hematuria workup in June 2019.  There were no abnormal findings noted on cystoscopic evaluation or upper tract imaging. Denies gross hematuria over past year. Using topical estrogen 3 times weekly. Denies UTIs.      Patient using oxybutynin 5 mg as needed for bladder spasms. Doing well overall. No complaints.     Review of Systems   Constitutional:  Negative for activity change, appetite change, chills and fever.   HENT:  Negative for congestion and trouble swallowing.    Respiratory:  Negative for cough and shortness of breath.    Cardiovascular:  Negative for chest pain, palpitations and leg swelling.   Gastrointestinal:  Negative for abdominal pain, constipation, diarrhea, nausea and vomiting.   Genitourinary:  Negative for difficulty urinating, dysuria, flank pain, frequency, hematuria and urgency.   Musculoskeletal:  Negative for back pain and gait problem.   Skin:  Negative for wound.   Allergic/Immunologic: Negative for immunocompromised state.   Neurological:  Negative for dizziness and syncope.   Hematological:  Does not bruise/bleed easily.   Psychiatric/Behavioral:  Negative for confusion.    All other systems reviewed and are negative.      Vitals  Vitals:     "01/02/24 0851   BP: 148/88   Pulse: 79   SpO2: 98%   Weight: 80.3 kg (177 lb)   Height: 5' 4.5\" (1.638 m)       Physical Exam  Constitutional:       General: She is not in acute distress.     Appearance: Normal appearance. She is not ill-appearing, toxic-appearing or diaphoretic.   HENT:      Head: Normocephalic.      Nose: No congestion.   Eyes:      General: No scleral icterus.        Right eye: No discharge.         Left eye: No discharge.      Conjunctiva/sclera: Conjunctivae normal.      Pupils: Pupils are equal, round, and reactive to light.   Pulmonary:      Effort: Pulmonary effort is normal.   Musculoskeletal:      Cervical back: Normal range of motion.   Skin:     General: Skin is warm and dry.      Coloration: Skin is not jaundiced or pale.      Findings: No bruising, erythema, lesion or rash.   Neurological:      General: No focal deficit present.      Mental Status: She is alert and oriented to person, place, and time. Mental status is at baseline.      Gait: Gait normal.   Psychiatric:         Mood and Affect: Mood normal.         Behavior: Behavior normal.         Thought Content: Thought content normal.         Judgment: Judgment normal.           Past History  Past Medical History:   Diagnosis Date    Abdominal pain, epigastric 3/23/2018    Added automatically from request for surgery [id number]    Abnormal x-ray of lumbar spine 11/3/2015    Acute cystitis with hematuria 4/7/2020    Bariatric surgery status     Basal cell carcinoma     basil cell carcinoma- in remission    Benign essential hypertension 6/12/2012    Breakdown (mechanical) of internal fixation device of vertebrae, initial encounter (Formerly [facility]) 3/1/2019    Calculus of bile duct with cholecystitis without obstruction 4/27/2018    Added automatically from request for surgery [id number]    Cellulitis of finger 12/3/2015    Degenerative lumbar disc     Digital mucinous cyst 6/24/2015    DMII (diabetes mellitus, type 2) (Formerly [facility]) 11/8/2013    Gross hematuria " 3/19/2019    Hiatal hernia     History of transfusion     Post-op spinal surgery    Low back pain     Lower urinary tract infectious disease 3/27/2015    Medicare annual wellness visit, initial 2019    Obesity     Resolved 10/6/2016     Overactive bladder     Postsurgical malabsorption     Recurrent UTI 3/19/2019    Spinal stenosis     SVT (supraventricular tachycardia)     Tachycardia     Resolved 2016     Type 2 diabetes mellitus (HCC)     Last assesssed 2018     Wears glasses      Social History     Socioeconomic History    Marital status:      Spouse name: None    Number of children: None    Years of education: Completed 4th year of college     Highest education level: None   Occupational History    Occupation: [employer]    Tobacco Use    Smoking status: Former     Current packs/day: 0.00     Average packs/day: 1 pack/day for 20.0 years (20.0 ttl pk-yrs)     Types: Cigarettes     Start date:      Quit date:      Years since quittin.0     Passive exposure: Past    Smokeless tobacco: Never   Vaping Use    Vaping status: Never Used   Substance and Sexual Activity    Alcohol use: No    Drug use: No    Sexual activity: Not Currently   Other Topics Concern    None   Social History Narrative    Lives with son     Works as       Social Determinants of Health     Financial Resource Strain: Medium Risk (4/10/2023)    Overall Financial Resource Strain (CARDIA)     Difficulty of Paying Living Expenses: Somewhat hard   Food Insecurity: Not on file   Transportation Needs: No Transportation Needs (4/10/2023)    PRAPARE - Transportation     Lack of Transportation (Medical): No     Lack of Transportation (Non-Medical): No   Physical Activity: Not on file   Stress: Not on file   Social Connections: Not on file   Intimate Partner Violence: Not on file   Housing Stability: Not on file     Social History     Tobacco Use   Smoking Status Former    Current packs/day: 0.00    Average  "packs/day: 1 pack/day for 20.0 years (20.0 ttl pk-yrs)    Types: Cigarettes    Start date:     Quit date:     Years since quittin.0    Passive exposure: Past   Smokeless Tobacco Never     Family History   Problem Relation Age of Onset    Arthritis Mother         Rheumatoid    Angina Mother     Coronary artery disease Mother     Diabetes Mother     Cancer Father         Lung    Cystic fibrosis Father     Rheum arthritis Sister     Diabetes Sister     No Known Problems Maternal Grandmother     No Known Problems Maternal Grandfather     No Known Problems Paternal Grandmother     No Known Problems Paternal Grandfather     Other Brother         Back problems     Diabetes Brother     No Known Problems Brother     Hypertension Family     Heart disease Family     No Known Problems Son     No Known Problems Son        The following portions of the patient's history were reviewed and updated as appropriate: allergies, current medications, past medical history, past social history, past surgical history and problem list.    Results  Recent Results (from the past 1 hour(s))   POCT Measure PVR    Collection Time: 24  8:59 AM   Result Value Ref Range    POST-VOID RESIDUAL VOLUME, ML POC 16 mL   ]  No results found for: \"PSA\"  Lab Results   Component Value Date    GLUCOSE 122 2015    CALCIUM 9.9 2023     2015    K 4.1 2023    CO2 27 2023     2023    BUN 12 2023    CREATININE 0.92 2023     Lab Results   Component Value Date    WBC 4.75 2023    HGB 13.4 2023    HCT 39.7 2023    MCV 91 2023     (H) 2023       Cassie Souza PA-C  "

## 2024-01-02 ENCOUNTER — OFFICE VISIT (OUTPATIENT)
Dept: PHYSICAL THERAPY | Facility: CLINIC | Age: 64
End: 2024-01-02
Payer: COMMERCIAL

## 2024-01-02 ENCOUNTER — OFFICE VISIT (OUTPATIENT)
Dept: OCCUPATIONAL THERAPY | Facility: CLINIC | Age: 64
End: 2024-01-02
Payer: COMMERCIAL

## 2024-01-02 ENCOUNTER — OFFICE VISIT (OUTPATIENT)
Dept: UROLOGY | Facility: CLINIC | Age: 64
End: 2024-01-02
Payer: COMMERCIAL

## 2024-01-02 VITALS
OXYGEN SATURATION: 98 % | HEART RATE: 79 BPM | BODY MASS INDEX: 29.49 KG/M2 | HEIGHT: 65 IN | WEIGHT: 177 LBS | DIASTOLIC BLOOD PRESSURE: 88 MMHG | SYSTOLIC BLOOD PRESSURE: 148 MMHG

## 2024-01-02 DIAGNOSIS — T14.8XXA BONE BRUISE: Primary | ICD-10-CM

## 2024-01-02 DIAGNOSIS — N39.0 RECURRENT UTI: Primary | ICD-10-CM

## 2024-01-02 DIAGNOSIS — S82.302D CLOSED FRACTURE OF DISTAL END OF FIBULA WITH TIBIA, LEFT, WITH ROUTINE HEALING, SUBSEQUENT ENCOUNTER: ICD-10-CM

## 2024-01-02 DIAGNOSIS — S82.832D OTHER CLOSED FRACTURE OF DISTAL END OF LEFT FIBULA WITH ROUTINE HEALING, SUBSEQUENT ENCOUNTER: Primary | ICD-10-CM

## 2024-01-02 DIAGNOSIS — S82.832D CLOSED FRACTURE OF DISTAL END OF FIBULA WITH TIBIA, LEFT, WITH ROUTINE HEALING, SUBSEQUENT ENCOUNTER: ICD-10-CM

## 2024-01-02 DIAGNOSIS — S93.401D SPRAIN OF RIGHT ANKLE, UNSPECIFIED LIGAMENT, SUBSEQUENT ENCOUNTER: ICD-10-CM

## 2024-01-02 LAB — POST-VOID RESIDUAL VOLUME, ML POC: 16 ML

## 2024-01-02 PROCEDURE — 97112 NEUROMUSCULAR REEDUCATION: CPT

## 2024-01-02 PROCEDURE — 97110 THERAPEUTIC EXERCISES: CPT

## 2024-01-02 PROCEDURE — 99213 OFFICE O/P EST LOW 20 MIN: CPT | Performed by: PHYSICIAN ASSISTANT

## 2024-01-02 PROCEDURE — 97530 THERAPEUTIC ACTIVITIES: CPT

## 2024-01-02 PROCEDURE — 51798 US URINE CAPACITY MEASURE: CPT | Performed by: PHYSICIAN ASSISTANT

## 2024-01-02 PROCEDURE — 97140 MANUAL THERAPY 1/> REGIONS: CPT

## 2024-01-02 RX ORDER — OXYBUTYNIN CHLORIDE 5 MG/1
TABLET ORAL
Qty: 90 TABLET | Refills: 0 | Status: SHIPPED | OUTPATIENT
Start: 2024-01-02

## 2024-01-02 NOTE — PROGRESS NOTES
"Daily Note     Today's date: 2024  Patient name: Denita Brown  : 1960  MRN: [id number]  Referring provider: Lachman, James R, MD  Dx:   Encounter Diagnosis     ICD-10-CM    1. Other closed fracture of distal end of left fibula with routine healing, subsequent encounter  S82.832D       2. Closed fracture of distal end of fibula with tibia, left, with routine healing, subsequent encounter  S82.302D     S82.832D       3. Sprain of right ankle, unspecified ligament, subsequent encounter  S93.401D           Start Time: 1613  Stop Time: 1700  Total time in clinic (min): 47 minutes    Subjective: Patient reports feeling sore and achy on arrival but has been on her feet a lot today. She states stairs are the most difficult still. She denies any swelling.       Objective: See treatment diary below      Assessment: Tolerated treatment well. Cuing was provided throughout exercises for proper form and technique. Patient demonstrated fatigue post treatment, exhibited good technique with therapeutic exercises, and would benefit from continued PT to meet functional goals.       Plan: Continue per plan of care.      Precautions: s/p nondisplaced, oblique fracture of left distal fibula and right ankle sprain sustained on 10/22/23.   L1-S1 fusion, C2-C7 fusion >10 years, HTN, DM, Tachycardia  Access Code: [email or web address]    POC expires Unit limit Auth  expiration date PT/OT + Visit Limit?   24 N/A N/A BOMN                 Visit/Unit Tracking  AUTH Status:  Date  12      BOMN Used 1 2 3 4 5 6 7 8 9       Remaining                     Manuals  1/2   L ankle PROM  BE BE BE BE BE SC R and L  BE   KL   L calf stretch  BE BE BE BE BE SC  DC -                Re-evaluation         BE    Neuro Re-Ed             SLS B/L       20\"x 2  2x20\" ea  2x20\" ea   Tandem stance         2x20\" foam   2x20\" foam   Rockerboard taps fwd/bwd, left/right " "             Dani step overs fwd and lat             Tandem walk on foam                                        Ther Ex             Nustep for LE endurance   L4 10' L4 10' L4 10' L4 10' L4 10' L4 10 min  L4 10' L4 10' L4 10'   Ankle 4 ways   TB on R only HEP L only  GTB x20 R only GTB x20 R only GTB x30 R only  R: GTB x30 ea    L: RTB x20 ea R: GTB x30 ea    L: RTB x20 ea R: GTB x30 ea    L: RTB x30 ea R: Blue   X20 ea    L: GTB 20x ea  R: Blue   X20 ea    L: GTB 20x ea    Seated ankle HR/TR B/L  x20 X20 ea X30 ea  X30 ea  X30ea  Standing   Standing 10x ea Standing 10x ea   Pt education  PT POC, HEP, ROM vs strengthening        HEP review     Ther Activity             TG squats       NV  L18 2x10  Standing squat 10x   Fwd step downs       4\" 2x10    R on step 4\" 10x b/l  4\" 2x10 B/L  4\" 2x10 B/L   Fwd step ups       4\" 2x10  R only  4\" 2x 10 L only  6\" 2x10 B/L  6\" 2x10 B/L                Gait Training                                       Modalities                                              "

## 2024-01-02 NOTE — PROGRESS NOTES
"Daily Note     Today's date: 2024  Patient name: Denita Brown  : 1960  MRN: [id number]  Referring provider: Aric Garcia MD  Dx:   Encounter Diagnosis     ICD-10-CM    1. Bone bruise  T14.8XXA           Start Time: 1705  Stop Time: 1800  Total time in clinic (min): 55 minutes    Subjective: Patient reports that her pain is minimal today. Reports that she is in most pain when she bumps her hand against something. Pain starts over bruise and radiates from bruise into thenar eminence and over volar wrist.      Objective: See treatment diary below      Assessment: Tolerated treatment well. Patient exhibited good technique with therapeutic exercises and would benefit from continued OT. Provided patient with wrist stretches to complete as part of HEP to help with volar wrist pain below bruise.      Plan: Continue per plan of care.      Precautions: HTN; chronic pain syndrome; b/l thumb CMC OA         POC expires Unit limit Auth  expiration date PT/OT + Visit Limit?   24 N/A N/A BOMN                          Visit/Unit Tracking  AUTH Status:  Date            BOMN Used 1 2 3 4  5 FOTO             Remaining                         Date 23   Visit 1 2 3  4  5   Manuals             STM 7' 5'      4'   IASTM 5' 5' 8'  10'  6'   KT 3' DC                       Neuro Re-Ed                                                                                                                Ther Ex             TGE     x10 all motions  x10 all motions     AROM wrist all planes x10 AA5' 3x10 e/f   x30 e/f  x30 all planes   AROM thumb all planes x10 AA5' x20 e/f  x20 abduction/adduction        Wrist stretches          10 x 5\" e/f                                                           Ther Activity             Opposition     Small beads oppose with each finger  Small beads 1/2 set oppose w/ each finger  Small beads 1/2 set oppose each digit "   Translation   Small beads 1 set Small beads 1 set  Small beads 1/2 set  Small beads 1/2 set   HEP POC; Elastogel and tg size C for pn mgt Provided new elastogel and TG size C    Wrist ext stretch     KT wear, care, precautions;             AROM digits, wrist           Modalities             MHP   5' 4'  6'  7'   US 3.3MHz, Pulse  50%, 0.8w/cm2 8' 8' 8'  8' 8'

## 2024-01-04 ENCOUNTER — OFFICE VISIT (OUTPATIENT)
Dept: PHYSICAL THERAPY | Facility: CLINIC | Age: 64
End: 2024-01-04
Payer: COMMERCIAL

## 2024-01-04 DIAGNOSIS — S82.832D CLOSED FRACTURE OF DISTAL END OF FIBULA WITH TIBIA, LEFT, WITH ROUTINE HEALING, SUBSEQUENT ENCOUNTER: ICD-10-CM

## 2024-01-04 DIAGNOSIS — S93.401D SPRAIN OF RIGHT ANKLE, UNSPECIFIED LIGAMENT, SUBSEQUENT ENCOUNTER: ICD-10-CM

## 2024-01-04 DIAGNOSIS — S82.832D OTHER CLOSED FRACTURE OF DISTAL END OF LEFT FIBULA WITH ROUTINE HEALING, SUBSEQUENT ENCOUNTER: Primary | ICD-10-CM

## 2024-01-04 DIAGNOSIS — S82.302D CLOSED FRACTURE OF DISTAL END OF FIBULA WITH TIBIA, LEFT, WITH ROUTINE HEALING, SUBSEQUENT ENCOUNTER: ICD-10-CM

## 2024-01-04 PROCEDURE — 97530 THERAPEUTIC ACTIVITIES: CPT

## 2024-01-04 PROCEDURE — 97112 NEUROMUSCULAR REEDUCATION: CPT

## 2024-01-04 PROCEDURE — 97110 THERAPEUTIC EXERCISES: CPT

## 2024-01-04 NOTE — PROGRESS NOTES
"Daily Note     Today's date: 2024  Patient name: Denita Brown  : 1960  MRN: [id number]  Referring provider: Lachman, James R, MD  Dx:   Encounter Diagnosis     ICD-10-CM    1. Other closed fracture of distal end of left fibula with routine healing, subsequent encounter  S82.832D       2. Closed fracture of distal end of fibula with tibia, left, with routine healing, subsequent encounter  S82.302D     S82.832D       3. Sprain of right ankle, unspecified ligament, subsequent encounter  S93.401D                      Subjective: Patient reports that she had a lot of pain in both of her ankles after her last session and she is very upset about this because her ankles had been doing very well.       Objective: See treatment diary below      Assessment: Tolerated treatment well. Mild discomfort with forward step downs with the L LE therefore held at 10 reps. She was able to complete all other exercises without increased pain.Does have difficulty and instabilities during balacne activiites therefore performed all with finger touch support. Patient demonstrated fatigue post treatment, exhibited good technique with therapeutic exercises, and would benefit from continued PT      Plan: Continue per plan of care.      Precautions: s/p nondisplaced, oblique fracture of left distal fibula and right ankle sprain sustained on 10/22/23.   L1-S1 fusion, C2-C7 fusion >10 years, HTN, DM, Tachycardia  Access Code: [email or web address]    POC expires Unit limit Auth  expiration date PT/OT + Visit Limit?   24 N/A N/A BOMN                 Visit/Unit Tracking  AUTH Status:  Date  1/2      BOMN Used 1 2 3 4 5 6 7 8 9 10      Remaining                     Manuals  1/2   L ankle PROM  BE BE BE BE BE SC R and L  BE   KL   L calf stretch  BE BE BE BE BE SC  DC -                Re-evaluation         BE    Neuro Re-Ed             SLS B/L 2x20\"     " "  20\"x 2  2x20\" ea  2x20\" ea   Tandem stance  2x20\" foam        2x20\" foam   2x20\" foam   Rockerboard taps fwd/bwd, left/right  20x ea dir             Dani step overs fwd and lat 3 laps non reciprocally            Tandem walk on foam                                        Ther Ex             Nustep for LE endurance  L4 10' L4 10' L4 10' L4 10' L4 10' L4 10' L4 10 min  L4 10' L4 10' L4 10'   Ankle 4 ways   TB on R only R: Blue   X20 ea    L: GTB 20x ea  GTB x20 R only GTB x20 R only GTB x30 R only  R: GTB x30 ea    L: RTB x20 ea R: GTB x30 ea    L: RTB x20 ea R: GTB x30 ea    L: RTB x30 ea R: Blue   X20 ea    L: GTB 20x ea  R: Blue   X20 ea    L: GTB 20x ea    Seated ankle HR/TR B/L Standing 2x10 ea x20 X20 ea X30 ea  X30 ea  X30ea  Standing   Standing 10x ea Standing 10x ea   Pt education          HEP review     Ther Activity             TG squats  L18 2x10      NV  L18 2x10  Standing squat 10x   Fwd step downs  4\" R: 2x10  L: X10      4\" 2x10    R on step 4\" 10x b/l  4\" 2x10 B/L  4\" 2x10 B/L   Fwd step ups  6\" 2x10 B/L     4\" 2x10  R only  4\" 2x 10 L only  6\" 2x10 B/L  6\" 2x10 B/L                Gait Training                                       Modalities                                                "

## 2024-01-06 DIAGNOSIS — G89.4 CHRONIC PAIN SYNDROME: ICD-10-CM

## 2024-01-08 ENCOUNTER — TELEPHONE (OUTPATIENT)
Dept: FAMILY MEDICINE CLINIC | Facility: CLINIC | Age: 64
End: 2024-01-08

## 2024-01-08 DIAGNOSIS — Z98.84 BARIATRIC SURGERY STATUS: ICD-10-CM

## 2024-01-08 DIAGNOSIS — Z90.3 POSTGASTRECTOMY MALABSORPTION: Primary | ICD-10-CM

## 2024-01-08 DIAGNOSIS — E78.2 MIXED HYPERLIPIDEMIA: ICD-10-CM

## 2024-01-08 DIAGNOSIS — N95.2 ATROPHIC VAGINITIS: ICD-10-CM

## 2024-01-08 DIAGNOSIS — F41.9 ANXIETY: ICD-10-CM

## 2024-01-08 DIAGNOSIS — K91.2 POSTGASTRECTOMY MALABSORPTION: Primary | ICD-10-CM

## 2024-01-08 DIAGNOSIS — I10 ESSENTIAL HYPERTENSION: ICD-10-CM

## 2024-01-08 PROBLEM — R05.8 POST-VIRAL COUGH SYNDROME: Status: RESOLVED | Noted: 2023-08-21 | Resolved: 2024-01-08

## 2024-01-08 RX ORDER — BUPROPION HYDROCHLORIDE 75 MG/1
75 TABLET ORAL 2 TIMES DAILY
Qty: 180 TABLET | Refills: 1 | Status: SHIPPED | OUTPATIENT
Start: 2024-01-08

## 2024-01-08 NOTE — TELEPHONE ENCOUNTER
I scheduled  Denita's appt for April however she said she is also due for labs but nothing is in there. Can you please order them? Patient will have done week prior to appt .  Thanks

## 2024-01-09 ENCOUNTER — APPOINTMENT (OUTPATIENT)
Dept: PHYSICAL THERAPY | Facility: CLINIC | Age: 64
End: 2024-01-09
Payer: COMMERCIAL

## 2024-01-09 ENCOUNTER — APPOINTMENT (OUTPATIENT)
Dept: OCCUPATIONAL THERAPY | Facility: CLINIC | Age: 64
End: 2024-01-09
Payer: COMMERCIAL

## 2024-01-11 ENCOUNTER — OFFICE VISIT (OUTPATIENT)
Dept: PHYSICAL THERAPY | Facility: CLINIC | Age: 64
End: 2024-01-11
Payer: COMMERCIAL

## 2024-01-11 DIAGNOSIS — S82.302D CLOSED FRACTURE OF DISTAL END OF FIBULA WITH TIBIA, LEFT, WITH ROUTINE HEALING, SUBSEQUENT ENCOUNTER: ICD-10-CM

## 2024-01-11 DIAGNOSIS — S93.401D SPRAIN OF RIGHT ANKLE, UNSPECIFIED LIGAMENT, SUBSEQUENT ENCOUNTER: ICD-10-CM

## 2024-01-11 DIAGNOSIS — S82.832D CLOSED FRACTURE OF DISTAL END OF FIBULA WITH TIBIA, LEFT, WITH ROUTINE HEALING, SUBSEQUENT ENCOUNTER: ICD-10-CM

## 2024-01-11 DIAGNOSIS — S82.832D OTHER CLOSED FRACTURE OF DISTAL END OF LEFT FIBULA WITH ROUTINE HEALING, SUBSEQUENT ENCOUNTER: Primary | ICD-10-CM

## 2024-01-11 PROCEDURE — 97530 THERAPEUTIC ACTIVITIES: CPT

## 2024-01-11 PROCEDURE — 97110 THERAPEUTIC EXERCISES: CPT

## 2024-01-11 PROCEDURE — 97112 NEUROMUSCULAR REEDUCATION: CPT

## 2024-01-11 NOTE — PROGRESS NOTES
"Daily Note     Today's date: 1/10/2024  Patient name: Denita Brown  : 1960  MRN: [id number]  Referring provider: Lachman, James R, MD  Dx:   Encounter Diagnosis     ICD-10-CM    1. Other closed fracture of distal end of left fibula with routine healing, subsequent encounter  S82.832D       2. Closed fracture of distal end of fibula with tibia, left, with routine healing, subsequent encounter  S82.302D     S82.832D       3. Sprain of right ankle, unspecified ligament, subsequent encounter  S93.401D                      Subjective: Patient reports that her ankles continue to improve in terms of her pain, mobility, and balance.       Objective: See treatment diary below      Assessment: Tolerated treatment well. Patient able to complete forward step downs off step this session with decreased discomfort reported in her bilateral ankles. Able to progress kathy step overs with reciprocal stepping and addition of foam pads. She did have increased difficulty with this requiring occasional UE use to stabilize. Patient demonstrated fatigue post treatment, exhibited good technique with therapeutic exercises, and would benefit from continued PT      Plan: Progress treatment as tolerated.       Precautions: s/p nondisplaced, oblique fracture of left distal fibula and right ankle sprain sustained on 10/22/23.   L1-S1 fusion, C2-C7 fusion >10 years, HTN, DM, Tachycardia  Access Code: [email or web address]    POC expires Unit limit Auth  expiration date PT/OT + Visit Limit?   24 N/A N/A BOMN                 Visit/Unit Tracking  AUTH Status:  Date 2     BOMN Used 1 2 3 4 5 6 7 8 9 10 11     Remaining                     Manuals  1/2   L ankle PROM   BE BE BE BE SC R and L  BE   KL   L calf stretch   BE BE BE BE SC  DC -                Re-evaluation         BE    Neuro Re-Ed             SLS B/L 2x20\"  3x20\"      20\"x 2  2x20\" ea  " "2x20\" ea   Tandem stance  2x20\" foam        2x20\" foam   2x20\" foam   Rockerboard taps fwd/bwd, left/right  20x ea dir  30x ea dir            Dani step overs fwd and lat 3 laps non reciprocally 3 laps reciprocally with foam pads           Tandem walk on foam   X4 laps                                      Ther Ex             Nustep for LE endurance  L4 10' L4 10' L4 10' L4 10' L4 10' L4 10' L4 10 min  L4 10' L4 10' L4 10'   Ankle 4 ways   TB on R only R: Blue   X20 ea    L: GTB 20x ea  GTB x20 R only GTB x20 R only GTB x30 R only  R: GTB x30 ea    L: RTB x20 ea R: GTB x30 ea    L: RTB x20 ea R: GTB x30 ea    L: RTB x30 ea R: Blue   X20 ea    L: GTB 20x ea  R: Blue   X20 ea    L: GTB 20x ea    Seated ankle HR/TR B/L Standing 2x10 ea Standing 3x10  X20 ea X30 ea  X30 ea  X30ea  Standing   Standing 10x ea Standing 10x ea   Pt education          HEP review     Ther Activity             TG squats  L18 2x10  L18 3x10    NV  L18 2x10  Standing squat 10x   Fwd step downs  4\" R: 2x10  L: X10  4\" 2x10 B/L    4\" 2x10    R on step 4\" 10x b/l  4\" 2x10 B/L  4\" 2x10 B/L   Fwd step ups  6\" 2x10 B/L 6\" 2x10 B/L    4\" 2x10  R only  4\" 2x 10 L only  6\" 2x10 B/L  6\" 2x10 B/L                Gait Training                                       Modalities                                                  "

## 2024-01-12 NOTE — PROGRESS NOTES
"Daily Note     Today's date: 2024  Patient name: Denita Brown  : 1960  MRN: [id number]  Referring provider: Lachman, James R, MD  Dx:   Encounter Diagnosis     ICD-10-CM    1. Other closed fracture of distal end of left fibula with routine healing, subsequent encounter  S82.832D       2. Closed fracture of distal end of fibula with tibia, left, with routine healing, subsequent encounter  S82.302D     S82.832D       3. Sprain of right ankle, unspecified ligament, subsequent encounter  S93.401D                      Subjective: Patient reports that she must have twisted wrong over the weekend because her back was killing her all of yesterday. She says that she spent all of yesterday on heating pads for her back.      Objective: See treatment diary below      Assessment: Tolerated treatment well. Limited balance exercises due to patient request of not wanting to aggravate her LBP. She was able to complete forward step downs off 6\" step this session with good form; mild soreness reported following. Patient demonstrated fatigue post treatment, exhibited good technique with therapeutic exercises, and would benefit from continued PT      Plan: Progress treatment as tolerated.       Precautions: s/p nondisplaced, oblique fracture of left distal fibula and right ankle sprain sustained on 10/22/23.   L1-S1 fusion, C2-C7 fusion >10 years, HTN, DM, Tachycardia  Access Code: [email or web address]    POC expires Unit limit Auth  expiration date PT/OT + Visit Limit?   24 N/A N/A BOMN                 Visit/Unit Tracking  AUTH Status:  Date 11/28 11/30 12/4 12/7 12/12 12/19 12/21 12/28 1/2 1/4 1/11 1/15   BOMN Used 1 2 3 4 5 6 7 8 9 10 11 12    Remaining                     Manuals 1/4 1/11 1/15 12/7 12/12 12/14 12/19 12/21 12/28 1/2   L ankle PROM    BE BE BE SC R and L  BE   KL   L calf stretch    BE BE BE SC  DC -                Re-evaluation         BE    Neuro Re-Ed             SLS B/L 2x20\"  3x20\"      20\"x 2  2x20\" ea  " "2x20\" ea   Tandem stance  2x20\" foam        2x20\" foam   2x20\" foam   Rockerboard taps fwd/bwd, left/right  20x ea dir  30x ea dir            Dani step overs fwd and lat 3 laps non reciprocally 3 laps reciprocally with foam pads           Tandem walk on foam   X4 laps  X4 laps                                     Ther Ex             Nustep for LE endurance  L4 10' L4 10' L4 10' L4 10' L4 10' L4 10' L4 10 min  L4 10' L4 10' L4 10'   Ankle 4 ways   TB on R only R: Blue   X20 ea    L: GTB 20x ea   GTB x20 R only GTB x30 R only  R: GTB x30 ea    L: RTB x20 ea R: GTB x30 ea    L: RTB x20 ea R: GTB x30 ea    L: RTB x30 ea R: Blue   X20 ea    L: GTB 20x ea  R: Blue   X20 ea    L: GTB 20x ea    Seated ankle HR/TR B/L Standing 2x10 ea Standing 3x10  Standing x30 ea X30 ea  X30 ea  X30ea  Standing   Standing 10x ea Standing 10x ea   Pt education          HEP review     Ther Activity             TG squats  L18 2x10  L18 3x10 L20 3x10    NV  L18 2x10  Standing squat 10x   Fwd step downs  4\" R: 2x10  L: X10  4\" 2x10 B/L 6\" 3x10 R  6\" 2x10 L    4\" 2x10    R on step 4\" 10x b/l  4\" 2x10 B/L  4\" 2x10 B/L   Fwd step ups  6\" 2x10 B/L 6\" 2x10 B/L 6\" 2x10 B/L   4\" 2x10  R only  4\" 2x 10 L only  6\" 2x10 B/L  6\" 2x10 B/L                Gait Training                                       Modalities                                                    "

## 2024-01-15 ENCOUNTER — OFFICE VISIT (OUTPATIENT)
Dept: PHYSICAL THERAPY | Facility: CLINIC | Age: 64
End: 2024-01-15
Payer: COMMERCIAL

## 2024-01-15 ENCOUNTER — EVALUATION (OUTPATIENT)
Dept: OCCUPATIONAL THERAPY | Facility: CLINIC | Age: 64
End: 2024-01-15
Payer: COMMERCIAL

## 2024-01-15 DIAGNOSIS — S82.832D OTHER CLOSED FRACTURE OF DISTAL END OF LEFT FIBULA WITH ROUTINE HEALING, SUBSEQUENT ENCOUNTER: Primary | ICD-10-CM

## 2024-01-15 DIAGNOSIS — S93.401D SPRAIN OF RIGHT ANKLE, UNSPECIFIED LIGAMENT, SUBSEQUENT ENCOUNTER: ICD-10-CM

## 2024-01-15 DIAGNOSIS — T14.8XXA BONE BRUISE: Primary | ICD-10-CM

## 2024-01-15 DIAGNOSIS — S82.832D CLOSED FRACTURE OF DISTAL END OF FIBULA WITH TIBIA, LEFT, WITH ROUTINE HEALING, SUBSEQUENT ENCOUNTER: ICD-10-CM

## 2024-01-15 DIAGNOSIS — S82.302D CLOSED FRACTURE OF DISTAL END OF FIBULA WITH TIBIA, LEFT, WITH ROUTINE HEALING, SUBSEQUENT ENCOUNTER: ICD-10-CM

## 2024-01-15 PROCEDURE — 97530 THERAPEUTIC ACTIVITIES: CPT

## 2024-01-15 PROCEDURE — 97110 THERAPEUTIC EXERCISES: CPT

## 2024-01-15 PROCEDURE — 97140 MANUAL THERAPY 1/> REGIONS: CPT

## 2024-01-15 NOTE — PROGRESS NOTES
OT Re-Evaluation    Today's date: 1/15/2024  Patient name: Denita Brown  : 1960  MRN: [id number]  Referring provider: Aric Garcia MD  Dx:   Encounter Diagnosis     ICD-10-CM    1. Bone bruise  T14.8XXA           Start Time: 1620  Stop Time: 1705  Total time in clinic (min): 45 minutes    Assessment  Assessment details: Denita Brown is a 63 y.o., Left HD female referred to hand therapy for a right thumb and wrist bone bruise.  Onset of injury 10/22/23 due to a fall on outstretched hand.  Patient presents 23 with impaired ROM, strength of the right thumb and wrist.  Deficits also noted in pain, edema and functional use of the right UE. Patient is wearing a thumb spica splint prn for pain relief. Patient is a good candidate for OT services to abolish pain and edema and restore ROM, strength for a return to independence in daily tasks.    1/15/24: Patient seen in OT 6 times. Patient has made improvements in pain and edema at wrist. She is able to achieve end range wrist motion without pain today. All AROM WFLs. She is limited in weight bearing through her RUE due to pain at wrist and thumb. Future sessions will place an emphasis on strengthening wrist and thumb to improve weight bearing tolerance and massage and ultrasound to decrease pain around these areas.. She should continue to wear carpal gel sleeve to protect this area during flare ups of symptoms.  Impairments: activity intolerance, impaired physical strength and pain with function    Symptom irritability: moderateBarriers to therapy: Chronic pain syndrome; HTN; b/l thumb CMC jt OA; hx right radial nerve irritation  Understanding of Dx/Px/POC: excellent   Prognosis: good    Goals  STGs/LTGs (4-6 weeks)  Patient will demonstrate independence in a HEP to maintain ROM, strength, and function at discharge MET  Patient will report an average pain level of 0-1/10 to be independent in daily tasks NOT MET  Patient will demonstrate RODGERS of thethumb  to WNL to be independent in self care tasks MET  Patient will demonstrate pain free AROM of the wrist and forearm WFL to be independent in self care tasks MET  Patient will demonstrate 5/5 muscle strength in the wrist and forearm to be MI for meal prep MET  Patient will demonstrate right hand strength within 20% of the left hand to be MI for IADL tasks MET  Patient will demonstrate resolution of edema MET      Plan  Plan details: 11/21/23: Begin OT 1-2x/wk x 4-6 weeks  1/15/24: Continue OT 1x/week x 2-4 weeks  Patient would benefit from: skilled occupational therapy and custom splinting  Planned modality interventions: ultrasound, thermotherapy: hydrocollator packs, cryotherapy and thermotherapy: paraffin bath  Planned therapy interventions: activity modification, compression, graded exercise, home exercise program, therapeutic exercise, therapeutic activities, stretching, strengthening, patient education, orthotic fitting/training, manual therapy, IASTM, joint mobilization, kinesiology taping, massage and graded activity  Frequency: 1-2x/wk.  Duration in weeks: 12  Plan of Care beginning date: 11/21/2023  Plan of Care expiration date: 2/16/2024  Treatment plan discussed with: patient        Subjective Evaluation    History of Present Illness  Date of onset: 10/22/2023  Mechanism of injury: trauma  Mechanism of injury: Denita Brown is a 63 y.o., LHD female who reports persistent right wrist and thumb pain. The patient reports she injured her right hand on 10/22/23 after a fall onto her outstretched hand while in [facility]. Pain persisted and she began using a cock-up wrist brace she had at home without much relief. The patient is on Dilaudid for chronic pain. She was seen by  for the pain. X rays were negative and patient diagnosed with a bone bruise. Patient was given a thumb spica splint to wear for pain mgt and has been given Mobic. She now referred to OT for evaluation and treatment of right hand  pain.    1/15/24: Patient reports that she feels like OT has been helping. Ultrasound and massage has helped most. States that the lump and swelling has gone down at her bruise. Grabbing objects has been easier. It is painful when she weight bears throughout her RUE but the carpal gel sleeve has helped.       Quality of life: fair    Patient Goals  Patient goals for therapy: decreased edema, decreased pain, increased motion, increased strength, independence with ADLs/IADLs and return to sport/leisure activities    Pain  Current pain ratin  At best pain ratin  At worst pain ratin  Location: right palm and base of thumb  Quality: sharp  Relieving factors: medications, support and ice (carpal gel sleeve)  Exacerbated by: weight bearing on palm, grasping, bumping into things.  Progression: improved    Social Support  Lives with: adult children    Employment status: working  Hand dominance: left      Diagnostic Tests  X-ray: normal  Treatments  Current treatment: occupational therapy        Objective     Observations     Right Wrist/Hand   Negative for edema.     Additional Observation Details  23: Nodule/mass on volar wrist near lunate  1/15/24: Nodule/mass still present    Tenderness     Right Wrist/Hand   Tenderness in the lunate. No tenderness in the scaphoid, triquetrum and capitate.     Neurological Testing     Sensation     Wrist/Hand     Right   Intact: light touch    Active Range of Motion     Right Wrist   Normal active range of motion    Right Thumb   Flexion     CMC: 10    MP: 55    DIP: 55  Palmar Abduction    CMC: 45  Radial Abduction    CMC: 58  Opposition: 23: RODGERS = 100  1/15/24: RODGERS = 120  Kapandji score: 10 degrees    Additional Active Range of Motion Details  23: Pain at end range of wrist flex/ext  1/15/24: No pain reported at end range  23: WNL  1/15/24: WNL    Strength/Myotome Testing     Left Wrist/Hand      (2nd hand position)     Trial 1: 60    Thumb  "Strength  Key/Lateral Pinch     Trial 1: 10  Tip/Two-Point Pinch     Trial 1: 6  Palmar/Three-Point Pinch     Trial 1: 8    Right Wrist/Hand      (2nd hand position)     Trial 1: 50    Thumb Strength   Key/Lateral Pinch     Trial 1: 10  Tip/Two-Point Pinch     Trial 1: 9  Palmar/Three-Point Pinch     Trial 1: 9    Swelling     Left Wrist/Hand   Circumference wrist: 15.1 cm    Right Wrist/Hand   Circumference wrist: 15.1 cm             Precautions: HTN; chronic pain syndrome; b/l thumb CMC OA          POC expires Unit limit Auth  expiration date PT/OT + Visit Limit?   2/16/24 N/A N/A BOMN                          Visit/Unit Tracking  AUTH Status:  Date 11/21 12/4 12/12 12/21  1/2  1/15  RE         BOMN Used 1 2 3 4  5 FOTO  6           Remaining                         Date 1/15/24 12/4/23 12/12/23  12/21/23  1/2/24   Visit 6 2 3  4  5   Manuals             STM  5'      4'   IASTM 10' 5' 8'  10'  6'   KT  DC                      Neuro Re-Ed                                                                                                        Ther Ex            TGE    x10 all motions  x10 all motions     AROM wrist all planes  AA5' 3x10 e/f   x30 e/f  x30 all planes   AROM thumb all planes  AA5' x20 e/f  x20 abduction/adduction        Wrist stretches          10 x 5\" e/f    Thumb strengthening  Radial ab isometric 10 x 5\"            Bunny ears  x20            Wrist strengthening  1# x30 e/f             strength  YPW x30           Pinch strength R/G CP on PW  2 circles       Ther Activity             Opposition     Small beads oppose with each finger  Small beads 1/2 set oppose w/ each finger  Small beads 1/2 set oppose each digit   Translation   Small beads 1 set Small beads 1 set  Small beads 1/2 set  Small beads 1/2 set   HEP POC; Elastogel and tg size C for pn mgt Provided new elastogel and TG size C    Wrist ext stretch     KT wear, care, precautions;             AROM digits, wrist           Modalities     "         [facility]   5' 4'  6'  7'   US 3.3MHz, Pulse  50%, 0.8w/cm2 8' 8' 8'  8' 8'

## 2024-01-18 ENCOUNTER — APPOINTMENT (OUTPATIENT)
Dept: PHYSICAL THERAPY | Facility: CLINIC | Age: 64
End: 2024-01-18
Payer: COMMERCIAL

## 2024-01-18 DIAGNOSIS — S82.832D OTHER CLOSED FRACTURE OF DISTAL END OF LEFT FIBULA WITH ROUTINE HEALING, SUBSEQUENT ENCOUNTER: Primary | ICD-10-CM

## 2024-01-18 DIAGNOSIS — S82.832D CLOSED FRACTURE OF DISTAL END OF FIBULA WITH TIBIA, LEFT, WITH ROUTINE HEALING, SUBSEQUENT ENCOUNTER: ICD-10-CM

## 2024-01-18 DIAGNOSIS — S82.302D CLOSED FRACTURE OF DISTAL END OF FIBULA WITH TIBIA, LEFT, WITH ROUTINE HEALING, SUBSEQUENT ENCOUNTER: ICD-10-CM

## 2024-01-18 DIAGNOSIS — S93.401D SPRAIN OF RIGHT ANKLE, UNSPECIFIED LIGAMENT, SUBSEQUENT ENCOUNTER: ICD-10-CM

## 2024-01-18 NOTE — PROGRESS NOTES
"Daily Note     Today's date: 2024  Patient name: Denita Brown  : 1960  MRN: [id number]  Referring provider: Lachman, James R, MD  Dx:   Encounter Diagnosis     ICD-10-CM    1. Other closed fracture of distal end of left fibula with routine healing, subsequent encounter  S82.832D       2. Closed fracture of distal end of fibula with tibia, left, with routine healing, subsequent encounter  S82.302D     S82.832D       3. Sprain of right ankle, unspecified ligament, subsequent encounter  S93.401D                      Subjective: ***      Objective: See treatment diary below      Assessment: Tolerated treatment {Tolerated treatment :[id number]}. Patient {assessment:[id number]}      Plan: {PLAN:[id number]}     Precautions: s/p nondisplaced, oblique fracture of left distal fibula and right ankle sprain sustained on 10/22/23.   L1-S1 fusion, C2-C7 fusion >10 years, HTN, DM, Tachycardia  Access Code: [email or web address]    POC expires Unit limit Auth  expiration date PT/OT + Visit Limit?   24 N/A N/A BOMN                 Visit/Unit Tracking  AUTH Status:  Date 11/28 11/30 12/4 12/7 12/12 12/19 12/21 12/28 1/2 1/4 1/11 1/15   BOMN Used 1 2 3 4 5 6 7 8 9 10 11 12    Remaining                     Manuals 1/4 1/11 1/15 12/7 12/12 12/14 12/19 12/21 12/28 1/2   L ankle PROM    BE BE BE SC R and L  BE   KL   L calf stretch    BE BE BE SC  DC -                Re-evaluation         BE    Neuro Re-Ed             SLS B/L 2x20\"  3x20\"      20\"x 2  2x20\" ea  2x20\" ea   Tandem stance  2x20\" foam        2x20\" foam   2x20\" foam   Rockerboard taps fwd/bwd, left/right  20x ea dir  30x ea dir            Dani step overs fwd and lat 3 laps non reciprocally 3 laps reciprocally with foam pads           Tandem walk on foam   X4 laps  X4 laps                                     Ther Ex             Nustep for LE endurance  L4 10' L4 10' L4 10' L4 10' L4 10' L4 10' L4 10 min  L4 10' L4 10' L4 10'   Ankle 4 ways   TB on R only R: Blue   X20 " "ea    L: GTB 20x ea   GTB x20 R only GTB x30 R only  R: GTB x30 ea    L: RTB x20 ea R: GTB x30 ea    L: RTB x20 ea R: GTB x30 ea    L: RTB x30 ea R: Blue   X20 ea    L: GTB 20x ea  R: Blue   X20 ea    L: GTB 20x ea    Seated ankle HR/TR B/L Standing 2x10 ea Standing 3x10  Standing x30 ea X30 ea  X30 ea  X30ea  Standing   Standing 10x ea Standing 10x ea   Pt education          HEP review     Ther Activity             TG squats  L18 2x10  L18 3x10 L20 3x10    NV  L18 2x10  Standing squat 10x   Fwd step downs  4\" R: 2x10  L: X10  4\" 2x10 B/L 6\" 3x10 R  6\" 2x10 L    4\" 2x10    R on step 4\" 10x b/l  4\" 2x10 B/L  4\" 2x10 B/L   Fwd step ups  6\" 2x10 B/L 6\" 2x10 B/L 6\" 2x10 B/L   4\" 2x10  R only  4\" 2x 10 L only  6\" 2x10 B/L  6\" 2x10 B/L                Gait Training                                       Modalities                                                      "

## 2024-01-22 ENCOUNTER — OFFICE VISIT (OUTPATIENT)
Dept: OCCUPATIONAL THERAPY | Facility: CLINIC | Age: 64
End: 2024-01-22
Payer: COMMERCIAL

## 2024-01-22 ENCOUNTER — OFFICE VISIT (OUTPATIENT)
Dept: PHYSICAL THERAPY | Facility: CLINIC | Age: 64
End: 2024-01-22
Payer: COMMERCIAL

## 2024-01-22 DIAGNOSIS — T14.8XXA BONE BRUISE: Primary | ICD-10-CM

## 2024-01-22 DIAGNOSIS — S82.302D CLOSED FRACTURE OF DISTAL END OF FIBULA WITH TIBIA, LEFT, WITH ROUTINE HEALING, SUBSEQUENT ENCOUNTER: ICD-10-CM

## 2024-01-22 DIAGNOSIS — S82.832D CLOSED FRACTURE OF DISTAL END OF FIBULA WITH TIBIA, LEFT, WITH ROUTINE HEALING, SUBSEQUENT ENCOUNTER: ICD-10-CM

## 2024-01-22 DIAGNOSIS — S93.401D SPRAIN OF RIGHT ANKLE, UNSPECIFIED LIGAMENT, SUBSEQUENT ENCOUNTER: ICD-10-CM

## 2024-01-22 DIAGNOSIS — S82.832D OTHER CLOSED FRACTURE OF DISTAL END OF LEFT FIBULA WITH ROUTINE HEALING, SUBSEQUENT ENCOUNTER: Primary | ICD-10-CM

## 2024-01-22 PROCEDURE — 97530 THERAPEUTIC ACTIVITIES: CPT

## 2024-01-22 PROCEDURE — 97112 NEUROMUSCULAR REEDUCATION: CPT

## 2024-01-22 PROCEDURE — 97110 THERAPEUTIC EXERCISES: CPT

## 2024-01-22 PROCEDURE — 97140 MANUAL THERAPY 1/> REGIONS: CPT

## 2024-01-22 NOTE — PROGRESS NOTES
"Daily Note     Today's date: 2024  Patient name: Denita Brown  : 1960  MRN: [id number]  Referring provider: Aric Garcia MD  Dx:   Encounter Diagnosis     ICD-10-CM    1. Bone bruise  T14.8XXA           Start Time: 1630  Stop Time: 1715  Total time in clinic (min): 45 minutes    Subjective: Patient reports that her pain is about the same at base of thumb and over bruise      Objective: See treatment diary below      Assessment: Tolerated treatment well. Patient exhibited good technique with therapeutic exercises and would benefit from continued OT. Patient responded well to IASTM and ultrasound to painful areas. Progressed strengthening and patient tolerated well with no increase in pain around thumb.      Plan: Continue per plan of care.  Progress treatment as tolerated.       Precautions: HTN; chronic pain syndrome; b/l thumb CMC OA          POC expires Unit limit Auth  expiration date PT/OT + Visit Limit?   24 N/A N/A BOMN                          Visit/Unit Tracking  AUTH Status:  Date 11/21 12/4 12/12 12/21  1/2  1/15  RE         BOMN Used 1 2 3 4  5 FOTO  6  7         Remaining                         Date 1/15/24 1/22/24 12/12/23  12/21/23  1/2/24   Visit 6 7 3  4  5   Manuals            STM         4'   IASTM 10' 10' 8'  10'  6'   KT                         Neuro Re-Ed                                                                                                        Ther Ex            TGE    x10 all motions  x10 all motions     AROM wrist all planes    3x10 e/f   x30 e/f  x30 all planes   AROM thumb all planes    x20 e/f  x20 abduction/adduction        Wrist stretches         10 x 5\" e/f    Thumb strengthening  Radial ab isometric 10 x 5\" Radial ab isometric 10 x 5\"          Bunny ears  x20 x20          Wrist strengthening  1# x30 e/f 2# 2x15 e/f           strength  YPW x30 YPW x30         Pinch strength R/G CP on PW  2 circles R/G CP on PW 2 circles         Ther " Activity            Opposition   Small beads Small beads oppose with each finger  Small beads 1/2 set oppose w/ each finger  Small beads 1/2 set oppose each digit   Translation   Small beads Small beads 1 set  Small beads 1/2 set  Small beads 1/2 set   HEP ;  Provided new elastogel and TG size C    Wrist ext stretch                           Modalities            MHP   3' 4'  6'  7'   US 3.3MHz, Pulse  50%, 0.8w/cm2 8' 8' 8'  8' 8'

## 2024-01-22 NOTE — PROGRESS NOTES
"Daily Note     Today's date: 2024  Patient name: Denita Brown  : 1960  MRN: [id number]  Referring provider: Lachman, James R, MD  Dx:   Encounter Diagnosis     ICD-10-CM    1. Other closed fracture of distal end of left fibula with routine healing, subsequent encounter  S82.832D       2. Closed fracture of distal end of fibula with tibia, left, with routine healing, subsequent encounter  S82.302D     S82.832D       3. Sprain of right ankle, unspecified ligament, subsequent encounter  S93.401D           Start Time: 1715  Stop Time: 1753  Total time in clinic (min): 38 minutes    Subjective: pt noted that her LB has been bother her for about a week now. She noted that her ankles are feeling good. Still having a slight ache all day long.   She noted that her R hip has been hurting.     Objective: See treatment diary below      Assessment: Continued with treatment session but noted that she has increase pain in her R hip. Pt noted that her ankles have been feeling good with all exercises. Tolerated treatment well. Patient exhibited good technique with therapeutic exercises and would benefit from continued PT. S/p treatment session noted some increase in soreness.       Plan: Continue per plan of care.      Precautions: s/p nondisplaced, oblique fracture of left distal fibula and right ankle sprain sustained on 10/22/23.   L1-S1 fusion, C2-C7 fusion >10 years, HTN, DM, Tachycardia  Access Code: [email or web address]    POC expires Unit limit Auth  expiration date PT/OT + Visit Limit?   24 N/A N/A BOMN                 Visit/Unit Tracking  AUTH Status:  Date 1/22  12/4 12/7 12/12 12/19 12/21 12/28 1/2 1/4 1/11 1/15   BOMN Used 13  3 4 5 6 7 8 9 10 11 12    Remaining                     Manuals 1/4 1/11 1/15 1/22  12/14 12/19 12/21 12/28 1   L ankle PROM      BE SC R and L  BE   KL   L calf stretch      BE SC  DC -                Re-evaluation         BE    Neuro Re-Ed             SLS B/L 2x20\"  3x20\"      20\"x 2  " "2x20\" ea  2x20\" ea   Tandem stance  2x20\" foam        2x20\" foam   2x20\" foam   Rockerboard taps fwd/bwd, left/right  20x ea dir  30x ea dir            Dani step overs fwd and lat 3 laps non reciprocally 3 laps reciprocally with foam pads           Tandem walk on foam   X4 laps  X4 laps  4x laps                                    Ther Ex             Nustep for LE endurance  L4 10' L4 10' L4 10' L4 10 min   L4 10' L4 10 min  L4 10' L4 10' L4 10'   Ankle 4 ways   TB on R only R: Blue   X20 ea    L: GTB 20x ea     R: GTB x30 ea    L: RTB x20 ea R: GTB x30 ea    L: RTB x20 ea R: GTB x30 ea    L: RTB x30 ea R: Blue   X20 ea    L: GTB 20x ea  R: Blue   X20 ea    L: GTB 20x ea    Seated ankle HR/TR B/L Standing 2x10 ea Standing 3x10  Standing x30 ea Standing x30 ea  X30ea  Standing   Standing 10x ea Standing 10x ea   Pt education          HEP review                               Ther Activity             TG squats  L18 2x10  L18 3x10 L20 3x10  L22 3x 10   NV  L18 2x10  Standing squat 10x   Fwd step downs  4\" R: 2x10  L: X10  4\" 2x10 B/L 6\" 3x10 R  6\" 2x10 L  6\" 3x10 R  6\" 2x10 L   4\" 2x10    R on step 4\" 10x b/l  4\" 2x10 B/L  4\" 2x10 B/L   Fwd step ups  6\" 2x10 B/L 6\" 2x10 B/L 6\" 2x10 B/L 6\" 2x 10 b/l   4\" 2x10  R only  4\" 2x 10 L only  6\" 2x10 B/L  6\" 2x10 B/L                Gait Training                                       Modalities                                                      "

## 2024-01-25 ENCOUNTER — EVALUATION (OUTPATIENT)
Dept: PHYSICAL THERAPY | Facility: CLINIC | Age: 64
End: 2024-01-25
Payer: COMMERCIAL

## 2024-01-25 DIAGNOSIS — N32.81 OAB (OVERACTIVE BLADDER): ICD-10-CM

## 2024-01-25 DIAGNOSIS — S93.401D SPRAIN OF RIGHT ANKLE, UNSPECIFIED LIGAMENT, SUBSEQUENT ENCOUNTER: ICD-10-CM

## 2024-01-25 DIAGNOSIS — S82.832D CLOSED FRACTURE OF DISTAL END OF FIBULA WITH TIBIA, LEFT, WITH ROUTINE HEALING, SUBSEQUENT ENCOUNTER: ICD-10-CM

## 2024-01-25 DIAGNOSIS — S82.832D OTHER CLOSED FRACTURE OF DISTAL END OF LEFT FIBULA WITH ROUTINE HEALING, SUBSEQUENT ENCOUNTER: Primary | ICD-10-CM

## 2024-01-25 DIAGNOSIS — S82.302D CLOSED FRACTURE OF DISTAL END OF FIBULA WITH TIBIA, LEFT, WITH ROUTINE HEALING, SUBSEQUENT ENCOUNTER: ICD-10-CM

## 2024-01-25 PROCEDURE — 97140 MANUAL THERAPY 1/> REGIONS: CPT

## 2024-01-25 PROCEDURE — 97110 THERAPEUTIC EXERCISES: CPT

## 2024-01-25 RX ORDER — OXYBUTYNIN CHLORIDE 5 MG/1
TABLET ORAL
Qty: 90 TABLET | Refills: 1 | Status: SHIPPED | OUTPATIENT
Start: 2024-01-25

## 2024-01-25 NOTE — PROGRESS NOTES
"PT Discharge    Today's date: 2024  Patient name: Denita Brown  : 1960  MRN: [id number]  Referring provider: Lachman, James R, MD  Dx:   Encounter Diagnosis     ICD-10-CM    1. Other closed fracture of distal end of left fibula with routine healing, subsequent encounter  S82.832D       2. Closed fracture of distal end of fibula with tibia, left, with routine healing, subsequent encounter  S82.302D     S82.832D       3. Sprain of right ankle, unspecified ligament, subsequent encounter  S93.401D                      Assessment  Assessment details: Denita Brown is a 63 y.o. female presenting to physical therapy for a reevaluation with a MD referral of s/p nondisplaced, oblique fracture of her left distal fibula and right ankle sprain sustained on 10/22/23 following a fall. Since her initial evaluation, she reports 100% improvement in her bilateral ankles. She demonstrates WNL bilateral ankle ROM, strength, and ambulates without antalgic gait. She reports much improved balance and stability and denies any feelings of instability. She has met her goals, is independent with completing her HEP, and is to be DC from PT services at this time.             Understanding of Dx/Px/POC: excellent   Prognosis: good    Goals  STG to be achieved in 4 weeks:   The patient will report a decrease in left ankle \"at worst\" subjective pain rating score to at least 6/10 to allow for improved tolerance for weightbearing activities. MET  The patient will increase left ankle dorsiflexion AROM to at least 5 degrees to allow for improved functional mobility. MET  The patient will increase left ankle dorsiflexion MMT score to at least 3+/5 to allow for improved functional mobility. MET    LTG to be achieved by DC:   The patient will be independent in comprehensive HEP. MET  The patient will report no pain with usual activities. MET  The patient will improve bilateral ankle AROM to WFL to allow for improved functional mobility. " MET  The patient will increase bilateral ankle MMT score to WFL to allow for improved functional mobility. MET  The patient will be able to ambulate one mile without pain or difficulty.MET  The patient will be able to navigate one full flight of stairs reciprocally without pain or difficulty. MET      Plan  Plan of Care beginning date: 2024  Plan of Care expiration date: 2024  Treatment plan discussed with: patient        Subjective Evaluation    History of Present Illness  Mechanism of injury: Denita reports 100% improvement since beginning PT services. She reports that her ankle ROM has returned to normal as is her strength. She reports that her balance is much improved and may even be better than her balance prior to injuring her ankles. She says that she does notice that her ankles (L>R) may ache at night after doing a lot during the day, especially doing lots of stairs. She denies any difficulty completing stairs however. She did get injections in her back today due to exacerbation of her chronic back pain and therefore would like to defer any exercises at today's session.      Patient Goals  Patient goals for therapy: decreased pain, increased strength, independence with ADLs/IADLs, increased motion and improved balance  Patient goal: walk normally again  Pain  Current pain ratin  At best pain ratin  At worst pain ratin  Location: lateral aspect of left foot/ankle  Quality: dull ache  Relieving factors: change in position, medications and relaxation  Aggravating factors: stair climbing  Progression: improved    Social Support  Steps to enter house: yes  Stairs in house: yes   Lives in: multiple-level home  Lives with: adult children    Employment status: working ()  Treatments  Previous treatment: medication  Current treatment: physical therapy        Objective     Observations     Additional Observation Details  No ecchymosis or swelling bilaterally     Tenderness   Left  Ankle/Foot   No tenderness in the Achilles insertion, anterior ankle, fifth metatarsal base, lateral malleolus, medial calcaneus, medial malleolus, peroneal tendon, plantar fascia, posterior tibial tendon, posterior talofibular ligament, proximal Achilles and superior tibiofibular ligament.     Right Ankle/Foot   No tenderness in the Achilles insertion, anterior ankle, anterior talofibular ligament, fifth metatarsal base, calcaneofibular ligament, deltoid ligament, first metatarsal head, lateral malleolus, medial calcaneus, medial malleolus, peroneal tendon, plantar fascia, posterior tibial tendon, posterior talofibular ligament, proximal Achilles and superior tibiofibular ligament.     Active Range of Motion   Left Ankle/Foot   Dorsiflexion (ke): 12 degrees   Plantar flexion: 50 degrees   Inversion: 30 degrees   Eversion: 15 degrees     Right Ankle/Foot   Dorsiflexion (ke): 10 degrees   Plantar flexion: 60 degrees   Inversion: 40 degrees   Eversion: 18 degrees     Passive Range of Motion   Left Ankle/Foot  Normal passive range of motion    Right Ankle/Foot  Normal passive range of motion    Strength/Myotome Testing     Left Ankle/Foot   Dorsiflexion: 5  Plantar flexion: 5  Inversion: 5  Eversion: 4+    Right Ankle/Foot   Dorsiflexion: 5  Plantar flexion: 5  Inversion: 5  Eversion: 5    Ambulation   Weight-Bearing Status   Weight-Bearing Status (Left): weight-bearing as tolerated   Weight-Bearing Status (Right): weight-bearing as tolerated    Assistive device used: none    Ambulation: Level Surfaces   Ambulation without assistive device: independent    Observational Gait   Gait: within functional limits              Precautions: s/p nondisplaced, oblique fracture of left distal fibula and right ankle sprain sustained on 10/22/23  L1-S1 fusion, C2-C7 fusion >10 years, HTN, DM, Tachycardia  Access Code: [email or web address]    POC expires Unit limit Auth  expiration date PT/OT + Visit Limit?   2/20/24 N/A N/A BOMN              "    Visit/Unit Tracking  AUTH Status:  Date 1/22 1/25 12/4 12/7 12/12 12/19 12/21 12/28 1/2 1/4 1/11 1/15   BOMN Used 13 14 3 4 5 6 7 8 9 10 11 12    Remaining                     Manuals 1/4 1/11 1/15 1/22 1/25 12/14 12/19 12/21 12/28 1/2   L ankle PROM      BE SC R and L  BE   KL   L calf stretch      BE SC  DC -                Re-evaluation     BE    BE    Neuro Re-Ed             SLS B/L 2x20\"  3x20\"      20\"x 2  2x20\" ea  2x20\" ea   Tandem stance  2x20\" foam        2x20\" foam   2x20\" foam   Rockerboard taps fwd/bwd, left/right  20x ea dir  30x ea dir            Dani step overs fwd and lat 3 laps non reciprocally 3 laps reciprocally with foam pads           Tandem walk on foam   X4 laps  X4 laps  4x laps                                    Ther Ex             Nustep for LE endurance  L4 10' L4 10' L4 10' L4 10 min   L4 10' L4 10 min  L4 10' L4 10' L4 10'   Ankle 4 ways   TB on R only R: Blue   X20 ea    L: GTB 20x ea     R: GTB x30 ea    L: RTB x20 ea R: GTB x30 ea    L: RTB x20 ea R: GTB x30 ea    L: RTB x30 ea R: Blue   X20 ea    L: GTB 20x ea  R: Blue   X20 ea    L: GTB 20x ea    Seated ankle HR/TR B/L Standing 2x10 ea Standing 3x10  Standing x30 ea Standing x30 ea  X30ea  Standing   Standing 10x ea Standing 10x ea   Pt education      HEP PT POC    HEP review                               Ther Activity             TG squats  L18 2x10  L18 3x10 L20 3x10  L22 3x 10   NV  L18 2x10  Standing squat 10x   Fwd step downs  4\" R: 2x10  L: X10  4\" 2x10 B/L 6\" 3x10 R  6\" 2x10 L  6\" 3x10 R  6\" 2x10 L   4\" 2x10    R on step 4\" 10x b/l  4\" 2x10 B/L  4\" 2x10 B/L   Fwd step ups  6\" 2x10 B/L 6\" 2x10 B/L 6\" 2x10 B/L 6\" 2x 10 b/l   4\" 2x10  R only  4\" 2x 10 L only  6\" 2x10 B/L  6\" 2x10 B/L                Gait Training                                       Modalities                                                "

## 2024-01-27 DIAGNOSIS — E78.5 HYPERLIPIDEMIA, UNSPECIFIED HYPERLIPIDEMIA TYPE: ICD-10-CM

## 2024-01-29 ENCOUNTER — OFFICE VISIT (OUTPATIENT)
Dept: OCCUPATIONAL THERAPY | Facility: CLINIC | Age: 64
End: 2024-01-29
Payer: COMMERCIAL

## 2024-01-29 DIAGNOSIS — T14.8XXA BONE BRUISE: Primary | ICD-10-CM

## 2024-01-29 PROCEDURE — 97110 THERAPEUTIC EXERCISES: CPT

## 2024-01-29 PROCEDURE — 97035 APP MDLTY 1+ULTRASOUND EA 15: CPT

## 2024-01-29 PROCEDURE — 97140 MANUAL THERAPY 1/> REGIONS: CPT

## 2024-01-29 RX ORDER — FENOFIBRATE 160 MG/1
160 TABLET ORAL DAILY
Qty: 90 TABLET | Refills: 1 | Status: SHIPPED | OUTPATIENT
Start: 2024-01-29

## 2024-01-29 NOTE — PROGRESS NOTES
"Daily Note     Today's date: 2024  Patient name: Denita Brown  : 1960  MRN: [id number]  Referring provider: Aric Garcia MD  Dx:   Encounter Diagnosis     ICD-10-CM    1. Bone bruise  T14.8XXA           Start Time: 1615  Stop Time: 1700  Total time in clinic (min): 45 minutes    Subjective: Patient reports that she has not been wearing carpal gel sleeve for the past few days and has not had any issues or increase in pain      Objective: See treatment diary below      Assessment: Tolerated treatment well. Patient exhibited good technique with therapeutic exercises and would benefit from continued OT. Progress strengthening today and patient tolerated well.      Plan: Continue per plan of care.  Progress treatment as tolerated.  Potential discharge next visit.     Precautions: HTN; chronic pain syndrome; b/l thumb CMC OA          POC expires Unit limit Auth  expiration date PT/OT + Visit Limit?   24 N/A N/A BOMN                          Visit/Unit Tracking  AUTH Status:  Date 11/21 12/4 12/12 12/21  1/2  1/15  RE       BOMN Used 1 2 3 4  5 FOTO  6  7  8       Remaining                         Date 1/15/24 1/22/24 1/29/24  12/21/23  1/2/24   Visit 6 7 8  4  5   Manuals            STM         4'   IASTM 10' 10' 10'  10'  6'   KT                         Neuro Re-Ed                                                                                                        Ther Ex            TGE       x10 all motions     AROM wrist all planes       x30 e/f  x30 all planes   AROM thumb all planes             Wrist stretches         10 x 5\" e/f    Thumb strengthening  Radial ab isometric 10 x 5\" Radial ab isometric 10 x 5\"         Bunny ears  x20 x20 x20        Wrist strengthening  1# x30 e/f 2# 2x15 e/f 2# x30 e/f         strength  YPW x30 YPW x30 RPW x30       Pinch strength R/G CP on PW  2 circles R/G CP on PW 2 circles R/G CP on PW x2 circles       Ther Activity            Opposition   " Small beads Small beads  Small beads 1/2 set oppose w/ each finger  Small beads 1/2 set oppose each digit   Translation   Small beads Small beads  Small beads 1/2 set  Small beads 1/2 set   HEP ;       Wrist ext stretch                             Modalities            P   3' 5'  6'  7'   US 3.3MHz, Pulse  50%, 0.8w/cm2 8' 8' 8'  8' 8'

## 2024-02-06 NOTE — TELEPHONE ENCOUNTER
-- DO NOT REPLY / DO NOT REPLY ALL --  -- Message is from [address] [facility] ([id number] [facility]) --    General Patient Message: Patient is returning a call from office      Caller Information         Type Contact Phone/Fax    02/06/2024 10:18 AM CST Phone (Incoming) Jena Reese (Self) [phone] (M)          Alternative phone number: none    Can a detailed message be left? Yes    Message Turnaround:     Is it Working Hours?  Yes - Working Hours Pt calls she has sinus issues since she had covid end of dec  She is having headaches from the congestion  She has tried otc - mucinex, tylenol,sudafedand others  Also using flonase daily  Nothing is helping her  Can you please erx something for her congestion  to [facility]  Shes thinking steroids or decongestant   She doesn't feel she needs an antibiotic just something for the  headache/congestion

## 2024-02-08 ENCOUNTER — OFFICE VISIT (OUTPATIENT)
Dept: OBGYN CLINIC | Facility: MEDICAL CENTER | Age: 64
End: 2024-02-08
Payer: COMMERCIAL

## 2024-02-08 ENCOUNTER — OFFICE VISIT (OUTPATIENT)
Dept: OCCUPATIONAL THERAPY | Facility: CLINIC | Age: 64
End: 2024-02-08
Payer: COMMERCIAL

## 2024-02-08 VITALS
DIASTOLIC BLOOD PRESSURE: 83 MMHG | HEART RATE: 75 BPM | WEIGHT: 177 LBS | SYSTOLIC BLOOD PRESSURE: 129 MMHG | BODY MASS INDEX: 29.91 KG/M2

## 2024-02-08 DIAGNOSIS — M16.11 PRIMARY OSTEOARTHRITIS OF ONE HIP, RIGHT: Primary | ICD-10-CM

## 2024-02-08 DIAGNOSIS — T14.8XXA BONE BRUISE: Primary | ICD-10-CM

## 2024-02-08 PROCEDURE — 20611 DRAIN/INJ JOINT/BURSA W/US: CPT | Performed by: PHYSICAL MEDICINE & REHABILITATION

## 2024-02-08 PROCEDURE — 99213 OFFICE O/P EST LOW 20 MIN: CPT | Performed by: PHYSICAL MEDICINE & REHABILITATION

## 2024-02-08 PROCEDURE — 97110 THERAPEUTIC EXERCISES: CPT

## 2024-02-08 PROCEDURE — 97140 MANUAL THERAPY 1/> REGIONS: CPT

## 2024-02-08 RX ORDER — IBUPROFEN 800 MG/1
800 TABLET ORAL EVERY 8 HOURS PRN
COMMUNITY
Start: 2024-01-04

## 2024-02-08 RX ORDER — TRIAMCINOLONE ACETONIDE 40 MG/ML
80 INJECTION, SUSPENSION INTRA-ARTICULAR; INTRAMUSCULAR
Status: COMPLETED | OUTPATIENT
Start: 2024-02-08 | End: 2024-02-08

## 2024-02-08 RX ORDER — AMOXICILLIN 500 MG/1
500 CAPSULE ORAL 4 TIMES DAILY
COMMUNITY
Start: 2024-02-01

## 2024-02-08 RX ORDER — ROPIVACAINE HYDROCHLORIDE 5 MG/ML
10 INJECTION, SOLUTION EPIDURAL; INFILTRATION; PERINEURAL
Status: COMPLETED | OUTPATIENT
Start: 2024-02-08 | End: 2024-02-08

## 2024-02-08 RX ADMIN — TRIAMCINOLONE ACETONIDE 80 MG: 40 INJECTION, SUSPENSION INTRA-ARTICULAR; INTRAMUSCULAR at 10:15

## 2024-02-08 RX ADMIN — ROPIVACAINE HYDROCHLORIDE 10 ML: 5 INJECTION, SOLUTION EPIDURAL; INFILTRATION; PERINEURAL at 10:15

## 2024-02-08 NOTE — PROGRESS NOTES
1. Primary osteoarthritis of one hip, right  Large joint arthrocentesis: R hip joint        Orders Placed This Encounter   Procedures    Large joint arthrocentesis: R hip joint        Impression:  Patient with history of lumbar fusion is here in follow up of chronic right thigh pain likely multifactorial and secondary to greater trochanteric pain syndrome and hip osteoarthritis.  Also possible that this is due to spinal etiology or SI joint mediated.  She is neurologically intact.  Treatment has included greater trochanteric steroid injection and right hip USGI.  She is seeing pain management at outside facility for her spine pain.   Today, we decided to proceed with a repeat right hip USGI.      Imaging Studies (I personally reviewed images in PACS and report):  Right hip x-rays most recent to this encounter reviewed.  These images show moderate osteoarthritis.    No follow-ups on file.    Patient is in agreement with the above plan.    HPI:  Denita Brown is a 63 y.o. female  who presents in follow up.  Here for   Chief Complaint   Patient presents with    Right Hip - Pain, Injections       Since last visit: See above.    Following history reviewed and updated:  Past Medical History:   Diagnosis Date    Abdominal pain, epigastric 3/23/2018    Added automatically from request for surgery [id number]    Abnormal x-ray of lumbar spine 11/3/2015    Acute cystitis with hematuria 4/7/2020    Bariatric surgery status     Basal cell carcinoma     basil cell carcinoma- in remission    Benign essential hypertension 6/12/2012    Breakdown (mechanical) of internal fixation device of vertebrae, initial encounter ([facility]) 3/1/2019    Calculus of bile duct with cholecystitis without obstruction 4/27/2018    Added automatically from request for surgery [id number]    Cellulitis of finger 12/3/2015    Degenerative lumbar disc     Digital mucinous cyst 6/24/2015    DMII (diabetes mellitus, type 2) ([facility]) 11/8/2013    Gross hematuria 3/19/2019     Hiatal hernia     History of transfusion     Post-op spinal surgery    Low back pain     Lower urinary tract infectious disease 3/27/2015    Medicare annual wellness visit, initial 2019    Obesity     Resolved 10/6/2016     Overactive bladder     Postsurgical malabsorption     Recurrent UTI 3/19/2019    Spinal stenosis     SVT (supraventricular tachycardia)     Tachycardia     Resolved 2016     Type 2 diabetes mellitus (HCC)     Last assesssed 2018     Wears glasses      Past Surgical History:   Procedure Laterality Date    APPENDECTOMY      ARTHRODESIS      Lumbar - Last assessed 2018     BACK SURGERY      Lumbar (3 surgeries)- two levels fused, clean out from infection, then fusion of 7 levels (last surgery in )    CARDIAC ELECTROPHYSIOLOGY STUDY AND ABLATION      CARPAL TUNNEL RELEASE      x2    CERVICAL SPINE SURGERY      Fusion of C2-C7 over a period of 3 surgeries ( first)     SECTION      X2    CHOLECYSTECTOMY      FL MYELOGRAM LUMBAR  3/28/2019    INSERTION / PLACEMENT / REVISION NEUROSTIMULATOR      X2- both were removed    NECK SURGERY      OTHER SURGICAL HISTORY      Catheter ablation     ND EGD TRANSORAL BIOPSY SINGLE/MULTIPLE N/A 2016    Procedure: ESOPHAGOGASTRODUODENOSCOPY (EGD);  Surgeon: Tanya Orta MD;  Location: [facility];  Service: Bariatrics    ND EGD TRANSORAL BIOPSY SINGLE/MULTIPLE N/A 2018    Procedure: ESOPHAGOGASTRODUODENOSCOPY (EGD) with biopsy;  Surgeon: Tanya Orta MD;  Location: [facility];  Service: Bariatrics    ND LAPAROSCOPY SURG CHOLECYSTECTOMY N/A 2018    Procedure: CHOLECYSTECTOMY LAPAROSCOPIC;  Surgeon: Tanya Orta MD;  Location: [facility];  Service: Bariatrics    ND LAPS GSTR RSTCV PX W/BYP BRAULIO-EN-Y LIMB <150 CM N/A 10/25/2016    Procedure: BYPASS GASTRIC  BRAULIO-EN-Y LAPAROSCOPIC, WITH INTRA OP EGD;  Surgeon: Tanya Orta MD;  Location: [facility];  Service: Bariatrics    SKIN CANCER EXCISION       TONSILLECTOMY      TUBAL LIGATION      US GUIDED BREAST BIOPSY RIGHT COMPLETE Right 2023    WISDOM TOOTH EXTRACTION       Social History   Social History     Substance and Sexual Activity   Alcohol Use No     Social History     Substance and Sexual Activity   Drug Use No     Social History     Tobacco Use   Smoking Status Former    Current packs/day: 0.00    Average packs/day: 1 pack/day for 20.0 years (20.0 ttl pk-yrs)    Types: Cigarettes    Start date:     Quit date:     Years since quittin.1    Passive exposure: Past   Smokeless Tobacco Never     Family History   Problem Relation Age of Onset    Arthritis Mother         Rheumatoid    Angina Mother     Coronary artery disease Mother     Diabetes Mother     Cancer Father         Lung    Cystic fibrosis Father     Rheum arthritis Sister     Diabetes Sister     No Known Problems Maternal Grandmother     No Known Problems Maternal Grandfather     No Known Problems Paternal Grandmother     No Known Problems Paternal Grandfather     Other Brother         Back problems     Diabetes Brother     No Known Problems Brother     Hypertension Family     Heart disease Family     No Known Problems Son     No Known Problems Son      No Known Allergies     Constitutional:  /83   Pulse 75   Wt 80.3 kg (177 lb)   BMI 29.91 kg/m²    General: NAD.  Eyes: Clear sclerae.  ENT: No inflammation, lesion, or mass of lips.  No tracheal deviation.  Musculoskeletal: As mentioned below.  Integumentary: No visible rashes or skin lesions.  Pulmonary/Chest: Effort normal. No respiratory distress.   Neuro: CN's grossly intact, MODI.  Psych: Normal affect and judgement.  Vascular: WWP.    Ortho Exam     Large joint arthrocentesis: R hip joint  Universal Protocol:  Procedure performed by:  Consent: Verbal consent obtained. Written consent not obtained.  Consent given by: patient  Timeout called at: 2024 10:14 AM.  Patient understanding: patient states understanding of  the procedure being performed  Site marked: the operative site was marked  Radiology Images displayed and confirmed. If images not available, report reviewed: imaging studies available  Patient identity confirmed: verbally with patient  Supporting Documentation  Indications: pain and diagnostic evaluation   Procedure Details  Location: hip - R hip joint  Ultrasound guidance: yes (US guidance was used to find the area of interest.)  Medications administered: 80 mg triamcinolone acetonide 40 mg/mL; 10 mL ropivacaine 0.5 %    Patient tolerance: patient tolerated the procedure well with no immediate complications  Dressing:  Sterile dressing applied    The right hip joint was visualized with ultrasound and injected with steroid/anesthetic solution as indicated.    Prior to the injection, the ultrasound was used to evaluate for any neural or vascular structures.  Care was taken to avoid these structures.    The images (and video if taken) were saved to the Cl[employer] ultrasound system.    Risks of this procedure include:    - Risk of bleeding since a needle is involved.  - Risk of infection (1/10,000 chance as per recent studies).  Signs/symptoms were discussed and they would prompt an urgent evaluation at an emergency department.  - Risk of pigmentation or skin dimpling in the skin (2-3% chance as per recent studies) from the steroid.  - Risk of increased pain from steroid flare (1% chance as per recent studies) that typically lasts 24-48 hours.  - Risk of increased blood sugars from the steroid medication that can last for a few weeks.  If the patient is a diabetic or pre-diabetic, they were encouraged to closely monitor their blood sugars and discuss with PCP if elevated more than usual or if having symptoms.    We decided that the benefits outweigh the risks and so we proceeded with the procedure.

## 2024-02-08 NOTE — PROGRESS NOTES
OT Discharge    Today's date: 2024  Patient name: Denita Brown  : 1960  MRN: [id number]  Referring provider: Aric Garcia MD  Dx:   Encounter Diagnosis     ICD-10-CM    1. Bone bruise  T14.8XXA           Start Time: 1615  Stop Time: 1700  Total time in clinic (min): 45 minutes    Subjective: Patient reports that her pain is very minimal and that she has not been wearing her carpal gel sleeve. She has not had any issues without wearing her sleeve.      Objective: See treatment diary below      Assessment: Tolerated treatment well. Patient exhibited good technique with therapeutic exercises  Goals  STGs/LTGs (4-6 weeks)  Patient will demonstrate independence in a HEP to maintain ROM, strength, and function at discharge MET  Patient will report an average pain level of 0-1/10 to be independent in daily tasks MET  Patient will demonstrate RODGERS of thethumb to WNL to be independent in self care tasks MET  Patient will demonstrate pain free AROM of the wrist and forearm WFL to be independent in self care tasks MET  Patient will demonstrate 5/5 muscle strength in the wrist and forearm to be MI for meal prep MET  Patient will demonstrate right hand strength within 20% of the left hand to be MI for IADL tasks MET  Patient will demonstrate resolution of edema MET      Plan:  Patient being discharged today. She does not have any pain and has decreased tenderness around bone bruise. Provided patient with theraputty to begin strengthening at home to maintain and improve strength. Instructed patient to contact office with any questions or concerns.      Precautions: HTN; chronic pain syndrome; b/l thumb CMC OA          POC expires Unit limit Auth  expiration date PT/OT + Visit Limit?   24 N/A N/A BOMN                          Visit/Unit Tracking  AUTH Status:  Date 11/21 12/4 12/12 12/21  1/2  1/15  RE     BOMN Used 1 2 3 4  5 FOTO  6  7  8  9     Remaining                         Date  "1/15/24 1/22/24 1/29/24 2/8/24  1/2/24   Visit 6 7 8 8  5   Manuals           STM        4'   IASTM 10' 10' 10' 10'  6'   KT                       Neuro Re-Ed                                                                                                Ther Ex           TGE           AROM wrist all planes        x30 all planes   AROM thumb all planes            Wrist stretches        10 x 5\" e/f    Thumb strengthening  Radial ab isometric 10 x 5\" Radial ab isometric 10 x 5\"        Bunny ears  x20 x20 x20       Wrist strengthening  1# x30 e/f 2# 2x15 e/f 2# x30 e/f 3# 3x10       strength  YPW x30 YPW x30 RPW x30 Yellow putty x10     Pinch strength R/G CP on PW  2 circles R/G CP on PW 2 circles R/G CP on PW x2 circles Yellow putty pinch and roll x10     Ther Activity           Opposition   Small beads Small beads   Small beads 1/2 set oppose each digit   Translation   Small beads Small beads   Small beads 1/2 set   HEP ;      Wrist ext stretch                            Modalities            MHP   3' 5'  10'  7'   US 3.3MHz, Pulse  50%, 0.8w/cm2 8' 8' 8'   8'                  "

## 2024-02-12 ENCOUNTER — APPOINTMENT (OUTPATIENT)
Dept: OCCUPATIONAL THERAPY | Facility: CLINIC | Age: 64
End: 2024-02-12
Payer: COMMERCIAL

## 2024-02-12 ENCOUNTER — TELEPHONE (OUTPATIENT)
Dept: OBGYN CLINIC | Facility: CLINIC | Age: 64
End: 2024-02-12

## 2024-02-12 NOTE — TELEPHONE ENCOUNTER
LMOM to callback and reschedule 2/13/24 appointment, office will be closed due to inclement weather.

## 2024-02-19 ENCOUNTER — APPOINTMENT (OUTPATIENT)
Dept: OCCUPATIONAL THERAPY | Facility: CLINIC | Age: 64
End: 2024-02-19
Payer: COMMERCIAL

## 2024-02-20 ENCOUNTER — TELEPHONE (OUTPATIENT)
Dept: OBGYN CLINIC | Facility: HOSPITAL | Age: 64
End: 2024-02-20

## 2024-02-20 NOTE — TELEPHONE ENCOUNTER
Caller: Patient- Denita    Doctor: Gina    Reason for call: Patient is requesting an appt for right hip USGI. Please help patient schedule appt. Patient stated she had USGI 2/8/24 but it did not help and she stated she can usually get two if needed. Ty.    Call back#: [phone]

## 2024-02-21 ENCOUNTER — OFFICE VISIT (OUTPATIENT)
Dept: OBGYN CLINIC | Facility: CLINIC | Age: 64
End: 2024-02-21
Payer: COMMERCIAL

## 2024-02-21 ENCOUNTER — APPOINTMENT (OUTPATIENT)
Dept: RADIOLOGY | Facility: AMBULARY SURGERY CENTER | Age: 64
End: 2024-02-21
Attending: ORTHOPAEDIC SURGERY
Payer: COMMERCIAL

## 2024-02-21 VITALS
BODY MASS INDEX: 30.73 KG/M2 | HEIGHT: 64 IN | WEIGHT: 180 LBS | SYSTOLIC BLOOD PRESSURE: 130 MMHG | DIASTOLIC BLOOD PRESSURE: 81 MMHG | HEART RATE: 60 BPM

## 2024-02-21 DIAGNOSIS — S82.832D OTHER CLOSED FRACTURE OF DISTAL END OF LEFT FIBULA WITH ROUTINE HEALING, SUBSEQUENT ENCOUNTER: Primary | ICD-10-CM

## 2024-02-21 DIAGNOSIS — S82.832A OTHER CLOSED FRACTURE OF DISTAL END OF LEFT FIBULA, INITIAL ENCOUNTER: ICD-10-CM

## 2024-02-21 DIAGNOSIS — Z01.89 ENCOUNTER FOR LOWER EXTREMITY COMPARISON IMAGING STUDY: ICD-10-CM

## 2024-02-21 PROCEDURE — 73610 X-RAY EXAM OF ANKLE: CPT

## 2024-02-21 PROCEDURE — 73600 X-RAY EXAM OF ANKLE: CPT

## 2024-02-21 PROCEDURE — 99213 OFFICE O/P EST LOW 20 MIN: CPT | Performed by: ORTHOPAEDIC SURGERY

## 2024-02-21 NOTE — PROGRESS NOTES
James R Lachman, M.D.  Attending, Orthopaedic Surgery  Foot and Ankle  [facility]      ORTHOPAEDIC FOOT AND ANKLE CLINIC VISIT     Assessment:     Encounter Diagnoses   Name Primary?    Other closed fracture of distal end of left fibula with routine healing, subsequent encounter Yes    Encounter for lower extremity comparison imaging study             Plan:   The patient verbalized understanding of exam findings and treatment plan. We engaged in the shared decision-making process and treatment options were discussed at length with the patient. Surgical and conservative management discussed today along with risks and benefits.  Patient has been treating non operatively for a nondisplaced oblique fracture of the left distal fibula sustained on 10/22/23  X rays today show a well healed distal fibula fracture  She has no restrictions from an orthopedic standpoint for her left ankle. She may gradually return to normal activities as tolerated with modifications to avoid pain  Return if symptoms worsen or fail to improve.      History of Present Illness:   Chief Complaint:   Chief Complaint   Patient presents with    Follow-up     Final follow up. Left ankle everything going good. Finished PT.      Denita Brown is a 63 y.o. female who is being seen in follow-up for left ankle fracture. When we last saw she we recommended transitioning into a supportive sneaker.  Pain has improved. Residual pain is localized at lateral ankle with minimal radiating and described as dull and aching.      Pain/symptom timing:  Worse during the day when active  Pain/symptom context:  Worse with activites and work  Pain/symptom modifying factors:  Rest makes better, activities make worse  Pain/symptom associated signs/symptoms: none    Prior treatment   NSAIDsNo   Injections No   Bracing/Orthotics Yes    Physical Therapy Yes     Orthopedic Surgical History:   See below    Past Medical, Surgical and Social  History:  Past Medical History:  has a past medical history of Abdominal pain, epigastric (3/23/2018), Abnormal x-ray of lumbar spine (11/3/2015), Acute cystitis with hematuria (2020), Bariatric surgery status, Basal cell carcinoma, Benign essential hypertension (2012), Breakdown (mechanical) of internal fixation device of vertebrae, initial encounter ([facility]) (3/1/2019), Calculus of bile duct with cholecystitis without obstruction (2018), Cellulitis of finger (12/3/2015), Degenerative lumbar disc, Digital mucinous cyst (2015), DMII (diabetes mellitus, type 2) ([facility]) (2013), Gross hematuria (3/19/2019), Hiatal hernia, History of transfusion (), Low back pain, Lower urinary tract infectious disease (3/27/2015), Medicare annual wellness visit, initial (2019), Obesity, Overactive bladder, Postsurgical malabsorption, Recurrent UTI (3/19/2019), Spinal stenosis, SVT (supraventricular tachycardia), Tachycardia, Type 2 diabetes mellitus ([facility]), and Wears glasses.  Problem List: does not have any pertinent problems on file.  Past Surgical History:  has a past surgical history that includes Appendectomy; Insertion / placement / revision neurostimulator; Cervical spine surgery; Cardiac electrophysiology study and ablation;  section; Tubal ligation; Carpal tunnel release; Tonsillectomy; Branscomb tooth extraction; pr laps gstr rstcv px w/byp danielle-en-y limb <150 cm (N/A, 10/25/2016); pr egd transoral biopsy single/multiple (N/A, 2016); Neck surgery; pr egd transoral biopsy single/multiple (N/A, 2018); Skin cancer excision; pr laparoscopy surg cholecystectomy (N/A, 2018); Cholecystectomy; Back surgery; FL myelogram lumbar (3/28/2019); Arthrodesis; Other surgical history; and US guided breast biopsy right complete (Right, 2023).  Family History: family history includes Angina in her mother; Arthritis in her mother; Cancer in her father; Coronary artery disease in her mother;  "Cystic fibrosis in her father; Diabetes in her brother, mother, and sister; Heart disease in her family; Hypertension in her family; No Known Problems in her brother, maternal grandfather, maternal grandmother, paternal grandfather, paternal grandmother, son, and son; Other in her brother; Rheum arthritis in her sister.  Social History:  reports that she quit smoking about 20 years ago. Her smoking use included cigarettes. She started smoking about 40 years ago. She has a 20 pack-year smoking history. She has been exposed to tobacco smoke. She has never used smokeless tobacco. She reports that she does not drink alcohol and does not use drugs.  Current Medications: has a current medication list which includes the following prescription(s): albuterol, baclofen, bupropion, calcium citrate-vitamin d, cholecalciferol, escitalopram, famotidine, fenofibrate, fluticasone, gabapentin, hydromorphone, ibuprofen, lidocaine, loratadine, meloxicam, multiple vitamins-minerals, ondansetron, oxybutynin, pantoprazole, promethazine-dextromethorphan, amoxicillin, and sucralfate.  Allergies: has No Known Allergies.     Review of Systems:  General- denies fever/chills  HEENT- denies hearing loss or sore throat  Eyes- denies eye pain or visual disturbances, denies red eyes  Respiratory- denies cough or SOB  Cardio- denies chest pain or palpitations  GI- denies abdominal pain  Endocrine- denies urinary frequency  Urinary- denies pain with urination  Musculoskeletal- Negative except noted above  Skin- denies rashes or wounds  Neurological- denies dizziness or headache  Psychiatric- denies anxiety or difficulty concentrating    Physical Exam:   /81   Pulse 60   Ht 5' 4\" (1.626 m)   Wt 81.6 kg (180 lb)   BMI 30.90 kg/m²   General/Constitutional: No apparent distress: well-nourished and well developed.  Eyes: normal ocular motion  Lymphatic: No appreciable lymphadenopathy  Respiratory: Non-labored breathing  Vascular: No edema, " swelling or tenderness, except as noted in detailed exam.  Integumentary: No impressive skin lesions present, except as noted in detailed exam.  Neuro: No ataxia or tremors noted  Psych: Normal mood and affect, oriented to person, place and time. Appropriate affect.  Musculoskeletal: Normal, except as noted in detailed exam and in HPI.    Examination    Left    Gait Normal   Musculoskeletal   Non tender to palpation    Skin Normal.    Nails Normal    Range of Motion  20 degrees dorsiflexion, 30 degrees plantarflexion  Subtalar motion: normal    Stability Stable    Muscle Strength 5/5 tibialis anterior  5/5 gastrocnemius-soleus  5/5 posterior tibialis  5/5 peroneal/eversion strength  5/5 EHL  5/5 FHL    Neurologic Normal    Sensation Intact to light touch throughout sural, saphenous, superficial peroneal, deep peroneal and medial/lateral plantar nerve distributions.  Lim[place]-Shayy 5.07 filament (10g) testing deferred.    Cardiovascular Brisk capillary refill < 2 seconds,intact DP and PT pulses    Special Tests None      Imaging Studies:   3 views of the Left ankle were obtained, reviewed and interpreted independently which demonstrate well healed distal fibula fracture that remains in stable alignment and position. Reviewed by me personally.      James R. Lachman, MD  Foot & Ankle Surgery   Department of Orthopaedic Surgery  [facility]      I personally performed the service.    James R. Lachman, MD    Scribe Attestation      I,:  Amor Edwards am acting as a scribe while in the presence of the attending physician.:       I,:  James R Lachman, MD personally performed the services described in this documentation    as scribed in my presence.:

## 2024-02-26 ENCOUNTER — APPOINTMENT (OUTPATIENT)
Dept: OCCUPATIONAL THERAPY | Facility: CLINIC | Age: 64
End: 2024-02-26
Payer: COMMERCIAL

## 2024-02-28 ENCOUNTER — OFFICE VISIT (OUTPATIENT)
Dept: OBGYN CLINIC | Facility: MEDICAL CENTER | Age: 64
End: 2024-02-28
Payer: COMMERCIAL

## 2024-02-28 VITALS
HEART RATE: 62 BPM | WEIGHT: 180 LBS | HEIGHT: 64 IN | SYSTOLIC BLOOD PRESSURE: 143 MMHG | DIASTOLIC BLOOD PRESSURE: 80 MMHG | BODY MASS INDEX: 30.73 KG/M2

## 2024-02-28 DIAGNOSIS — M16.11 PRIMARY OSTEOARTHRITIS OF ONE HIP, RIGHT: ICD-10-CM

## 2024-02-28 DIAGNOSIS — M25.551 GREATER TROCHANTERIC PAIN SYNDROME OF RIGHT LOWER EXTREMITY: Primary | ICD-10-CM

## 2024-02-28 PROCEDURE — 20610 DRAIN/INJ JOINT/BURSA W/O US: CPT | Performed by: PHYSICAL MEDICINE & REHABILITATION

## 2024-02-28 PROCEDURE — 99213 OFFICE O/P EST LOW 20 MIN: CPT | Performed by: PHYSICAL MEDICINE & REHABILITATION

## 2024-02-28 RX ORDER — TRIAMCINOLONE ACETONIDE 40 MG/ML
80 INJECTION, SUSPENSION INTRA-ARTICULAR; INTRAMUSCULAR
Status: COMPLETED | OUTPATIENT
Start: 2024-02-28 | End: 2024-02-28

## 2024-02-28 RX ORDER — ROPIVACAINE HYDROCHLORIDE 5 MG/ML
10 INJECTION, SOLUTION EPIDURAL; INFILTRATION; PERINEURAL
Status: COMPLETED | OUTPATIENT
Start: 2024-02-28 | End: 2024-02-28

## 2024-02-28 RX ADMIN — ROPIVACAINE HYDROCHLORIDE 10 ML: 5 INJECTION, SOLUTION EPIDURAL; INFILTRATION; PERINEURAL at 10:15

## 2024-02-28 RX ADMIN — TRIAMCINOLONE ACETONIDE 80 MG: 40 INJECTION, SUSPENSION INTRA-ARTICULAR; INTRAMUSCULAR at 10:15

## 2024-02-28 NOTE — PROGRESS NOTES
1. Primary osteoarthritis of one hip, right        2. Greater trochanteric pain syndrome of right lower extremity          Orders Placed This Encounter   Procedures    Large joint arthrocentesis        Impression:  Patient with history of lumbar fusion is here in follow up of chronic right thigh pain likely multifactorial and secondary to greater trochanteric pain syndrome and hip osteoarthritis.  Also possible that this is due to spinal etiology or SI joint mediated.  She is neurologically intact.  Treatment has included greater trochanteric steroid injection and right hip USGI.  She is seeing pain management at outside facility for her spine pain.   Today, we decided to proceed with a repeat right hip greater trochanteric injection.  Will see her back in about 3-4 weeks to reassess.  Will obtain repeat x-rays of her right hip joint.  Consider surgical referral if no improvement.     Imaging Studies (I personally reviewed images in PACS and report):  Right hip x-rays most recent to this encounter reviewed.  These images show moderate osteoarthritis.    No follow-ups on file.    Patient is in agreement with the above plan.    HPI:  Denita Brown is a 63 y.o. female  who presents in follow up.  Here for   Chief Complaint   Patient presents with    Right Hip - Pain, Follow-up       Since last visit: See above.    Following history reviewed and updated:  Past Medical History:   Diagnosis Date    Abdominal pain, epigastric 3/23/2018    Added automatically from request for surgery [id number]    Abnormal x-ray of lumbar spine 11/3/2015    Acute cystitis with hematuria 4/7/2020    Bariatric surgery status     Basal cell carcinoma     basil cell carcinoma- in remission    Benign essential hypertension 6/12/2012    Breakdown (mechanical) of internal fixation device of vertebrae, initial encounter ([facility]) 3/1/2019    Calculus of bile duct with cholecystitis without obstruction 4/27/2018    Added automatically from request for  surgery [id number]    Cellulitis of finger 12/3/2015    Degenerative lumbar disc     Digital mucinous cyst 2015    DMII (diabetes mellitus, type 2) ([facility]) 2013    Gross hematuria 3/19/2019    Hiatal hernia     History of transfusion     Post-op spinal surgery    Low back pain     Lower urinary tract infectious disease 3/27/2015    Medicare annual wellness visit, initial 2019    Obesity     Resolved 10/6/2016     Overactive bladder     Postsurgical malabsorption     Recurrent UTI 3/19/2019    Spinal stenosis     SVT (supraventricular tachycardia)     Tachycardia     Resolved 2016     Type 2 diabetes mellitus (HCC)     Last assesssed 2018     Wears glasses      Past Surgical History:   Procedure Laterality Date    APPENDECTOMY      ARTHRODESIS      Lumbar - Last assessed 2018     BACK SURGERY      Lumbar (3 surgeries)- two levels fused, clean out from infection, then fusion of 7 levels (last surgery in )    CARDIAC ELECTROPHYSIOLOGY STUDY AND ABLATION      CARPAL TUNNEL RELEASE      x2    CERVICAL SPINE SURGERY      Fusion of C2-C7 over a period of 3 surgeries ( first)     SECTION      X2    CHOLECYSTECTOMY      FL MYELOGRAM LUMBAR  3/28/2019    INSERTION / PLACEMENT / REVISION NEUROSTIMULATOR      X2- both were removed    NECK SURGERY      OTHER SURGICAL HISTORY      Catheter ablation     UT EGD TRANSORAL BIOPSY SINGLE/MULTIPLE N/A 2016    Procedure: ESOPHAGOGASTRODUODENOSCOPY (EGD);  Surgeon: Tanya Orta MD;  Location: [facility];  Service: Bariatrics    UT EGD TRANSORAL BIOPSY SINGLE/MULTIPLE N/A 2018    Procedure: ESOPHAGOGASTRODUODENOSCOPY (EGD) with biopsy;  Surgeon: Tanya Orta MD;  Location: [facility];  Service: Bariatrics    UT LAPAROSCOPY SURG CHOLECYSTECTOMY N/A 2018    Procedure: CHOLECYSTECTOMY LAPAROSCOPIC;  Surgeon: Tanya Orta MD;  Location: [facility];  Service: Bariatrics    UT LAPS GSTR RSTCV PX W/BYP BRAULIO-EN-Y LIMB <150 CM N/A  "10/25/2016    Procedure: BYPASS GASTRIC  BRAULIO-EN-Y LAPAROSCOPIC, WITH INTRA OP EGD;  Surgeon: Tanya Orta MD;  Location: [facility];  Service: Bariatrics    SKIN CANCER EXCISION      TONSILLECTOMY      TUBAL LIGATION      US GUIDED BREAST BIOPSY RIGHT COMPLETE Right 2023    WISDOM TOOTH EXTRACTION       Social History   Social History     Substance and Sexual Activity   Alcohol Use No     Social History     Substance and Sexual Activity   Drug Use No     Social History     Tobacco Use   Smoking Status Former    Current packs/day: 0.00    Average packs/day: 1 pack/day for 20.0 years (20.0 ttl pk-yrs)    Types: Cigarettes    Start date:     Quit date:     Years since quittin.1    Passive exposure: Past   Smokeless Tobacco Never     Family History   Problem Relation Age of Onset    Arthritis Mother         Rheumatoid    Angina Mother     Coronary artery disease Mother     Diabetes Mother     Cancer Father         Lung    Cystic fibrosis Father     Rheum arthritis Sister     Diabetes Sister     No Known Problems Maternal Grandmother     No Known Problems Maternal Grandfather     No Known Problems Paternal Grandmother     No Known Problems Paternal Grandfather     Other Brother         Back problems     Diabetes Brother     No Known Problems Brother     Hypertension Family     Heart disease Family     No Known Problems Son     No Known Problems Son      No Known Allergies     Constitutional:  /80   Pulse 62   Ht 5' 4\" (1.626 m)   Wt 81.6 kg (180 lb)   BMI 30.90 kg/m²    General: NAD.  Eyes: Clear sclerae.  ENT: No inflammation, lesion, or mass of lips.  No tracheal deviation.  Musculoskeletal: As mentioned below.  Integumentary: No visible rashes or skin lesions.  Pulmonary/Chest: Effort normal. No respiratory distress.   Neuro: CN's grossly intact, MODI.  Psych: Normal affect and judgement.  Vascular: WWP.    Right Hip Exam     Tenderness   The patient is experiencing tenderness in the " greater trochanter.    Range of Motion   Internal rotation:  abnormal     Tests   SUDHAKAR: positive    Other   Erythema: absent  Scars: absent  Sensation: normal  Pulse: present             Large joint arthrocentesis: R greater trochanteric bursa  Universal Protocol:  Procedure performed by: (Chaperone present)  Consent: Verbal consent obtained. Written consent not obtained.  Risks and benefits: risks, benefits and alternatives were discussed  Consent given by: patient  Timeout called at: 2/28/2024 10:19 AM.  Patient understanding: patient states understanding of the procedure being performed  Site marked: the operative site was marked  Radiology Images displayed and confirmed. If images not available, report reviewed: imaging studies available  Patient identity confirmed: verbally with patient  Supporting Documentation  Indications: pain   Procedure Details  Location: hip - R greater trochanteric bursa  Needle size: 20 G (20G 3.5'')  Ultrasound guidance: no  Approach: Lateral to medial approach.  Medications administered: 80 mg triamcinolone acetonide 40 mg/mL; 10 mL ropivacaine 0.5 %    Patient tolerance: patient tolerated the procedure well with no immediate complications  Dressing:  Sterile dressing applied    A modified tenotomy was performed by redirecting the needle in three directions and dispersing the medication equally in all three directions.    There was little to no resistance encountered during the injection.    Risks of this procedure include:    - Risk of bleeding since a needle is involved.  - Risk of infection (1/10,000 chance as per recent studies).  Signs/symptoms were discussed and they would prompt an urgent evaluation at an emergency department.  - Risk of pigmentation or skin dimpling in the skin (2-3% chance as per recent studies) from the steroid.  - Risk of increased pain from steroid flare (1% chance as per recent studies) that typically lasts 24-48 hours.  - Risk of increased blood sugars  from the steroid medication that can last for a few weeks.  If the patient is a diabetic or pre-diabetic, they were encouraged to closely monitor their blood sugars and discuss with PCP if elevated more than usual or if having symptoms.    The benefits outweigh the risks and so the procedure was completed.

## 2024-03-05 DIAGNOSIS — F32.5 MAJOR DEPRESSIVE DISORDER WITH SINGLE EPISODE, IN FULL REMISSION (HCC): ICD-10-CM

## 2024-03-05 RX ORDER — ESCITALOPRAM OXALATE 20 MG/1
TABLET ORAL
Qty: 90 TABLET | Refills: 0 | Status: SHIPPED | OUTPATIENT
Start: 2024-03-05

## 2024-03-11 ENCOUNTER — TELEPHONE (OUTPATIENT)
Dept: FAMILY MEDICINE CLINIC | Facility: CLINIC | Age: 64
End: 2024-03-11

## 2024-03-11 ENCOUNTER — CLINICAL SUPPORT (OUTPATIENT)
Dept: FAMILY MEDICINE CLINIC | Facility: CLINIC | Age: 64
End: 2024-03-11
Payer: COMMERCIAL

## 2024-03-11 DIAGNOSIS — R30.0 DYSURIA: Primary | ICD-10-CM

## 2024-03-11 LAB
SL AMB  POCT GLUCOSE, UA: ABNORMAL
SL AMB LEUKOCYTE ESTERASE,UA: ABNORMAL
SL AMB POCT BILIRUBIN,UA: ABNORMAL
SL AMB POCT BLOOD,UA: ABNORMAL
SL AMB POCT KETONES,UA: ABNORMAL
SL AMB POCT NITRITE,UA: ABNORMAL
SL AMB POCT PH,UA: 6
SL AMB POCT SPECIFIC GRAVITY,UA: 1.02
SL AMB POCT URINE PROTEIN: ABNORMAL
SL AMB POCT UROBILINOGEN: ABNORMAL

## 2024-03-11 PROCEDURE — 87086 URINE CULTURE/COLONY COUNT: CPT | Performed by: FAMILY MEDICINE

## 2024-03-11 PROCEDURE — 81002 URINALYSIS NONAUTO W/O SCOPE: CPT

## 2024-03-11 NOTE — TELEPHONE ENCOUNTER
Patient having frequent urination and pain after going and very minimal when goes.  Has appointment tomorrow but wanted to get on something sooner.      Can she come in to give a urine?

## 2024-03-12 DIAGNOSIS — R39.9 UTI SYMPTOMS: Primary | ICD-10-CM

## 2024-03-12 LAB — BACTERIA UR CULT: NORMAL

## 2024-03-12 RX ORDER — NITROFURANTOIN 25; 75 MG/1; MG/1
100 CAPSULE ORAL 2 TIMES DAILY
Qty: 10 CAPSULE | Refills: 0 | Status: SHIPPED | OUTPATIENT
Start: 2024-03-12 | End: 2024-03-17

## 2024-03-14 DIAGNOSIS — N32.81 OAB (OVERACTIVE BLADDER): ICD-10-CM

## 2024-03-14 RX ORDER — OXYBUTYNIN CHLORIDE 5 MG/1
TABLET ORAL
Qty: 90 TABLET | Refills: 1 | Status: SHIPPED | OUTPATIENT
Start: 2024-03-14

## 2024-03-21 ENCOUNTER — OFFICE VISIT (OUTPATIENT)
Dept: OBGYN CLINIC | Facility: MEDICAL CENTER | Age: 64
End: 2024-03-21
Payer: COMMERCIAL

## 2024-03-21 ENCOUNTER — APPOINTMENT (OUTPATIENT)
Dept: RADIOLOGY | Facility: MEDICAL CENTER | Age: 64
End: 2024-03-21
Payer: COMMERCIAL

## 2024-03-21 VITALS
HEART RATE: 72 BPM | BODY MASS INDEX: 30.9 KG/M2 | DIASTOLIC BLOOD PRESSURE: 85 MMHG | WEIGHT: 180 LBS | SYSTOLIC BLOOD PRESSURE: 129 MMHG

## 2024-03-21 DIAGNOSIS — M16.11 PRIMARY OSTEOARTHRITIS OF ONE HIP, RIGHT: Primary | ICD-10-CM

## 2024-03-21 DIAGNOSIS — M85.872 OTHER SPECIFIED DISORDERS OF BONE DENSITY AND STRUCTURE, LEFT ANKLE AND FOOT: ICD-10-CM

## 2024-03-21 DIAGNOSIS — M16.11 PRIMARY OSTEOARTHRITIS OF ONE HIP, RIGHT: ICD-10-CM

## 2024-03-21 DIAGNOSIS — S82.832D OTHER CLOSED FRACTURE OF DISTAL END OF LEFT FIBULA WITH ROUTINE HEALING, SUBSEQUENT ENCOUNTER: ICD-10-CM

## 2024-03-21 PROCEDURE — 73502 X-RAY EXAM HIP UNI 2-3 VIEWS: CPT

## 2024-03-21 PROCEDURE — 99214 OFFICE O/P EST MOD 30 MIN: CPT | Performed by: PHYSICAL MEDICINE & REHABILITATION

## 2024-03-21 NOTE — PROGRESS NOTES
1. Primary osteoarthritis of one hip, right  XR hip/pelv 2-3 vws right if performed    Ambulatory referral to Physical Therapy    DXA bone density spine hip and pelvis      2. Other closed fracture of distal end of left fibula with routine healing, subsequent encounter  DXA bone density spine hip and pelvis      3. Other specified disorders of bone density and structure, left ankle and foot  DXA bone density spine hip and pelvis        Orders Placed This Encounter   Procedures    XR hip/pelv 2-3 vws right if performed    DXA bone density spine hip and pelvis    Ambulatory referral to Physical Therapy     Impression:  Patient with history of lumbar fusion is here in follow up of chronic right thigh pain likely multifactorial and secondary to greater trochanteric pain syndrome and hip osteoarthritis.  Less likely that this is due to spinal etiology or SI joint mediated.  She is neurologically intact.  Treatment has included greater trochanteric steroid injection and right hip USGI.  She is seeing pain management at outside facility for her spine pain.  We obtained updated right hip x-rays today.  We will have Deinta return for repeat ultrasound-guided hip joint injection.  If no improvement, would consider referral to orthopedic surgery.  Will also obtain a DEXA scan due to the fragility fracture that she had a couple of months ago in her left ankle.     Imaging Studies (I personally reviewed images in PACS and report):  Right hip x-rays most recent to this encounter reviewed.  These images show mild, more so moderate, right hip OA.    No follow-ups on file.    Patient is in agreement with the above plan.    HPI:  Denita Brwon is a 63 y.o. female  who presents in follow up.  Here for   Chief Complaint   Patient presents with    Right Hip - Pain, Follow-up       Since last visit: See above.    Following history reviewed and updated:  Past Medical History:   Diagnosis Date    Abdominal pain, epigastric 3/23/2018    Added  automatically from request for surgery [id number]    Abnormal x-ray of lumbar spine 11/3/2015    Acute cystitis with hematuria 2020    Bariatric surgery status     Basal cell carcinoma     basil cell carcinoma- in remission    Benign essential hypertension 2012    Breakdown (mechanical) of internal fixation device of vertebrae, initial encounter ([facility]) 3/1/2019    Calculus of bile duct with cholecystitis without obstruction 2018    Added automatically from request for surgery [id number]    Cellulitis of finger 12/3/2015    Degenerative lumbar disc     Digital mucinous cyst 2015    DMII (diabetes mellitus, type 2) ([facility]) 2013    Gross hematuria 3/19/2019    Hiatal hernia     History of transfusion     Post-op spinal surgery    Low back pain     Lower urinary tract infectious disease 3/27/2015    Medicare annual wellness visit, initial 2019    Obesity     Resolved 10/6/2016     Overactive bladder     Postsurgical malabsorption     Recurrent UTI 3/19/2019    Spinal stenosis     SVT (supraventricular tachycardia)     Tachycardia     Resolved 2016     Type 2 diabetes mellitus ([facility])     Last assesssed 2018     Wears glasses      Past Surgical History:   Procedure Laterality Date    APPENDECTOMY      ARTHRODESIS      Lumbar - Last assessed 2018     BACK SURGERY      Lumbar (3 surgeries)- two levels fused, clean out from infection, then fusion of 7 levels (last surgery in )    CARDIAC ELECTROPHYSIOLOGY STUDY AND ABLATION      CARPAL TUNNEL RELEASE      x2    CERVICAL SPINE SURGERY      Fusion of C2-C7 over a period of 3 surgeries ( first)     SECTION      X2    CHOLECYSTECTOMY      FL MYELOGRAM LUMBAR  3/28/2019    INSERTION / PLACEMENT / REVISION NEUROSTIMULATOR      X2- both were removed    NECK SURGERY      OTHER SURGICAL HISTORY      Catheter ablation     MS EGD TRANSORAL BIOPSY SINGLE/MULTIPLE N/A 2016    Procedure: ESOPHAGOGASTRODUODENOSCOPY (EGD);  Surgeon:  Tanya Orta MD;  Location: [facility];  Service: Bariatrics    ME EGD TRANSORAL BIOPSY SINGLE/MULTIPLE N/A 2018    Procedure: ESOPHAGOGASTRODUODENOSCOPY (EGD) with biopsy;  Surgeon: Tanya Orta MD;  [facility];  Service: Bariatrics    ME LAPAROSCOPY SURG CHOLECYSTECTOMY N/A 2018    Procedure: CHOLECYSTECTOMY LAPAROSCOPIC;  Surgeon: Tanya Orta MD;  [facility];  Service: Bariatrics    ME LAPS GSTR RSTCV PX W/BYP BRAULIO-EN-Y LIMB <150 CM N/A 10/25/2016    Procedure: BYPASS GASTRIC  BRAULIO-EN-Y LAPAROSCOPIC, WITH INTRA OP EGD;  Surgeon: Tanya Orta MD;  [facility];  Service: Bariatrics    SKIN CANCER EXCISION      TONSILLECTOMY      TUBAL LIGATION      US GUIDED BREAST BIOPSY RIGHT COMPLETE Right 2023    WISDOM TOOTH EXTRACTION       Social History   Social History     Substance and Sexual Activity   Alcohol Use No     Social History     Substance and Sexual Activity   Drug Use No     Social History     Tobacco Use   Smoking Status Former    Current packs/day: 0.00    Average packs/day: 1 pack/day for 20.0 years (20.0 ttl pk-yrs)    Types: Cigarettes    Start date:     Quit date:     Years since quittin.2    Passive exposure: Past   Smokeless Tobacco Never     Family History   Problem Relation Age of Onset    Arthritis Mother         Rheumatoid    Angina Mother     Coronary artery disease Mother     Diabetes Mother     Cancer Father         Lung    Cystic fibrosis Father     Rheum arthritis Sister     Diabetes Sister     No Known Problems Maternal Grandmother     No Known Problems Maternal Grandfather     No Known Problems Paternal Grandmother     No Known Problems Paternal Grandfather     Other Brother         Back problems     Diabetes Brother     No Known Problems Brother     Hypertension Family     Heart disease Family     No Known Problems Son     No Known Problems Son      No Known Allergies     Constitutional:  /85   Pulse 72   Wt 81.6  kg (180 lb)   BMI 30.90 kg/m²    General: NAD.  Eyes: Clear sclerae.  ENT: No inflammation, lesion, or mass of lips.  No tracheal deviation.  Musculoskeletal: As mentioned below.  Integumentary: No visible rashes or skin lesions.  Pulmonary/Chest: Effort normal. No respiratory distress.   Neuro: CN's grossly intact, MODI.  Psych: Normal affect and judgement.  Vascular: WWP.    Right Hip Exam     Tenderness   The patient is experiencing tenderness in the greater trochanter.    Range of Motion   Internal rotation:  abnormal     Other   Erythema: absent  Scars: absent  Sensation: normal  Pulse: present             Procedures

## 2024-03-26 ENCOUNTER — HOSPITAL ENCOUNTER (OUTPATIENT)
Age: 64
Discharge: HOME | End: 2024-03-26
Payer: COMMERCIAL

## 2024-03-26 DIAGNOSIS — S82.832D OTHER CLOSED FRACTURE OF DISTAL END OF LEFT FIBULA WITH ROUTINE HEALING, SUBSEQUENT ENCOUNTER: ICD-10-CM

## 2024-03-26 DIAGNOSIS — M16.11 PRIMARY OSTEOARTHRITIS OF ONE HIP, RIGHT: ICD-10-CM

## 2024-03-26 DIAGNOSIS — M85.872 OTHER SPECIFIED DISORDERS OF BONE DENSITY AND STRUCTURE, LEFT ANKLE AND FOOT: ICD-10-CM

## 2024-03-26 PROCEDURE — 77080 DXA BONE DENSITY AXIAL: CPT

## 2024-03-27 ENCOUNTER — TELEPHONE (OUTPATIENT)
Age: 64
End: 2024-03-27

## 2024-03-27 DIAGNOSIS — R11.0 NAUSEA: ICD-10-CM

## 2024-03-27 RX ORDER — ONDANSETRON 4 MG/1
4 TABLET, FILM COATED ORAL EVERY 8 HOURS PRN
Qty: 90 TABLET | Refills: 0 | Status: SHIPPED | OUTPATIENT
Start: 2024-03-27

## 2024-03-27 NOTE — TELEPHONE ENCOUNTER
Caller: Patient    Doctor: Gina    Reason for call:     Patient is calling about the hip DXA Scan review she has scheduled for 4/11/24 ast 10:00 am.  She saw the results are ready and is asking if the doctor can call her to review on the phone??  Please advise..    Call back#: [phone]

## 2024-03-27 NOTE — PROGRESS NOTES
PT Evaluation     Today's date: 24  Patient name: Denita Brown  : 1960  MRN: [id number]  Referring provider: Piedad Fuentes DO  Dx:   Encounter Diagnosis     ICD-10-CM    1. Primary osteoarthritis of one hip, right  M16.11                       Assessment  Assessment details: Denita Brown is a 63 y.o. female referred with primary diagnosis of Primary osteoarthritis of one hip, right  (primary encounter diagnosis) .  Patient presents with the following functional limitations: Pain walking > 20 minutes, stairs, squatting, and lying on right side. Patient demonstrates decreased ROM and strength in the right hip, as well as, pain and tenderness to palpation of the right Greater trochanter and TFL.  Patient reports a 3 year history of right hip pain and had minimal relief from CSI of the trochanteric bursa and intra articular joint injection.  Treatment to include: Manual therapy techniques, extremity/core strengthening, neuromuscular control exercises, balance/proprioception training as appropriate, instruction in a comprehensive HEP, and modalities as needed.  They will benefit from skilled PT services to address the above functional deficits and to decrease pain to promote a return to their premorbid level of function.    Impairments: abnormal gait, abnormal or restricted ROM, abnormal movement, activity intolerance, impaired physical strength, lacks appropriate home exercise program and pain with function  Functional limitations: Pain walking > 20 minutes, stairs, squatting, and lying on right side.Understanding of Dx/Px/POC: good   Prognosis: good    Goals  STG (4 weeks)  1. Patient will demonstrate the ability to correct posture with minimal VC's  2. Patient will demonstrate 25% gains in Hip ROM in all deficient planes  3. Patient will report pain as a 4-5/10 at worst with all normal activities  4. Patient will report 50% reduction in sleep disturbances related to pain  LTG (8 weeks)  1. Patient  "will report pain as a 0-1/10 at worst with normal activities  2. Patient will sleep through the night without disturbances related to pain  3. Patient will demonstrate Hip ROM WNL to facilitate improved quality of gait  4. Patient will demonstrate Hip strength WNL to improve improve transfers, quality of gait, and ability to negotiate stairs.  5. Patient will be independent and compliant with a HEP in order to maintain gains made with skilled PT services.      Plan  Patient would benefit from: skilled physical therapy  Planned modality interventions: cryotherapy and thermotherapy: hydrocollator packs  Planned therapy interventions: joint mobilization, manual therapy, balance, neuromuscular re-education, patient education, strengthening, stretching, therapeutic activities, therapeutic exercise and home exercise program  Frequency: 2x week  Duration in weeks: 8  Plan of Care beginning date: 3/28/2024  Plan of Care expiration date: 2024  Treatment plan discussed with: patient        Subjective Evaluation    History of Present Illness  Mechanism of injury: Patient states she has had constant pain in her right hip for the past 3 years.  She had a CSI for bursitis a few weeks ago and an injection for her right hip in February, neither of which provided symptom relief.  She is scheduled for another US guided hip injection in May.  She is referred now to outpatient PT services.  X-rays of the right hip show, \"Mild to moderate right hip osteoarthritis.\"  Patient is also diagnosed with osteopenia.          Recurrent probem    Quality of life: poor    Patient Goals  Patient goals for therapy: decreased pain    Pain  Current pain ratin  At best pain ratin  At worst pain ratin  Location: right groin and lateral thigh.  Quality: sharp and dull ache  Aggravating factors: stair climbing (Squatting, lifting right LE, walking > 20 minutes, lying on right side.)  Progression: worsening    Social Support  Steps to " enter house: yes  Stairs in house: yes   Lives in: multiple-level home  Lives with: adult children    Employment status: working (.  Sits most of the day.)    Diagnostic Tests  X-ray: abnormal  Treatments  Previous treatment: medication and injection treatment  Current treatment: injection treatment and medication        Objective     Palpation     Right   Tenderness of the TFL.     Tenderness     Right Hip   Tenderness in the greater trochanter.     Neurological Testing     Sensation     Hip     Right Hip   Intact: light touch    Active Range of Motion     Right Hip   Flexion: WFL and with pain  Abduction: WFL  External rotation (90/90): 35 degrees   Internal rotation (90/90): 25 degrees with pain    Passive Range of Motion     Right Hip   External rotation (90/90): 40 degrees   Internal rotation (90/90): 25 degrees with pain    Strength/Myotome Testing     Left Hip   Planes of Motion   Flexion: 4+    Right Hip   Planes of Motion   Flexion: 4-    Tests     Right Hip   Positive SUDHAKAR, FADIR and scour.   Negative modified Jenna, Jenna, SI compression and SI distraction.          Precautions: L1-S1 fusion, C2-C7 fusion >10 years, HTN, DM, Tachycardia , osteopenia    POC expires Unit limit Auth  expiration date PT/OT + Visit Limit?   5/23/24 NA 12/31 NA                 Visit/Unit Tracking  AUTH Status:  Date 3/28              12/31 Used 1               Remaining                      Manuals 3/28            PROM right hip JF            Gentle hip mobs inferior and lateral distraction                                       Neuro Re-Ed             Pt education Hip anatomy and pathology.  DJD                                                                                          Ther Ex             Nustep             Hip ABD and ext             Supine bridges             Supine t-band clamshells             SL clamshells             Supine SLR                                       Ther Activity              Squats             Step-ups F/L             Gait Training             GT with [employer]'s techniqe. X 50'                         Modalities

## 2024-03-28 ENCOUNTER — EVALUATION (OUTPATIENT)
Dept: PHYSICAL THERAPY | Facility: CLINIC | Age: 64
End: 2024-03-28
Payer: COMMERCIAL

## 2024-03-28 DIAGNOSIS — M16.11 PRIMARY OSTEOARTHRITIS OF ONE HIP, RIGHT: Primary | ICD-10-CM

## 2024-03-28 DIAGNOSIS — K21.9 GASTROESOPHAGEAL REFLUX DISEASE, UNSPECIFIED WHETHER ESOPHAGITIS PRESENT: Primary | ICD-10-CM

## 2024-03-28 PROCEDURE — 97161 PT EVAL LOW COMPLEX 20 MIN: CPT | Performed by: PHYSICAL THERAPIST

## 2024-03-28 RX ORDER — SUCRALFATE ORAL 1 G/10ML
1 SUSPENSION ORAL 3 TIMES DAILY PRN
Qty: 414 ML | Refills: 1 | Status: SHIPPED | OUTPATIENT
Start: 2024-03-28

## 2024-04-08 ENCOUNTER — OFFICE VISIT (OUTPATIENT)
Dept: PHYSICAL THERAPY | Facility: CLINIC | Age: 64
End: 2024-04-08
Payer: COMMERCIAL

## 2024-04-08 DIAGNOSIS — M16.11 PRIMARY OSTEOARTHRITIS OF ONE HIP, RIGHT: Primary | ICD-10-CM

## 2024-04-08 PROCEDURE — 97140 MANUAL THERAPY 1/> REGIONS: CPT | Performed by: PHYSICAL THERAPIST

## 2024-04-08 PROCEDURE — 97110 THERAPEUTIC EXERCISES: CPT | Performed by: PHYSICAL THERAPIST

## 2024-04-08 PROCEDURE — 97530 THERAPEUTIC ACTIVITIES: CPT | Performed by: PHYSICAL THERAPIST

## 2024-04-08 NOTE — PROGRESS NOTES
"Daily Note     Today's date: 2024  Patient name: Denita Brown  : 1960  MRN: [id number]  Referring provider: Piedad Fuentes DO  Dx:   Encounter Diagnosis     ICD-10-CM    1. Primary osteoarthritis of one hip, right  M16.11                      Subjective: No new complaints.      Objective: See treatment diary below      Assessment: Tolerated treatment well. Patient demonstrated fatigue post treatment and would benefit from continued PT.  Patient reports muscle soreness after session today.  Pain with SLR today.      Plan: Continue per plan of care.  Progress treatment as tolerated.       Precautions: L1-S1 fusion, C2-C7 fusion >10 years, HTN, DM, Tachycardia , osteopenia    POC expires Unit limit Auth  expiration date PT/OT + Visit Limit?   24 NA  NA                 Visit/Unit Tracking  AUTH Status:  Date 3/28 4/8             12/31 Used 1 2              Remaining                      Manuals 3/28 4/8           PROM right hip JF            Gentle hip mobs inferior and lateral distraction                                       Neuro Re-Ed             Pt education Hip anatomy and pathology.  DJD                                                                                          Ther Ex             Nustep  L4x10'           Hip ABD and ext  2x10 ea            Supine bridges  3\"x20           Supine t-band clamshells  GTB 5\"x30           SL clamshells  3\"x20           Supine SLR  3\"x17                                     Ther Activity             Squats  2x10           Step-ups F/L  F 6\"x20           Gait Training             GT with [employer] techniqe. X 50'                         Modalities                                            "

## 2024-04-11 ENCOUNTER — OFFICE VISIT (OUTPATIENT)
Dept: PHYSICAL THERAPY | Facility: CLINIC | Age: 64
End: 2024-04-11
Payer: COMMERCIAL

## 2024-04-11 DIAGNOSIS — M16.11 PRIMARY OSTEOARTHRITIS OF ONE HIP, RIGHT: Primary | ICD-10-CM

## 2024-04-11 PROCEDURE — 97110 THERAPEUTIC EXERCISES: CPT

## 2024-04-11 PROCEDURE — 97530 THERAPEUTIC ACTIVITIES: CPT

## 2024-04-11 PROCEDURE — 97140 MANUAL THERAPY 1/> REGIONS: CPT

## 2024-04-11 NOTE — PROGRESS NOTES
"Daily Note     Today's date: 2024  Patient name: Denita Brown  : 1960  MRN: [id number]  Referring provider: Piedad Fuentes DO  Dx:   Encounter Diagnosis     ICD-10-CM    1. Primary osteoarthritis of one hip, right  M16.11                      Subjective: Patient reports numbness after last session the following day. She notes it is less now and a bit tingly. The numbness is to her medial foot and great toe. She knows it is from her back and is getting injections on tues next week.       Objective: See treatment diary below      Assessment: Tolerated treatment fair. Modified program due to subjective, held ext biased exercises. Notes abolished sx to foot in L sidelying during clamshells but did return although less that beginning of session. Performs charted program despite some hip discomfort mainly in groin. Patient demonstrated fatigue post treatment and would benefit from continued PT      Plan: Progress treatment as tolerated.       Precautions: L1-S1 fusion, C2-C7 fusion >10 years, HTN, DM, Tachycardia , osteopenia    POC expires Unit limit Auth  expiration date PT/OT + Visit Limit?   24 NA  NA                 Visit/Unit Tracking  AUTH Status:  Date 3/28 4/8 4/11            12/31 Used 1 2 3             Remaining                      Manuals 3/28 4/8 4/11          PROM right hip JF  Gentle DEEPAK          Gentle hip mobs inferior and lateral distraction                                       Neuro Re-Ed             Pt education Hip anatomy and pathology.  DJD                                                                                          Ther Ex             Nustep  L4x10' L4x10'           Hip ABD and ext  2x10 ea  Abd on R only 2x10          Supine bridges  3\"x20 held          Supine t-band clamshells  GTB 5\"x30 GTB 5\"x30          SL clamshells  3\"x20 R only 5\" x20          Supine SLR  3\"x17 held          Supine hip adduction w/ bolster   5\"x20                       Ther Activity     " "        Squats  2x10 2x10 UE support           Step-ups F/L  F 6\"x20 F 6\" x20           Gait Training             GT with [employer] techniqe. X 50'                         Modalities                                              "

## 2024-04-15 ENCOUNTER — APPOINTMENT (OUTPATIENT)
Dept: PHYSICAL THERAPY | Facility: CLINIC | Age: 64
End: 2024-04-15
Payer: COMMERCIAL

## 2024-04-15 ENCOUNTER — VBI (OUTPATIENT)
Dept: ADMINISTRATIVE | Facility: OTHER | Age: 64
End: 2024-04-15

## 2024-04-15 DIAGNOSIS — M16.11 PRIMARY OSTEOARTHRITIS OF ONE HIP, RIGHT: Primary | ICD-10-CM

## 2024-04-15 NOTE — TELEPHONE ENCOUNTER
04/15/24 1:43 PM     [employer] SmartForm used to document caregap status.    Rosalinda Thomas MA

## 2024-04-15 NOTE — PROGRESS NOTES
"Daily Note     Today's date: 4/15/2024  Patient name: Denita Brown  : 1960  MRN: [id number]  Referring provider: Piedad Fuentes DO  Dx:   Encounter Diagnosis     ICD-10-CM    1. Primary osteoarthritis of one hip, right  M16.11                      Subjective: ***      Objective: See treatment diary below      Assessment: Tolerated treatment {Tolerated treatment :6559948846}. Patient {assessment:2036855227}      Plan: {PLAN:1948981385}     Precautions: L1-S1 fusion, C2-C7 fusion >10 years, HTN, DM, Tachycardia , osteopenia    POC expires Unit limit Auth  expiration date PT/OT + Visit Limit?   24 NA  NA                 Visit/Unit Tracking  AUTH Status:  Date 3/28 4/8 4/11            12/31 Used 1 2 3             Remaining                      Manuals 3/28 4/8 4/11          PROM right hip JF  Gentle DEEPAK          Gentle hip mobs inferior and lateral distraction                                       Neuro Re-Ed             Pt education Hip anatomy and pathology.  DJD                                                                                          Ther Ex             Nustep  L4x10' L4x10'           Hip ABD and ext  2x10 ea  Abd on R only 2x10          Supine bridges  3\"x20 held          Supine t-band clamshells  GTB 5\"x30 GTB 5\"x30          SL clamshells  3\"x20 R only 5\" x20          Supine SLR  3\"x17 held          Supine hip adduction w/ bolster   5\"x20                       Ther Activity             Squats  2x10 2x10 UE support           Step-ups F/L  F 6\"x20 F 6\" x20           Gait Training             GT with [employer] techniqe. X 50'                         Modalities                                                "

## 2024-04-18 ENCOUNTER — OFFICE VISIT (OUTPATIENT)
Dept: PHYSICAL THERAPY | Facility: CLINIC | Age: 64
End: 2024-04-18
Payer: COMMERCIAL

## 2024-04-18 DIAGNOSIS — M16.11 PRIMARY OSTEOARTHRITIS OF ONE HIP, RIGHT: Primary | ICD-10-CM

## 2024-04-18 PROCEDURE — 97140 MANUAL THERAPY 1/> REGIONS: CPT | Performed by: PHYSICAL THERAPIST

## 2024-04-18 PROCEDURE — 97112 NEUROMUSCULAR REEDUCATION: CPT | Performed by: PHYSICAL THERAPIST

## 2024-04-18 PROCEDURE — 97110 THERAPEUTIC EXERCISES: CPT | Performed by: PHYSICAL THERAPIST

## 2024-04-18 NOTE — PROGRESS NOTES
"Daily Note     Today's date: 2024  Patient name: Denita Brown  : 1960  MRN: [id number]  Referring provider: Piedad Fuentes DO  Dx:   Encounter Diagnosis     ICD-10-CM    1. Primary osteoarthritis of one hip, right  M16.11           Start Time: 1636          Subjective: Patient states her hip is hurting today.      Objective: See treatment diary below      Assessment: Tolerated treatment well. Patient demonstrated fatigue post treatment and would benefit from continued PT.  Added gentle Inferior and lateral hip mobilizations.  Had some muscle soreness after session.      Plan: Continue per plan of care.      Precautions: L1-S1 fusion, C2-C7 fusion >10 years, HTN, DM, Tachycardia , osteopenia    POC expires Unit limit Auth  expiration date PT/OT + Visit Limit?   24 NA  NA                 Visit/Unit Tracking  AUTH Status:  Date 3/28 4/8 4/11 4/18           12/31 Used 1 2 3 4            Remaining                      Manuals 3/28 4/8 4/11 4/18         PROM right hip JF  Gentle DEEPAK JF         Gentle hip mobs inferior and lateral distraction    JF Grade II-III                                   Neuro Re-Ed             Pt education Hip anatomy and pathology.  DJD                                                                                          Ther Ex             Nustep seat 7  L4x10' L4x10'  L4x10'          Hip ABD and ext  2x10 ea  Abd on R only 2x10 2x10 right ABD         Supine bridges  3\"x20 held          Supine t-band clamshells  GTB 5\"x30 GTB 5\"x30 GTB 5\"x30         SL clamshells  3\"x20 R only 5\" x20 R only 5\" x20         Supine SLR  3\"x17 held x10         Supine hip adduction w/ bolster   5\"x20 5\"x20                      Ther Activity             Squats  2x10 2x10 UE support  2x10 UE support          Step-ups F/L  F 6\"x20 F 6\" x20           Gait Training             GT with [employer] techniqe. X 50'                         Modalities                                                  "

## 2024-04-19 PROBLEM — Z86.16 HISTORY OF COVID-19: Status: RESOLVED | Noted: 2021-12-22 | Resolved: 2024-04-19

## 2024-04-22 ENCOUNTER — OFFICE VISIT (OUTPATIENT)
Dept: FAMILY MEDICINE CLINIC | Facility: CLINIC | Age: 64
End: 2024-04-22
Payer: COMMERCIAL

## 2024-04-22 ENCOUNTER — OFFICE VISIT (OUTPATIENT)
Dept: PHYSICAL THERAPY | Facility: CLINIC | Age: 64
End: 2024-04-22
Payer: COMMERCIAL

## 2024-04-22 VITALS
HEART RATE: 60 BPM | DIASTOLIC BLOOD PRESSURE: 84 MMHG | TEMPERATURE: 98.5 F | BODY MASS INDEX: 35.5 KG/M2 | OXYGEN SATURATION: 97 % | WEIGHT: 180.8 LBS | SYSTOLIC BLOOD PRESSURE: 138 MMHG | HEIGHT: 60 IN

## 2024-04-22 DIAGNOSIS — Z12.31 SCREENING MAMMOGRAM FOR BREAST CANCER: ICD-10-CM

## 2024-04-22 DIAGNOSIS — Z00.00 MEDICARE ANNUAL WELLNESS VISIT, SUBSEQUENT: Primary | ICD-10-CM

## 2024-04-22 DIAGNOSIS — M16.11 PRIMARY OSTEOARTHRITIS OF ONE HIP, RIGHT: Primary | ICD-10-CM

## 2024-04-22 DIAGNOSIS — F11.20 CONTINUOUS OPIOID DEPENDENCE (HCC): ICD-10-CM

## 2024-04-22 DIAGNOSIS — R11.0 NAUSEA: ICD-10-CM

## 2024-04-22 PROBLEM — S82.832A OTHER CLOSED FRACTURE OF DISTAL END OF LEFT FIBULA, INITIAL ENCOUNTER: Status: RESOLVED | Noted: 2023-10-28 | Resolved: 2024-04-22

## 2024-04-22 PROBLEM — T14.8XXA BONE BRUISE: Status: RESOLVED | Noted: 2023-11-02 | Resolved: 2024-04-22

## 2024-04-22 PROBLEM — S82.832A CLOSED FRACTURE OF LEFT DISTAL FIBULA: Status: RESOLVED | Noted: 2023-10-28 | Resolved: 2024-04-22

## 2024-04-22 PROCEDURE — 97140 MANUAL THERAPY 1/> REGIONS: CPT | Performed by: PHYSICAL THERAPIST

## 2024-04-22 PROCEDURE — G0439 PPPS, SUBSEQ VISIT: HCPCS | Performed by: FAMILY MEDICINE

## 2024-04-22 PROCEDURE — 97110 THERAPEUTIC EXERCISES: CPT | Performed by: PHYSICAL THERAPIST

## 2024-04-22 PROCEDURE — 97112 NEUROMUSCULAR REEDUCATION: CPT | Performed by: PHYSICAL THERAPIST

## 2024-04-22 NOTE — PROGRESS NOTES
"Daily Note     Today's date: 2024  Patient name: Denita Brown  : 1960  MRN: [id number]  Referring provider: Piedad Fuentes DO  Dx:   Encounter Diagnosis     ICD-10-CM    1. Primary osteoarthritis of one hip, right  M16.11                      Subjective: Patient states she is sore from walking a lot over the weekend while shopping.      Objective: See treatment diary below      Assessment: Tolerated treatment fair. Patient demonstrated fatigue post treatment and would benefit from continued PT.  Continues to have pain with supine SLR.  No residual soreness after session today.      Plan: Continue per plan of care.  Progress treatment as tolerated.       Precautions: L1-S1 fusion, C2-C7 fusion >10 years, HTN, DM, Tachycardia , osteopenia    POC expires Unit limit Auth  expiration date PT/OT + Visit Limit?   24 NA  NA                 Visit/Unit Tracking  AUTH Status:  Date 3/28 4/8 4/11 4/18 4/22          12/31 Used 1 2 3 4 5           Remaining                      Manuals 3/28 4/8 4/11 4/18 4/22        PROM right hip JF  Gentle DEEPAK JF JF        Gentle hip mobs inferior and lateral distraction    JF Grade II-III JF Grade II-III                                  Neuro Re-Ed             Pt education Hip anatomy and pathology.  DJD                                                                                          Ther Ex             Nustep seat 7  L4x10' L4x10'  L4x10'  L4x10'        Hip ABD and ext  2x10 ea  Abd on R only 2x10 2x10 right ABD YTB ABD and Ext 2x10 ea        Supine bridges  3\"x20 held          Supine t-band clamshells  GTB 5\"x30 GTB 5\"x30 GTB 5\"x30 GTB 5\"x30        SL clamshells  3\"x20 R only 5\" x20 R only 5\" x20 R only 5\" x20        Supine SLR  3\"x17 held x10 x10        Supine hip adduction w/ bolster   5\"x20 5\"x20 5\"x30                     Ther Activity             Squats  2x10 2x10 UE support  2x10 UE support  TG L22 3x10        Step-ups F/L  F 6\"x20 F 6\" x20   F 6\" x20     "     Gait Training             GT with [employer]'s techniqe. X 50'                         Modalities

## 2024-04-22 NOTE — PATIENT INSTRUCTIONS
Medicare Preventive Visit Patient Instructions  Thank you for completing your Welcome to Medicare Visit or Medicare Annual Wellness Visit today. Your next wellness visit will be due in one year (4/23/2025).  The screening/preventive services that you may require over the next 5-10 years are detailed below. Some tests may not apply to you based off risk factors and/or age. Screening tests ordered at today's visit but not completed yet may show as past due. Also, please note that scanned in results may not display below.  Preventive Screenings:  Service Recommendations Previous Testing/Comments   Colorectal Cancer Screening  * Colonoscopy    * Fecal Occult Blood Test (FOBT)/Fecal Immunochemical Test (FIT)  * Fecal DNA/Cologuard Test  * Flexible Sigmoidoscopy Age: 45-75 years old   Colonoscopy: every 10 years (may be performed more frequently if at higher risk)  OR  FOBT/FIT: every 1 year  OR  Cologuard: every 3 years  OR  Sigmoidoscopy: every 5 years  Screening may be recommended earlier than age 45 if at higher risk for colorectal cancer. Also, an individualized decision between you and your healthcare provider will decide whether screening between the ages of 76-85 would be appropriate. Colonoscopy: Not on file  FOBT/FIT: Not on file  Cologuard: 02/14/2023  Sigmoidoscopy: Not on file    Screening Current     Breast Cancer Screening Age: 40+ years old  Frequency: every 1-2 years  Not required if history of left and right mastectomy Mammogram: 05/24/2023    Screening Current   Cervical Cancer Screening Between the ages of 21-29, pap smear recommended once every 3 years.   Between the ages of 30-65, can perform pap smear with HPV co-testing every 5 years.   Recommendations may differ for women with a history of total hysterectomy, cervical cancer, or abnormal pap smears in past. Pap Smear: 08/10/2020        Hepatitis C Screening Once for adults born between 1945 and 1965  More frequently in patients at high risk for  Hepatitis C Hep C Antibody: 02/06/2021    Screening Current   Diabetes Screening 1-2 times per year if you're at risk for diabetes or have pre-diabetes Fasting glucose: 101 mg/dL (4/22/2023)  A1C: 5.4 % (4/22/2023)  Screening Current   Cholesterol Screening Once every 5 years if you don't have a lipid disorder. May order more often based on risk factors. Lipid panel: 04/22/2023    Screening Not Indicated  History Lipid Disorder     Other Preventive Screenings Covered by Medicare:  Abdominal Aortic Aneurysm (AAA) Screening: covered once if your at risk. You're considered to be at risk if you have a family history of AAA.  Lung Cancer Screening: covers low dose CT scan once per year if you meet all of the following conditions: (1) Age 55-77; (2) No signs or symptoms of lung cancer; (3) Current smoker or have quit smoking within the last 15 years; (4) You have a tobacco smoking history of at least 20 pack years (packs per day multiplied by number of years you smoked); (5) You get a written order from a healthcare provider.  Glaucoma Screening: covered annually if you're considered high risk: (1) You have diabetes OR (2) Family history of glaucoma OR (3)  aged 50 and older OR (4)  American aged 65 and older  Osteoporosis Screening: covered every 2 years if you meet one of the following conditions: (1) You're estrogen deficient and at risk for osteoporosis based off medical history and other findings; (2) Have a vertebral abnormality; (3) On glucocorticoid therapy for more than 3 months; (4) Have primary hyperparathyroidism; (5) On osteoporosis medications and need to assess response to drug therapy.   Last bone density test (DXA Scan): 03/26/2024.  HIV Screening: covered annually if you're between the age of 15-65. Also covered annually if you are younger than 15 and older than 65 with risk factors for HIV infection. For pregnant patients, it is covered up to 3 times per  pregnancy.    Immunizations:  Immunization Recommendations   Influenza Vaccine Annual influenza vaccination during flu season is recommended for all persons aged >= 6 months who do not have contraindications   Pneumococcal Vaccine   * Pneumococcal conjugate vaccine = PCV13 (Prevnar 13), PCV15 (Vaxneuvance), PCV20 (Prevnar 20)  * Pneumococcal polysaccharide vaccine = PPSV23 (Pneumovax) Adults 19-63 yo with certain risk factors or if 65+ yo  If never received any pneumonia vaccine: recommend Prevnar 20 (PCV20)  Give PCV20 if previously received 1 dose of PCV13 or PPSV23   Hepatitis B Vaccine 3 dose series if at intermediate or high risk (ex: diabetes, end stage renal disease, liver disease)   Respiratory syncytial virus (RSV) Vaccine - COVERED BY MEDICARE PART D  * RSVPreF3 (Arexvy) CDC recommends that adults 60 years of age and older may receive a single dose of RSV vaccine using shared clinical decision-making (SCDM)   Tetanus (Td) Vaccine - COST NOT COVERED BY MEDICARE PART B Following completion of primary series, a booster dose should be given every 10 years to maintain immunity against tetanus. Td may also be given as tetanus wound prophylaxis.   Tdap Vaccine - COST NOT COVERED BY MEDICARE PART B Recommended at least once for all adults. For pregnant patients, recommended with each pregnancy.   Shingles Vaccine (Shingrix) - COST NOT COVERED BY MEDICARE PART B  2 shot series recommended in those 19 years and older who have or will have weakened immune systems or those 50 years and older     Health Maintenance Due:      Topic Date Due   • Breast Cancer Screening: Mammogram  05/24/2024   • Cervical Cancer Screening  08/10/2025   • Colorectal Cancer Screening  02/14/2026   • HIV Screening  Completed   • Hepatitis C Screening  Completed     Immunizations Due:      Topic Date Due   • COVID-19 Vaccine (5 - 2023-24 season) 09/01/2023     Advance Directives   What are advance directives?  Advance directives are legal  documents that state your wishes and plans for medical care. These plans are made ahead of time in case you lose your ability to make decisions for yourself. Advance directives can apply to any medical decision, such as the treatments you want, and if you want to donate organs.   What are the types of advance directives?  There are many types of advance directives, and each state has rules about how to use them. You may choose a combination of any of the following:  Living will:  This is a written record of the treatment you want. You can also choose which treatments you do not want, which to limit, and which to stop at a certain time. This includes surgery, medicine, IV fluid, and tube feedings.   Durable power of  for healthcare (DPAHC):  This is a written record that states who you want to make healthcare choices for you when you are unable to make them for yourself. This person, called a proxy, is usually a family member or a friend. You may choose more than 1 proxy.  Do not resuscitate (DNR) order:  A DNR order is used in case your heart stops beating or you stop breathing. It is a request not to have certain forms of treatment, such as CPR. A DNR order may be included in other types of advance directives.  Medical directive:  This covers the care that you want if you are in a coma, near death, or unable to make decisions for yourself. You can list the treatments you want for each condition. Treatment may include pain medicine, surgery, blood transfusions, dialysis, IV or tube feedings, and a ventilator (breathing machine).  Values history:  This document has questions about your views, beliefs, and how you feel and think about life. This information can help others choose the care that you would choose.  Why are advance directives important?  An advance directive helps you control your care. Although spoken wishes may be used, it is better to have your wishes written down. Spoken wishes can be  misunderstood, or not followed. Treatments may be given even if you do not want them. An advance directive may make it easier for your family to make difficult choices about your care.   Weight Management   Why it is important to manage your weight:  Being overweight increases your risk of health conditions such as heart disease, high blood pressure, type 2 diabetes, and certain types of cancer. It can also increase your risk for osteoarthritis, sleep apnea, and other respiratory problems. Aim for a slow, steady weight loss. Even a small amount of weight loss can lower your risk of health problems.  How to lose weight safely:  A safe and healthy way to lose weight is to eat fewer calories and get regular exercise. You can lose up about 1 pound a week by decreasing the number of calories you eat by 500 calories each day.   Healthy meal plan for weight management:  A healthy meal plan includes a variety of foods, contains fewer calories, and helps you stay healthy. A healthy meal plan includes the following:  Eat whole-grain foods more often.  A healthy meal plan should contain fiber. Fiber is the part of grains, fruits, and vegetables that is not broken down by your body. Whole-grain foods are healthy and provide extra fiber in your diet. Some examples of whole-grain foods are whole-wheat breads and pastas, oatmeal, brown rice, and bulgur.  Eat a variety of vegetables every day.  Include dark, leafy greens such as spinach, kale, blanche greens, and mustard greens. Eat yellow and orange vegetables such as carrots, sweet potatoes, and winter squash.   Eat a variety of fruits every day.  Choose fresh or canned fruit (canned in its own juice or light syrup) instead of juice. Fruit juice has very little or no fiber.  Eat low-fat dairy foods.  Drink fat-free (skim) milk or 1% milk. Eat fat-free yogurt and low-fat cottage cheese. Try low-fat cheeses such as mozzarella and other reduced-fat cheeses.  Choose meat and other  protein foods that are low in fat.  Choose beans or other legumes such as split peas or lentils. Choose fish, skinless poultry (chicken or turkey), or lean cuts of red meat (beef or pork). Before you cook meat or poultry, cut off any visible fat.   Use less fat and oil.  Try baking foods instead of frying them. Add less fat, such as margarine, sour cream, regular salad dressing and mayonnaise to foods. Eat fewer high-fat foods. Some examples of high-fat foods include french fries, doughnuts, ice cream, and cakes.  Eat fewer sweets.  Limit foods and drinks that are high in sugar. This includes candy, cookies, regular soda, and sweetened drinks.  Exercise:  Exercise at least 30 minutes per day on most days of the week. Some examples of exercise include walking, biking, dancing, and swimming. You can also fit in more physical activity by taking the stairs instead of the elevator or parking farther away from stores. Ask your healthcare provider about the best exercise plan for you.   Narcotic (Opioid) Safety    Use narcotics safely:  Take prescribed narcotics exactly as directed  Do not give narcotics to others or take narcotics that belong to someone else  Do not mix narcotics without medicines or alcohol  Do not drive or operate heavy machinery after you take the narcotic  Monitor for side effects and notify your healthcare provider if you experienced side effects such as nausea, sleepiness, itching, or trouble thinking clearly.    Manage constipation:    Constipation is the most common side effect of narcotic medicine. Constipation is when you have hard, dry bowel movements, or you go longer than usual between bowel movements. Tell your healthcare provider about all changes in your bowel movements while you are taking narcotics. He or she may recommend laxative medicine to help you have a bowel movement. He or she may also change the kind of narcotic you are taking, or change when you take it. The following are more  ways you can prevent or relieve constipation:    Drink liquids as directed.  You may need to drink extra liquids to help soften and move your bowels. Ask how much liquid to drink each day and which liquids are best for you.  Eat high-fiber foods.  This may help decrease constipation by adding bulk to your bowel movements. High-fiber foods include fruits, vegetables, whole-grain breads and cereals, and beans. Your healthcare provider or dietitian can help you create a high-fiber meal plan. Your provider may also recommend a fiber supplement if you cannot get enough fiber from food.  Exercise regularly.  Regular physical activity can help stimulate your intestines. Walking is a good exercise to prevent or relieve constipation. Ask which exercises are best for you.  Schedule a time each day to have a bowel movement.  This may help train your body to have regular bowel movements. Bend forward while you are on the toilet to help move the bowel movement out. Sit on the toilet for at least 10 minutes, even if you do not have a bowel movement.    Store narcotics safely:   Store narcotics where others cannot easily get them.  Keep them in a locked cabinet or secure area. Do not  keep them in a purse or other bag you carry with you. A person may be looking for something else and find the narcotics.  Make sure narcotics are stored out of the reach of children.  A child can easily overdose on narcotics. Narcotics may look like candy to a small child.    The best way to dispose of narcotics:      The laws vary by country and area. In the [place], the best way is to return the narcotics through a take-back program. This program is offered by the [place] Drug Enforcement Agency (CORA). The following are options for using the program:  Take the narcotics to a CORA collection site.  The site is often a law enforcement center. Call your local law enforcement center for scheduled take-back days in your area. You will be given  information on where to go if the collection site is in a different location.  Take the narcotics to an approved pharmacy or hospital.  A pharmacy or hospital may be set up as a collection site. You will need to ask if it is a CORA collection site if you were not directed there. A pharmacy or doctor's office may not be able to take back narcotics unless it is a CORA site.  Use a mail-back system.  This means you are given containers to put the narcotics into. You will then mail them in the containers.  Use a take-back drop box.  This is a place to leave the narcotics at any time. People and animals will not be able to get into the box. Your local law enforcement agency can tell you where to find a drop box in your area.    Other ways to manage pain:   Ask your healthcare provider about non-narcotic medicines to control pain.  Nonprescription medicines include NSAIDs (such as ibuprofen) and acetaminophen. Prescription medicines include muscle relaxers, antidepressants, and steroids.  Pain may be managed without any medicines.  Some ways to relieve pain include massage, aromatherapy, or meditation. Physical or occupational therapy may also help.    For more information:   Drug Enforcement Administration  [address] [place] [address]  Phone: 5- 929 - 117-8218  Web Address: https://www.deadiversion.[facility].gov/drug_disposal/    [place] Food and Drug Administration  [address] [place] [address]  Phone: 8- 639 - 798-3900  Web Address: http://www.fda.gov     © Copyright [employer] 2018 Information is for End User's use only and may not be sold, redistributed or otherwise used for commercial purposes. All illustrations and images included in CareNotes® are the copyrighted property of [employer] or [employer]

## 2024-04-22 NOTE — PROGRESS NOTES
Assessment and Plan:     Problem List Items Addressed This Visit        Behavioral Health    Continuous opioid dependence (HCC)   Other Visit Diagnoses     Medicare annual wellness visit, subsequent    -  Primary    Nausea        Relevant Orders    Ambulatory referral to Gastroenterology    Screening mammogram for breast cancer        Relevant Orders    Mammo screening bilateral w 3d & cad        Encouraged to update labs as previously ordered  Provided referral for second opinion from GI -- did not want to complete colonoscopy (just wanted to do EGD)        Preventive health issues were discussed with patient, and age appropriate screening tests were ordered as noted in patient's After Visit Summary.  Personalized health advice and appropriate referrals for health education or preventive services given if needed, as noted in patient's After Visit Summary.     History of Present Illness:     Patient presents for a Medicare Wellness Visit    HPI     Follows with Pain Management for chronic pain on opioids    Patient Care Team:  Nery Choi DO as PCP - General (Family Medicine)  MIA Jackson MD Carol Ruspantini, PA-C Peter Baddick, DO (Inactive)  DO Tanya Rodriguez MD Brian P George, MD Steven Falowski, MD Giuseppina Biundo, PA-C     Review of Systems:     Review of Systems   Constitutional:  Positive for fatigue. Negative for unexpected weight change.   HENT:  Negative for congestion, ear pain, rhinorrhea and sore throat.         (+) allergies   Eyes:  Negative for visual disturbance.   Respiratory:  Negative for cough and shortness of breath.    Cardiovascular:  Negative for chest pain, palpitations and leg swelling.   Gastrointestinal:  Positive for nausea (when she eats). Negative for abdominal pain, blood in stool, constipation and diarrhea.   Endocrine: Negative for polyuria.   Genitourinary:  Negative for dysuria, hematuria and vaginal bleeding.    Musculoskeletal:  Positive for arthralgias and back pain.   Neurological:  Positive for headaches (sinus). Negative for dizziness.   Psychiatric/Behavioral:  Negative for sleep disturbance.         Problem List:     Patient Active Problem List   Diagnosis   • Hyperlipemia   • Postgastrectomy malabsorption   • Chronic pain syndrome   • History of lumbar spinal fusion   • Lumbar radiculopathy   • Atrophic vaginitis   • Lumbar post-laminectomy syndrome   • Anxiety   • Bilateral low back pain without sciatica   • Intervertebral disc prolapse   • Irritation of right radial nerve   • Lateral epicondylitis of left elbow   • Lumbar degenerative disc disease   • Vitamin D insufficiency   • Lumbar stenosis   • Bariatric surgery status   • Primary osteoarthritis of one hip, right   • Greater trochanteric pain syndrome of right lower extremity   • Essential hypertension   • Right hand paresthesia   • Continuous opioid dependence (HCC)   • Chronic congestion of paranasal sinus   • Left hand pain   • Osteoarthritis of carpometacarpal (CMC) joint of both thumbs   • Bilateral foot pain   • Bilateral ankle pain, unspecified chronicity      Past Medical and Surgical History:     Past Medical History:   Diagnosis Date   • Abdominal pain, epigastric 03/23/2018    Added automatically from request for surgery [id number]   • Abnormal x-ray of lumbar spine 11/03/2015   • Acute cystitis with hematuria 04/07/2020   • Bariatric surgery status    • Basal cell carcinoma     basil cell carcinoma- in remission   • Benign essential hypertension 06/12/2012   • Bone bruise 11/02/2023   • Breakdown (mechanical) of internal fixation device of vertebrae, initial encounter ([facility]) 03/01/2019   • Calculus of bile duct with cholecystitis without obstruction 04/27/2018    Added automatically from request for surgery [id number]   • Cellulitis of finger 12/03/2015   • Closed fracture of left distal fibula 10/28/2023   • Degenerative lumbar disc    • Digital mucinous  cyst 2015   • DMII (diabetes mellitus, type 2) (HCC) 2013   • Gross hematuria 2019   • Hiatal hernia    • History of COVID-19 2021   • History of transfusion 2014    Post-op spinal surgery   • Low back pain    • Lower urinary tract infectious disease 2015   • Medicare annual wellness visit, initial 2019   • Obesity     Resolved 10/6/2016    • Other closed fracture of distal end of left fibula, initial encounter 10/28/2023   • Overactive bladder    • Postsurgical malabsorption    • Recurrent UTI 2019   • Spinal stenosis    • SVT (supraventricular tachycardia)    • Tachycardia     Resolved 2016    • Type 2 diabetes mellitus (HCC)     Last assesssed 2018    • Wears glasses      Past Surgical History:   Procedure Laterality Date   • APPENDECTOMY     • ARTHRODESIS      Lumbar - Last assessed 2018    • BACK SURGERY      Lumbar (3 surgeries)- two levels fused, clean out from infection, then fusion of 7 levels (last surgery in )   • CARDIAC ELECTROPHYSIOLOGY STUDY AND ABLATION     • CARPAL TUNNEL RELEASE      x2   • CERVICAL SPINE SURGERY      Fusion of C2-C7 over a period of 3 surgeries ( first)   •  SECTION      X2   • CHOLECYSTECTOMY     • FL MYELOGRAM LUMBAR  3/28/2019   • INSERTION / PLACEMENT / REVISION NEUROSTIMULATOR      X2- both were removed   • NECK SURGERY     • OTHER SURGICAL HISTORY      Catheter ablation    • HI EGD TRANSORAL BIOPSY SINGLE/MULTIPLE N/A 2016    Procedure: ESOPHAGOGASTRODUODENOSCOPY (EGD);  Surgeon: Tanya Orta MD;  Location: [facility];  Service: Bariatrics   • HI EGD TRANSORAL BIOPSY SINGLE/MULTIPLE N/A 2018    Procedure: ESOPHAGOGASTRODUODENOSCOPY (EGD) with biopsy;  Surgeon: Tanya Orta MD;  Location: [facility];  Service: Bariatrics   • HI LAPAROSCOPY SURG CHOLECYSTECTOMY N/A 2018    Procedure: CHOLECYSTECTOMY LAPAROSCOPIC;  Surgeon: Tanya Orta MD;  Location: [facility];  Service: Bariatrics    • AR LAPS GSTR RSTCV PX W/BYP BRAULIO-EN-Y LIMB <150 CM N/A 10/25/2016    Procedure: BYPASS GASTRIC  BRAULIO-EN-Y LAPAROSCOPIC, WITH INTRA OP EGD;  Surgeon: Tanya Orta MD;  Location: [facility];  Service: Bariatrics   • SKIN CANCER EXCISION     • TONSILLECTOMY     • TUBAL LIGATION     • US GUIDED BREAST BIOPSY RIGHT COMPLETE Right 2023   • WISDOM TOOTH EXTRACTION        Family History:     Family History   Problem Relation Age of Onset   • Arthritis Mother         Rheumatoid   • Angina Mother    • Coronary artery disease Mother    • Diabetes Mother    • Cancer Father         Lung   • Cystic fibrosis Father    • Rheum arthritis Sister    • Diabetes Sister    • No Known Problems Maternal Grandmother    • No Known Problems Maternal Grandfather    • No Known Problems Paternal Grandmother    • No Known Problems Paternal Grandfather    • Other Brother         Back problems    • Diabetes Brother    • No Known Problems Brother    • Hypertension Family    • Heart disease Family    • No Known Problems Son    • No Known Problems Son       Social History:     Social History     Socioeconomic History   • Marital status:      Spouse name: None   • Number of children: None   • Years of education: Completed 4th year of college    • Highest education level: None   Occupational History   • Occupation: Realtor    Tobacco Use   • Smoking status: Former     Current packs/day: 0.00     Average packs/day: 1 pack/day for 20.0 years (20.0 ttl pk-yrs)     Types: Cigarettes     Start date:      Quit date: 2004     Years since quittin.3     Passive exposure: Past   • Smokeless tobacco: Never   Vaping Use   • Vaping status: Never Used   Substance and Sexual Activity   • Alcohol use: No   • Drug use: No   • Sexual activity: Not Currently   Other Topics Concern   • None   Social History Narrative    Lives with son     Works as       Social Determinants of Health     Financial Resource Strain: Medium Risk  (4/10/2023)    Overall Financial Resource Strain (CARDIA)    • Difficulty of Paying Living Expenses: Somewhat hard   Food Insecurity: Food Insecurity Present (4/17/2024)    Hunger Vital Sign    • Worried About Running Out of Food in the Last Year: Sometimes true    • Ran Out of Food in the Last Year: Never true   Transportation Needs: No Transportation Needs (4/17/2024)    PRAPARE - Transportation    • Lack of Transportation (Medical): No    • Lack of Transportation (Non-Medical): No   Physical Activity: Not on file   Stress: Not on file   Social Connections: Not on file   Intimate Partner Violence: Not on file   Housing Stability: Low Risk  (4/17/2024)    Housing Stability Vital Sign    • Unable to Pay for Housing in the Last Year: No    • Number of Places Lived in the Last Year: 1    • Unstable Housing in the Last Year: No      Medications and Allergies:     Current Outpatient Medications   Medication Sig Dispense Refill   • albuterol (PROVENTIL HFA,VENTOLIN HFA) 90 mcg/act inhaler Inhale 2 puffs every 6 (six) hours as needed for wheezing 8 g 0   • baclofen 10 mg tablet Take 10 mg by mouth daily     • buPROPion (WELLBUTRIN) 75 mg tablet take 1 tablet by mouth twice a day 180 tablet 1   • Calcium Citrate-Vitamin D 315-250 MG-UNIT TABS Take 1 tablet by mouth 2 (two) times a day      • Cholecalciferol 25 MCG (1000 UT) CHEW Chew     • escitalopram (LEXAPRO) 20 mg tablet take 1 tablet by mouth every morning 90 tablet 0   • famotidine (PEPCID) 40 MG tablet take 1 tablet by mouth at bedtime 90 tablet 3   • fenofibrate 160 MG tablet take 1 tablet by mouth once daily 90 tablet 1   • fluticasone (FLONASE) 50 mcg/act nasal spray instill 1 spray into each nostril daily 16 g 3   • gabapentin (NEURONTIN) 100 mg capsule 100 mg 5 (five) times a day      • HYDROmorphone (DILAUDID) 4 mg tablet TAKE 1 TABLET BY MOUTH EVERY 6 HOURS AS NEEDED...FILL 5/8/2020     • lidocaine (XYLOCAINE) 5 % ointment Apply topically as needed for mild  pain 30 g 2   • loratadine (CLARITIN) 10 mg tablet Take 10 mg by mouth daily.     • Multiple Vitamins-Minerals (BARIATRIC FUSION PO) Take 1 tablet by mouth daily     • ondansetron (ZOFRAN) 4 mg tablet Take 1 tablet (4 mg total) by mouth every 8 (eight) hours as needed for nausea or vomiting 90 tablet 0   • oxybutynin (DITROPAN) 5 mg tablet take 1 tablet by mouth three times a day if needed for bladder SPASM(S) 90 tablet 1   • pantoprazole (PROTONIX) 40 mg tablet Take 1 tablet (40 mg total) by mouth daily before breakfast 90 tablet 3     No current facility-administered medications for this visit.     No Known Allergies   Immunizations:     Immunization History   Administered Date(s) Administered   • COVID-19 PFIZER VACCINE 0.3 ML IM 04/13/2021, 05/04/2021, 12/19/2021   • COVID-19 Pfizer Vac BIVALENT Moo-sucrose 12 Yr+ IM 09/16/2022   • INFLUENZA 10/20/2017, 09/29/2021, 09/05/2022, 09/24/2023   • Influenza Injectable, MDCK, Preservative Free, Quadrivalent, 0.5 mL 10/03/2020   • Influenza, injectable, quadrivalent, preservative free 0.5 mL 09/22/2018, 10/06/2019   • Tdap 02/24/2018   • Tuberculin Skin Test-PPD Intradermal 01/28/2016   • Zoster 10/25/2014   • Zoster Vaccine Recombinant 08/08/2022, 10/12/2022      Health Maintenance:         Topic Date Due   • Breast Cancer Screening: Mammogram  05/24/2024   • Cervical Cancer Screening  08/10/2025   • Colorectal Cancer Screening  02/14/2026   • HIV Screening  Completed   • Hepatitis C Screening  Completed         Topic Date Due   • COVID-19 Vaccine (5 - 2023-24 season) 09/01/2023      Medicare Screening Tests and Risk Assessments:     Denita is here for her Subsequent Wellness visit.     Health Risk Assessment:   Patient rates overall health as good. Patient feels that their physical health rating is slightly worse. Patient is satisfied with their life. Eyesight was rated as same. Hearing was rated as same. Patient feels that their emotional and mental health rating is  slightly better. Patients states they are never, rarely angry. Patient states they are sometimes unusually tired/fatigued. Pain experienced in the last 7 days has been a lot. Patient's pain rating has been 6/10. Patient states that she has experienced no weight loss or gain in last 6 months.     Depression Screening:   PHQ-2 Score: 0      Fall Risk Screening:   In the past year, patient has experienced: history of falling in past year    Number of falls: 1  Injured during fall?: Yes    Feels unsteady when standing or walking?: No    Worried about falling?: No      Urinary Incontinence Screening:   Patient has not leaked urine accidently in the last six months.     Home Safety:  Patient does not have trouble with stairs inside or outside of their home. Patient has working smoke alarms and has working carbon monoxide detector. Home safety hazards include: none.     Nutrition:   Current diet is Unhealthy.     Medications:   Patient is currently taking over-the-counter supplements. OTC medications include: see medication list. Patient is able to manage medications.     Activities of Daily Living (ADLs)/Instrumental Activities of Daily Living (IADLs):   Walk and transfer into and out of bed and chair?: Yes  Dress and groom yourself?: Yes    Bathe or shower yourself?: Yes    Feed yourself? Yes  Do your laundry/housekeeping?: Yes  Manage your money, pay your bills and track your expenses?: Yes  Make your own meals?: Yes    Do your own shopping?: Yes    ADL comments: Have a bad hip so some things are difficult to do because of that.    Durable Medical Equipment Suppliers  cane    Previous Hospitalizations:   Any hospitalizations or ED visits within the last 12 months?: No      Advance Care Planning:   Living will: Yes    Durable POA for healthcare: No    Advanced directive: Yes      Cognitive Screening:   Provider or family/friend/caregiver concerned regarding cognition?: No    PREVENTIVE SCREENINGS      Cardiovascular  Screening:    General: Screening Not Indicated and History Lipid Disorder    Due for: Lipid Panel      Diabetes Screening:     General: Screening Current    Due for: Blood Glucose      Colorectal Cancer Screening:     General: Screening Current      Breast Cancer Screening:     General: Screening Current      Cervical Cancer Screening:    General: Screening Current      Osteoporosis Screening:    General: Screening Current      Abdominal Aortic Aneurysm (AAA) Screening:        General: Screening Not Indicated      Lung Cancer Screening:     General: Screening Not Indicated      Hepatitis C Screening:    General: Screening Current    Screening, Brief Intervention, and Referral to Treatment (SBIRT)    Screening  Typical number of drinks in a day: 0  Typical number of drinks in a week: 0  Interpretation: Low risk drinking behavior.    AUDIT-C Screenin) How often did you have a drink containing alcohol in the past year? never  2) How many drinks did you have on a typical day when you were drinking in the past year? 0  3) How often did you have 6 or more drinks on one occasion in the past year? never    AUDIT-C Score: 0  Interpretation: Score 0-2 (female): Negative screen for alcohol misuse    Single Item Drug Screening:  How often have you used an illegal drug (including marijuana) or a prescription medication for non-medical reasons in the past year? never    Single Item Drug Screen Score: 0  Interpretation: Negative screen for possible drug use disorder    SDOH Risk Assessment  Social determinants of health (SDOH) risk assesment tool was completed. The tool at a minimum covered housing stability, food insecurity, transportation needs, and utility difficulty. Patient had at risk responses for the following SDOH domains: food insecurity.     Review of Current Opioid Use    Opioid Risk Tool (ORT) Interpretation: Complete Opioid Risk Tool (ORT)    No results found.     Physical Exam:     /84   Pulse 60   Temp  98.5 °F (36.9 °C)   Ht 5' (1.524 m)   Wt 82 kg (180 lb 12.8 oz)   SpO2 97%   BMI 35.31 kg/m²     Physical Exam  Vitals and nursing note reviewed.   Constitutional:       General: She is not in acute distress.     Appearance: She is well-developed.   HENT:      Head: Normocephalic and atraumatic.      Right Ear: Tympanic membrane, ear canal and external ear normal.      Left Ear: Tympanic membrane, ear canal and external ear normal.      Nose: Nose normal. No rhinorrhea.      Mouth/Throat:      Mouth: Mucous membranes are moist.      Pharynx: No oropharyngeal exudate or posterior oropharyngeal erythema.   Eyes:      Conjunctiva/sclera: Conjunctivae normal.   Neck:      Thyroid: No thyromegaly.   Cardiovascular:      Rate and Rhythm: Normal rate and regular rhythm.   Pulmonary:      Effort: Pulmonary effort is normal. No respiratory distress.      Breath sounds: Normal breath sounds.   Abdominal:      General: Bowel sounds are normal. There is no distension.      Palpations: Abdomen is soft.      Tenderness: There is no abdominal tenderness.   Musculoskeletal:         General: Normal range of motion.   Lymphadenopathy:      Cervical: No cervical adenopathy.   Skin:     General: Skin is warm and dry.   Neurological:      Mental Status: She is alert.      Comments: Grossly intact   Psychiatric:         Mood and Affect: Mood normal.          Nery Choi DO

## 2024-04-24 NOTE — PROGRESS NOTES
"Daily Note     Today's date: 2024  Patient name: Denita Brown  : 1960  MRN: [id number]  Referring provider: Piedad Fuentes DO  Dx:   Encounter Diagnosis     ICD-10-CM    1. Primary osteoarthritis of one hip, right  M16.11           Start Time: 1620  Stop Time: 1705  Total time in clinic (min): 45 minutes    Subjective: No changes noted a the arrival of PT session for R hip.       Objective: See treatment diary below      Assessment:  Continued with treatment session with focus on R hip. At this time some progressions added such as s/l abd as well as L step ups Tolerated treatment well.  Pt did note come increase in challenge with  s/l abd. Patient demonstrated fatigue post treatment. S/p treatment no changes noted with R hip. Advised patient that she may have some additional soreness.       Plan: Continue per plan of care.      Precautions: L1-S1 fusion, C2-C7 fusion >10 years, HTN, DM, Tachycardia , osteopenia    POC expires Unit limit Auth  expiration date PT/OT + Visit Limit?   24 NA  NA                 Visit/Unit Tracking  AUTH Status:  Date 3/28 4/8 4/11 4/18 4/22 4/25         12/31 Used 1 2 3 4 5 6           Remaining                      Manuals 3/28 4/8 4/11 4/18 4/22 4/25       PROM right hip JF  Gentle DEEPAK JF JF SC       Gentle hip mobs inferior and lateral distraction    JF Grade II-III JF Grade II-III NV                                 Neuro Re-Ed             Pt education Hip anatomy and pathology.  DJD                                                                                          Ther Ex             Nustep seat 7  L4x10' L4x10'  L4x10'  L4x10' L4 10 min        Hip ABD and ext  2x10 ea  Abd on R only 2x10 2x10 right ABD YTB ABD and Ext 2x10 ea YTB 2x 10        Supine bridges  3\"x20 held          Supine t-band clamshells  GTB 5\"x30 GTB 5\"x30 GTB 5\"x30 GTB 5\"x30 GTB 5\" 3x 10        SL clamshells  3\"x20 R only 5\" x20 R only 5\" x20 R only 5\" x20 R only 5\" 20x        Supine " "SLR  3\"x17 held x10 x10 2x 10 and abd 2x 10 (R)       Supine hip adduction w/ bolster   5\"x20 5\"x20 5\"x30 5\" 30x                     Ther Activity             Squats  2x10 2x10 UE support  2x10 UE support  TG L22 3x10 TG L22 3x 10        Step-ups F/L  F 6\"x20 F 6\" x20   F 6\" x20  F  and L 2x 10        Gait Training             GT with [facility] techn[employer]. X 50'                         Modalities                                                       Billed for 1 on 1 time as well as  with supervised time from Mario Nielsen, PT.     "

## 2024-04-25 ENCOUNTER — OFFICE VISIT (OUTPATIENT)
Dept: PHYSICAL THERAPY | Facility: CLINIC | Age: 64
End: 2024-04-25
Payer: COMMERCIAL

## 2024-04-25 DIAGNOSIS — M16.11 PRIMARY OSTEOARTHRITIS OF ONE HIP, RIGHT: Primary | ICD-10-CM

## 2024-04-25 PROCEDURE — 97530 THERAPEUTIC ACTIVITIES: CPT

## 2024-04-25 PROCEDURE — 97110 THERAPEUTIC EXERCISES: CPT

## 2024-04-27 ENCOUNTER — APPOINTMENT (OUTPATIENT)
Dept: LAB | Facility: CLINIC | Age: 64
End: 2024-04-27
Payer: COMMERCIAL

## 2024-04-27 DIAGNOSIS — I10 ESSENTIAL HYPERTENSION: ICD-10-CM

## 2024-04-27 DIAGNOSIS — E78.2 MIXED HYPERLIPIDEMIA: ICD-10-CM

## 2024-04-27 DIAGNOSIS — F41.9 ANXIETY: ICD-10-CM

## 2024-04-27 DIAGNOSIS — Z90.3 POSTGASTRECTOMY MALABSORPTION: ICD-10-CM

## 2024-04-27 DIAGNOSIS — Z98.84 BARIATRIC SURGERY STATUS: ICD-10-CM

## 2024-04-27 DIAGNOSIS — K91.2 POSTGASTRECTOMY MALABSORPTION: ICD-10-CM

## 2024-04-27 DIAGNOSIS — N95.2 ATROPHIC VAGINITIS: ICD-10-CM

## 2024-04-27 LAB
25(OH)D3 SERPL-MCNC: 60.8 NG/ML (ref 30–100)
ALBUMIN SERPL BCP-MCNC: 4.2 G/DL (ref 3.5–5)
ALP SERPL-CCNC: 43 U/L (ref 34–104)
ALT SERPL W P-5'-P-CCNC: 21 U/L (ref 7–52)
ANION GAP SERPL CALCULATED.3IONS-SCNC: 14 MMOL/L (ref 4–13)
AST SERPL W P-5'-P-CCNC: 35 U/L (ref 13–39)
BASOPHILS # BLD AUTO: 0.08 THOUSANDS/ÂΜL (ref 0–0.1)
BASOPHILS NFR BLD AUTO: 2 % (ref 0–1)
BILIRUB SERPL-MCNC: 0.32 MG/DL (ref 0.2–1)
BUN SERPL-MCNC: 14 MG/DL (ref 5–25)
CALCIUM SERPL-MCNC: 9.5 MG/DL (ref 8.4–10.2)
CHLORIDE SERPL-SCNC: 103 MMOL/L (ref 96–108)
CHOLEST SERPL-MCNC: 168 MG/DL
CO2 SERPL-SCNC: 26 MMOL/L (ref 21–32)
CREAT SERPL-MCNC: 0.92 MG/DL (ref 0.6–1.3)
EOSINOPHIL # BLD AUTO: 0.16 THOUSAND/ÂΜL (ref 0–0.61)
EOSINOPHIL NFR BLD AUTO: 3 % (ref 0–6)
ERYTHROCYTE [DISTWIDTH] IN BLOOD BY AUTOMATED COUNT: 12.1 % (ref 11.6–15.1)
EST. AVERAGE GLUCOSE BLD GHB EST-MCNC: 120 MG/DL
FERRITIN SERPL-MCNC: 35 NG/ML (ref 11–307)
GFR SERPL CREATININE-BSD FRML MDRD: 67 ML/MIN/1.73SQ M
GLUCOSE P FAST SERPL-MCNC: 96 MG/DL (ref 65–99)
HBA1C MFR BLD: 5.8 %
HCT VFR BLD AUTO: 42.2 % (ref 34.8–46.1)
HDLC SERPL-MCNC: 70 MG/DL
HGB BLD-MCNC: 13.7 G/DL (ref 11.5–15.4)
IMM GRANULOCYTES # BLD AUTO: 0.01 THOUSAND/UL (ref 0–0.2)
IMM GRANULOCYTES NFR BLD AUTO: 0 % (ref 0–2)
IRON SATN MFR SERPL: 18 % (ref 15–50)
IRON SERPL-MCNC: 74 UG/DL (ref 50–212)
LDLC SERPL CALC-MCNC: 79 MG/DL (ref 0–100)
LYMPHOCYTES # BLD AUTO: 1.93 THOUSANDS/ÂΜL (ref 0.6–4.47)
LYMPHOCYTES NFR BLD AUTO: 37 % (ref 14–44)
MCH RBC QN AUTO: 30.2 PG (ref 26.8–34.3)
MCHC RBC AUTO-ENTMCNC: 32.5 G/DL (ref 31.4–37.4)
MCV RBC AUTO: 93 FL (ref 82–98)
MONOCYTES # BLD AUTO: 0.49 THOUSAND/ÂΜL (ref 0.17–1.22)
MONOCYTES NFR BLD AUTO: 9 % (ref 4–12)
NEUTROPHILS # BLD AUTO: 2.52 THOUSANDS/ÂΜL (ref 1.85–7.62)
NEUTS SEG NFR BLD AUTO: 49 % (ref 43–75)
NONHDLC SERPL-MCNC: 98 MG/DL
NRBC BLD AUTO-RTO: 0 /100 WBCS
PLATELET # BLD AUTO: 377 THOUSANDS/UL (ref 149–390)
PMV BLD AUTO: 9.5 FL (ref 8.9–12.7)
POTASSIUM SERPL-SCNC: 4.1 MMOL/L (ref 3.5–5.3)
PROT SERPL-MCNC: 6.7 G/DL (ref 6.4–8.4)
RBC # BLD AUTO: 4.53 MILLION/UL (ref 3.81–5.12)
SODIUM SERPL-SCNC: 143 MMOL/L (ref 135–147)
TIBC SERPL-MCNC: 408 UG/DL (ref 250–450)
TRIGL SERPL-MCNC: 93 MG/DL
TSH SERPL DL<=0.05 MIU/L-ACNC: 1.32 UIU/ML (ref 0.45–4.5)
UIBC SERPL-MCNC: 334 UG/DL (ref 155–355)
VIT B12 SERPL-MCNC: 1334 PG/ML (ref 180–914)
WBC # BLD AUTO: 5.19 THOUSAND/UL (ref 4.31–10.16)

## 2024-04-27 PROCEDURE — 80061 LIPID PANEL: CPT

## 2024-04-27 PROCEDURE — 83036 HEMOGLOBIN GLYCOSYLATED A1C: CPT

## 2024-04-27 PROCEDURE — 82306 VITAMIN D 25 HYDROXY: CPT

## 2024-04-27 PROCEDURE — 82728 ASSAY OF FERRITIN: CPT

## 2024-04-27 PROCEDURE — 80053 COMPREHEN METABOLIC PANEL: CPT

## 2024-04-27 PROCEDURE — 83540 ASSAY OF IRON: CPT

## 2024-04-27 PROCEDURE — 36415 COLL VENOUS BLD VENIPUNCTURE: CPT

## 2024-04-27 PROCEDURE — 84443 ASSAY THYROID STIM HORMONE: CPT

## 2024-04-27 PROCEDURE — 83550 IRON BINDING TEST: CPT

## 2024-04-27 PROCEDURE — 85025 COMPLETE CBC W/AUTO DIFF WBC: CPT

## 2024-04-27 PROCEDURE — 82607 VITAMIN B-12: CPT

## 2024-04-29 ENCOUNTER — EVALUATION (OUTPATIENT)
Dept: PHYSICAL THERAPY | Facility: CLINIC | Age: 64
End: 2024-04-29
Payer: COMMERCIAL

## 2024-04-29 DIAGNOSIS — M16.11 PRIMARY OSTEOARTHRITIS OF ONE HIP, RIGHT: Primary | ICD-10-CM

## 2024-04-29 PROCEDURE — 97110 THERAPEUTIC EXERCISES: CPT | Performed by: PHYSICAL THERAPIST

## 2024-04-29 NOTE — PROGRESS NOTES
PT Re-Evaluation     Today's date: 24  Patient name: Denita Brown  : 1960  MRN: [id number]  Referring provider: Piedad Fuentes DO  Dx:   Encounter Diagnosis     ICD-10-CM    1. Primary osteoarthritis of one hip, right  M16.11                       Assessment  Assessment details: Patient reports feeling 50% improved to date.  She no longer has constant pain in her hip.  Right groin pain occurs only with ambulation or WB > 10 minutes.  Pain in greater trochanter occurs only with pressure to her lateral hip.  Denies sleep disturbances.  Performs some of her exercises daily.  Objectively, she demonstrates greatly improved hip strength and mild gains in in right hip ROM.  Limitations persist with hip internal rotation and there is significant tenderness to palpation of the greater trochanter at the right hip.  Patient will benefit from continuation of PT services in order to further alleviate her pain to improve her tolerance to community ambulation and activity around the home.  Impairments: abnormal gait, abnormal or restricted ROM, abnormal movement, activity intolerance, impaired physical strength, lacks appropriate home exercise program and pain with function  Functional limitations: Pain walking > 20 minutes, stairs, squatting, and lying on right side.Understanding of Dx/Px/POC: good   Prognosis: good    Goals  STG (4 weeks)  1. Patient will demonstrate the ability to correct posture with minimal VC's - met  2. Patient will demonstrate 25% gains in Hip ROM in all deficient planes - partially met  3. Patient will report pain as a 4-5/10 at worst with all normal activities- not met  4. Patient will report 50% reduction in sleep disturbances related to pain - met  LTG (8 weeks)  1. Patient will report pain as a 0-1/10 at worst with normal activities - not met  2. Patient will sleep through the night without disturbances related to pain - not met  3. Patient will demonstrate Hip ROM WNL to facilitate  improved quality of gait - not met  4. Patient will demonstrate Hip strength WNL to improve improve transfers, quality of gait, and ability to negotiate stairs. - met  5. Patient will be independent and compliant with a HEP in order to maintain gains made with skilled PT services.- partially met      Plan  Patient would benefit from: skilled physical therapy  Planned modality interventions: cryotherapy and thermotherapy: hydrocollator packs  Planned therapy interventions: joint mobilization, manual therapy, balance, neuromuscular re-education, patient education, strengthening, stretching, therapeutic activities, therapeutic exercise and home exercise program  Frequency: 2x week  Duration in weeks: 4  Plan of Care beginning date: 3/28/2024  Plan of Care expiration date: 2024  Treatment plan discussed with: patient        Subjective Evaluation    History of Present Illness  Mechanism of injury: Patient states her right groin pain is now intermittent in nature.  Groin hurts when she has walked for about 10 minutes.  Lateral hip only hurts if something presses against it.  No sleep disturbances related to pain unless she lays on her right side.  Patient has been doing some of her exercises daily.          Recurrent probem    Quality of life: poor    Patient Goals  Patient goals for therapy: decreased pain    Pain  Current pain ratin  At best pain ratin  At worst pain ratin  Location: right groin and lateral thigh.  Quality: sharp and dull ache  Aggravating factors: stair climbing (deep squats, getting in/out of the car, lifting right LE, walking > 20 minutes, lying on right side.)  Progression: improved    Social Support  Steps to enter house: yes  Stairs in house: yes   Lives in: multiple-level home  Lives with: adult children    Employment status: working (.  Sits most of the day.)    Diagnostic Tests  X-ray: abnormal  Treatments  Previous treatment: medication and injection  "treatment  Current treatment: injection treatment and medication        Objective     Palpation     Right   Tenderness of the TFL.     Tenderness     Right Hip   Tenderness in the greater trochanter.     Neurological Testing     Sensation     Hip     Right Hip   Intact: light touch    Active Range of Motion     Right Hip   Flexion: WFL  Abduction: WFL  External rotation (90/90): 35 degrees   Internal rotation (90/90): 25 degrees with pain    Passive Range of Motion     Right Hip   External rotation (90/90): 40 degrees   Internal rotation (90/90): 25 degrees with pain    Strength/Myotome Testing     Left Hip   Planes of Motion   Flexion: 4+    Right Hip   Planes of Motion   Flexion: 4+  Extension: 4+  Abduction: 4+    Tests     Right Hip   Positive scour.   Negative SUDHAKAR, FADIR, modified Jenna, Jenna, SI compression and SI distraction.          Precautions: L1-S1 fusion, C2-C7 fusion >10 years, HTN, DM, Tachycardia , osteopenia    POC expires Unit limit Auth  expiration date PT/OT + Visit Limit?   5/23/24 NA 12/31 NA                 Visit/Unit Tracking  AUTH Status:  Date 3/28 4/8 4/11 4/18 4/22 4/25 4/29        12/31 Used 1 2 3 4 5 6  7         Remaining                      Manuals 3/28 4/8 4/11 4/18 4/22 4/25 4/29      PROM right hip JF  Gentle DEEPAK JF JF SC NV      Gentle hip mobs inferior and lateral distraction    JF Grade II-III JF Grade II-III NV NV                   RE performed       JF      Neuro Re-Ed             Pt education Hip anatomy and pathology.  DJD                                                                                          Ther Ex             Nustep seat 7  L4x10' L4x10'  L4x10'  L4x10' L4 10 min  L4 10 min       Hip ABD and ext  2x10 ea  Abd on R only 2x10 2x10 right ABD YTB ABD and Ext 2x10 ea YTB 2x 10        Supine bridges  3\"x20 held          Supine t-band clamshells  GTB 5\"x30 GTB 5\"x30 GTB 5\"x30 GTB 5\"x30 GTB 5\" 3x 10  GTB 5\" 3x 10       SL clamshells  3\"x20 R only 5\" x20 R only " "5\" x20 R only 5\" x20 R only 5\" 20x  R only 5\" 20x       Supine SLR  3\"x17 held x10 x10 2x 10 and abd 2x 10 (R) x17and abd x14(R)      Supine hip adduction w/ bolster   5\"x20 5\"x20 5\"x30 5\" 30x  5\" 30x                    Ther Activity             Squats  2x10 2x10 UE support  2x10 UE support  TG L22 3x10 TG L22 3x 10  TG L22 3x 10       Step-ups F/L  F 6\"x20 F 6\" x20   F 6\" x20  F  and L 2x 10  F  and L 2x 10       Gait Training             GT with SPC VC's techniqe. X 50'                         Modalities                                            "

## 2024-05-02 ENCOUNTER — OFFICE VISIT (OUTPATIENT)
Dept: PHYSICAL THERAPY | Facility: CLINIC | Age: 64
End: 2024-05-02
Payer: COMMERCIAL

## 2024-05-02 DIAGNOSIS — M16.11 PRIMARY OSTEOARTHRITIS OF ONE HIP, RIGHT: Primary | ICD-10-CM

## 2024-05-02 PROCEDURE — 97140 MANUAL THERAPY 1/> REGIONS: CPT | Performed by: PHYSICAL THERAPIST

## 2024-05-02 PROCEDURE — 97110 THERAPEUTIC EXERCISES: CPT | Performed by: PHYSICAL THERAPIST

## 2024-05-02 NOTE — PROGRESS NOTES
"Daily Note     Today's date: 2024  Patient name: Denita Brown  : 1960  MRN: [id number]  Referring provider: Piedad Fuentes DO  Dx:   Encounter Diagnosis     ICD-10-CM    1. Primary osteoarthritis of one hip, right  M16.11                      Subjective: Patient states her back is hurting today and her bursitis is \"touchy.\"      Objective: See treatment diary below      Assessment: Tolerated treatment well. Patient demonstrated fatigue post treatment and would benefit from continued PT      Plan: Continue per plan of care.  Progress treatment as tolerated.       Precautions: L1-S1 fusion, C2-C7 fusion >10 years, HTN, DM, Tachycardia , osteopenia    POC expires Unit limit Auth  expiration date PT/OT + Visit Limit?   24 NA  NA                 Visit/Unit Tracking  AUTH Status:  Date 3/28 4/8 4/11 4/18 4/22 4/25 4/29 5/2       12/31 Used 1 2 3 4 5 6  7 8        Remaining                      Manuals 3/28 4/8 4/11 4/18 4/22 4/25 4/29 5/2     PROM right hip JF  Gentle DEEPAK JF JF SC NV JF     Gentle hip mobs inferior and lateral distraction    JF Grade II-III JF Grade II-III NV NV JF                  RE performed       JF      Neuro Re-Ed             Pt education Hip anatomy and pathology.  DJD                                                                                          Ther Ex             Nustep seat 7  L4x10' L4x10'  L4x10'  L4x10' L4 10 min  L4 10 min  L4 10 min      Hip ABD and ext  2x10 ea  Abd on R only 2x10 2x10 right ABD YTB ABD and Ext 2x10 ea YTB 2x 10   YTB 2x 10      Supine bridges  3\"x20 held          Supine t-band clamshells  GTB 5\"x30 GTB 5\"x30 GTB 5\"x30 GTB 5\"x30 GTB 5\" 3x 10  GTB 5\" 3x 10  GTB 5\" 3x 10      SL clamshells  3\"x20 R only 5\" x20 R only 5\" x20 R only 5\" x20 R only 5\" 20x  R only 5\" 20x  R only 5\" 20x      Supine SLR  3\"x17 held x10 x10 2x 10 and abd 2x 10 (R) x17and abd x14(R) Hold today     Supine hip adduction w/ bolster   5\"x20 5\"x20 5\"x30 5\" 30x  5\" 30x  5\" " "30x                   Ther Activity             Squats  2x10 2x10 UE support  2x10 UE support  TG L22 3x10 TG L22 3x 10  TG L22 3x 10  TG L22 3x 10      Step-ups F/L  F 6\"x20 F 6\" x20   F 6\" x20  F  and L 2x 10  F  and L 2x 10       Gait Training             GT with SPC VC's techniqe. X 50'                         Modalities                                            "

## 2024-05-03 NOTE — PROGRESS NOTES
"Daily Note     Today's date: 5/3/2024  Patient name: Denita Brown  : 1960  MRN: [id number]  Referring provider: Piedad Fuentes DO  Dx:   Encounter Diagnosis     ICD-10-CM    1. Primary osteoarthritis of one hip, right  M16.11                      Subjective: Patient reports that her hip is a little more sore today compared to previous days and largely due to walking a lot over the weekend.       Objective: See treatment diary below      Assessment: Tolerated treatment well. Patient able to progress with additional reps for step ups forward and laterally to promote improved hip strengthening functionally. She did report fatigue and soreness following this however.  Patient demonstrated fatigue post treatment, exhibited good technique with therapeutic exercises, and would benefit from continued PT      Plan: Continue per plan of care.      Precautions: L1-S1 fusion, C2-C7 fusion >10 years, HTN, DM, Tachycardia , osteopenia    POC expires Unit limit Auth  expiration date PT/OT + Visit Limit?   24 NA  NA                 Visit/Unit Tracking  AUTH Status:  Date 3/28 4/8 4/11 4/18 4/22 4/25 4/29 5/ 5/ Used 1 2 3 4 5 6  7 8 9       Remaining                      Manuals 3/28 4/8 4/11 4/18 4/22 4/25 4/29 5/2 5/6    PROM right hip JF  Gentle DEEPAK JF JF SC NV JF BE    Gentle hip mobs inferior and lateral distraction    JF Grade II-III JF Grade II-III NV NV JF BE  Grade 2-3                 RE performed       JF      Neuro Re-Ed             Pt education Hip anatomy and pathology.  DJD                                                                                          Ther Ex             Nustep seat 7  L4x10' L4x10'  L4x10'  L4x10' L4 10 min  L4 10 min  L4 10 min  L4 10'    Hip ABD and ext  2x10 ea  Abd on R only 2x10 2x10 right ABD YTB ABD and Ext 2x10 ea YTB 2x 10   YTB 2x 10  YTB 2x 10     Supine bridges  3\"x20 held          Supine t-band clamshells  GTB 5\"x30 GTB 5\"x30 GTB 5\"x30 GTB 5\"x30 " "GTB 5\" 3x 10  GTB 5\" 3x 10  GTB 5\" 3x 10  Blue 5\" x30    SL clamshells  3\"x20 R only 5\" x20 R only 5\" x20 R only 5\" x20 R only 5\" 20x  R only 5\" 20x  R only 5\" 20x  R only 5\" 20x     Supine SLR  3\"x17 held x10 x10 2x 10 and abd 2x 10 (R) x17and abd x14(R) Hold today 2x10    Supine hip adduction w/ bolster   5\"x20 5\"x20 5\"x30 5\" 30x  5\" 30x  5\" 30x  5\" 30x                  Ther Activity             Squats  2x10 2x10 UE support  2x10 UE support  TG L22 3x10 TG L22 3x 10  TG L22 3x 10  TG L22 3x 10  TG L22 3x 10     Step-ups F/L  F 6\"x20 F 6\" x20   F 6\" x20  F  and L 2x 10  F  and L 2x 10   6\" 3x10 ea dir    Gait Training             GT with SPC VC's techniqe. X 50'                         Modalities                                              "

## 2024-05-06 ENCOUNTER — OFFICE VISIT (OUTPATIENT)
Dept: PHYSICAL THERAPY | Facility: CLINIC | Age: 64
End: 2024-05-06
Payer: COMMERCIAL

## 2024-05-06 DIAGNOSIS — M16.11 PRIMARY OSTEOARTHRITIS OF ONE HIP, RIGHT: Primary | ICD-10-CM

## 2024-05-06 PROCEDURE — 97110 THERAPEUTIC EXERCISES: CPT

## 2024-05-06 PROCEDURE — 97140 MANUAL THERAPY 1/> REGIONS: CPT

## 2024-05-06 PROCEDURE — 97530 THERAPEUTIC ACTIVITIES: CPT

## 2024-05-09 ENCOUNTER — OFFICE VISIT (OUTPATIENT)
Dept: OBGYN CLINIC | Facility: MEDICAL CENTER | Age: 64
End: 2024-05-09
Payer: COMMERCIAL

## 2024-05-09 ENCOUNTER — OFFICE VISIT (OUTPATIENT)
Dept: PHYSICAL THERAPY | Facility: CLINIC | Age: 64
End: 2024-05-09
Payer: COMMERCIAL

## 2024-05-09 VITALS
DIASTOLIC BLOOD PRESSURE: 75 MMHG | BODY MASS INDEX: 35.34 KG/M2 | SYSTOLIC BLOOD PRESSURE: 121 MMHG | HEIGHT: 60 IN | WEIGHT: 180 LBS | HEART RATE: 75 BPM

## 2024-05-09 DIAGNOSIS — M16.11 PRIMARY OSTEOARTHRITIS OF ONE HIP, RIGHT: Primary | ICD-10-CM

## 2024-05-09 PROCEDURE — 97140 MANUAL THERAPY 1/> REGIONS: CPT

## 2024-05-09 PROCEDURE — 97110 THERAPEUTIC EXERCISES: CPT

## 2024-05-09 PROCEDURE — 99213 OFFICE O/P EST LOW 20 MIN: CPT | Performed by: PHYSICAL MEDICINE & REHABILITATION

## 2024-05-09 PROCEDURE — 97530 THERAPEUTIC ACTIVITIES: CPT

## 2024-05-09 NOTE — PROGRESS NOTES
1. Primary osteoarthritis of one hip, right  Large joint arthrocentesis        Orders Placed This Encounter   Procedures    Large joint arthrocentesis        Impression:  Patient with history of lumbar fusion is here in follow up of chronic right thigh pain likely multifactorial and secondary to greater trochanteric pain syndrome and hip osteoarthritis.  She is neurologically intact.  Treatment has included greater trochanteric steroid injection and right hip USGI.  She is seeing pain management at outside facility for her axial pain.  She is improving with physical therapy.  We decided to hold off on any type of intervention today.  I will see her back in 4 to 6 weeks if needed for possible greater troches versus hip ultrasound-guided joint injection.  Will also obtain a DEXA scan due to the fragility fracture of left ankle, chronic PPI use and opiate treatment.     Imaging Studies (I personally reviewed images in PACS and report):  Right hip x-rays most recent to this encounter reviewed.  These images show mild, more so moderate, right hip OA.    No follow-ups on file.    Patient is in agreement with the above plan.    HPI:  Denita Brown is a 63 y.o. female  who presents in follow up.  Here for   Chief Complaint   Patient presents with    Right Hip - Pain       Since last visit: See above.    Following history reviewed and updated:  Past Medical History:   Diagnosis Date    Abdominal pain, epigastric 03/23/2018    Added automatically from request for surgery [id number]    Abnormal x-ray of lumbar spine 11/03/2015    Acute cystitis with hematuria 04/07/2020    Bariatric surgery status     Basal cell carcinoma     basil cell carcinoma- in remission    Benign essential hypertension 06/12/2012    Bone bruise 11/02/2023    Breakdown (mechanical) of internal fixation device of vertebrae, initial encounter ([facility]) 03/01/2019    Calculus of bile duct with cholecystitis without obstruction 04/27/2018    Added automatically from  request for surgery [id number]    Cellulitis of finger 2015    Closed fracture of left distal fibula 10/28/2023    Degenerative lumbar disc     Digital mucinous cyst 2015    DMII (diabetes mellitus, type 2) (HCC) 2013    Gross hematuria 2019    Hiatal hernia     History of COVID-19 2021    History of transfusion     Post-op spinal surgery    Low back pain     Lower urinary tract infectious disease 2015    Medicare annual wellness visit, initial 2019    Obesity     Resolved 10/6/2016     Other closed fracture of distal end of left fibula, initial encounter 10/28/2023    Overactive bladder     Postsurgical malabsorption     Recurrent UTI 2019    Spinal stenosis     SVT (supraventricular tachycardia)     Tachycardia     Resolved 2016     Type 2 diabetes mellitus (HCC)     Last assesssed 2018     Wears glasses      Past Surgical History:   Procedure Laterality Date    APPENDECTOMY      ARTHRODESIS      Lumbar - Last assessed 2018     BACK SURGERY      Lumbar (3 surgeries)- two levels fused, clean out from infection, then fusion of 7 levels (last surgery in )    CARDIAC ELECTROPHYSIOLOGY STUDY AND ABLATION      CARPAL TUNNEL RELEASE      x2    CERVICAL SPINE SURGERY      Fusion of C2-C7 over a period of 3 surgeries ( first)     SECTION      X2    CHOLECYSTECTOMY      FL MYELOGRAM LUMBAR  3/28/2019    INSERTION / PLACEMENT / REVISION NEUROSTIMULATOR      X2- both were removed    NECK SURGERY      OTHER SURGICAL HISTORY      Catheter ablation     [place] EGD TRANSORAL BIOPSY SINGLE/MULTIPLE N/A 2016    Procedure: ESOPHAGOGASTRODUODENOSCOPY (EGD);  Surgeon: Tanya Orta MD;  Location: [facility];  Service: Bariatrics    [place] EGD TRANSORAL BIOPSY SINGLE/MULTIPLE N/A 2018    Procedure: ESOPHAGOGASTRODUODENOSCOPY (EGD) with biopsy;  Surgeon: Tanya Orta MD;  Location: [facility];  Service: Bariatrics    [place] LAPAROSCOPY SURG CHOLECYSTECTOMY N/A  2018    Procedure: CHOLECYSTECTOMY LAPAROSCOPIC;  Surgeon: Tanya Orta MD;  Location: [facility];  Service: Bariatrics    VT LAPS GSTR RSTCV PX W/BYP BRAULIO-EN-Y LIMB <150 CM N/A 10/25/2016    Procedure: BYPASS GASTRIC  BRAULIO-EN-Y LAPAROSCOPIC, WITH INTRA OP EGD;  Surgeon: Tanya Orta MD;  Location: [facility];  Service: Bariatrics    SKIN CANCER EXCISION      TONSILLECTOMY      TUBAL LIGATION      US GUIDED BREAST BIOPSY RIGHT COMPLETE Right 2023    WISDOM TOOTH EXTRACTION       Social History   Social History     Substance and Sexual Activity   Alcohol Use No     Social History     Substance and Sexual Activity   Drug Use No     Social History     Tobacco Use   Smoking Status Former    Current packs/day: 0.00    Average packs/day: 1 pack/day for 20.0 years (20.0 ttl pk-yrs)    Types: Cigarettes    Start date:     Quit date:     Years since quittin.3    Passive exposure: Past   Smokeless Tobacco Never     Family History   Problem Relation Age of Onset    Arthritis Mother         Rheumatoid    Angina Mother     Coronary artery disease Mother     Diabetes Mother     Cancer Father         Lung    Cystic fibrosis Father     Rheum arthritis Sister     Diabetes Sister     No Known Problems Maternal Grandmother     No Known Problems Maternal Grandfather     No Known Problems Paternal Grandmother     No Known Problems Paternal Grandfather     Other Brother         Back problems     Diabetes Brother     No Known Problems Brother     Hypertension Family     Heart disease Family     No Known Problems Son     No Known Problems Son      No Known Allergies     Constitutional:  /75   Pulse 75   Ht 5' (1.524 m)   Wt 81.6 kg (180 lb)   BMI 35.15 kg/m²    General: NAD.  Eyes: Clear sclerae.  ENT: No inflammation, lesion, or mass of lips.  No tracheal deviation.  Musculoskeletal: As mentioned below.  Integumentary: No visible rashes or skin lesions.  Pulmonary/Chest: Effort normal. No respiratory  distress.   Neuro: CN's grossly intact, MODI.  Psych: Normal affect and judgement.  Vascular: WWP.    Right Hip Exam     Range of Motion   Internal rotation:  abnormal     Other   Erythema: absent  Scars: absent  Sensation: normal  Pulse: present             Procedures

## 2024-05-09 NOTE — PROGRESS NOTES
"Daily Note     Today's date: 2024  Patient name: Denita Brown  : 1960  MRN: [id number]  Referring provider: Piedad Fuentes DO  Dx:   Encounter Diagnosis     ICD-10-CM    1. Primary osteoarthritis of one hip, right  M16.11                      Subjective: Pt reports her bursitis is hurting more.       Objective: See treatment diary below      Assessment: Pt reports increased tenderness along ITB on RLE.       Plan: Continue per plan of care.      Precautions: L1-S1 fusion, C2-C7 fusion >10 years, HTN, DM, Tachycardia , osteopenia    POC expires Unit limit Auth  expiration date PT/OT + Visit Limit?   24 NA  NA                 Visit/Unit Tracking  AUTH Status:  Date 3/28 4/8 4/11 4/18 4/22 4/25 4/29 5/2 5/6 5/9     12/31 Used 1 2 3 4 5 6  7 8 9 10      Remaining                      Manuals 3/28 4/8 4/11 4/18 4/22 4/25 4/29 5/2 5/6 5/9   PROM right hip JF  Gentle DEEPAK JF JF SC NV JF BE HA   Gentle hip mobs inferior and lateral distraction    JF Grade II-III JF Grade II-III NV NV JF BE  Grade 2-3                 RE performed       JF      Neuro Re-Ed             Pt education Hip anatomy and pathology.  DJD                                                                                          Ther Ex             Nustep seat 7  L4x10' L4x10'  L4x10'  L4x10' L4 10 min  L4 10 min  L4 10 min  L4 10' L4 10'   Hip ABD and ext  2x10 ea  Abd on R only 2x10 2x10 right ABD YTB ABD and Ext 2x10 ea YTB 2x 10   YTB 2x 10  YTB 2x 10  YTB 2x10   Supine bridges  3\"x20 held          Supine t-band clamshells  GTB 5\"x30 GTB 5\"x30 GTB 5\"x30 GTB 5\"x30 GTB 5\" 3x 10  GTB 5\" 3x 10  GTB 5\" 3x 10  Blue 5\" x30 Blue 5'x30   SL clamshells  3\"x20 R only 5\" x20 R only 5\" x20 R only 5\" x20 R only 5\" 20x  R only 5\" 20x  R only 5\" 20x  R only 5\" 20x  R only 5\" 20x   Supine SLR  3\"x17 held x10 x10 2x 10 and abd 2x 10 (R) x17and abd x14(R) Hold today 2x10 2x10   Supine hip adduction w/ bolster   5\"x20 5\"x20 5\"x30 5\" 30x  5\" 30x  5\" " "30x  5\" 30x  5\"x30                Ther Activity             Squats  2x10 2x10 UE support  2x10 UE support  TG L22 3x10 TG L22 3x 10  TG L22 3x 10  TG L22 3x 10  TG L22 3x 10  TG L22 3x10   Step-ups F/L  F 6\"x20 F 6\" x20   F 6\" x20  F  and L 2x 10  F  and L 2x 10   6\" 3x10 ea dir 6\" 3x10 ea dir   Gait Training             GT with SPC VC's techniqe. X 50'                         Modalities                                                "

## 2024-05-13 ENCOUNTER — OFFICE VISIT (OUTPATIENT)
Dept: PHYSICAL THERAPY | Facility: CLINIC | Age: 64
End: 2024-05-13
Payer: COMMERCIAL

## 2024-05-13 DIAGNOSIS — M16.11 PRIMARY OSTEOARTHRITIS OF ONE HIP, RIGHT: Primary | ICD-10-CM

## 2024-05-13 PROCEDURE — 97110 THERAPEUTIC EXERCISES: CPT | Performed by: PHYSICAL THERAPIST

## 2024-05-13 PROCEDURE — 97530 THERAPEUTIC ACTIVITIES: CPT | Performed by: PHYSICAL THERAPIST

## 2024-05-13 NOTE — PROGRESS NOTES
"Daily Note     Today's date: 2024  Patient name: Denita Brown  : 1960  MRN: [id number]  Referring provider: Piedad Fuentes DO  Dx:   Encounter Diagnosis     ICD-10-CM    1. Primary osteoarthritis of one hip, right  M16.11                      Subjective: Patient states her hip is in agony today from doing a lot of walking over the weekend when shopping.      Objective: See treatment diary below      Assessment: Tolerated treatment fair. Patient demonstrated fatigue post treatment and would benefit from continued PT.  Poor tolerance to very gentle massage roller on the right ITB.  Patient painful today.  Progress very slow.      Plan: Continue per plan of care.  Progress treatment as tolerated.       Precautions: L1-S1 fusion, C2-C7 fusion >10 years, HTN, DM, Tachycardia , osteopenia    POC expires Unit limit Auth  expiration date PT/OT + Visit Limit?   24 NA  NA                 Visit/Unit Tracking  AUTH Status:  Date 3/28 4/8 4/11 4/18 4/22 4/25 4/29 5 Used 1 2 3 4 5 6  7 8 9 10 11     Remaining                      Manuals  5   PROM right hip   Gentle DEEPAK JF JF SC NV JF BE HA   Gentle hip mobs inferior and lateral distraction    JF Grade II-III JF Grade II-III NV NV JF BE  Grade 2-3    Massage roller right ITB Gentle JF            RE performed       JF      Neuro Re-Ed             Pt education                                                                                           Ther Ex             Nustep seat 7 L4 10' L4x10' L4x10'  L4x10'  L4x10' L4 10 min  L4 10 min  L4 10 min  L4 10' L4 10'   Hip ABD and ext YTB 2x10 2x10 ea  Abd on R only 2x10 2x10 right ABD YTB ABD and Ext 2x10 ea YTB 2x 10   YTB 2x 10  YTB 2x 10  YTB 2x10   Supine bridges 3\"x20 3\"x20 held          Supine t-band clamshells Blue 5'x30 GTB 5\"x30 GTB 5\"x30 GTB 5\"x30 GTB 5\"x30 GTB 5\" 3x 10  GTB 5\" 3x 10  GTB 5\" 3x 10  Blue 5\" x30 Blue 5'x30   SL " "clamshells R only 5\" 20x 3\"x20 R only 5\" x20 R only 5\" x20 R only 5\" x20 R only 5\" 20x  R only 5\" 20x  R only 5\" 20x  R only 5\" 20x  R only 5\" 20x   Supine SLR 2x10 3\"x17 held x10 x10 2x 10 and abd 2x 10 (R) x17and abd x14(R) Hold today 2x10 2x10   Supine hip adduction w/ bolster 5\"x30  5\"x20 5\"x20 5\"x30 5\" 30x  5\" 30x  5\" 30x  5\" 30x  5\"x30                Ther Activity             Squats TG L22 3x10 2x10 2x10 UE support  2x10 UE support  TG L22 3x10 TG L22 3x 10  TG L22 3x 10  TG L22 3x 10  TG L22 3x 10  TG L22 3x10   Step-ups F/L 6\" 3x10 ea dir F 6\"x20 F 6\" x20   F 6\" x20  F  and L 2x 10  F  and L 2x 10   6\" 3x10 ea dir 6\" 3x10 ea dir   Gait Training             GT with SPC                          Modalities                                                  "

## 2024-05-16 ENCOUNTER — OFFICE VISIT (OUTPATIENT)
Age: 64
End: 2024-05-16
Payer: COMMERCIAL

## 2024-05-16 ENCOUNTER — PREP FOR PROCEDURE (OUTPATIENT)
Age: 64
End: 2024-05-16

## 2024-05-16 VITALS
BODY MASS INDEX: 35.34 KG/M2 | DIASTOLIC BLOOD PRESSURE: 79 MMHG | SYSTOLIC BLOOD PRESSURE: 119 MMHG | HEIGHT: 60 IN | OXYGEN SATURATION: 97 % | WEIGHT: 180 LBS | HEART RATE: 72 BPM

## 2024-05-16 DIAGNOSIS — R10.9 ABDOMINAL PAIN, UNSPECIFIED ABDOMINAL LOCATION: Primary | ICD-10-CM

## 2024-05-16 DIAGNOSIS — Z98.84 HISTORY OF ROUX-EN-Y GASTRIC BYPASS: ICD-10-CM

## 2024-05-16 DIAGNOSIS — N32.81 OAB (OVERACTIVE BLADDER): ICD-10-CM

## 2024-05-16 DIAGNOSIS — Z12.11 SCREENING FOR COLON CANCER: ICD-10-CM

## 2024-05-16 DIAGNOSIS — R11.0 NAUSEA: ICD-10-CM

## 2024-05-16 DIAGNOSIS — E66.01 OBESITY, MORBID (HCC): ICD-10-CM

## 2024-05-16 PROCEDURE — G2211 COMPLEX E/M VISIT ADD ON: HCPCS | Performed by: INTERNAL MEDICINE

## 2024-05-16 PROCEDURE — 99213 OFFICE O/P EST LOW 20 MIN: CPT | Performed by: INTERNAL MEDICINE

## 2024-05-16 RX ORDER — OXYBUTYNIN CHLORIDE 5 MG/1
TABLET ORAL
Qty: 90 TABLET | Refills: 1 | Status: SHIPPED | OUTPATIENT
Start: 2024-05-16

## 2024-05-16 NOTE — PATIENT INSTRUCTIONS
Scheduled date of EGD/colonoscopy (as of today): 7/6/24  Physician performing EGD/colonoscopy: Gabby  Location of EGD/colonoscopy: [facility]  Desired bowel prep reviewed with patient: Sutab  Instructions reviewed with patient by: Jessy ABBOTT  Clearances:

## 2024-05-16 NOTE — PROGRESS NOTES
[facility] - Outpatient Note  Denita Brown 63 y.o. female MRN: [id number]  Encounter: [id number]      ASSESSMENT AND PLAN:    Denita Brown is a 63 y.o. old pleasant female with PMH of rygb in 2016-[place], lumbar radiculopathy, hypertension, morbid obesity who presents for followup    Chronic nausea-I suspect this may be due to chronic daily Dilaudid use but other differentials include H. pylori infection, SIBO, gastritis among other causes.  Fortunately her symptoms respond well to PPI.  Given that her symptoms are worsened with bread and pasta there may be a component of gluten allergy as well.  Plan for EGD to assess for marginal ulcer and biopsy for H. pylori and celiac disease  Continue pantoprazole 40 mg daily.  Will increase to twice daily after EGD.  Can also consider different PPI in the future.  Continue Zofran as needed  I recommended she check stool H. pylori to assess for HP in the remnant stomach however she does not want to be off PPI as of now we can consider this in the future.    Colon cancer screening-no previous colonoscopy.  No family history of colon cancer.  She is agreeable to colonoscopy as long as she gets to use the Anand tabs so we will plan for colonoscopy.    Obesity -BMI 35.5.  Counseled on diet and exercise and weight loss.    1. Nausea    - Ambulatory referral to Gastroenterology    ______________________________________________________________________    SUBJECTIVE:    Nausea x 1 year, daily, lasting about an hour,    Pantoprazole daily but when misses doses severe pain and nausea    No gerd, abdominal pain, vomiting, weight loss, dysphagia, odynophagia    Famotidine at ebdtime    Bread, pasta, and chocoalte worsen nausea, sometimes not related to food    Daily dialudid    Last EGD 2016ihs    Gallchaneller removed after saurgery    No prev colonoscopy    No family history of colon cancer.    Does not use NSAIDs.    I reviewed prior external notes    I reviewed  previous lab results and images      REVIEW OF SYSTEMS:     REVIEW OF ALL OTHER SYSTEMS IS OTHERWISE NEGATIVE.      Historical Information   Past Medical History:   Diagnosis Date    Abdominal pain, epigastric 03/23/2018    Added automatically from request for surgery [id number]    Abnormal x-ray of lumbar spine 11/03/2015    Acute cystitis with hematuria 04/07/2020    Bariatric surgery status     Basal cell carcinoma     basil cell carcinoma- in remission    Benign essential hypertension 06/12/2012    Bone bruise 11/02/2023    Breakdown (mechanical) of internal fixation device of vertebrae, initial encounter ([facility]) 03/01/2019    Calculus of bile duct with cholecystitis without obstruction 04/27/2018    Added automatically from request for surgery [id number]    Cellulitis of finger 12/03/2015    Closed fracture of left distal fibula 10/28/2023    Degenerative lumbar disc     Digital mucinous cyst 06/24/2015    DMII (diabetes mellitus, type 2) ([facility]) 11/08/2013    Gross hematuria 03/19/2019    Hiatal hernia     History of COVID-19 12/22/2021    History of transfusion 2014    Post-op spinal surgery    Low back pain     Lower urinary tract infectious disease 03/27/2015    Medicare annual wellness visit, initial 11/27/2019    Obesity     Resolved 10/6/2016     Other closed fracture of distal end of left fibula, initial encounter 10/28/2023    Overactive bladder     Postsurgical malabsorption     Recurrent UTI 03/19/2019    Spinal stenosis     SVT (supraventricular tachycardia)     Tachycardia     Resolved 5/4/2016     Type 2 diabetes mellitus ([facility])     Last assesssed 1/18/2018     Wears glasses      Past Surgical History:   Procedure Laterality Date    APPENDECTOMY      ARTHRODESIS      Lumbar - Last assessed 1/4/2018     BACK SURGERY      Lumbar (3 surgeries)- two levels fused, clean out from infection, then fusion of 7 levels (last surgery in 2014)    CARDIAC ELECTROPHYSIOLOGY STUDY AND ABLATION      CARPAL TUNNEL RELEASE       x2    CERVICAL SPINE SURGERY      Fusion of C2-C7 over a period of 3 surgeries (2003 first)     SECTION      X2    CHOLECYSTECTOMY      FL MYELOGRAM LUMBAR  3/28/2019    INSERTION / PLACEMENT / REVISION NEUROSTIMULATOR      X2- both were removed    NECK SURGERY      OTHER SURGICAL HISTORY      Catheter ablation     AL EGD TRANSORAL BIOPSY SINGLE/MULTIPLE N/A 2016    Procedure: ESOPHAGOGASTRODUODENOSCOPY (EGD);  Surgeon: Tanya Orta MD;  Location: [facility];  Service: Bariatrics    AL EGD TRANSORAL BIOPSY SINGLE/MULTIPLE N/A 2018    Procedure: ESOPHAGOGASTRODUODENOSCOPY (EGD) with biopsy;  Surgeon: Tanya Orta MD;  Location: [facility];  Service: Bariatrics    AL LAPAROSCOPY SURG CHOLECYSTECTOMY N/A 2018    Procedure: CHOLECYSTECTOMY LAPAROSCOPIC;  Surgeon: Tanya Orta MD;  Location: [facility];  Service: Bariatrics    AL LAPS GSTR RSTCV PX W/BYP BRAULIO-EN-Y LIMB <150 CM N/A 10/25/2016    Procedure: BYPASS GASTRIC  BRAULIO-EN-Y LAPAROSCOPIC, WITH INTRA OP EGD;  Surgeon: Tanya Orta MD;  Location: [facility];  Service: Bariatrics    SKIN CANCER EXCISION      TONSILLECTOMY      TUBAL LIGATION      US GUIDED BREAST BIOPSY RIGHT COMPLETE Right 2023    WISDOM TOOTH EXTRACTION       Social History   Social History     Substance and Sexual Activity   Alcohol Use No     Social History     Substance and Sexual Activity   Drug Use No     Social History     Tobacco Use   Smoking Status Former    Current packs/day: 0.00    Average packs/day: 1 pack/day for 20.0 years (20.0 ttl pk-yrs)    Types: Cigarettes    Start date:     Quit date:     Years since quittin.3    Passive exposure: Past   Smokeless Tobacco Never     Family History   Problem Relation Age of Onset    Arthritis Mother         Rheumatoid    Angina Mother     Coronary artery disease Mother     Diabetes Mother     Cancer Father         Lung    Cystic fibrosis Father     Rheum arthritis Sister     Diabetes Sister     No  Known Problems Maternal Grandmother     No Known Problems Maternal Grandfather     No Known Problems Paternal Grandmother     No Known Problems Paternal Grandfather     Other Brother         Back problems     Diabetes Brother     No Known Problems Brother     Hypertension Family     Heart disease Family     No Known Problems Son     No Known Problems Son        Meds/Allergies       Current Outpatient Medications:     albuterol (PROVENTIL HFA,VENTOLIN HFA) 90 mcg/act inhaler    baclofen 10 mg tablet    buPROPion (WELLBUTRIN) 75 mg tablet    Calcium Citrate-Vitamin D 315-250 MG-UNIT TABS    Cholecalciferol 25 MCG (1000 UT) CHEW    escitalopram (LEXAPRO) 20 mg tablet    famotidine (PEPCID) 40 MG tablet    fenofibrate 160 MG tablet    fluticasone (FLONASE) 50 mcg/act nasal spray    gabapentin (NEURONTIN) 100 mg capsule    HYDROmorphone (DILAUDID) 4 mg tablet    lidocaine (XYLOCAINE) 5 % ointment    loratadine (CLARITIN) 10 mg tablet    Multiple Vitamins-Minerals (BARIATRIC FUSION PO)    ondansetron (ZOFRAN) 4 mg tablet    oxybutynin (DITROPAN) 5 mg tablet    pantoprazole (PROTONIX) 40 mg tablet    No Known Allergies        Objective     Blood pressure 119/79, pulse 72, height 5' (1.524 m), weight 81.6 kg (180 lb), SpO2 97%, not currently breastfeeding. Body mass index is 35.15 kg/m².      PHYSICAL EXAM:      General Appearance:   Alert, cooperative, no distress   HEENT:   Normocephalic, atraumatic, anicteric.     Neck:  Supple, symmetrical, trachea midline   Lungs:   Clear to auscultation bilaterally; no rales, rhonchi or wheezing; respirations unlabored    Heart::   Regular rate and rhythm; no murmur, rub, or gallop.   Abdomen:   Soft, non-tender, non-distended; normal bowel sounds; no masses, no organomegaly    Genitalia:   Deferred    Rectal:   Deferred    Extremities:  No cyanosis, clubbing or edema    Pulses:  2+ and symmetric    Skin:  No jaundice, rashes, or lesions    Lymph nodes:  No palpable cervical  lymphadenopathy        Lab Results:   No visits with results within 1 Day(s) from this visit.   Latest known visit with results is:   Appointment on 04/27/2024   Component Date Value    WBC 04/27/2024 5.19     RBC 04/27/2024 4.53     Hemoglobin 04/27/2024 13.7     Hematocrit 04/27/2024 42.2     MCV 04/27/2024 93     MCH 04/27/2024 30.2     MCHC 04/27/2024 32.5     RDW 04/27/2024 12.1     MPV 04/27/2024 9.5     Platelets 04/27/2024 377     nRBC 04/27/2024 0     Segmented % 04/27/2024 49     Immature Grans % 04/27/2024 0     Lymphocytes % 04/27/2024 37     Monocytes % 04/27/2024 9     Eosinophils Relative 04/27/2024 3     Basophils Relative 04/27/2024 2 (H)     Absolute Neutrophils 04/27/2024 2.52     Absolute Immature Grans 04/27/2024 0.01     Absolute Lymphocytes 04/27/2024 1.93     Absolute Monocytes 04/27/2024 0.49     Eosinophils Absolute 04/27/2024 0.16     Basophils Absolute 04/27/2024 0.08     Sodium 04/27/2024 143     Potassium 04/27/2024 4.1     Chloride 04/27/2024 103     CO2 04/27/2024 26     ANION GAP 04/27/2024 14 (H)     BUN 04/27/2024 14     Creatinine 04/27/2024 0.92     Glucose, Fasting 04/27/2024 96     Calcium 04/27/2024 9.5     AST 04/27/2024 35     ALT 04/27/2024 21     Alkaline Phosphatase 04/27/2024 43     Total Protein 04/27/2024 6.7     Albumin 04/27/2024 4.2     Total Bilirubin 04/27/2024 0.32     eGFR 04/27/2024 67     Cholesterol 04/27/2024 168     Triglycerides 04/27/2024 93     HDL, Direct 04/27/2024 70     LDL Calculated 04/27/2024 79     Non-HDL-Chol (CHOL-HDL) 04/27/2024 98     TSH 3RD GENERATON 04/27/2024 1.324     Vit D, 25-Hydroxy 04/27/2024 60.8     Vitamin B-12 04/27/2024 1,334 (H)     Hemoglobin A1C 04/27/2024 5.8 (H)     EAG 04/27/2024 120     Iron Saturation 04/27/2024 18     TIBC 04/27/2024 408     Iron 04/27/2024 74     UIBC 04/27/2024 334     Ferritin 04/27/2024 35          Radiology Results:   No results found.

## 2024-05-20 ENCOUNTER — OFFICE VISIT (OUTPATIENT)
Dept: PHYSICAL THERAPY | Facility: CLINIC | Age: 64
End: 2024-05-20
Payer: COMMERCIAL

## 2024-05-20 DIAGNOSIS — M16.11 PRIMARY OSTEOARTHRITIS OF ONE HIP, RIGHT: Primary | ICD-10-CM

## 2024-05-20 PROCEDURE — 97530 THERAPEUTIC ACTIVITIES: CPT | Performed by: PHYSICAL THERAPIST

## 2024-05-20 PROCEDURE — 97110 THERAPEUTIC EXERCISES: CPT | Performed by: PHYSICAL THERAPIST

## 2024-05-20 NOTE — PROGRESS NOTES
"Daily Note     Today's date: 2024  Patient name: Denita Brown  : 1960  MRN: [id number]  Referring provider: Piedad Fuentes DO  Dx:   Encounter Diagnosis     ICD-10-CM    1. Primary osteoarthritis of one hip, right  M16.11                      Subjective: Patient states she is beginning to have sensitivity to LT in her right anterior thigh recently, similar to what she feels at her GT.  Took it easy yesterday.      Objective: See treatment diary below      Assessment: Tolerated treatment well. Patient demonstrated fatigue post treatment and would benefit from continued PT.  Patient states she is still not having as much difficulty as previously going up/down stairs, but pain in her lateral hip is \"not good.\"      Plan: Continue per plan of care.  Progress treatment as tolerated.       Precautions: L1-S1 fusion, C2-C7 fusion >10 years, HTN, DM, Tachycardia , osteopenia    POC expires Unit limit Auth  expiration date PT/OT + Visit Limit?   24 NA  NA                 Visit/Unit Tracking  AUTH Status:  Date 3/28 4/8 4/11 4/18 4/22 4/25 4/29 5 5/6 5 Used 1 2 3 4 5 6  7 8 9 10 11 12    Remaining                      Manuals  5/ 5/6 5/9   PROM right hip   Gentle DEEPAK JF JF SC NV JF BE HA   Gentle hip mobs inferior and lateral distraction    JF Grade II-III JF Grade II-III NV NV JF BE  Grade 2-3    Massage roller right ITB Gentle JF Declined           RE performed       JF      Neuro Re-Ed             Pt education                                                                                           Ther Ex             Nustep seat 7 L4 10' L4x10' L4x10'  L4x10'  L4x10' L4 10 min  L4 10 min  L4 10 min  L4 10' L4 10'   Hip ABD and ext YTB 2x10 YTB 2x10 Abd on R only 2x10 2x10 right ABD YTB ABD and Ext 2x10 ea YTB 2x 10   YTB 2x 10  YTB 2x 10  YTB 2x10   Supine bridges 3\"x20 3\"x30 held          Supine t-band clamshells Blue 5'x30 Blue 5\"x30 GTB " "5\"x30 GTB 5\"x30 GTB 5\"x30 GTB 5\" 3x 10  GTB 5\" 3x 10  GTB 5\" 3x 10  Blue 5\" x30 Blue 5'x30   SL clamshells R only 5\" 20x R only 5\" 20x R only 5\" x20 R only 5\" x20 R only 5\" x20 R only 5\" 20x  R only 5\" 20x  R only 5\" 20x  R only 5\" 20x  R only 5\" 20x   Supine SLR 2x10 held held x10 x10 2x 10 and abd 2x 10 (R) x17and abd x14(R) Hold today 2x10 2x10   Supine hip adduction w/ bolster 5\"x30 5\"x30 5\"x20 5\"x20 5\"x30 5\" 30x  5\" 30x  5\" 30x  5\" 30x  5\"x30   SL quad stretch with strap  3x30\" pillow b/n knees           Ther Activity             Squats TG L22 3x10 TG L22 3x10 2x10 UE support  2x10 UE support  TG L22 3x10 TG L22 3x 10  TG L22 3x 10  TG L22 3x 10  TG L22 3x 10  TG L22 3x10   Step-ups F/L 6\" 3x10 ea dir 6\" 3x10 ea dir F 6\" 3x10   F 6\" x20  F  and L 2x 10  F  and L 2x 10   6\" 3x10 ea dir 6\" 3x10 ea dir   Gait Training             GT with SPC                          Modalities                                                    "

## 2024-05-23 ENCOUNTER — OFFICE VISIT (OUTPATIENT)
Dept: PHYSICAL THERAPY | Facility: CLINIC | Age: 64
End: 2024-05-23
Payer: COMMERCIAL

## 2024-05-23 DIAGNOSIS — M16.11 PRIMARY OSTEOARTHRITIS OF ONE HIP, RIGHT: Primary | ICD-10-CM

## 2024-05-23 PROCEDURE — 97530 THERAPEUTIC ACTIVITIES: CPT

## 2024-05-23 PROCEDURE — 97110 THERAPEUTIC EXERCISES: CPT

## 2024-05-23 NOTE — PROGRESS NOTES
"Daily Note     Today's date: 2024  Patient name: Denita Brown  : 1960  MRN: [id number]  Referring provider: Piedad Fuentes DO  Dx:   Encounter Diagnosis     ICD-10-CM    1. Primary osteoarthritis of one hip, right  M16.11           Start Time: 1623  Stop Time: 1701  Total time in clinic (min): 38 minutes    Subjective: pt noted that she was trying to get in her sons truck and had a hard time with her R hip.       Objective: See treatment diary below      Assessment:  Continued with treatment session with focus on R hip. Overall was able to complete all exercises with no increase in pain reported t/o progressions. Tolerated treatment well with exercises to fatigue. Patient demonstrated fatigue post treatment, exhibited good technique with therapeutic exercises, and would benefit from continued PT    DOMS due to progressions added.     Plan: Continue per plan of care.      Precautions: L1-S1 fusion, C2-C7 fusion >10 years, HTN, DM, Tachycardia , osteopenia    POC expires Unit limit Auth  expiration date PT/OT + Visit Limit?   24 NA  NA                 Visit/Unit Tracking  AUTH Status:  Date  5 5/6 5 Used 13 2 3 4 5 6  7 8 9 10 11 12    Remaining                      Manuals  5/2 5/6 5/9   PROM right hip     JF SC NV JF BE HA   Gentle hip mobs inferior and lateral distraction     JF Grade II-III NV NV JF BE  Grade 2-3    Massage roller right ITB Gentle JF Declined           RE performed       JF      Neuro Re-Ed             Pt education                                                                                           Ther Ex             Nustep seat 7 L4 10' L4x10' L1 Rec bike 10 min   L4x10' L4 10 min  L4 10 min  L4 10 min  L4 10' L4 10'   Hip ABD and ext YTB 2x10 YTB 2x10 RTB 2x 10   YTB ABD and Ext 2x10 ea YTB 2x 10   YTB 2x 10  YTB 2x 10  YTB 2x10   Supine bridges 3\"x20 3\"x30 3\" 20x           Supine " "t-band clamshells Blue 5'x30 Blue 5\"x30 Blue 30x   GTB 5\"x30 GTB 5\" 3x 10  GTB 5\" 3x 10  GTB 5\" 3x 10  Blue 5\" x30 Blue 5'x30   SL clamshells R only 5\" 20x R only 5\" 20x R only 5\" 2x 10   R only 5\" x20 R only 5\" 20x  R only 5\" 20x  R only 5\" 20x  R only 5\" 20x  R only 5\" 20x   Supine SLR 2x10 held   x10 2x 10 and abd 2x 10 (R) x17and abd x14(R) Hold today 2x10 2x10   Supine hip adduction w/ bolster 5\"x30 5\"x30 5\"x30  5\"x30 5\" 30x  5\" 30x  5\" 30x  5\" 30x  5\"x30   SL quad stretch with strap  3x30\" pillow b/n knees NV                                    Ther Activity             Squats TG L22 3x10 TG L22 3x10 TG L22 3x 10   TG L22 3x10 TG L22 3x 10  TG L22 3x 10  TG L22 3x 10  TG L22 3x 10  TG L22 3x10   Step-ups F/L 6\" 3x10 ea dir 6\" 3x10 ea dir 6\" 3x10 ea dir  F 6\" x20  F  and L 2x 10  F  and L 2x 10   6\" 3x10 ea dir 6\" 3x10 ea dir   Gait Training             GT with SPC                          Modalities                                                      "

## 2024-05-28 ENCOUNTER — EVALUATION (OUTPATIENT)
Dept: PHYSICAL THERAPY | Facility: CLINIC | Age: 64
End: 2024-05-28
Payer: COMMERCIAL

## 2024-05-28 DIAGNOSIS — M16.11 PRIMARY OSTEOARTHRITIS OF ONE HIP, RIGHT: Primary | ICD-10-CM

## 2024-05-28 PROCEDURE — 97110 THERAPEUTIC EXERCISES: CPT | Performed by: PHYSICAL THERAPIST

## 2024-05-28 NOTE — PROGRESS NOTES
PT Re-Evaluation     Today's date: 24  Patient name: Denita Brown  : 1960  MRN: [id number]  Referring provider: Piedad Fuentes DO  Dx:   Encounter Diagnosis     ICD-10-CM    1. Primary osteoarthritis of one hip, right  M16.11                       Assessment  Impairments: abnormal gait, abnormal or restricted ROM, abnormal movement, activity intolerance, impaired physical strength, lacks appropriate home exercise program and pain with function  Functional limitations: Pain walking > 20 minutes, stairs, squatting, and lying on right side.    Assessment details: Patient reports right groin pain is 50% improved to date.  There has been no improvement in her right hip bursitis.  She no longer has constant pain in her hip.  Right groin pain occurs only with ambulation or WB > 20 minutes.  Pain in greater trochanter occurs only with pressure to her lateral hip.  Denies sleep disturbances.  Performs some of her exercises daily.  Objectively, she demonstrates greatly improved hip strength and mild gains in in right hip ROM.  Limitations persist with hip internal rotation and there is significant tenderness to palpation of the greater trochanter at the right hip.  Patient has maximized the benefit of skilled PT services at this time.  She is independent and compliant with a HEP.  Understanding of Dx/Px/POC: good     Prognosis: good    Goals  STG (4 weeks)  1. Patient will demonstrate the ability to correct posture with minimal VC's - met  2. Patient will demonstrate 25% gains in Hip ROM in all deficient planes - partially met  3. Patient will report pain as a 4-5/10 at worst with all normal activities- not met  4. Patient will report 50% reduction in sleep disturbances related to pain - met  LTG (8 weeks)  1. Patient will report pain as a 0-1/10 at worst with normal activities - not met  2. Patient will sleep through the night without disturbances related to pain - not met  3. Patient will demonstrate Hip ROM  WNL to facilitate improved quality of gait - not met  4. Patient will demonstrate Hip strength WNL to improve improve transfers, quality of gait, and ability to negotiate stairs. - met  5. Patient will be independent and compliant with a HEP in order to maintain gains made with skilled PT services.- met      Plan    Planned therapy interventions: patient education and home exercise program    Duration in weeks: 4  Plan of Care beginning date: 3/28/2024  Plan of Care expiration date: 2024  Treatment plan discussed with: patient        Subjective Evaluation    History of Present Illness  Mechanism of injury: Patient states the bursitis in her hip is unchanged and her leg gets very tired when she is on it a lot.  She still has extreme sensitivity with anything that touches that outside of her leg and when lying on it.  Pain worsens with stairs and walking > 20 minutes.  She is independent and compliant with a HEP.  No longer has continuous groin pain.          Recurrent probem    Quality of life: poor    Patient Goals  Patient goals for therapy: decreased pain    Pain  Current pain ratin  At best pain ratin  At worst pain ratin  Location: right groin and lateral thigh.  Quality: sharp and dull ache  Aggravating factors: stair climbing (deep squats, getting in/out of the car, lifting right LE, walking > 20 minutes, lying on right side.)  Progression: no change    Social Support  Steps to enter house: yes  Stairs in house: yes   Lives in: multiple-level home  Lives with: adult children    Employment status: working (.  Sits most of the day.)    Diagnostic Tests  X-ray: abnormal    FCE comments: (+) sleep disturbances when rolling over in bed.Treatments  Previous treatment: medication and injection treatment  Current treatment: injection treatment and medication        Objective     Palpation     Right   Tenderness of the TFL.     Tenderness     Right Hip   Tenderness in the greater  "trochanter.     Neurological Testing     Sensation     Hip     Right Hip   Intact: light touch    Active Range of Motion     Right Hip   Flexion: WFL  Abduction: WFL  External rotation (90/90): 35 degrees   Internal rotation (90/90): 25 degrees with pain    Passive Range of Motion     Right Hip   External rotation (90/90): 40 degrees   Internal rotation (90/90): 25 degrees with pain    Strength/Myotome Testing     Left Hip   Planes of Motion   Flexion: 4+    Right Hip   Planes of Motion   Flexion: 5  Extension: 4+  Abduction: 5    Tests     Right Hip   Positive scour.   Negative SUDHAKAR, FADIR, modified Jenna, Jenna, SI compression and SI distraction.          Precautions: L1-S1 fusion, C2-C7 fusion >10 years, HTN, DM, Tachycardia , osteopenia    POC expires Unit limit Auth  expiration date PT/OT + Visit Limit?   5/23/24 NA 12/31 NA                 Visit/Unit Tracking  AUTH Status:  Date 5/23 5/28 4/11 4/18 4/22 4/25 4/29 5/2 5/6 5/9 5/13 5/20 12/31 Used 13 14 3 4 5 6  7 8 9 10 11 12    Remaining                      Manuals 5/13 5/20 5/23 5/28 4/22 4/25 4/29 5/2 5/6 5/9   PROM right hip     JF SC NV JF BE HA   Gentle hip mobs inferior and lateral distraction     JF Grade II-III NV NV JF BE  Grade 2-3    Massage roller right ITB Gentle JF Declined           RE performed    JF   JF      Neuro Re-Ed             Pt education                                                                                           Ther Ex             Nustep seat 7 L4 10' L4x10' L1 Rec bike 10 min  L4x10' L4x10' L4 10 min  L4 10 min  L4 10 min  L4 10' L4 10'   Hip ABD and ext YTB 2x10 YTB 2x10 RTB 2x 10   YTB ABD and Ext 2x10 ea YTB 2x 10   YTB 2x 10  YTB 2x 10  YTB 2x10   Supine bridges 3\"x20 3\"x30 3\" 20x           Supine t-band clamshells Blue 5'x30 Blue 5\"x30 Blue 30x  Blue 30x  GTB 5\"x30 GTB 5\" 3x 10  GTB 5\" 3x 10  GTB 5\" 3x 10  Blue 5\" x30 Blue 5'x30   SL clamshells R only 5\" 20x R only 5\" 20x R only 5\" 2x 10  R only 5\" 2x 10  R only " "5\" x20 R only 5\" 20x  R only 5\" 20x  R only 5\" 20x  R only 5\" 20x  R only 5\" 20x   Supine SLR 2x10 held   x10 2x 10 and abd 2x 10 (R) x17and abd x14(R) Hold today 2x10 2x10   Supine hip adduction w/ bolster 5\"x30 5\"x30 5\"x30 5\"x30 5\"x30 5\" 30x  5\" 30x  5\" 30x  5\" 30x  5\"x30   SL quad stretch with strap  3x30\" pillow b/n knees NV                                    Ther Activity             Squats TG L22 3x10 TG L22 3x10 TG L22 3x 10   TG L22 3x10 TG L22 3x 10  TG L22 3x 10  TG L22 3x 10  TG L22 3x 10  TG L22 3x10   Step-ups F/L 6\" 3x10 ea dir 6\" 3x10 ea dir 6\" 3x10 ea dir  F 6\" x20  F  and L 2x 10  F  and L 2x 10   6\" 3x10 ea dir 6\" 3x10 ea dir   Gait Training             GT with SPC                          Modalities                                            "

## 2024-05-30 ENCOUNTER — APPOINTMENT (OUTPATIENT)
Dept: PHYSICAL THERAPY | Facility: CLINIC | Age: 64
End: 2024-05-30
Payer: COMMERCIAL

## 2024-05-30 ENCOUNTER — TELEPHONE (OUTPATIENT)
Age: 64
End: 2024-05-30

## 2024-05-30 ENCOUNTER — VBI (OUTPATIENT)
Dept: ADMINISTRATIVE | Facility: OTHER | Age: 64
End: 2024-05-30

## 2024-05-30 NOTE — TELEPHONE ENCOUNTER
05/30/24 11:33 AM     [employer] SmartForm used to document caregap status.    Rosalinda Thomas MA

## 2024-05-30 NOTE — TELEPHONE ENCOUNTER
Patient called to request a np appt, was put on wl in sept.  No openings until 2025.  She will seek care elsewhere

## 2024-06-05 DIAGNOSIS — F32.5 MAJOR DEPRESSIVE DISORDER WITH SINGLE EPISODE, IN FULL REMISSION (HCC): ICD-10-CM

## 2024-06-05 RX ORDER — ESCITALOPRAM OXALATE 20 MG/1
TABLET ORAL
Qty: 90 TABLET | Refills: 1 | Status: SHIPPED | OUTPATIENT
Start: 2024-06-05

## 2024-06-05 NOTE — PROGRESS NOTES
Ambulatory Visit  Name: Denita Brown      : 1960      MRN: [id number]  Encounter Provider: Nery Choi DO  Encounter Date: 2024   Encounter department: [facility]    Assessment & Plan   1. Obesity, morbid (HCC)  Assessment & Plan:  Reviewed Wegovy mechanism of action and possible ADRs including abdominal bloating, nausea/GERD/belching. Reviewed signs and symptoms of hypoglycemia including dizziness, tremor, diaphoresis. No personal history of pancreatitis, thyroid CA and no family history of MEN syndrome. Discussed likely need for prior auth/supply issues. Will f/u in 4 weeks to monitor, to call if not tolerating prior. Pt is at increased risk of worsening co-morbidities(HTN, HLD, Pre-DM) due to elevated BMI.    Orders:  -     Semaglutide-Weight Management (WEGOVY) 0.25 MG/0.5ML; Inject 0.5 mL (0.25 mg total) under the skin once a week  2. Continuous opioid dependence (HCC)  Assessment & Plan:  Medications managed by Pain Management        History of Present Illness     HPI    Pt presents to discuss weight loss assistance. Pt is having trouble losing weight. She is limited in her activity level due to chronic hip and back pain -- she follows with Ortho and Pain Management for this. She may be getting a hip replacement soon. She previously had gastric bypass, and was able to maintain her results for a few years, but has since gained again.     Review of Systems   Constitutional:  Positive for unexpected weight change.   Musculoskeletal:  Positive for arthralgias and back pain.     Medical History Reviewed by provider this encounter:  Tobacco  Allergies  Meds  Problems  Med Hx  Surg Hx  Fam Hx       Objective     /76 (BP Location: Left arm, Patient Position: Sitting, Cuff Size: Large)   Pulse 68   Temp 98.2 °F (36.8 °C)   Ht 5' (1.524 m)   Wt 83.5 kg (184 lb)   SpO2 96%   BMI 35.94 kg/m²     Physical Exam  Vitals and nursing note reviewed.   Constitutional:        General: She is not in acute distress.     Appearance: Normal appearance.   Pulmonary:      Effort: Pulmonary effort is normal. No respiratory distress.   Musculoskeletal:      Comments: Antalgic gait   Neurological:      Mental Status: She is alert.      Comments: Grossly intact   Psychiatric:         Mood and Affect: Mood normal.       Administrative Statements

## 2024-06-06 ENCOUNTER — TELEPHONE (OUTPATIENT)
Dept: FAMILY MEDICINE CLINIC | Facility: CLINIC | Age: 64
End: 2024-06-06

## 2024-06-06 ENCOUNTER — TELEPHONE (OUTPATIENT)
Age: 64
End: 2024-06-06

## 2024-06-06 ENCOUNTER — OFFICE VISIT (OUTPATIENT)
Dept: FAMILY MEDICINE CLINIC | Facility: CLINIC | Age: 64
End: 2024-06-06
Payer: COMMERCIAL

## 2024-06-06 VITALS
WEIGHT: 184 LBS | BODY MASS INDEX: 36.12 KG/M2 | OXYGEN SATURATION: 96 % | DIASTOLIC BLOOD PRESSURE: 76 MMHG | HEART RATE: 68 BPM | HEIGHT: 60 IN | SYSTOLIC BLOOD PRESSURE: 122 MMHG | TEMPERATURE: 98.2 F

## 2024-06-06 DIAGNOSIS — F11.20 CONTINUOUS OPIOID DEPENDENCE (HCC): ICD-10-CM

## 2024-06-06 DIAGNOSIS — E66.01 OBESITY, MORBID (HCC): Primary | ICD-10-CM

## 2024-06-06 PROCEDURE — 99214 OFFICE O/P EST MOD 30 MIN: CPT | Performed by: FAMILY MEDICINE

## 2024-06-06 PROCEDURE — G2211 COMPLEX E/M VISIT ADD ON: HCPCS | Performed by: FAMILY MEDICINE

## 2024-06-06 NOTE — TELEPHONE ENCOUNTER
[employer], called in regards to pt, said they need a prior authorization for the pt's Semaglutide-Weight Management (WEGOVY) 0.25 MG/0.5ML   Please advise.

## 2024-06-06 NOTE — ASSESSMENT & PLAN NOTE
Reviewed Wegovy mechanism of action and possible ADRs including abdominal bloating, nausea/GERD/belching. Reviewed signs and symptoms of hypoglycemia including dizziness, tremor, diaphoresis. No personal history of pancreatitis, thyroid CA and no family history of MEN syndrome. Discussed likely need for prior auth/supply issues. Will f/u in 4 weeks to monitor, to call if not tolerating prior. Pt is at increased risk of worsening co-morbidities(HTN, HLD, Pre-DM) due to elevated BMI.

## 2024-06-10 NOTE — TELEPHONE ENCOUNTER
Denita called in following up to prior authorization for her Wegovy.  She said she checked with her insurance company [facility] and they had not received a request yet for prior authorization.  I advised her we will send to Prior Auth team to check on.    Please follow up with patient accordingly.

## 2024-06-12 ENCOUNTER — TELEPHONE (OUTPATIENT)
Age: 64
End: 2024-06-12

## 2024-06-13 ENCOUNTER — OFFICE VISIT (OUTPATIENT)
Dept: OBGYN CLINIC | Facility: MEDICAL CENTER | Age: 64
End: 2024-06-13
Payer: COMMERCIAL

## 2024-06-13 VITALS
HEART RATE: 75 BPM | DIASTOLIC BLOOD PRESSURE: 82 MMHG | BODY MASS INDEX: 35.94 KG/M2 | SYSTOLIC BLOOD PRESSURE: 128 MMHG | WEIGHT: 184 LBS

## 2024-06-13 DIAGNOSIS — M25.551 GREATER TROCHANTERIC PAIN SYNDROME OF RIGHT LOWER EXTREMITY: ICD-10-CM

## 2024-06-13 DIAGNOSIS — M16.11 PRIMARY OSTEOARTHRITIS OF ONE HIP, RIGHT: Primary | ICD-10-CM

## 2024-06-13 PROCEDURE — 20610 DRAIN/INJ JOINT/BURSA W/O US: CPT | Performed by: PHYSICAL MEDICINE & REHABILITATION

## 2024-06-13 PROCEDURE — 99214 OFFICE O/P EST MOD 30 MIN: CPT | Performed by: PHYSICAL MEDICINE & REHABILITATION

## 2024-06-13 RX ORDER — TRIAMCINOLONE ACETONIDE 40 MG/ML
80 INJECTION, SUSPENSION INTRA-ARTICULAR; INTRAMUSCULAR
Status: COMPLETED | OUTPATIENT
Start: 2024-06-13 | End: 2024-06-13

## 2024-06-13 RX ORDER — ROPIVACAINE HYDROCHLORIDE 5 MG/ML
10 INJECTION, SOLUTION EPIDURAL; INFILTRATION; PERINEURAL
Status: COMPLETED | OUTPATIENT
Start: 2024-06-13 | End: 2024-06-13

## 2024-06-13 RX ADMIN — ROPIVACAINE HYDROCHLORIDE 10 ML: 5 INJECTION, SOLUTION EPIDURAL; INFILTRATION; PERINEURAL at 09:30

## 2024-06-13 RX ADMIN — TRIAMCINOLONE ACETONIDE 80 MG: 40 INJECTION, SUSPENSION INTRA-ARTICULAR; INTRAMUSCULAR at 09:30

## 2024-06-13 NOTE — TELEPHONE ENCOUNTER
PA for WEGOVY 0.25 MG/0.5ML  Denied    Reason:          Message sent to provider pool Yes    Denial letter scanned into Media Yes    Appeal started No    (Provider will need to decide if appeal is warranted and send clinical documentation to PA team for initiation.)

## 2024-06-13 NOTE — PROGRESS NOTES
1. Primary osteoarthritis of one hip, right        2. Greater trochanteric pain syndrome of right lower extremity          Orders Placed This Encounter   Procedures    Large joint arthrocentesis        Impression:  Patient with history of lumbar fusion is here in follow up of chronic right thigh pain likely multifactorial and secondary to greater trochanteric pain syndrome and hip osteoarthritis.  She is neurologically intact.     Treatment has included greater trochanteric steroid injection and right hip USGI.  She is seeing pain management at outside facility for her axial pain.  She is improving with physical therapy.  We reviewed her DEXA scan which shows osteopenia at most.  She will continue with weightbearing activities.  Today we repeated a greater trochanteric injection.  She also has intra-articular signs on exam.  Will have her return in 4 weeks for possible ultrasound-guided hip joint injection.  She is currently following with spine and pain for her axial issues.  I will see her back for this planned procedure if she is still symptomatic.    Imaging Studies (I personally reviewed images in PACS and report):  Right hip x-rays most recent to this encounter reviewed.  These images show mild, more so moderate, right hip OA.    No follow-ups on file.    Patient is in agreement with the above plan.    HPI:  Denita Brown is a 63 y.o. female  who presents in follow up.  Here for   Chief Complaint   Patient presents with    Right Hip - Pain       Since last visit: See above.    Following history reviewed and updated:  Past Medical History:   Diagnosis Date    Abdominal pain, epigastric 03/23/2018    Added automatically from request for surgery [id number]    Abnormal x-ray of lumbar spine 11/03/2015    Acute cystitis with hematuria 04/07/2020    Bariatric surgery status     Basal cell carcinoma     basil cell carcinoma- in remission    Benign essential hypertension 06/12/2012    Bone bruise 11/02/2023    Breakdown  (mechanical) of internal fixation device of vertebrae, initial encounter ([facility]) 2019    Calculus of bile duct with cholecystitis without obstruction 2018    Added automatically from request for surgery [id number]    Cellulitis of finger 2015    Closed fracture of left distal fibula 10/28/2023    Degenerative lumbar disc     Digital mucinous cyst 2015    DMII (diabetes mellitus, type 2) ([facility]) 2013    Gross hematuria 2019    Hiatal hernia     History of COVID-19 2021    History of transfusion     Post-op spinal surgery    Low back pain     Lower urinary tract infectious disease 2015    Medicare annual wellness visit, initial 2019    Obesity     Resolved 10/6/2016     Other closed fracture of distal end of left fibula, initial encounter 10/28/2023    Overactive bladder     Postsurgical malabsorption     Recurrent UTI 2019    Spinal stenosis     SVT (supraventricular tachycardia)     Tachycardia     Resolved 2016     Type 2 diabetes mellitus ([facility])     Last assesssed 2018     Wears glasses      Past Surgical History:   Procedure Laterality Date    APPENDECTOMY      ARTHRODESIS      Lumbar - Last assessed 2018     BACK SURGERY      Lumbar (3 surgeries)- two levels fused, clean out from infection, then fusion of 7 levels (last surgery in )    CARDIAC ELECTROPHYSIOLOGY STUDY AND ABLATION      CARPAL TUNNEL RELEASE      x2    CERVICAL SPINE SURGERY      Fusion of C2-C7 over a period of 3 surgeries ( first)     SECTION      X2    CHOLECYSTECTOMY      FL MYELOGRAM LUMBAR  3/28/2019    INSERTION / PLACEMENT / REVISION NEUROSTIMULATOR      X2- both were removed    NECK SURGERY      OTHER SURGICAL HISTORY      Catheter ablation     CT EGD TRANSORAL BIOPSY SINGLE/MULTIPLE N/A 2016    Procedure: ESOPHAGOGASTRODUODENOSCOPY (EGD);  Surgeon: Tanya Orta MD;  Location: [facility];  Service: Bariatrics    CT EGD TRANSORAL BIOPSY  SINGLE/MULTIPLE N/A 2018    Procedure: ESOPHAGOGASTRODUODENOSCOPY (EGD) with biopsy;  Surgeon: Tanya Orta MD;  [facility]: [facility];  Service: Bariatrics    WI LAPAROSCOPY SURG CHOLECYSTECTOMY N/A 2018    Procedure: CHOLECYSTECTOMY LAPAROSCOPIC;  Surgeon: Tanya Orta MD;  [facility];  Service: Bariatrics    WI LAPS GSTR RSTCV PX W/BYP BRAULIO-EN-Y LIMB <150 CM N/A 10/25/2016    Procedure: BYPASS GASTRIC  BRAULIO-EN-Y LAPAROSCOPIC, WITH INTRA OP EGD;  Surgeon: Tanya Orta MD;  [facility];  Service: Bariatrics    SKIN CANCER EXCISION      TONSILLECTOMY      TUBAL LIGATION      US GUIDED BREAST BIOPSY RIGHT COMPLETE Right 2023    WISDOM TOOTH EXTRACTION       Social History   Social History     Substance and Sexual Activity   Alcohol Use No     Social History     Substance and Sexual Activity   Drug Use No     Social History     Tobacco Use   Smoking Status Former    Current packs/day: 0.00    Average packs/day: 1 pack/day for 20.0 years (20.0 ttl pk-yrs)    Types: Cigarettes    Start date:     Quit date:     Years since quittin.4    Passive exposure: Past   Smokeless Tobacco Never     Family History   Problem Relation Age of Onset    Arthritis Mother         Rheumatoid    Angina Mother     Coronary artery disease Mother     Diabetes Mother     Cancer Father         Lung    Cystic fibrosis Father     Rheum arthritis Sister     Diabetes Sister     No Known Problems Maternal Grandmother     No Known Problems Maternal Grandfather     No Known Problems Paternal Grandmother     No Known Problems Paternal Grandfather     Other Brother         Back problems     Diabetes Brother     No Known Problems Brother     Hypertension Family     Heart disease Family     No Known Problems Son     No Known Problems Son      No Known Allergies     Constitutional:  Wt 83.5 kg (184 lb)   BMI 35.94 kg/m²    General: NAD.  Eyes: Clear sclerae.  ENT: No inflammation, lesion, or mass of  lips.  No tracheal deviation.  Musculoskeletal: As mentioned below.  Integumentary: No visible rashes or skin lesions.  Pulmonary/Chest: Effort normal. No respiratory distress.   Neuro: CN's grossly intact, MODI.  Psych: Normal affect and judgement.  Vascular: WWP.    Right Hip Exam     Range of Motion   Internal rotation:  abnormal     Other   Erythema: absent  Scars: absent  Sensation: normal  Pulse: present             Large joint arthrocentesis: R greater trochanteric bursa  Universal Protocol:  Procedure performed by:  Consent: Verbal consent obtained. Written consent not obtained.  Consent given by: patient  Timeout called at: 6/13/2024 9:24 AM.  Patient understanding: patient states understanding of the procedure being performed  Site marked: the operative site was marked  Radiology Images displayed and confirmed. If images not available, report reviewed: imaging studies available  Patient identity confirmed: verbally with patient  Supporting Documentation  Indications: pain and diagnostic evaluation   Procedure Details  Location: hip - R greater trochanteric bursa  Ultrasound guidance: no (US guidance was used to find the area of interest.)  Medications administered: 80 mg triamcinolone acetonide 40 mg/mL; 10 mL ropivacaine 0.5 %    Patient tolerance: patient tolerated the procedure well with no immediate complications  Dressing:  Sterile dressing applied

## 2024-06-21 DIAGNOSIS — J01.90 ACUTE SINUSITIS, RECURRENCE NOT SPECIFIED, UNSPECIFIED LOCATION: ICD-10-CM

## 2024-06-22 DIAGNOSIS — K21.9 GASTROESOPHAGEAL REFLUX DISEASE WITHOUT ESOPHAGITIS: ICD-10-CM

## 2024-06-22 DIAGNOSIS — K29.00 ACUTE GASTRITIS WITHOUT HEMORRHAGE, UNSPECIFIED GASTRITIS TYPE: ICD-10-CM

## 2024-06-22 RX ORDER — PANTOPRAZOLE SODIUM 40 MG/1
40 TABLET, DELAYED RELEASE ORAL
Qty: 90 TABLET | Refills: 1 | Status: SHIPPED | OUTPATIENT
Start: 2024-06-22

## 2024-06-22 RX ORDER — FLUTICASONE PROPIONATE 50 MCG
1 SPRAY, SUSPENSION (ML) NASAL DAILY
Qty: 16 G | Refills: 0 | Status: SHIPPED | OUTPATIENT
Start: 2024-06-22

## 2024-06-25 ENCOUNTER — TELEPHONE (OUTPATIENT)
Dept: FAMILY MEDICINE CLINIC | Facility: CLINIC | Age: 64
End: 2024-06-25

## 2024-06-25 ENCOUNTER — TELEPHONE (OUTPATIENT)
Dept: PREADMISSION TESTING | Facility: HOSPITAL | Age: 64
End: 2024-06-25

## 2024-06-25 NOTE — PRE-PROCEDURE INSTRUCTIONS
Pre-Surgery Instructions:   Medication Instructions    baclofen 10 mg tablet Take day of surgery.    buPROPion (WELLBUTRIN) 75 mg tablet Take day of surgery.    escitalopram (LEXAPRO) 20 mg tablet Take day of surgery.    famotidine (PEPCID) 40 MG tablet Take day of surgery.    fenofibrate 160 MG tablet Take day of surgery.    fluticasone (FLONASE) 50 mcg/act nasal spray Take day of surgery.    gabapentin (NEURONTIN) 100 mg capsule Take day of surgery.    HYDROmorphone (DILAUDID) 4 mg tablet Uses PRN- OK to take day of surgery    loratadine (CLARITIN) 10 mg tablet Take day of surgery.    ondansetron (ZOFRAN) 4 mg tablet Uses PRN- OK to take day of surgery    oxybutynin (DITROPAN) 5 mg tablet Uses PRN- OK to take day of surgery    pantoprazole (PROTONIX) 40 mg tablet Take day of surgery.      Medication instructions for day of procedure reviewed. Please use only a sip of water to take your instructed medications 2 hours prior to arrival.  If you use rescue inhalers, please bring with you on day of procedure. Hold all vitamins/supplements day of procedure.     You will receive a call one business day prior to your procedure with an arrival time and hospital directions. If your procedure is scheduled on a Monday, the hospital will be calling you on the Friday prior to your procedure. If you have not heard from anyone by 8pm, please call the hospital supervisor through the hospital  at [phone]. ([place] [phone])    Do not eat or drink anything after midnight the night before your procedure, including candy, mints, lifesavers, or chewing gum. Do not drink alcohol 24hrs before your procedure. Try not to smoke at least 24hrs before your EGD.      Please shower on the day of your procedure. After showering do not use anything on your skin including creams, lotions, powders, make-up, or cologne. Do not wear contact lenses or artificial eyelashes. If you wear dentures, please do not use denture glue on day  of procedure.     Please bring photo ID and insurance card on day of procedure.  Please do not bring any valuables such as money, credit cards, etc. Remove all jewelry, including rings and body piercings.     Wear causal clean clothing that is easy to take on and off. (Preferably loose waistbands)    Arrange for a responsible person to drive you to and from the hospital on the day of your procedure. Please confirm the visitor policy for the day of your procedure when you receive your phone call with your arrival time.    Call the GI office at [phone] with any new illnesses, exposures, or additional questions prior to procedure. If you need to reschedule for a non-medical reason, please do so NO LATER than 3 days prior to your procedure.

## 2024-07-05 DIAGNOSIS — G89.4 CHRONIC PAIN SYNDROME: ICD-10-CM

## 2024-07-05 RX ORDER — BUPROPION HYDROCHLORIDE 75 MG/1
75 TABLET ORAL 2 TIMES DAILY
Qty: 180 TABLET | Refills: 1 | Status: SHIPPED | OUTPATIENT
Start: 2024-07-05

## 2024-07-06 ENCOUNTER — HOSPITAL ENCOUNTER (OUTPATIENT)
Dept: GASTROENTEROLOGY | Facility: HOSPITAL | Age: 64
Setting detail: OUTPATIENT SURGERY
Discharge: HOME | End: 2024-07-06
Attending: INTERNAL MEDICINE
Payer: COMMERCIAL

## 2024-07-06 ENCOUNTER — ANESTHESIA (OUTPATIENT)
Dept: GASTROENTEROLOGY | Facility: HOSPITAL | Age: 64
End: 2024-07-06

## 2024-07-06 ENCOUNTER — ANESTHESIA EVENT (OUTPATIENT)
Dept: GASTROENTEROLOGY | Facility: HOSPITAL | Age: 64
End: 2024-07-06

## 2024-07-06 VITALS
WEIGHT: 180.34 LBS | RESPIRATION RATE: 20 BRPM | BODY MASS INDEX: 30.05 KG/M2 | TEMPERATURE: 97.6 F | HEART RATE: 62 BPM | HEIGHT: 65 IN | DIASTOLIC BLOOD PRESSURE: 68 MMHG | SYSTOLIC BLOOD PRESSURE: 130 MMHG | OXYGEN SATURATION: 96 %

## 2024-07-06 DIAGNOSIS — Z98.84 HISTORY OF ROUX-EN-Y GASTRIC BYPASS: ICD-10-CM

## 2024-07-06 DIAGNOSIS — R10.9 ABDOMINAL PAIN, UNSPECIFIED ABDOMINAL LOCATION: ICD-10-CM

## 2024-07-06 PROCEDURE — 43239 EGD BIOPSY SINGLE/MULTIPLE: CPT | Performed by: INTERNAL MEDICINE

## 2024-07-06 PROCEDURE — 88305 TISSUE EXAM BY PATHOLOGIST: CPT | Performed by: PATHOLOGY

## 2024-07-06 RX ORDER — LIDOCAINE HYDROCHLORIDE 10 MG/ML
INJECTION, SOLUTION EPIDURAL; INFILTRATION; INTRACAUDAL; PERINEURAL AS NEEDED
Status: DISCONTINUED | OUTPATIENT
Start: 2024-07-06 | End: 2024-07-06

## 2024-07-06 RX ORDER — SODIUM CHLORIDE, SODIUM LACTATE, POTASSIUM CHLORIDE, CALCIUM CHLORIDE 600; 310; 30; 20 MG/100ML; MG/100ML; MG/100ML; MG/100ML
INJECTION, SOLUTION INTRAVENOUS CONTINUOUS PRN
Status: DISCONTINUED | OUTPATIENT
Start: 2024-07-06 | End: 2024-07-06

## 2024-07-06 RX ORDER — PROPOFOL 10 MG/ML
INJECTION, EMULSION INTRAVENOUS AS NEEDED
Status: DISCONTINUED | OUTPATIENT
Start: 2024-07-06 | End: 2024-07-06

## 2024-07-06 RX ADMIN — SODIUM CHLORIDE, SODIUM LACTATE, POTASSIUM CHLORIDE, AND CALCIUM CHLORIDE: .6; .31; .03; .02 INJECTION, SOLUTION INTRAVENOUS at 08:17

## 2024-07-06 RX ADMIN — PROPOFOL 50 MG: 10 INJECTION, EMULSION INTRAVENOUS at 08:58

## 2024-07-06 RX ADMIN — PROPOFOL 100 MG: 10 INJECTION, EMULSION INTRAVENOUS at 08:56

## 2024-07-06 RX ADMIN — LIDOCAINE HYDROCHLORIDE 50 MG: 10 INJECTION, SOLUTION EPIDURAL; INFILTRATION; INTRACAUDAL; PERINEURAL at 08:56

## 2024-07-06 RX ADMIN — PROPOFOL 100 MG: 10 INJECTION, EMULSION INTRAVENOUS at 08:57

## 2024-07-06 RX ADMIN — SODIUM CHLORIDE, SODIUM LACTATE, POTASSIUM CHLORIDE, AND CALCIUM CHLORIDE: .6; .31; .03; .02 INJECTION, SOLUTION INTRAVENOUS at 08:53

## 2024-07-06 NOTE — H&P
History and Physical -  [facility]  Denita Brown 63 y.o. female MRN: [id number]                  HPI: Denita Brown is a 63 y.o. year old female who presents for EGD for chronic nausea.      REVIEW OF SYSTEMS: Per the HPI, and otherwise unremarkable.    Historical Information   Past Medical History:   Diagnosis Date    Abdominal pain, epigastric 03/23/2018    Added automatically from request for surgery [id number]    Abnormal x-ray of lumbar spine 11/03/2015    Acute cystitis with hematuria 04/07/2020    Bariatric surgery status     Basal cell carcinoma     basil cell carcinoma- in remission    Benign essential hypertension 06/12/2012    Bone bruise 11/02/2023    Breakdown (mechanical) of internal fixation device of vertebrae, initial encounter ([facility]) 03/01/2019    Calculus of bile duct with cholecystitis without obstruction 04/27/2018    Added automatically from request for surgery [id number]    Cellulitis of finger 12/03/2015    Closed fracture of left distal fibula 10/28/2023    Degenerative lumbar disc     Digital mucinous cyst 06/24/2015    DMII (diabetes mellitus, type 2) ([facility]) 11/08/2013    Gross hematuria 03/19/2019    Hiatal hernia     History of COVID-19 12/22/2021    History of transfusion 2014    Post-op spinal surgery    Low back pain     Lower urinary tract infectious disease 03/27/2015    Medicare annual wellness visit, initial 11/27/2019    Obesity     Resolved 10/6/2016     Other closed fracture of distal end of left fibula, initial encounter 10/28/2023    Overactive bladder     Postsurgical malabsorption     Recurrent UTI 03/19/2019    Spinal stenosis     SVT (supraventricular tachycardia)     Tachycardia     Resolved 5/4/2016     Type 2 diabetes mellitus ([facility])     Last assesssed 1/18/2018     Wears glasses      Past Surgical History:   Procedure Laterality Date    APPENDECTOMY      ARTHRODESIS      Lumbar - Last assessed 1/4/2018     BACK SURGERY      Lumbar (3 surgeries)- two levels  fused, clean out from infection, then fusion of 7 levels (last surgery in )    CARDIAC ELECTROPHYSIOLOGY STUDY AND ABLATION      CARPAL TUNNEL RELEASE      x2    CERVICAL SPINE SURGERY      Fusion of C2-C7 over a period of 3 surgeries ( first)     SECTION      X2    CHOLECYSTECTOMY      FL MYELOGRAM LUMBAR  3/28/2019    INSERTION / PLACEMENT / REVISION NEUROSTIMULATOR      X2- both were removed    NECK SURGERY      OTHER SURGICAL HISTORY      Catheter ablation     [place] EGD TRANSORAL BIOPSY SINGLE/MULTIPLE N/A 2016    Procedure: ESOPHAGOGASTRODUODENOSCOPY (EGD);  Surgeon: Tanya Orta MD;  [facility]: [facility];  Service: Bariatrics    AZ EGD TRANSORAL BIOPSY SINGLE/MULTIPLE N/A 2018    Procedure: ESOPHAGOGASTRODUODENOSCOPY (EGD) with biopsy;  Surgeon: Tanya Orta MD;  [facility];  Service: Bariatrics    AZ LAPAROSCOPY SURG CHOLECYSTECTOMY N/A 2018    Procedure: CHOLECYSTECTOMY LAPAROSCOPIC;  Surgeon: Tanya Orta MD;  [facility];  Service: Bariatrics    AZ LAPS GSTR RSTCV PX W/BYP BRAULIO-EN-Y LIMB <150 CM N/A 10/25/2016    Procedure: BYPASS GASTRIC  BRAULIO-EN-Y LAPAROSCOPIC, WITH INTRA OP EGD;  Surgeon: Tanya Orta MD;  [facility];  Service: Bariatrics    SKIN CANCER EXCISION      TONSILLECTOMY      TUBAL LIGATION      US GUIDED BREAST BIOPSY RIGHT COMPLETE Right 2023    WISDOM TOOTH EXTRACTION       Social History   Social History     Substance and Sexual Activity   Alcohol Use No     Social History     Substance and Sexual Activity   Drug Use No     Social History     Tobacco Use   Smoking Status Former    Current packs/day: 0.00    Average packs/day: 1 pack/day for 20.0 years (20.0 ttl pk-yrs)    Types: Cigarettes    Start date:     Quit date:     Years since quittin.5    Passive exposure: Past   Smokeless Tobacco Never     Family History   Problem Relation Age of Onset    Arthritis Mother         Rheumatoid    Angina Mother      "Coronary artery disease Mother     Diabetes Mother     Cancer Father         Lung    Cystic fibrosis Father     Rheum arthritis Sister     Diabetes Sister     No Known Problems Maternal Grandmother     No Known Problems Maternal Grandfather     No Known Problems Paternal Grandmother     No Known Problems Paternal Grandfather     Other Brother         Back problems     Diabetes Brother     No Known Problems Brother     Hypertension Family     Heart disease Family     No Known Problems Son     No Known Problems Son        Meds/Allergies       Current Outpatient Medications:     buPROPion (WELLBUTRIN) 75 mg tablet    Calcium Citrate-Vitamin D 315-250 MG-UNIT TABS    escitalopram (LEXAPRO) 20 mg tablet    famotidine (PEPCID) 40 MG tablet    fenofibrate 160 MG tablet    fluticasone (FLONASE) 50 mcg/act nasal spray    gabapentin (NEURONTIN) 100 mg capsule    HYDROmorphone (DILAUDID) 4 mg tablet    Multiple Vitamins-Minerals (BARIATRIC FUSION PO)    ondansetron (ZOFRAN) 4 mg tablet    oxybutynin (DITROPAN) 5 mg tablet    pantoprazole (PROTONIX) 40 mg tablet    albuterol (PROVENTIL HFA,VENTOLIN HFA) 90 mcg/act inhaler    baclofen 10 mg tablet    Cholecalciferol 25 MCG (1000 UT) CHEW    lidocaine (XYLOCAINE) 5 % ointment    loratadine (CLARITIN) 10 mg tablet    Semaglutide-Weight Management (WEGOVY) 0.25 MG/0.5ML  No current facility-administered medications for this encounter.    Facility-Administered Medications Ordered in Other Encounters:     lactated ringers infusion, , Intravenous, Continuous PRN, New Bag at 07/06/24 0817    No Known Allergies    Objective     /77   Pulse 61   Temp 97.8 °F (36.6 °C)   Resp 16   Ht 5' 5\" (1.651 m)   Wt 81.8 kg (180 lb 5.4 oz)   SpO2 96%   BMI 30.01 kg/m²       PHYSICAL EXAM    Gen: NAD  Head: NCAT  CV: RRR  CHEST: Clear  ABD: soft, NT/ND  EXT: no edema      ASSESSMENT/PLAN:  Denita Brown is a 63 y.o. year old female who presents for EGD for chronic nausea. The patient is " stable and optimized for the procedure, we reviewed risk and benefits. Risk include but not limited to infection, bleeding, perforation and missing a lesion.

## 2024-07-06 NOTE — ANESTHESIA POSTPROCEDURE EVALUATION
Post-Op Assessment Note    CV Status:  Stable  Pain Score: 0    Pain management: adequate       Mental Status:  Alert and awake   Hydration Status:  Euvolemic   PONV Controlled:  Controlled   Airway Patency:  Patent     Post Op Vitals Reviewed: Yes    No anethesia notable event occurred.    Staff: CRNA               BP   127/68   Temp      Pulse  67   Resp   15   SpO2   97

## 2024-07-06 NOTE — ANESTHESIA PREPROCEDURE EVALUATION
Procedure:  EGD    Relevant Problems   CARDIO   (+) Essential hypertension   (+) Hyperlipemia      MUSCULOSKELETAL   (+) Bilateral low back pain without sciatica   (+) Lateral epicondylitis of left elbow   (+) Lumbar degenerative disc disease   (+) Osteoarthritis of carpometacarpal (CMC) joint of both thumbs   (+) Primary osteoarthritis of one hip, right      NEURO/PSYCH   (+) Anxiety   (+) Chronic pain syndrome   (+) Continuous opioid dependence (HCC)   (+) Right hand paresthesia        Physical Exam    Airway    Mallampati score: II  TM Distance: >3 FB  Neck ROM: full     Dental   No notable dental hx     Cardiovascular      Pulmonary      Other Findings  post-pubertal.      Anesthesia Plan  ASA Score- 2     Anesthesia Type- IV sedation with anesthesia with ASA Monitors.         Additional Monitors:     Airway Plan:            Plan Factors-Exercise tolerance (METS): >4 METS.    Chart reviewed.    Patient summary reviewed.    Patient is not a current smoker.      There is medical exclusion for perioperative obstructive sleep apnea risk education.        Induction- intravenous.    Postoperative Plan-         Informed Consent- Anesthetic plan and risks discussed with patient.  I personally reviewed this patient with the CRNA. Discussed and agreed on the Anesthesia Plan with the CRNA..

## 2024-07-09 PROCEDURE — 88305 TISSUE EXAM BY PATHOLOGIST: CPT | Performed by: PATHOLOGY

## 2024-07-11 ENCOUNTER — TELEPHONE (OUTPATIENT)
Dept: FAMILY MEDICINE CLINIC | Facility: CLINIC | Age: 64
End: 2024-07-11

## 2024-07-11 ENCOUNTER — HOSPITAL ENCOUNTER (OUTPATIENT)
Dept: MAMMOGRAPHY | Facility: CLINIC | Age: 64
End: 2024-07-11
Payer: COMMERCIAL

## 2024-07-11 VITALS — BODY MASS INDEX: 29.99 KG/M2 | HEIGHT: 65 IN | WEIGHT: 180 LBS

## 2024-07-11 DIAGNOSIS — Z12.31 SCREENING MAMMOGRAM FOR BREAST CANCER: ICD-10-CM

## 2024-07-11 PROCEDURE — 77063 BREAST TOMOSYNTHESIS BI: CPT

## 2024-07-11 PROCEDURE — 77067 SCR MAMMO BI INCL CAD: CPT

## 2024-07-18 ENCOUNTER — TELEPHONE (OUTPATIENT)
Age: 64
End: 2024-07-18

## 2024-07-18 NOTE — TELEPHONE ENCOUNTER
Called pt L/M to call PCP office to obtain her current insurance plan to initiate her PA for Wegovy. Pt has her secondary on file which was denied in June due to her having a primary [employer] plan. We will have to submit to the primary first and receive the denial before we can resubmit to the secondary for approval. Please obtain pt RX benefits  BIN  N  GRP  ID#  PLAN NAME  PLAN PHONE NUMBER

## 2024-07-18 NOTE — TELEPHONE ENCOUNTER
Patient called back, she stated her primary had already been denied (media tab 6/14) so we would have to submit to her secondary.    BIN [id number]  HARLEY FLORES  GRP PMD  ID: Primary #[id number]   Secondary # [id number]   PLAN NAME: [facility]  PLAN PHONE NUMBER: [phone]    Please advise, thank you.

## 2024-07-19 ENCOUNTER — TELEPHONE (OUTPATIENT)
Dept: OBGYN CLINIC | Facility: MEDICAL CENTER | Age: 64
End: 2024-07-19

## 2024-07-19 NOTE — TELEPHONE ENCOUNTER
Per our messaging, Pt came to [place], please call her this afternoon to reschedule her     UCHE BERNARD Hip

## 2024-07-24 NOTE — TELEPHONE ENCOUNTER
Patient states her secondary has not received Prior Authorization as of yet. She states it has been over a week since Denial from Primary was received and now it needs to go through her secondary. Patient is upset and is looking to speak with someone in the Prior Authorization Department.       INSURANCE TO SUBMIT PA TO:    BIN [id number]  HARLEY FLORES  GRP PMD  Secondary # [id number]   PLAN NAME: [facility] [employer]  PLAN PHONE NUMBER: [phone]

## 2024-07-25 ENCOUNTER — TELEPHONE (OUTPATIENT)
Age: 64
End: 2024-07-25

## 2024-07-25 NOTE — TELEPHONE ENCOUNTER
PA for WEGOVY 0.25 MG/0.5ML Denied    Reason:(Screenshot if applicable)        Message sent to office clinical pool Yes    Denial letter scanned into Media Yes    Appeal started No ( Provider will need to decide if appeal is warranted and send clinical documentation to Prior Authorization Team for initiation.)    **Please follow up with your patient regarding denial and next steps**

## 2024-07-25 NOTE — TELEPHONE ENCOUNTER
This is not a duplicate the original Prior auth was done through her primary not her secondary    Pt called back and stated that [facility] choices is different then the Life choice, please submit to the secondary [facility] choices as stated in previous message. If any other questions please reach back out to the pt.

## 2024-07-25 NOTE — TELEPHONE ENCOUNTER
Resubmitted with secondary insurance ID [facility]    PA for (WEGOVY) 0.25 MG/0.5ML     RESUBMITTED via    []CMM-KEY   []Surescripts-Case ID #   []Faxed to plan   []Other website [facility] [id number]  []Phone call Case ID #     Office notes sent, clinical questions answered. Awaiting determination    Turnaround time for your insurance to make a decision on your Prior Authorization can take 7-21 business days.

## 2024-07-25 NOTE — TELEPHONE ENCOUNTER
Duplicate encounter created, please see telephone encounter from 7/18/2024 regarding Wegovy PA status. Please review patient's chart to see if there is already an encounter regarding the medication in question and to document anything regarding this medication in regards to anything regarding the authorization process etc before creating another encounter Thank You.

## 2024-07-25 NOTE — TELEPHONE ENCOUNTER
Patient called regarding her prior authorization of her Wegovy.  Patient states it should be going through her secondary insurance.  She is very upset that this was not done on 7/18 when it was advised at that time.      The correct insurance it should be billed under is:  [facility]  BIN: [id number]  PCN: MD  GRP: [id number]    Plan phone number: [phone]    Please reach out to patient once this is taken care of.

## 2024-07-28 DIAGNOSIS — R11.0 NAUSEA: ICD-10-CM

## 2024-07-29 ENCOUNTER — OFFICE VISIT (OUTPATIENT)
Dept: FAMILY MEDICINE CLINIC | Facility: CLINIC | Age: 64
End: 2024-07-29
Payer: COMMERCIAL

## 2024-07-29 VITALS
DIASTOLIC BLOOD PRESSURE: 80 MMHG | OXYGEN SATURATION: 95 % | TEMPERATURE: 98.6 F | WEIGHT: 184 LBS | HEART RATE: 82 BPM | HEIGHT: 65 IN | SYSTOLIC BLOOD PRESSURE: 142 MMHG | BODY MASS INDEX: 30.66 KG/M2

## 2024-07-29 DIAGNOSIS — E78.5 HYPERLIPIDEMIA, UNSPECIFIED HYPERLIPIDEMIA TYPE: ICD-10-CM

## 2024-07-29 DIAGNOSIS — H11.32 SUBCONJUNCTIVAL HEMORRHAGE OF LEFT EYE: Primary | ICD-10-CM

## 2024-07-29 PROCEDURE — 99213 OFFICE O/P EST LOW 20 MIN: CPT | Performed by: FAMILY MEDICINE

## 2024-07-29 RX ORDER — ONDANSETRON 4 MG/1
4 TABLET, FILM COATED ORAL EVERY 8 HOURS PRN
Qty: 90 TABLET | Refills: 1 | Status: SHIPPED | OUTPATIENT
Start: 2024-07-29

## 2024-07-29 NOTE — ASSESSMENT & PLAN NOTE
Exam consistent with subconjunctival hemorrhage. Discussed this is a self limited condition and should resolve with time.

## 2024-07-29 NOTE — PROGRESS NOTES
"Ambulatory Visit  Name: Denita Brown      : 1960      MRN: [id number]  Encounter Provider: Matilde Arcos MD  Encounter Date: 2024   Encounter department: [facility]    Assessment & Plan   1. Subconjunctival hemorrhage of left eye  Assessment & Plan:  Exam consistent with subconjunctival hemorrhage. Discussed this is a self limited condition and should resolve with time.     History of Present Illness     She was at work today and her boss noticed her eye was red. She denies any drainage, itchiness or discharge. No vision changes.     Conjunctivitis   Associated symptoms include eye redness. Pertinent negatives include no eye itching, no photophobia, no congestion, no eye discharge and no eye pain.       Review of Systems   HENT:  Negative for congestion.    Eyes:  Positive for redness. Negative for photophobia, pain, discharge, itching and visual disturbance.       Objective     /80   Pulse 82   Temp 98.6 °F (37 °C)   Ht 5' 5\" (1.651 m)   Wt 83.5 kg (184 lb)   SpO2 95%   BMI 30.62 kg/m²     Physical Exam  Vitals and nursing note reviewed.   Eyes:      Extraocular Movements:      Right eye: Normal extraocular motion.      Left eye: Normal extraocular motion.      Conjunctiva/sclera:      Right eye: Right conjunctiva is not injected. No chemosis, exudate or hemorrhage.     Left eye: Left conjunctiva is not injected. Hemorrhage present. No chemosis or exudate.      Administrative Statements           "

## 2024-07-30 RX ORDER — FENOFIBRATE 160 MG/1
160 TABLET ORAL DAILY
Qty: 90 TABLET | Refills: 1 | Status: SHIPPED | OUTPATIENT
Start: 2024-07-30

## 2024-08-02 ENCOUNTER — OFFICE VISIT (OUTPATIENT)
Dept: OBGYN CLINIC | Facility: CLINIC | Age: 64
End: 2024-08-02
Payer: COMMERCIAL

## 2024-08-02 VITALS — DIASTOLIC BLOOD PRESSURE: 84 MMHG | HEART RATE: 79 BPM | SYSTOLIC BLOOD PRESSURE: 149 MMHG

## 2024-08-02 DIAGNOSIS — M16.11 PRIMARY OSTEOARTHRITIS OF ONE HIP, RIGHT: Primary | ICD-10-CM

## 2024-08-02 PROCEDURE — 99213 OFFICE O/P EST LOW 20 MIN: CPT | Performed by: PHYSICAL MEDICINE & REHABILITATION

## 2024-08-02 PROCEDURE — 20611 DRAIN/INJ JOINT/BURSA W/US: CPT | Performed by: PHYSICAL MEDICINE & REHABILITATION

## 2024-08-02 RX ORDER — TRIAMCINOLONE ACETONIDE 40 MG/ML
80 INJECTION, SUSPENSION INTRA-ARTICULAR; INTRAMUSCULAR
Status: COMPLETED | OUTPATIENT
Start: 2024-08-02 | End: 2024-08-02

## 2024-08-02 RX ORDER — ROPIVACAINE HYDROCHLORIDE 5 MG/ML
10 INJECTION, SOLUTION EPIDURAL; INFILTRATION; PERINEURAL
Status: COMPLETED | OUTPATIENT
Start: 2024-08-02 | End: 2024-08-02

## 2024-08-02 RX ADMIN — TRIAMCINOLONE ACETONIDE 80 MG: 40 INJECTION, SUSPENSION INTRA-ARTICULAR; INTRAMUSCULAR at 10:00

## 2024-08-02 RX ADMIN — ROPIVACAINE HYDROCHLORIDE 10 ML: 5 INJECTION, SOLUTION EPIDURAL; INFILTRATION; PERINEURAL at 10:00

## 2024-08-02 NOTE — PROGRESS NOTES
1. Primary osteoarthritis of one hip, right  Large joint arthrocentesis: R hip joint        Orders Placed This Encounter   Procedures   • Large joint arthrocentesis: R hip joint        Impression:  Patient with history of lumbar fusion is here in follow up of chronic right thigh pain likely multifactorial and secondary to greater trochanteric pain syndrome and hip osteoarthritis.  She is neurologically intact.      Treatment has included greater trochanteric steroid injection and right hip USGI.  She is seeing pain management at outside facility for her axial pain.  She is improving with physical therapy.  We reviewed her DEXA scan which shows osteopenia at most.  She will continue with weightbearing activities.  Today we repeated a greater trochanteric injection.  She also has intra-articular signs on exam.  Today we proceeded with an ultrasound-guided hip joint injection.  She is currently following with spine and pain for her axial issues.  I will see her back for GT injection before her trip in October if needed.     Imaging Studies (I personally reviewed images in PACS and report):  Right hip x-rays most recent to this encounter reviewed.  These images show mild, more so moderate, right hip OA.    No follow-ups on file.    Patient is in agreement with the above plan.    HPI:  Denita Brown is a 63 y.o. female  who presents in follow up.  Here for No chief complaint on file.      Since last visit: See above.    Following history reviewed and updated:  Past Medical History:   Diagnosis Date   • Abdominal pain, epigastric 03/23/2018    Added automatically from request for surgery [id number]   • Abnormal x-ray of lumbar spine 11/03/2015   • Acute cystitis with hematuria 04/07/2020   • Bariatric surgery status    • Basal cell carcinoma     basil cell carcinoma- in remission   • Benign essential hypertension 06/12/2012   • Bone bruise 11/02/2023   • Breakdown (mechanical) of internal fixation device of vertebrae, initial  Diabetes marker has continued to go up slightly over time.   Work on diet, low carb/sugar and recheck 6 months encounter ([facility]) 2019   • Calculus of bile duct with cholecystitis without obstruction 2018    Added automatically from request for surgery [id number]   • Cellulitis of finger 2015   • Closed fracture of left distal fibula 10/28/2023   • Degenerative lumbar disc    • Digital mucinous cyst 2015   • DMII (diabetes mellitus, type 2) ([facility]) 2013   • Gross hematuria 2019   • Hiatal hernia    • History of COVID-19 2021   • History of transfusion 2014    Post-op spinal surgery   • Low back pain    • Lower urinary tract infectious disease 2015   • Medicare annual wellness visit, initial 2019   • Obesity     Resolved 10/6/2016    • Other closed fracture of distal end of left fibula, initial encounter 10/28/2023   • Overactive bladder    • Postsurgical malabsorption    • Recurrent UTI 2019   • Spinal stenosis    • SVT (supraventricular tachycardia)    • Tachycardia     Resolved 2016    • Type 2 diabetes mellitus ([facility])     Last assesssed 2018    • Wears glasses      Past Surgical History:   Procedure Laterality Date   • APPENDECTOMY     • ARTHRODESIS      Lumbar - Last assessed 2018    • BACK SURGERY      Lumbar (3 surgeries)- two levels fused, clean out from infection, then fusion of 7 levels (last surgery in )   • CARDIAC ELECTROPHYSIOLOGY STUDY AND ABLATION     • CARPAL TUNNEL RELEASE      x2   • CERVICAL SPINE SURGERY      Fusion of C2-C7 over a period of 3 surgeries ( first)   •  SECTION      X2   • CHOLECYSTECTOMY     • FL MYELOGRAM LUMBAR  3/28/2019   • INSERTION / PLACEMENT / REVISION NEUROSTIMULATOR      X2- both were removed   • NECK SURGERY     • OTHER SURGICAL HISTORY      Catheter ablation    • TX EGD TRANSORAL BIOPSY SINGLE/MULTIPLE N/A 2016    Procedure: ESOPHAGOGASTRODUODENOSCOPY (EGD);  Surgeon: Tanya Orta MD;  Location: [facility];  Service: Bariatrics   • TX EGD TRANSORAL BIOPSY SINGLE/MULTIPLE N/A 2018     Procedure: ESOPHAGOGASTRODUODENOSCOPY (EGD) with biopsy;  Surgeon: Tanya Orta MD;  Location: [facility];  Service: Bariatrics   • AZ LAPAROSCOPY SURG CHOLECYSTECTOMY N/A 2018    Procedure: CHOLECYSTECTOMY LAPAROSCOPIC;  Surgeon: Tanya Orta MD;  [facility];  Service: Bariatrics   • AZ LAPS GSTR RSTCV PX W/BYP BRAULIO-EN-Y LIMB <150 CM N/A 10/25/2016    Procedure: BYPASS GASTRIC  BRAULIO-EN-Y LAPAROSCOPIC, WITH INTRA OP EGD;  Surgeon: Tanya Orta MD;  [facility];  Service: Bariatrics   • SKIN CANCER EXCISION     • TONSILLECTOMY     • TUBAL LIGATION     • US GUIDED BREAST BIOPSY RIGHT COMPLETE Right 2023   • WISDOM TOOTH EXTRACTION       Social History   Social History     Substance and Sexual Activity   Alcohol Use No     Social History     Substance and Sexual Activity   Drug Use No     Social History     Tobacco Use   Smoking Status Former   • Current packs/day: 0.00   • Average packs/day: 1 pack/day for 20.0 years (20.0 ttl pk-yrs)   • Types: Cigarettes   • Start date:    • Quit date:    • Years since quittin.6   • Passive exposure: Past   Smokeless Tobacco Never     Family History   Problem Relation Age of Onset   • Arthritis Mother         Rheumatoid   • Angina Mother    • Coronary artery disease Mother    • Diabetes Mother    • Cancer Father         Lung   • Cystic fibrosis Father    • Rheum arthritis Sister    • Diabetes Sister    • No Known Problems Maternal Grandmother    • No Known Problems Maternal Grandfather    • No Known Problems Paternal Grandmother    • No Known Problems Paternal Grandfather    • Other Brother         Back problems    • Diabetes Brother    • No Known Problems Brother    • No Known Problems Son    • No Known Problems Son    • Hypertension Family    • Heart disease Family    • Breast cancer Neg Hx      No Known Allergies     Constitutional:  /84   Pulse 79    General: NAD.  Eyes: Clear sclerae.  ENT: No inflammation, lesion, or  mass of lips.  No tracheal deviation.  Musculoskeletal: As mentioned below.  Integumentary: No visible rashes or skin lesions.  Pulmonary/Chest: Effort normal. No respiratory distress.   Neuro: CN's grossly intact, MODI.  Psych: Normal affect and judgement.  Vascular: WWP.    Right Hip Exam     Range of Motion   Internal rotation:  abnormal     Tests   SUDHAKAR: positive    Other   Erythema: absent  Scars: absent  Sensation: normal  Pulse: present           Large joint arthrocentesis: R hip joint  Universal Protocol:  Procedure performed by:  Consent: Verbal consent obtained. Written consent not obtained.  Consent given by: patient  Timeout called at: 8/2/2024 9:43 AM.  Patient understanding: patient states understanding of the procedure being performed  Site marked: the operative site was marked  Radiology Images displayed and confirmed. If images not available, report reviewed: imaging studies available  Patient identity confirmed: verbally with patient  Supporting Documentation  Indications: pain and diagnostic evaluation   Procedure Details  Location: hip - R hip joint  Ultrasound guidance: yes (US guidance was used to find the area of interest.)  Medications administered: 80 mg triamcinolone acetonide 40 mg/mL; 10 mL ropivacaine 0.5 %    Patient tolerance: patient tolerated the procedure well with no immediate complications  Dressing:  Sterile dressing applied    The right hip joint was visualized with ultrasound and injected with steroid/anesthetic solution as indicated.    Prior to the injection, the ultrasound was used to evaluate for any neural or vascular structures.  Care was taken to avoid these structures.    The images (and video if taken) were saved to the [employer] ultrasound system.    Risks of this procedure include:    - Risk of bleeding since a needle is involved.  - Risk of infection (1/10,000 chance as per recent studies).  Signs/symptoms were discussed and they would prompt an urgent evaluation at an  emergency department.  - Risk of pigmentation or skin dimpling in the skin (2-3% chance as per recent studies) from the steroid.  - Risk of increased pain from steroid flare (1% chance as per recent studies) that typically lasts 24-48 hours.  - Risk of increased blood sugars from the steroid medication that can last for a few weeks.  If the patient is a diabetic or pre-diabetic, they were encouraged to closely monitor their blood sugars and discuss with PCP if elevated more than usual or if having symptoms.    We decided that the benefits outweigh the risks and so we proceeded with the procedure.

## 2024-08-05 ENCOUNTER — TELEPHONE (OUTPATIENT)
Age: 64
End: 2024-08-05

## 2024-08-05 NOTE — TELEPHONE ENCOUNTER
Patient called regarding her prior authorization of her Wegovy.  Patient states it should be going through her secondary insurance.  She is very upset that this was not done on 7/18 and called 7/25 and calling again today when it was advised at that time that it will be done     Advised:    Sussy Bradley MA   Medical Assistant   Telephone Encounter     Addendum   Encounter Date: 7/18/2024    Spoke with Luis from [facility], stated appeal needs to be done through Primary before a prior auth is submitted through the secondary insurance ID. Can you please respond with clinical documentation to support appeal process       And she stated appeal was done and denied and the process should now go through the secondary     The correct insurance it should be billed under is:  [facility] [employer]  BIN: [id number]  PCN: MD  GRP: [id number]     Plan phone number: [phone]  Pharmacy line - [phone]     Please call patient back  to advise where the auth process is at this time

## 2024-08-06 DIAGNOSIS — J20.9 ACUTE BRONCHITIS, UNSPECIFIED ORGANISM: ICD-10-CM

## 2024-08-06 DIAGNOSIS — R06.02 SHORTNESS OF BREATH: Primary | ICD-10-CM

## 2024-08-06 RX ORDER — ALBUTEROL SULFATE 90 UG/1
2 AEROSOL, METERED RESPIRATORY (INHALATION) EVERY 6 HOURS PRN
Qty: 8 G | Refills: 0 | Status: CANCELLED | OUTPATIENT
Start: 2024-08-06

## 2024-08-06 RX ORDER — ALBUTEROL SULFATE 90 UG/1
2 AEROSOL, METERED RESPIRATORY (INHALATION) EVERY 6 HOURS PRN
Qty: 18 G | Refills: 1 | Status: SHIPPED | OUTPATIENT
Start: 2024-08-06

## 2024-08-07 NOTE — TELEPHONE ENCOUNTER
Duplicate encounter created, please see telephone encounter from 07/18/2024 regarding Wegovy PA status. Please review patient's chart to see if there is already an encounter regarding the medication in question and to document anything regarding this medication in regards to anything regarding the authorization process etc before creating another encounter Thank You.

## 2024-08-16 ENCOUNTER — TELEPHONE (OUTPATIENT)
Age: 64
End: 2024-08-16

## 2024-08-16 DIAGNOSIS — E66.01 OBESITY, MORBID (HCC): ICD-10-CM

## 2024-08-16 NOTE — TELEPHONE ENCOUNTER
Patient called in stating [facility] is out of stock on [employer]. [facility] and [facility] have two [facility] in stock. Patient is asking if her script can be sent to [facility] today? Please advise.

## 2024-08-23 ENCOUNTER — NURSE TRIAGE (OUTPATIENT)
Dept: OTHER | Facility: OTHER | Age: 64
End: 2024-08-23

## 2024-08-23 DIAGNOSIS — U07.1 COVID: Primary | ICD-10-CM

## 2024-08-23 RX ORDER — NIRMATRELVIR AND RITONAVIR 300-100 MG
3 KIT ORAL 2 TIMES DAILY
Qty: 30 TABLET | Refills: 0 | Status: SHIPPED | OUTPATIENT
Start: 2024-08-23 | End: 2024-08-28

## 2024-08-23 NOTE — TELEPHONE ENCOUNTER
reports starting with body aches, fever, chills, congestion and headache today. Tested positive for covid with a home covid test. Denies any CP or SOB. And denies any significant medical history. Requesting paxlovid.     On call provider notified   Reason for Disposition   HIGH RISK for severe COVID complications (e.g., weak immune system, age > 64 years, obesity with BMI > 25, pregnant, chronic lung disease or other chronic medical condition)  (Exception: Already seen by PCP and no new or worsening symptoms.)    Answer Assessment - Initial Assessment Questions  Were you within 6 feet or less, for up to 15 minutes or more with a person that has a confirmed COVID-19 test? unsure  What was the date of your exposure? unsure  Are you experiencing any symptoms attributed to the virus?  (Assess for SOB, cough, fever, difficulty breathing) congestion, body aches, chills, fever, headache  HIGH RISK: Do you have any history heart or lung conditions, weakened immune system, diabetes, Asthma, CHF, HIV, COPD, Chemo, renal failure, sickle cell, etc? denies    Protocols used: Coronavirus (COVID-19) Diagnosed or Suspected-ADULT-

## 2024-08-26 NOTE — TELEPHONE ENCOUNTER
Took it for two days and it was horrible. Changed her taste and didn't feel a difference. She is getting better besides chest congestion and fatigue. Will call if anything changes.

## 2024-08-31 DIAGNOSIS — F32.5 MAJOR DEPRESSIVE DISORDER WITH SINGLE EPISODE, IN FULL REMISSION (HCC): ICD-10-CM

## 2024-09-01 RX ORDER — ESCITALOPRAM OXALATE 20 MG/1
20 TABLET ORAL EVERY MORNING
Qty: 90 TABLET | Refills: 1 | Status: SHIPPED | OUTPATIENT
Start: 2024-09-01

## 2024-09-03 ENCOUNTER — OFFICE VISIT (OUTPATIENT)
Dept: FAMILY MEDICINE CLINIC | Facility: CLINIC | Age: 64
End: 2024-09-03
Payer: COMMERCIAL

## 2024-09-03 VITALS
HEIGHT: 65 IN | HEART RATE: 74 BPM | SYSTOLIC BLOOD PRESSURE: 124 MMHG | TEMPERATURE: 98.2 F | DIASTOLIC BLOOD PRESSURE: 82 MMHG | OXYGEN SATURATION: 96 % | WEIGHT: 181 LBS | BODY MASS INDEX: 30.16 KG/M2

## 2024-09-03 DIAGNOSIS — Z98.84 BARIATRIC SURGERY STATUS: Primary | ICD-10-CM

## 2024-09-03 DIAGNOSIS — E66.01 OBESITY, MORBID (HCC): Primary | ICD-10-CM

## 2024-09-03 DIAGNOSIS — K21.9 GASTROESOPHAGEAL REFLUX DISEASE, UNSPECIFIED WHETHER ESOPHAGITIS PRESENT: ICD-10-CM

## 2024-09-03 PROCEDURE — 99213 OFFICE O/P EST LOW 20 MIN: CPT

## 2024-09-03 PROCEDURE — G2211 COMPLEX E/M VISIT ADD ON: HCPCS

## 2024-09-03 RX ORDER — SUCRALFATE ORAL 1 G/10ML
1 SUSPENSION ORAL 2 TIMES DAILY PRN
COMMUNITY
Start: 2024-03-28 | End: 2024-09-03 | Stop reason: SDUPTHER

## 2024-09-03 RX ORDER — SEMAGLUTIDE 0.5 MG/.5ML
INJECTION, SOLUTION SUBCUTANEOUS
Qty: 2 ML | Refills: 0 | Status: SHIPPED | OUTPATIENT
Start: 2024-09-03 | End: 2024-09-13

## 2024-09-03 RX ORDER — SUCRALFATE ORAL 1 G/10ML
1 SUSPENSION ORAL 2 TIMES DAILY PRN
Qty: 473 ML | Refills: 1 | Status: SHIPPED | OUTPATIENT
Start: 2024-09-03

## 2024-09-03 NOTE — PROGRESS NOTES
Ambulatory Visit  Name: Denita Brown      : 1960      MRN: [id number]  Encounter Provider: Nikki Vilchis PA-C  Encounter Date: 9/3/2024   Encounter department: [facility]    Assessment & Plan   1. Obesity, morbid (HCC)  Assessment & Plan:  - doing well on wegovy 0.25 mg weekly, 3 lb weight loss with no reportable side effects  - increase to next dose 0.5 mg and continue titrating up every four weeks as appropriate   - follow up in three months or sooner as needed  - to call with any questions or concerns   Orders:  -     Semaglutide-Weight Management (Wegovy) 0.5 MG/0.5ML; Inject 0.5 mg under the skin weekly     History of Present Illness     CC: Wegovy follow up    Patient presents for one month Wegovy follow up.   She is currently on week three of 0.25 mg weekly and has been doing well on it. Has lost 3 lbs so far with no reportable side effects.   Would like to increase to next dose.       Review of Systems   Constitutional:  Negative for chills, diaphoresis, fatigue and fever.   Respiratory:  Negative for cough, chest tightness, shortness of breath and wheezing.    Cardiovascular:  Negative for chest pain and palpitations.   Neurological:  Negative for dizziness, light-headedness and headaches.     Past Medical History:   Diagnosis Date    Abdominal pain, epigastric 2018    Added automatically from request for surgery [id number]    Abnormal x-ray of lumbar spine 2015    Acute cystitis with hematuria 2020    Bariatric surgery status     Basal cell carcinoma     basil cell carcinoma- in remission    Benign essential hypertension 2012    Bone bruise 2023    Breakdown (mechanical) of internal fixation device of vertebrae, initial encounter ([facility]) 2019    Calculus of bile duct with cholecystitis without obstruction 2018    Added automatically from request for surgery [id number]    Cellulitis of finger 2015    Closed fracture of left distal  fibula 10/28/2023    Degenerative lumbar disc     Digital mucinous cyst 2015    DMII (diabetes mellitus, type 2) (HCC) 2013    Gross hematuria 2019    Hiatal hernia     History of COVID-19 2021    History of transfusion     Post-op spinal surgery    Low back pain     Lower urinary tract infectious disease 2015    Medicare annual wellness visit, initial 2019    Obesity     Resolved 10/6/2016     Other closed fracture of distal end of left fibula, initial encounter 10/28/2023    Overactive bladder     Postsurgical malabsorption     Recurrent UTI 2019    Spinal stenosis     SVT (supraventricular tachycardia)     Tachycardia     Resolved 2016     Type 2 diabetes mellitus (HCC)     Last assesssed 2018     Wears glasses      Past Surgical History:   Procedure Laterality Date    APPENDECTOMY      ARTHRODESIS      Lumbar - Last assessed 2018     BACK SURGERY      Lumbar (3 surgeries)- two levels fused, clean out from infection, then fusion of 7 levels (last surgery in )    CARDIAC ELECTROPHYSIOLOGY STUDY AND ABLATION      CARPAL TUNNEL RELEASE      x2    CERVICAL SPINE SURGERY      Fusion of C2-C7 over a period of 3 surgeries ( first)     SECTION      X2    CHOLECYSTECTOMY      FL MYELOGRAM LUMBAR  3/28/2019    INSERTION / PLACEMENT / REVISION NEUROSTIMULATOR      X2- both were removed    NECK SURGERY      OTHER SURGICAL HISTORY      Catheter ablation     AR EGD TRANSORAL BIOPSY SINGLE/MULTIPLE N/A 2016    Procedure: ESOPHAGOGASTRODUODENOSCOPY (EGD);  Surgeon: Tanya Orta MD;  [facility]: [facility];  Service: Bariatrics    AR EGD TRANSORAL BIOPSY SINGLE/MULTIPLE N/A 2018    Procedure: ESOPHAGOGASTRODUODENOSCOPY (EGD) with biopsy;  Surgeon: Tanya Orta MD;  [facility]: [facility];  Service: Bariatrics    AR LAPAROSCOPY SURG CHOLECYSTECTOMY N/A 2018    Procedure: CHOLECYSTECTOMY LAPAROSCOPIC;  Surgeon: Tanya Orta MD;  [facility];  Service: Bariatrics    IN LAPS GSTR RSTCV PX W/BYP BRAULIO-EN-Y LIMB <150 CM N/A 10/25/2016    Procedure: BYPASS GASTRIC  BRAULIO-EN-Y LAPAROSCOPIC, WITH INTRA OP EGD;  Surgeon: Tanya Orta MD;  [facility];  Service: Bariatrics    SKIN CANCER EXCISION      TONSILLECTOMY      TUBAL LIGATION      US GUIDED BREAST BIOPSY RIGHT COMPLETE Right 2023    WISDOM TOOTH EXTRACTION       Family History   Problem Relation Age of Onset    Arthritis Mother         Rheumatoid    Angina Mother     Coronary artery disease Mother     Diabetes Mother     Cancer Father         Lung    Cystic fibrosis Father     Rheum arthritis Sister     Diabetes Sister     No Known Problems Maternal Grandmother     No Known Problems Maternal Grandfather     No Known Problems Paternal Grandmother     No Known Problems Paternal Grandfather     Other Brother         Back problems     Diabetes Brother     No Known Problems Brother     No Known Problems Son     No Known Problems Son     Hypertension Family     Heart disease Family     Breast cancer Neg Hx      Social History     Tobacco Use    Smoking status: Former     Current packs/day: 0.00     Average packs/day: 1 pack/day for 20.0 years (20.0 ttl pk-yrs)     Types: Cigarettes     Start date:      Quit date:      Years since quittin.6     Passive exposure: Past    Smokeless tobacco: Never   Vaping Use    Vaping status: Never Used   Substance and Sexual Activity    Alcohol use: No    Drug use: No    Sexual activity: Not Currently     Current Outpatient Medications on File Prior to Visit   Medication Sig    albuterol (PROVENTIL HFA,VENTOLIN HFA) 90 mcg/act inhaler Inhale 2 puffs every 6 (six) hours as needed for wheezing or shortness of breath (cough, chest tightness)    baclofen 10 mg tablet Take 10 mg by mouth daily    buPROPion (WELLBUTRIN) 75 mg tablet take 1 tablet by mouth twice a day    Calcium Citrate-Vitamin D 315-250 MG-UNIT TABS Take 1 tablet by mouth 2  (two) times a day     Cholecalciferol 25 MCG (1000 UT) CHEW Chew    escitalopram (LEXAPRO) 20 mg tablet Take 1 tablet (20 mg total) by mouth every morning    famotidine (PEPCID) 40 MG tablet take 1 tablet by mouth at bedtime    fenofibrate 160 MG tablet take 1 tablet by mouth once daily    fluticasone (FLONASE) 50 mcg/act nasal spray 1 spray into each nostril daily    gabapentin (NEURONTIN) 100 mg capsule 100 mg 5 (five) times a day     HYDROmorphone (DILAUDID) 4 mg tablet TAKE 1 TABLET BY MOUTH EVERY 6 HOURS AS NEEDED...FILL 5/8/2020    lidocaine (XYLOCAINE) 5 % ointment Apply topically as needed for mild pain    loratadine (CLARITIN) 10 mg tablet Take 10 mg by mouth daily.    Multiple Vitamins-Minerals (BARIATRIC FUSION PO) Take 1 tablet by mouth daily    ondansetron (ZOFRAN) 4 mg tablet Take 1 tablet (4 mg total) by mouth every 8 (eight) hours as needed for nausea or vomiting    oxybutynin (DITROPAN) 5 mg tablet take 1 tablet by mouth three times a day if needed for bladder SPASM(S)    pantoprazole (PROTONIX) 40 mg tablet take 1 tablet by mouth daily before breakfast    [DISCONTINUED] Semaglutide-Weight Management (WEGOVY) 0.25 MG/0.5ML Inject 0.5 mL (0.25 mg total) under the skin once a week     No Known Allergies  Immunization History   Administered Date(s) Administered    COVID-19 PFIZER VACCINE 0.3 ML IM 04/13/2021, 05/04/2021, 12/19/2021    COVID-19 Pfizer Vac BIVALENT Moo-sucrose 12 Yr+ IM 09/16/2022    INFLUENZA 10/20/2017, 09/29/2021, 09/05/2022, 09/24/2023    Influenza Injectable, MDCK, Preservative Free, Quadrivalent, 0.5 mL 10/03/2020    Influenza, injectable, quadrivalent, preservative free 0.5 mL 09/22/2018, 10/06/2019    Tdap 02/24/2018    Tuberculin Skin Test-PPD Intradermal 01/28/2016    Zoster 10/25/2014    Zoster Vaccine Recombinant 08/08/2022, 10/12/2022     Objective     /82 (BP Location: Left arm, Patient Position: Sitting, Cuff Size: Large)   Pulse 74   Temp 98.2 °F (36.8 °C)   Ht  "5' 5\" (1.651 m)   Wt 82.1 kg (181 lb)   SpO2 96%   BMI 30.12 kg/m²     Physical Exam  Vitals reviewed.   Constitutional:       General: She is not in acute distress.     Appearance: Normal appearance. She is not ill-appearing or diaphoretic.   HENT:      Head: Normocephalic and atraumatic.      Right Ear: External ear normal.      Left Ear: External ear normal.      Nose: Nose normal.      Mouth/Throat:      Mouth: Mucous membranes are moist.   Eyes:      General:         Right eye: No discharge.         Left eye: No discharge.      Conjunctiva/sclera: Conjunctivae normal.   Cardiovascular:      Rate and Rhythm: Normal rate and regular rhythm.      Pulses: Normal pulses.      Heart sounds: Normal heart sounds. No murmur heard.  Pulmonary:      Effort: Pulmonary effort is normal. No respiratory distress.      Breath sounds: Normal breath sounds. No wheezing, rhonchi or rales.   Musculoskeletal:         General: Normal range of motion.      Cervical back: Normal range of motion.      Right lower leg: No edema.      Left lower leg: No edema.   Lymphadenopathy:      Cervical: No cervical adenopathy.   Skin:     General: Skin is warm.   Neurological:      General: No focal deficit present.      Mental Status: She is alert.      Gait: Gait normal.   Psychiatric:         Mood and Affect: Mood normal.         "

## 2024-09-03 NOTE — ASSESSMENT & PLAN NOTE
- doing well on wegovy 0.25 mg weekly, 3 lb weight loss with no reportable side effects  - increase to next dose 0.5 mg and continue titrating up every four weeks as appropriate   - follow up in three months or sooner as needed  - to call with any questions or concerns

## 2024-09-04 ENCOUNTER — TELEPHONE (OUTPATIENT)
Dept: FAMILY MEDICINE CLINIC | Facility: CLINIC | Age: 64
End: 2024-09-04

## 2024-09-13 ENCOUNTER — TELEPHONE (OUTPATIENT)
Age: 64
End: 2024-09-13

## 2024-09-13 DIAGNOSIS — E66.01 OBESITY, MORBID (HCC): Primary | ICD-10-CM

## 2024-09-13 RX ORDER — SEMAGLUTIDE 0.25 MG/.5ML
INJECTION, SOLUTION SUBCUTANEOUS
Qty: 2 ML | Refills: 0 | Status: SHIPPED | OUTPATIENT
Start: 2024-09-13

## 2024-09-13 NOTE — TELEPHONE ENCOUNTER
Patient could not get Wegovy .5 mg.  Pharmacy is out of stock. She would like to know if you could send in Wegovy .25 mg to [facility] #[id number] in its place.    Please advise, thank you.

## 2024-09-14 DIAGNOSIS — J01.90 ACUTE SINUSITIS, RECURRENCE NOT SPECIFIED, UNSPECIFIED LOCATION: ICD-10-CM

## 2024-09-16 DIAGNOSIS — E66.01 OBESITY, MORBID (HCC): Primary | ICD-10-CM

## 2024-09-16 RX ORDER — FLUTICASONE PROPIONATE 50 MCG
1 SPRAY, SUSPENSION (ML) NASAL DAILY
Qty: 16 G | Refills: 2 | Status: SHIPPED | OUTPATIENT
Start: 2024-09-16

## 2024-09-16 RX ORDER — SEMAGLUTIDE 0.5 MG/.5ML
INJECTION, SOLUTION SUBCUTANEOUS
Qty: 2 ML | Refills: 0 | Status: SHIPPED | OUTPATIENT
Start: 2024-09-16

## 2024-09-17 ENCOUNTER — TELEPHONE (OUTPATIENT)
Dept: FAMILY MEDICINE CLINIC | Facility: CLINIC | Age: 64
End: 2024-09-17

## 2024-09-17 DIAGNOSIS — R21 RASH: Primary | ICD-10-CM

## 2024-09-17 RX ORDER — FLUOCINONIDE 0.5 MG/G
CREAM TOPICAL 2 TIMES DAILY
Qty: 15 G | Refills: 2 | Status: SHIPPED | OUTPATIENT
Start: 2024-09-17

## 2024-09-18 NOTE — TELEPHONE ENCOUNTER
The pharmacist from [facility] called to report that Wegovy 0.5mg requires prior auth. After review of the chart, telephone encounter 07/18/24 shows prior auth approved through secondary insurance until 02/02/25. During the call, the pharmacist bypassed patient's primary insurance and got a rejection from the secondary that stated med was billable on 10/06/24. So, a prior auth may not be necessary and it's just too soon. The pharmacist will call back if the Wegovy 0.5mg prescription does not go through on 10/06/24.

## 2024-09-25 ENCOUNTER — OFFICE VISIT (OUTPATIENT)
Dept: OBGYN CLINIC | Facility: MEDICAL CENTER | Age: 64
End: 2024-09-25
Payer: COMMERCIAL

## 2024-09-25 VITALS
HEART RATE: 80 BPM | BODY MASS INDEX: 30.16 KG/M2 | HEIGHT: 65 IN | DIASTOLIC BLOOD PRESSURE: 85 MMHG | WEIGHT: 181 LBS | SYSTOLIC BLOOD PRESSURE: 150 MMHG

## 2024-09-25 DIAGNOSIS — M25.551 GREATER TROCHANTERIC PAIN SYNDROME OF RIGHT LOWER EXTREMITY: Primary | ICD-10-CM

## 2024-09-25 PROCEDURE — 99213 OFFICE O/P EST LOW 20 MIN: CPT | Performed by: PHYSICAL MEDICINE & REHABILITATION

## 2024-09-25 PROCEDURE — 20610 DRAIN/INJ JOINT/BURSA W/O US: CPT | Performed by: PHYSICAL MEDICINE & REHABILITATION

## 2024-09-25 RX ORDER — ROPIVACAINE HYDROCHLORIDE 5 MG/ML
10 INJECTION, SOLUTION EPIDURAL; INFILTRATION; PERINEURAL
Status: COMPLETED | OUTPATIENT
Start: 2024-09-25 | End: 2024-09-25

## 2024-09-25 RX ORDER — TRIAMCINOLONE ACETONIDE 40 MG/ML
80 INJECTION, SUSPENSION INTRA-ARTICULAR; INTRAMUSCULAR
Status: COMPLETED | OUTPATIENT
Start: 2024-09-25 | End: 2024-09-25

## 2024-09-25 RX ADMIN — TRIAMCINOLONE ACETONIDE 80 MG: 40 INJECTION, SUSPENSION INTRA-ARTICULAR; INTRAMUSCULAR at 09:30

## 2024-09-25 RX ADMIN — ROPIVACAINE HYDROCHLORIDE 10 ML: 5 INJECTION, SOLUTION EPIDURAL; INFILTRATION; PERINEURAL at 09:30

## 2024-09-25 NOTE — PROGRESS NOTES
1. Greater trochanteric pain syndrome of right lower extremity  Large joint arthrocentesis: R greater trochanteric bursa        Orders Placed This Encounter   Procedures    Large joint arthrocentesis: R greater trochanteric bursa        Impression:  Patient with history of lumbar fusion is here in follow up of chronic right thigh pain likely multifactorial and secondary to greater trochanteric pain syndrome and hip osteoarthritis.  She is neurologically intact.      Treatment has included greater trochanteric steroid injection and right hip USGI.  She is seeing pain management at outside facility for her axial pain.  She has had PT and continues HEP. We reviewed her DEXA scan which shows osteopenia at most.  She will continue with weightbearing activities.  Today we proceeded with a repeat GT bursa injection.  She is currently following with spine and pain for her axial issues.      Imaging Studies (I personally reviewed images in PACS and report):  Right hip x-rays most recent to this encounter reviewed.  These images show mild, more so moderate, right hip OA.    No follow-ups on file.    Patient is in agreement with the above plan.    HPI:  Denita Brown is a 63 y.o. female  who presents in follow up.  Here for   Chief Complaint   Patient presents with    Right Hip - Pain, Follow-up       Since last visit: See above.    Following history reviewed and updated:  Past Medical History:   Diagnosis Date    Abdominal pain, epigastric 03/23/2018    Added automatically from request for surgery [id number]    Abnormal x-ray of lumbar spine 11/03/2015    Acute cystitis with hematuria 04/07/2020    Bariatric surgery status     Basal cell carcinoma     basil cell carcinoma- in remission    Benign essential hypertension 06/12/2012    Bone bruise 11/02/2023    Breakdown (mechanical) of internal fixation device of vertebrae, initial encounter ([facility]) 03/01/2019    Calculus of bile duct with cholecystitis without obstruction 04/27/2018     Added automatically from request for surgery [id number]    Cellulitis of finger 2015    Closed fracture of left distal fibula 10/28/2023    Degenerative lumbar disc     Digital mucinous cyst 2015    DMII (diabetes mellitus, type 2) (HCC) 2013    Gross hematuria 2019    Hiatal hernia     History of COVID-19 2021    History of transfusion     Post-op spinal surgery    Low back pain     Lower urinary tract infectious disease 2015    Medicare annual wellness visit, initial 2019    Obesity     Resolved 10/6/2016     Other closed fracture of distal end of left fibula, initial encounter 10/28/2023    Overactive bladder     Postsurgical malabsorption     Recurrent UTI 2019    Spinal stenosis     SVT (supraventricular tachycardia)     Tachycardia     Resolved 2016     Type 2 diabetes mellitus (HCC)     Last assesssed 2018     Wears glasses      Past Surgical History:   Procedure Laterality Date    APPENDECTOMY      ARTHRODESIS      Lumbar - Last assessed 2018     BACK SURGERY      Lumbar (3 surgeries)- two levels fused, clean out from infection, then fusion of 7 levels (last surgery in )    CARDIAC ELECTROPHYSIOLOGY STUDY AND ABLATION      CARPAL TUNNEL RELEASE      x2    CERVICAL SPINE SURGERY      Fusion of C2-C7 over a period of 3 surgeries ( first)     SECTION      X2    CHOLECYSTECTOMY      FL MYELOGRAM LUMBAR  3/28/2019    INSERTION / PLACEMENT / REVISION NEUROSTIMULATOR      X2- both were removed    NECK SURGERY      OTHER SURGICAL HISTORY      Catheter ablation     DC EGD TRANSORAL BIOPSY SINGLE/MULTIPLE N/A 2016    Procedure: ESOPHAGOGASTRODUODENOSCOPY (EGD);  Surgeon: Tanya Orta MD;  Location: [facility];  Service: Bariatrics    DC EGD TRANSORAL BIOPSY SINGLE/MULTIPLE N/A 2018    Procedure: ESOPHAGOGASTRODUODENOSCOPY (EGD) with biopsy;  Surgeon: Tanya Orta MD;  [facility]: [facility];  Service: Bariatrics    DC  "LAPAROSCOPY SURG CHOLECYSTECTOMY N/A 2018    Procedure: CHOLECYSTECTOMY LAPAROSCOPIC;  Surgeon: Tanya Orta MD;  [facility]: [facility];  Service: Bariatrics    PA LAPS GSTR RSTCV PX W/BYP BRAULIO-EN-Y LIMB <150 CM N/A 10/25/2016    Procedure: BYPASS GASTRIC  BRAULIO-EN-Y LAPAROSCOPIC, WITH INTRA OP EGD;  Surgeon: Tanya Orta MD;  [facility];  Service: Bariatrics    SKIN CANCER EXCISION      TONSILLECTOMY      TUBAL LIGATION      US GUIDED BREAST BIOPSY RIGHT COMPLETE Right 2023    WISDOM TOOTH EXTRACTION       Social History   Social History     Substance and Sexual Activity   Alcohol Use No     Social History     Substance and Sexual Activity   Drug Use No     Social History     Tobacco Use   Smoking Status Former    Current packs/day: 0.00    Average packs/day: 1 pack/day for 20.0 years (20.0 ttl pk-yrs)    Types: Cigarettes    Start date:     Quit date:     Years since quittin.7    Passive exposure: Past   Smokeless Tobacco Never     Family History   Problem Relation Age of Onset    Arthritis Mother         Rheumatoid    Angina Mother     Coronary artery disease Mother     Diabetes Mother     Cancer Father         Lung    Cystic fibrosis Father     Rheum arthritis Sister     Diabetes Sister     No Known Problems Maternal Grandmother     No Known Problems Maternal Grandfather     No Known Problems Paternal Grandmother     No Known Problems Paternal Grandfather     Other Brother         Back problems     Diabetes Brother     No Known Problems Brother     No Known Problems Son     No Known Problems Son     Hypertension Family     Heart disease Family     Breast cancer Neg Hx      No Known Allergies     Constitutional:  /85   Pulse 80   Ht 5' 5\" (1.651 m)   Wt 82.1 kg (181 lb)   BMI 30.12 kg/m²    General: NAD.  Eyes: Clear sclerae.  ENT: No inflammation, lesion, or mass of lips.  No tracheal deviation.  Musculoskeletal: As mentioned below.  Integumentary: No visible rashes " or skin lesions.  Pulmonary/Chest: Effort normal. No respiratory distress.   Neuro: CN's grossly intact, MODI.  Psych: Normal affect and judgement.  Vascular: WWP.    Right Hip Exam     Tenderness   The patient is experiencing tenderness in the greater trochanter.    Other   Erythema: absent  Scars: absent  Sensation: normal  Pulse: present             Large joint arthrocentesis: R greater trochanteric bursa  Universal Protocol:  Procedure performed by: (Chaperone present)  Consent: Verbal consent obtained. Written consent not obtained.  Risks and benefits: risks, benefits and alternatives were discussed  Consent given by: patient  Timeout called at: 9/25/2024 9:21 AM.  Patient understanding: patient states understanding of the procedure being performed  Site marked: the operative site was marked  Radiology Images displayed and confirmed. If images not available, report reviewed: imaging studies available  Patient identity confirmed: verbally with patient  Supporting Documentation  Indications: pain   Procedure Details  Location: hip - R greater trochanteric bursa  Needle size: 20 G (20G 3.5'')  Ultrasound guidance: no  Approach: Lateral to medial approach.  Medications administered: 80 mg triamcinolone acetonide 40 mg/mL; 10 mL ropivacaine 0.5 %    Patient tolerance: patient tolerated the procedure well with no immediate complications  Dressing:  Sterile dressing applied    A modified tenotomy was performed by redirecting the needle in three directions and dispersing the medication equally in all three directions.    There was little to no resistance encountered during the injection.    Risks of this procedure include:    - Risk of bleeding since a needle is involved.  - Risk of infection (1/10,000 chance as per recent studies).  Signs/symptoms were discussed and they would prompt an urgent evaluation at an emergency department.  - Risk of pigmentation or skin dimpling in the skin (2-3% chance as per recent studies)  from the steroid.  - Risk of increased pain from steroid flare (1% chance as per recent studies) that typically lasts 24-48 hours.  - Risk of increased blood sugars from the steroid medication that can last for a few weeks.  If the patient is a diabetic or pre-diabetic, they were encouraged to closely monitor their blood sugars and discuss with PCP if elevated more than usual or if having symptoms.    The benefits outweigh the risks and so the procedure was completed.

## 2024-10-03 ENCOUNTER — PATIENT MESSAGE (OUTPATIENT)
Dept: FAMILY MEDICINE CLINIC | Facility: CLINIC | Age: 64
End: 2024-10-03

## 2024-10-03 DIAGNOSIS — E66.01 OBESITY, MORBID (HCC): ICD-10-CM

## 2024-10-03 RX ORDER — SEMAGLUTIDE 0.25 MG/.5ML
INJECTION, SOLUTION SUBCUTANEOUS
Qty: 2 ML | Refills: 0 | Status: SHIPPED | OUTPATIENT
Start: 2024-10-03

## 2024-10-03 NOTE — PATIENT COMMUNICATION
Patient called asking if Dr. Choi can order the 2.5 Wegovy because the pharmacy has the .5 on backorder    [place].     [facility] [place]

## 2024-11-07 ENCOUNTER — OFFICE VISIT (OUTPATIENT)
Dept: OBGYN CLINIC | Facility: MEDICAL CENTER | Age: 64
End: 2024-11-07
Payer: COMMERCIAL

## 2024-11-07 VITALS
HEART RATE: 62 BPM | WEIGHT: 181 LBS | HEIGHT: 65 IN | SYSTOLIC BLOOD PRESSURE: 147 MMHG | BODY MASS INDEX: 30.16 KG/M2 | DIASTOLIC BLOOD PRESSURE: 84 MMHG

## 2024-11-07 DIAGNOSIS — M16.11 PRIMARY OSTEOARTHRITIS OF ONE HIP, RIGHT: Primary | ICD-10-CM

## 2024-11-07 PROCEDURE — 99213 OFFICE O/P EST LOW 20 MIN: CPT | Performed by: PHYSICAL MEDICINE & REHABILITATION

## 2024-11-07 PROCEDURE — 20611 DRAIN/INJ JOINT/BURSA W/US: CPT | Performed by: PHYSICAL MEDICINE & REHABILITATION

## 2024-11-07 RX ORDER — ROPIVACAINE HYDROCHLORIDE 5 MG/ML
10 INJECTION, SOLUTION EPIDURAL; INFILTRATION; PERINEURAL
Status: COMPLETED | OUTPATIENT
Start: 2024-11-07 | End: 2024-11-07

## 2024-11-07 RX ORDER — TRIAMCINOLONE ACETONIDE 40 MG/ML
80 INJECTION, SUSPENSION INTRA-ARTICULAR; INTRAMUSCULAR
Status: COMPLETED | OUTPATIENT
Start: 2024-11-07 | End: 2024-11-07

## 2024-11-07 RX ADMIN — ROPIVACAINE HYDROCHLORIDE 10 ML: 5 INJECTION, SOLUTION EPIDURAL; INFILTRATION; PERINEURAL at 09:00

## 2024-11-07 RX ADMIN — TRIAMCINOLONE ACETONIDE 80 MG: 40 INJECTION, SUSPENSION INTRA-ARTICULAR; INTRAMUSCULAR at 09:00

## 2024-11-07 NOTE — PROGRESS NOTES
1. Primary osteoarthritis of one hip, right  Large joint arthrocentesis: R hip joint        Orders Placed This Encounter   Procedures    Large joint arthrocentesis: R hip joint        Impression:  Patient with history of lumbar fusion is here in follow up of chronic right thigh pain likely multifactorial and secondary to greater trochanteric pain syndrome and hip osteoarthritis.  She is neurologically intact.      Treatment has included greater trochanteric steroid injection and right hip USGI.  She is seeing pain management at outside facility for her axial pain.  She has had PT and continues HEP. We reviewed her DEXA scan which shows osteopenia at most.  She will continue with weightbearing activities.  Today we proceeded with a repeat USG right hip joint injection.  She is currently following with spine and pain for her axial issues.      Imaging Studies (I personally reviewed images in PACS and report):  Right hip x-rays most recent to this encounter reviewed.  These images show mild, more so moderate, right hip OA.    No follow-ups on file.    Patient is in agreement with the above plan.    HPI:  Denita Brown is a 64 y.o. female  who presents in follow up.  Here for   Chief Complaint   Patient presents with    Right Hip - Pain, Injections, Follow-up       Since last visit: See above.    Following history reviewed and updated:  Past Medical History:   Diagnosis Date    Abdominal pain, epigastric 03/23/2018    Added automatically from request for surgery [id number]    Abnormal x-ray of lumbar spine 11/03/2015    Acute cystitis with hematuria 04/07/2020    Bariatric surgery status     Basal cell carcinoma     basil cell carcinoma- in remission    Benign essential hypertension 06/12/2012    Bone bruise 11/02/2023    Breakdown (mechanical) of internal fixation device of vertebrae, initial encounter ([facility]) 03/01/2019    Calculus of bile duct with cholecystitis without obstruction 04/27/2018    Added automatically from  request for surgery [id number]    Cellulitis of finger 2015    Closed fracture of left distal fibula 10/28/2023    Degenerative lumbar disc     Digital mucinous cyst 2015    DMII (diabetes mellitus, type 2) ([facility]) 2013    Gross hematuria 2019    Hiatal hernia     History of COVID-19 2021    History of transfusion     Post-op spinal surgery    Low back pain     Lower urinary tract infectious disease 2015    Medicare annual wellness visit, initial 2019    Obesity     Resolved 10/6/2016     Other closed fracture of distal end of left fibula, initial encounter 10/28/2023    Overactive bladder     Postsurgical malabsorption     Recurrent UTI 2019    Spinal stenosis     SVT (supraventricular tachycardia) ([facility])     Tachycardia     Resolved 2016     Type 2 diabetes mellitus ([facility])     Last assesssed 2018     Wears glasses      Past Surgical History:   Procedure Laterality Date    APPENDECTOMY      ARTHRODESIS      Lumbar - Last assessed 2018     BACK SURGERY      Lumbar (3 surgeries)- two levels fused, clean out from infection, then fusion of 7 levels (last surgery in )    CARDIAC ELECTROPHYSIOLOGY STUDY AND ABLATION      CARPAL TUNNEL RELEASE      x2    CERVICAL SPINE SURGERY      Fusion of C2-C7 over a period of 3 surgeries ( first)     SECTION      X2    CHOLECYSTECTOMY      FL MYELOGRAM LUMBAR  3/28/2019    INSERTION / PLACEMENT / REVISION NEUROSTIMULATOR      X2- both were removed    NECK SURGERY      OTHER SURGICAL HISTORY      Catheter ablation     [place] EGD TRANSORAL BIOPSY SINGLE/MULTIPLE N/A 2016    Procedure: ESOPHAGOGASTRODUODENOSCOPY (EGD);  Surgeon: Tanya Orta MD;  Location: [facility];  Service: Bariatrics    [place] EGD TRANSORAL BIOPSY SINGLE/MULTIPLE N/A 2018    Procedure: ESOPHAGOGASTRODUODENOSCOPY (EGD) with biopsy;  Surgeon: Tanya Orta MD;  [facility];  Service: Bariatrics    AZ LAPAROSCOPY SURG  "CHOLECYSTECTOMY N/A 2018    Procedure: CHOLECYSTECTOMY LAPAROSCOPIC;  Surgeon: Tanya Orta MD;  [facility];  Service: Bariatrics    OR LAPS GSTR RSTCV PX W/BYP BRAULIO-EN-Y LIMB <150 CM N/A 10/25/2016    Procedure: BYPASS GASTRIC  BRAULIO-EN-Y LAPAROSCOPIC, WITH INTRA OP EGD;  Surgeon: Tanya Orta MD;  [facility];  Service: Bariatrics    SKIN CANCER EXCISION      TONSILLECTOMY      TUBAL LIGATION      US GUIDED BREAST BIOPSY RIGHT COMPLETE Right 2023    WISDOM TOOTH EXTRACTION       Social History   Social History     Substance and Sexual Activity   Alcohol Use No     Social History     Substance and Sexual Activity   Drug Use No     Social History     Tobacco Use   Smoking Status Former    Current packs/day: 0.00    Average packs/day: 1 pack/day for 20.0 years (20.0 ttl pk-yrs)    Types: Cigarettes    Start date:     Quit date:     Years since quittin.8    Passive exposure: Past   Smokeless Tobacco Never     Family History   Problem Relation Age of Onset    Arthritis Mother         Rheumatoid    Angina Mother     Coronary artery disease Mother     Diabetes Mother     Cancer Father         Lung    Cystic fibrosis Father     Rheum arthritis Sister     Diabetes Sister     No Known Problems Maternal Grandmother     No Known Problems Maternal Grandfather     No Known Problems Paternal Grandmother     No Known Problems Paternal Grandfather     Other Brother         Back problems     Diabetes Brother     No Known Problems Brother     No Known Problems Son     No Known Problems Son     Hypertension Family     Heart disease Family     Breast cancer Neg Hx      No Known Allergies     Constitutional:  /84   Pulse 62   Ht 5' 5\" (1.651 m)   Wt 82.1 kg (181 lb)   BMI 30.12 kg/m²    General: NAD.  Eyes: Clear sclerae.  ENT: No inflammation, lesion, or mass of lips.  No tracheal deviation.  Musculoskeletal: As mentioned below.  Integumentary: No visible rashes or skin " lesions.  Pulmonary/Chest: Effort normal. No respiratory distress.   Neuro: CN's grossly intact, MODI.  Psych: Normal affect and judgement.  Vascular: WWP.    Right Hip Exam     Range of Motion   Internal rotation:  abnormal     Tests   SUDHAKAR: positive    Other   Erythema: absent  Scars: absent  Sensation: normal  Pulse: present             Large joint arthrocentesis: R hip joint  Universal Protocol:  Procedure performed by:  Consent: Verbal consent obtained. Written consent not obtained.  Consent given by: patient  Timeout called at: 11/7/2024 8:48 AM.  Patient understanding: patient states understanding of the procedure being performed  Site marked: the operative site was marked  Radiology Images displayed and confirmed. If images not available, report reviewed: imaging studies available  Patient identity confirmed: verbally with patient  Supporting Documentation  Indications: pain and diagnostic evaluation   Procedure Details  Location: hip - R hip joint  Ultrasound guidance: yes (US guidance was used to find the area of interest.)  Medications administered: 80 mg triamcinolone acetonide 40 mg/mL; 10 mL ropivacaine 0.5 %    Patient tolerance: patient tolerated the procedure well with no immediate complications  Dressing:  Sterile dressing applied    The right hip joint was visualized with ultrasound and injected with steroid/anesthetic solution as indicated.    Prior to the injection, the ultrasound was used to evaluate for any neural or vascular structures.  Care was taken to avoid these structures.    The images (and video if taken) were saved to the [employer] ultrasound system.    Risks of this procedure include:    - Risk of bleeding since a needle is involved.  - Risk of infection (1/10,000 chance as per recent studies).  Signs/symptoms were discussed and they would prompt an urgent evaluation at an emergency department.  - Risk of pigmentation or skin dimpling in the skin (2-3% chance as per recent studies) from  the steroid.  - Risk of increased pain from steroid flare (1% chance as per recent studies) that typically lasts 24-48 hours.  - Risk of increased blood sugars from the steroid medication that can last for a few weeks.  If the patient is a diabetic or pre-diabetic, they were encouraged to closely monitor their blood sugars and discuss with PCP if elevated more than usual or if having symptoms.    We decided that the benefits outweigh the risks and so we proceeded with the procedure.

## 2024-11-18 ENCOUNTER — TELEPHONE (OUTPATIENT)
Age: 64
End: 2024-11-18

## 2024-11-18 DIAGNOSIS — E66.09 CLASS 1 OBESITY DUE TO EXCESS CALORIES WITH SERIOUS COMORBIDITY AND BODY MASS INDEX (BMI) OF 30.0 TO 30.9 IN ADULT: Primary | ICD-10-CM

## 2024-11-18 DIAGNOSIS — E66.811 CLASS 1 OBESITY DUE TO EXCESS CALORIES WITH SERIOUS COMORBIDITY AND BODY MASS INDEX (BMI) OF 30.0 TO 30.9 IN ADULT: Primary | ICD-10-CM

## 2024-11-18 RX ORDER — TIRZEPATIDE 2.5 MG/.5ML
2.5 INJECTION, SOLUTION SUBCUTANEOUS WEEKLY
Qty: 2 ML | Refills: 0 | Status: SHIPPED | OUTPATIENT
Start: 2024-11-18 | End: 2024-12-16

## 2024-11-18 NOTE — TELEPHONE ENCOUNTER
Patient called stating that she has been unable to get the Wegovy prescription from the pharmacy. Patient stated the pharmacy has had it on back order for 2 months.     Patient has not taken prescription since September. Patient is inquiring if there are any alternatives she can take.     Please return patient call     Please advise  Thank you

## 2024-11-19 ENCOUNTER — TELEPHONE (OUTPATIENT)
Dept: FAMILY MEDICINE CLINIC | Facility: CLINIC | Age: 64
End: 2024-11-19

## 2024-11-19 NOTE — TELEPHONE ENCOUNTER
PA for Zepbound) 2.5 mg/0.5 mL auto-injector SUBMITTED to     via    []CMM-KEY:   [x]Surescripts-Case ID # [id number]   []Availity-Auth ID # NDC #   []Faxed to plan   []Other website   []Phone call Case ID #     [x]PA sent as URGENT    All office notes, labs and other pertaining documents and studies sent. Clinical questions answered. Awaiting determination from insurance company.     Turnaround time for your insurance to make a decision on your Prior Authorization can take 7-21 business days.

## 2024-11-20 DIAGNOSIS — R11.0 NAUSEA: ICD-10-CM

## 2024-11-20 NOTE — TELEPHONE ENCOUNTER
[facility] [place] pharmacy called stating they are having a hard time getting the Zepbound prescription to go through to insurance. Asking if the approval letter received today (in Media) can be faxed over to [phone]

## 2024-11-20 NOTE — TELEPHONE ENCOUNTER
PA for Zepbound 2.5 mg  APPROVED     Date(s) approved November 19, 2024 to May 18, 2025     Case #[id number]     Patient advised by          []MyChart Message  []Phone call   [x]LMOM  []L/M to call office as no active Communication consent on file  []Unable to leave detailed message as VM not approved on Communication consent       Pharmacy advised by    [x]Fax  []Phone call    Approval letter scanned into Media Yes

## 2024-11-21 RX ORDER — ONDANSETRON 4 MG/1
4 TABLET, FILM COATED ORAL EVERY 8 HOURS PRN
Qty: 90 TABLET | Refills: 0 | Status: SHIPPED | OUTPATIENT
Start: 2024-11-21

## 2024-12-03 DIAGNOSIS — E66.01 OBESITY, MORBID (HCC): Primary | ICD-10-CM

## 2024-12-03 RX ORDER — TIRZEPATIDE 5 MG/.5ML
5 INJECTION, SOLUTION SUBCUTANEOUS WEEKLY
Qty: 2 ML | Refills: 0 | Status: SHIPPED | OUTPATIENT
Start: 2024-12-03

## 2024-12-11 ENCOUNTER — APPOINTMENT (OUTPATIENT)
Dept: RADIOLOGY | Facility: MEDICAL CENTER | Age: 64
End: 2024-12-11
Payer: COMMERCIAL

## 2024-12-11 ENCOUNTER — OFFICE VISIT (OUTPATIENT)
Dept: OBGYN CLINIC | Facility: MEDICAL CENTER | Age: 64
End: 2024-12-11
Payer: COMMERCIAL

## 2024-12-11 VITALS — BODY MASS INDEX: 30.16 KG/M2 | WEIGHT: 181 LBS | HEIGHT: 65 IN

## 2024-12-11 DIAGNOSIS — M16.11 PRIMARY OSTEOARTHRITIS OF ONE HIP, RIGHT: ICD-10-CM

## 2024-12-11 DIAGNOSIS — M16.11 PRIMARY OSTEOARTHRITIS OF ONE HIP, RIGHT: Primary | ICD-10-CM

## 2024-12-11 PROCEDURE — 99214 OFFICE O/P EST MOD 30 MIN: CPT | Performed by: PHYSICAL MEDICINE & REHABILITATION

## 2024-12-11 PROCEDURE — 73502 X-RAY EXAM HIP UNI 2-3 VIEWS: CPT

## 2024-12-11 RX ORDER — PREDNISONE 20 MG/1
40 TABLET ORAL
Qty: 10 TABLET | Refills: 0 | Status: SHIPPED | OUTPATIENT
Start: 2024-12-11 | End: 2024-12-16

## 2024-12-11 NOTE — PROGRESS NOTES
1. Primary osteoarthritis of one hip, right  XR hip/pelv 2-3 vws right if performed    predniSONE 20 mg tablet    Ambulatory referral to Orthopedic Surgery        Orders Placed This Encounter   Procedures    XR hip/pelv 2-3 vws right if performed    Ambulatory referral to Orthopedic Surgery      Impression:  Patient with history of lumbar fusion is here in follow up of chronic right thigh pain likely multifactorial and secondary to greater trochanteric pain syndrome and hip osteoarthritis.  She is neurologically intact.      Treatment has included greater trochanteric steroid injections and right hip USGIs.  She is seeing pain management at outside facility for her axial pain.  She has had PT and continues HEP. We reviewed her DEXA scan which shows osteopenia at most.  She will continue with weightbearing activities.  Will have her go for consultation with total joint surgery.  I have prescribed her oral steroid burst.  Can try a greater trochanteric injection a few weeks.     Imaging Studies (I personally reviewed images in PACS and report):  Right hip x-rays obtained on 12/11/2024.  These images show mild, more moderate, osteoarthritic changes.  No significant changes from x-rays when compared to March 2024.    No follow-ups on file.    Patient is in agreement with the above plan.    HPI:  Denita Brown is a 64 y.o. female  who presents in follow up.  Here for   Chief Complaint   Patient presents with    Right Hip - Pain, Follow-up       Since last visit: See above.  She had a fall and is having more hip pain than before.  Pain is in the same areas of her hip.    Following history reviewed and updated:  Past Medical History:   Diagnosis Date    Abdominal pain, epigastric 03/23/2018    Added automatically from request for surgery [id number]    Abnormal x-ray of lumbar spine 11/03/2015    Acute cystitis with hematuria 04/07/2020    Bariatric surgery status     Basal cell carcinoma     basil cell carcinoma- in remission     Benign essential hypertension 2012    Bone bruise 2023    Breakdown (mechanical) of internal fixation device of vertebrae, initial encounter ([facility]) 2019    Calculus of bile duct with cholecystitis without obstruction 2018    Added automatically from request for surgery [id number]    Cellulitis of finger 2015    Closed fracture of left distal fibula 10/28/2023    Degenerative lumbar disc     Digital mucinous cyst 2015    DMII (diabetes mellitus, type 2) ([facility]) 2013    Gross hematuria 2019    Hiatal hernia     History of COVID-19 2021    History of transfusion     Post-op spinal surgery    Low back pain     Lower urinary tract infectious disease 2015    Medicare annual wellness visit, initial 2019    Obesity     Resolved 10/6/2016     Other closed fracture of distal end of left fibula, initial encounter 10/28/2023    Overactive bladder     Postsurgical malabsorption     Recurrent UTI 2019    Spinal stenosis     SVT (supraventricular tachycardia) ([facility])     Tachycardia     Resolved 2016     Type 2 diabetes mellitus ([facility])     Last assesssed 2018     Wears glasses      Past Surgical History:   Procedure Laterality Date    APPENDECTOMY      ARTHRODESIS      Lumbar - Last assessed 2018     BACK SURGERY      Lumbar (3 surgeries)- two levels fused, clean out from infection, then fusion of 7 levels (last surgery in )    CARDIAC ELECTROPHYSIOLOGY STUDY AND ABLATION      CARPAL TUNNEL RELEASE      x2    CERVICAL SPINE SURGERY      Fusion of C2-C7 over a period of 3 surgeries ( first)     SECTION      X2    CHOLECYSTECTOMY      FL MYELOGRAM LUMBAR  3/28/2019    INSERTION / PLACEMENT / REVISION NEUROSTIMULATOR      X2- both were removed    NECK SURGERY      OTHER SURGICAL HISTORY      Catheter ablation     [place] EGD TRANSORAL BIOPSY SINGLE/MULTIPLE N/A 2016    Procedure: ESOPHAGOGASTRODUODENOSCOPY (EGD);  Surgeon: Tanya Rowland  "MD Marivel;  Location: [facility];  Service: Bariatrics    RI EGD TRANSORAL BIOPSY SINGLE/MULTIPLE N/A 2018    Procedure: ESOPHAGOGASTRODUODENOSCOPY (EGD) with biopsy;  Surgeon: Tanya Orta MD;  Location: [facility];  Service: Bariatrics    RI LAPAROSCOPY SURG CHOLECYSTECTOMY N/A 2018    Procedure: CHOLECYSTECTOMY LAPAROSCOPIC;  Surgeon: Tanya Orta MD;  Location: [facility];  Service: Bariatrics    RI LAPS GSTR RSTCV PX W/BYP BRAULIO-EN-Y LIMB <150 CM N/A 10/25/2016    Procedure: BYPASS GASTRIC  BRAULIO-EN-Y LAPAROSCOPIC, WITH INTRA OP EGD;  Surgeon: Tanya Orta MD;  Location: [facility];  Service: Bariatrics    SKIN CANCER EXCISION      TONSILLECTOMY      TUBAL LIGATION      US GUIDED BREAST BIOPSY RIGHT COMPLETE Right 2023    WISDOM TOOTH EXTRACTION       Social History   Social History     Substance and Sexual Activity   Alcohol Use No     Social History     Substance and Sexual Activity   Drug Use No     Social History     Tobacco Use   Smoking Status Former    Current packs/day: 0.00    Average packs/day: 1 pack/day for 20.0 years (20.0 ttl pk-yrs)    Types: Cigarettes    Start date:     Quit date:     Years since quittin.9    Passive exposure: Past   Smokeless Tobacco Never     Family History   Problem Relation Age of Onset    Arthritis Mother         Rheumatoid    Angina Mother     Coronary artery disease Mother     Diabetes Mother     Cancer Father         Lung    Cystic fibrosis Father     Rheum arthritis Sister     Diabetes Sister     No Known Problems Maternal Grandmother     No Known Problems Maternal Grandfather     No Known Problems Paternal Grandmother     No Known Problems Paternal Grandfather     Other Brother         Back problems     Diabetes Brother     No Known Problems Brother     No Known Problems Son     No Known Problems Son     Hypertension Family     Heart disease Family     Breast cancer Neg Hx      No Known Allergies     Constitutional:  Ht 5' 5\" (1.651 m) "   Wt 82.1 kg (181 lb)   BMI 30.12 kg/m²    General: NAD.  Eyes: Clear sclerae.  ENT: No inflammation, lesion, or mass of lips.  No tracheal deviation.  Musculoskeletal: As mentioned below.  Integumentary: No visible rashes or skin lesions.  Pulmonary/Chest: Effort normal. No respiratory distress.   Neuro: CN's grossly intact, MODI.  Psych: Normal affect and judgement.  Vascular: WWP.    Right Hip Exam     Tenderness   The patient is experiencing tenderness in the greater trochanter.    Range of Motion   Internal rotation:  abnormal     Tests   SUDHAKAR: positive    Other   Erythema: absent  Scars: absent  Sensation: normal  Pulse: present    Comments:  Pain with passive hip IR.             Procedures

## 2024-12-17 DIAGNOSIS — K29.00 ACUTE GASTRITIS WITHOUT HEMORRHAGE, UNSPECIFIED GASTRITIS TYPE: ICD-10-CM

## 2024-12-17 DIAGNOSIS — K21.9 GASTROESOPHAGEAL REFLUX DISEASE WITHOUT ESOPHAGITIS: ICD-10-CM

## 2024-12-18 RX ORDER — PANTOPRAZOLE SODIUM 40 MG/1
40 TABLET, DELAYED RELEASE ORAL
Qty: 90 TABLET | Refills: 1 | Status: SHIPPED | OUTPATIENT
Start: 2024-12-18

## 2024-12-20 ENCOUNTER — OFFICE VISIT (OUTPATIENT)
Dept: OBGYN CLINIC | Facility: MEDICAL CENTER | Age: 64
End: 2024-12-20
Payer: COMMERCIAL

## 2024-12-20 VITALS
WEIGHT: 171.8 LBS | HEIGHT: 65 IN | BODY MASS INDEX: 28.62 KG/M2 | HEART RATE: 64 BPM | DIASTOLIC BLOOD PRESSURE: 79 MMHG | SYSTOLIC BLOOD PRESSURE: 134 MMHG

## 2024-12-20 DIAGNOSIS — M16.11 PRIMARY OSTEOARTHRITIS OF ONE HIP, RIGHT: ICD-10-CM

## 2024-12-20 DIAGNOSIS — Z51.81 ANTICOAGULATION MANAGEMENT ENCOUNTER: ICD-10-CM

## 2024-12-20 DIAGNOSIS — Z79.01 ANTICOAGULATION MANAGEMENT ENCOUNTER: ICD-10-CM

## 2024-12-20 PROCEDURE — 99214 OFFICE O/P EST MOD 30 MIN: CPT | Performed by: ORTHOPAEDIC SURGERY

## 2024-12-20 RX ORDER — CHLORHEXIDINE GLUCONATE ORAL RINSE 1.2 MG/ML
15 SOLUTION DENTAL ONCE
OUTPATIENT
Start: 2024-12-20 | End: 2024-12-20

## 2024-12-20 RX ORDER — MUPIROCIN 20 MG/G
OINTMENT TOPICAL 3 TIMES DAILY
Qty: 15 G | Refills: 0 | Status: SHIPPED | OUTPATIENT
Start: 2024-12-20

## 2024-12-20 RX ORDER — FERROUS SULFATE 324(65)MG
324 TABLET, DELAYED RELEASE (ENTERIC COATED) ORAL
Qty: 60 TABLET | Refills: 0 | Status: SHIPPED | OUTPATIENT
Start: 2024-12-20

## 2024-12-20 RX ORDER — FOLIC ACID 1 MG/1
1 TABLET ORAL DAILY
Qty: 30 TABLET | Refills: 0 | Status: SHIPPED | OUTPATIENT
Start: 2024-12-20

## 2024-12-20 RX ORDER — SODIUM CHLORIDE, SODIUM LACTATE, POTASSIUM CHLORIDE, CALCIUM CHLORIDE 600; 310; 30; 20 MG/100ML; MG/100ML; MG/100ML; MG/100ML
125 INJECTION, SOLUTION INTRAVENOUS CONTINUOUS
OUTPATIENT
Start: 2024-12-20

## 2024-12-20 RX ORDER — ENOXAPARIN SODIUM 100 MG/ML
40 INJECTION SUBCUTANEOUS DAILY
Qty: 11.2 ML | Refills: 0 | Status: SHIPPED | OUTPATIENT
Start: 2024-12-20 | End: 2025-01-17

## 2024-12-20 RX ORDER — ASCORBIC ACID 500 MG
500 TABLET ORAL DAILY
Qty: 30 TABLET | Refills: 0 | Status: SHIPPED | OUTPATIENT
Start: 2024-12-20

## 2024-12-20 RX ORDER — CEFAZOLIN SODIUM 2 G/50ML
2000 SOLUTION INTRAVENOUS ONCE
OUTPATIENT
Start: 2024-12-20 | End: 2024-12-20

## 2024-12-20 RX ORDER — TRANEXAMIC ACID 10 MG/ML
1000 INJECTION, SOLUTION INTRAVENOUS ONCE
OUTPATIENT
Start: 2024-12-20 | End: 2024-12-20

## 2024-12-20 NOTE — PROGRESS NOTES
Assessment:  1. Primary osteoarthritis of one hip, right  Ambulatory referral to Orthopedic Surgery          Plan:  Right hip osteoarthritis  The patient will be scheduled for right total hip arthroplasty.  We feel the patient will find significant relief per current symptoms, findings on imaging and exam.  Risks, benefits, precautions and expectations were discussed. Risks include blood loss, potential infection and blood clots.  Preoperative vitamins were prescribed.     The patient should follow up after surgery.        To do next visit:  Return for post-op with x-rays.    The above stated was discussed in layman's terms and the patient expressed understanding.  All questions were answered to the patient's satisfaction.       Scribe Attestation      I,:  Kenny Hernandez MA am acting as a scribe while in the presence of the attending physician.:       I,:  Ezio Borges MD personally performed the services described in this documentation    as scribed in my presence.:               Subjective:   Denita Brown is a 64 y.o. female who presents for initial evaluation of right hip.  She has had symptoms for about 3 years with no injury.  Today she complains of right anterior gorin pain with lateral hip pain.  She admits to distal right LE numbness yet understands this stems from lumbar spine.  Most activity aggravates while rest alleviates.  She does use gabapentin, Baclofen and Dilaudid from pain management.  She has had trochanteric bursa and IA hip steroid injections with diminishing benefit.  She denies past hip surgery.  She has had multiple lumbar surgeries with plans for upcoming lumbar injections.         Review of systems negative unless otherwise specified in HPI    Past Medical History:   Diagnosis Date    Abdominal pain, epigastric 03/23/2018    Added automatically from request for surgery [id number]    Abnormal x-ray of lumbar spine 11/03/2015    Acute cystitis with hematuria 04/07/2020    Bariatric  surgery status     Basal cell carcinoma     basil cell carcinoma- in remission    Benign essential hypertension 2012    Bone bruise 2023    Breakdown (mechanical) of internal fixation device of vertebrae, initial encounter ([facility]) 2019    Calculus of bile duct with cholecystitis without obstruction 2018    Added automatically from request for surgery [id number]    Cellulitis of finger 2015    Closed fracture of left distal fibula 10/28/2023    Degenerative lumbar disc     Digital mucinous cyst 2015    DMII (diabetes mellitus, type 2) ([facility]) 2013    Gross hematuria 2019    Hiatal hernia     History of COVID-19 2021    History of transfusion     Post-op spinal surgery    Low back pain     Lower urinary tract infectious disease 2015    Medicare annual wellness visit, initial 2019    Obesity     Resolved 10/6/2016     Other closed fracture of distal end of left fibula, initial encounter 10/28/2023    Overactive bladder     Postsurgical malabsorption     Recurrent UTI 2019    Spinal stenosis     SVT (supraventricular tachycardia) ([facility])     Tachycardia     Resolved 2016     Type 2 diabetes mellitus ([facility])     Last assesssed 2018     Wears glasses        Past Surgical History:   Procedure Laterality Date    APPENDECTOMY      ARTHRODESIS      Lumbar - Last assessed 2018     BACK SURGERY      Lumbar (3 surgeries)- two levels fused, clean out from infection, then fusion of 7 levels (last surgery in )    CARDIAC ELECTROPHYSIOLOGY STUDY AND ABLATION      CARPAL TUNNEL RELEASE      x2    CERVICAL SPINE SURGERY      Fusion of C2-C7 over a period of 3 surgeries ( first)     SECTION      X2    CHOLECYSTECTOMY      FL MYELOGRAM LUMBAR  3/28/2019    INSERTION / PLACEMENT / REVISION NEUROSTIMULATOR      X2- both were removed    NECK SURGERY      OTHER SURGICAL HISTORY      Catheter ablation     [place] EGD TRANSORAL BIOPSY SINGLE/MULTIPLE N/A  2016    Procedure: ESOPHAGOGASTRODUODENOSCOPY (EGD);  Surgeon: Tanya Orta MD;  Location: [facility];  Service: Bariatrics    [place] EGD TRANSORAL BIOPSY SINGLE/MULTIPLE N/A 2018    Procedure: ESOPHAGOGASTRODUODENOSCOPY (EGD) with biopsy;  Surgeon: Tanya Otra MD;  Location: [facility];  Service: Bariatrics    [place] LAPAROSCOPY SURG CHOLECYSTECTOMY N/A 2018    Procedure: CHOLECYSTECTOMY LAPAROSCOPIC;  Surgeon: Tanya Orta MD;  [facility];  Service: Bariatrics    [place] LAPS GSTR RSTCV PX W/BYP BRAULIO-EN-Y LIMB <150 CM N/A 10/25/2016    Procedure: BYPASS GASTRIC  BRAULIO-EN-Y LAPAROSCOPIC, WITH INTRA OP EGD;  Surgeon: Tanya Orta MD;  [facility];  Service: Bariatrics    SKIN CANCER EXCISION      TONSILLECTOMY      TUBAL LIGATION      US GUIDED BREAST BIOPSY RIGHT COMPLETE Right 2023    WISDOM TOOTH EXTRACTION         Family History   Problem Relation Age of Onset    Arthritis Mother         Rheumatoid    Angina Mother     Coronary artery disease Mother     Diabetes Mother     Cancer Father         Lung    Cystic fibrosis Father     Rheum arthritis Sister     Diabetes Sister     No Known Problems Maternal Grandmother     No Known Problems Maternal Grandfather     No Known Problems Paternal Grandmother     No Known Problems Paternal Grandfather     Other Brother         Back problems     Diabetes Brother     No Known Problems Brother     No Known Problems Son     No Known Problems Son     Hypertension Family     Heart disease Family     Breast cancer Neg Hx        Social History     Occupational History    Occupation: Realtor    Tobacco Use    Smoking status: Former     Current packs/day: 0.00     Average packs/day: 1 pack/day for 20.0 years (20.0 ttl pk-yrs)     Types: Cigarettes     Start date:      Quit date: 2004     Years since quittin.9     Passive exposure: Past    Smokeless tobacco: Never   Vaping Use    Vaping status: Never Used   Substance and Sexual Activity     Alcohol use: No    Drug use: No    Sexual activity: Not Currently         Current Outpatient Medications:     albuterol (PROVENTIL HFA,VENTOLIN HFA) 90 mcg/act inhaler, Inhale 2 puffs every 6 (six) hours as needed for wheezing or shortness of breath (cough, chest tightness), Disp: 18 g, Rfl: 1    baclofen 10 mg tablet, Take 10 mg by mouth daily, Disp: , Rfl:     buPROPion (WELLBUTRIN) 75 mg tablet, take 1 tablet by mouth twice a day, Disp: 180 tablet, Rfl: 1    Calcium Citrate-Vitamin D 315-250 MG-UNIT TABS, Take 1 tablet by mouth 2 (two) times a day , Disp: , Rfl:     Cholecalciferol 25 MCG (1000 UT) CHEW, Chew, Disp: , Rfl:     escitalopram (LEXAPRO) 20 mg tablet, Take 1 tablet (20 mg total) by mouth every morning, Disp: 90 tablet, Rfl: 1    famotidine (PEPCID) 40 MG tablet, take 1 tablet by mouth at bedtime, Disp: 90 tablet, Rfl: 3    fenofibrate 160 MG tablet, take 1 tablet by mouth once daily, Disp: 90 tablet, Rfl: 1    fluocinonide (LIDEX) 0.05 % cream, Apply topically 2 (two) times a day, Disp: 15 g, Rfl: 2    fluticasone (FLONASE) 50 mcg/act nasal spray, 1 spray into each nostril daily, Disp: 16 g, Rfl: 2    gabapentin (NEURONTIN) 100 mg capsule, 100 mg 5 (five) times a day , Disp: , Rfl:     HYDROmorphone (DILAUDID) 4 mg tablet, TAKE 1 TABLET BY MOUTH EVERY 6 HOURS AS NEEDED...FILL 5/8/2020, Disp: , Rfl:     lidocaine (XYLOCAINE) 5 % ointment, Apply topically as needed for mild pain, Disp: 30 g, Rfl: 2    loratadine (CLARITIN) 10 mg tablet, Take 10 mg by mouth daily., Disp: , Rfl:     Multiple Vitamins-Minerals (BARIATRIC FUSION PO), Take 1 tablet by mouth daily, Disp: , Rfl:     ondansetron (ZOFRAN) 4 mg tablet, Take 1 tablet (4 mg total) by mouth every 8 (eight) hours as needed for nausea or vomiting, Disp: 90 tablet, Rfl: 0    oxybutynin (DITROPAN) 5 mg tablet, take 1 tablet by mouth three times a day if needed for bladder SPASM(S), Disp: 90 tablet, Rfl: 1    pantoprazole (PROTONIX) 40 mg tablet, take 1  "tablet by mouth daily before breakfast, Disp: 90 tablet, Rfl: 1    sucralfate (CARAFATE) 1 g/10 mL suspension, Take 10 mL (1 g total) by mouth 2 (two) times a day as needed (gastritis), Disp: 473 mL, Rfl: 1    tirzepatide (Zepbound) 5 mg/0.5 mL auto-injector, Inject 0.5 mL (5 mg total) under the skin once a week, Disp: 2 mL, Rfl: 0    No Known Allergies         Vitals:    12/20/24 0825   BP: 134/79   Pulse: 64       Objective:  Physical exam  General: Awake, Alert, Oriented  Eyes: Pupils equal, round and reactive to light  Heart: regular rate and rhythm  Lungs: No audible wheezing  Abdomen: soft                    Ortho Exam  Right hip:  TTP over greater trochanter  Apprehension with ROM  Groin pain with IR  ER with flexion  Calf compartments soft and supple  Sensation intact  Toes are warm sensate and mobile      Diagnostics, reviewed and taken today if performed as documented:    The attending physician has personally reviewed the pertinent films in PACS and interpretation is as follows:  Right hip x-ray of 12/11/2024:  Moderate-severe arthritic changes with decreased joint space, subchondral sclerosis and osteophyte formation.  Lumbar spine has evidence of past fusion.      Procedures, if performed today:    Procedures    None performed      Portions of the record may have been created with voice recognition software.  Occasional wrong word or \"sound a like\" substitutions may have occurred due to the inherent limitations of voice recognition software.  Read the chart carefully and recognize, using context, where substitutions have occurred.    "

## 2024-12-23 DIAGNOSIS — Z01.818 PRE-OP TESTING: Primary | ICD-10-CM

## 2024-12-23 DIAGNOSIS — G89.4 CHRONIC PAIN SYNDROME: ICD-10-CM

## 2024-12-24 DIAGNOSIS — R21 RASH: ICD-10-CM

## 2024-12-24 RX ORDER — BUPROPION HYDROCHLORIDE 75 MG/1
75 TABLET ORAL 2 TIMES DAILY
Qty: 180 TABLET | Refills: 1 | Status: SHIPPED | OUTPATIENT
Start: 2024-12-24

## 2024-12-26 RX ORDER — FLUOCINONIDE 0.5 MG/G
CREAM TOPICAL 2 TIMES DAILY
Qty: 15 G | Refills: 0 | Status: SHIPPED | OUTPATIENT
Start: 2024-12-26

## 2024-12-27 ENCOUNTER — OFFICE VISIT (OUTPATIENT)
Dept: FAMILY MEDICINE CLINIC | Facility: CLINIC | Age: 64
End: 2024-12-27
Payer: COMMERCIAL

## 2024-12-27 VITALS
BODY MASS INDEX: 28.49 KG/M2 | TEMPERATURE: 97.8 F | HEIGHT: 65 IN | SYSTOLIC BLOOD PRESSURE: 140 MMHG | HEART RATE: 67 BPM | WEIGHT: 171 LBS | DIASTOLIC BLOOD PRESSURE: 84 MMHG | OXYGEN SATURATION: 97 %

## 2024-12-27 DIAGNOSIS — E66.01 OBESITY, MORBID (HCC): Primary | ICD-10-CM

## 2024-12-27 PROCEDURE — G2211 COMPLEX E/M VISIT ADD ON: HCPCS

## 2024-12-27 PROCEDURE — 99213 OFFICE O/P EST LOW 20 MIN: CPT

## 2024-12-27 RX ORDER — TIRZEPATIDE 7.5 MG/.5ML
7.5 INJECTION, SOLUTION SUBCUTANEOUS WEEKLY
Qty: 2 ML | Refills: 0 | Status: SHIPPED | OUTPATIENT
Start: 2024-12-27

## 2024-12-27 NOTE — ASSESSMENT & PLAN NOTE
Patient currently taking Zepbound 5 mg injection for weight loss  Presents today for routine follow-up for medication management  Reports doing well medication, no adverse side effects, denies nausea, vomiting, diarrhea, constipation discussed risk and benefits of increasing dosage, as well as reviewed  Adverse side effects and contraindications for the medication  Using shared decision making, will increase to 7.5 mg injections, follow-up in 1 month sooner as needed  Orders:    tirzepatide (Zepbound) 7.5 mg/0.5 mL auto-injector; Inject 0.5 mL (7.5 mg total) under the skin once a week

## 2024-12-27 NOTE — PROGRESS NOTES
Name: Denita Brown      : 1960      MRN: [id number]  Encounter Provider: Clif Mcfadden PA-C  Encounter Date: 2024   Encounter department: [facility]    Assessment & Plan  Obesity, morbid (HCC)    Patient currently taking Zepbound 5 mg injection for weight loss  Presents today for routine follow-up for medication management  Reports doing well medication, no adverse side effects, denies nausea, vomiting, diarrhea, constipation discussed risk and benefits of increasing dosage, as well as reviewed  Adverse side effects and contraindications for the medication  Using shared decision making, will increase to 7.5 mg injections, follow-up in 1 month sooner as needed  Orders:    tirzepatide (Zepbound) 7.5 mg/0.5 mL auto-injector; Inject 0.5 mL (7.5 mg total) under the skin once a week         History of Present Illness     Denita Brown is a 64 y.o. female  presenting for 1 m f/u for zepbound           Review of Systems   Constitutional:  Negative for chills and fever.   HENT:  Negative for ear pain and sore throat.    Eyes:  Negative for pain and visual disturbance.   Respiratory:  Negative for cough and shortness of breath.    Cardiovascular:  Negative for chest pain and palpitations.   Gastrointestinal:  Negative for abdominal pain and vomiting.   Genitourinary:  Negative for dysuria and hematuria.   Musculoskeletal:  Negative for arthralgias and back pain.   Skin:  Negative for color change and rash.   Neurological:  Negative for seizures and syncope.   All other systems reviewed and are negative.    Past Medical History:   Diagnosis Date    Abdominal pain, epigastric 2018    Added automatically from request for surgery [id number]    Abnormal x-ray of lumbar spine 2015    Acute cystitis with hematuria 2020    Bariatric surgery status     Basal cell carcinoma     basil cell carcinoma- in remission    Benign essential hypertension 2012    Bone bruise 2023     Breakdown (mechanical) of internal fixation device of vertebrae, initial encounter ([facility]) 2019    Calculus of bile duct with cholecystitis without obstruction 2018    Added automatically from request for surgery [id number]    Cellulitis of finger 2015    Closed fracture of left distal fibula 10/28/2023    Degenerative lumbar disc     Digital mucinous cyst 2015    DMII (diabetes mellitus, type 2) ([facility]) 2013    Gross hematuria 2019    Hiatal hernia     History of COVID-19 2021    History of transfusion 2014    Post-op spinal surgery    Low back pain     Lower urinary tract infectious disease 2015    Medicare annual wellness visit, initial 2019    Obesity     Resolved 10/6/2016     Other closed fracture of distal end of left fibula, initial encounter 10/28/2023    Overactive bladder     Postsurgical malabsorption     Recurrent UTI 2019    Spinal stenosis     SVT (supraventricular tachycardia) ([facility])     Tachycardia     Resolved 2016     Type 2 diabetes mellitus ([facility])     Last assesssed 2018     Wears glasses      Past Surgical History:   Procedure Laterality Date    APPENDECTOMY      ARTHRODESIS      Lumbar - Last assessed 2018     BACK SURGERY      Lumbar (3 surgeries)- two levels fused, clean out from infection, then fusion of 7 levels (last surgery in )    CARDIAC ELECTROPHYSIOLOGY STUDY AND ABLATION      CARPAL TUNNEL RELEASE      x2    CERVICAL SPINE SURGERY      Fusion of C2-C7 over a period of 3 surgeries ( first)     SECTION      X2    CHOLECYSTECTOMY      FL MYELOGRAM LUMBAR  3/28/2019    INSERTION / PLACEMENT / REVISION NEUROSTIMULATOR      X2- both were removed    NECK SURGERY      OTHER SURGICAL HISTORY      Catheter ablation     VA EGD TRANSORAL BIOPSY SINGLE/MULTIPLE N/A 2016    Procedure: ESOPHAGOGASTRODUODENOSCOPY (EGD);  Surgeon: Tanya Orta MD;  Location: [facility];  Service: Bariatrics    VA EGD TRANSORAL  BIOPSY SINGLE/MULTIPLE N/A 2018    Procedure: ESOPHAGOGASTRODUODENOSCOPY (EGD) with biopsy;  Surgeon: Tanya Orta MD;  Location: [facility];  Service: Bariatrics    CA LAPAROSCOPY SURG CHOLECYSTECTOMY N/A 2018    Procedure: CHOLECYSTECTOMY LAPAROSCOPIC;  Surgeon: Tanya Orta MD;  [facility]: [facility];  Service: Bariatrics    CA LAPS GSTR RSTCV PX W/BYP BRAULIO-EN-Y LIMB <150 CM N/A 10/25/2016    Procedure: BYPASS GASTRIC  BRAULIO-EN-Y LAPAROSCOPIC, WITH INTRA OP EGD;  Surgeon: Tanya Orta MD;  [facility]: [facility];  Service: Bariatrics    SKIN CANCER EXCISION      TONSILLECTOMY      TUBAL LIGATION      US GUIDED BREAST BIOPSY RIGHT COMPLETE Right 2023    WISDOM TOOTH EXTRACTION       Family History   Problem Relation Age of Onset    Arthritis Mother         Rheumatoid    Angina Mother     Coronary artery disease Mother     Diabetes Mother     Cancer Father         Lung    Cystic fibrosis Father     Rheum arthritis Sister     Diabetes Sister     No Known Problems Maternal Grandmother     No Known Problems Maternal Grandfather     No Known Problems Paternal Grandmother     No Known Problems Paternal Grandfather     Other Brother         Back problems     Diabetes Brother     No Known Problems Brother     No Known Problems Son     No Known Problems Son     Hypertension Family     Heart disease Family     Breast cancer Neg Hx      Social History     Tobacco Use    Smoking status: Former     Current packs/day: 0.00     Average packs/day: 1 pack/day for 20.0 years (20.0 ttl pk-yrs)     Types: Cigarettes     Start date:      Quit date:      Years since quittin.0     Passive exposure: Past    Smokeless tobacco: Never   Vaping Use    Vaping status: Never Used   Substance and Sexual Activity    Alcohol use: No    Drug use: No    Sexual activity: Not Currently     Current Outpatient Medications on File Prior to Visit   Medication Sig    albuterol (PROVENTIL HFA,VENTOLIN HFA) 90 mcg/act inhaler  Inhale 2 puffs every 6 (six) hours as needed for wheezing or shortness of breath (cough, chest tightness)    ascorbic acid (VITAMIN C) 500 MG tablet Take 1 tablet (500 mg total) by mouth daily Start 30 days prior to surgery    baclofen 10 mg tablet Take 10 mg by mouth daily    buPROPion (WELLBUTRIN) 75 mg tablet take 1 tablet by mouth twice a day    Calcium Citrate-Vitamin D 315-250 MG-UNIT TABS Take 1 tablet by mouth 2 (two) times a day     Cholecalciferol 25 MCG (1000 UT) CHEW Chew    enoxaparin (LOVENOX) 40 mg/0.4 mL Inject 0.4 mL (40 mg total) under the skin daily for 28 days Start injections after surgery    escitalopram (LEXAPRO) 20 mg tablet Take 1 tablet (20 mg total) by mouth every morning    famotidine (PEPCID) 40 MG tablet take 1 tablet by mouth at bedtime    fenofibrate 160 MG tablet take 1 tablet by mouth once daily    ferrous sulfate 324 (65 Fe) mg Take 1 tablet (324 mg total) by mouth 2 (two) times a day before meals Start 30 days prior to surgery    fluocinonide (LIDEX) 0.05 % cream Apply topically 2 (two) times a day    fluticasone (FLONASE) 50 mcg/act nasal spray 1 spray into each nostril daily    folic acid (FOLVITE) 1 mg tablet Take 1 tablet (1 mg total) by mouth daily Start 30 days prior to surgery    gabapentin (NEURONTIN) 100 mg capsule 100 mg 5 (five) times a day     HYDROmorphone (DILAUDID) 4 mg tablet TAKE 1 TABLET BY MOUTH EVERY 6 HOURS AS NEEDED...FILL 5/8/2020    lidocaine (XYLOCAINE) 5 % ointment Apply topically as needed for mild pain    loratadine (CLARITIN) 10 mg tablet Take 10 mg by mouth daily.    Multiple Vitamins-Minerals (BARIATRIC FUSION PO) Take 1 tablet by mouth daily    mupirocin (BACTROBAN) 2 % ointment Apply topically 3 (three) times a day Apply pea size amount to each nostril 2x/day for 5 days prior to procedure    ondansetron (ZOFRAN) 4 mg tablet Take 1 tablet (4 mg total) by mouth every 8 (eight) hours as needed for nausea or vomiting    oxybutynin (DITROPAN) 5 mg  tablet take 1 tablet by mouth three times a day if needed for bladder SPASM(S)    pantoprazole (PROTONIX) 40 mg tablet take 1 tablet by mouth daily before breakfast    sucralfate (CARAFATE) 1 g/10 mL suspension Take 10 mL (1 g total) by mouth 2 (two) times a day as needed (gastritis)    tirzepatide (Zepbound) 5 mg/0.5 mL auto-injector Inject 0.5 mL (5 mg total) under the skin once a week     No Known Allergies  Immunization History   Administered Date(s) Administered    COVID-19 PFIZER VACCINE 0.3 ML IM 04/13/2021, 05/04/2021, 12/19/2021    COVID-19 Pfizer Vac BIVALENT Moo-sucrose 12 Yr+ IM 09/16/2022    INFLUENZA 10/20/2017, 09/29/2021, 09/05/2022, 09/24/2023    Influenza Injectable, MDCK, Preservative Free, Quadrivalent, 0.5 mL 10/03/2020    Influenza, injectable, quadrivalent, preservative free 0.5 mL 09/22/2018, 10/06/2019    Tdap 02/24/2018    Tuberculin Skin Test-PPD Intradermal 01/28/2016    Zoster 10/25/2014    Zoster Vaccine Recombinant 08/08/2022, 10/12/2022     Objective   There were no vitals taken for this visit.    Physical Exam  Vitals and nursing note reviewed.   Constitutional:       General: She is not in acute distress.     Appearance: She is well-developed.   HENT:      Head: Normocephalic and atraumatic.   Eyes:      Conjunctiva/sclera: Conjunctivae normal.   Cardiovascular:      Rate and Rhythm: Normal rate and regular rhythm.      Heart sounds: No murmur heard.  Pulmonary:      Effort: Pulmonary effort is normal. No respiratory distress.      Breath sounds: Normal breath sounds.   Abdominal:      Palpations: Abdomen is soft.      Tenderness: There is no abdominal tenderness.   Musculoskeletal:         General: No swelling.      Cervical back: Neck supple.   Skin:     General: Skin is warm and dry.      Capillary Refill: Capillary refill takes less than 2 seconds.   Neurological:      Mental Status: She is alert.   Psychiatric:         Mood and Affect: Mood normal.

## 2024-12-28 DIAGNOSIS — R21 RASH: ICD-10-CM

## 2024-12-30 RX ORDER — FLUOCINONIDE 0.5 MG/G
CREAM TOPICAL 2 TIMES DAILY
Qty: 15 G | Refills: 0 | Status: SHIPPED | OUTPATIENT
Start: 2024-12-30

## 2025-01-06 NOTE — PROGRESS NOTES
Name: Denita Brown      : 1960      MRN: [id number]  Encounter Provider: Cassie Souza PA-C  Encounter Date: 2025   Encounter department: [facility]OLOG[facility] [place]  :  Assessment & Plan  Atrophic vaginitis  Continue topical estrogen 3 times weekly   Orders:    estrogens, conjugated (Premarin) vaginal cream; Insert 1 g into the vagina 3 (three) times a week    History of gross hematuria  S/p negative workup in 2019  Denies recurrence       OAB (overactive bladder)    Orders:    oxybutynin (DITROPAN) 5 mg tablet; Take 1 tablet (5 mg total) by mouth daily        History of Present Illness   Denita Brown is a 64 y.o. female who presents for follow up.    Patient underwent negative hematuria workup in 2019.  There were no abnormal findings noted on cystoscopic evaluation or upper tract imaging. Denies gross hematuria over past year. Using topical estrogen 3 times weekly. Denies UTIs. Using oxybutynin for urgency with benefit.     Review of Systems   Constitutional:  Negative for activity change, appetite change, chills and fever.   HENT:  Negative for congestion and trouble swallowing.    Respiratory:  Negative for cough and shortness of breath.    Cardiovascular:  Negative for chest pain, palpitations and leg swelling.   Gastrointestinal:  Negative for abdominal pain, constipation, diarrhea, nausea and vomiting.   Genitourinary:  Negative for difficulty urinating, dysuria, flank pain, frequency, hematuria and urgency.   Musculoskeletal:  Negative for back pain and gait problem.   Skin:  Negative for wound.   Allergic/Immunologic: Negative for immunocompromised state.   Neurological:  Negative for dizziness and syncope.   Hematological:  Does not bruise/bleed easily.   Psychiatric/Behavioral:  Negative for confusion.    All other systems reviewed and are negative.         Objective   /84 (Patient Position: Sitting, Cuff Size: Standard)   Pulse 68   Temp 98.5 °F (36.9 °C)  "(Temporal)   Ht 5' 5\" (1.651 m)   Wt 76.2 kg (168 lb)   SpO2 99%   BMI 27.96 kg/m²     Physical Exam  Constitutional:       Appearance: Normal appearance.   HENT:      Head: Normocephalic.      Nose: Nose normal.      Mouth/Throat:      Pharynx: Oropharynx is clear.   Eyes:      Pupils: Pupils are equal, round, and reactive to light.   Pulmonary:      Effort: Pulmonary effort is normal.   Musculoskeletal:      Cervical back: Normal range of motion.   Skin:     General: Skin is warm.   Neurological:      General: No focal deficit present.      Mental Status: She is alert and oriented to person, place, and time. Mental status is at baseline.   Psychiatric:         Mood and Affect: Mood normal.         Behavior: Behavior normal.         Thought Content: Thought content normal.         Judgment: Judgment normal.          Results  No results found for: \"PSA\"  Lab Results   Component Value Date    GLUCOSE 122 03/02/2015    CALCIUM 9.5 04/27/2024     03/02/2015    K 4.1 04/27/2024    CO2 26 04/27/2024     04/27/2024    BUN 14 04/27/2024    CREATININE 0.92 04/27/2024     Lab Results   Component Value Date    WBC 5.19 04/27/2024    HGB 13.7 04/27/2024    HCT 42.2 04/27/2024    MCV 93 04/27/2024     04/27/2024       Office Urine Dip  No results found for this or any previous visit (from the past hour).]      "

## 2025-01-06 NOTE — ASSESSMENT & PLAN NOTE
Continue topical estrogen 3 times weekly   Orders:    estrogens, conjugated (Premarin) vaginal cream; Insert 1 g into the vagina 3 (three) times a week

## 2025-01-07 ENCOUNTER — OFFICE VISIT (OUTPATIENT)
Dept: UROLOGY | Facility: CLINIC | Age: 65
End: 2025-01-07
Payer: COMMERCIAL

## 2025-01-07 VITALS
HEIGHT: 65 IN | WEIGHT: 168 LBS | HEART RATE: 68 BPM | OXYGEN SATURATION: 99 % | BODY MASS INDEX: 27.99 KG/M2 | TEMPERATURE: 98.5 F | DIASTOLIC BLOOD PRESSURE: 84 MMHG | SYSTOLIC BLOOD PRESSURE: 142 MMHG

## 2025-01-07 DIAGNOSIS — N95.2 ATROPHIC VAGINITIS: ICD-10-CM

## 2025-01-07 DIAGNOSIS — N32.81 OAB (OVERACTIVE BLADDER): ICD-10-CM

## 2025-01-07 DIAGNOSIS — R39.15 URINARY URGENCY: Primary | ICD-10-CM

## 2025-01-07 DIAGNOSIS — R11.0 NAUSEA: ICD-10-CM

## 2025-01-07 DIAGNOSIS — J01.90 ACUTE SINUSITIS, RECURRENCE NOT SPECIFIED, UNSPECIFIED LOCATION: ICD-10-CM

## 2025-01-07 DIAGNOSIS — Z87.898 HISTORY OF GROSS HEMATURIA: ICD-10-CM

## 2025-01-07 PROBLEM — E66.01 OBESITY, MORBID (HCC): Status: RESOLVED | Noted: 2024-05-16 | Resolved: 2025-01-07

## 2025-01-07 PROCEDURE — 99213 OFFICE O/P EST LOW 20 MIN: CPT | Performed by: PHYSICIAN ASSISTANT

## 2025-01-07 RX ORDER — OXYBUTYNIN CHLORIDE 5 MG/1
5 TABLET ORAL DAILY
Qty: 90 TABLET | Refills: 3 | Status: SHIPPED | OUTPATIENT
Start: 2025-01-07

## 2025-01-07 RX ORDER — CONJUGATED ESTROGENS 0.62 MG/G
1 CREAM VAGINAL 3 TIMES WEEKLY
Qty: 12 G | Refills: 3 | Status: SHIPPED | OUTPATIENT
Start: 2025-01-08

## 2025-01-08 RX ORDER — ONDANSETRON 4 MG/1
4 TABLET, FILM COATED ORAL EVERY 8 HOURS PRN
Qty: 90 TABLET | Refills: 0 | Status: SHIPPED | OUTPATIENT
Start: 2025-01-08

## 2025-01-08 RX ORDER — FLUTICASONE PROPIONATE 50 MCG
1 SPRAY, SUSPENSION (ML) NASAL DAILY
Qty: 16 G | Refills: 0 | Status: SHIPPED | OUTPATIENT
Start: 2025-01-08

## 2025-01-11 ENCOUNTER — OFFICE VISIT (OUTPATIENT)
Dept: URGENT CARE | Facility: CLINIC | Age: 65
End: 2025-01-11
Payer: COMMERCIAL

## 2025-01-11 VITALS
SYSTOLIC BLOOD PRESSURE: 126 MMHG | DIASTOLIC BLOOD PRESSURE: 60 MMHG | RESPIRATION RATE: 16 BRPM | HEART RATE: 78 BPM | TEMPERATURE: 97.4 F | OXYGEN SATURATION: 96 %

## 2025-01-11 DIAGNOSIS — H92.01 RIGHT EAR PAIN: Primary | ICD-10-CM

## 2025-01-11 PROCEDURE — S9083 URGENT CARE CENTER GLOBAL: HCPCS

## 2025-01-11 PROCEDURE — 99213 OFFICE O/P EST LOW 20 MIN: CPT

## 2025-01-11 PROCEDURE — G0463 HOSPITAL OUTPT CLINIC VISIT: HCPCS

## 2025-01-11 NOTE — PROGRESS NOTES
[facility]    NAME: Denita Brown is a 64 y.o. female  : 1960    MRN: [id number]  DATE: 2025  TIME: 11:59 AM    Assessment and Plan   Right ear pain [H92.01]  1. Right ear pain            Normal exam findings no TM J findings noted otherwise.  Follow up with primary care in 3-5 days.  Go to ER if symptoms get worse.     Patient Instructions       Go see your regular family doctor in 3-5 days.  Go to emergency room (ER) if you are getting worse.     Chief Complaint     Chief Complaint   Patient presents with    right ear pain     Started last night. Taking tylenol, however is also on narcotics. Getting worse.          History of Present Illness       Presents with right ear pain that began last night.  She is taking Tylenol for symptoms and notes that she is on chronic opiates.  Denies recent sick symptoms allergies dental pain associated symptoms.  Denies fever or chills.        Review of Systems   Review of Systems   Constitutional:  Negative for chills and fever.   HENT:  Positive for ear pain. Negative for congestion and sore throat.    Respiratory:  Negative for cough and shortness of breath.    Cardiovascular:  Negative for chest pain.   Gastrointestinal:  Negative for abdominal pain.   Musculoskeletal:  Negative for myalgias.   Psychiatric/Behavioral:  Negative for confusion.          Current Medications       Current Outpatient Medications:     albuterol (PROVENTIL HFA,VENTOLIN HFA) 90 mcg/act inhaler, Inhale 2 puffs every 6 (six) hours as needed for wheezing or shortness of breath (cough, chest tightness), Disp: 18 g, Rfl: 1    ascorbic acid (VITAMIN C) 500 MG tablet, Take 1 tablet (500 mg total) by mouth daily Start 30 days prior to surgery, Disp: 30 tablet, Rfl: 0    baclofen 10 mg tablet, Take 10 mg by mouth daily, Disp: , Rfl:     buPROPion (WELLBUTRIN) 75 mg tablet, take 1 tablet by mouth twice a day, Disp: 180 tablet, Rfl: 1    Calcium Citrate-Vitamin D 315-250 MG-UNIT  TABS, Take 1 tablet by mouth 2 (two) times a day , Disp: , Rfl:     Cholecalciferol 25 MCG (1000 UT) CHEW, Chew, Disp: , Rfl:     escitalopram (LEXAPRO) 20 mg tablet, Take 1 tablet (20 mg total) by mouth every morning, Disp: 90 tablet, Rfl: 1    estrogens, conjugated (Premarin) vaginal cream, Insert 1 g into the vagina 3 (three) times a week, Disp: 12 g, Rfl: 3    famotidine (PEPCID) 40 MG tablet, take 1 tablet by mouth at bedtime, Disp: 90 tablet, Rfl: 3    fenofibrate 160 MG tablet, take 1 tablet by mouth once daily, Disp: 90 tablet, Rfl: 1    fluticasone (FLONASE) 50 mcg/act nasal spray, 1 spray into each nostril daily, Disp: 16 g, Rfl: 0    gabapentin (NEURONTIN) 100 mg capsule, 100 mg 5 (five) times a day , Disp: , Rfl:     HYDROmorphone (DILAUDID) 4 mg tablet, TAKE 1 TABLET BY MOUTH EVERY 6 HOURS AS NEEDED...FILL 5/8/2020, Disp: , Rfl:     lidocaine (XYLOCAINE) 5 % ointment, Apply topically as needed for mild pain, Disp: 30 g, Rfl: 2    loratadine (CLARITIN) 10 mg tablet, Take 10 mg by mouth daily., Disp: , Rfl:     Multiple Vitamins-Minerals (BARIATRIC FUSION PO), Take 1 tablet by mouth daily, Disp: , Rfl:     ondansetron (ZOFRAN) 4 mg tablet, Take 1 tablet (4 mg total) by mouth every 8 (eight) hours as needed for nausea or vomiting, Disp: 90 tablet, Rfl: 0    oxybutynin (DITROPAN) 5 mg tablet, Take 1 tablet (5 mg total) by mouth daily, Disp: 90 tablet, Rfl: 3    pantoprazole (PROTONIX) 40 mg tablet, take 1 tablet by mouth daily before breakfast, Disp: 90 tablet, Rfl: 1    sucralfate (CARAFATE) 1 g/10 mL suspension, Take 10 mL (1 g total) by mouth 2 (two) times a day as needed (gastritis), Disp: 473 mL, Rfl: 1    tirzepatide (Zepbound) 7.5 mg/0.5 mL auto-injector, Inject 0.5 mL (7.5 mg total) under the skin once a week, Disp: 2 mL, Rfl: 0    enoxaparin (LOVENOX) 40 mg/0.4 mL, Inject 0.4 mL (40 mg total) under the skin daily for 28 days Start injections after surgery (Patient not taking: Reported on  1/11/2025), Disp: 11.2 mL, Rfl: 0    ferrous sulfate 324 (65 Fe) mg, Take 1 tablet (324 mg total) by mouth 2 (two) times a day before meals Start 30 days prior to surgery (Patient not taking: Reported on 1/11/2025), Disp: 60 tablet, Rfl: 0    fluocinonide (LIDEX) 0.05 % cream, Apply topically 2 (two) times a day, Disp: 15 g, Rfl: 0    folic acid (FOLVITE) 1 mg tablet, Take 1 tablet (1 mg total) by mouth daily Start 30 days prior to surgery (Patient not taking: Reported on 1/11/2025), Disp: 30 tablet, Rfl: 0    mupirocin (BACTROBAN) 2 % ointment, Apply topically 3 (three) times a day Apply pea size amount to each nostril 2x/day for 5 days prior to procedure, Disp: 15 g, Rfl: 0    Current Allergies     Allergies as of 01/11/2025    (No Known Allergies)            The following portions of the patient's history were reviewed and updated as appropriate: allergies, current medications, past family history, past medical history, past social history, past surgical history and problem list.     Past Medical History:   Diagnosis Date    Abdominal pain, epigastric 03/23/2018    Added automatically from request for surgery [id number]    Abnormal x-ray of lumbar spine 11/03/2015    Acute cystitis with hematuria 04/07/2020    Bariatric surgery status     Basal cell carcinoma     basil cell carcinoma- in remission    Benign essential hypertension 06/12/2012    Bone bruise 11/02/2023    Breakdown (mechanical) of internal fixation device of vertebrae, initial encounter ([facility]) 03/01/2019    Calculus of bile duct with cholecystitis without obstruction 04/27/2018    Added automatically from request for surgery [id number]    Cellulitis of finger 12/03/2015    Closed fracture of left distal fibula 10/28/2023    Degenerative lumbar disc     Digital mucinous cyst 06/24/2015    DMII (diabetes mellitus, type 2) ([facility]) 11/08/2013    Gross hematuria 03/19/2019    Hiatal hernia     History of COVID-19 12/22/2021    History of transfusion 2014    Post-op  spinal surgery    Low back pain     Lower urinary tract infectious disease 2015    Medicare annual wellness visit, initial 2019    Obesity     Resolved 10/6/2016     Other closed fracture of distal end of left fibula, initial encounter 10/28/2023    Overactive bladder     Postsurgical malabsorption     Recurrent UTI 2019    Spinal stenosis     SVT (supraventricular tachycardia) (HCC)     Tachycardia     Resolved 2016     Type 2 diabetes mellitus (HCC)     Last assesssed 2018     Wears glasses        Past Surgical History:   Procedure Laterality Date    APPENDECTOMY      ARTHRODESIS      Lumbar - Last assessed 2018     BACK SURGERY      Lumbar (3 surgeries)- two levels fused, clean out from infection, then fusion of 7 levels (last surgery in )    CARDIAC ELECTROPHYSIOLOGY STUDY AND ABLATION      CARPAL TUNNEL RELEASE      x2    CERVICAL SPINE SURGERY      Fusion of C2-C7 over a period of 3 surgeries ( first)     SECTION      X2    CHOLECYSTECTOMY      FL MYELOGRAM LUMBAR  3/28/2019    INSERTION / PLACEMENT / REVISION NEUROSTIMULATOR      X2- both were removed    NECK SURGERY      OTHER SURGICAL HISTORY      Catheter ablation     IA EGD TRANSORAL BIOPSY SINGLE/MULTIPLE N/A 2016    Procedure: ESOPHAGOGASTRODUODENOSCOPY (EGD);  Surgeon: Tanya Orta MD;  Location: [facility];  Service: Bariatrics    IA EGD TRANSORAL BIOPSY SINGLE/MULTIPLE N/A 2018    Procedure: ESOPHAGOGASTRODUODENOSCOPY (EGD) with biopsy;  Surgeon: Tanya Orta MD;  Location: [facility];  Service: Bariatrics    IA LAPAROSCOPY SURG CHOLECYSTECTOMY N/A 2018    Procedure: CHOLECYSTECTOMY LAPAROSCOPIC;  Surgeon: Tanya Orta MD;  [facility];  Service: Bariatrics    IA LAPS GSTR RSTCV PX W/BYP BRAULIO-EN-Y LIMB <150 CM N/A 10/25/2016    Procedure: BYPASS GASTRIC  BRAULIO-EN-Y LAPAROSCOPIC, WITH INTRA OP EGD;  Surgeon: Tanya Orta MD;  [facility]: [facility];  Service: Bariatrics     SKIN CANCER EXCISION      TONSILLECTOMY      TUBAL LIGATION      US GUIDED BREAST BIOPSY RIGHT COMPLETE Right 7/12/2023    WISDOM TOOTH EXTRACTION         Family History   Problem Relation Age of Onset    Arthritis Mother         Rheumatoid    Angina Mother     Coronary artery disease Mother     Diabetes Mother     Cancer Father         Lung    Cystic fibrosis Father     Rheum arthritis Sister     Diabetes Sister     No Known Problems Maternal Grandmother     No Known Problems Maternal Grandfather     No Known Problems Paternal Grandmother     No Known Problems Paternal Grandfather     Other Brother         Back problems     Diabetes Brother     No Known Problems Brother     No Known Problems Son     No Known Problems Son     Hypertension Family     Heart disease Family     Breast cancer Neg Hx          Medications have been verified.        Objective   /60   Pulse 78   Temp (!) 97.4 °F (36.3 °C)   Resp 16   SpO2 96%        Physical Exam     Physical Exam  Vitals reviewed.   Constitutional:       General: She is not in acute distress.     Appearance: Normal appearance.   HENT:      Head:      Jaw: No tenderness, swelling or pain on movement.      Right Ear: Tympanic membrane, ear canal and external ear normal. Tympanic membrane is not erythematous or bulging.      Left Ear: Tympanic membrane, ear canal and external ear normal. Tympanic membrane is not erythematous or bulging.      Nose: Nose normal.      Mouth/Throat:      Mouth: Mucous membranes are moist.      Pharynx: No posterior oropharyngeal erythema.   Eyes:      Conjunctiva/sclera: Conjunctivae normal.   Cardiovascular:      Rate and Rhythm: Normal rate and regular rhythm.      Pulses: Normal pulses.      Heart sounds: Normal heart sounds. No murmur heard.  Pulmonary:      Effort: Pulmonary effort is normal. No respiratory distress.      Breath sounds: Normal breath sounds.   Skin:     General: Skin is warm and dry.   Neurological:      General: No  focal deficit present.      Mental Status: She is alert and oriented to person, place, and time.   Psychiatric:         Mood and Affect: Mood normal.         Behavior: Behavior normal.                     No adenopathy or splenomegaly. No cervical or inguinal lymphadenopathy.

## 2025-01-12 DIAGNOSIS — M16.11 PRIMARY OSTEOARTHRITIS OF ONE HIP, RIGHT: ICD-10-CM

## 2025-01-13 ENCOUNTER — TELEPHONE (OUTPATIENT)
Dept: OBGYN CLINIC | Facility: HOSPITAL | Age: 65
End: 2025-01-13

## 2025-01-13 DIAGNOSIS — M16.11 PRIMARY OSTEOARTHRITIS OF ONE HIP, RIGHT: Primary | ICD-10-CM

## 2025-01-13 NOTE — TELEPHONE ENCOUNTER
Preoperative Elective Admission Assessment    EKG/LAB/MRSA SWAB/CXR date:     Living Situation:    Who does pt live with:  son  What kind of home:  bilevel  How do they enter the home: front  How many levels in home: 2   # of steps to enter home: 3  # of steps to second floor: 7  Are there handrails: Yes  Are there landings: Yes  Sleeping arrangement: second floor  Where is Bathroom: second floor  Where is the tub or shower: step in bath tub w/o grab bars  Toilet height? Concerns for low toilets standard height   Dogs or ther pets: 3 dogs and 3 cats     First Floor Setup:   Is there a bathroom: Yes  Where would pt sleep: bed     DME: cane. RW and commode ordered 1/13. Instructed to bring DOS  We discussed clearing pathways in the home and making sure there is accessibly to use the walker, for example, removing throw rugs.      Patient's Current Level of Function: Ambulates: Independently and ADLs: Independent    Post-op Caregiver: child  Caregiver Name and phone number for Inpatient discharge needs: John  Currently receive any HHC/aides/community supports: No     Post-op Transport:  provided through insurance  To/from hospital:  Provided through insurance   To/from PT 2-3x/week:  provided through insurance  Uses community transport now: No     Outpatient Physical Therapy Site:  Site: [place]  pre and post-op appts scheduled? Yes     Medication Management: self, pillbox, and out of bottle  Preferred Pharmacy for Post-op Medications: [facility] [place]  Blood Management Vitamin Regimen:  Starting 30 days prior to surgery  Post-op anticoagulant: prescribed preoperatively - patient has at home ready for after surgery use only  Has Bactroban for 5 days preop: Yes  Educated on Preoperative Bathing Instructions, and use of Soap for 5 days before surgery.      DC Plan: Pt plans to be discharged home    Barriers to DC identified preoperatively: none identified    BMI: 27.96    Patient Education:  Pt educated  on post-op pain, early mobilization (POD0), LOS goals, OP PT goals, and preoperative bathing. Patient educated that our goal is to appropriately discharge patient based off their post-op function while striving to maintain maximal independence. The goal is to discharge patient to home and for them to attend outpatient physical therapy.    Assigned to care team? Yes

## 2025-01-14 RX ORDER — FOLIC ACID 1 MG/1
TABLET ORAL
Qty: 30 TABLET | Refills: 0 | Status: SHIPPED | OUTPATIENT
Start: 2025-01-14

## 2025-01-15 LAB
DME PARACHUTE DELIVERY DATE ACTUAL: NORMAL
DME PARACHUTE DELIVERY DATE EXPECTED: NORMAL
DME PARACHUTE DELIVERY DATE REQUESTED: NORMAL
DME PARACHUTE ITEM DESCRIPTION: NORMAL
DME PARACHUTE ITEM DESCRIPTION: NORMAL
DME PARACHUTE ORDER STATUS: NORMAL
DME PARACHUTE SUPPLIER NAME: NORMAL
DME PARACHUTE SUPPLIER PHONE: NORMAL

## 2025-01-16 ENCOUNTER — HOSPITAL ENCOUNTER (OUTPATIENT)
Dept: CT IMAGING | Facility: HOSPITAL | Age: 65
End: 2025-01-16
Payer: COMMERCIAL

## 2025-01-16 DIAGNOSIS — M47.817 SPONDYLOSIS WITHOUT MYELOPATHY OR RADICULOPATHY, LUMBOSACRAL REGION: ICD-10-CM

## 2025-01-16 PROCEDURE — 72131 CT LUMBAR SPINE W/O DYE: CPT

## 2025-01-25 ENCOUNTER — APPOINTMENT (OUTPATIENT)
Dept: LAB | Facility: CLINIC | Age: 65
End: 2025-01-25
Payer: COMMERCIAL

## 2025-01-25 DIAGNOSIS — Z79.01 ANTICOAGULATION MANAGEMENT ENCOUNTER: ICD-10-CM

## 2025-01-25 DIAGNOSIS — M16.11 PRIMARY OSTEOARTHRITIS OF ONE HIP, RIGHT: ICD-10-CM

## 2025-01-25 DIAGNOSIS — Z51.81 ANTICOAGULATION MANAGEMENT ENCOUNTER: ICD-10-CM

## 2025-01-25 LAB
ALBUMIN SERPL BCG-MCNC: 3.8 G/DL (ref 3.5–5)
ALP SERPL-CCNC: 43 U/L (ref 34–104)
ALT SERPL W P-5'-P-CCNC: 17 U/L (ref 7–52)
ANION GAP SERPL CALCULATED.3IONS-SCNC: 8 MMOL/L (ref 4–13)
APTT PPP: 32 SECONDS (ref 23–34)
AST SERPL W P-5'-P-CCNC: 33 U/L (ref 13–39)
BASOPHILS # BLD AUTO: 0.07 THOUSANDS/ΜL (ref 0–0.1)
BASOPHILS NFR BLD AUTO: 2 % (ref 0–1)
BILIRUB SERPL-MCNC: 0.31 MG/DL (ref 0.2–1)
BUN SERPL-MCNC: 13 MG/DL (ref 5–25)
CALCIUM SERPL-MCNC: 9.6 MG/DL (ref 8.4–10.2)
CHLORIDE SERPL-SCNC: 105 MMOL/L (ref 96–108)
CO2 SERPL-SCNC: 31 MMOL/L (ref 21–32)
CREAT SERPL-MCNC: 0.92 MG/DL (ref 0.6–1.3)
EOSINOPHIL # BLD AUTO: 0.1 THOUSAND/ΜL (ref 0–0.61)
EOSINOPHIL NFR BLD AUTO: 2 % (ref 0–6)
ERYTHROCYTE [DISTWIDTH] IN BLOOD BY AUTOMATED COUNT: 11.8 % (ref 11.6–15.1)
EST. AVERAGE GLUCOSE BLD GHB EST-MCNC: 105 MG/DL
GFR SERPL CREATININE-BSD FRML MDRD: 66 ML/MIN/1.73SQ M
GLUCOSE P FAST SERPL-MCNC: 88 MG/DL (ref 65–99)
HBA1C MFR BLD: 5.3 %
HCT VFR BLD AUTO: 40 % (ref 34.8–46.1)
HGB BLD-MCNC: 13.1 G/DL (ref 11.5–15.4)
IMM GRANULOCYTES # BLD AUTO: 0.01 THOUSAND/UL (ref 0–0.2)
IMM GRANULOCYTES NFR BLD AUTO: 0 % (ref 0–2)
INR PPP: 0.91 (ref 0.85–1.19)
LYMPHOCYTES # BLD AUTO: 1.73 THOUSANDS/ΜL (ref 0.6–4.47)
LYMPHOCYTES NFR BLD AUTO: 36 % (ref 14–44)
MCH RBC QN AUTO: 30.2 PG (ref 26.8–34.3)
MCHC RBC AUTO-ENTMCNC: 32.8 G/DL (ref 31.4–37.4)
MCV RBC AUTO: 92 FL (ref 82–98)
MONOCYTES # BLD AUTO: 0.47 THOUSAND/ΜL (ref 0.17–1.22)
MONOCYTES NFR BLD AUTO: 10 % (ref 4–12)
NEUTROPHILS # BLD AUTO: 2.41 THOUSANDS/ΜL (ref 1.85–7.62)
NEUTS SEG NFR BLD AUTO: 50 % (ref 43–75)
NRBC BLD AUTO-RTO: 0 /100 WBCS
PLATELET # BLD AUTO: 374 THOUSANDS/UL (ref 149–390)
PMV BLD AUTO: 10 FL (ref 8.9–12.7)
POTASSIUM SERPL-SCNC: 4.1 MMOL/L (ref 3.5–5.3)
PROT SERPL-MCNC: 6.9 G/DL (ref 6.4–8.4)
PROTHROMBIN TIME: 12.6 SECONDS (ref 12.3–15)
RBC # BLD AUTO: 4.34 MILLION/UL (ref 3.81–5.12)
SODIUM SERPL-SCNC: 144 MMOL/L (ref 135–147)
WBC # BLD AUTO: 4.79 THOUSAND/UL (ref 4.31–10.16)

## 2025-01-25 PROCEDURE — 85610 PROTHROMBIN TIME: CPT

## 2025-01-25 PROCEDURE — 87081 CULTURE SCREEN ONLY: CPT

## 2025-01-25 PROCEDURE — 36415 COLL VENOUS BLD VENIPUNCTURE: CPT

## 2025-01-25 PROCEDURE — 85025 COMPLETE CBC W/AUTO DIFF WBC: CPT

## 2025-01-25 PROCEDURE — 83036 HEMOGLOBIN GLYCOSYLATED A1C: CPT

## 2025-01-25 PROCEDURE — 80053 COMPREHEN METABOLIC PANEL: CPT

## 2025-01-25 PROCEDURE — 85730 THROMBOPLASTIN TIME PARTIAL: CPT

## 2025-01-26 DIAGNOSIS — E78.5 HYPERLIPIDEMIA, UNSPECIFIED HYPERLIPIDEMIA TYPE: ICD-10-CM

## 2025-01-26 LAB — MRSA NOSE QL CULT: NORMAL

## 2025-01-27 ENCOUNTER — OFFICE VISIT (OUTPATIENT)
Dept: FAMILY MEDICINE CLINIC | Facility: CLINIC | Age: 65
End: 2025-01-27
Payer: COMMERCIAL

## 2025-01-27 VITALS
TEMPERATURE: 98.3 F | HEIGHT: 65 IN | DIASTOLIC BLOOD PRESSURE: 74 MMHG | BODY MASS INDEX: 27.49 KG/M2 | OXYGEN SATURATION: 100 % | HEART RATE: 72 BPM | SYSTOLIC BLOOD PRESSURE: 110 MMHG | WEIGHT: 165 LBS

## 2025-01-27 DIAGNOSIS — J32.9 CHRONIC CONGESTION OF PARANASAL SINUS: ICD-10-CM

## 2025-01-27 DIAGNOSIS — F11.20 CONTINUOUS OPIOID DEPENDENCE (HCC): ICD-10-CM

## 2025-01-27 DIAGNOSIS — I10 ESSENTIAL HYPERTENSION: ICD-10-CM

## 2025-01-27 PROCEDURE — G2211 COMPLEX E/M VISIT ADD ON: HCPCS

## 2025-01-27 PROCEDURE — 99214 OFFICE O/P EST MOD 30 MIN: CPT

## 2025-01-27 RX ORDER — TIRZEPATIDE 10 MG/.5ML
10 INJECTION, SOLUTION SUBCUTANEOUS WEEKLY
Qty: 2 ML | Refills: 0 | Status: SHIPPED | OUTPATIENT
Start: 2025-01-27

## 2025-01-27 RX ORDER — FENOFIBRATE 160 MG/1
160 TABLET ORAL DAILY
Qty: 90 TABLET | Refills: 0 | Status: SHIPPED | OUTPATIENT
Start: 2025-01-27

## 2025-01-27 NOTE — PROGRESS NOTES
Name: Denita Brown      : 1960      MRN: [id number]  Encounter Provider: Clif Mcfadden PA-C  Encounter Date: 2025   Encounter department: [facility]    Assessment & Plan  Body mass index (BMI) 27.0-27.9, adult  Last visit increased zepbound to 7.5 mg   Today presents tolerating medication well no AE reported  Using shared decision making will increase to 10 mg doing  F/u 4 weeks, patient to call office to confirm no adverse side effects and desire to increase dose and we will increase to next dosage without in person visitation  Orders:    tirzepatide (Zepbound) 10 mg/0.5 mL auto-injector; Inject 0.5 mL (10 mg total) under the skin once a week    Essential hypertension  BP today in office is 110/74 Well controlled    Recommend at-home BP monitoring to ensure control   Continue to monitor       Continuous opioid dependence (HCC)  Dilaudid taken secondary to chronic pain of hip  C/o constipation related to this, no zepbound   Continue pain control as needed, is scheduled for corrective surgery with orthopedics for hip       Chronic congestion of paranasal sinus  Last two weeks worsening  Endorses worsening sinus congestion and ear fullness  Has improved slightly since onset  On exam today, no evidence of acute bacterial infection, vital stable, afebrile, no sinus pressure tenderness to palpation  Do not suspect acute bacterial cause this or increase in symptoms, did discuss seasonal increase in sinus symptoms at this time of year, continue Flonase, Zyrtec/Claritin, follow-up as needed if developing additional symptoms/fever and patient is in agreement with plan            History of Present Illness     Denita Brown is a 64 y.o. female  presenting for wt loss f/u. She also endorses chronic sinus issues which seem to have worsened.           Review of Systems   Constitutional:  Negative for chills and fever.   HENT:  Positive for sinus pressure. Negative for ear pain and sore  throat.    Eyes:  Negative for pain and visual disturbance.   Respiratory:  Negative for cough and shortness of breath.    Cardiovascular:  Negative for chest pain and palpitations.   Gastrointestinal:  Negative for abdominal pain and vomiting.   Genitourinary:  Negative for dysuria and hematuria.   Musculoskeletal:  Negative for arthralgias and back pain.   Skin:  Negative for color change and rash.   Neurological:  Negative for seizures and syncope.   All other systems reviewed and are negative.    Past Medical History:   Diagnosis Date    Abdominal pain, epigastric 03/23/2018    Added automatically from request for surgery [id number]    Abnormal x-ray of lumbar spine 11/03/2015    Acute cystitis with hematuria 04/07/2020    Bariatric surgery status     Basal cell carcinoma     basil cell carcinoma- in remission    Benign essential hypertension 06/12/2012    Bone bruise 11/02/2023    Breakdown (mechanical) of internal fixation device of vertebrae, initial encounter ([facility]) 03/01/2019    Calculus of bile duct with cholecystitis without obstruction 04/27/2018    Added automatically from request for surgery [id number]    Cellulitis of finger 12/03/2015    Closed fracture of left distal fibula 10/28/2023    Degenerative lumbar disc     Digital mucinous cyst 06/24/2015    DMII (diabetes mellitus, type 2) ([facility]) 11/08/2013    GERD (gastroesophageal reflux disease)     Gross hematuria 03/19/2019    Hiatal hernia     History of COVID-19 12/22/2021    History of transfusion 2014    Post-op spinal surgery    Hyperlipidemia     Low back pain     Lower urinary tract infectious disease 03/27/2015    Medicare annual wellness visit, initial 11/27/2019    Obesity     Resolved 10/6/2016     Other closed fracture of distal end of left fibula, initial encounter 10/28/2023    Overactive bladder     Postsurgical malabsorption     Recurrent UTI 03/19/2019    Spinal stenosis     SVT (supraventricular tachycardia) ([facility])     Tachycardia     Resolved  2016     Type 2 diabetes mellitus (HCC)     Last assesssed 2018     Wears glasses      Past Surgical History:   Procedure Laterality Date    APPENDECTOMY      ARTHRODESIS      Lumbar - Last assessed 2018     BACK SURGERY      Lumbar (3 surgeries)- two levels fused, clean out from infection, then fusion of 7 levels (last surgery in )    CARDIAC ELECTROPHYSIOLOGY STUDY AND ABLATION      CARPAL TUNNEL RELEASE      x2    CERVICAL SPINE SURGERY      Fusion of C2-C7 over a period of 3 surgeries ( first)     SECTION      X2    CHOLECYSTECTOMY      FL MYELOGRAM LUMBAR  3/28/2019    INSERTION / PLACEMENT / REVISION NEUROSTIMULATOR      X2- both were removed    NECK SURGERY      OTHER SURGICAL HISTORY      Catheter ablation     CT EGD TRANSORAL BIOPSY SINGLE/MULTIPLE N/A 2016    Procedure: ESOPHAGOGASTRODUODENOSCOPY (EGD);  Surgeon: Tanya Orta MD;  Location: [facility];  Service: Bariatrics    CT EGD TRANSORAL BIOPSY SINGLE/MULTIPLE N/A 2018    Procedure: ESOPHAGOGASTRODUODENOSCOPY (EGD) with biopsy;  Surgeon: Tanya Orta MD;  Location: [facility];  Service: Bariatrics    CT LAPAROSCOPY SURG CHOLECYSTECTOMY N/A 2018    Procedure: CHOLECYSTECTOMY LAPAROSCOPIC;  Surgeon: Tanya Orta MD;  Location: [facility];  Service: Bariatrics    CT LAPS GSTR RSTCV PX W/BYP BRAULIO-EN-Y LIMB <150 CM N/A 10/25/2016    Procedure: BYPASS GASTRIC  BRAULIO-EN-Y LAPAROSCOPIC, WITH INTRA OP EGD;  Surgeon: Tanya Orta MD;  Location: [facility];  Service: Bariatrics    SKIN CANCER EXCISION      TONSILLECTOMY      TUBAL LIGATION      US GUIDED BREAST BIOPSY RIGHT COMPLETE Right 2023    WISDOM TOOTH EXTRACTION       Family History   Problem Relation Age of Onset    Arthritis Mother         Rheumatoid    Angina Mother     Coronary artery disease Mother     Diabetes Mother     Cancer Father         Lung    Cystic fibrosis Father     Rheum arthritis Sister     Diabetes Sister     No Known Problems  Maternal Grandmother     No Known Problems Maternal Grandfather     No Known Problems Paternal Grandmother     No Known Problems Paternal Grandfather     Other Brother         Back problems     Diabetes Brother     No Known Problems Brother     No Known Problems Son     No Known Problems Son     Hypertension Family     Heart disease Family     Breast cancer Neg Hx      Social History     Tobacco Use    Smoking status: Former     Current packs/day: 0.00     Average packs/day: 1 pack/day for 20.0 years (20.0 ttl pk-yrs)     Types: Cigarettes     Start date:      Quit date:      Years since quittin.0     Passive exposure: Past    Smokeless tobacco: Never   Vaping Use    Vaping status: Never Used   Substance and Sexual Activity    Alcohol use: No    Drug use: No    Sexual activity: Not Currently     Current Outpatient Medications on File Prior to Visit   Medication Sig    albuterol (PROVENTIL HFA,VENTOLIN HFA) 90 mcg/act inhaler Inhale 2 puffs every 6 (six) hours as needed for wheezing or shortness of breath (cough, chest tightness)    ascorbic acid (VITAMIN C) 500 MG tablet Take 1 tablet (500 mg total) by mouth daily Start 30 days prior to surgery    baclofen 10 mg tablet Take 10 mg by mouth 3 (three) times a day    buPROPion (WELLBUTRIN) 75 mg tablet take 1 tablet by mouth twice a day    Calcium Citrate-Vitamin D 315-250 MG-UNIT TABS Take 1 tablet by mouth 2 (two) times a day     Cholecalciferol 25 MCG (1000 UT) CHEW Chew    enoxaparin (LOVENOX) 40 mg/0.4 mL Inject 0.4 mL (40 mg total) under the skin daily for 28 days Start injections after surgery (Patient not taking: Reported on 2025)    escitalopram (LEXAPRO) 20 mg tablet Take 1 tablet (20 mg total) by mouth every morning    estrogens, conjugated (Premarin) vaginal cream Insert 1 g into the vagina 3 (three) times a week    famotidine (PEPCID) 40 MG tablet take 1 tablet by mouth at bedtime    fenofibrate 160 MG tablet take 1 tablet by mouth once  daily    ferrous sulfate 324 (65 Fe) mg Take 1 tablet (324 mg total) by mouth 2 (two) times a day before meals Start 30 days prior to surgery    fluocinonide (LIDEX) 0.05 % cream Apply topically 2 (two) times a day (Patient not taking: Reported on 1/27/2025)    fluticasone (FLONASE) 50 mcg/act nasal spray 1 spray into each nostril daily    folic acid (FOLVITE) 1 mg tablet take 1 tablet by mouth daily START 30 DAYS prior to surgery    gabapentin (NEURONTIN) 100 mg capsule 100 mg 5 (five) times a day     HYDROmorphone (DILAUDID) 4 mg tablet TAKE 1 TABLET BY MOUTH EVERY 6 HOURS AS NEEDED...FILL 5/8/2020    lidocaine (XYLOCAINE) 5 % ointment Apply topically as needed for mild pain    loratadine (CLARITIN) 10 mg tablet Take 10 mg by mouth daily.    Multiple Vitamins-Minerals (BARIATRIC FUSION PO) Take 1 tablet by mouth daily    mupirocin (BACTROBAN) 2 % ointment Apply topically 3 (three) times a day Apply pea size amount to each nostril 2x/day for 5 days prior to procedure    ondansetron (ZOFRAN) 4 mg tablet Take 1 tablet (4 mg total) by mouth every 8 (eight) hours as needed for nausea or vomiting    oxybutynin (DITROPAN) 5 mg tablet Take 1 tablet (5 mg total) by mouth daily (Patient taking differently: Take 5 mg by mouth every morning)    pantoprazole (PROTONIX) 40 mg tablet take 1 tablet by mouth daily before breakfast    sucralfate (CARAFATE) 1 g/10 mL suspension Take 10 mL (1 g total) by mouth 2 (two) times a day as needed (gastritis)    tirzepatide (Zepbound) 7.5 mg/0.5 mL auto-injector Inject 0.5 mL (7.5 mg total) under the skin once a week (Patient taking differently: Inject 7.5 mg under the skin once a week)    [DISCONTINUED] famotidine (PEPCID) 40 MG tablet take 1 tablet by mouth at bedtime    [DISCONTINUED] fenofibrate 160 MG tablet take 1 tablet by mouth once daily     No Known Allergies  Immunization History   Administered Date(s) Administered    COVID-19 PFIZER VACCINE 0.3 ML IM 04/13/2021, 05/04/2021,  12/19/2021    COVID-19 Pfizer Vac BIVALENT Moo-sucrose 12 Yr+ IM 09/16/2022    INFLUENZA 10/20/2017, 09/29/2021, 09/05/2022, 09/24/2023    Influenza Injectable, MDCK, Preservative Free, Quadrivalent, 0.5 mL 10/03/2020    Influenza, injectable, quadrivalent, preservative free 0.5 mL 09/22/2018, 10/06/2019    Tdap 02/24/2018    Tuberculin Skin Test-PPD Intradermal 01/28/2016    Zoster 10/25/2014    Zoster Vaccine Recombinant 08/08/2022, 10/12/2022     Objective   There were no vitals taken for this visit.    Physical Exam  Vitals and nursing note reviewed.   Constitutional:       General: She is not in acute distress.     Appearance: She is well-developed.   HENT:      Head: Normocephalic and atraumatic.      Right Ear: Tympanic membrane normal.      Left Ear: Tympanic membrane normal.      Nose: No congestion or rhinorrhea.      Right Sinus: No maxillary sinus tenderness or frontal sinus tenderness.      Left Sinus: No maxillary sinus tenderness or frontal sinus tenderness.   Eyes:      Conjunctiva/sclera: Conjunctivae normal.   Cardiovascular:      Rate and Rhythm: Normal rate and regular rhythm.      Heart sounds: Normal heart sounds.   Pulmonary:      Effort: Pulmonary effort is normal. No respiratory distress.   Abdominal:      General: There is no distension.      Palpations: Abdomen is soft. There is no mass.      Tenderness: There is no abdominal tenderness.      Hernia: No hernia is present.   Musculoskeletal:         General: No swelling.      Cervical back: Neck supple.   Skin:     General: Skin is warm and dry.   Neurological:      Mental Status: She is alert.   Psychiatric:         Mood and Affect: Mood normal.

## 2025-01-27 NOTE — ASSESSMENT & PLAN NOTE
BP today in office is 110/74 Well controlled    Recommend at-home BP monitoring to ensure control   Continue to monitor

## 2025-01-27 NOTE — ASSESSMENT & PLAN NOTE
Dilaudid taken secondary to chronic pain of hip  C/o constipation related to this, no zepbound   Continue pain control as needed, is scheduled for corrective surgery with orthopedics for hip

## 2025-01-27 NOTE — PRE-PROCEDURE INSTRUCTIONS
Pre-Surgery Instructions:   Medication Instructions    albuterol (PROVENTIL HFA,VENTOLIN HFA) 90 mcg/act inhaler Uses PRN- OK to take day of surgery    ascorbic acid (VITAMIN C) 500 MG tablet Hold day of surgery.    baclofen 10 mg tablet Take day of surgery.    buPROPion (WELLBUTRIN) 75 mg tablet Take day of surgery.    Calcium Citrate-Vitamin D 315-250 MG-UNIT TABS Stop taking 7 days prior to surgery.    Cholecalciferol 25 MCG (1000 UT) CHEW Stop taking 7 days prior to surgery.    escitalopram (LEXAPRO) 20 mg tablet Take day of surgery.    estrogens, conjugated (Premarin) vaginal cream Three times per week    famotidine (PEPCID) 40 MG tablet Take day of surgery.    fenofibrate 160 MG tablet Take day of surgery.    ferrous sulfate 324 (65 Fe) mg Hold day of surgery.    fluticasone (FLONASE) 50 mcg/act nasal spray Uses PRN- OK to take day of surgery    folic acid (FOLVITE) 1 mg tablet Hold day of surgery.    gabapentin (NEURONTIN) 100 mg capsule Uses PRN- OK to take day of surgery    HYDROmorphone (DILAUDID) 4 mg tablet Uses PRN- OK to take day of surgery    lidocaine (XYLOCAINE) 5 % ointment Hold day of surgery.    loratadine (CLARITIN) 10 mg tablet Uses PRN- OK to take day of surgery    Multiple Vitamins-Minerals (BARIATRIC FUSION PO) Hold day of surgery.    mupirocin (BACTROBAN) 2 % ointment Take day of surgery.    ondansetron (ZOFRAN) 4 mg tablet Uses PRN- OK to take day of surgery    oxybutynin (DITROPAN) 5 mg tablet Hold day of surgery.    pantoprazole (PROTONIX) 40 mg tablet Take day of surgery.    sucralfate (CARAFATE) 1 g/10 mL suspension Uses PRN- DO NOT take day of surgery    tirzepatide (Zepbound) 7.5 mg/0.5 mL auto-injector Last dose 2-13-25  Per protocol   Medication instructions for day surgery reviewed. Please use only a sip of water to take your instructed medications. Avoid all over the counter vitamins, supplements and NSAIDS for one week prior to surgery per anesthesia guidelines. Tylenol is ok to  take as needed.     You will receive a call one business day prior to surgery with an arrival time and hospital directions. If your surgery is scheduled on a Monday, the hospital will be calling you on the Friday prior to your surgery. If you have not heard from anyone by 8pm, please call the hospital supervisor through the hospital  at [phone]  or [place] [phone]).    Do not eat or drink anything after midnight the night before your surgery, including candy, mints, lifesavers, or chewing gum. Do not drink alcohol 24hrs before your surgery. Try not to smoke at least 24hrs before your surgery.       Follow the pre surgery showering instructions as listed in the “My Surgical Experience Booklet” or otherwise provided by your surgeon's office. Do not use a blade to shave the surgical area 1 week before surgery. It is okay to use a clean electric clippers up to 24 hours before surgery. Do not apply any lotions, creams, including makeup, cologne, deodorant, or perfumes after showering on the day of your surgery. Do not use dry shampoo, hair spray, hair gel, or any type of hair products.     No contact lenses, eye make-up, or artificial eyelashes. Remove nail polish, including gel polish, and any artificial, gel, or acrylic nails if possible. Remove all jewelry including rings and body piercing jewelry.     Wear causal clothing that is easy to take on and off. Consider your type of surgery.    Keep any valuables, jewelry, piercings at home. Please bring any specially ordered equipment (sling, braces) if indicated.    Arrange for a responsible person to drive you to and from the hospital on the day of your surgery. Please confirm the visitor policy for the day of your procedure when you receive your phone call with an arrival time.     Call the surgeon's office with any new illnesses, exposures, or additional questions prior to surgery.    Please reference your “My Surgical Experience Booklet” for  additional information to prepare for your upcoming surgery.

## 2025-01-27 NOTE — ASSESSMENT & PLAN NOTE
Last two weeks worsening  Endorses worsening sinus congestion and ear fullness  Has improved slightly since onset  On exam today, no evidence of acute bacterial infection, vital stable, afebrile, no sinus pressure tenderness to palpation  Do not suspect acute bacterial cause this or increase in symptoms, did discuss seasonal increase in sinus symptoms at this time of year, continue Flonase, Zyrtec/Claritin, follow-up as needed if developing additional symptoms/fever and patient is in agreement with plan

## 2025-01-31 PROBLEM — H11.32 SUBCONJUNCTIVAL HEMORRHAGE OF LEFT EYE: Status: RESOLVED | Noted: 2024-07-29 | Resolved: 2025-01-31

## 2025-01-31 NOTE — PROGRESS NOTES
Pre-operative Clearance  Name: Denita Brown      : 1960      MRN: [id number]  Encounter Provider: Nery Choi DO  Encounter Date: 2/3/2025   Encounter department: [facility]    Assessment & Plan  Primary osteoarthritis of one hip, right    Orders:  •  [facility] Ref[facility] to [facility]    Pre-op exam    Orders:  •  POCT ECG    Pre-operative Clearance:     Revised Cardiac Risk Index:  RCI RISK CLASS I (0 risk factors, risk of major cardiac complications approximately 0.5%)    Clearance:  Patient is medically optimized (CLEARED) for proposed surgery without any additional cardiac testing.      Medication Instructions:   - 5HT3 Antagonist (ie, zofran):  Continue to take this medication on your normal schedule.   - Antidepressants: Continue to take this medication on your normal schedule.  - Antiepileptic meds: Continue to take this medication on your normal schedule.  - GLP weight loss meds: Stop medication 1 week prior to procedure/surgery. If undergoing bariatric surgery, then stop medication 4 weeks prior.  - H2 Blocker: Continue to take this medication on your normal schedule.  - Hormone replacement therapy: Continue to take this medication on your normal schedule. This medication may need to be discontinued for at least 4 weeks prior to surgery if the surgical procedure is associated with high risk of blood clots. Consult with your surgeon.  - Hyperlipidemia meds: Continue to take this medication on your normal schedule.  - Opioids: Continue to take this medication on your normal schedule.  - Urinary Antispasmodics: Continue taking medication up to the evening before procedure/surgery, but do not take this medication on the morning of procedure/surgery.       History of Present Illness     Pre-op Exam  Surgery: R Hip Replacement  Anticipated Date of Surgery: 2025  Surgeon: Dr. Borges    Lab Review:   A1c 5.3  PT/PTT WNL  CBC benign   Cr 0.92/GFR  66  LFTs WNL    EKG: Sinus gage       Previous history of bleeding disorders or clots?: No  Previous Anesthesia reaction?: No  Prolonged steroid use in the last 6 months?: No    Assessment of Cardiac Risk:   - Unstable or severe angina or MI in the last 6 weeks or history of stent placement in the last year?: No   - Decompensated heart failure (e.g. New onset heart failure, NYHA  Class IV heart failure, or worsening existing heart failure)?: No  - Significant arrhythmias such as high grade AV block, symptomatic ventricular arrhythmia, newly recognized ventricular tachycardia, supraventricular tachycardia with resting heart rate >100, or symptomatic bradycardia?: No  - Severe heart valve disease including aortic stenosis or symptomatic mitral stenosis?: No      Pre-operative Risk Factors:  Elevated-risk surgery: No    History of cerebrovascular disease: No    History of ischemic heart disease: No  Pre-operative treatment with insulin: No  Pre-operative creatinine >2 mg/dL: No    History of congestive heart failure: No    Review of Systems   Constitutional:  Negative for chills and fever.   HENT:  Negative for congestion, ear pain, rhinorrhea and sore throat.         (+) chronic sinusitis    Respiratory:  Negative for cough and shortness of breath.    Cardiovascular:  Negative for chest pain, palpitations and leg swelling.   Gastrointestinal:  Negative for blood in stool.   Genitourinary:  Negative for hematuria.   Musculoskeletal:  Positive for arthralgias.   Neurological:  Negative for dizziness and headaches.   Hematological:  Does not bruise/bleed easily.     Past Medical History   Past Medical History:   Diagnosis Date   • Abdominal pain, epigastric 03/23/2018    Added automatically from request for surgery [id number]   • Abnormal x-ray of lumbar spine 11/03/2015   • Acute cystitis with hematuria 04/07/2020   • Bariatric surgery status    • Basal cell carcinoma     basil cell carcinoma- in remission   • Benign  essential hypertension 2012   • Bone bruise 2023   • Breakdown (mechanical) of internal fixation device of vertebrae, initial encounter ([facility]) 2019   • Calculus of bile duct with cholecystitis without obstruction 2018    Added automatically from request for surgery [id number]   • Cellulitis of finger 2015   • Closed fracture of left distal fibula 10/28/2023   • Degenerative lumbar disc    • Digital mucinous cyst 2015   • DMII (diabetes mellitus, type 2) ([facility]) 2013   • GERD (gastroesophageal reflux disease)    • Gross hematuria 2019   • Hiatal hernia    • History of COVID-19 2021   • History of transfusion 2014    Post-op spinal surgery   • Hyperlipidemia    • Low back pain    • Lower urinary tract infectious disease 2015   • Medicare annual wellness visit, initial 2019   • Obesity     Resolved 10/6/2016    • Other closed fracture of distal end of left fibula, initial encounter 10/28/2023   • Overactive bladder    • Postsurgical malabsorption    • Recurrent UTI 2019   • Spinal stenosis    • SVT (supraventricular tachycardia) ([facility])    • Tachycardia     Resolved 2016    • Type 2 diabetes mellitus ([facility])     Last assesssed 2018    • Wears glasses      Past Surgical History:   Procedure Laterality Date   • APPENDECTOMY     • ARTHRODESIS      Lumbar - Last assessed 2018    • BACK SURGERY      Lumbar (3 surgeries)- two levels fused, clean out from infection, then fusion of 7 levels (last surgery in )   • CARDIAC ELECTROPHYSIOLOGY STUDY AND ABLATION     • CARPAL TUNNEL RELEASE      x2   • CERVICAL SPINE SURGERY      Fusion of C2-C7 over a period of 3 surgeries ( first)   •  SECTION      X2   • CHOLECYSTECTOMY     • FL MYELOGRAM LUMBAR  3/28/2019   • INSERTION / PLACEMENT / REVISION NEUROSTIMULATOR      X2- both were removed   • NECK SURGERY     • OTHER SURGICAL HISTORY      Catheter ablation    • NE EGD TRANSORAL BIOPSY  SINGLE/MULTIPLE N/A 2016    Procedure: ESOPHAGOGASTRODUODENOSCOPY (EGD);  Surgeon: Tanya Orta MD;  Location: [facility];  Service: Bariatrics   • NJ EGD TRANSORAL BIOPSY SINGLE/MULTIPLE N/A 2018    Procedure: ESOPHAGOGASTRODUODENOSCOPY (EGD) with biopsy;  Surgeon: Tanya Orta MD;  Location: [facility];  Service: Bariatrics   • NJ LAPAROSCOPY SURG CHOLECYSTECTOMY N/A 2018    Procedure: CHOLECYSTECTOMY LAPAROSCOPIC;  Surgeon: Tanya Orta MD;  Location: [facility];  Service: Bariatrics   • NJ LAPS GSTR RSTCV PX W/BYP BRAULIO-EN-Y LIMB <150 CM N/A 10/25/2016    Procedure: BYPASS GASTRIC  BRAULIO-EN-Y LAPAROSCOPIC, WITH INTRA OP EGD;  Surgeon: Tanya Orta MD;  Location: [facility];  Service: Bariatrics   • SKIN CANCER EXCISION     • TONSILLECTOMY     • TUBAL LIGATION     • US GUIDED BREAST BIOPSY RIGHT COMPLETE Right 2023   • WISDOM TOOTH EXTRACTION       Family History   Problem Relation Age of Onset   • Arthritis Mother         Rheumatoid   • Angina Mother    • Coronary artery disease Mother    • Diabetes Mother    • Cancer Father         Lung   • Cystic fibrosis Father    • Rheum arthritis Sister    • Diabetes Sister    • No Known Problems Maternal Grandmother    • No Known Problems Maternal Grandfather    • No Known Problems Paternal Grandmother    • No Known Problems Paternal Grandfather    • Other Brother         Back problems    • Diabetes Brother    • No Known Problems Brother    • No Known Problems Son    • No Known Problems Son    • Hypertension Family    • Heart disease Family    • Breast cancer Neg Hx      Social History     Tobacco Use   • Smoking status: Former     Current packs/day: 0.00     Average packs/day: 1 pack/day for 20.0 years (20.0 ttl pk-yrs)     Types: Cigarettes     Start date:      Quit date:      Years since quittin.1     Passive exposure: Past   • Smokeless tobacco: Never   Vaping Use   • Vaping status: Never Used   Substance and Sexual Activity   •  Alcohol use: No   • Drug use: No   • Sexual activity: Not Currently     Current Outpatient Medications on File Prior to Visit   Medication Sig   • albuterol (PROVENTIL HFA,VENTOLIN HFA) 90 mcg/act inhaler Inhale 2 puffs every 6 (six) hours as needed for wheezing or shortness of breath (cough, chest tightness)   • ascorbic acid (VITAMIN C) 500 MG tablet Take 1 tablet (500 mg total) by mouth daily Start 30 days prior to surgery   • baclofen 10 mg tablet Take 10 mg by mouth 3 (three) times a day   • buPROPion (WELLBUTRIN) 75 mg tablet take 1 tablet by mouth twice a day   • Calcium Citrate-Vitamin D 315-250 MG-UNIT TABS Take 1 tablet by mouth 2 (two) times a day    • Cholecalciferol 25 MCG (1000 UT) CHEW Chew   • enoxaparin (LOVENOX) 40 mg/0.4 mL Inject 0.4 mL (40 mg total) under the skin daily for 28 days Start injections after surgery (Patient not taking: Reported on 1/11/2025)   • escitalopram (LEXAPRO) 20 mg tablet Take 1 tablet (20 mg total) by mouth every morning   • estrogens, conjugated (Premarin) vaginal cream Insert 1 g into the vagina 3 (three) times a week   • famotidine (PEPCID) 40 MG tablet take 1 tablet by mouth at bedtime   • fenofibrate 160 MG tablet take 1 tablet by mouth once daily   • ferrous sulfate 324 (65 Fe) mg Take 1 tablet (324 mg total) by mouth 2 (two) times a day before meals Start 30 days prior to surgery   • fluticasone (FLONASE) 50 mcg/act nasal spray 1 spray into each nostril daily   • folic acid (FOLVITE) 1 mg tablet take 1 tablet by mouth daily START 30 DAYS prior to surgery   • gabapentin (NEURONTIN) 100 mg capsule 100 mg 5 (five) times a day    • HYDROmorphone (DILAUDID) 4 mg tablet TAKE 1 TABLET BY MOUTH EVERY 6 HOURS AS NEEDED...FILL 5/8/2020   • lidocaine (XYLOCAINE) 5 % ointment Apply topically as needed for mild pain   • loratadine (CLARITIN) 10 mg tablet Take 10 mg by mouth daily.   • Multiple Vitamins-Minerals (BARIATRIC FUSION PO) Take 1 tablet by mouth daily   • mupirocin  "(BACTROBAN) 2 % ointment Apply topically 3 (three) times a day Apply pea size amount to each nostril 2x/day for 5 days prior to procedure   • ondansetron (ZOFRAN) 4 mg tablet Take 1 tablet (4 mg total) by mouth every 8 (eight) hours as needed for nausea or vomiting   • oxybutynin (DITROPAN) 5 mg tablet Take 1 tablet (5 mg total) by mouth daily (Patient taking differently: Take 5 mg by mouth every morning)   • pantoprazole (PROTONIX) 40 mg tablet take 1 tablet by mouth daily before breakfast   • sucralfate (CARAFATE) 1 g/10 mL suspension Take 10 mL (1 g total) by mouth 2 (two) times a day as needed (gastritis)   • tirzepatide (Zepbound) 10 mg/0.5 mL auto-injector Inject 0.5 mL (10 mg total) under the skin once a week   • [DISCONTINUED] fluocinonide (LIDEX) 0.05 % cream Apply topically 2 (two) times a day (Patient not taking: Reported on 1/27/2025)     No Known Allergies  Objective   /82   Temp 97.9 °F (36.6 °C)   Ht 5' 5\" (1.651 m)   Wt 75.3 kg (166 lb)   BMI 27.62 kg/m²     Physical Exam  Vitals and nursing note reviewed.   Constitutional:       General: She is not in acute distress.     Appearance: She is well-developed.   HENT:      Head: Normocephalic and atraumatic.      Right Ear: Tympanic membrane, ear canal and external ear normal.      Left Ear: Tympanic membrane, ear canal and external ear normal.      Nose: Nose normal. No rhinorrhea.      Mouth/Throat:      Mouth: Mucous membranes are moist.      Pharynx: No oropharyngeal exudate or posterior oropharyngeal erythema.   Eyes:      Conjunctiva/sclera: Conjunctivae normal.   Cardiovascular:      Rate and Rhythm: Normal rate and regular rhythm.   Pulmonary:      Effort: Pulmonary effort is normal. No respiratory distress.      Breath sounds: Normal breath sounds.   Abdominal:      General: Bowel sounds are normal. There is no distension.      Palpations: Abdomen is soft.      Tenderness: There is no abdominal tenderness.   Musculoskeletal:      Right " lower leg: No edema.      Left lower leg: No edema.   Lymphadenopathy:      Cervical: No cervical adenopathy.   Skin:     General: Skin is warm and dry.   Neurological:      Mental Status: She is alert.      Comments: Grossly intact   Psychiatric:         Mood and Affect: Mood normal.           Nery Choi,

## 2025-02-03 ENCOUNTER — CONSULT (OUTPATIENT)
Dept: FAMILY MEDICINE CLINIC | Facility: CLINIC | Age: 65
End: 2025-02-03
Payer: COMMERCIAL

## 2025-02-03 VITALS
HEIGHT: 65 IN | TEMPERATURE: 97.9 F | DIASTOLIC BLOOD PRESSURE: 82 MMHG | SYSTOLIC BLOOD PRESSURE: 134 MMHG | BODY MASS INDEX: 27.66 KG/M2 | WEIGHT: 166 LBS

## 2025-02-03 DIAGNOSIS — M16.11 PRIMARY OSTEOARTHRITIS OF ONE HIP, RIGHT: Primary | ICD-10-CM

## 2025-02-03 DIAGNOSIS — Z01.818 PRE-OP EXAM: ICD-10-CM

## 2025-02-03 PROCEDURE — 99214 OFFICE O/P EST MOD 30 MIN: CPT | Performed by: FAMILY MEDICINE

## 2025-02-03 PROCEDURE — 93000 ELECTROCARDIOGRAM COMPLETE: CPT | Performed by: FAMILY MEDICINE

## 2025-02-03 PROCEDURE — G2211 COMPLEX E/M VISIT ADD ON: HCPCS | Performed by: FAMILY MEDICINE

## 2025-02-03 NOTE — LETTER
February 3, 2025     Ezio Borges MD  [address] [facility]  [address] [place] [id number]    Patient: Denita Brown   YOB: 1960   Date of Visit: 2/3/2025       Dear Dr. Borges:    Thank you for referring Denita Brown to me for evaluation. Below are my notes for this consultation.    If you have questions, please do not hesitate to call me. I look forward to following your patient along with you.         Sincerely,        Nery Choi DO        CC: No Recipients    Nery Choi DO  2/3/2025  4:08 PM  Sign when Signing Visit  Pre-operative Clearance  Name: Denita Brown      : 1960      MRN: [id number]  Encounter Provider: Nery Choi DO  Encounter Date: 2/3/2025   Encounter department: [facility]    Assessment & Plan  Primary osteoarthritis of one hip, right    Orders:  •  [facility]    Pre-op exam    Orders:  •  POCT ECG    Pre-operative Clearance:     Revised Cardiac Risk Index:  RCI RISK CLASS I (0 risk factors, risk of major cardiac complications approximately 0.5%)    Clearance:  Patient is medically optimized (CLEARED) for proposed surgery without any additional cardiac testing.      Medication Instructions:   - 5HT3 Antagonist (ie, zofran):  Continue to take this medication on your normal schedule.   - Antidepressants: Continue to take this medication on your normal schedule.  - Antiepileptic meds: Continue to take this medication on your normal schedule.  - GLP weight loss meds: Stop medication 1 week prior to procedure/surgery. If undergoing bariatric surgery, then stop medication 4 weeks prior.  - H2 Blocker: Continue to take this medication on your normal schedule.  - Hormone replacement therapy: Continue to take this medication on your normal schedule. This medication may need to be discontinued for at least 4 weeks prior to surgery if the surgical procedure is associated with high risk of  blood clots. Consult with your surgeon.  - Hyperlipidemia meds: Continue to take this medication on your normal schedule.  - Opioids: Continue to take this medication on your normal schedule.  - Urinary Antispasmodics: Continue taking medication up to the evening before procedure/surgery, but do not take this medication on the morning of procedure/surgery.       History of Present Illness    Pre-op Exam  Surgery: R Hip Replacement  Anticipated Date of Surgery: 02/20/2025  Surgeon: Dr. Borges    Lab Review:   A1c 5.3  PT/PTT WNL  CBC benign   Cr 0.92/GFR 66  LFTs WNL    EKG: Sinus gage       Previous history of bleeding disorders or clots?: No  Previous Anesthesia reaction?: No  Prolonged steroid use in the last 6 months?: No    Assessment of Cardiac Risk:   - Unstable or severe angina or MI in the last 6 weeks or history of stent placement in the last year?: No   - Decompensated heart failure (e.g. New onset heart failure, NYHA  Class IV heart failure, or worsening existing heart failure)?: No  - Significant arrhythmias such as high grade AV block, symptomatic ventricular arrhythmia, newly recognized ventricular tachycardia, supraventricular tachycardia with resting heart rate >100, or symptomatic bradycardia?: No  - Severe heart valve disease including aortic stenosis or symptomatic mitral stenosis?: No      Pre-operative Risk Factors:  Elevated-risk surgery: No    History of cerebrovascular disease: No    History of ischemic heart disease: No  Pre-operative treatment with insulin: No  Pre-operative creatinine >2 mg/dL: No    History of congestive heart failure: No    Review of Systems   Constitutional:  Negative for chills and fever.   HENT:  Negative for congestion, ear pain, rhinorrhea and sore throat.         (+) chronic sinusitis    Respiratory:  Negative for cough and shortness of breath.    Cardiovascular:  Negative for chest pain, palpitations and leg swelling.   Gastrointestinal:  Negative for blood in  stool.   Genitourinary:  Negative for hematuria.   Musculoskeletal:  Positive for arthralgias.   Neurological:  Negative for dizziness and headaches.   Hematological:  Does not bruise/bleed easily.     Past Medical History  Past Medical History:   Diagnosis Date   • Abdominal pain, epigastric 03/23/2018    Added automatically from request for surgery [id number]   • Abnormal x-ray of lumbar spine 11/03/2015   • Acute cystitis with hematuria 04/07/2020   • Bariatric surgery status    • Basal cell carcinoma     basil cell carcinoma- in remission   • Benign essential hypertension 06/12/2012   • Bone bruise 11/02/2023   • Breakdown (mechanical) of internal fixation device of vertebrae, initial encounter ([facility]) 03/01/2019   • Calculus of bile duct with cholecystitis without obstruction 04/27/2018    Added automatically from request for surgery [id number]   • Cellulitis of finger 12/03/2015   • Closed fracture of left distal fibula 10/28/2023   • Degenerative lumbar disc    • Digital mucinous cyst 06/24/2015   • DMII (diabetes mellitus, type 2) ([facility]) 11/08/2013   • GERD (gastroesophageal reflux disease)    • Gross hematuria 03/19/2019   • Hiatal hernia    • History of COVID-19 12/22/2021   • History of transfusion 2014    Post-op spinal surgery   • Hyperlipidemia    • Low back pain    • Lower urinary tract infectious disease 03/27/2015   • Medicare annual wellness visit, initial 11/27/2019   • Obesity     Resolved 10/6/2016    • Other closed fracture of distal end of left fibula, initial encounter 10/28/2023   • Overactive bladder    • Postsurgical malabsorption    • Recurrent UTI 03/19/2019   • Spinal stenosis    • SVT (supraventricular tachycardia) ([facility])    • Tachycardia     Resolved 5/4/2016    • Type 2 diabetes mellitus ([facility])     Last assesssed 1/18/2018    • Wears glasses      Past Surgical History:   Procedure Laterality Date   • APPENDECTOMY     • ARTHRODESIS      Lumbar - Last assessed 1/4/2018    • BACK SURGERY      Lumbar  (3 surgeries)- two levels fused, clean out from infection, then fusion of 7 levels (last surgery in )   • CARDIAC ELECTROPHYSIOLOGY STUDY AND ABLATION     • CARPAL TUNNEL RELEASE      x2   • CERVICAL SPINE SURGERY      Fusion of C2-C7 over a period of 3 surgeries ( first)   •  SECTION      X2   • CHOLECYSTECTOMY     • FL MYELOGRAM LUMBAR  3/28/2019   • INSERTION / PLACEMENT / REVISION NEUROSTIMULATOR      X2- both were removed   • NECK SURGERY     • OTHER SURGICAL HISTORY      Catheter ablation    • AK EGD TRANSORAL BIOPSY SINGLE/MULTIPLE N/A 2016    Procedure: ESOPHAGOGASTRODUODENOSCOPY (EGD);  Surgeon: Tanya Orta MD;  Location: [facility];  Service: Bariatrics   • AK EGD TRANSORAL BIOPSY SINGLE/MULTIPLE N/A 2018    Procedure: ESOPHAGOGASTRODUODENOSCOPY (EGD) with biopsy;  Surgeon: Tanya Orta MD;  Location: [facility];  Service: Bariatrics   • AK LAPAROSCOPY SURG CHOLECYSTECTOMY N/A 2018    Procedure: CHOLECYSTECTOMY LAPAROSCOPIC;  Surgeon: Tanya Orta MD;  Location: [facility];  Service: Bariatrics   • AK LAPS GSTR RSTCV PX W/BYP BRAULIO-EN-Y LIMB <150 CM N/A 10/25/2016    Procedure: BYPASS GASTRIC  BRAULIO-EN-Y LAPAROSCOPIC, WITH INTRA OP EGD;  Surgeon: Tanya Orta MD;  Location: [facility];  Service: Bariatrics   • SKIN CANCER EXCISION     • TONSILLECTOMY     • TUBAL LIGATION     • US GUIDED BREAST BIOPSY RIGHT COMPLETE Right 2023   • WISDOM TOOTH EXTRACTION       Family History   Problem Relation Age of Onset   • Arthritis Mother         Rheumatoid   • Angina Mother    • Coronary artery disease Mother    • Diabetes Mother    • Cancer Father         Lung   • Cystic fibrosis Father    • Rheum arthritis Sister    • Diabetes Sister    • No Known Problems Maternal Grandmother    • No Known Problems Maternal Grandfather    • No Known Problems Paternal Grandmother    • No Known Problems Paternal Grandfather    • Other Brother         Back problems    • Diabetes Brother     • No Known Problems Brother    • No Known Problems Son    • No Known Problems Son    • Hypertension Family    • Heart disease Family    • Breast cancer Neg Hx      Social History     Tobacco Use   • Smoking status: Former     Current packs/day: 0.00     Average packs/day: 1 pack/day for 20.0 years (20.0 ttl pk-yrs)     Types: Cigarettes     Start date:      Quit date:      Years since quittin.1     Passive exposure: Past   • Smokeless tobacco: Never   Vaping Use   • Vaping status: Never Used   Substance and Sexual Activity   • Alcohol use: No   • Drug use: No   • Sexual activity: Not Currently     Current Outpatient Medications on File Prior to Visit   Medication Sig   • albuterol (PROVENTIL HFA,VENTOLIN HFA) 90 mcg/act inhaler Inhale 2 puffs every 6 (six) hours as needed for wheezing or shortness of breath (cough, chest tightness)   • ascorbic acid (VITAMIN C) 500 MG tablet Take 1 tablet (500 mg total) by mouth daily Start 30 days prior to surgery   • baclofen 10 mg tablet Take 10 mg by mouth 3 (three) times a day   • buPROPion (WELLBUTRIN) 75 mg tablet take 1 tablet by mouth twice a day   • Calcium Citrate-Vitamin D 315-250 MG-UNIT TABS Take 1 tablet by mouth 2 (two) times a day    • Cholecalciferol 25 MCG (1000 UT) CHEW Chew   • enoxaparin (LOVENOX) 40 mg/0.4 mL Inject 0.4 mL (40 mg total) under the skin daily for 28 days Start injections after surgery (Patient not taking: Reported on 2025)   • escitalopram (LEXAPRO) 20 mg tablet Take 1 tablet (20 mg total) by mouth every morning   • estrogens, conjugated (Premarin) vaginal cream Insert 1 g into the vagina 3 (three) times a week   • famotidine (PEPCID) 40 MG tablet take 1 tablet by mouth at bedtime   • fenofibrate 160 MG tablet take 1 tablet by mouth once daily   • ferrous sulfate 324 (65 Fe) mg Take 1 tablet (324 mg total) by mouth 2 (two) times a day before meals Start 30 days prior to surgery   • fluocinonide (LIDEX) 0.05 % cream Apply  "topically 2 (two) times a day (Patient not taking: Reported on 1/27/2025)   • fluticasone (FLONASE) 50 mcg/act nasal spray 1 spray into each nostril daily   • folic acid (FOLVITE) 1 mg tablet take 1 tablet by mouth daily START 30 DAYS prior to surgery   • gabapentin (NEURONTIN) 100 mg capsule 100 mg 5 (five) times a day    • HYDROmorphone (DILAUDID) 4 mg tablet TAKE 1 TABLET BY MOUTH EVERY 6 HOURS AS NEEDED...FILL 5/8/2020   • lidocaine (XYLOCAINE) 5 % ointment Apply topically as needed for mild pain   • loratadine (CLARITIN) 10 mg tablet Take 10 mg by mouth daily.   • Multiple Vitamins-Minerals (BARIATRIC FUSION PO) Take 1 tablet by mouth daily   • mupirocin (BACTROBAN) 2 % ointment Apply topically 3 (three) times a day Apply pea size amount to each nostril 2x/day for 5 days prior to procedure   • ondansetron (ZOFRAN) 4 mg tablet Take 1 tablet (4 mg total) by mouth every 8 (eight) hours as needed for nausea or vomiting   • oxybutynin (DITROPAN) 5 mg tablet Take 1 tablet (5 mg total) by mouth daily (Patient taking differently: Take 5 mg by mouth every morning)   • pantoprazole (PROTONIX) 40 mg tablet take 1 tablet by mouth daily before breakfast   • sucralfate (CARAFATE) 1 g/10 mL suspension Take 10 mL (1 g total) by mouth 2 (two) times a day as needed (gastritis)   • tirzepatide (Zepbound) 10 mg/0.5 mL auto-injector Inject 0.5 mL (10 mg total) under the skin once a week     No Known Allergies  Objective  /82   Temp 97.9 °F (36.6 °C)   Ht 5' 5\" (1.651 m)   Wt 75.3 kg (166 lb)   BMI 27.62 kg/m²     Physical Exam  Vitals and nursing note reviewed.   Constitutional:       General: She is not in acute distress.     Appearance: She is well-developed.   HENT:      Head: Normocephalic and atraumatic.      Right Ear: Tympanic membrane, ear canal and external ear normal.      Left Ear: Tympanic membrane, ear canal and external ear normal.      Nose: Nose normal. No rhinorrhea.      Mouth/Throat:      Mouth: Mucous " membranes are moist.      Pharynx: No oropharyngeal exudate or posterior oropharyngeal erythema.   Eyes:      Conjunctiva/sclera: Conjunctivae normal.   Cardiovascular:      Rate and Rhythm: Normal rate and regular rhythm.   Pulmonary:      Effort: Pulmonary effort is normal. No respiratory distress.      Breath sounds: Normal breath sounds.   Abdominal:      General: Bowel sounds are normal. There is no distension.      Palpations: Abdomen is soft.      Tenderness: There is no abdominal tenderness.   Musculoskeletal:      Right lower leg: No edema.      Left lower leg: No edema.   Lymphadenopathy:      Cervical: No cervical adenopathy.   Skin:     General: Skin is warm and dry.   Neurological:      Mental Status: She is alert.      Comments: Grossly intact   Psychiatric:         Mood and Affect: Mood normal.           Nery Choi,

## 2025-02-06 ENCOUNTER — ANESTHESIA (OUTPATIENT)
Age: 65
End: 2025-02-06

## 2025-02-06 ENCOUNTER — ANESTHESIA EVENT (OUTPATIENT)
Age: 65
End: 2025-02-06

## 2025-02-06 ENCOUNTER — EVALUATION (OUTPATIENT)
Dept: PHYSICAL THERAPY | Facility: CLINIC | Age: 65
End: 2025-02-06
Payer: COMMERCIAL

## 2025-02-06 DIAGNOSIS — M16.11 PRIMARY OSTEOARTHRITIS OF ONE HIP, RIGHT: Primary | ICD-10-CM

## 2025-02-06 PROCEDURE — 97161 PT EVAL LOW COMPLEX 20 MIN: CPT | Performed by: PHYSICAL THERAPIST

## 2025-02-06 PROCEDURE — 97110 THERAPEUTIC EXERCISES: CPT | Performed by: PHYSICAL THERAPIST

## 2025-02-06 NOTE — PROGRESS NOTES
PT Evaluation     Today's date: 25  Patient name: Denita Brown  : 1960  MRN: [id number]  Referring provider: Ezio Borges,*  Dx:   Encounter Diagnosis     ICD-10-CM    1. Primary osteoarthritis of one hip, right  M16.11                       Assessment  Impairments: abnormal gait, abnormal or restricted ROM, abnormal movement, activity intolerance, impaired physical strength, lacks appropriate home exercise program and pain with function  Functional limitations: Pain with stairs, prolonged standing and walking, as well as, with lying on right hip.  Symptom irritability: high    Assessment details: Denita Brown is a 64 y.o. female referred with primary diagnosis of Primary osteoarthritis of one hip, right  (primary encounter diagnosis) .  Patient is scheduled for a right KENNEDY on 25.  Patient presents with the following functional limitations: Pain with stairs, prolonged standing and walking, as well as, with lying on right hip.  Patient demonstrates painful ROM of the right hip in all directions.  Weakness observed in all hip musculature.  Antalgic gait with forward flexed posture at hips.  Reviewed immediate post- operative home exercise program, home preparation checklist, post-operative pain management, RAPT, and discussed patient's home set up because virtual home assessment was not with patient. Educated patient on posterior total hip precautions. Performed gait training with both straight cane and rolling walker on level surface, transferring sit to stand, and with stair negotiation. Educated pt to call with any questions or concerns. Treatment to include: Manual therapy techniques, extremity/core strengthening, neuromuscular control exercises, balance/proprioception training as appropriate, gait training, instruction in a comprehensive HEP, and modalities as needed.  They will benefit from skilled PT services to address the above functional deficits and to decrease pain to promote  a return to their premorbid level of function.    Understanding of Dx/Px/POC: good     Prognosis: good    Goals  STG (4 weeks)  1. Patient will demonstrate the ability to correct posture with minimal VC's  2. Patient will demonstrate 25% gains in Hip ROM in all deficient planes  3. Patient will report pain as a 4-5/10 at worst with all normal activities  4. Patient will report 50% reduction in sleep disturbances related to pain  LTG (8 weeks)  1. Patient will report pain as a 0-1/10 at worst with normal activities  2. Patient will sleep through the night without disturbances related to pain  3. Patient will demonstrate Hip ROM WFL to facilitate improved quality of gait  4. Patient will demonstrate Hip strength WNL to improve improve transfers, quality of gait, and ability to negotiate stairs.  5. Patient will be independent and compliant with a HEP in order to maintain gains made with skilled PT services.      Plan  Patient would benefit from: skilled physical therapy  Planned modality interventions: cryotherapy and thermotherapy: hydrocollator packs    Planned therapy interventions: joint mobilization, manual therapy, balance, neuromuscular re-education, patient education, strengthening, stretching, therapeutic activities, therapeutic exercise and home exercise program    Frequency: 2x week  Duration in weeks: 10  Plan of Care beginning date: 2/6/2025  Plan of Care expiration date: 4/17/2025  Treatment plan discussed with: patient      Subjective Evaluation    History of Present Illness  Mechanism of injury: Patient has a history of chronic right hip pain.  Symptoms have gotten progressively worse over time.  Patient is now scheduled for a right posterior KENNEDY on 2/20/25 and presents today to outpatient PT for her pre-op PT evaluation          Recurrent probem    Quality of life: good    Patient Goals  Patient goals for therapy: decreased pain  Patient goal: Walk without pain.  negotiate stairs  reciprocally.  Pain  Current pain ratin  At best pain ratin  At worst pain rating: 10  Location: Right lateral hip and groin  Quality: sharp  Relieving factors: medications  Exacerbated by: Stairs, walking > 5 minutes.  Progression: worsening    Social Support  Stairs in house: yes (13 stairs with Left HR)   Lives in: multiple-level home  Lives with: adult children    Employment status: working    Diagnostic Tests  X-ray: abnormal    FCE comments: Paresthesias into right foot.  (+) sleep disturbancesTreatments  Previous treatment: medication  Current treatment: medication      Objective     Static Posture     Hip   Hip (Left): No increased flexion.   Hip (Right): Increased flexion.     Active Range of Motion     Right Hip   Flexion: 90 degrees with pain  Abduction: 40 degrees with pain  External rotation (90/90): 35 degrees   Internal rotation (90/90): 10 degrees with pain    Passive Range of Motion     Right Hip   Flexion: 100 degrees with pain  Abduction: 40 degrees with pain  External rotation (90/90): 30 degrees with pain  Internal rotation (90/90): 10 degrees with pain    Strength/Myotome Testing     Right Hip   Planes of Motion   Flexion: 4-  Extension: 3+  Abduction: 4-    Ambulation   Weight-Bearing Status   Weight-Bearing Status (Left): full weight bearing   Weight-Bearing Status (Right): full weight-bearing    Assistive device used: none    Ambulation: Stairs   Ascend stairs: independent  Pattern: non-reciprocal  Railings: one rail  Descend stairs: independent  Pattern: non-reciprocal  Railings: one rail    Observational Gait   Gait: antalgic   Decreased walking speed and stride length.          Precautions: L1-S1 fusion, C2-C7 fusion >10 years, HTN, DM, Tachycardia , osteopenia.  Right Posterior KENNEDY 25    POC expires Unit limit Auth  expiration date PT/OT + Visit Limit?   4/3/25 NA 25 1                 Visit/Unit Tracking  AUTH Status:  Date               Authorized Used 1                Remaining                  QR     Manuals 2/6            PROM right hip                                                    Neuro Re-Ed             Pt education Rehab progression.  Pain and edema control.  Safety in home.  Stairs and GT                                                                                          Ther Ex             Nustep             Standing hip ABD and Ext Instructed            Bridges             SLR             Seated LAQ Instructed            QS Instructed            Ankle pumps Instructed                         Ther Activity             Squats Instructed            Step ups F/L                                       Gait Training                                       Modalities

## 2025-02-07 ENCOUNTER — TELEPHONE (OUTPATIENT)
Dept: OBGYN CLINIC | Facility: MEDICAL CENTER | Age: 65
End: 2025-02-07

## 2025-02-07 ENCOUNTER — PATIENT OUTREACH (OUTPATIENT)
Dept: OBGYN CLINIC | Facility: HOSPITAL | Age: 65
End: 2025-02-07

## 2025-02-07 NOTE — PROGRESS NOTES
[facility] received a new referral in regard to pt scheduled for arthroplasty of right hip on 02/20/2025. [facility] reviewed NN notes prior to contacting pt. Pt lives with her son in a bi level home. Pt ambulates independently and is independent with ADLs. Pt son will provide caregiver support after surgery. Pt receives transportation through her insurance that she will use to get to surgery. However, pt insurance will not provide transportation home since she will be alone. Pt will use transportation through her insurance to get to and from OP PT.   [facility] referral made to discuss transportation options home after surgery.  [facility] contacted pt today and introduced self and role. Pt confirmed that her adult son will be her caregiver support after surgery. Pt will use transportation through her insurance to get to surgery. Pt also did share that her insurance will not provide transportation home form surgery if she is alone. I did advise pt we have the same policy, if pt does not have an adult to accompany her home, a non emergency ambulance ride will need to be scheduled at a cost to pt. Pt plans in paying a ride service to bring her son to the hospital when she is ready to DC so he can ride home with her. Per pt the concern is that pts son has a large truck and she will not be able to enter the truck after surgery. If pt has her son transported to the hospital he will DC with her as opposed to paying the cost of a non emergency ambulance. Pt reports no other needs at this time. [facility] will follow up next week to ensure plan is in place.

## 2025-02-07 NOTE — TELEPHONE ENCOUNTER
Pt called, she is scheduled for a RT KENNEDY on 2/20. She is having an issue because transport needs to know time of surgery so they can arrange  for her and also she is having an issue getting transportation back home after surgery. She is being told she cannot get a ride back home because she will be alone and they will not take that responsibility after surgery. Would like to know if anything can be done to get her transport home after surgery.

## 2025-02-09 DIAGNOSIS — J01.90 ACUTE SINUSITIS, RECURRENCE NOT SPECIFIED, UNSPECIFIED LOCATION: ICD-10-CM

## 2025-02-09 DIAGNOSIS — Z98.84 BARIATRIC SURGERY STATUS: ICD-10-CM

## 2025-02-09 DIAGNOSIS — K21.9 GASTROESOPHAGEAL REFLUX DISEASE, UNSPECIFIED WHETHER ESOPHAGITIS PRESENT: ICD-10-CM

## 2025-02-10 DIAGNOSIS — R21 RASH: Primary | ICD-10-CM

## 2025-02-10 RX ORDER — FLUOCINONIDE 0.5 MG/G
CREAM TOPICAL 2 TIMES DAILY PRN
Qty: 15 G | Refills: 1 | Status: SHIPPED | OUTPATIENT
Start: 2025-02-10

## 2025-02-11 RX ORDER — FLUTICASONE PROPIONATE 50 MCG
1 SPRAY, SUSPENSION (ML) NASAL DAILY
Qty: 48 G | Refills: 1 | Status: SHIPPED | OUTPATIENT
Start: 2025-02-11

## 2025-02-11 RX ORDER — SUCRALFATE ORAL 1 G/10ML
1 SUSPENSION ORAL 2 TIMES DAILY PRN
Qty: 473 ML | Refills: 0 | Status: SHIPPED | OUTPATIENT
Start: 2025-02-11

## 2025-02-13 ENCOUNTER — PATIENT OUTREACH (OUTPATIENT)
Dept: OBGYN CLINIC | Facility: HOSPITAL | Age: 65
End: 2025-02-13

## 2025-02-13 NOTE — PROGRESS NOTES
TY received an IB from  that pt surgery time for 02/20/2025 is 9:15 am, pt will need to arrive by 7:15. TY contacted pt today to inform her of same. Pt appreciative of the information and can now arrange transportation to surgery. PILARCM will remain available.

## 2025-02-17 ENCOUNTER — PATIENT OUTREACH (OUTPATIENT)
Dept: OBGYN CLINIC | Facility: HOSPITAL | Age: 65
End: 2025-02-17

## 2025-02-17 NOTE — PROGRESS NOTES
TY contacted pt today confirm all DC plans are in place. Per pt she has a ride to surgery through her insurance and her con will come to the hospital after surgery to ride home with pt. Pt son will provide caregiver support after surgery. No other needs noted. SWCM will remain available as needed.

## 2025-02-18 ENCOUNTER — PATIENT OUTREACH (OUTPATIENT)
Dept: OBGYN CLINIC | Facility: HOSPITAL | Age: 65
End: 2025-02-18

## 2025-02-19 ENCOUNTER — ANESTHESIA EVENT (OUTPATIENT)
Age: 65
End: 2025-02-19
Payer: COMMERCIAL

## 2025-02-19 NOTE — ANESTHESIA PREPROCEDURE EVALUATION
Procedure:  ARTHROPLASTY HIP TOTAL (Right: Hip)    Relevant Problems   ANESTHESIA (within normal limits)      CARDIO   (+) Essential hypertension   (+) Hyperlipemia      ENDO (within normal limits)      GI/HEPATIC (within normal limits)      /RENAL (within normal limits)      GYN (within normal limits)      HEMATOLOGY (within normal limits)      MUSCULOSKELETAL   (+) Bilateral low back pain without sciatica   (+) Lateral epicondylitis of left elbow   (+) Lumbar degenerative disc disease   (+) Osteoarthritis of carpometacarpal (CMC) joint of both thumbs   (+) Primary osteoarthritis of one hip, right      NEURO/PSYCH   (+) Anxiety   (+) Chronic pain syndrome   (+) Continuous opioid dependence (HCC)   (+) Right hand paresthesia      PULMONARY (within normal limits)      Neurology/Sleep   (+) History of lumbar spinal fusion   (+) Irritation of right radial nerve      Rheumatology   (+) Intervertebral disc prolapse      Surgery/Wound/Pain   (+) Bariatric surgery status   (+) Lumbar post-laminectomy syndrome   (+) Postgastrectomy malabsorption      Orthopedic/Musculoskeletal   (+) Greater trochanteric pain syndrome of right lower extremity   (+) Lumbar radiculopathy   (+) Lumbar stenosis        Physical Exam    Airway    Mallampati score: II  TM Distance: >3 FB  Neck ROM: full     Dental   No notable dental hx     Cardiovascular  Rhythm: regular, Rate: normal, Cardiovascular exam normal    Pulmonary  Pulmonary exam normal Breath sounds clear to auscultation    Other Findings  post-pubertal.      Anesthesia Plan  ASA Score- 3     Anesthesia Type- spinal with ASA Monitors.         Additional Monitors:     Airway Plan:     Comment: Patient has extensive lumbar spine surgery with fusion of L1-5.       Plan Factors-Exercise tolerance (METS): >4 METS.    Chart reviewed. EKG reviewed. Imaging results reviewed. Existing labs reviewed. Patient summary reviewed.                  Induction- intravenous.    Postoperative Plan- Plan  for postoperative opioid use.     Perioperative Resuscitation Plan - Level 1 - Full Code.       Informed Consent- Anesthetic plan and risks discussed with patient.  I personally reviewed this patient with the CRNA. Discussed and agreed on the Anesthesia Plan with the CRNA..      NPO Status:  No vitals data found for the desired time range.      Recent labs personally reviewed:  Lab Results   Component Value Date    WBC 4.79 01/25/2025    HGB 13.1 01/25/2025     01/25/2025     Lab Results   Component Value Date     03/02/2015    K 4.1 01/25/2025    BUN 13 01/25/2025    CREATININE 0.92 01/25/2025    GLUCOSE 122 03/02/2015     Lab Results   Component Value Date    PTT 32 01/25/2025      Lab Results   Component Value Date    INR 0.91 01/25/2025       Blood type O    Lab Results   Component Value Date    HGBA1C 5.3 01/25/2025       I, Tyler Whalen MD, have personally seen and evaluated the patient prior to anesthetic care.  I have reviewed the pre-anesthetic record, and other medical records if appropriate to the anesthetic care.  If a CRNA is involved in the case, I have reviewed the CRNA assessment, if present, and agree. Risks/benefits and alternatives discussed with patient including possible PONV, sore throat, and possibility of rare anesthetic and surgical emergencies.

## 2025-02-19 NOTE — DISCHARGE INSTR - AVS FIRST PAGE
Discharge Instructions: Hip replacement with Dr. Borges    Weight Bearing Status:                                           Weight Bearing as tolerated to right lower extremity with assistive devices.     Be sure to maintain posterior hip precautions: no bending at waist, no crossing legs, on internally rotating surgical leg    Pain Management/Medications  You may resume your usual medications.  You may stop pre-operative vitamins (Folic acid, Ferrous sulfate, and Vitamin C).   Please take the following medications:  Anti-coagulation (blood clot prevention) - Complete Lovenox injections for 28 days.   Pain medication:  Oxycodone 5 m tablet every 6 hours as needed for severe pain  Robaxin (Muscle relaxer) 500 m tablet up to 3 times a day as needed for mild pain and muscle discomfort  Tylenol 1000 mg: up to three times daily as needed for mild to moderate pain. Do not exceed 3000mg daily   Continue applying ice to your hip on and off for about 20 minutes as needed.  Continue to elevate your operative leg, with ankle above the level of your heart when possible.  If you have questions or pain concerns, please contact the office.   If you need refills of your medications prior to your next office visit, please contact the office.      Showering/Dressing Instructions:   Do not shower until first follow up appointment.  Leave bandage covering surgical incision on until seen in office.   No baths, swimming, or submerging your leg until cleared to do so.      Driving Instructions:  No driving until cleared by Orthopaedic Surgery.    PT/OT:  Continue PT/OT on outpatient basis as directed    Appt Instructions:   Follow up as scheduled on 2025 in [place]   If you need to change or cancel your appointment for any reason, please call the clinic at [phone]    Please contact the office if you experience any of the following:  Excessive bleeding (bleeding through your dressing)  Fever greater than 101 degrees  F after 48 hours (low grade fevers the day or two after surgery are normal)  Persistent nausea or vomiting  Decreased sensation or discoloration of the operative limb  Pain or swelling that is getting worse and not better with medication    Miscellaneous:  Advise use of abduction pillow (pink pillow) for 6 weeks after surgery when sleeping.    Advise against any dental cleanings or procedures for 3 months after surgery.   - If there is a dental emergency, please contact the office for further instructions.

## 2025-02-20 ENCOUNTER — ANESTHESIA (OUTPATIENT)
Age: 65
End: 2025-02-20
Payer: COMMERCIAL

## 2025-02-20 ENCOUNTER — HOSPITAL ENCOUNTER (OUTPATIENT)
Age: 65
Setting detail: OUTPATIENT SURGERY
Discharge: HOME | End: 2025-02-21
Attending: ORTHOPAEDIC SURGERY | Admitting: ORTHOPAEDIC SURGERY
Payer: COMMERCIAL

## 2025-02-20 DIAGNOSIS — M16.11 PRIMARY OSTEOARTHRITIS OF ONE HIP, RIGHT: Primary | ICD-10-CM

## 2025-02-20 LAB
GLUCOSE SERPL-MCNC: 71 MG/DL (ref 65–140)
GLUCOSE SERPL-MCNC: 96 MG/DL (ref 65–140)

## 2025-02-20 PROCEDURE — 97167 OT EVAL HIGH COMPLEX 60 MIN: CPT

## 2025-02-20 PROCEDURE — C1776 JOINT DEVICE (IMPLANTABLE): HCPCS | Performed by: ORTHOPAEDIC SURGERY

## 2025-02-20 PROCEDURE — 97530 THERAPEUTIC ACTIVITIES: CPT | Performed by: PHYSICAL THERAPIST

## 2025-02-20 PROCEDURE — 97163 PT EVAL HIGH COMPLEX 45 MIN: CPT | Performed by: PHYSICAL THERAPIST

## 2025-02-20 PROCEDURE — 82948 REAGENT STRIP/BLOOD GLUCOSE: CPT

## 2025-02-20 PROCEDURE — 27130 TOTAL HIP ARTHROPLASTY: CPT | Performed by: ORTHOPAEDIC SURGERY

## 2025-02-20 PROCEDURE — 99222 1ST HOSP IP/OBS MODERATE 55: CPT | Performed by: INTERNAL MEDICINE

## 2025-02-20 PROCEDURE — 27130 TOTAL HIP ARTHROPLASTY: CPT

## 2025-02-20 PROCEDURE — A6250 SKIN SEAL PROTECT MOISTURIZR: HCPCS | Performed by: ORTHOPAEDIC SURGERY

## 2025-02-20 PROCEDURE — NC001 PR NO CHARGE: Performed by: ORTHOPAEDIC SURGERY

## 2025-02-20 PROCEDURE — C1713 ANCHOR/SCREW BN/BN,TIS/BN: HCPCS | Performed by: ORTHOPAEDIC SURGERY

## 2025-02-20 DEVICE — CORAIL HIP SYSTEM CEMENTLESS FEMORAL STEM 12/14 AMT 135 DEGREES KS SIZE 10 HA COATED STANDARD NO COLLAR
Type: IMPLANTABLE DEVICE | Site: HIP | Status: FUNCTIONAL
Brand: CORAIL

## 2025-02-20 DEVICE — PINNACLE CANCELLOUS BONE SCREW 6.5MM X 20MM
Type: IMPLANTABLE DEVICE | Site: HIP | Status: FUNCTIONAL
Brand: PINNACLE

## 2025-02-20 DEVICE — PINNACLE HIP SOLUTIONS DUAL MOBILITY LINER PINNACLE ACETABULAR SHELL BI-MENTUM PE LINER 48/41 48MM 41/22.2
Type: IMPLANTABLE DEVICE | Site: HIP | Status: FUNCTIONAL
Brand: PINNACLE HIP SOLUTIONS

## 2025-02-20 DEVICE — ARTICUL/EZE FEMORAL HEAD DIAMETER 22.225MM +4 12/14 TAPER
Type: IMPLANTABLE DEVICE | Site: HIP | Status: FUNCTIONAL
Brand: ARTICUL/EZE

## 2025-02-20 DEVICE — BI-MENTUM ALTRX LINER 41/22
Type: IMPLANTABLE DEVICE | Site: HIP | Status: FUNCTIONAL
Brand: BI-MENTUM ALTRX

## 2025-02-20 DEVICE — PINNACLE POROCOAT ACETABULAR SHELL SECTOR II 48MM OD
Type: IMPLANTABLE DEVICE | Site: HIP | Status: FUNCTIONAL
Brand: PINNACLE POROCOAT

## 2025-02-20 RX ORDER — FAMOTIDINE 20 MG/1
40 TABLET, FILM COATED ORAL
Status: DISCONTINUED | OUTPATIENT
Start: 2025-02-21 | End: 2025-02-21 | Stop reason: HOSPADM

## 2025-02-20 RX ORDER — ALBUTEROL SULFATE 90 UG/1
2 INHALANT RESPIRATORY (INHALATION) EVERY 6 HOURS PRN
Status: DISCONTINUED | OUTPATIENT
Start: 2025-02-20 | End: 2025-02-21 | Stop reason: HOSPADM

## 2025-02-20 RX ORDER — OXYCODONE HYDROCHLORIDE 10 MG/1
10 TABLET ORAL EVERY 4 HOURS PRN
Status: DISCONTINUED | OUTPATIENT
Start: 2025-02-20 | End: 2025-02-21 | Stop reason: HOSPADM

## 2025-02-20 RX ORDER — OXYCODONE HYDROCHLORIDE 5 MG/1
5 TABLET ORAL EVERY 4 HOURS PRN
Status: DISCONTINUED | OUTPATIENT
Start: 2025-02-20 | End: 2025-02-21 | Stop reason: HOSPADM

## 2025-02-20 RX ORDER — CEFAZOLIN SODIUM 1 G/50ML
1000 SOLUTION INTRAVENOUS EVERY 8 HOURS
Status: COMPLETED | OUTPATIENT
Start: 2025-02-20 | End: 2025-02-21

## 2025-02-20 RX ORDER — LORATADINE 10 MG/1
10 TABLET ORAL DAILY
Status: DISCONTINUED | OUTPATIENT
Start: 2025-02-21 | End: 2025-02-21 | Stop reason: HOSPADM

## 2025-02-20 RX ORDER — ENOXAPARIN SODIUM 100 MG/ML
40 INJECTION SUBCUTANEOUS DAILY
Status: DISCONTINUED | OUTPATIENT
Start: 2025-02-20 | End: 2025-02-21 | Stop reason: HOSPADM

## 2025-02-20 RX ORDER — CEFAZOLIN SODIUM 2 G/50ML
2000 SOLUTION INTRAVENOUS ONCE
Status: COMPLETED | OUTPATIENT
Start: 2025-02-20 | End: 2025-02-20

## 2025-02-20 RX ORDER — BUPROPION HYDROCHLORIDE 75 MG/1
75 TABLET ORAL 2 TIMES DAILY
Status: DISCONTINUED | OUTPATIENT
Start: 2025-02-20 | End: 2025-02-21 | Stop reason: HOSPADM

## 2025-02-20 RX ORDER — FENOFIBRATE 145 MG/1
145 TABLET, COATED ORAL
Status: DISCONTINUED | OUTPATIENT
Start: 2025-02-20 | End: 2025-02-21 | Stop reason: HOSPADM

## 2025-02-20 RX ORDER — ACETAMINOPHEN 325 MG/1
975 TABLET ORAL EVERY 6 HOURS PRN
Status: DISCONTINUED | OUTPATIENT
Start: 2025-02-20 | End: 2025-02-21 | Stop reason: HOSPADM

## 2025-02-20 RX ORDER — SODIUM CHLORIDE, SODIUM LACTATE, POTASSIUM CHLORIDE, CALCIUM CHLORIDE 600; 310; 30; 20 MG/100ML; MG/100ML; MG/100ML; MG/100ML
125 INJECTION, SOLUTION INTRAVENOUS CONTINUOUS
Status: DISCONTINUED | OUTPATIENT
Start: 2025-02-20 | End: 2025-02-20

## 2025-02-20 RX ORDER — BACLOFEN 10 MG/1
10 TABLET ORAL 3 TIMES DAILY
Status: DISCONTINUED | OUTPATIENT
Start: 2025-02-20 | End: 2025-02-20 | Stop reason: ALTCHOICE

## 2025-02-20 RX ORDER — METHOCARBAMOL 500 MG/1
500 TABLET, FILM COATED ORAL EVERY 6 HOURS SCHEDULED
Status: DISCONTINUED | OUTPATIENT
Start: 2025-02-20 | End: 2025-02-21 | Stop reason: HOSPADM

## 2025-02-20 RX ORDER — CHLORHEXIDINE GLUCONATE ORAL RINSE 1.2 MG/ML
15 SOLUTION DENTAL ONCE
Status: COMPLETED | OUTPATIENT
Start: 2025-02-20 | End: 2025-02-20

## 2025-02-20 RX ORDER — HYDROMORPHONE HCL/PF 1 MG/ML
0.5 SYRINGE (ML) INJECTION
Status: DISCONTINUED | OUTPATIENT
Start: 2025-02-20 | End: 2025-02-20 | Stop reason: HOSPADM

## 2025-02-20 RX ORDER — KETOROLAC TROMETHAMINE 30 MG/ML
15 INJECTION, SOLUTION INTRAMUSCULAR; INTRAVENOUS ONCE
Status: COMPLETED | OUTPATIENT
Start: 2025-02-20 | End: 2025-02-20

## 2025-02-20 RX ORDER — CALCIUM CARBONATE 500 MG/1
1000 TABLET, CHEWABLE ORAL DAILY PRN
Status: DISCONTINUED | OUTPATIENT
Start: 2025-02-20 | End: 2025-02-21 | Stop reason: HOSPADM

## 2025-02-20 RX ORDER — ONDANSETRON 2 MG/ML
INJECTION INTRAMUSCULAR; INTRAVENOUS AS NEEDED
Status: DISCONTINUED | OUTPATIENT
Start: 2025-02-20 | End: 2025-02-20

## 2025-02-20 RX ORDER — SODIUM CHLORIDE, SODIUM LACTATE, POTASSIUM CHLORIDE, CALCIUM CHLORIDE 600; 310; 30; 20 MG/100ML; MG/100ML; MG/100ML; MG/100ML
100 INJECTION, SOLUTION INTRAVENOUS CONTINUOUS
Status: DISCONTINUED | OUTPATIENT
Start: 2025-02-20 | End: 2025-02-21 | Stop reason: HOSPADM

## 2025-02-20 RX ORDER — DOCUSATE SODIUM 100 MG/1
100 CAPSULE, LIQUID FILLED ORAL 2 TIMES DAILY
Status: DISCONTINUED | OUTPATIENT
Start: 2025-02-20 | End: 2025-02-21 | Stop reason: HOSPADM

## 2025-02-20 RX ORDER — METHOCARBAMOL 500 MG/1
500 TABLET, FILM COATED ORAL 3 TIMES DAILY PRN
Qty: 60 TABLET | Refills: 0 | Status: SHIPPED | OUTPATIENT
Start: 2025-02-20

## 2025-02-20 RX ORDER — ACETAMINOPHEN 500 MG
1000 TABLET ORAL EVERY 6 HOURS PRN
Qty: 90 TABLET | Refills: 0 | Status: SHIPPED | OUTPATIENT
Start: 2025-02-20

## 2025-02-20 RX ORDER — GABAPENTIN 100 MG/1
100 CAPSULE ORAL EVERY 8 HOURS
Status: DISCONTINUED | OUTPATIENT
Start: 2025-02-20 | End: 2025-02-21 | Stop reason: HOSPADM

## 2025-02-20 RX ORDER — ESCITALOPRAM OXALATE 10 MG/1
20 TABLET ORAL EVERY MORNING
Status: DISCONTINUED | OUTPATIENT
Start: 2025-02-20 | End: 2025-02-21 | Stop reason: HOSPADM

## 2025-02-20 RX ORDER — OXYBUTYNIN CHLORIDE 5 MG/1
5 TABLET ORAL DAILY
Status: DISCONTINUED | OUTPATIENT
Start: 2025-02-20 | End: 2025-02-21 | Stop reason: HOSPADM

## 2025-02-20 RX ORDER — DIPHENHYDRAMINE HYDROCHLORIDE 50 MG/ML
12.5 INJECTION INTRAMUSCULAR; INTRAVENOUS ONCE AS NEEDED
Status: DISCONTINUED | OUTPATIENT
Start: 2025-02-20 | End: 2025-02-20 | Stop reason: HOSPADM

## 2025-02-20 RX ORDER — LIDOCAINE HYDROCHLORIDE 10 MG/ML
0.5 INJECTION, SOLUTION EPIDURAL; INFILTRATION; INTRACAUDAL; PERINEURAL ONCE AS NEEDED
Status: DISCONTINUED | OUTPATIENT
Start: 2025-02-20 | End: 2025-02-20 | Stop reason: HOSPADM

## 2025-02-20 RX ORDER — PROPOFOL 10 MG/ML
INJECTION, EMULSION INTRAVENOUS CONTINUOUS PRN
Status: DISCONTINUED | OUTPATIENT
Start: 2025-02-20 | End: 2025-02-20

## 2025-02-20 RX ORDER — HYDROMORPHONE HCL IN WATER/PF 6 MG/30 ML
0.2 PATIENT CONTROLLED ANALGESIA SYRINGE INTRAVENOUS
Status: DISCONTINUED | OUTPATIENT
Start: 2025-02-20 | End: 2025-02-20 | Stop reason: HOSPADM

## 2025-02-20 RX ORDER — PANTOPRAZOLE SODIUM 40 MG/1
40 TABLET, DELAYED RELEASE ORAL
Status: DISCONTINUED | OUTPATIENT
Start: 2025-02-21 | End: 2025-02-21 | Stop reason: HOSPADM

## 2025-02-20 RX ORDER — TRANEXAMIC ACID 10 MG/ML
1000 INJECTION, SOLUTION INTRAVENOUS ONCE
Status: COMPLETED | OUTPATIENT
Start: 2025-02-20 | End: 2025-02-20

## 2025-02-20 RX ORDER — ONDANSETRON 2 MG/ML
4 INJECTION INTRAMUSCULAR; INTRAVENOUS ONCE AS NEEDED
Status: DISCONTINUED | OUTPATIENT
Start: 2025-02-20 | End: 2025-02-20 | Stop reason: HOSPADM

## 2025-02-20 RX ORDER — HYDROMORPHONE HCL/PF 1 MG/ML
0.5 SYRINGE (ML) INJECTION EVERY 2 HOUR PRN
Status: DISCONTINUED | OUTPATIENT
Start: 2025-02-20 | End: 2025-02-21 | Stop reason: HOSPADM

## 2025-02-20 RX ORDER — OXYCODONE HYDROCHLORIDE 5 MG/1
5 TABLET ORAL EVERY 6 HOURS PRN
Qty: 30 TABLET | Refills: 0 | Status: SHIPPED | OUTPATIENT
Start: 2025-02-20 | End: 2025-03-02

## 2025-02-20 RX ORDER — FENTANYL CITRATE/PF 50 MCG/ML
50 SYRINGE (ML) INJECTION
Status: DISCONTINUED | OUTPATIENT
Start: 2025-02-20 | End: 2025-02-20 | Stop reason: HOSPADM

## 2025-02-20 RX ORDER — ONDANSETRON 2 MG/ML
4 INJECTION INTRAMUSCULAR; INTRAVENOUS EVERY 6 HOURS PRN
Status: DISCONTINUED | OUTPATIENT
Start: 2025-02-20 | End: 2025-02-21 | Stop reason: HOSPADM

## 2025-02-20 RX ORDER — MIDAZOLAM HYDROCHLORIDE 2 MG/2ML
INJECTION, SOLUTION INTRAMUSCULAR; INTRAVENOUS AS NEEDED
Status: DISCONTINUED | OUTPATIENT
Start: 2025-02-20 | End: 2025-02-20

## 2025-02-20 RX ORDER — DEXAMETHASONE SODIUM PHOSPHATE 10 MG/ML
INJECTION, SOLUTION INTRAMUSCULAR; INTRAVENOUS AS NEEDED
Status: DISCONTINUED | OUTPATIENT
Start: 2025-02-20 | End: 2025-02-20

## 2025-02-20 RX ORDER — METOCLOPRAMIDE HYDROCHLORIDE 5 MG/ML
10 INJECTION INTRAMUSCULAR; INTRAVENOUS ONCE AS NEEDED
Status: DISCONTINUED | OUTPATIENT
Start: 2025-02-20 | End: 2025-02-20 | Stop reason: HOSPADM

## 2025-02-20 RX ADMIN — MEPIVACAINE HYDROCHLORIDE 3.5 ML: 15 INJECTION, SOLUTION EPIDURAL; INFILTRATION at 07:31

## 2025-02-20 RX ADMIN — TRANEXAMIC ACID 1000 MG: 10 INJECTION, SOLUTION INTRAVENOUS at 07:37

## 2025-02-20 RX ADMIN — FENOFIBRATE 145 MG: 145 TABLET ORAL at 16:45

## 2025-02-20 RX ADMIN — METHOCARBAMOL 500 MG: 500 TABLET ORAL at 11:27

## 2025-02-20 RX ADMIN — SODIUM CHLORIDE, SODIUM LACTATE, POTASSIUM CHLORIDE, AND CALCIUM CHLORIDE 125 ML/HR: .6; .31; .03; .02 INJECTION, SOLUTION INTRAVENOUS at 11:30

## 2025-02-20 RX ADMIN — ACETAMINOPHEN 325MG 975 MG: 325 TABLET ORAL at 10:24

## 2025-02-20 RX ADMIN — FENTANYL CITRATE 50 MCG: 50 INJECTION INTRAMUSCULAR; INTRAVENOUS at 09:49

## 2025-02-20 RX ADMIN — KETOROLAC TROMETHAMINE 15 MG: 30 INJECTION, SOLUTION INTRAMUSCULAR at 16:45

## 2025-02-20 RX ADMIN — DOCUSATE SODIUM 100 MG: 100 CAPSULE, LIQUID FILLED ORAL at 18:14

## 2025-02-20 RX ADMIN — CEFAZOLIN SODIUM 1000 MG: 1 SOLUTION INTRAVENOUS at 23:59

## 2025-02-20 RX ADMIN — OXYCODONE HYDROCHLORIDE 5 MG: 5 TABLET ORAL at 10:24

## 2025-02-20 RX ADMIN — ESCITALOPRAM OXALATE 20 MG: 10 TABLET ORAL at 10:24

## 2025-02-20 RX ADMIN — GABAPENTIN 100 MG: 100 CAPSULE ORAL at 13:06

## 2025-02-20 RX ADMIN — SODIUM CHLORIDE, SODIUM LACTATE, POTASSIUM CHLORIDE, AND CALCIUM CHLORIDE 125 ML/HR: .6; .31; .03; .02 INJECTION, SOLUTION INTRAVENOUS at 06:29

## 2025-02-20 RX ADMIN — OXYCODONE HYDROCHLORIDE 5 MG: 5 TABLET ORAL at 19:46

## 2025-02-20 RX ADMIN — CEFAZOLIN SODIUM 2000 MG: 2 SOLUTION INTRAVENOUS at 07:32

## 2025-02-20 RX ADMIN — ONDANSETRON 4 MG: 2 INJECTION INTRAMUSCULAR; INTRAVENOUS at 18:15

## 2025-02-20 RX ADMIN — CHLORHEXIDINE GLUCONATE 15 ML: 1.2 SOLUTION ORAL at 06:17

## 2025-02-20 RX ADMIN — METHOCARBAMOL 500 MG: 500 TABLET ORAL at 18:14

## 2025-02-20 RX ADMIN — DOCUSATE SODIUM 100 MG: 100 CAPSULE, LIQUID FILLED ORAL at 10:24

## 2025-02-20 RX ADMIN — ENOXAPARIN SODIUM 40 MG: 40 INJECTION SUBCUTANEOUS at 19:47

## 2025-02-20 RX ADMIN — PROPOFOL 80 MCG/KG/MIN: 10 INJECTION, EMULSION INTRAVENOUS at 07:32

## 2025-02-20 RX ADMIN — BUPROPION HYDROCHLORIDE 75 MG: 75 TABLET, FILM COATED ORAL at 18:14

## 2025-02-20 RX ADMIN — HYDROMORPHONE HYDROCHLORIDE 0.5 MG: 1 INJECTION, SOLUTION INTRAMUSCULAR; INTRAVENOUS; SUBCUTANEOUS at 11:28

## 2025-02-20 RX ADMIN — MIDAZOLAM 2 MG: 1 INJECTION INTRAMUSCULAR; INTRAVENOUS at 07:24

## 2025-02-20 RX ADMIN — SODIUM CHLORIDE 1000 ML: 0.9 INJECTION, SOLUTION INTRAVENOUS at 15:57

## 2025-02-20 RX ADMIN — BUPROPION HYDROCHLORIDE 75 MG: 75 TABLET, FILM COATED ORAL at 13:06

## 2025-02-20 RX ADMIN — HYDROMORPHONE HYDROCHLORIDE 0.5 MG: 1 INJECTION, SOLUTION INTRAMUSCULAR; INTRAVENOUS; SUBCUTANEOUS at 18:46

## 2025-02-20 RX ADMIN — SODIUM CHLORIDE, SODIUM LACTATE, POTASSIUM CHLORIDE, AND CALCIUM CHLORIDE 125 ML/HR: .6; .31; .03; .02 INJECTION, SOLUTION INTRAVENOUS at 09:30

## 2025-02-20 RX ADMIN — ONDANSETRON 4 MG: 2 INJECTION INTRAMUSCULAR; INTRAVENOUS at 08:00

## 2025-02-20 RX ADMIN — ACETAMINOPHEN 325MG 975 MG: 325 TABLET ORAL at 19:47

## 2025-02-20 RX ADMIN — PHENYLEPHRINE HYDROCHLORIDE 30 MCG/MIN: 10 INJECTION INTRAVENOUS at 07:33

## 2025-02-20 RX ADMIN — SODIUM CHLORIDE, SODIUM LACTATE, POTASSIUM CHLORIDE, AND CALCIUM CHLORIDE 125 ML/HR: .6; .31; .03; .02 INJECTION, SOLUTION INTRAVENOUS at 17:01

## 2025-02-20 RX ADMIN — SODIUM CHLORIDE, SODIUM LACTATE, POTASSIUM CHLORIDE, AND CALCIUM CHLORIDE 125 ML/HR: .6; .31; .03; .02 INJECTION, SOLUTION INTRAVENOUS at 10:18

## 2025-02-20 RX ADMIN — KETOROLAC TROMETHAMINE 15 MG: 30 INJECTION, SOLUTION INTRAMUSCULAR at 14:05

## 2025-02-20 RX ADMIN — DEXAMETHASONE SODIUM PHOSPHATE 10 MG: 10 INJECTION INTRAMUSCULAR; INTRAVENOUS at 08:00

## 2025-02-20 RX ADMIN — OXYBUTYNIN CHLORIDE 5 MG: 5 TABLET ORAL at 10:24

## 2025-02-20 RX ADMIN — FENTANYL CITRATE 50 MCG: 50 INJECTION INTRAMUSCULAR; INTRAVENOUS at 09:55

## 2025-02-20 RX ADMIN — CEFAZOLIN SODIUM 1000 MG: 1 SOLUTION INTRAVENOUS at 15:52

## 2025-02-20 NOTE — ANESTHESIA POSTPROCEDURE EVALUATION
Post-Op Assessment Note    CV Status:  Stable  Pain Score: 0    Pain management: adequate       Mental Status:  Alert and awake   Hydration Status:  Euvolemic   PONV Controlled:  Controlled   Airway Patency:  Patent     Post Op Vitals Reviewed: Yes    No anethesia notable event occurred.    Staff: Anesthesiologist, CRNA           Last Filed PACU Vitals:  Vitals Value Taken Time   Temp 98.2    Pulse 69 02/20/25 0835   /58 02/20/25 0835   Resp 14 02/20/25 0835   SpO2 100 % 02/20/25 0835   Vitals shown include unfiled device data.

## 2025-02-20 NOTE — OCCUPATIONAL THERAPY NOTE
Occupational Therapy Evaluation     Patient Name: Denita Brown  Today's Date: 2/20/2025  Problem List  Principal Problem:    Primary osteoarthritis of one hip, right    Past Medical History  Past Medical History:   Diagnosis Date    Abdominal pain, epigastric 03/23/2018    Added automatically from request for surgery [id number]    Abnormal x-ray of lumbar spine 11/03/2015    Acute cystitis with hematuria 04/07/2020    Bariatric surgery status     Basal cell carcinoma     basil cell carcinoma- in remission    Benign essential hypertension 06/12/2012    Bone bruise 11/02/2023    Breakdown (mechanical) of internal fixation device of vertebrae, initial encounter ([facility]) 03/01/2019    Calculus of bile duct with cholecystitis without obstruction 04/27/2018    Added automatically from request for surgery [id number]    Cellulitis of finger 12/03/2015    Closed fracture of left distal fibula 10/28/2023    Degenerative lumbar disc     Digital mucinous cyst 06/24/2015    DMII (diabetes mellitus, type 2) ([facility]) 11/08/2013    GERD (gastroesophageal reflux disease)     Gross hematuria 03/19/2019    Hiatal hernia     History of COVID-19 12/22/2021    History of transfusion 2014    Post-op spinal surgery    Hyperlipidemia     Low back pain     Lower urinary tract infectious disease 03/27/2015    Medicare annual wellness visit, initial 11/27/2019    Obesity     Resolved 10/6/2016     Other closed fracture of distal end of left fibula, initial encounter 10/28/2023    Overactive bladder     Postsurgical malabsorption     Recurrent UTI 03/19/2019    Spinal stenosis     SVT (supraventricular tachycardia) ([facility])     Tachycardia     Resolved 5/4/2016     Type 2 diabetes mellitus ([facility])     Last assesssed 1/18/2018     Wears glasses      Past Surgical History  Past Surgical History:   Procedure Laterality Date    APPENDECTOMY      ARTHRODESIS      Lumbar - Last assessed 1/4/2018     BACK SURGERY      Lumbar (3 surgeries)- two levels fused, clean  out from infection, then fusion of 7 levels (last surgery in )    CARDIAC ELECTROPHYSIOLOGY STUDY AND ABLATION      CARPAL TUNNEL RELEASE      x2    CERVICAL SPINE SURGERY      Fusion of C2-C7 over a period of 3 surgeries ( first)     SECTION      X2    CHOLECYSTECTOMY      FL MYELOGRAM LUMBAR  2019    HIP ARTHROPLASTY Right 2025    INSERTION / PLACEMENT / REVISION NEUROSTIMULATOR      X2- both were removed    NECK SURGERY      OTHER SURGICAL HISTORY      Catheter ablation     NC EGD TRANSORAL BIOPSY SINGLE/MULTIPLE N/A 2016    Procedure: ESOPHAGOGASTRODUODENOSCOPY (EGD);  Surgeon: Tanya Orta MD;  Location: [facility];  Service: Bariatrics    NC EGD TRANSORAL BIOPSY SINGLE/MULTIPLE N/A 2018    Procedure: ESOPHAGOGASTRODUODENOSCOPY (EGD) with biopsy;  Surgeon: Tanya Orta MD;  Location: [facility];  Service: Bariatrics    NC LAPAROSCOPY SURG CHOLECYSTECTOMY N/A 2018    Procedure: CHOLECYSTECTOMY LAPAROSCOPIC;  Surgeon: Tanya Orta MD;  Location: [facility];  Service: Bariatrics    NC LAPS GSTR RSTCV PX W/BYP BRAULIO-EN-Y LIMB <150 CM N/A 10/25/2016    Procedure: BYPASS GASTRIC  BRAULIO-EN-Y LAPAROSCOPIC, WITH INTRA OP EGD;  Surgeon: Tanya Orta MD;  Location: [facility];  Service: Bariatrics    SKIN CANCER EXCISION      TONSILLECTOMY      TUBAL LIGATION      US GUIDED BREAST BIOPSY RIGHT COMPLETE Right 2023    WISDOM TOOTH EXTRACTION          25 1415   OT Last Visit   OT Visit Date 25   Note Type   Note type Evaluation   Additional Comments pt greeted OOB in recliner, agreeable to OT evaluation   Pain Assessment   Pain Assessment Tool 0-10   Pain Score 8   Pain Location/Orientation Orientation: Right;Location: Hip   Hospital Pain Intervention(s) Repositioned;Ambulation/increased activity;Emotional support;Rest   Restrictions/Precautions   Weight Bearing Precautions Per Order Yes   RLE Weight Bearing Per Order WBAT   Braces or Orthoses Other  (Comment)  (hip abduction pillow)   Other Precautions Chair Alarm;Bed Alarm;WBS;THR;Multiple lines;Fall Risk;Pain  (posterior hip precautions)   Home Living   Type of Home House  (bilevel house, 0 PATRICK through garage)   Home Layout Multi-level;1/2 bath on main level;Bed/bath upstairs;Performs ADLs on one level  (7 L rail + 7 L rail steps inside)   Bathroom Shower/Tub Tub/shower unit   Bathroom Toilet Standard  (w/ BSC)   Bathroom Equipment Shower chair   Bathroom Accessibility Accessible   Home Equipment Walker;Cane   Additional Comments pt perfers to stay upstairs with full bedroom and bathroom, kitchen and recliner.   Prior Function   Level of [place] Independent with ADLs;Independent with functional mobility;Independent with IADLS   Lives With Son   Receives Help From Family   IADLs Independent with driving;Independent with meal prep;Independent with medication management   Falls in the last 6 months 1 to 4  (1x)   Vocational Full time employment  ()   Comments no AD use at baseline   Lifestyle   Autonomy Independent with all ADLs/IADLs, no AD use at baseline   Reciprocal Relationships Son   Service to Others FTE   Intrinsic Gratification watch movies, shopping   General   Family/Caregiver Present No   ADL   Where Assessed Chair   Eating Assistance 5  Supervision/Setup   Grooming Assistance 5  Supervision/Setup   UB Bathing Assistance 5  Supervision/Setup   LB Bathing Assistance 4  Minimal Assistance   UB Dressing Assistance 5  Supervision/Setup   LB Dressing Assistance 4  Minimal Assistance   Toileting Assistance  4  Minimal Assistance   Bed Mobility   Supine to Sit Unable to assess   Sit to Supine 4  Minimal assistance   Additional items Assist x 1;HOB elevated;Bedrails;Increased time required;Verbal cues;LE management   Additional Comments pt greeted in chair, ended session supine in bed. BP in chair: 90/58 (-) symptoms, BP standin/64, RN aware.   Transfers   Sit to Stand 5  Supervision    Additional items Increased time required;Verbal cues;Armrests  (RW)   Stand to Sit 5  Supervision   Additional items Increased time required;Verbal cues;HOB elevated  (RW)   Additional Comments verbal cues for hand placement and safety with use of RW   Functional Mobility   Functional Mobility 5  Supervision   Additional Comments Pt completed functional mobility functional household distance with use of RW and S   Additional items Rolling walker   Balance   Static Sitting Good   Dynamic Sitting Fair +   Static Standing Fair   Dynamic Standing Fair -   Ambulatory Fair -   Activity Tolerance   Activity Tolerance Patient limited by pain   Medical Staff Made Aware PT Ali, Pt seen for co-evaluation/treatment with skilled Physical Therapy due to pt's medical complexity, decreased endurance, overall functional level, overall safety, and post surgical day #0.   Nurse Made Aware RN Flores   RUE Assessment   RUE Assessment WFL   LUE Assessment   LUE Assessment WFL   Hand Function   Gross Motor Coordination Functional   Fine Motor Coordination Functional   Cognition   Overall Cognitive Status WFL   Arousal/Participation Alert;Cooperative   Attention Within functional limits   Orientation Level Oriented X4   Memory Within functional limits   Following Commands Follows all commands and directions without difficulty   Comments Cooperative and pleasant, but in increased pain, RN aware, Pt able to name 1/3 precautions during beginning of session.   Assessment   Limitation Decreased ADL status;Decreased endurance;Decreased self-care trans;Decreased high-level ADLs   Prognosis Good   Assessment Pt is a 64 y.o. female seen for OT evaluation s/p adm to [facility] on 2/20/2025 w/ Primary osteoarthritis of one hip, right . Comorbidities affecting pt’s functional performance include a significant PMH of GERD, hx of transfusion, spinal stenosis, SVT, tachycardia, DM, HTN, bariatric surgery, DDD. Pt with active OT orders and activity  orders for Activity beginning POD #0. WBAT RLE. Posterior hip precautions. Pt lives with adult son in a bilevel house with 3 PATRICK. Pt has 7 steps to 2nd floor w/ tub/shower and standard toilet with BSC over. At baseline, pt was independent with all ADLs/IADLs. Pt completed sit to  stand with S. Pt completed functional mobility functional household distance with use of RW and S. Pt able to maintain posterior hip precautions with good carryover during session. Pt completed sit to supine with Milady. Increased pain during session RN aware. Upon evaluation, pt currently requires S for UB ADLs, Milady for LB ADLs, S for toileting, Milady for bed mobility, S for functional mobility, and S for transfers 2* the following deficits impacting occupational performance: weakness, decreased strength , decreased balance, decreased activity tolerance, increased pain, and orthopedic restrictions. These impairments, as well at pt’s personal factors of: PATRICK home environment, steps within home environment, difficulty performing ADLs, difficulty performing IADLs, WBS, fall risk , and new use of AD for functional transfers/mobility limit pt’s ability to safely engage in all baseline areas of occupation. Based on the aforementioned OT evaluation, functional performance deficits, and assessments, pt has been identified as a high complexity evaluation. Pt to continue to benefit from continued acute OT services during hospital stay to address defined deficits and to maximize level of functional independence in the following Occupational Performance areas: grooming, bathing/shower, toilet hygiene, dressing, health maintenance, functional mobility, community mobility, clothing management, cleaning, household maintenance, and job performance/volunteering. From OT standpoint, recommend III; Minimum Resource Intensity (pending progress) upon D/C. OT will continue to follow pt 3-5x/wk.   Goals   Patient Goals to have less pain   STG Time Frame 1-3    Short Term Goal #1 Pt will improve activity tolerance to G for min 30 min treatment sessions for increase engagement in functional tasks   Short Term Goal #2 Pt will complete bed mobility at a mod I level w/ G balance/safety demonstrated to decrease caregiver assistance required   Short Term Goal  Pt will complete LB dressing/self care w/ mod I using adaptive device and DME as needed   LTG Time Frame 3-7   Long Term Goal #1 Pt will complete toileting w/ mod I w/ G hygiene/thoroughness using DME as needed   Long Term Goal #2 Pt will improve functional transfers to mod I on/off all surfaces using DME as needed w/ G balance/safety   Long Term Goal Pt will improve functional mobility during ADL/IADL/leisure tasks to mod I using DME as needed w/ G balance/safety   Plan   Treatment Interventions ADL retraining;Functional transfer training;Endurance training;Patient/family training;Equipment evaluation/education;Compensatory technique education;Continued evaluation;Energy conservation;Activityengagement   Goal Expiration Date 02/27/25   OT Treatment Day 0   OT Frequency 3-5x/wk   Discharge Recommendation   Rehab Resource Intensity Level, OT No post-acute rehabilitation needs   Additional Comments  The patient's raw score on the AM-PAC Daily Activity Inpatient Short Form is 19 . A raw score of greater than or equal to 19 suggests the patient may benefit from discharge to home. Please refer to the recommendation of the Occupational Therapist for safe discharge planning.   AM-PAC Daily Activity Inpatient   Lower Body Dressing 3   Bathing 3   Toileting 3   Upper Body Dressing 3   Grooming 3   Eating 4   Daily Activity Raw Score 19   Daily Activity Standardized Score (Calc for Raw Score >=11) 40.22   AM-PAC Applied Cognition Inpatient   Following a Speech/Presentation 4   Understanding Ordinary Conversation 4   Taking Medications 4   Remembering Where Things Are Placed or Put Away 4   Remembering List of 4-5 Errands 4    Taking Care of Complicated Tasks 4   Applied Cognition Raw Score 24   Applied Cognition Standardized Score 62.21   End of Consult   Education Provided Yes   Patient Position at End of Consult Supine;Bed/Chair alarm activated;All needs within reach   Nurse Communication Nurse aware of consult   End of Consult Comments Pt lying supine with bed alarm activated at end of session. Call bell and phone within reach. All needs met and pt reports no further questions for OT at this time.   Soraya Farr, OT

## 2025-02-20 NOTE — NURSING NOTE
Pt still having lots of pain. 8/10 @ 1636  Pt takes dilaudid at home.  Spoke with Carmen about Toradol.   Gave 1 dose already 1405  Bp is low 98/58   hr 81 started a bolus  Got the ok it give second dose toradol per Carmen   Will continue to monitor  Will be Giving IV dilaudid for continued pain

## 2025-02-20 NOTE — PLAN OF CARE
Problem: PHYSICAL THERAPY ADULT  Goal: Performs mobility at highest level of function for planned discharge setting.  See evaluation for individualized goals.  Description: Treatment/Interventions: Elevations, Functional transfer training, LE strengthening/ROM, Therapeutic exercise, Bed mobility, Gait training, Spoke to nursing, OT  Equipment Recommended: Walker (pt owns)       See flowsheet documentation for full assessment, interventions and recommendations.  Note: Prognosis: Good  Problem List: Decreased strength, Decreased range of motion, Decreased endurance, Impaired balance, Decreased mobility, Orthopedic restrictions, Pain  Assessment: Pt is a 64 y.o. female who presented to [facility] on [facility]/2025 s/p R KENNEDY, posterior approach done by Dr. Borges. Precautions include no hip flex. >90 deg., no IR, no adduction past neutral. Pt has a past medical history of Abdominal pain, epigastric (03/23/2018), Abnormal x-ray of lumbar spine (11/03/2015), Acute cystitis with hematuria (04/07/2020), Bariatric surgery status, Basal cell carcinoma, HTN. Pt greeted in recliner chair for PT evaluation on 02/20/25. Pt referred to PT for functional mobility evaluation & D/C planning w/ orders of activity beginning POD #0 and activity as tolerated. PTA, pt reports being I w/o AD and I w/ IADLs. Personal factors affecting pt at time of IE include: lives in 2 story house and stairs to enter home. Please find objective findings from PT assessment regarding body systems outlined above with impairments and limitations including weakness, decreased ROM, impaired balance, decreased endurance, gait deviations, pain, decreased activity tolerance, decreased functional mobility tolerance, fall risk, and orthopedic restrictions.  Please see flow sheet above for objective findings and level of assistance required for safe completion of functional tasks. Pt able to perform sit<> stand with RW and S. Pt complaint with post. Hip precautions throughout  session, but needed to be educated on precautions prior to activity. Pt able to ambulate 40'x2 with RW and S. Pt needed cues for proper sequencing during ambulation with RW. Please see additional treatment session for continued functional mobility following evaluation.  Pt demonstrated dec endurance and tolerance to activity. Denies reports of dizziness or SOB t/o session. Patient was left supine in bed with call bell and all needs within reach. Pt was educated on fall precautions and reinforced w/ good understanding. Based on pt presentation and impaired function, pt would benefit from level III, (minimal resource intensity) at D/C. From PT/mobility standpoint, pt would benefit from OPPT to address deficits as defined above and maximize level of independence and return pt to PLOF. CM to follow. Nsg staff to continue to mobilized pt (OOB in chair for all meals & ambulate in room/unit) as tolerated to prevent further decline in function. Nsg notified. Co-eval performed with OT to complete this evaluation for the pts best interest given pts medical complexity and functional level.  Barriers to Discharge: Inaccessible home environment (PATRICK)     Rehab Resource Intensity Level, PT: III (Minimum Resource Intensity)    See flowsheet documentation for full assessment.         Abbe Flap (Upper To Lower Lip) Text: The defect of the lower lip was assessed and measured.  Given the location and size of the defect, an Abbe flap was deemed most appropriate. Using a sterile surgical marker, an appropriate Abbe flap was measured and drawn on the upper lip. Local anesthesia was then infiltrated.  A scalpel was then used to incise the upper lip through and through the skin, vermilion, muscle and mucosa, leaving the flap pedicled on the opposite side.  The flap was then rotated and transferred to the lower lip defect.  The flap was then sutured into place with a three layer technique, closing the orbicularis oris muscle layer with subcutaneous buried sutures, followed by a mucosal layer and an epidermal layer.

## 2025-02-20 NOTE — PHYSICAL THERAPY NOTE
PT EVALUATION and TREATMENT    Pt. Name: Denita Brown  Pt. Age: 64 y.o.  MRN: [id number]  LENGTH OF STAY: 0    Patient Active Problem List   Diagnosis    Hyperlipemia    Postgastrectomy malabsorption    Chronic pain syndrome    History of lumbar spinal fusion    Lumbar radiculopathy    Atrophic vaginitis    Lumbar post-laminectomy syndrome    Anxiety    Bilateral low back pain without sciatica    Intervertebral disc prolapse    Irritation of right radial nerve    Lateral epicondylitis of left elbow    Lumbar degenerative disc disease    Vitamin D insufficiency    Lumbar stenosis    Bariatric surgery status    Primary osteoarthritis of one hip, right    Greater trochanteric pain syndrome of right lower extremity    Essential hypertension    Right hand paresthesia    Continuous opioid dependence (HCC)    Chronic congestion of paranasal sinus    Left hand pain    Osteoarthritis of carpometacarpal (CMC) joint of both thumbs    Bilateral foot pain    Bilateral ankle pain, unspecified chronicity       Admitting Diagnoses:   Primary osteoarthritis of one hip, right [M16.11]    Past Medical History:   Diagnosis Date    Abdominal pain, epigastric 03/23/2018    Added automatically from request for surgery [id number]    Abnormal x-ray of lumbar spine 11/03/2015    Acute cystitis with hematuria 04/07/2020    Bariatric surgery status     Basal cell carcinoma     basil cell carcinoma- in remission    Benign essential hypertension 06/12/2012    Bone bruise 11/02/2023    Breakdown (mechanical) of internal fixation device of vertebrae, initial encounter ([facility]) 03/01/2019    Calculus of bile duct with cholecystitis without obstruction 04/27/2018    Added automatically from request for surgery [id number]    Cellulitis of finger 12/03/2015    Closed fracture of left distal fibula 10/28/2023    Degenerative lumbar disc     Digital mucinous cyst 06/24/2015    DMII (diabetes mellitus, type 2) ([facility]) 11/08/2013    GERD (gastroesophageal  reflux disease)     Gross hematuria 2019    Hiatal hernia     History of COVID-19 2021    History of transfusion     Post-op spinal surgery    Hyperlipidemia     Low back pain     Lower urinary tract infectious disease 2015    Medicare annual wellness visit, initial 2019    Obesity     Resolved 10/6/2016     Other closed fracture of distal end of left fibula, initial encounter 10/28/2023    Overactive bladder     Postsurgical malabsorption     Recurrent UTI 2019    Spinal stenosis     SVT (supraventricular tachycardia) ([facility])     Tachycardia     Resolved 2016     Type 2 diabetes mellitus (HCC)     Last assesssed 2018     Wears glasses        Past Surgical History:   Procedure Laterality Date    APPENDECTOMY      ARTHRODESIS      Lumbar - Last assessed 2018     BACK SURGERY      Lumbar (3 surgeries)- two levels fused, clean out from infection, then fusion of 7 levels (last surgery in )    CARDIAC ELECTROPHYSIOLOGY STUDY AND ABLATION      CARPAL TUNNEL RELEASE      x2    CERVICAL SPINE SURGERY      Fusion of C2-C7 over a period of 3 surgeries ( first)     SECTION      X2    CHOLECYSTECTOMY      FL MYELOGRAM LUMBAR  2019    HIP ARTHROPLASTY Right 2025    INSERTION / PLACEMENT / REVISION NEUROSTIMULATOR      X2- both were removed    NECK SURGERY      OTHER SURGICAL HISTORY      Catheter ablation     GA EGD TRANSORAL BIOPSY SINGLE/MULTIPLE N/A 2016    Procedure: ESOPHAGOGASTRODUODENOSCOPY (EGD);  Surgeon: Tanya Orta MD;  Location: [facility];  Service: Bariatrics    GA EGD TRANSORAL BIOPSY SINGLE/MULTIPLE N/A 2018    Procedure: ESOPHAGOGASTRODUODENOSCOPY (EGD) with biopsy;  Surgeon: Tanya Orta MD;  Location: [facility];  Service: Bariatrics    GA LAPAROSCOPY SURG CHOLECYSTECTOMY N/A 2018    Procedure: CHOLECYSTECTOMY LAPAROSCOPIC;  Surgeon: Tanya Orta MD;  Location: [facility];  Service: Bariatrics    GA LAPS GSTR  RSTCV PX W/BYP BRAULIO-EN-Y LIMB <150 CM N/A 10/25/2016    Procedure: BYPASS GASTRIC  BRAULIO-EN-Y LAPAROSCOPIC, WITH INTRA OP EGD;  Surgeon: Tanya Orta MD;  Location: [facility];  Service: Bariatrics    SKIN CANCER EXCISION      TONSILLECTOMY      TUBAL LIGATION      US GUIDED BREAST BIOPSY RIGHT COMPLETE Right 07/12/2023    WISDOM TOOTH EXTRACTION         Imaging Studies:  No orders to display        02/20/25 1416   PT Last Visit   PT Visit Date 02/20/25   Note Type   Note type Evaluation and Treatment   Additional Comments pt greeted in recliner chair   Pain Assessment   Pain Assessment Tool 0-10   Pain Score 8   Pain Location/Orientation Orientation: Right;Location: Hip   Hospital Pain Intervention(s) Repositioned;Ambulation/increased activity;Elevated;Emotional support;Rest   Restrictions/Precautions   Weight Bearing Precautions Per Order Yes   RLE Weight Bearing Per Order WBAT   Braces or Orthoses   (hip abduction pillow)   Other Precautions WBS;THR;Chair Alarm;Bed Alarm;Fall Risk;Pain  (posterior hip precautions)   Home Living   Type of Home House  (bilevel home, 0 steps from the garage)   Home Layout Multi-level;Performs ADLs on one level;Able to live on main level with bedroom/bathroom;Bed/bath upstairs  (7 L rail + 7 L rail steps inside)   Bathroom Shower/Tub Tub/shower unit   Bathroom Toilet Standard  (with BSC over toilet)   Bathroom Equipment Shower chair   Bathroom Accessibility Accessible   Home Equipment Walker;Cane   Additional Comments pt perfers to stay upstairs with full bedroom and bathroom, kitchen and recliner.   Prior Function   Level of [place] Independent with ADLs;Independent with functional mobility;Independent with IADLS   Lives With Son   Receives Help From Family   IADLs Independent with driving;Independent with meal prep;Independent with medication management   Falls in the last 6 months 1 to 4  (1x)   Vocational Full time employment  ()   Comments no AD use of  baseline   General   Family/Caregiver Present No   Cognition   Overall Cognitive Status WFL   Arousal/Participation Alert   Attention Within functional limits   Orientation Level Oriented X4   Following Commands Follows all commands and directions without difficulty   Comments pt pleasant, but in pain   RUE Assessment   RUE Assessment   (see OT note)   LUE Assessment   LUE Assessment   (see OT note)   RLE Assessment   RLE Assessment X  (did not assess hip due to post op, able to flex knee and move ankle through normal ROM)   LLE Assessment   LLE Assessment WFL   Light Touch   RLE Light Touch Grossly intact   LLE Light Touch Grossly intact   Bed Mobility   Supine to Sit Unable to assess   Sit to Supine 4  Minimal assistance   Additional items Assist x 1;Increased time required;Verbal cues;LE management;Bedrails;HOB elevated   Additional Comments pt greeted in chair   Transfers   Sit to Stand 5  Supervision   Additional items Increased time required;Verbal cues  (RW)   Stand to Sit 5  Supervision   Additional items Increased time required;Verbal cues  (RW)   Additional Comments verbal cues for hand placement and safety with use of RW   Ambulation/Elevation   Gait pattern Improper Weight shift;Antalgic;Decreased R stance;Step to;Excessively slow;Decreased toe off;Decreased hip extension;Decreased heel strike   Gait Assistance 5  Supervision   Additional items Verbal cues   Assistive Device Rolling walker   Distance 40'x2   Stair Management Assistance 5  Supervision   Additional items Increased time required;Verbal cues   Stair Management Technique No rails;Step to pattern;With walker;Foreward   Number of Stairs 4   Ambulation/Elevation Additional Comments cues for LE sequencing during stairs   Balance   Static Sitting Good   Dynamic Sitting Fair +   Static Standing Fair   Dynamic Standing Fair -   Ambulatory Fair -   Endurance Deficit   Endurance Deficit Yes   Endurance Deficit Description fatigue and pain   Activity  Tolerance   Activity Tolerance Patient tolerated treatment well;Patient limited by pain   Medical Staff Made Aware RN WALE Tanner   Nurse Made Aware yes   Assessment   Prognosis Good   Problem List Decreased strength;Decreased range of motion;Decreased endurance;Impaired balance;Decreased mobility;Orthopedic restrictions;Pain   Assessment Pt is a 64 y.o. female who presented [facility]/20/2025 s/p R KENNEDY, posterior approach done by Dr. Borges. Precautions include no hip flex. >90 deg., no IR, no adduction past neutral. Pt has a past medical history of Abdominal pain, epigastric (03/23/2018), Abnormal x-ray of lumbar spine (11/03/2015), Acute cystitis with hematuria (04/07/2020), Bariatric surgery status, Basal cell carcinoma, HTN. Pt greeted in recliner chair for PT evaluation on 02/20/25. Pt referred to PT for functional mobility evaluation & D/C planning w/ orders of activity beginning POD #0 and activity as tolerated. PTA, pt reports being I w/o AD and I w/ IADLs. Personal factors affecting pt at time of IE include: lives in 2 story house and stairs to enter home. Please find objective findings from PT assessment regarding body systems outlined above with impairments and limitations including weakness, decreased ROM, impaired balance, decreased endurance, gait deviations, pain, decreased activity tolerance, decreased functional mobility tolerance, fall risk, and orthopedic restrictions.  Please see flow sheet above for objective findings and level of assistance required for safe completion of functional tasks. Pt able to perform sit<> stand with RW and S. Pt complaint with post. Hip precautions throughout session, but needed to be educated on precautions prior to activity. Pt able to ambulate 40'x2 with RW and S. Pt needed cues for proper sequencing during ambulation with RW. Please see additional treatment session for continued functional mobility following evaluation.  Pt demonstrated dec endurance and  tolerance to activity. Denies reports of dizziness or SOB t/o session. Patient was left supine in bed with call bell and all needs within reach. Pt was educated on fall precautions and reinforced w/ good understanding. Based on pt presentation and impaired function, pt would benefit from level III, (minimal resource intensity) at D/C. From PT/mobility standpoint, pt would benefit from OPPT to address deficits as defined above and maximize level of independence and return pt to PLOF. CM to follow. Nsg staff to continue to mobilized pt (OOB in chair for all meals & ambulate in room/unit) as tolerated to prevent further decline in function. Nsg notified. Co-eval performed with OT to complete this evaluation for the pts best interest given pts medical complexity and functional level.   Barriers to Discharge Inaccessible home environment  (PATRICK)   Goals   Patient Goals to have less pain   STG Expiration Date 02/27/25   Short Term Goal #1 1).  Pt will perform bed mobility with Karolyn demonstrating appropriate technique 100% of the time in order to improve function.2)  Perform all transfers with Karolyn demonstrating safe and appropriate technique 100% of the time in order to improve ability to negotiate safely in home environment.3) Amb with least restrictive AD > 200'x1 with Karolyn in order to demonstrate ability to negotiate in home environment.4)  Improve overall strength and balance 1/2 grade in order to optimize ability to perform functional tasks and reduce fall risk.5) Increase activity tolerance to 45 minutes in order to improve endurance to functional tasks.6)  Negotiate stairs using most appropriate technique and Karolyn in order to be able to negotiate safely in home environment. 7) PT for ongoing patient and family/caregiver education, DME needs and d/c planning in order to promote highest level of function in least restrictive environment.   Plan   Treatment/Interventions Elevations;Functional transfer training;LE  strengthening/ROM;Therapeutic exercise;Bed mobility;Gait training;Spoke to nursing;OT   PT Frequency Twice a day  (prn)   Discharge Recommendation   Rehab Resource Intensity Level, PT III (Minimum Resource Intensity)   Equipment Recommended Walker  (pt owns)   Additional Comments The patient's AM-PAC Basic Mobility Inpatient Short Form Raw Score is 18. A Raw score of greater than 16 suggests the patient may benefit from discharge to home. Please also refer to the recommendation of the Physical Therapist for safe discharge planning.   AM-PAC Basic Mobility Inpatient   Turning in Flat Bed Without Bedrails 3   Lying on Back to Sitting on Edge of Flat Bed Without Bedrails 3   Moving Bed to Chair 3   Standing Up From Chair Using Arms 3   Walk in Room 3   Climb 3-5 Stairs With Railing 3   Basic Mobility Inpatient Raw Score 18   Basic Mobility Standardized Score 41.05   [facility] Level Of Mobility   -HLM Goal 6: Walk 10 steps or more   -HLM Achieved 7: Walk 25 feet or more   Additional Treatment Session   Start Time 1430   End Time 1442   Treatment Assessment Pt was seen for additional treatment session following evaluation. Pt was education on proper sequencing and technique during stairs. Pt able to perform 4 steps with RW and S. No LOB or instability during stair training. Reviewed precautions again with patient and DC instructions for functional mobility with patient, no questions at this time. Pt was stable, left supine in room with call bell and alarm on. RN updated and aware.   Equipment Use curb step, RW   Additional Treatment Day 1   End of Consult   Patient Position at End of Consult Bed/Chair alarm activated;Supine;All needs within reach   End of Consult Comments pt stable, left supine in bed with alarm on and call bell within reach. RN updated     Hx/personal factors: co-morbidities, inaccessible home, home alone, pain, WB restrictions, total hip precautions, h/o of falls, and fall  risk  Examination: dec mobility, dec balance, dec endurance, dec amb, risk for falls, pain, assessed body system, balance, endurance, amb, D/C disposition & fall risk, WB restrictions, impairements in locomotion, musculoskeletal, balance, endurance, posture, coordination  Clinical: unpredictable (ongoing medical status, risk for falls, POD #0, and pain mgt)  Complexity: high  Meenakshi Andrade, PT

## 2025-02-20 NOTE — ANESTHESIA POSTPROCEDURE EVALUATION
Post-Op Assessment Note    CV Status:  Stable  Pain Score: 0    Pain management: adequate       Mental Status:  Alert and awake   Hydration Status:  Euvolemic   PONV Controlled:  Controlled   Airway Patency:  Patent     Post Op Vitals Reviewed: Yes    No anethesia notable event occurred.    Staff: Anesthesiologist, with CRNAs           Last Filed PACU Vitals:  Vitals Value Taken Time   Temp 98.6 °F (37 °C) 02/20/25 0915   Pulse 65 02/20/25 0956   /74 02/20/25 0946   Resp 8 02/20/25 0956   SpO2 97 % 02/20/25 0956   Vitals shown include unfiled device data.    Modified Sandy:     Vitals Value Taken Time   Activity 1 02/20/25 0915   Respiration 2 02/20/25 0915   Circulation 2 02/20/25 0915   Consciousness 2 02/20/25 0915   Oxygen Saturation 2 02/20/25 0915     Modified Sandy Score: 9

## 2025-02-20 NOTE — H&P
H&P Exam - Orthopedics   Denita Brown 64 y.o. female MRN: [id number]      Assessment/Plan   Assessment:  right Hip Osteoarthritis in this adult female who has had a long posterior multilevel spinal fusion and who continues to have significant ongoing pain in the right hip despite appropriate nonsurgical treatments    Plan:  right posterior Total Hip Arthroplasty.  Patient is familiar with the risks, benefits, and alternatives      TOTAL HIP REPLACEMENT INDICATIONS AND RISKS  We had a lengthy discussion with the patient regarding the potential options for treatment.  Based on current presentation, radiographic exam, and lack of sufficient response to nonsurgical management including activity modification, NSAIDs and therapeutic exercise, I would offer treatment in the form of total hip arthroplasty at this time.      The potential risks and benefits were discussed in detail.    While no guarantees can be made, total hip replacement has a very high success rate in terms of relieving a patient's hip pain and returning them to a more active, independent lifestyle for 10-15 years or more. All surgery carries some risk; for hip replacement, the complication rate is low but may include: death (very rare), infection, bleeding requiring transfusion, blood clots in legs traveling to lungs, nerve and/or blood vessel damage, bone fracture, leg length difference, prosthetic hip dislocation, persistent hip pain and/or stiffness, and repeat surgery(ies). The risk of a major complication is generally 1-2 per 1000 cases. Total hip replacement should only be done if conservative treatment has failed. The predicted revision rate is approximately 1% per year; in other words, 90% of hip replacements last 10 years or more, 80% last 20 years or more, and so on, assuming no injury. Additionally, we discussed anesthesia related complications which will be discussed in greater detail with the anesthesia team before surgery. The patient  voiced their understanding of the surgical plan and potential complications and wishes to proceed with surgery.    History of Present Illness   HPI:  Denita Brown is a 64 y.o. female who presents with pain in the right hip. Patient is no longer getting adequate relief from non-operative modalities. Patient is continuing to have debilitating pain from their hip, interfering with daily activities and sleep. Patient denies any concerns with infections, new neuropathies, or any acute injuries.     Historical Information  Review Of Systems:   Skin: Normal  Neuro: See HPI  Musculoskeletal: See HPI  14 point review of systems negative except as stated above     Past Medical History:   Past Medical History:   Diagnosis Date    Abdominal pain, epigastric 03/23/2018    Added automatically from request for surgery [id number]    Abnormal x-ray of lumbar spine 11/03/2015    Acute cystitis with hematuria 04/07/2020    Bariatric surgery status     Basal cell carcinoma     basil cell carcinoma- in remission    Benign essential hypertension 06/12/2012    Bone bruise 11/02/2023    Breakdown (mechanical) of internal fixation device of vertebrae, initial encounter ([facility]) 03/01/2019    Calculus of bile duct with cholecystitis without obstruction 04/27/2018    Added automatically from request for surgery [id number]    Cellulitis of finger 12/03/2015    Closed fracture of left distal fibula 10/28/2023    Degenerative lumbar disc     Digital mucinous cyst 06/24/2015    DMII (diabetes mellitus, type 2) ([facility]) 11/08/2013    GERD (gastroesophageal reflux disease)     Gross hematuria 03/19/2019    Hiatal hernia     History of COVID-19 12/22/2021    History of transfusion 2014    Post-op spinal surgery    Hyperlipidemia     Low back pain     Lower urinary tract infectious disease 03/27/2015    Medicare annual wellness visit, initial 11/27/2019    Obesity     Resolved 10/6/2016     Other closed fracture of distal end of left fibula, initial encounter  10/28/2023    Overactive bladder     Postsurgical malabsorption     Recurrent UTI 2019    Spinal stenosis     SVT (supraventricular tachycardia) (HCC)     Tachycardia     Resolved 2016     Type 2 diabetes mellitus (HCC)     Last assesssed 2018     Wears glasses        Past Surgical History:   Past Surgical History:   Procedure Laterality Date    APPENDECTOMY      ARTHRODESIS      Lumbar - Last assessed 2018     BACK SURGERY      Lumbar (3 surgeries)- two levels fused, clean out from infection, then fusion of 7 levels (last surgery in )    CARDIAC ELECTROPHYSIOLOGY STUDY AND ABLATION      CARPAL TUNNEL RELEASE      x2    CERVICAL SPINE SURGERY      Fusion of C2-C7 over a period of 3 surgeries ( first)     SECTION      X2    CHOLECYSTECTOMY      FL MYELOGRAM LUMBAR  2019    HIP ARTHROPLASTY Right 2025    INSERTION / PLACEMENT / REVISION NEUROSTIMULATOR      X2- both were removed    NECK SURGERY      OTHER SURGICAL HISTORY      Catheter ablation     CA EGD TRANSORAL BIOPSY SINGLE/MULTIPLE N/A 2016    Procedure: ESOPHAGOGASTRODUODENOSCOPY (EGD);  Surgeon: Tanya Orta MD;  Location: [facility];  Service: Bariatrics    CA EGD TRANSORAL BIOPSY SINGLE/MULTIPLE N/A 2018    Procedure: ESOPHAGOGASTRODUODENOSCOPY (EGD) with biopsy;  Surgeon: Tanya Orta MD;  Location: [facility];  Service: Bariatrics    CA LAPAROSCOPY SURG CHOLECYSTECTOMY N/A 2018    Procedure: CHOLECYSTECTOMY LAPAROSCOPIC;  Surgeon: Tanya Orta MD;  Location: [facility];  Service: Bariatrics    CA LAPS GSTR RSTCV PX W/BYP BRAULIO-EN-Y LIMB <150 CM N/A 10/25/2016    Procedure: BYPASS GASTRIC  BRAULIO-EN-Y LAPAROSCOPIC, WITH INTRA OP EGD;  Surgeon: Tanya Orta MD;  Location: [facility];  Service: Bariatrics    SKIN CANCER EXCISION      TONSILLECTOMY      TUBAL LIGATION      US GUIDED BREAST BIOPSY RIGHT COMPLETE Right 2023    WISDOM TOOTH EXTRACTION         Family History:  Family  history reviewed and non-contributory  Family History   Problem Relation Age of Onset    Arthritis Mother         Rheumatoid    Angina Mother     Coronary artery disease Mother     Diabetes Mother     Cancer Father         Lung    Cystic fibrosis Father     Rheum arthritis Sister     Diabetes Sister     No Known Problems Maternal Grandmother     No Known Problems Maternal Grandfather     No Known Problems Paternal Grandmother     No Known Problems Paternal Grandfather     Other Brother         Back problems     Diabetes Brother     No Known Problems Brother     No Known Problems Son     No Known Problems Son     Hypertension Family     Heart disease Family     Breast cancer Neg Hx        Social History:  Social History     Socioeconomic History    Marital status:      Spouse name: None    Number of children: None    Years of education: Completed 4th year of college     Highest education level: None   Occupational History    Occupation: [employer]    Tobacco Use    Smoking status: Former     Current packs/day: 0.00     Average packs/day: 1 pack/day for 20.0 years (20.0 ttl pk-yrs)     Types: Cigarettes     Start date:      Quit date:      Years since quittin.1     Passive exposure: Past    Smokeless tobacco: Never   Vaping Use    Vaping status: Never Used   Substance and Sexual Activity    Alcohol use: No    Drug use: No    Sexual activity: Not Currently   Other Topics Concern    None   Social History Narrative    Lives with son     Works as       Social Drivers of Health     Financial Resource Strain: Medium Risk (4/10/2023)    Overall Financial Resource Strain (CARDIA)     Difficulty of Paying Living Expenses: Somewhat hard   Food Insecurity: Food Insecurity Present (2024)    Nursing - Inadequate Food Risk Classification     Worried About Running Out of Food in the Last Year: Sometimes true     Ran Out of Food in the Last Year: Never true     Ran Out of Food in the Last Year:  Not on file   Transportation Needs: No Transportation Needs (4/17/2024)    PRAPARE - Transportation     Lack of Transportation (Medical): No     Lack of Transportation (Non-Medical): No   Physical Activity: Not on file   Stress: Not on file   Social Connections: Unknown (6/18/2024)    Received from [facility]    Social [employer]     How often do you feel lonely or isolated from those around you? (Adult - for ages 18 years and over): Not on file   Intimate Partner Violence: Not on file   Housing Stability: Low Risk  (4/17/2024)    Housing Stability Vital Sign     Unable to Pay for Housing in the Last Year: No     Number of Times Moved in the Last Year: 1     Homeless in the Last Year: No       Allergies:   No Known Allergies        Labs:  0   Lab Value Date/Time    HCT 40.0 01/25/2025 0738    HCT 42.2 04/27/2024 0832    HCT 39.7 04/22/2023 0840    HCT 41.6 03/02/2015 1251    HCT 35.1 08/16/2014 0630    HCT 38.1 08/15/2014 0629    HGB 13.1 01/25/2025 0738    HGB 13.7 04/27/2024 0832    HGB 13.4 04/22/2023 0840    HGB 14.0 03/02/2015 1251    HGB 12.6 09/21/2014 2019    HGB 12.1 08/16/2014 0630    HGB 12.8 08/15/2014 0629    INR 0.91 01/25/2025 0738    INR 1.09 03/02/2015 1251    WBC 4.79 01/25/2025 0738    WBC 5.19 04/27/2024 0832    WBC 4.75 04/22/2023 0840    WBC 6.99 03/02/2015 1251    WBC 6.18 08/16/2014 0630    WBC 7.28 08/15/2014 0629    CRP <3.0 04/22/2023 0840       Meds:    Current Facility-Administered Medications:     ceFAZolin (ANCEF) IVPB (premix in dextrose) 2,000 mg 50 mL, 2,000 mg, Intravenous, Once, Ezio Borges MD    lactated ringers infusion, 125 mL/hr, Intravenous, Continuous, Tyler Whalen MD    lactated ringers infusion, 125 mL/hr, Intravenous, Continuous, Ezio Borges MD, Last Rate: 125 mL/hr at 02/20/25 0629, 125 mL/hr at 02/20/25 0629    lidocaine (PF) (XYLOCAINE-MPF) 1 % injection 0.5 mL, 0.5 mL, Infiltration, Once PRN, Tyler Whalen MD    tranexamic acid  "(CYKLOKAPRON) 1000-0.7 MG/100ML-% injection 1,000 mg, 1,000 mg, Intravenous, Once, Ezio Borges MD    Blood Culture:   No results found for: \"BLOODCX\"    Wound Culture:   No results found for: \"WOUNDCULT\"    Ins and Outs:  No intake/output data recorded.          Physical Exam  /59   Pulse 87   Temp 97.8 °F (36.6 °C) (Temporal)   Resp 20   Ht 5' 5\" (1.651 m)   Wt 73 kg (161 lb)   SpO2 96%   BMI 26.79 kg/m²   /59   Pulse 87   Temp 97.8 °F (36.6 °C) (Temporal)   Resp 20   Ht 5' 5\" (1.651 m)   Wt 73 kg (161 lb)   SpO2 96%   BMI 26.79 kg/m²   Gen: No acute distress, resting comfortably in bed  HEENT: Eyes clear, moist mucus membranes, hearing intact  Respiratory: No audible wheezing or stridor  Cardiovascular: Well Perfused peripherally, 2+ distal pulse  Abdomen: nondistended, no peritoneal signs  Ortho Exam: limited hip ROM due to pain and mechanical blocking  Neuro Exam: intact    Lab Results: Reviewed  Imaging: Reviewed   "

## 2025-02-20 NOTE — ANESTHESIA PROCEDURE NOTES
Spinal Block    Patient location during procedure: OR  Reason for block: procedure for pain and at surgeon's request  Staffing  Performed by: Paresh Dejesus CRNA  Authorized by: Paresh Dejesus CRNA    Preanesthetic Checklist  Completed: patient identified, IV checked, site marked, risks and benefits discussed, surgical consent, monitors and equipment checked, pre-op evaluation and timeout performed  Spinal Block  Patient position: sitting  Prep: ChloraPrep and site prepped and draped  Patient monitoring: frequent blood pressure checks, continuous pulse ox and heart rate  Approach: midline  Location: L3-4  Needle  Needle type: Pencan   Needle gauge: 24 G  Needle length: 4 in  Assessment  Sensory level: T4  Injection Assessment:  negative aspiration for heme, no paresthesia on injection and positive aspiration for clear CSF.  Post-procedure:  site cleaned

## 2025-02-20 NOTE — OP NOTE
OPERATIVE REPORT  PATIENT NAME: Denita Brown  : 1960  MRN: [id number]  Pt Location:  [facility] ROOM 05    Surgery Date: 2025    Surgeons and Role:     * Ezio Borges MD - Primary     * Tonya Fitzpatrick PA-C     Preop Diagnosis:  Primary osteoarthritis of one hip, right [M16.11]    Post-Op Diagnosis Codes:     * Primary osteoarthritis of one hip, right [M16.11]    Procedure(s):  Right - ARTHROPLASTY HIP TOTAL    Specimens:  * No specimens in log *    Estimated Blood Loss:   Minimal    Drains:  Urethral Catheter Non-latex 16 Fr. (Active)   Number of days: 0       Anesthesia Type:   Choice     Operative Indications:  Primary osteoarthritis of one hip, right [M16.11]    Operative Findings:  Depuy   Cup-49mm metal   Liner-dual mobility   Femur-10 STD    Complications:   None      Hip Approach: Posterior    Chronic Narcotic Use:  Yes      Procedure and Technique:  Following the induction of adequate level of spinal anesthesia, a Hinojosa catheter was then sterilely introduced into this patient's bladder.  Antibiotics were administered.  She was then placed in the left lateral cubitus, right side up position.  An axillary roll was placed underneath the left axilla.  The right hip and lateral thigh were then prepped and draped sterilely.  A posterolateral approach was created over to gain access to the hip.  Full-thickness flaps were raised in order to access the tensor fascia.  This was split, expose the deep layer of the hip.  With the hip in internal rotation, the piriformis tendon was identified, transected, and retracted in a posterior fashion.  The remainder the short external rotators were sectioned as well.  A T-type capsulotomy was used to open up the hip.  The femoral head was then delivered posteriorly.  Utilizing the femoral neck osteotomy guide, the proper femoral cut was then made.  A posterior capsulectomy, and anterior capsulotomy were then created in order to circumference expose the  acetabulum.  Reaming started at 42 and extended 48 which point time a Hemisphere bleeding cancellous bone was encountered.  40 trial was inserted and noted to fit well, therefore the 48 mm insert was then hammered into place.  A single screw was then placed within the posterior superior quadrant for additional fixation, the dual mobility liner was then snapped into position.  The proximal femur was then prepared for insertion of press-fit component.  The proximal femur was then broached to a #10 standard with a 10 standard and a +4 femoral head and dual mobility liner of the hip was located, taken through range of motion with cuff adequate stable.  The trial components removed if that hip was carefully dislocated.  The insert components were assembled and introduced the patient's right hip in standard fashion.  The hip was once more located, taken through range of motion, found a quite stable.  Satisfied with the extent of surgery, the wound was then flushed with saline and closed.  A Betadine soak initiated.  The piriformis tendon was reapproximated to the greater trochanter with number Vicryl suture.  The tensor fascia was then closed with number Vicryl suture.  The subcu tissues were closed with injection #1 for deep layer, 2-0 Vicryl for the subcutaneous tissues, and skin staples for the skin.  Sterile dressings were applied.  She was then transferred to recovery room in stable condition with plans to include physical therapy for weightbearing to tolerance.  She will require DVT prophylaxis with Lovenox.  Please note, there is no qualified orthopedic resident was available to assist, the assistance of Tonya Fitzpatrick PA-C was instrumental in the safe execution as patient surgery.  Started the patient position, patient prepped draped, IntraOp assistance, wound closure, dressing application, patient transfers, all of these were performed under my direct supervision   I was present for the entire procedure.    Patient  Disposition:  PACU               SIGNATURE: Ezio Borges MD  DATE: February 20, 2025  TIME: 8:24 AM

## 2025-02-20 NOTE — PLAN OF CARE
Problem: OCCUPATIONAL THERAPY ADULT  Goal: Performs self-care activities at highest level of function for planned discharge setting.  See evaluation for individualized goals.  Description: Treatment Interventions: ADL retraining, Functional transfer training, Endurance training, Patient/family training, Equipment evaluation/education, Compensatory technique education, Continued evaluation, Energy conservation, Activityengagement          See flowsheet documentation for full assessment, interventions and recommendations.   Note: Limitation: Decreased ADL status, Decreased endurance, Decreased self-care trans, Decreased high-level ADLs  Prognosis: Good  Assessment: Pt is a 64 y.o. female seen for OT evaluation s/p adm to [facility] on 2/20/2025 w/ Primary osteoarthritis of one hip, right . Comorbidities affecting pt’s functional performance include a significant PMH of GERD, hx of transfusion, spinal stenosis, SVT, tachycardia, DM, HTN, bariatric surgery, DDD. Pt with active OT orders and activity orders for Activity beginning POD #0. WBAT RLE. Posterior hip precautions. Pt lives with adult son in a bilevel house with 3 PATRICK. Pt has 7 steps to 2nd floor w/ tub/shower and standard toilet with BSC over. At baseline, pt was independent with all ADLs/IADLs. Pt completed sit to  stand with S. Pt completed functional mobility functional household distance with use of RW and S. Pt able to maintain posterior hip precautions with good carryover during session. Pt completed sit to supine with Milady. Increased pain during session RN aware. Upon evaluation, pt currently requires S for UB ADLs, Milady for LB ADLs, S for toileting, Milady for bed mobility, S for functional mobility, and S for transfers 2* the following deficits impacting occupational performance: weakness, decreased strength , decreased balance, decreased activity tolerance, increased pain, and orthopedic restrictions. These impairments, as well at pt’s personal  factors of: PATRICK home environment, steps within home environment, difficulty performing ADLs, difficulty performing IADLs, WBS, fall risk , and new use of AD for functional transfers/mobility limit pt’s ability to safely engage in all baseline areas of occupation. Based on the aforementioned OT evaluation, functional performance deficits, and assessments, pt has been identified as a high complexity evaluation. Pt to continue to benefit from continued acute OT services during hospital stay to address defined deficits and to maximize level of functional independence in the following Occupational Performance areas: grooming, bathing/shower, toilet hygiene, dressing, health maintenance, functional mobility, community mobility, clothing management, cleaning, household maintenance, and job performance/volunteering. From OT standpoint, recommend III; Minimum Resource Intensity (pending progress) upon D/C. OT will continue to follow pt 3-5x/wk.     Rehab Resource Intensity Level, OT: No post-acute rehabilitation needs

## 2025-02-21 VITALS
BODY MASS INDEX: 26.82 KG/M2 | RESPIRATION RATE: 14 BRPM | WEIGHT: 161 LBS | OXYGEN SATURATION: 98 % | HEIGHT: 65 IN | TEMPERATURE: 99.4 F | DIASTOLIC BLOOD PRESSURE: 68 MMHG | SYSTOLIC BLOOD PRESSURE: 141 MMHG | HEART RATE: 83 BPM

## 2025-02-21 LAB
ANION GAP SERPL CALCULATED.3IONS-SCNC: 5 MMOL/L (ref 4–13)
BUN SERPL-MCNC: 14 MG/DL (ref 5–25)
CALCIUM SERPL-MCNC: 8.8 MG/DL (ref 8.4–10.2)
CHLORIDE SERPL-SCNC: 110 MMOL/L (ref 96–108)
CO2 SERPL-SCNC: 28 MMOL/L (ref 21–32)
CREAT SERPL-MCNC: 0.8 MG/DL (ref 0.6–1.3)
ERYTHROCYTE [DISTWIDTH] IN BLOOD BY AUTOMATED COUNT: 11.9 % (ref 11.6–15.1)
GFR SERPL CREATININE-BSD FRML MDRD: 79 ML/MIN/1.73SQ M
GLUCOSE P FAST SERPL-MCNC: 88 MG/DL (ref 65–99)
GLUCOSE SERPL-MCNC: 88 MG/DL (ref 65–140)
HCT VFR BLD AUTO: 28.9 % (ref 34.8–46.1)
HGB BLD-MCNC: 9.8 G/DL (ref 11.5–15.4)
MCH RBC QN AUTO: 30.9 PG (ref 26.8–34.3)
MCHC RBC AUTO-ENTMCNC: 33.9 G/DL (ref 31.4–37.4)
MCV RBC AUTO: 91 FL (ref 82–98)
PLATELET # BLD AUTO: 280 THOUSANDS/UL (ref 149–390)
PMV BLD AUTO: 9.7 FL (ref 8.9–12.7)
POTASSIUM SERPL-SCNC: 4.2 MMOL/L (ref 3.5–5.3)
RBC # BLD AUTO: 3.17 MILLION/UL (ref 3.81–5.12)
SODIUM SERPL-SCNC: 143 MMOL/L (ref 135–147)
WBC # BLD AUTO: 6.92 THOUSAND/UL (ref 4.31–10.16)

## 2025-02-21 PROCEDURE — 99232 SBSQ HOSP IP/OBS MODERATE 35: CPT | Performed by: INTERNAL MEDICINE

## 2025-02-21 PROCEDURE — 97116 GAIT TRAINING THERAPY: CPT

## 2025-02-21 PROCEDURE — 97535 SELF CARE MNGMENT TRAINING: CPT

## 2025-02-21 PROCEDURE — 97110 THERAPEUTIC EXERCISES: CPT

## 2025-02-21 PROCEDURE — 99024 POSTOP FOLLOW-UP VISIT: CPT

## 2025-02-21 PROCEDURE — 85027 COMPLETE CBC AUTOMATED: CPT

## 2025-02-21 PROCEDURE — 80048 BASIC METABOLIC PNL TOTAL CA: CPT

## 2025-02-21 PROCEDURE — NC001 PR NO CHARGE

## 2025-02-21 PROCEDURE — 97530 THERAPEUTIC ACTIVITIES: CPT

## 2025-02-21 RX ORDER — FLUTICASONE PROPIONATE 50 MCG
1 SPRAY, SUSPENSION (ML) NASAL DAILY
Status: DISCONTINUED | OUTPATIENT
Start: 2025-02-21 | End: 2025-02-21 | Stop reason: HOSPADM

## 2025-02-21 RX ADMIN — OXYCODONE HYDROCHLORIDE 5 MG: 5 TABLET ORAL at 02:16

## 2025-02-21 RX ADMIN — OXYCODONE HYDROCHLORIDE 5 MG: 5 TABLET ORAL at 06:26

## 2025-02-21 RX ADMIN — LORATADINE 10 MG: 10 TABLET ORAL at 09:51

## 2025-02-21 RX ADMIN — IRON SUCROSE 200 MG: 20 INJECTION, SOLUTION INTRAVENOUS at 09:49

## 2025-02-21 RX ADMIN — METHOCARBAMOL 500 MG: 500 TABLET ORAL at 11:57

## 2025-02-21 RX ADMIN — DOCUSATE SODIUM 100 MG: 100 CAPSULE, LIQUID FILLED ORAL at 09:51

## 2025-02-21 RX ADMIN — ESCITALOPRAM OXALATE 20 MG: 10 TABLET ORAL at 09:51

## 2025-02-21 RX ADMIN — HYDROMORPHONE HYDROCHLORIDE 0.5 MG: 1 INJECTION, SOLUTION INTRAMUSCULAR; INTRAVENOUS; SUBCUTANEOUS at 09:42

## 2025-02-21 RX ADMIN — ACETAMINOPHEN 325MG 975 MG: 325 TABLET ORAL at 02:16

## 2025-02-21 RX ADMIN — METHOCARBAMOL 500 MG: 500 TABLET ORAL at 06:27

## 2025-02-21 RX ADMIN — ENOXAPARIN SODIUM 40 MG: 40 INJECTION SUBCUTANEOUS at 09:51

## 2025-02-21 RX ADMIN — ACETAMINOPHEN 325MG 975 MG: 325 TABLET ORAL at 14:06

## 2025-02-21 RX ADMIN — PANTOPRAZOLE SODIUM 40 MG: 40 TABLET, DELAYED RELEASE ORAL at 06:27

## 2025-02-21 RX ADMIN — OXYBUTYNIN CHLORIDE 5 MG: 5 TABLET ORAL at 09:51

## 2025-02-21 RX ADMIN — GABAPENTIN 100 MG: 100 CAPSULE ORAL at 13:02

## 2025-02-21 RX ADMIN — HYDROMORPHONE HYDROCHLORIDE 0.5 MG: 1 INJECTION, SOLUTION INTRAMUSCULAR; INTRAVENOUS; SUBCUTANEOUS at 01:06

## 2025-02-21 RX ADMIN — OXYCODONE HYDROCHLORIDE 10 MG: 10 TABLET ORAL at 11:57

## 2025-02-21 RX ADMIN — BUPROPION HYDROCHLORIDE 75 MG: 75 TABLET, FILM COATED ORAL at 09:51

## 2025-02-21 NOTE — ASSESSMENT & PLAN NOTE
POD 1 s/p Right KENNEDY, AVSS, ambulating mobilizing minimally, slight numbness of foot from leaving independent positioning on recliner for too long but sensation to dull pressure intact, had some tenderness that kept patient up overnight, strength/motor intact, pending labs    POD #1 s/p right KENNEDY-   - Antibiotics: Finished antibiotics/Ancef  - Anticoagulation: Lovenox 40 mg subcutaneous once daily, SCDs, ambulation  - Activity: Weightbearing as tolerated, PT/OT posterior hip protocol/pink pillow  - AM lab draw: CBC, BMP  - Analgesia: Continue p.r.n. medication  - Dressing: keep clean, dry, and in tact, dressing will be changed  - Disposition: Hopeful for discharge tomorrow pending progress with PT/OT

## 2025-02-21 NOTE — PLAN OF CARE
Problem: PAIN - ADULT  Goal: Verbalizes/displays adequate comfort level or baseline comfort level  Description: Interventions:  - Encourage patient to monitor pain and request assistance  - Assess pain using appropriate pain scale  - Administer analgesics based on type and severity of pain and evaluate response  - Implement non-pharmacological measures as appropriate and evaluate response  - Consider cultural and social influences on pain and pain management  - Notify physician/advanced practitioner if interventions unsuccessful or patient reports new pain  Outcome: Progressing     Problem: INFECTION - ADULT  Goal: Absence or prevention of progression during hospitalization  Description: INTERVENTIONS:  - Assess and monitor for signs and symptoms of infection  - Monitor lab/diagnostic results  - Monitor all insertion sites, i.e. indwelling lines, tubes, and drains  - Monitor endotracheal if appropriate and nasal secretions for changes in amount and color  - [place] appropriate cooling/warming therapies per order  - Administer medications as ordered  - Instruct and encourage patient and family to use good hand hygiene technique  - Identify and instruct in appropriate isolation precautions for identified infection/condition  Outcome: Progressing  Goal: Absence of fever/infection during neutropenic period  Description: INTERVENTIONS:  - Monitor WBC    Outcome: Progressing     Problem: SAFETY ADULT  Goal: Patient will remain free of falls  Description: INTERVENTIONS:  - Educate patient/family on patient safety including physical limitations  - Instruct patient to call for assistance with activity   - Consult OT/PT to assist with strengthening/mobility   - Keep Call bell within reach  - Keep bed low and locked with side rails adjusted as appropriate  - Keep care items and personal belongings within reach  - Initiate and maintain comfort rounds  - Make Fall Risk Sign visible to staff  - Offer Toileting every 2 Hours,  in advance of need  - Initiate/Maintain bed/chair alarm  - Obtain necessary fall risk management equipment: rolling walker  - Apply yellow socks and bracelet for high fall risk patients  - Consider moving patient to room near nurses station  Outcome: Progressing  Goal: Maintain or return to baseline ADL function  Description: INTERVENTIONS:  -  Assess patient's ability to carry out ADLs; assess patient's baseline for ADL function and identify physical deficits which impact ability to perform ADLs (bathing, care of mouth/teeth, toileting, grooming, dressing, etc.)  - Assess/evaluate cause of self-care deficits   - Assess range of motion  - Assess patient's mobility; develop plan if impaired  - Assess patient's need for assistive devices and provide as appropriate  - Encourage maximum independence but intervene and supervise when necessary  - Involve family in performance of ADLs  - Assess for home care needs following discharge   - Consider OT consult to assist with ADL evaluation and planning for discharge  - Provide patient education as appropriate  Outcome: Progressing  Goal: Maintains/Returns to pre admission functional level  Description: INTERVENTIONS:  - Perform AM-PAC 6 Click Basic Mobility/ Daily Activity assessment daily.  - Set and communicate daily mobility goal to care team and patient/family/caregiver.   - Collaborate with rehabilitation services on mobility goals if consulted  - Perform Range of Motion 3 times a day.  - Reposition patient every 2 hours.  - Dangle patient 3 times a day  - Stand patient 3 times a day  - Ambulate patient 3 times a day  - Out of bed to chair 3 times a day   - Out of bed for meals 3 times a day  - Out of bed for toileting  - Record patient progress and toleration of activity level   Outcome: Progressing     Problem: DISCHARGE PLANNING  Goal: Discharge to home or other facility with appropriate resources  Description: INTERVENTIONS:  - Identify barriers to discharge  w/patient and caregiver  - Arrange for needed discharge resources and transportation as appropriate  - Identify discharge learning needs (meds, wound care, etc.)  - Arrange for interpretive services to assist at discharge as needed  - Refer to Case Management Department for coordinating discharge planning if the patient needs post-hospital services based on physician/advanced practitioner order or complex needs related to functional status, cognitive ability, or social support system  Outcome: Progressing     Problem: Knowledge Deficit  Goal: Patient/family/caregiver demonstrates understanding of disease process, treatment plan, medications, and discharge instructions  Description: Complete learning assessment and assess knowledge base.  Interventions:  - Provide teaching at level of understanding  - Provide teaching via preferred learning methods  Outcome: Progressing

## 2025-02-21 NOTE — PLAN OF CARE
Problem: PHYSICAL THERAPY ADULT  Goal: Performs mobility at highest level of function for planned discharge setting.  See evaluation for individualized goals.  Description: Treatment/Interventions: Elevations, Functional transfer training, LE strengthening/ROM, Therapeutic exercise, Bed mobility, Gait training, Spoke to nursing, OT  Equipment Recommended: Walker (pt owns)       See flowsheet documentation for full assessment, interventions and recommendations.  Outcome: Adequate for Discharge  Note: Prognosis: Good  Problem List: Decreased strength, Decreased range of motion, Decreased endurance, Impaired balance, Decreased mobility, Orthopedic restrictions, Pain  Assessment: Denita is seen for progression of PT POC to facilitate discharge to home.  Improved with all mobility and tolerance to ambuation.  S for bed mobility supine<>sit with cues to optimize technique only.  Transfers with S, cues for hand and operative leg placement. Able to increase distances with RW support and complete stairs.  Nonreciprocal pattern with L HR and cane with S . Step to pattern. Tolerated progression of THR HEP.  Verbalizes 2/3 THR precautions, 3rd with cues.  All written THR eduation and HEP issued.  All discharge questions addressed and cleared for d/c to home with family support. The patient's AM-PAC Basic Mobility Inpatient Short Form Raw Score is 21. A Raw score of greater than 16 suggests the patient may benefit from discharge to home. Please also refer to the recommendation of the Physical Therapist for safe discharge planning. OPPT at d/c.  Barriers to Discharge: Inaccessible home environment  Barriers to Discharge Comments: steps  Rehab Resource Intensity Level, PT: III (Minimum Resource Intensity)    See flowsheet documentation for full assessment.

## 2025-02-21 NOTE — CONSULTS
Consultation - Hospitalist   Name: Denita Brown 64 y.o. female I MRN: [id number]  Unit/Bed#: WE 2 N -01 I Date of Admission: 2/20/2025   Date of Service: 2/20/2025 I Hospital Day: 0   Inpatient consult to Internal Medicine  Consult performed by: Ronak Chou PA-C  Consult ordered by: Tonya Fitzpatrick PA-C        Physician Requesting Evaluation: Ezio Borges MD   Reason for Evaluation / Principal Problem: Medical management of comorbidities status post right total hip arthroplasty     Assessment & Plan  Primary osteoarthritis of one hip, right  AVSS, pain well-controlled, slight numbness that is resolving with time, appropriate urinary output at 1.5 L, ambulating on extremity, labs pending into tomorrow    POD #0 s/p right KENNEDY-   - Antibiotics: Finished antibiotics/Ancef  - Anticoagulation: Lovenox 40 mg subcutaneous once daily, SCDs, ambulation  - Activity: Weightbearing as tolerated, PT/OT posterior hip protocol/pink pillow  - AM lab draw: CBC, BMP  - Analgesia: Continue p.r.n. medication  - Dressing: keep clean, dry, and in tact, dressing will be changed  - Disposition: Hopeful for discharge tomorrow pending progress with PT/OT    Anxiety  Chronic, stable    -Continue Wellbutrin and Lexapro  Continuous opioid dependence (HCC)  Chronic, stable    -Continue current opioid-based pain regiment  Hyperlipemia  Stable, chronic    -Continue fenofibrate  Hospitalist service will follow.      VTE Pharmacologic Prophylaxis:   Moderate Risk (Score 3-4) - Pharmacological DVT Prophylaxis Ordered: enoxaparin (Lovenox).  Code Status: Level 1 - Full Code     History of Present Illness   Denita Brown is a 64 y.o. female with a past medical history for bariatric surgery, hyperlipidemia, anxiety, chronic pain syndrome with chronic opioid dependency presenting to the medical team for management of comorbidities being status post day 0 right KENNEDY.  Patient upon asking about how she is doing is reporting that she is  fairly well with pain relatively well-controlled.  Patient at rest is doing quite well and is in bed.  Patient is not in pink pillow but understands the importance of posterior hip protocols.  Patient does have some slight residual numbness on the foot this been resolving over time but as her numbness resolves her discomfort/pain has been increasing slightly.  Patient was able to ambulate today with assistance.  Patient feels she was doing quite well.  Patient's largest concern/complaint is that she was having excessive urination which was uncomfortable because of how often she has had to urinate.  Patient denies any hematuria or dysuria.  Patient just reports peeing a lot secondary to have a lot of IV fluids.  Patient did tolerate diet.  Patient does have some mild to moderate pain surrounding her joint face with some myalgias throughout her calf and thigh.  Patient has not reported any other concerns as evidenced by review of systems.    Review of Systems   Constitutional: Negative.    HENT: Negative.     Respiratory: Negative.     Cardiovascular: Negative.    Gastrointestinal: Negative.    Endocrine: Positive for polyuria.   Genitourinary:  Negative for difficulty urinating, dysuria and hematuria.   Musculoskeletal:  Positive for arthralgias, gait problem and myalgias. Negative for back pain and joint swelling.   Skin: Negative.    Neurological:  Positive for numbness. Negative for weakness.   Psychiatric/Behavioral: Negative.         Historical Information   Past Medical History:   Diagnosis Date    Abdominal pain, epigastric 03/23/2018    Added automatically from request for surgery [id number]    Abnormal x-ray of lumbar spine 11/03/2015    Acute cystitis with hematuria 04/07/2020    Bariatric surgery status     Basal cell carcinoma     basil cell carcinoma- in remission    Benign essential hypertension 06/12/2012    Bone bruise 11/02/2023    Breakdown (mechanical) of internal fixation device of vertebrae, initial  encounter (HCC) 2019    Calculus of bile duct with cholecystitis without obstruction 2018    Added automatically from request for surgery [id number]    Cellulitis of finger 2015    Closed fracture of left distal fibula 10/28/2023    Degenerative lumbar disc     Digital mucinous cyst 2015    DMII (diabetes mellitus, type 2) ([facility]) 2013    GERD (gastroesophageal reflux disease)     Gross hematuria 2019    Hiatal hernia     History of COVID-19 2021    History of transfusion 2014    Post-op spinal surgery    Hyperlipidemia     Low back pain     Lower urinary tract infectious disease 2015    Medicare annual wellness visit, initial 2019    Obesity     Resolved 10/6/2016     Other closed fracture of distal end of left fibula, initial encounter 10/28/2023    Overactive bladder     Postsurgical malabsorption     Recurrent UTI 2019    Spinal stenosis     SVT (supraventricular tachycardia) ([facility])     Tachycardia     Resolved 2016     Type 2 diabetes mellitus ([facility])     Last assesssed 2018     Wears glasses      Past Surgical History:   Procedure Laterality Date    APPENDECTOMY      ARTHRODESIS      Lumbar - Last assessed 2018     BACK SURGERY      Lumbar (3 surgeries)- two levels fused, clean out from infection, then fusion of 7 levels (last surgery in )    CARDIAC ELECTROPHYSIOLOGY STUDY AND ABLATION      CARPAL TUNNEL RELEASE      x2    CERVICAL SPINE SURGERY      Fusion of C2-C7 over a period of 3 surgeries ( first)     SECTION      X2    CHOLECYSTECTOMY      FL MYELOGRAM LUMBAR  2019    HIP ARTHROPLASTY Right 2025    INSERTION / PLACEMENT / REVISION NEUROSTIMULATOR      X2- both were removed    NECK SURGERY      OTHER SURGICAL HISTORY      Catheter ablation     CA EGD TRANSORAL BIOPSY SINGLE/MULTIPLE N/A 2016    Procedure: ESOPHAGOGASTRODUODENOSCOPY (EGD);  Surgeon: Tanya Orta MD;  Location: [facility];  Service:  Bariatrics    NM EGD TRANSORAL BIOPSY SINGLE/MULTIPLE N/A 2018    Procedure: ESOPHAGOGASTRODUODENOSCOPY (EGD) with biopsy;  Surgeon: Tanya Orta MD;  Location: [facility];  Service: Bariatrics    NM LAPAROSCOPY SURG CHOLECYSTECTOMY N/A 2018    Procedure: CHOLECYSTECTOMY LAPAROSCOPIC;  Surgeon: Tanya Orta MD;  Location: [facility];  Service: Bariatrics    NM LAPS GSTR RSTCV PX W/BYP BRAULIO-EN-Y LIMB <150 CM N/A 10/25/2016    Procedure: BYPASS GASTRIC  BRAULIO-EN-Y LAPAROSCOPIC, WITH INTRA OP EGD;  Surgeon: Tanya Orta MD;  Location: [facility];  Service: Bariatrics    SKIN CANCER EXCISION      TONSILLECTOMY      TUBAL LIGATION      US GUIDED BREAST BIOPSY RIGHT COMPLETE Right 2023    WISDOM TOOTH EXTRACTION       Social History     Tobacco Use    Smoking status: Former     Current packs/day: 0.00     Average packs/day: 1 pack/day for 20.0 years (20.0 ttl pk-yrs)     Types: Cigarettes     Start date:      Quit date:      Years since quittin.1     Passive exposure: Past    Smokeless tobacco: Never   Vaping Use    Vaping status: Never Used   Substance and Sexual Activity    Alcohol use: Not Currently    Drug use: No    Sexual activity: Not Currently     E-Cigarette/Vaping    E-Cigarette Use Never User      E-Cigarette/Vaping Substances    Nicotine No     THC No     CBD No     Flavoring No     Other No     Unknown No      Family history non-contributory      Meds/Allergies   I have reviewed home medications with patient personally.  Prior to Admission medications    Medication Sig Start Date End Date Taking? Authorizing Provider   acetaminophen (TYLENOL) 500 mg tablet Take 2 tablets (1,000 mg total) by mouth every 6 (six) hours as needed for mild pain 25  Yes Tonya Fitzpatrick PA-C   ascorbic acid (VITAMIN C) 500 MG tablet Take 1 tablet (500 mg total) by mouth daily Start 30 days prior to surgery 24  Yes Ezio Borges MD   baclofen 10 mg tablet Take 10 mg by mouth 3  (three) times a day   Yes Historical Provider, MD   buPROPion (WELLBUTRIN) 75 mg tablet take 1 tablet by mouth twice a day 12/24/24  Yes Nery Choi DO   Calcium Citrate-Vitamin D 315-250 MG-UNIT TABS Take 1 tablet by mouth 2 (two) times a day    Yes Historical Provider, MD   Cholecalciferol 25 MCG (1000 UT) CHEW Chew   Yes Historical Provider, MD   escitalopram (LEXAPRO) 20 mg tablet Take 1 tablet (20 mg total) by mouth every morning 9/1/24  Yes Matilde Arcos MD   estrogens, conjugated (Premarin) vaginal cream Insert 1 g into the vagina 3 (three) times a week 1/8/25  Yes Cassie Souza PA-C   famotidine (PEPCID) 40 MG tablet take 1 tablet by mouth at bedtime 1/27/25  Yes Timothy Bennett MD   fenofibrate 160 MG tablet take 1 tablet by mouth once daily 1/27/25  Yes Nery Choi DO   ferrous sulfate 324 (65 Fe) mg Take 1 tablet (324 mg total) by mouth 2 (two) times a day before meals Start 30 days prior to surgery 12/20/24  Yes Ezio Borges MD   fluocinonide (LIDEX) 0.05 % cream Apply topically 2 (two) times a day as needed for rash 2/10/25  Yes Nery Choi DO   fluticasone (FLONASE) 50 mcg/act nasal spray 1 spray into each nostril daily 2/11/25  Yes Nery Choi DO   folic acid (FOLVITE) 1 mg tablet take 1 tablet by mouth daily START 30 DAYS prior to surgery 1/14/25  Yes Ezio Borges MD   gabapentin (NEURONTIN) 100 mg capsule 100 mg 5 (five) times a day  2/27/18  Yes Historical Provider, MD   HYDROmorphone (DILAUDID) 4 mg tablet TAKE 1 TABLET BY MOUTH EVERY 6 HOURS AS NEEDED...FILL 5/8/2020 5/8/20  Yes Historical Provider, MD   lidocaine (XYLOCAINE) 5 % ointment Apply topically as needed for mild pain 11/2/23  Yes Aric Garcia MD   loratadine (CLARITIN) 10 mg tablet Take 10 mg by mouth daily.   Yes Historical Provider, MD   methocarbamol (ROBAXIN) 500 mg tablet Take 1 tablet (500 mg total) by mouth 3 (three) times a day as needed for muscle spasms 2/20/25  Yes Tonya  MIA Fitzpatrick   Multiple Vitamins-Minerals (BARIATRIC FUSION PO) Take 1 tablet by mouth daily   Yes Historical MD Jose C   mupirocin (BACTROBAN) 2 % ointment Apply topically 3 (three) times a day Apply pea size amount to each nostril 2x/day for 5 days prior to procedure 12/20/24  Yes Ezio Borges MD   ondansetron (ZOFRAN) 4 mg tablet Take 1 tablet (4 mg total) by mouth every 8 (eight) hours as needed for nausea or vomiting 1/8/25  Yes Nery Choi DO   oxybutynin (DITROPAN) 5 mg tablet Take 1 tablet (5 mg total) by mouth daily  Patient taking differently: Take 5 mg by mouth every morning 1/7/25  Yes Cassie Souza PA-C   oxyCODONE (Roxicodone) 5 immediate release tablet Take 1 tablet (5 mg total) by mouth every 6 (six) hours as needed for moderate pain for up to 10 days Max Daily Amount: 20 mg 2/20/25 3/2/25 Yes Tonya Fitzpatrick PA-C   pantoprazole (PROTONIX) 40 mg tablet take 1 tablet by mouth daily before breakfast 12/18/24  Yes Mohinder Woodward PA-C   sucralfate (CARAFATE) 1 g/10 mL suspension Take 10 mL (1 g total) by mouth 2 (two) times a day as needed (gastritis) 2/11/25  Yes Nery Choi DO   tirzepatide (Zepbound) 10 mg/0.5 mL auto-injector Inject 0.5 mL (10 mg total) under the skin once a week 1/27/25  Yes Clif Mcfadden PA-C   albuterol (PROVENTIL HFA,VENTOLIN HFA) 90 mcg/act inhaler Inhale 2 puffs every 6 (six) hours as needed for wheezing or shortness of breath (cough, chest tightness) 8/6/24   Nery Choi DO   enoxaparin (LOVENOX) 40 mg/0.4 mL Inject 0.4 mL (40 mg total) under the skin daily for 28 days Start injections after surgery  Patient not taking: Reported on 1/11/2025 12/20/24 1/17/25  Ezio Borges MD     No Known Allergies    Objective :  Temp:  [97.8 °F (36.6 °C)-99.3 °F (37.4 °C)] 99.3 °F (37.4 °C)  HR:  [] 85  BP: ()/(56-75) 121/58  Resp:  [12-20] 18  SpO2:  [94 %-100 %] 97 %  O2 Device: None (Room air)    Physical Exam  Constitutional:       General:  She is awake. She is not in acute distress.     Appearance: Normal appearance. She is normal weight. She is not ill-appearing, toxic-appearing or diaphoretic.      Interventions: She is not intubated.  HENT:      Head: Normocephalic and atraumatic.      Right Ear: Hearing and external ear normal.      Left Ear: Hearing and external ear normal.      Nose: Nose normal.   Cardiovascular:      Rate and Rhythm: Normal rate and regular rhythm.      Pulses:           Dorsalis pedis pulses are 2+ on the right side and 2+ on the left side.      Heart sounds: Normal heart sounds, S1 normal and S2 normal. Heart sounds not distant. No murmur heard.  Pulmonary:      Effort: Pulmonary effort is normal. No tachypnea, bradypnea, accessory muscle usage, prolonged expiration, respiratory distress or retractions. She is not intubated.      Breath sounds: Normal breath sounds. No stridor, decreased air movement or transmitted upper airway sounds. No decreased breath sounds, wheezing, rhonchi or rales.   Abdominal:      General: Abdomen is flat.      Palpations: Abdomen is soft.      Tenderness: There is no abdominal tenderness.   Musculoskeletal:      Right foot: Normal range of motion. No deformity.      Left foot: Normal range of motion. No deformity.   Feet:      Right foot:      Skin integrity: Skin integrity normal.      Left foot:      Skin integrity: Skin integrity normal.   Skin:     General: Skin is warm and dry.      Capillary Refill: Capillary refill takes less than 2 seconds.   Neurological:      Sensory: Sensation is intact.      Motor: Motor function is intact.   Psychiatric:         Behavior: Behavior is cooperative.      Left Lower Extremity: Thigh and calf compartments are soft and nontender to palpation. + L3-S1 SILT. + DF/PF.+ EHL. No pain with passive stretch/extension of toes. DP 2+, capillary refill less than 2 seconds, and foot is warm B/L. Incision is c/d/i      Lines/Drains:            Lab Results: I have  reviewed the following results:              Results from last 7 days   Lab Units 02/20/25  0852 02/20/25  0604   POC GLUCOSE mg/dl 71 96     Lab Results   Component Value Date    HGBA1C 5.3 01/25/2025    HGBA1C 5.8 (H) 04/27/2024    HGBA1C 5.4 04/22/2023           Imaging Results Review: No pertinent imaging studies reviewed.  Other Study Results Review: No additional pertinent studies reviewed.    Administrative Statements   Approximately 40 minutes was spent with this patient in reviewing chart, reviewing history, reviewing physical exam, patient education, reviewing labs, reviewing imaging, documentation    ** Please Note: This note has been constructed using a voice recognition system. **

## 2025-02-21 NOTE — PLAN OF CARE
Problem: PAIN - ADULT  Goal: Verbalizes/displays adequate comfort level or baseline comfort level  Description: Interventions:  - Encourage patient to monitor pain and request assistance  - Assess pain using appropriate pain scale  - Administer analgesics based on type and severity of pain and evaluate response  - Implement non-pharmacological measures as appropriate and evaluate response  - Consider cultural and social influences on pain and pain management  - Notify physician/advanced practitioner if interventions unsuccessful or patient reports new pain  Outcome: Progressing     Problem: SAFETY ADULT  Goal: Patient will remain free of falls  Description: INTERVENTIONS:  - Educate patient/family on patient safety including physical limitations  - Instruct patient to call for assistance with activity   - Consult OT/PT to assist with strengthening/mobility   - Keep Call bell within reach  - Keep bed low and locked with side rails adjusted as appropriate  - Keep care items and personal belongings within reach  - Initiate and maintain comfort rounds  - Make Fall Risk Sign visible to staff  - Offer Toileting every  Hours, in advance of need  - Initiate/Maintain bed/chair alarm  - Obtain necessary fall risk management equipment:   - Apply yellow socks and bracelet for high fall risk patients  - Consider moving patient to room near nurses station  Outcome: Progressing     Problem: SAFETY ADULT  Goal: Maintain or return to baseline ADL function  Description: INTERVENTIONS:  -  Assess patient's ability to carry out ADLs; assess patient's baseline for ADL function and identify physical deficits which impact ability to perform ADLs (bathing, care of mouth/teeth, toileting, grooming, dressing, etc.)  - Assess/evaluate cause of self-care deficits   - Assess range of motion  - Assess patient's mobility; develop plan if impaired  - Assess patient's need for assistive devices and provide as appropriate  - Encourage maximum  independence but intervene and supervise when necessary  - Involve family in performance of ADLs  - Assess for home care needs following discharge   - Consider OT consult to assist with ADL evaluation and planning for discharge  - Provide patient education as appropriate  Outcome: Progressing     Problem: DISCHARGE PLANNING  Goal: Discharge to home or other facility with appropriate resources  Description: INTERVENTIONS:  - Identify barriers to discharge w/patient and caregiver  - Arrange for needed discharge resources and transportation as appropriate  - Identify discharge learning needs (meds, wound care, etc.)  - Arrange for interpretive services to assist at discharge as needed  - Refer to Case Management Department for coordinating discharge planning if the patient needs post-hospital services based on physician/advanced practitioner order or complex needs related to functional status, cognitive ability, or social support system  Outcome: Progressing     Problem: Knowledge Deficit  Goal: Patient/family/caregiver demonstrates understanding of disease process, treatment plan, medications, and discharge instructions  Description: Complete learning assessment and assess knowledge base.  Interventions:  - Provide teaching at level of understanding  - Provide teaching via preferred learning methods  Outcome: Progressing

## 2025-02-21 NOTE — PHYSICAL THERAPY NOTE
PHYSICAL THERAPY NOTE          Patient Name: Denita Brown  Today's Date: 2/21/2025  11:50-12:45       02/21/25 1150   PT Last Visit   PT Visit Date 02/21/25   Note Type   Note Type Treatment   Pain Assessment   Pain Score 8   Pain Location/Orientation Orientation: Right;Location: Hip   Restrictions/Precautions   RLE Weight Bearing Per Order WBAT   Braces or Orthoses Other (Comment)  (hip abd pillow)   Other Precautions Chair Alarm;Bed Alarm;THR;WBS;Multiple lines;Fall Risk;Pain  (posterior THR prec.)   General   Chart Reviewed Yes   Response to Previous Treatment Patient with no complaints from previous session.   Family/Caregiver Present No   Cognition   Overall Cognitive Status WFL   Arousal/Participation Alert   Orientation Level Oriented X4   Comments Pleasant and cooperative.   Subjective   Subjective Looking forward to improvement in pain.   Bed Mobility   Supine to Sit 5  Supervision   Additional items Increased time required;HOB elevated;Verbal cues   Sit to Supine 5  Supervision   Additional items Increased time required;Verbal cues;HOB elevated   Additional Comments Cues for technique.  Ed on self assist techniques for operative leg management prn.   Transfers   Sit to Stand 5  Supervision   Additional items Increased time required;Verbal cues   Stand to Sit 5  Supervision   Additional items Increased time required;Verbal cues   Toilet transfer 5  Supervision   Additional items Increased time required;Verbal cues;Commode   Additional Comments Cues for hand and operative leg placement.   Ambulation/Elevation   Gait pattern Improper Weight shift;Decreased foot clearance;Antalgic;Inconsistent belinda;Short stride;Step to   Gait Assistance 5  Supervision   Additional items Verbal cues   Assistive Device Rolling walker   Distance 30'x1, 35'x1, 10'x1, then 75'x1, seated rest followed by stair training and additioal 130'x1.   Stair  Management Assistance 5  Supervision   Additional items Verbal cues   Stair Management Technique One rail L;Step to pattern;With cane  (L HR + cane in RUE)   Number of Stairs 5  (performed  steps x 2 with rail + cane and S.)   Balance   Static Sitting Good   Dynamic Sitting Fair +   Static Standing Fair   Dynamic Standing Fair -   Ambulatory Fair -   Endurance Deficit   Endurance Deficit Yes   Endurance Deficit Description pain and fatigue.   Activity Tolerance   Activity Tolerance Patient tolerated treatment well;Patient limited by fatigue;Patient limited by pain   Medical Staff Made Aware NurseTc   Nurse Made Aware yes   Exercises   THR Supine;10 reps;AAROM;AROM;Right  (issued, performed and reviewed progression and frequency of HEP.)   Neuro re-ed Verbalizes 2/3 THR precautions, 3rd with cues.   Assessment   Prognosis Good   Problem List Decreased strength;Decreased range of motion;Decreased endurance;Impaired balance;Decreased mobility;Orthopedic restrictions;Pain   Assessment Denita is seen for progression of PT POC to facilitate discharge to home.  Improved with all mobility and tolerance to ambuation.  S for bed mobility supine<>sit with cues to optimize technique only.  Transfers with S, cues for hand and operative leg placement. Able to increase distances with RW support and complete stairs.  Nonreciprocal pattern with L HR and cane with S . Step to pattern. Tolerated progression of THR HEP.  Verbalizes 2/3 THR precautions, 3rd with cues.  All written THR eduation and HEP issued.  All discharge questions addressed and cleared for d/c to home with family support. The patient's AM-PAC Basic Mobility Inpatient Short Form Raw Score is 21. A Raw score of greater than 16 suggests the patient may benefit from discharge to home. Please also refer to the recommendation of the Physical Therapist for safe discharge planning. OPPT at d/c.   Barriers to Discharge Inaccessible home environment   Barriers to  Discharge Comments steps   Goals   Patient Goals pain to get better   STG Expiration Date 02/27/25   PT Treatment Day 1   Plan   Treatment/Interventions Functional transfer training;LE strengthening/ROM;Elevations;Therapeutic exercise;Endurance training;Patient/family training;Equipment eval/education;Bed mobility;Gait training;Compensatory technique education;Continued evaluation;Spoke to nursing;ST   Progress Progressing toward goals   PT Frequency 2-3x/wk   Discharge Recommendation   Rehab Resource Intensity Level, PT III (Minimum Resource Intensity)   Equipment Recommended Walker  (patient has walker and cane.)   AM-PAC Basic Mobility Inpatient   Turning in Flat Bed Without Bedrails 3   Lying on Back to Sitting on Edge of Flat Bed Without Bedrails 3   Moving Bed to Chair 3   Standing Up From Chair Using Arms 4   Walk in Room 4   Climb 3-5 Stairs With Railing 4   Basic Mobility Inpatient Raw Score 21   Basic Mobility Standardized Score 45.55   [facility] Highest Level Of Mobility   JH-HLM Goal 6: Walk 10 steps or more   JH-HLM Achieved 7: Walk 25 feet or more   Education   Education Provided Mobility training;Home exercise program;Precautions for total hip arthroplasty (KENNEDY);Assistive device   Patient Demonstrates acceptance/verbal understanding   End of Consult   Patient Position at End of Consult Supine;Bed/Chair alarm activated;All needs within reach  (ice to operative hip and SCDs in place.)   Haleigh Nicole, PT

## 2025-02-21 NOTE — PROGRESS NOTES
"Progress Note - Orthopedics   Name: Denita Brown 64 y.o. female I MRN: [id number]  Unit/Bed#: WE 2 N -[facility] I Date of Admission: 2/20/2025   Date of Service: 2/21/2025 I Hospital Day: 0     Assessment & Plan  Primary osteoarthritis of one hip, right  Postop day 1 from right total hip arthroplasty with Dr. Borges  PT/OT  Weightbearing as tolerated right lower extremity  Abduction pillow  Posterior precautions  Lovenox for 28 days for DVT prophylaxis  Stable from orthopedic perspective to be discharged when cleared by PT  Follow-up with Dr. Borges as scheduled        Subjective   64 y.o.female No acute events, no new complaints.  Patient  seen and examined at chair side.  Patient reports expected postoperative pain which is well-controlled with pain medication at this time.  Patient said she is able to ambulate well without any difficulty.  She is hopeful for discharge today.    Objective :  Temp:  [97.8 °F (36.6 °C)-99.3 °F (37.4 °C)] 98.9 °F (37.2 °C)  HR:  [] 68  BP: ()/(53-75) 146/62  Resp:  [12-18] 16  SpO2:  [94 %-100 %] 98 %  O2 Device: None (Room air)    Physical Exam  Musculoskeletal: Right lower  Visible skin with no obvious erythema, ecchymosis, or other abnormalities  Mepilex dressing clean, dry, intact  Tenderness to palpation noted around mihir-incisional site  Upper lower compartments soft and compressible  Negative Homans' sign  Motor function intact distally  Neurovascular intact distally        Lab Results: I have reviewed the following results:  Recent Labs     02/21/25  0546   WBC 6.92   HGB 9.8*   HCT 28.9*      BUN 14   CREATININE 0.80     Blood Culture:  No results found for: \"BLOODCX\"  Wound Culture: No results found for: \"WOUNDCULT\"        "

## 2025-02-21 NOTE — OCCUPATIONAL THERAPY NOTE
Occupational Therapy Progress Note     Patient Name: Denita Brown  Today's Date: 2/21/2025  Problem List  Principal Problem:    Primary osteoarthritis of one hip, right  Active Problems:    Hyperlipemia    Anxiety    Continuous opioid dependence (HCC)          02/21/25 0834   OT Last Visit   OT Visit Date 02/21/25   Note Type   Note Type Treatment   Pain Assessment   Pain Assessment Tool 0-10   Pain Score 7   Pain Location/Orientation Orientation: Right;Location: Hip   Hospital Pain Intervention(s) Repositioned;Ambulation/increased activity;Elevated;Emotional support;Rest   Restrictions/Precautions   Weight Bearing Precautions Per Order Yes   RLE Weight Bearing Per Order WBAT   Braces or Orthoses Other (Comment)  (hip abduction pillow)   Other Precautions Chair Alarm;WBS;THR;Multiple lines;Fall Risk;Pain  (posterior hip precautions)   Lifestyle   Autonomy Independent with all ADLs/IADLs, no AD use at baseline   Reciprocal Relationships Son   Service to Others FTE   Intrinsic Gratification watch movies, shopping   ADL   Where Assessed Chair   Equipment Provided Reacher;Sock aid;Dressing stick   Grooming Assistance 5  Supervision/Setup   Grooming Deficit Setup;Verbal cueing;Supervision/safety;Increased time to complete;Standing with assistive device   UB Dressing Assistance 5  Supervision/Setup   UB Dressing Deficit Setup;Supervision/safety;Increased time to complete   LB Dressing Assistance 5  Supervision/Setup   LB Dressing Deficit Setup;Verbal cueing;Supervision/safety;Increased time to complete;Use of adaptive equipment   Functional Standing Tolerance   Time ~3-4 min   Activity standing for grooming at sink   Comments use of RW for stabilitty   Bed Mobility   Supine to Sit Unable to assess   Sit to Supine Unable to assess   Additional Comments Pt greeted OOB in chair   Transfers   Sit to Stand 5  Supervision   Additional items Armrests;Increased time required;Verbal cues  (RW)   Stand to Sit 5  Supervision    Additional items Armrests;Increased time required;Verbal cues  (RW)   Toilet transfer 5  Supervision   Additional items Armrests;Increased time required;Verbal cues;Standard toilet;Commode  (BSC over standard toilet)   Additional Comments verbal cues for hand placement and safety with use of RW   Functional Mobility   Functional Mobility 5  Supervision   Additional Comments Pt completed functional mobility functional household distance with use of RW and S   Additional items Rolling walker   Toilet Transfers   Toilet Transfer From Other (Comment)  (Chair)   Toilet Transfer Type To and from   Toilet Transfer to Standard toilet  (BSC over)   Toilet Transfer Technique Ambulating   Toilet Transfers Supervision   Toilet Transfers Comments use of RW   Cognition   Overall Cognitive Status WFL   Arousal/Participation Alert;Cooperative   Attention Within functional limits   Orientation Level Oriented X4   Memory Within functional limits   Following Commands Follows all commands and directions without difficulty   Comments Cooperative and pleasant   Activity Tolerance   Activity Tolerance Patient tolerated treatment well   Medical Staff Made Aware RN C   Assessment   Assessment Pt seen for OT treatment session focusing on ADLs/IADLs, functional mobility, functional standing tolerance, functional transfers, patient education, continued evaluation. Pt greeted OOB in recliner at start of session. Pt alert and cooperative throughout session. Pt with good sitting balance and fair - dynamic standing balance. Pt tolerated treatment well. Pt completed don of pants with use of reacher seated to maintain posterior hip precautions, then standing to pull over waist with use of RW. Pt completed don of shirt with S seated. Pt educated on sock aid and dressing stick for independence at home. Pt completed functional mobility from chair to bathroom with S and use of RW. Pt completed toilet transfer with use of RW and BSC over for S. Pt  completed grooming at sink with RW for stability. Teeth brushing and grooming hair with S. Pt complete functional mobility in room distance with S and use of RW. Pt reports 7/10 pain and no dizziness. Pt's vitals include: stable.     Pt ended session seated OOB in recliner. Call bell and phone within reach. All needs met and pt reports no further questions at this time. Continue to recommend No post-acute rehabilitation needs when medically cleared. OT will continue to follow pt on caseload.   Plan   Treatment Interventions ADL retraining;Functional transfer training;Endurance training;Patient/family training;Equipment evaluation/education;Compensatory technique education;Continued evaluation;Energy conservation;Activityengagement   Goal Expiration Date 02/27/25   OT Treatment Day 1   OT Frequency 3-5x/wk   Discharge Recommendation   Rehab Resource Intensity Level, OT No post-acute rehabilitation needs   Additional Comments  The patient's raw score on the AM-PAC Daily Activity Inpatient Short Form is 21. A raw score of greater than or equal to 19 suggests the patient may benefit from discharge to home. Please refer to the recommendation of the Occupational Therapist for safe discharge planning.   AM-PAC Daily Activity Inpatient   Lower Body Dressing 3   Bathing 3   Toileting 3   Upper Body Dressing 4   Grooming 4   Eating 4   Daily Activity Raw Score 21   Daily Activity Standardized Score (Calc for Raw Score >=11) 44.27   AM-PAC Applied Cognition Inpatient   Following a Speech/Presentation 4   Understanding Ordinary Conversation 4   Taking Medications 4   Remembering Where Things Are Placed or Put Away 4   Remembering List of 4-5 Errands 4   Taking Care of Complicated Tasks 4   Applied Cognition Raw Score 24   Applied Cognition Standardized Score 62.21   End of Consult   Education Provided Yes;Family or social support of family present for education by provider   Patient Position at End of Consult Bedside  chair;Bed/Chair alarm activated;All needs within reach   Nurse Communication Nurse aware of consult   Soraya Farr, OT

## 2025-02-21 NOTE — PLAN OF CARE
Problem: OCCUPATIONAL THERAPY ADULT  Goal: Performs self-care activities at highest level of function for planned discharge setting.  See evaluation for individualized goals.  Description: Treatment Interventions: ADL retraining, Functional transfer training, Endurance training, Patient/family training, Equipment evaluation/education, Compensatory technique education, Continued evaluation, Energy conservation, Activityengagement          See flowsheet documentation for full assessment, interventions and recommendations.   Outcome: Adequate for Discharge  Note: Limitation: Decreased ADL status, Decreased endurance, Decreased self-care trans, Decreased high-level ADLs  Prognosis: Good  Assessment: Pt seen for OT treatment session focusing on ADLs/IADLs, functional mobility, functional standing tolerance, functional transfers, patient education, continued evaluation. Pt greeted OOB in recliner at start of session. Pt alert and cooperative throughout session. Pt with good sitting balance and fair - dynamic standing balance. Pt tolerated treatment well. Pt completed don of pants with use of reacher seated to maintain posterior hip precautions, then standing to pull over waist with use of RW. Pt completed don of shirt with S seated. Pt educated on sock aid and dressing stick for independence at home. Pt completed functional mobility from chair to bathroom with S and use of RW. Pt completed toilet transfer with use of RW and BSC over for S. Pt completed grooming at sink with RW for stability. Teeth brushing and grooming hair with S. Pt complete functional mobility in room distance with S and use of RW. Pt reports 7/10 pain and no dizziness. Pt's vitals include: stable.     Pt ended session seated OOB in recliner. Call bell and phone within reach. All needs met and pt reports no further questions at this time. Continue to recommend No post-acute rehabilitation needs when medically cleared. OT will continue to follow pt on  caseload.     Rehab Resource Intensity Level, OT: No post-acute rehabilitation needs

## 2025-02-21 NOTE — DISCHARGE SUMMARY
Discharge Summary - Orthopedics   Name: Denita Brown 64 y.o. female I MRN: [id number]  Unit/Bed#: [facility] 2 N -01 I Date of Admission: 2/20/2025   Date of Service: 2/21/2025 I Hospital Day: 0    Discharge Summary - Orthopedics   Denita Brown 64 y.o. female MRN: [id number]  Unit/Bed#: [facility] 2 N -01    Attending Physician: Dr. Ezio Borges    Admitting diagnosis: Primary osteoarthritis of one hip, right [M16.11]    Discharge diagnosis: Primary osteoarthritis of one hip, right [M16.11]    Date of admission: 2/20/2025    Date of discharge: 02/21/25    Procedure: Right total hip arthroplasty    HPI: 64 y.o. female with a history of Primary osteoarthritis of one hip, right [M16.11] who has been seen by Dr. Ezio Borges in clinic.  Pt has failed previous non operative therapies and was scheduled for right total hip arthroplasty.  Prior to surgery the risks and benefits of surgery were explained and informed consent was obtained.    Hospital course: Pt was taken to the OR on 2/20/2025.  Surgery went without complications and pt was discharged to the PACU in a stable condition and was transferred to the floor.  On discharge date pt was cleared by PT, ortho, and medicine team and determined to be safe for discharge.  Daily discussion was had with the patient, nursing staff, orthopaedic team, and family members if present.  All questions were answered to the patients satisifaction.      0   Lab Value Date/Time    HGB 9.8 (L) 02/21/2025 0546    HGB 13.1 01/25/2025 0738    HGB 13.7 04/27/2024 0832    HGB 13.4 04/22/2023 0840    HGB 13.5 09/04/2021 1035    HGB 12.9 02/06/2021 0951    HGB 13.2 02/01/2020 1006    HGB 12.7 02/09/2019 0923    HGB 12.5 10/26/2016 0519    HGB 14.0 03/02/2015 1251    HGB 12.6 09/21/2014 2019    HGB 12.1 08/16/2014 0630    HGB 12.8 08/15/2014 0629    HGB 12.4 08/14/2014 0523    HGB 12.4 08/13/2014 1958    HGB 12.2 08/13/2014 1553    HGB 10.2 (L) 08/13/2014 1359    HGB 8.2 (L) 08/13/2014 1223     HGB 9.9 (L) 08/13/2014 1113    HGB 13.2 07/18/2014 1349        Discharge Instructions:   As per discharge instructions in epic  Keep dressings clean and dry at all times   Complete DVT prophylaxis as prescribed   Physical therapy  Follow-up as scheduled, otherwise call for appt.     Discharge Medications:  For the complete list of discharge medications, please refer to the patient's medication reconciliation.

## 2025-02-21 NOTE — ASSESSMENT & PLAN NOTE
AVSS, pain well-controlled, slight numbness that is resolving with time, appropriate urinary output at 1.5 L, ambulating on extremity, labs pending into tomorrow    POD #0 s/p right KENNEDY-   - Antibiotics: Finished antibiotics/Ancef  - Anticoagulation: Lovenox 40 mg subcutaneous once daily, SCDs, ambulation  - Activity: Weightbearing as tolerated, PT/OT posterior hip protocol/pink pillow  - AM lab draw: CBC, BMP  - Analgesia: Continue p.r.n. medication  - Dressing: keep clean, dry, and in tact, dressing will be changed  - Disposition: Hopeful for discharge tomorrow pending progress with PT/OT

## 2025-02-21 NOTE — ASSESSMENT & PLAN NOTE
Postop day 1 from right total hip arthroplasty with Dr. Borges  PT/OT  Weightbearing as tolerated right lower extremity  Abduction pillow  Posterior precautions  Lovenox for 28 days for DVT prophylaxis  Stable from orthopedic perspective to be discharged when cleared by PT  Follow-up with Dr. Borges as scheduled

## 2025-02-21 NOTE — PROGRESS NOTES
Progress Note - Hospitalist   Name: Denita Brown 64 y.o. female I MRN: [id number]  Unit/Bed#: WE 2 N -01 I Date of Admission: 2/20/2025   Date of Service: 2/21/2025 I Hospital Day: 0     Assessment & Plan  Primary osteoarthritis of one hip, right  POD 1 s/p Right KENNEDY, AVSS, ambulating mobilizing minimally, slight numbness of foot from leaving independent positioning on recliner for too long but sensation to dull pressure intact, had some tenderness that kept patient up overnight, strength/motor intact, pending labs    POD #1 s/p right KENNEDY-   - Antibiotics: Finished antibiotics/Ancef  - Anticoagulation: Lovenox 40 mg subcutaneous once daily, SCDs, ambulation  - Activity: Weightbearing as tolerated, PT/OT posterior hip protocol/pink pillow  - AM lab draw: CBC, BMP  - Analgesia: Continue p.r.n. medication  - Dressing: keep clean, dry, and in tact, dressing will be changed  - Disposition: Hopeful for discharge tomorrow pending progress with PT/OT    Anxiety  Chronic, stable    -Continue Wellbutrin and Lexapro  Continuous opioid dependence (HCC)  Chronic, stable    -Continue current opioid-based pain regiment  Hyperlipemia  Stable, chronic    -Continue fenofibrate    Hospitalist service will follow.    Subjective   Patient was seen examined at bedside.  Patient denies any acute events overnight but did have discomfort throughout the course of the night particularly with myalgias in the thigh.  Patient was only able to sleep 3 hours as result and is somewhat fatigued.  Patient is still able to weight-bear and ambulate as needed.  Patient's needs to urinate or not as significant/much as they were earlier yesterday afternoon/evening.  Patient has a slight numbness on the foot but reports that it improves with just mobilizing and moving her leg around.  Patient reports she hangs her foot over the recliner portion of the chair for too long and that is what causes the numbness.  Patient otherwise has decent sensation to  dull pressure throughout the entirety of the foot and the calf.  Patient has good motor and strength.  Patient is primarily just fatigued    Objective :  Temp:  [97.8 °F (36.6 °C)-99.3 °F (37.4 °C)] 99 °F (37.2 °C)  HR:  [] 76  BP: ()/(53-75) 125/67  Resp:  [12-18] 18  SpO2:  [94 %-100 %] 98 %  O2 Device: None (Room air)    I/O         02/19 0701 02/20 0700 02/20 0701 02/21 0700    P.O.  480    I.V. (mL/kg)  6000 (82.2)    IV Piggyback  970    Total Intake(mL/kg)  7450 (102.1)    Urine (mL/kg/hr)  2129 (1.2)    Blood  50    Total Output  2179    Net  +5271                  Physical Exam  Right Lower Extremity: Thigh and calf compartments are soft and nontender to palpation. + L3-S1 SILT. + DF/PF.+ EHL. No pain with passive stretch/extension of toes.  DP 2+, capillary refill less than 2 seconds, and foot is warm B/L. Incision is c/d/i      Lab Results: I have reviewed the following results:  Recent Labs     02/21/25  0546   WBC 6.92   HGB 9.8*   HCT 28.9*          Imaging Results Review: No pertinent imaging studies reviewed.  Other Study Results Review: No additional pertinent studies reviewed.    VTE Pharmacologic Prophylaxis: Enoxaparin (Lovenox)  VTE Mechanical Prophylaxis: sequential compression device

## 2025-02-22 ENCOUNTER — NURSE TRIAGE (OUTPATIENT)
Dept: OTHER | Facility: OTHER | Age: 65
End: 2025-02-22

## 2025-02-22 NOTE — TELEPHONE ENCOUNTER
"Regarding: post op/hip replacement surgery/nauseous/cant keep anything down  ----- Message from Donna BUI sent at 2/22/2025 12:26 PM EST -----  'I got hip replacement surgery on thursdya. I am feeling so nauseous and I can't keep anything down\"    "

## 2025-02-22 NOTE — TELEPHONE ENCOUNTER
"Reason for Disposition   [1] Caller has URGENT question AND [2] triager unable to answer question    Answer Assessment - Initial Assessment Questions  1. SYMPTOM: \"What's the main symptom you're concerned about?\" (e.g., drainage, fever, incision opened up, pain, redness, swelling)      Pt is very nauseated. Is dry heaving. No vomiting.     2. ONSET: \"When did   start?\"      Since yesterday.     3. DATE of SURGERY: \"When was the surgery?\"        2/20    4. INCISION SITE: \"Where is the incision located?\" (e.g., right or left side; buttocks area [posterior approach], front groin area [anterior approach], outer side of hip [lateral approach].       Right hip    5. REDNESS: \"Is there any redness at the incision site?\" If Yes, ask: \"How wide across is the redness?\" (e.g., inches, centimeters)       No redness.     6. PAIN: \"Is there any pain?\" If Yes, ask: \"Where is it located?\" (e.g., hip, thigh, calf; right or left side). How bad is it?\"  (Scale 1-10; or mild, moderate, severe)      Pain is tolerable- rated at a 2-3 /10. Pt took half of a 4mg Dilaudid (total of 2 mg) at 7am today. Pt took 8mg of Dilaudid last night, One oxycodone, and one 500mg of Robaxin last night at 7pm. Pt also took Zepbound yesterday at 6pm.     7. LEG SWELLING: \"Is there any leg swelling?\" If Yes, ask: \"Where is the swelling?\"  (e.g., right, left or both legs;  localized swelling just around incision, entire leg looks swollen, swollen area on thigh or calf). \"Is the swelling getting worse, staying the same, or getting better?\"        8. BLEEDING: \"Is there any bleeding?\" If Yes, ask: \"How much?\" and \"Where?\"      No bleeding.     9. DRAINAGE: \"Is there any drainage from the incision site?\" If Yes, ask: \"What does it look like?\" (e.g., clear, cloudy, pink, red, yellow, pus). \"How much drainage is there?\" (e.g., drops, teaspoon)      No drainage.     10. FEVER: \"Do you have a fever?\" If Yes, ask: \"What is your temperature, how was it measured, and " "when did it start?\"        No fever.     11. OTHER SYMPTOMS: \"Do you have any other symptoms?\" (e.g., dizziness, rash elsewhere on body, shaking chills, weakness)        No dizziness or lightheadedness. Took Zofran at 4:30am and again at 0930- says it is not helping.    Protocols used: Post-Op Total Hip Replacement Follow-up Call-[facility]-      Per Dr. Zuluaga- Pt can try taking another Zofran now. Should try to limit narcotic use as well.     Recommendation given to pt who verbalized understanding.     "

## 2025-02-23 ENCOUNTER — APPOINTMENT (EMERGENCY)
Dept: CT IMAGING | Facility: HOSPITAL | Age: 65
End: 2025-02-23
Payer: COMMERCIAL

## 2025-02-23 ENCOUNTER — HOSPITAL ENCOUNTER (EMERGENCY)
Facility: HOSPITAL | Age: 65
Discharge: HOME | End: 2025-02-23
Attending: EMERGENCY MEDICINE | Admitting: EMERGENCY MEDICINE
Payer: COMMERCIAL

## 2025-02-23 VITALS
OXYGEN SATURATION: 99 % | HEIGHT: 65 IN | WEIGHT: 174.16 LBS | RESPIRATION RATE: 17 BRPM | TEMPERATURE: 98 F | BODY MASS INDEX: 29.02 KG/M2 | HEART RATE: 102 BPM | DIASTOLIC BLOOD PRESSURE: 74 MMHG | SYSTOLIC BLOOD PRESSURE: 171 MMHG

## 2025-02-23 DIAGNOSIS — Z96.641 STATUS POST TOTAL REPLACEMENT OF RIGHT HIP: ICD-10-CM

## 2025-02-23 DIAGNOSIS — R63.0 DECREASED APPETITE: ICD-10-CM

## 2025-02-23 DIAGNOSIS — M25.551 ACUTE RIGHT HIP PAIN: ICD-10-CM

## 2025-02-23 DIAGNOSIS — F32.5 MAJOR DEPRESSIVE DISORDER WITH SINGLE EPISODE, IN FULL REMISSION (HCC): ICD-10-CM

## 2025-02-23 DIAGNOSIS — R10.13 EPIGASTRIC PAIN: ICD-10-CM

## 2025-02-23 DIAGNOSIS — R11.0 NAUSEA: Primary | ICD-10-CM

## 2025-02-23 LAB
ALBUMIN SERPL BCG-MCNC: 3.7 G/DL (ref 3.5–5)
ALP SERPL-CCNC: 37 U/L (ref 34–104)
ALT SERPL W P-5'-P-CCNC: 13 U/L (ref 7–52)
ANION GAP SERPL CALCULATED.3IONS-SCNC: 16 MMOL/L (ref 4–13)
AST SERPL W P-5'-P-CCNC: 35 U/L (ref 13–39)
BASOPHILS # BLD AUTO: 0.06 THOUSANDS/ÂΜL (ref 0–0.1)
BASOPHILS NFR BLD AUTO: 1 % (ref 0–1)
BILIRUB SERPL-MCNC: 0.43 MG/DL (ref 0.2–1)
BUN SERPL-MCNC: 13 MG/DL (ref 5–25)
CALCIUM SERPL-MCNC: 9.2 MG/DL (ref 8.4–10.2)
CHLORIDE SERPL-SCNC: 102 MMOL/L (ref 96–108)
CO2 SERPL-SCNC: 22 MMOL/L (ref 21–32)
CREAT SERPL-MCNC: 0.65 MG/DL (ref 0.6–1.3)
EOSINOPHIL # BLD AUTO: 0.02 THOUSAND/ÂΜL (ref 0–0.61)
EOSINOPHIL NFR BLD AUTO: 0 % (ref 0–6)
ERYTHROCYTE [DISTWIDTH] IN BLOOD BY AUTOMATED COUNT: 11.9 % (ref 11.6–15.1)
GFR SERPL CREATININE-BSD FRML MDRD: 95 ML/MIN/1.73SQ M
GLUCOSE SERPL-MCNC: 93 MG/DL (ref 65–140)
HCT VFR BLD AUTO: 33 % (ref 34.8–46.1)
HGB BLD-MCNC: 11.2 G/DL (ref 11.5–15.4)
IMM GRANULOCYTES # BLD AUTO: 0.06 THOUSAND/UL (ref 0–0.2)
IMM GRANULOCYTES NFR BLD AUTO: 1 % (ref 0–2)
LIPASE SERPL-CCNC: 20 U/L (ref 11–82)
LYMPHOCYTES # BLD AUTO: 1.12 THOUSANDS/ÂΜL (ref 0.6–4.47)
LYMPHOCYTES NFR BLD AUTO: 18 % (ref 14–44)
MCH RBC QN AUTO: 30.1 PG (ref 26.8–34.3)
MCHC RBC AUTO-ENTMCNC: 33.9 G/DL (ref 31.4–37.4)
MCV RBC AUTO: 89 FL (ref 82–98)
MONOCYTES # BLD AUTO: 0.47 THOUSAND/ÂΜL (ref 0.17–1.22)
MONOCYTES NFR BLD AUTO: 8 % (ref 4–12)
NEUTROPHILS # BLD AUTO: 4.51 THOUSANDS/ÂΜL (ref 1.85–7.62)
NEUTS SEG NFR BLD AUTO: 72 % (ref 43–75)
NRBC BLD AUTO-RTO: 0 /100 WBCS
PLATELET # BLD AUTO: 339 THOUSANDS/UL (ref 149–390)
PMV BLD AUTO: 9.3 FL (ref 8.9–12.7)
POTASSIUM SERPL-SCNC: 3.7 MMOL/L (ref 3.5–5.3)
PROT SERPL-MCNC: 6.4 G/DL (ref 6.4–8.4)
RBC # BLD AUTO: 3.72 MILLION/UL (ref 3.81–5.12)
SODIUM SERPL-SCNC: 140 MMOL/L (ref 135–147)
WBC # BLD AUTO: 6.24 THOUSAND/UL (ref 4.31–10.16)

## 2025-02-23 PROCEDURE — 96374 THER/PROPH/DIAG INJ IV PUSH: CPT

## 2025-02-23 PROCEDURE — 83690 ASSAY OF LIPASE: CPT | Performed by: EMERGENCY MEDICINE

## 2025-02-23 PROCEDURE — 96375 TX/PRO/DX INJ NEW DRUG ADDON: CPT

## 2025-02-23 PROCEDURE — 80053 COMPREHEN METABOLIC PANEL: CPT | Performed by: EMERGENCY MEDICINE

## 2025-02-23 PROCEDURE — 99284 EMERGENCY DEPT VISIT MOD MDM: CPT

## 2025-02-23 PROCEDURE — 74177 CT ABD & PELVIS W/CONTRAST: CPT

## 2025-02-23 PROCEDURE — 96372 THER/PROPH/DIAG INJ SC/IM: CPT

## 2025-02-23 PROCEDURE — 85025 COMPLETE CBC W/AUTO DIFF WBC: CPT | Performed by: EMERGENCY MEDICINE

## 2025-02-23 PROCEDURE — 96361 HYDRATE IV INFUSION ADD-ON: CPT

## 2025-02-23 PROCEDURE — 36415 COLL VENOUS BLD VENIPUNCTURE: CPT | Performed by: EMERGENCY MEDICINE

## 2025-02-23 RX ORDER — PROMETHAZINE HYDROCHLORIDE 25 MG/ML
25 INJECTION, SOLUTION INTRAMUSCULAR; INTRAVENOUS ONCE
Status: COMPLETED | OUTPATIENT
Start: 2025-02-23 | End: 2025-02-23

## 2025-02-23 RX ORDER — METOCLOPRAMIDE HYDROCHLORIDE 5 MG/ML
10 INJECTION INTRAMUSCULAR; INTRAVENOUS ONCE
Status: COMPLETED | OUTPATIENT
Start: 2025-02-23 | End: 2025-02-23

## 2025-02-23 RX ORDER — ONDANSETRON 2 MG/ML
4 INJECTION INTRAMUSCULAR; INTRAVENOUS ONCE
Status: COMPLETED | OUTPATIENT
Start: 2025-02-23 | End: 2025-02-23

## 2025-02-23 RX ORDER — FENTANYL CITRATE 50 UG/ML
50 INJECTION, SOLUTION INTRAMUSCULAR; INTRAVENOUS ONCE
Refills: 0 | Status: DISCONTINUED | OUTPATIENT
Start: 2025-02-23 | End: 2025-02-23 | Stop reason: HOSPADM

## 2025-02-23 RX ORDER — FAMOTIDINE 20 MG/1
40 TABLET, FILM COATED ORAL ONCE
Status: COMPLETED | OUTPATIENT
Start: 2025-02-23 | End: 2025-02-23

## 2025-02-23 RX ORDER — METOCLOPRAMIDE 10 MG/1
10 TABLET ORAL EVERY 6 HOURS
Qty: 30 TABLET | Refills: 0 | Status: SHIPPED | OUTPATIENT
Start: 2025-02-23

## 2025-02-23 RX ADMIN — ONDANSETRON 4 MG: 2 INJECTION INTRAMUSCULAR; INTRAVENOUS at 07:15

## 2025-02-23 RX ADMIN — METOCLOPRAMIDE 10 MG: 5 INJECTION, SOLUTION INTRAMUSCULAR; INTRAVENOUS at 08:02

## 2025-02-23 RX ADMIN — PROMETHAZINE HYDROCHLORIDE 25 MG: 25 INJECTION INTRAMUSCULAR; INTRAVENOUS at 09:23

## 2025-02-23 RX ADMIN — FAMOTIDINE 40 MG: 20 TABLET, FILM COATED ORAL at 07:15

## 2025-02-23 RX ADMIN — SODIUM CHLORIDE 1000 ML: 0.9 INJECTION, SOLUTION INTRAVENOUS at 07:12

## 2025-02-23 RX ADMIN — IOHEXOL 100 ML: 350 INJECTION, SOLUTION INTRAVENOUS at 09:14

## 2025-02-23 NOTE — DISCHARGE INSTRUCTIONS
Please follow up PCP.  Recommend brat diet for the next couple of days.  Recommend tylenol 650 mg every 6 hours as needed for pain. Please return for severe chest pain, significant shortness of breath, severely worsening symptoms, or any other concerning signs or symptoms. Please refer to the following documents for additional instructions and return precautions.

## 2025-02-23 NOTE — ED PROVIDER NOTES
Time reflects when diagnosis was documented in both MDM as applicable and the Disposition within this note       Time User Action Codes Description Comment    2/23/2025  7:10 AM ErneSusann Add [Z96.641] Status post total replacement of right hip     2/23/2025  7:10 AM Erne Parrish Add [M25.551] Acute right hip pain     2/23/2025  7:10 AM Erne, Parrish Add [R10.13] Epigastric pain     2/23/2025  7:10 AM Erne, Parrish Add [R11.0] Nausea     2/23/2025  7:10 AM Erne, Parrish Add [R63.0] Decreased appetite     2/23/2025 10:50 AM Erne Parrish Modify [Z96.641] Status post total replacement of right hip     2/23/2025 10:50 AM Erne Parrish Modify [R11.0] Nausea           ED Disposition       ED Disposition   Discharge    Condition   Stable    Date/Time   Sun Feb 23, 2025 10:50 AM    Comment   Denita Brown discharge to home/self care.                   Assessment & Plan       Medical Decision Making  64-year-old female history of diabetes presenting with nausea, poor appetite, hip pain.  Plan for labs include abdominal labs.  Symptom management with oral and IV pain and nausea medication.  IV fluids.  Reassess.    Labs interpreted by me without significant acute process.  CT no significant acute process.  Symptoms significantly improved with medications.  Prescription sent to pharmacy.  Dietary recommendations. Discussed results and recommendations. Advised follow up PCP. Medication recommendations. Given instructions and return precautions. Patient/family at bedside acknowledged understanding of all written and verbal instructions and return precautions. Discharged.     Amount and/or Complexity of Data Reviewed  Labs: ordered.  Radiology: ordered.    Risk  Prescription drug management.             Medications   fentaNYL injection 50 mcg (50 mcg Intravenous Not Given 2/23/25 0723)   sodium chloride 0.9 % bolus 1,000 mL (0 mL Intravenous Stopped 2/23/25 0812)   ondansetron (ZOFRAN) injection 4 mg (4 mg Intravenous Given 2/23/25 0715)  "  famotidine (PEPCID) tablet 40 mg (40 mg Oral Given 2/23/25 0715)   metoclopramide (REGLAN) injection 10 mg (10 mg Intravenous Given 2/23/25 0802)   promethazine (PHENERGAN) injection 25 mg (25 mg Intramuscular Given 2/23/25 0923)   iohexol (OMNIPAQUE) 350 MG/ML injection (MULTI-DOSE) 100 mL (100 mL Intravenous Given 2/23/25 0914)       ED Risk Strat Scores                            SBIRT 22yo+      Flowsheet Row Most Recent Value   Initial Alcohol Screen: US AUDIT-C     1. How often do you have a drink containing alcohol? 0 Filed at: 02/23/2025 0702   2. How many drinks containing alcohol do you have on a typical day you are drinking?  0 Filed at: 02/23/2025 0702   3b. FEMALE Any Age, or MALE 65+: How often do you have 4 or more drinks on one occassion? 0 Filed at: 02/23/2025 0702   Audit-C Score 0 Filed at: 02/23/2025 0702   JILLIAN: How many times in the past year have you...    Used an illegal drug or used a prescription medication for non-medical reasons? Never Filed at: 02/23/2025 0702                            History of Present Illness       Chief Complaint   Patient presents with    Nausea     Patient arrives via EMS \"c/o nausea lack of appetite after right hip replacement x 4 days ago\"        Past Medical History:   Diagnosis Date    Abdominal pain, epigastric 03/23/2018    Added automatically from request for surgery [id number]    Abnormal x-ray of lumbar spine 11/03/2015    Acute cystitis with hematuria 04/07/2020    Bariatric surgery status     Basal cell carcinoma     basil cell carcinoma- in remission    Benign essential hypertension 06/12/2012    Bone bruise 11/02/2023    Breakdown (mechanical) of internal fixation device of vertebrae, initial encounter ([facility]) 03/01/2019    Calculus of bile duct with cholecystitis without obstruction 04/27/2018    Added automatically from request for surgery [id number]    Cellulitis of finger 12/03/2015    Closed fracture of left distal fibula 10/28/2023    Degenerative " lumbar disc     Digital mucinous cyst 2015    DMII (diabetes mellitus, type 2) (HCC) 2013    GERD (gastroesophageal reflux disease)     Gross hematuria 2019    Hiatal hernia     History of COVID-19 2021    History of transfusion     Post-op spinal surgery    Hyperlipidemia     Low back pain     Lower urinary tract infectious disease 2015    Medicare annual wellness visit, initial 2019    Obesity     Resolved 10/6/2016     Other closed fracture of distal end of left fibula, initial encounter 10/28/2023    Overactive bladder     Postsurgical malabsorption     Recurrent UTI 2019    Spinal stenosis     SVT (supraventricular tachycardia) ([facility])     Tachycardia     Resolved 2016     Type 2 diabetes mellitus ([facility])     Last assesssed 2018     Wears glasses       Past Surgical History:   Procedure Laterality Date    APPENDECTOMY      ARTHRODESIS      Lumbar - Last assessed 2018     BACK SURGERY      Lumbar (3 surgeries)- two levels fused, clean out from infection, then fusion of 7 levels (last surgery in )    CARDIAC ELECTROPHYSIOLOGY STUDY AND ABLATION      CARPAL TUNNEL RELEASE      x2    CERVICAL SPINE SURGERY      Fusion of C2-C7 over a period of 3 surgeries ( first)     SECTION      X2    CHOLECYSTECTOMY      FL MYELOGRAM LUMBAR  2019    HIP ARTHROPLASTY Right 2025    INSERTION / PLACEMENT / REVISION NEUROSTIMULATOR      X2- both were removed    NECK SURGERY      OTHER SURGICAL HISTORY      Catheter ablation     MI ARTHRP ACETBLR/PROX FEM PROSTC AGRFT/ALGRFT Right 2025    Procedure: ARTHROPLASTY HIP TOTAL;  Surgeon: Ezio Borges MD;  Location:  MAIN OR;  Service: Orthopedics    MI EGD TRANSORAL BIOPSY SINGLE/MULTIPLE N/A 2016    Procedure: ESOPHAGOGASTRODUODENOSCOPY (EGD);  Surgeon: Tanya Orta MD;  Location: [facility];  Service: Bariatrics    MI EGD TRANSORAL BIOPSY SINGLE/MULTIPLE N/A 2018     Procedure: ESOPHAGOGASTRODUODENOSCOPY (EGD) with biopsy;  Surgeon: Tanya Orta MD;  Location: [facility];  Service: Bariatrics    CO LAPAROSCOPY SURG CHOLECYSTECTOMY N/A 2018    Procedure: CHOLECYSTECTOMY LAPAROSCOPIC;  Surgeon: Tanya Orta MD;  Location: [facility];  Service: Bariatrics    CO LAPS GSTR RSTCV PX W/BYP BRAULIO-EN-Y LIMB <150 CM N/A 10/25/2016    Procedure: BYPASS GASTRIC  BRAULIO-EN-Y LAPAROSCOPIC, WITH INTRA OP EGD;  Surgeon: Tanya Orta MD;  Location: [facility];  Service: Bariatrics    SKIN CANCER EXCISION      TONSILLECTOMY      TUBAL LIGATION      US GUIDED BREAST BIOPSY RIGHT COMPLETE Right 2023    WISDOM TOOTH EXTRACTION        Family History   Problem Relation Age of Onset    Arthritis Mother         Rheumatoid    Angina Mother     Coronary artery disease Mother     Diabetes Mother     Cancer Father         Lung    Cystic fibrosis Father     Rheum arthritis Sister     Diabetes Sister     No Known Problems Maternal Grandmother     No Known Problems Maternal Grandfather     No Known Problems Paternal Grandmother     No Known Problems Paternal Grandfather     Other Brother         Back problems     Diabetes Brother     No Known Problems Brother     No Known Problems Son     No Known Problems Son     Hypertension Family     Heart disease Family     Breast cancer Neg Hx       Social History     Tobacco Use    Smoking status: Former     Current packs/day: 0.00     Average packs/day: 1 pack/day for 20.0 years (20.0 ttl pk-yrs)     Types: Cigarettes     Start date:      Quit date:      Years since quittin.1     Passive exposure: Past    Smokeless tobacco: Never   Vaping Use    Vaping status: Never Used   Substance Use Topics    Alcohol use: Not Currently    Drug use: No      E-Cigarette/Vaping    E-Cigarette Use Never User       E-Cigarette/Vaping Substances    Nicotine No     THC No     CBD No     Flavoring No     Other No     Unknown No       I have reviewed and agree  with the history as documented.     64-year-old female history of diabetes presenting with nausea, poor appetite, hip pain.  Patient reports getting right hip surgery a few days ago.  Reports that her appetite has been very poor and she has been nauseous with dry heaving.  Denies any vomiting.  States that she was able to have a bowel movement.  Denies any diarrhea.  Reports intermittent epigastric abdominal discomfort when dry heaving.  Denies any currently.  Reports right hip is sore but pain is tolerable.  Denies any neurological changes such as motor or sensory deficits.  Denies chest pain shortness of breath.  Denies any other complaints.  Chart reviewed.    Past Medical History:  03/23/2018: Abdominal pain, epigastric      Comment:  Added automatically from request for surgery [id number]  11/03/2015: Abnormal x-ray of lumbar spine  04/07/2020: Acute cystitis with hematuria  No date: Bariatric surgery status  No date: Basal cell carcinoma      Comment:  basil cell carcinoma- in remission  06/12/2012: Benign essential hypertension  11/02/2023: Bone bruise  03/01/2019: Breakdown (mechanical) of internal fixation device of   vertebrae, initial encounter ([facility])  04/27/2018: Calculus of bile duct with cholecystitis without   obstruction      Comment:  Added automatically from request for surgery [id number]  12/03/2015: Cellulitis of finger  10/28/2023: Closed fracture of left distal fibula  No date: Degenerative lumbar disc  06/24/2015: Digital mucinous cyst  11/08/2013: DMII (diabetes mellitus, type 2) ([facility])  No date: GERD (gastroesophageal reflux disease)  03/19/2019: Gross hematuria  No date: Hiatal hernia  12/22/2021: History of COVID-19  2014: History of transfusion      Comment:  Post-op spinal surgery  No date: Hyperlipidemia  No date: Low back pain  03/27/2015: Lower urinary tract infectious disease  11/27/2019: Medicare annual wellness visit, initial  No date: Obesity      Comment:  Resolved 10/6/2016   10/28/2023:  Other closed fracture of distal end of left fibula,   initial encounter  No date: Overactive bladder  No date: Postsurgical malabsorption  03/19/2019: Recurrent UTI  No date: Spinal stenosis  No date: SVT (supraventricular tachycardia) ([facility])  No date: Tachycardia      Comment:  Resolved 5/4/2016   No date: Type 2 diabetes mellitus ([facility])      Comment:  Last assesssed 1/18/2018   No date: Wears glasses  Family History: non-contributory  Social History          Review of Systems   Constitutional:  Positive for appetite change. Negative for chills, diaphoresis, fever and unexpected weight change.   HENT:  Negative for congestion and rhinorrhea.    Eyes:  Negative for photophobia and visual disturbance.   Respiratory:  Negative for cough, chest tightness and shortness of breath.    Cardiovascular:  Negative for chest pain, palpitations and leg swelling.   Gastrointestinal:  Positive for abdominal pain and nausea. Negative for abdominal distention, blood in stool, constipation, diarrhea and vomiting.   Genitourinary:  Negative for dysuria and hematuria.   Musculoskeletal:  Positive for arthralgias. Negative for back pain, joint swelling, neck pain and neck stiffness.   Skin:  Negative for color change, pallor, rash and wound.   Neurological:  Negative for dizziness, syncope, weakness, light-headedness and headaches.   Psychiatric/Behavioral:  Negative for agitation.    All other systems reviewed and are negative.          Objective       ED Triage Vitals [02/23/25 0701]   Temperature Pulse Blood Pressure Respirations SpO2 Patient Position - Orthostatic VS   98 °F (36.7 °C) 104 170/84 17 98 % --      Temp Source Heart Rate Source BP Location FiO2 (%) Pain Score    Oral Monitor Right arm -- --      Vitals      Date and Time Temp Pulse SpO2 Resp BP Pain Score FACES Pain Rating User   02/23/25 0830 -- 102 99 % 17 171/74 -- -- MI   02/23/25 0730 -- 89 97 % 17 152/79 -- -- MI   02/23/25 0715 -- 101 98 % 17 154/83 -- -- MI    02/23/25 0701 98 °F (36.7 °C) 104 98 % 17 170/84 -- -- MI            Physical Exam  Vitals and nursing note reviewed.   Constitutional:       General: She is not in acute distress.     Appearance: Normal appearance. She is well-developed. She is not ill-appearing, toxic-appearing or diaphoretic.   HENT:      Head: Normocephalic and atraumatic.      Nose: Nose normal. No congestion or rhinorrhea.      Mouth/Throat:      Mouth: Mucous membranes are moist.      Pharynx: Oropharynx is clear. No oropharyngeal exudate or posterior oropharyngeal erythema.   Eyes:      General: No scleral icterus.        Right eye: No discharge.         Left eye: No discharge.      Extraocular Movements: Extraocular movements intact.      Conjunctiva/sclera: Conjunctivae normal.      Pupils: Pupils are equal, round, and reactive to light.   Neck:      Vascular: No JVD.      Trachea: No tracheal deviation.      Comments: Supple. Normal range of motion.   Cardiovascular:      Rate and Rhythm: Normal rate and regular rhythm.      Heart sounds: Normal heart sounds. No murmur heard.     No friction rub. No gallop.      Comments: Normal rate and regular rhythm  Pulmonary:      Effort: Pulmonary effort is normal. No respiratory distress.      Breath sounds: Normal breath sounds. No stridor. No wheezing or rales.      Comments: Clear to auscultation bilaterally  Chest:      Chest wall: No tenderness.   Abdominal:      General: Bowel sounds are normal. There is no distension.      Palpations: Abdomen is soft.      Tenderness: There is no abdominal tenderness. There is no right CVA tenderness, left CVA tenderness, guarding or rebound.      Comments: Soft, nontender, nondistended.  Normal bowel sounds throughout   Musculoskeletal:         General: No swelling, tenderness, deformity or signs of injury. Normal range of motion.      Cervical back: Normal range of motion and neck supple. No rigidity. No muscular tenderness.      Right lower leg: No  edema.      Left lower leg: No edema.      Comments: Right hip incision bandaged, clean dry intact.  2+ DP PT pulses.  Sensation intact   Lymphadenopathy:      Cervical: No cervical adenopathy.   Skin:     General: Skin is warm and dry.      Coloration: Skin is not pale.      Findings: No erythema or rash.   Neurological:      General: No focal deficit present.      Mental Status: She is alert. Mental status is at baseline.      Sensory: No sensory deficit.      Motor: No weakness or abnormal muscle tone.      Coordination: Coordination normal.      Comments: Alert.  Strength and sensation grossly intact.    Psychiatric:         Behavior: Behavior normal.         Thought Content: Thought content normal.         Results Reviewed       Procedure Component Value Units Date/Time    Comprehensive metabolic panel [[id number]]  (Abnormal) Collected: 02/23/25 0714    Lab Status: Final result Specimen: Blood from Arm, Right Updated: 02/23/25 0827     Sodium 140 mmol/L      Potassium 3.7 mmol/L      Chloride 102 mmol/L      CO2 22 mmol/L      ANION GAP 16 mmol/L      BUN 13 mg/dL      Creatinine 0.65 mg/dL      Glucose 93 mg/dL      Calcium 9.2 mg/dL      AST 35 U/L      ALT 13 U/L      Alkaline Phosphatase 37 U/L      Total Protein 6.4 g/dL      Albumin 3.7 g/dL      Total Bilirubin 0.43 mg/dL      eGFR 95 ml/min/1.73sq m     Narrative:      National Kid[employer] guidelines for Chronic Kidney Disease (CKD):     Stage 1 with normal or high GFR (GFR > 90 mL/min/1.73 square meters)    Stage 2 Mild CKD (GFR = 60-89 mL/min/1.73 square meters)    Stage 3A Moderate CKD (GFR = 45-59 mL/min/1.73 square meters)    Stage 3B Moderate CKD (GFR = 30-44 mL/min/1.73 square meters)    Stage 4 Severe CKD (GFR = 15-29 mL/min/1.73 square meters)    Stage 5 End Stage CKD (GFR <15 mL/min/1.73 square meters)  Note: GFR calculation is accurate only with a steady state creatinine    Lipase [[id number]]  (Normal) Collected: 02/23/25 0714     Lab Status: Final result Specimen: Blood from Arm, Right Updated: 02/23/25 0827     Lipase 20 u/L     CBC and differential [[id number]]  (Abnormal) Collected: 02/23/25 0714    Lab Status: Final result Specimen: Blood from Arm, Right Updated: 02/23/25 0725     WBC 6.24 Thousand/uL      RBC 3.72 Million/uL      Hemoglobin 11.2 g/dL      Hematocrit 33.0 %      MCV 89 fL      MCH 30.1 pg      MCHC 33.9 g/dL      RDW 11.9 %      MPV 9.3 fL      Platelets 339 Thousands/uL      nRBC 0 /100 WBCs      Segmented % 72 %      Immature Grans % 1 %      Lymphocytes % 18 %      Monocytes % 8 %      Eosinophils Relative 0 %      Basophils Relative 1 %      Absolute Neutrophils 4.51 Thousands/µL      Absolute Immature Grans 0.06 Thousand/uL      Absolute Lymphocytes 1.12 Thousands/µL      Absolute Monocytes 0.47 Thousand/µL      Eosinophils Absolute 0.02 Thousand/µL      Basophils Absolute 0.06 Thousands/µL             CT abdomen pelvis with contrast   Final Interpretation by Clarke Duncan MD (02/23 1030)      Air in the urinary bladder (series 2 image 158.) This could be due to infection. Please correlate with urinalysis.      No other potential source for pain identified in the abdomen or pelvis.      Expected recent postoperative appearance of right total hip arthroplasty.      Status post posterior fusion from L1-S1. Please see lumbar spine CT from 1/16/2025 for further evaluation of evidence of loosening and hardware fracture. The S1 left-sided screw fracture is visible on this CT.         Workstation performed: [id number]             Procedures    ED Medication and Procedure Management   Prior to Admission Medications   Prescriptions Last Dose Informant Patient Reported? Taking?   Calcium Citrate-Vitamin D 315-250 MG-UNIT TABS  Self Yes No   Sig: Take 1 tablet by mouth 2 (two) times a day    Cholecalciferol 25 MCG (1000 UT) CHEW  Self Yes No   Sig: Chew   Multiple Vitamins-Minerals (BARIATRIC FUSION PO)  Self Yes No   Sig:  Take 1 tablet by mouth daily   acetaminophen (TYLENOL) 500 mg tablet   No No   Sig: Take 2 tablets (1,000 mg total) by mouth every 6 (six) hours as needed for mild pain   albuterol (PROVENTIL HFA,VENTOLIN HFA) 90 mcg/act inhaler  Self No No   Sig: Inhale 2 puffs every 6 (six) hours as needed for wheezing or shortness of breath (cough, chest tightness)   baclofen 10 mg tablet  Self Yes No   Sig: Take 10 mg by mouth 3 (three) times a day   buPROPion (WELLBUTRIN) 75 mg tablet  Self No No   Sig: take 1 tablet by mouth twice a day   enoxaparin (LOVENOX) 40 mg/0.4 mL  Self No No   Sig: Inject 0.4 mL (40 mg total) under the skin daily for 28 days Start injections after surgery   Patient not taking: Reported on 2025   escitalopram (LEXAPRO) 20 mg tablet  Self No No   Sig: Take 1 tablet (20 mg total) by mouth every morning   estrogens, conjugated (Premarin) vaginal cream   No No   Sig: Insert 1 g into the vagina 3 (three) times a week   famotidine (PEPCID) 40 MG tablet   No No   Sig: take 1 tablet by mouth at bedtime   fenofibrate 160 MG tablet   No No   Sig: take 1 tablet by mouth once daily   fluocinonide (LIDEX) 0.05 % cream   No No   Sig: Apply topically 2 (two) times a day as needed for rash   fluticasone (FLONASE) 50 mcg/act nasal spray   No No   Si spray into each nostril daily   gabapentin (NEURONTIN) 100 mg capsule  Self Yes No   Si mg 5 (five) times a day    lidocaine (XYLOCAINE) 5 % ointment  Self No No   Sig: Apply topically as needed for mild pain   loratadine (CLARITIN) 10 mg tablet  Self Yes No   Sig: Take 10 mg by mouth daily.   methocarbamol (ROBAXIN) 500 mg tablet   No No   Sig: Take 1 tablet (500 mg total) by mouth 3 (three) times a day as needed for muscle spasms   ondansetron (ZOFRAN) 4 mg tablet   No No   Sig: Take 1 tablet (4 mg total) by mouth every 8 (eight) hours as needed for nausea or vomiting   oxyCODONE (Roxicodone) 5 immediate release tablet   No No   Sig: Take 1 tablet (5 mg  total) by mouth every 6 (six) hours as needed for moderate pain for up to 10 days Max Daily Amount: 20 mg   oxybutynin (DITROPAN) 5 mg tablet   No No   Sig: Take 1 tablet (5 mg total) by mouth daily   Patient taking differently: Take 5 mg by mouth every morning   pantoprazole (PROTONIX) 40 mg tablet  Self No No   Sig: take 1 tablet by mouth daily before breakfast   sucralfate (CARAFATE) 1 g/10 mL suspension   No No   Sig: Take 10 mL (1 g total) by mouth 2 (two) times a day as needed (gastritis)   tirzepatide (Zepbound) 10 mg/0.5 mL auto-injector   No No   Sig: Inject 0.5 mL (10 mg total) under the skin once a week      Facility-Administered Medications: None     Patient's Medications   Discharge Prescriptions    METOCLOPRAMIDE (REGLAN) 10 MG TABLET    Take 1 tablet (10 mg total) by mouth every 6 (six) hours       Start Date: 2/23/2025 End Date: --       Order Dose: 10 mg       Quantity: 30 tablet    Refills: 0     No discharge procedures on file.  ED SEPSIS DOCUMENTATION   Time reflects when diagnosis was documented in both MDM as applicable and the Disposition within this note       Time User Action Codes Description Comment    2/23/2025  7:10 AM Parrish Berkowitz Add [Z96.641] Status post total replacement of right hip     2/23/2025  7:10 AM Parrish Berkowitz Add [M25.551] Acute right hip pain     2/23/2025  7:10 AM Parrish Berkowitz Add [R10.13] Epigastric pain     2/23/2025  7:10 AM Parrish Berkowitz Add [R11.0] Nausea     2/23/2025  7:10 AM Parrish Berkowitz Add [R63.0] Decreased appetite     2/23/2025 10:50 AM Parrish Berkowitz Modify [Z96.641] Status post total replacement of right hip     2/23/2025 10:50 AM Parrish Berkowitz Modify [R11.0] Nausea                  Parrish Berkowitz MD  02/23/25 [id number]

## 2025-02-24 ENCOUNTER — TELEPHONE (OUTPATIENT)
Dept: OBGYN CLINIC | Facility: HOSPITAL | Age: 65
End: 2025-02-24

## 2025-02-24 ENCOUNTER — OFFICE VISIT (OUTPATIENT)
Dept: PHYSICAL THERAPY | Facility: CLINIC | Age: 65
End: 2025-02-24
Payer: COMMERCIAL

## 2025-02-24 DIAGNOSIS — M16.11 PRIMARY OSTEOARTHRITIS OF ONE HIP, RIGHT: Primary | ICD-10-CM

## 2025-02-24 PROCEDURE — 97110 THERAPEUTIC EXERCISES: CPT | Performed by: PHYSICAL THERAPIST

## 2025-02-24 RX ORDER — ESCITALOPRAM OXALATE 20 MG/1
20 TABLET ORAL EVERY MORNING
Qty: 90 TABLET | Refills: 1 | Status: SHIPPED | OUTPATIENT
Start: 2025-02-24

## 2025-02-24 NOTE — TELEPHONE ENCOUNTER
Patient contacted for a postoperative follow up assessment. Patient states was seen in ED yesterday for severe nausea and insomnia. Patient states since, has been feeling better, has been increasing fluid intake. Patient states current pain level of a  2/10  when sitting and 4/10 when walking with RW.  Patient denies increase in swelling and dressing is clean, dry and intact. Patient is icing the site regularly.     We reviewed patients AVS medication list. Patient is taking Tylenol 1000mg every 8 hours, Oxycodone 5mg PRN, lovenox inj daily, Colace 100mg BID. Patient has had a BM since being home.     Patient currently denies nausea, vomiting, abdominal pain, chest pain, shortness of breath, fever, dizziness and calf pain. Patient confirmed post-op appointment with surgeon on                            .Patient does not have any other questions or concerns at this time. Pt was encouraged to call with any questions, concerns or issues.

## 2025-02-24 NOTE — PROGRESS NOTES
PT Re-Evaluation     Today's date: 25  Patient name: Denita Brown  : 1960  MRN: [id number]  Referring provider: Ezio Borges,*  Dx:   Encounter Diagnosis     ICD-10-CM    1. Primary osteoarthritis of one hip, right  M16.11                       Assessment  Impairments: abnormal gait, abnormal or restricted ROM, abnormal movement, activity intolerance, impaired physical strength, lacks appropriate home exercise program and pain with function  Functional limitations: Pain with stairs, prolonged standing and walking, as well as ,lying in bed  Symptom irritability: high    Assessment details: Patient had a right posterior KENNEDY on 25.  She presents today for her first post-op treatment.  She demonstrates limitations in right hip ROM and strength.  Patient currently using left LE to advance right LE into car and onto bed.  (+) sleep disturbances related to pain.  Reviewed post-op precautions and signs/symptoms of infection.  Some ecchymosis observed at distal incision, but no redness or warmth.  Patient is currently ambulating household and short community distances with a WW and is negotiating stairs step-to with a HR and SPC.  . Treatment to include: Manual therapy techniques, extremity/core strengthening, neuromuscular control exercises, balance/proprioception training as appropriate, gait training, instruction in a comprehensive HEP, and modalities as needed.  They will benefit from skilled PT services to address the above functional deficits and to decrease pain to promote a return to their premorbid level of function.    Understanding of Dx/Px/POC: good     Prognosis: good    Goals  STG (4 weeks)  1. Patient will demonstrate the ability to correct posture with minimal VC's - not met  2. Patient will demonstrate 25% gains in Hip ROM in all deficient planes- not met  3. Patient will report pain as a 4-5/10 at worst with all normal activities- not met  4. Patient will report 50% reduction  in sleep disturbances related to pain- not met  LTG (8 weeks)  1. Patient will report pain as a 0-1/10 at worst with normal activities- not met  2. Patient will sleep through the night without disturbances related to pain- not met  3. Patient will demonstrate Hip ROM WFL to facilitate improved quality of gait- not met  4. Patient will demonstrate Hip strength WNL to improve improve transfers, quality of gait, and ability to negotiate stairs.- not met  5. Patient will be independent and compliant with a HEP in order to maintain gains made with skilled PT services.- not met      Plan  Patient would benefit from: skilled physical therapy  Planned modality interventions: cryotherapy and thermotherapy: hydrocollator packs    Planned therapy interventions: joint mobilization, manual therapy, balance, neuromuscular re-education, patient education, strengthening, stretching, therapeutic activities, therapeutic exercise and home exercise program    Frequency: 2x week  Duration in weeks: 8  Plan of Care beginning date: 2025  Plan of Care expiration date: 2025  Treatment plan discussed with: patient    Subjective Evaluation    History of Present Illness  Mechanism of injury: Patient had a right posterior KENNEDY on 25 and presents today to outpatient PT for her post-op PT evaluation.  Taking 1/2 Dilaudid with Tylenol in between.  Has been up on her feet a lot and her hip is more sore. (+) sleep disturbaces          Recurrent probem    Quality of life: good    Patient Goals  Patient goals for therapy: decreased pain, increased strength, return to work and increased motion  Patient goal: Walk without pain.  negotiate stairs reciprocally.  Pain  Current pain ratin  At best pain ratin  At worst pain ratin  Location: Right lateral hip  Quality: sharp  Relieving factors: medications  Exacerbated by: Stairs, walking > 5 minutes.  Progression: worsening    Social Support  Stairs in house: yes (13 stairs with Left  HR)   Lives in: multiple-level home  Lives with: adult children    Employment status: working    Diagnostic Tests  X-ray: abnormal    FCE comments: Paresthesias into right foot.  (+) sleep disturbances every hour or so.  Not comfortable on her back.  Feels very good in the recliner.  Using CP throughout the day.Treatments  Previous treatment: medication  Current treatment: medication    Objective     Static Posture     Hip   Hip (Left): No increased flexion.   Hip (Right): Increased flexion.     Observations     Right Hip  Positive for edema and incision.     Additional Observation Details  (+) Ecchymosis at distal incision.  No redness or warmth to the touch.    Neurological Testing     Sensation     Hip     Right Hip   Hyposensation: light touch    Comments   Right light touch: mihir-incisional region.     Active Range of Motion     Right Hip   Flexion: 90 degrees with pain  Abduction: 40 degrees with pain  External rotation (90/90): 35 degrees     Passive Range of Motion     Right Hip   Flexion: 90 degrees with pain  Abduction: 40 degrees with pain  External rotation (90/90): 30 degrees with pain    Strength/Myotome Testing     Right Hip   Planes of Motion   Flexion: 3  Extension: 3+  Abduction: 3-    Ambulation   Weight-Bearing Status   Weight-Bearing Status (Left): full weight bearing   Weight-Bearing Status (Right): weight-bearing as tolerated    Assistive device used: wheeled walker    Ambulation: Stairs   Ascend stairs: independent  Pattern: non-reciprocal  Railings: one rail  Descend stairs: independent  Pattern: non-reciprocal  Railings: one rail    Additional Stairs Ambulation Details  Uses HR and SPC.    Observational Gait   Gait: antalgic   Decreased walking speed and stride length.          Precautions: L1-S1 fusion, C2-C7 fusion >10 years, HTN, DM, Tachycardia , osteopenia.  Right Posterior KENNEDY 2/20/25    POC expires Unit limit Auth  expiration date PT/OT + Visit Limit?   4/3/25 NA 12/31/25 1                  Visit/Unit Tracking  AUTH Status:  Date 2/6              Authorized Used 1               Remaining                  QR     Manuals 2/6            PROM right hip                                                    Neuro Re-Ed             Pt education Rehab progression.  Pain and edema control.  Safety in home.  Stairs and GT                                                                                          Ther Ex             Nustep             Standing hip ABD and Ext Instructed            Bridges             SLR             Seated LAQ Instructed            QS Instructed            Ankle pumps Instructed                         Ther Activity             Squats Instructed            Step ups F/L                                       Gait Training                                       Modalities

## 2025-02-25 NOTE — PROGRESS NOTES
Daily Note    Today's date: 25  Patient name: Denita Brown  : 1960  MRN: [id number]  Referring provider: Ezio Borges,*  Dx:   Encounter Diagnosis     ICD-10-CM    1. Primary osteoarthritis of one hip, right  M16.11           Start Time: 930  Stop Time: 1015  Total time in clinic (min): 45 minutes      Subjective: Denita reports no changes today. She notes soreness following previous session. She notes adherence to HEP and is icing at home. She was able to sleep better last night.    Objective: See treatment diary below.    Assessment: Denita tolerated treatment well with consistent cuing throughout. TE's were performed with increased reps and increased resistance. New TE's were demonstrated with proper technique, and tolerated well. Following treatment, the patient demonstrated fatigue and would benefit from continued physical therapy.    Plan: Continue per plan of care.  Progress treatment as tolerated.       Precautions: L1-S1 fusion, C2-C7 fusion >10 years, HTN, DM, Tachycardia , osteopenia.  Right Posterior KENNEDY 25    POC expires Unit limit Auth  expiration date PT/OT + Visit Limit?   4/3/25 NA 25 1                 Visit/Unit Tracking  AUTH Status:  Date               Authorized Used 1               Remaining                  QR     Manuals           PROM right hip                                       Re-eval  TS           Neuro Re-Ed             Pt education Rehab progression.  Pain and edema control.  Safety in home.  Stairs and GT                                                                                          Ther Ex             Nustep  10' L6 10' L6           Standing hip ABD and Ext Instructed  3x10 ea          Bridges   5X5          Clamshell hooklying   YTB 3x10           SLR   March 2x20          Seated LAQ Instructed            QS Instructed            Ankle pumps Instructed                         Ther Activity             Squats Instructed   "L22 3x10           Step ups F/L   4\" 2x10                                     Gait Training                                       Modalities                                                Jaya Fang, PT  2/26/2025,10:47 AM  "

## 2025-02-26 ENCOUNTER — OFFICE VISIT (OUTPATIENT)
Dept: PHYSICAL THERAPY | Facility: CLINIC | Age: 65
End: 2025-02-26
Payer: COMMERCIAL

## 2025-02-26 DIAGNOSIS — M16.11 PRIMARY OSTEOARTHRITIS OF ONE HIP, RIGHT: Primary | ICD-10-CM

## 2025-02-26 DIAGNOSIS — F32.5 MAJOR DEPRESSIVE DISORDER WITH SINGLE EPISODE, IN FULL REMISSION (HCC): ICD-10-CM

## 2025-02-26 PROCEDURE — 97530 THERAPEUTIC ACTIVITIES: CPT

## 2025-02-26 PROCEDURE — 97110 THERAPEUTIC EXERCISES: CPT

## 2025-02-26 RX ORDER — TIRZEPATIDE 10 MG/.5ML
10 INJECTION, SOLUTION SUBCUTANEOUS WEEKLY
Qty: 2 ML | Refills: 2 | Status: SHIPPED | OUTPATIENT
Start: 2025-02-26 | End: 2025-02-28

## 2025-02-27 RX ORDER — ESCITALOPRAM OXALATE 20 MG/1
20 TABLET ORAL EVERY MORNING
Qty: 90 TABLET | Refills: 0 | OUTPATIENT
Start: 2025-02-27

## 2025-02-27 RX ORDER — TIRZEPATIDE 10 MG/.5ML
10 INJECTION, SOLUTION SUBCUTANEOUS WEEKLY
Qty: 2 ML | Refills: 0 | Status: CANCELLED | OUTPATIENT
Start: 2025-02-27

## 2025-02-27 NOTE — TELEPHONE ENCOUNTER
Pt states the dosage of the zepbound was to be increased this time, please review and send increase if appropriate

## 2025-02-28 ENCOUNTER — OFFICE VISIT (OUTPATIENT)
Dept: OBGYN CLINIC | Facility: MEDICAL CENTER | Age: 65
End: 2025-02-28

## 2025-02-28 ENCOUNTER — APPOINTMENT (OUTPATIENT)
Dept: RADIOLOGY | Facility: MEDICAL CENTER | Age: 65
End: 2025-02-28
Payer: COMMERCIAL

## 2025-02-28 VITALS — WEIGHT: 174.16 LBS | BODY MASS INDEX: 29.02 KG/M2 | HEIGHT: 65 IN

## 2025-02-28 DIAGNOSIS — Z96.641 STATUS POST TOTAL REPLACEMENT OF RIGHT HIP: ICD-10-CM

## 2025-02-28 DIAGNOSIS — Z96.641 STATUS POST TOTAL REPLACEMENT OF RIGHT HIP: Primary | ICD-10-CM

## 2025-02-28 PROCEDURE — 99024 POSTOP FOLLOW-UP VISIT: CPT | Performed by: ORTHOPAEDIC SURGERY

## 2025-02-28 PROCEDURE — 73502 X-RAY EXAM HIP UNI 2-3 VIEWS: CPT

## 2025-02-28 RX ORDER — TIRZEPATIDE 12.5 MG/.5ML
12.5 INJECTION, SOLUTION SUBCUTANEOUS WEEKLY
Qty: 2 ML | Refills: 0 | Status: SHIPPED | OUTPATIENT
Start: 2025-02-28

## 2025-02-28 NOTE — TELEPHONE ENCOUNTER
Pt is asking if she should go to the 10 mg of the zep boumd or go to the 12.5 mg, please call when decision is made to that she can  the 10 MG that was already sent to the pharmacy

## 2025-02-28 NOTE — PROGRESS NOTES
Assessment:  1. Status post total replacement of right hip  XR hip/pelv 2-3 vws right if performed        Plan:  X-rays reviewed and discussed with patient revealing hardware in good position  Pt to be weightbearing as tolerated  ROM as tolerated to   Discussed with patient she is healing well   Pt to continue physical therapy for strength and mobility training, new script given   Pt to follow up in 1 week for staple removal  To do next visit:  Return in about 1 week (around 3/7/2025).    The above stated was discussed in layman's terms and the patient expressed understanding.  All questions were answered to the patient's satisfaction.       Scribe Attestation    I,:  Denise Dial am acting as a scribe while in the presence of the attending physician.:       I,:  Ezio Borges MD personally performed the services described in this documentation    as scribed in my presence.:             Subjective:   Denita Brown is a 64 y.o. female who presents for her first post operative visit of the right hip. She is 8 days s/p right total hip arthroplasty DOS 2/20/2025.  She is healing well post operatively denies fever, drainage, chills and shortness of breath. Patient returned to the ED 3 days after her surgery for nausea and dry heaving. She was provided Reglan that seemed to help. She is currently using a walker to assist with ambulation and compliant with going to physical therapy and doing home exercises.       Review of systems negative unless otherwise specified in HPI    Past Medical History:   Diagnosis Date   • Abdominal pain, epigastric 03/23/2018    Added automatically from request for surgery [id number]   • Abnormal x-ray of lumbar spine 11/03/2015   • Acute cystitis with hematuria 04/07/2020   • Bariatric surgery status    • Basal cell carcinoma     basil cell carcinoma- in remission   • Benign essential hypertension 06/12/2012   • Bone bruise 11/02/2023   • Breakdown (mechanical) of internal fixation  device of vertebrae, initial encounter ([facility]) 2019   • Calculus of bile duct with cholecystitis without obstruction 2018    Added automatically from request for surgery [id number]   • Cellulitis of finger 2015   • Closed fracture of left distal fibula 10/28/2023   • Degenerative lumbar disc    • Digital mucinous cyst 2015   • DMII (diabetes mellitus, type 2) ([facility]) 2013   • GERD (gastroesophageal reflux disease)    • Gross hematuria 2019   • Hiatal hernia    • History of COVID-19 2021   • History of transfusion 2014    Post-op spinal surgery   • Hyperlipidemia    • Low back pain    • Lower urinary tract infectious disease 2015   • Medicare annual wellness visit, initial 2019   • Obesity     Resolved 10/6/2016    • Other closed fracture of distal end of left fibula, initial encounter 10/28/2023   • Overactive bladder    • Postsurgical malabsorption    • Recurrent UTI 2019   • Spinal stenosis    • SVT (supraventricular tachycardia) ([facility])    • Tachycardia     Resolved 2016    • Type 2 diabetes mellitus ([facility])     Last assesssed 2018    • Wears glasses        Past Surgical History:   Procedure Laterality Date   • APPENDECTOMY     • ARTHRODESIS      Lumbar - Last assessed 2018    • BACK SURGERY      Lumbar (3 surgeries)- two levels fused, clean out from infection, then fusion of 7 levels (last surgery in )   • CARDIAC ELECTROPHYSIOLOGY STUDY AND ABLATION     • CARPAL TUNNEL RELEASE      x2   • CERVICAL SPINE SURGERY      Fusion of C2-C7 over a period of 3 surgeries ( first)   •  SECTION      X2   • CHOLECYSTECTOMY     • FL MYELOGRAM LUMBAR  2019   • HIP ARTHROPLASTY Right 2025   • INSERTION / PLACEMENT / REVISION NEUROSTIMULATOR      X2- both were removed   • NECK SURGERY     • OTHER SURGICAL HISTORY      Catheter ablation    • HI ARTHRP ACETBLR/PROX FEM PROSTC AGRFT/ALGRFT Right 2025    Procedure: ARTHROPLASTY HIP TOTAL;   Surgeon: Ezio Borges MD;  Location: [facility];  Service: Orthopedics   • MS EGD TRANSORAL BIOPSY SINGLE/MULTIPLE N/A 09/14/2016    Procedure: ESOPHAGOGASTRODUODENOSCOPY (EGD);  Surgeon: Tanya Orta MD;  Location: [facility];  Service: Bariatrics   • MS EGD TRANSORAL BIOPSY SINGLE/MULTIPLE N/A 04/18/2018    Procedure: ESOPHAGOGASTRODUODENOSCOPY (EGD) with biopsy;  Surgeon: Tanya Orta MD;  Location: [facility];  Service: Bariatrics   • MS LAPAROSCOPY SURG CHOLECYSTECTOMY N/A 05/14/2018    Procedure: CHOLECYSTECTOMY LAPAROSCOPIC;  Surgeon: Tanya Orta MD;  [facility];  Service: Bariatrics   • MS LAPS GSTR RSTCV PX W/BYP BRAULIO-EN-Y LIMB <150 CM N/A 10/25/2016    Procedure: BYPASS GASTRIC  BRAULIO-EN-Y LAPAROSCOPIC, WITH INTRA OP EGD;  Surgeon: Tanya Orta MD;  Location: [facility];  Service: Bariatrics   • SKIN CANCER EXCISION     • TONSILLECTOMY     • TUBAL LIGATION     • US GUIDED BREAST BIOPSY RIGHT COMPLETE Right 07/12/2023   • WISDOM TOOTH EXTRACTION         Family History   Problem Relation Age of Onset   • Arthritis Mother         Rheumatoid   • Angina Mother    • Coronary artery disease Mother    • Diabetes Mother    • Cancer Father         Lung   • Cystic fibrosis Father    • Rheum arthritis Sister    • Diabetes Sister    • No Known Problems Maternal Grandmother    • No Known Problems Maternal Grandfather    • No Known Problems Paternal Grandmother    • No Known Problems Paternal Grandfather    • Other Brother         Back problems    • Diabetes Brother    • No Known Problems Brother    • No Known Problems Son    • No Known Problems Son    • Hypertension Family    • Heart disease Family    • Breast cancer Neg Hx        Social History     Occupational History   • Occupation: Realtor    Tobacco Use   • Smoking status: Former     Current packs/day: 0.00     Average packs/day: 1 pack/day for 20.0 years (20.0 ttl pk-yrs)     Types: Cigarettes     Start date: 1984     Quit date: 2004      Years since quittin.1     Passive exposure: Past   • Smokeless tobacco: Never   Vaping Use   • Vaping status: Never Used   Substance and Sexual Activity   • Alcohol use: Not Currently   • Drug use: No   • Sexual activity: Not Currently         Current Outpatient Medications:   •  acetaminophen (TYLENOL) 500 mg tablet, Take 2 tablets (1,000 mg total) by mouth every 6 (six) hours as needed for mild pain, Disp: 90 tablet, Rfl: 0  •  albuterol (PROVENTIL HFA,VENTOLIN HFA) 90 mcg/act inhaler, Inhale 2 puffs every 6 (six) hours as needed for wheezing or shortness of breath (cough, chest tightness), Disp: 18 g, Rfl: 1  •  baclofen 10 mg tablet, Take 10 mg by mouth 3 (three) times a day, Disp: , Rfl:   •  buPROPion (WELLBUTRIN) 75 mg tablet, take 1 tablet by mouth twice a day, Disp: 180 tablet, Rfl: 1  •  Calcium Citrate-Vitamin D 315-250 MG-UNIT TABS, Take 1 tablet by mouth 2 (two) times a day , Disp: , Rfl:   •  Cholecalciferol 25 MCG (1000 UT) CHEW, Chew, Disp: , Rfl:   •  escitalopram (LEXAPRO) 20 mg tablet, take 1 tablet by mouth every morning, Disp: 90 tablet, Rfl: 1  •  estrogens, conjugated (Premarin) vaginal cream, Insert 1 g into the vagina 3 (three) times a week, Disp: 12 g, Rfl: 3  •  famotidine (PEPCID) 40 MG tablet, take 1 tablet by mouth at bedtime, Disp: 90 tablet, Rfl: 3  •  fenofibrate 160 MG tablet, take 1 tablet by mouth once daily, Disp: 90 tablet, Rfl: 0  •  fluocinonide (LIDEX) 0.05 % cream, Apply topically 2 (two) times a day as needed for rash, Disp: 15 g, Rfl: 1  •  fluticasone (FLONASE) 50 mcg/act nasal spray, 1 spray into each nostril daily, Disp: 48 g, Rfl: 1  •  gabapentin (NEURONTIN) 100 mg capsule, 100 mg 5 (five) times a day , Disp: , Rfl:   •  lidocaine (XYLOCAINE) 5 % ointment, Apply topically as needed for mild pain, Disp: 30 g, Rfl: 2  •  loratadine (CLARITIN) 10 mg tablet, Take 10 mg by mouth daily., Disp: , Rfl:   •  methocarbamol (ROBAXIN) 500 mg tablet, Take 1 tablet (500 mg  total) by mouth 3 (three) times a day as needed for muscle spasms, Disp: 60 tablet, Rfl: 0  •  metoclopramide (Reglan) 10 mg tablet, Take 1 tablet (10 mg total) by mouth every 6 (six) hours, Disp: 30 tablet, Rfl: 0  •  Multiple Vitamins-Minerals (BARIATRIC FUSION PO), Take 1 tablet by mouth daily, Disp: , Rfl:   •  ondansetron (ZOFRAN) 4 mg tablet, Take 1 tablet (4 mg total) by mouth every 8 (eight) hours as needed for nausea or vomiting, Disp: 90 tablet, Rfl: 0  •  oxybutynin (DITROPAN) 5 mg tablet, Take 1 tablet (5 mg total) by mouth daily (Patient taking differently: Take 5 mg by mouth every morning), Disp: 90 tablet, Rfl: 3  •  pantoprazole (PROTONIX) 40 mg tablet, take 1 tablet by mouth daily before breakfast, Disp: 90 tablet, Rfl: 1  •  sucralfate (CARAFATE) 1 g/10 mL suspension, Take 10 mL (1 g total) by mouth 2 (two) times a day as needed (gastritis), Disp: 473 mL, Rfl: 0  •  tirzepatide (Zepbound) 10 mg/0.5 mL auto-injector, Inject 0.5 mL (10 mg total) under the skin once a week, Disp: 2 mL, Rfl: 2  •  enoxaparin (LOVENOX) 40 mg/0.4 mL, Inject 0.4 mL (40 mg total) under the skin daily for 28 days Start injections after surgery (Patient not taking: Reported on 1/11/2025), Disp: 11.2 mL, Rfl: 0    No Known Allergies       There were no vitals filed for this visit.    Objective:  Physical exam  General: Awake, Alert, Oriented  Eyes: Pupils equal, round and reactive to light  Heart: regular rate and rhythm  Lungs: No audible wheezing  Abdomen: soft                    Ortho Exam  Posterior incision clean dry and intact  Staples well approximated   Appropriate warmth and swelling  Good arc of motion with no pain  Patient sits comfortably in chair with hip flexed at 90 degrees  Patient stands from seated position without assistance  Calf compartments soft and supple  Sensation intact  Toes are warm sensate and mobile   Diagnostics, reviewed and taken today if performed as documented:      The attending physician  has personally reviewed the pertinent films in PACS and interpretation is as follows:    X-rays of right hip dated 2/28/2025 demonstrates hardware in proper position with no evidence of loosening.     Procedures, if performed today:    Procedures  None performed today    Scribe Attestation    I,:  Denise Dial am acting as a scribe while in the presence of the attending physician.:       I,:  Ezio Borges MD personally performed the services described in this documentation    as scribed in my presence.:

## 2025-03-04 ENCOUNTER — OFFICE VISIT (OUTPATIENT)
Dept: PHYSICAL THERAPY | Facility: CLINIC | Age: 65
End: 2025-03-04
Payer: COMMERCIAL

## 2025-03-04 DIAGNOSIS — M16.11 PRIMARY OSTEOARTHRITIS OF ONE HIP, RIGHT: Primary | ICD-10-CM

## 2025-03-04 PROCEDURE — 97110 THERAPEUTIC EXERCISES: CPT

## 2025-03-04 PROCEDURE — 97530 THERAPEUTIC ACTIVITIES: CPT

## 2025-03-04 NOTE — PROGRESS NOTES
Daily Note     Today's date: 3/4/2025  Patient name: Denita Brown  : 1960  MRN: [id number]  Referring provider: Ezio Borges,*  Dx:   Encounter Diagnosis     ICD-10-CM    1. Primary osteoarthritis of one hip, right  M16.11           Start Time: 1615  Stop Time: 1704  Total time in clinic (min): 49 minutes    Subjective: Pt noted that she is sore today and was not able to take any pain reliever before coming to PT this afternoon.   Pt noted that she is sleeping in a  since she is unable to get in her bed since it is raised. Noted that she is seeing her ortho on Friday and mentioned that she is going to ask if she can drive before she hits 3 weeks.     Objective: See treatment diary below      Assessment:  Continued with treatment session with focus  on R hip. At this time trialed to progress to SLR but was unable to straighten all the way due to having pain and tightness in her R hip flexor.  Tolerated treatment well. Patient exhibited good technique with therapeutic exercises and would benefit from continued PT. S/p treatment noted no immediate changes but did note some increase in muscle soreness.       Plan: Continue per plan of care.       Precautions: L1-S1 fusion, C2-C7 fusion >10 years, HTN, DM, Tachycardia , osteopenia.  Right Posterior KENNEDY 25    POC expires Unit limit Auth  expiration date PT/OT + Visit Limit?   4/3/25 NA 25 1                 Visit/Unit Tracking  AUTH Status:  Date  3/4           Authorized Used 1               Remaining                  QR     Manuals  3/4         PROM right hip    SC                                   Re-eval  TS           Neuro Re-Ed             Pt education Rehab progression.  Pain and edema control.  Safety in home.  Stairs and GT                                                                                          Ther Ex             Nustep  10' L6 10' L6  10 min L6          Standing hip ABD and Ext  "Instructed  3x10 ea NV          Bridges   5X5 5\" 2x 5          Clamshell hooklying   YTB 3x10  YTB 3\"          SLR   March 2x20  March 2x 10           SLR     Unable to straighten  p!          Seated LAQ Instructed            QS Instructed            Ankle pumps Instructed                         Ther Activity             Squats Instructed  L22 3x10   L22 3x 10          Step ups F/L   4\" 2x10  4\" 2x 10                                    Gait Training                                       Modalities                                                  "

## 2025-03-06 ENCOUNTER — OFFICE VISIT (OUTPATIENT)
Dept: PHYSICAL THERAPY | Facility: CLINIC | Age: 65
End: 2025-03-06
Payer: COMMERCIAL

## 2025-03-06 DIAGNOSIS — M16.11 PRIMARY OSTEOARTHRITIS OF ONE HIP, RIGHT: Primary | ICD-10-CM

## 2025-03-06 PROCEDURE — 97110 THERAPEUTIC EXERCISES: CPT

## 2025-03-06 PROCEDURE — 97530 THERAPEUTIC ACTIVITIES: CPT

## 2025-03-06 PROCEDURE — 97140 MANUAL THERAPY 1/> REGIONS: CPT

## 2025-03-06 NOTE — PROGRESS NOTES
"Daily Note     Today's date: 3/6/2025  Patient name: Denita Brown  : 1960  MRN: [id number]  Referring provider: Ezio Borges,*  Dx:   Encounter Diagnosis     ICD-10-CM    1. Primary osteoarthritis of one hip, right  M16.11                      Subjective: Pt noted that she is having more pain today. She noted that she was doing a lot during the day today.       Objective: See treatment diary below      Assessment:   Continued with treatment session with focus on R hip. Overall minimal progressions at this time. Pt noted that she had some decreased pain while straightening her R LE today but still unable to complete SLR. Tolerated treatment well. Patient demonstrated fatigue post treatment, exhibited good technique with therapeutic exercises, and would benefit from continued PT      Plan: Continue per plan of care.      Precautions: L1-S1 fusion, C2-C7 fusion >10 years, HTN, DM, Tachycardia , osteopenia.  Right Posterior KENNEDY 25    POC expires Unit limit Auth  expiration date PT/OT + Visit Limit?   4/3/25 NA 25 1                 Visit/Unit Tracking  AUTH Status:  Date 2/6 2/24 2/26 3/4 3/6          Authorized Used 1               Remaining                  QR     Manuals  3/4 3/6        PROM right hip    SC SC                                  Re-eval  TS           Neuro Re-Ed             Pt education Rehab progression.  Pain and edema control.  Safety in home.  Stairs and GT                                                                                          Ther Ex             Nustep  10' L6 10' L6  10 min L6  10 min L4         Standing hip ABD and Ext Instructed  3x10 ea NV  Abd 2 10         Bridges   5X5 5\" 2x 5  5\" 2x 10         Clamshell hooklying   YTB 3x10  YTB 3\"  YTB 2x 10         SLR   March 2x20  March 2x 10   March 2x 10          SLR     Unable to straighten  p!          Seated LAQ Instructed            QS Instructed    5\" 2x 10         Ankle pumps Instructed  " "                       Ther Activity             Squats Instructed  L22 3x10   L22 3x 10          Step ups F/L   4\" 2x10  4\" 2x 10  4\" 2x 10 F                                   Gait Training                                       Modalities                                                  "

## 2025-03-07 ENCOUNTER — OFFICE VISIT (OUTPATIENT)
Dept: OBGYN CLINIC | Facility: MEDICAL CENTER | Age: 65
End: 2025-03-07

## 2025-03-07 VITALS — HEIGHT: 65 IN | BODY MASS INDEX: 28.99 KG/M2 | WEIGHT: 174 LBS

## 2025-03-07 DIAGNOSIS — Z47.89 ORTHOPEDIC AFTERCARE: Primary | ICD-10-CM

## 2025-03-07 PROCEDURE — 99024 POSTOP FOLLOW-UP VISIT: CPT | Performed by: ORTHOPAEDIC SURGERY

## 2025-03-07 NOTE — LETTER
March 7, 2025     Patient: Denita Brown   YOB: 1960   Date of Visit: 3/7/2025       To Whom It May Concern:    Denita Brown was seen in my clinic on 3/7/2025 at 10:45 am. Please excuse Denita for her absence from work on this day to make the appointment. Please allow to work from home for the next week as she is not capable of driving. When returning to work, allow for intermittent breaks to become mobile.     If you have any questions or concerns, please don't hesitate to call.         Sincerely,         Ezio Borges MD        CC: No Recipients

## 2025-03-07 NOTE — PROGRESS NOTES
Name: Denita Brown      : 1960       MRN: [id number]   Encounter Provider: Ezio Borges MD   Encounter Date: 25  Encounter department: [facility] [place]     ASSESSMENT & PLAN:  Assessment & Plan    -2 weeks s/p right KENNEYD DOS 2025  Continue physical therapy   Continue weight bearing activities as tolerated   Continue pain medications as needed   Continue lovenox as prescribed    Staples removed in office today, incision cleaned, steri-strips applied   Provided work note to allow working from home as she is still using a walker to ambulate. Returning to drive recommendations are to be able to ambulate to vehicle with a cane and no longer taking narcotics.   Follow up in 4 weeks for 6 week post-op appointment.      To do next visit:  Return in about 4 weeks (around 2025).  ____________________________________________________  CHIEF COMPLAINT:  Chief Complaint   Patient presents with   • Right Hip - Post-op       SUBJECTIVE:  Denita Brown is a 64 y.o. female who presents for her second postoperative visit. She is 2 weeks s/p right total hip arthroplasty DOS 2025. She is healing well post operatively denies fever, drainage, chills and shortness of breath. She has been compliant with going to physical therapy and doing her home exercises. She notes some pain and stiffness after therapy, otherwise she is healing well. She is using a walker to assist with ambulation. She feels if she sits for an extended period of time slight numbness is felt in her toes, this was at baseline prior to surgery, admits to multiple back surgeries.       PAST MEDICAL HISTORY:  Past Medical History:   Diagnosis Date   • Abdominal pain, epigastric 2018    Added automatically from request for surgery [id number]   • Abnormal x-ray of lumbar spine 2015   • Acute cystitis with hematuria 2020   • Bariatric surgery status    • Basal cell carcinoma     basil cell  carcinoma- in remission   • Benign essential hypertension 2012   • Bone bruise 2023   • Breakdown (mechanical) of internal fixation device of vertebrae, initial encounter ([facility]) 2019   • Calculus of bile duct with cholecystitis without obstruction 2018    Added automatically from request for surgery [id number]   • Cellulitis of finger 2015   • Closed fracture of left distal fibula 10/28/2023   • Degenerative lumbar disc    • Digital mucinous cyst 2015   • DMII (diabetes mellitus, type 2) (Former[facility]) 2013   • GERD (gastroesophageal reflux disease)    • Gross hematuria 2019   • Hiatal hernia    • History of COVID-19 2021   • History of transfusion 2014    Post-op spinal surgery   • Hyperlipidemia    • Low back pain    • Lower urinary tract infectious disease 2015   • Medicare annual wellness visit, initial 2019   • Obesity     Resolved 10/6/2016    • Other closed fracture of distal end of left fibula, initial encounter 10/28/2023   • Overactive bladder    • Postsurgical malabsorption    • Recurrent UTI 2019   • Spinal stenosis    • SVT (supraventricular tachycardia) ([facility])    • Tachycardia     Resolved 2016    • Type 2 diabetes mellitus ([facility])     Last assesssed 2018    • Wears glasses        PAST SURGICAL HISTORY:  Past Surgical History:   Procedure Laterality Date   • APPENDECTOMY     • ARTHRODESIS      Lumbar - Last assessed 2018    • BACK SURGERY      Lumbar (3 surgeries)- two levels fused, clean out from infection, then fusion of 7 levels (last surgery in )   • CARDIAC ELECTROPHYSIOLOGY STUDY AND ABLATION     • CARPAL TUNNEL RELEASE      x2   • CERVICAL SPINE SURGERY      Fusion of C2-C7 over a period of 3 surgeries ( first)   •  SECTION      X2   • CHOLECYSTECTOMY     • FL MYELOGRAM LUMBAR  2019   • HIP ARTHROPLASTY Right 2025   • INSERTION / PLACEMENT / REVISION NEUROSTIMULATOR      X2- both were removed   • NECK  SURGERY     • OTHER SURGICAL HISTORY      Catheter ablation    • SC ARTHRP ACETBLR/PROX FEM PROSTC AGRFT/ALGRFT Right 2/20/2025    Procedure: ARTHROPLASTY HIP TOTAL;  Surgeon: Ezio Borges MD;  Location: [facility];  Service: Orthopedics   • SC EGD TRANSORAL BIOPSY SINGLE/MULTIPLE N/A 09/14/2016    Procedure: ESOPHAGOGASTRODUODENOSCOPY (EGD);  Surgeon: Tanya Orta MD;  Location: [facility];  Service: Bariatrics   • SC EGD TRANSORAL BIOPSY SINGLE/MULTIPLE N/A 04/18/2018    Procedure: ESOPHAGOGASTRODUODENOSCOPY (EGD) with biopsy;  Surgeon: Tanya Orta MD;  Location: [facility];  Service: Bariatrics   • SC LAPAROSCOPY SURG CHOLECYSTECTOMY N/A 05/14/2018    Procedure: CHOLECYSTECTOMY LAPAROSCOPIC;  Surgeon: Tanya Orta MD;  [facility];  Service: Bariatrics   • SC LAPS GSTR RSTCV PX W/BYP BRAULIO-EN-Y LIMB <150 CM N/A 10/25/2016    Procedure: BYPASS GASTRIC  BRAULIO-EN-Y LAPAROSCOPIC, WITH INTRA OP EGD;  Surgeon: Tanya Orta MD;  [facility];  Service: Bariatrics   • SKIN CANCER EXCISION     • TONSILLECTOMY     • TUBAL LIGATION     • US GUIDED BREAST BIOPSY RIGHT COMPLETE Right 07/12/2023   • WISDOM TOOTH EXTRACTION         FAMILY HISTORY:  Family History   Problem Relation Age of Onset   • Arthritis Mother         Rheumatoid   • Angina Mother    • Coronary artery disease Mother    • Diabetes Mother    • Cancer Father         Lung   • Cystic fibrosis Father    • Rheum arthritis Sister    • Diabetes Sister    • No Known Problems Maternal Grandmother    • No Known Problems Maternal Grandfather    • No Known Problems Paternal Grandmother    • No Known Problems Paternal Grandfather    • Other Brother         Back problems    • Diabetes Brother    • No Known Problems Brother    • No Known Problems Son    • No Known Problems Son    • Hypertension Family    • Heart disease Family    • Breast cancer Neg Hx        SOCIAL HISTORY:  Social History     Tobacco Use   • Smoking status: Former      Current packs/day: 0.00     Average packs/day: 1 pack/day for 20.0 years (20.0 ttl pk-yrs)     Types: Cigarettes     Start date:      Quit date:      Years since quittin.1     Passive exposure: Past   • Smokeless tobacco: Never   Vaping Use   • Vaping status: Never Used   Substance Use Topics   • Alcohol use: Not Currently   • Drug use: No       MEDICATIONS:    Current Outpatient Medications:   •  acetaminophen (TYLENOL) 500 mg tablet, Take 2 tablets (1,000 mg total) by mouth every 6 (six) hours as needed for mild pain, Disp: 90 tablet, Rfl: 0  •  albuterol (PROVENTIL HFA,VENTOLIN HFA) 90 mcg/act inhaler, Inhale 2 puffs every 6 (six) hours as needed for wheezing or shortness of breath (cough, chest tightness), Disp: 18 g, Rfl: 1  •  baclofen 10 mg tablet, Take 10 mg by mouth 3 (three) times a day, Disp: , Rfl:   •  buPROPion (WELLBUTRIN) 75 mg tablet, take 1 tablet by mouth twice a day, Disp: 180 tablet, Rfl: 1  •  Calcium Citrate-Vitamin D 315-250 MG-UNIT TABS, Take 1 tablet by mouth 2 (two) times a day , Disp: , Rfl:   •  Cholecalciferol 25 MCG (1000 UT) CHEW, Chew, Disp: , Rfl:   •  escitalopram (LEXAPRO) 20 mg tablet, take 1 tablet by mouth every morning, Disp: 90 tablet, Rfl: 1  •  estrogens, conjugated (Premarin) vaginal cream, Insert 1 g into the vagina 3 (three) times a week, Disp: 12 g, Rfl: 3  •  famotidine (PEPCID) 40 MG tablet, take 1 tablet by mouth at bedtime, Disp: 90 tablet, Rfl: 3  •  fenofibrate 160 MG tablet, take 1 tablet by mouth once daily, Disp: 90 tablet, Rfl: 0  •  fluocinonide (LIDEX) 0.05 % cream, Apply topically 2 (two) times a day as needed for rash, Disp: 15 g, Rfl: 1  •  fluticasone (FLONASE) 50 mcg/act nasal spray, 1 spray into each nostril daily, Disp: 48 g, Rfl: 1  •  gabapentin (NEURONTIN) 100 mg capsule, 100 mg 5 (five) times a day , Disp: , Rfl:   •  lidocaine (XYLOCAINE) 5 % ointment, Apply topically as needed for mild pain, Disp: 30 g, Rfl: 2  •  loratadine  "(CLARITIN) 10 mg tablet, Take 10 mg by mouth daily., Disp: , Rfl:   •  methocarbamol (ROBAXIN) 500 mg tablet, Take 1 tablet (500 mg total) by mouth 3 (three) times a day as needed for muscle spasms, Disp: 60 tablet, Rfl: 0  •  metoclopramide (Reglan) 10 mg tablet, Take 1 tablet (10 mg total) by mouth every 6 (six) hours, Disp: 30 tablet, Rfl: 0  •  Multiple Vitamins-Minerals (BARIATRIC FUSION PO), Take 1 tablet by mouth daily, Disp: , Rfl:   •  ondansetron (ZOFRAN) 4 mg tablet, Take 1 tablet (4 mg total) by mouth every 8 (eight) hours as needed for nausea or vomiting, Disp: 90 tablet, Rfl: 0  •  oxybutynin (DITROPAN) 5 mg tablet, Take 1 tablet (5 mg total) by mouth daily, Disp: 90 tablet, Rfl: 3  •  pantoprazole (PROTONIX) 40 mg tablet, take 1 tablet by mouth daily before breakfast, Disp: 90 tablet, Rfl: 1  •  sucralfate (CARAFATE) 1 g/10 mL suspension, Take 10 mL (1 g total) by mouth 2 (two) times a day as needed (gastritis), Disp: 473 mL, Rfl: 0  •  tirzepatide (Zepbound) 12.5 mg/0.5 mL auto-injector, Inject 0.5 mL (12.5 mg total) under the skin once a week, Disp: 2 mL, Rfl: 0  •  enoxaparin (LOVENOX) 40 mg/0.4 mL, Inject 0.4 mL (40 mg total) under the skin daily for 28 days Start injections after surgery (Patient not taking: Reported on 1/11/2025), Disp: 11.2 mL, Rfl: 0    ALLERGIES:  No Known Allergies    LABS:  HgA1c:   Lab Results   Component Value Date    HGBA1C 5.3 01/25/2025     BMP:   Lab Results   Component Value Date    GLUCOSE 122 03/02/2015    CALCIUM 9.2 02/23/2025     03/02/2015    K 3.7 02/23/2025    CO2 22 02/23/2025     02/23/2025    BUN 13 02/23/2025    CREATININE 0.65 02/23/2025     CBC: No components found for: \"CBC\"    _____________________________________________________  PHYSICAL EXAMINATION:  Vital signs: Ht 5' 5\" (1.651 m)   Wt 78.9 kg (174 lb)   BMI 28.96 kg/m²   General: No acute distress, awake and alert  Psychiatric: Mood and affect appear appropriate  HEENT: Trachea " Midline, No torticollis, no apparent facial trauma  Cardiovascular: No audible murmurs; Extremities appear perfused  Pulmonary: No audible wheezing or stridor  Skin: Intact, no open lesions; see further details (if any) below    MUSCULOSKELETAL EXAMINATION:    Ortho Exam  Right hip  Posterior incision clean dry and intact  Staples well approximated; removed in office today  Incision cleaned  moderate ecchymosis present  No erythema present   Appropriate warmth and swelling  Good arc of motion with no pain  Patient sits comfortably in chair with hip flexed at 90 degrees  Patient stands from seated position without assistance  Calf compartments soft and supple  Sensation intact  Toes are warm sensate and mobile     _____________________________________________________  STUDIES REVIEWED:    None performed      PROCEDURES PERFORMED:    Procedures    None Preformed        Scribe Attestation    I,:  Denise Dial am acting as a scribe while in the presence of the attending physician.:       I,:  Ezio Borges MD personally performed the services described in this documentation    as scribed in my presence.:

## 2025-03-07 NOTE — LETTER
March 7, 2025     Patient: Denita Brown  YOB: 1960  Date of Visit: 3/7/2025      To Whom it May Concern:    Denita Brown was seen in my clinic on 3/7/2025 at 10:45 am. Please excuse Denita for her absence from work on this day to make the appointment. Please allow to work from home for the next week as she is not capable of driving. When returning to work, allow for intermittent breaks to become mobile.      If you have any questions or concerns, please don't hesitate to call.          If you have any questions or concerns, please don't hesitate to call.         Sincerely,          Ezio Borges MD        CC: Denita Brown

## 2025-03-10 ENCOUNTER — OFFICE VISIT (OUTPATIENT)
Dept: PHYSICAL THERAPY | Facility: CLINIC | Age: 65
End: 2025-03-10
Payer: COMMERCIAL

## 2025-03-10 DIAGNOSIS — M16.11 PRIMARY OSTEOARTHRITIS OF ONE HIP, RIGHT: Primary | ICD-10-CM

## 2025-03-10 PROCEDURE — 97530 THERAPEUTIC ACTIVITIES: CPT | Performed by: PHYSICAL THERAPIST

## 2025-03-10 PROCEDURE — 97110 THERAPEUTIC EXERCISES: CPT | Performed by: PHYSICAL THERAPIST

## 2025-03-10 NOTE — PROGRESS NOTES
"Daily Note     Today's date: 3/10/2025  Patient name: Denita Brown  : 1960  MRN: [id number]  Referring provider: Ezio Borges,*  Dx:   Encounter Diagnosis     ICD-10-CM    1. Primary osteoarthritis of one hip, right  M16.11                      Subjective: Patient states she started walking with a SPC today.  Still having pain in her hip.  She says she is more bruised.  Saw Surgeon on Friday.      Objective: See treatment diary below      Assessment: Tolerated treatment fair. Patient demonstrated fatigue post treatment and would benefit from continued PT.  Some discomfort in hip musculature with 6\" step ups, but able to perform today.  Performed standing squats to increase difficulty instead of [employer].  Added SL AAROM hip ABD today.  Had some muscle soreness, but no complaints of pain.  Patient arrived to clinic with SPC today.      Plan: Continue per plan of care.  Progress treatment as tolerated.       Precautions: L1-S1 fusion, C2-C7 fusion >10 years, HTN, DM, Tachycardia , osteopenia.  Right Posterior KENNEDY 25    POC expires Unit limit Auth  expiration date PT/OT + Visit Limit?   4/3/25 NA 25 1                 Visit/Unit Tracking  AUTH Status:  Date 2/6 2/24 2/26 3/4 3/6 3/10         Authorized Used 1               Remaining                  QR     Manuals 2/6 2/24 2/26 3/4 3/6 3/10       PROM right hip    SC SC JF                                 Re-eval  TS           Neuro Re-Ed             Pt education Rehab progression.  Pain and edema control.  Safety in home.  Stairs and GT                                                                                          Ther Ex             Nustep  10' L6 10' L6  10 min L6  10 min L4  L4x10'       Standing hip ABD and Ext Instructed  3x10 ea NV  Abd 2 10  2x10 ea       Bridges   5X5 5\" 2x 5  5\" 2x 10  5\" 3x10       Clamshell hooklying   YTB 3x10  YTB 3\"  YTB 2x 10  YTB 3x10 RTB      SLR   March 2x20  March 2x 10   March 2x 10  SLR 2x10  " "     Seated LAQ Instructed            QS Instructed    5\" 2x 10  HEP       Ankle pumps Instructed     HEP       Sidelying right hip ABD      AAROM 2x10       Ther Activity             Squats Instructed  L22 3x10   L22 3x 10   Standing 3x10       Step ups F/L   4\" 2x10  4\" 2x 10  4\" 2x 10 F  6\" x10                                 Gait Training                                       Modalities                                                  "

## 2025-03-11 ENCOUNTER — PATIENT OUTREACH (OUTPATIENT)
Dept: OBGYN CLINIC | Facility: HOSPITAL | Age: 65
End: 2025-03-11

## 2025-03-11 NOTE — PROGRESS NOTES
[facility] contacted pt today to follow up postoperatively. Pt reports she recovering and the process is slow. Pt is still receiving PT twice a week, and feels this helping her. No other needs noted. [facility] encouraged pt to contact [facility] with any future needs or concerns. [facility] will remain available.

## 2025-03-13 ENCOUNTER — OFFICE VISIT (OUTPATIENT)
Dept: PHYSICAL THERAPY | Facility: CLINIC | Age: 65
End: 2025-03-13
Payer: COMMERCIAL

## 2025-03-13 DIAGNOSIS — M16.11 PRIMARY OSTEOARTHRITIS OF ONE HIP, RIGHT: Primary | ICD-10-CM

## 2025-03-13 PROCEDURE — 97530 THERAPEUTIC ACTIVITIES: CPT | Performed by: PHYSICAL THERAPIST

## 2025-03-13 PROCEDURE — 97110 THERAPEUTIC EXERCISES: CPT | Performed by: PHYSICAL THERAPIST

## 2025-03-13 PROCEDURE — 97140 MANUAL THERAPY 1/> REGIONS: CPT | Performed by: PHYSICAL THERAPIST

## 2025-03-13 NOTE — PROGRESS NOTES
"Daily Note     Today's date: 3/13/2025  Patient name: Denita Brown  : 1960  MRN: [id number]  Referring provider: Ezio Borges,*  Dx:   Encounter Diagnosis     ICD-10-CM    1. Primary osteoarthritis of one hip, right  M16.11                      Subjective: Patient states she can walk without her WW or SPC.  Has some soreness, but says it is muscular and more of a healing pain.      Objective: See treatment diary below      Assessment: Tolerated treatment well. Patient demonstrated fatigue post treatment and would benefit from continued PT.  Increased repetitions with SLR and increased resistance with supine clamshells.  Advised patient she may have DOMS.  Progressing very well.      Plan: Continue per plan of care.  Progress treatment as tolerated.       Precautions: L1-S1 fusion, C2-C7 fusion >10 years, HTN, DM, Tachycardia , osteopenia.  Right Posterior KENNEDY 25    POC expires Unit limit Auth  expiration date PT/OT + Visit Limit?   4/3/25 NA 25 1                 Visit/Unit Tracking  AUTH Status:  Date  3/4 3/6 3/10 3/13        Authorized Used 1 2 3 4 5 6 7         Remaining                  QR     Manuals  3/4 3/6 3/10 3/13      PROM right hip    SC SC JF JF                                Re-eval  TS           Neuro Re-Ed             Pt education Rehab progression.  Pain and edema control.  Safety in home.  Stairs and GT                                                                                          Ther Ex             Nustep  10' L6 10' L6  10 min L6  10 min L4  L4x10' L5x10'      Standing hip ABD and Ext Instructed  3x10 ea NV  Abd 2 10  2x10 ea 3x10      Bridges   5X5 5\" 2x 5  5\" 2x 10  5\" 3x10 5\"x30      Clamshell hooklying   YTB 3x10  YTB 3\"  YTB 2x 10  YTB 3x10 RTB x30      SLR   March 2x20  March 2x 10   March 2x 10  SLR 2x10 SLR 3x10      Seated LAQ Instructed            QS Instructed    5\" 2x 10  HEP       Ankle pumps Instructed     HEP     " "  Sidelying right hip ABD      AAROM 2x10       Ther Activity             Squats Instructed  L22 3x10   L22 3x 10   Standing 3x10 Standing 3x10      Step ups F/L   4\" 2x10  4\" 2x 10  4\" 2x 10 F  6\" x10 6\" 3x10 8\"                               Gait Training                                       Modalities                                                  "

## 2025-03-17 ENCOUNTER — OFFICE VISIT (OUTPATIENT)
Dept: PHYSICAL THERAPY | Facility: CLINIC | Age: 65
End: 2025-03-17
Payer: COMMERCIAL

## 2025-03-17 DIAGNOSIS — M16.11 PRIMARY OSTEOARTHRITIS OF ONE HIP, RIGHT: Primary | ICD-10-CM

## 2025-03-17 PROCEDURE — 97110 THERAPEUTIC EXERCISES: CPT

## 2025-03-17 PROCEDURE — 97530 THERAPEUTIC ACTIVITIES: CPT

## 2025-03-17 PROCEDURE — 97140 MANUAL THERAPY 1/> REGIONS: CPT

## 2025-03-17 NOTE — PROGRESS NOTES
"Daily Note     Today's date: 3/17/2025  Patient name: Denita Brown  : 1960  MRN: [id number]  Referring provider: Ezio Borges,*  Dx:   Encounter Diagnosis     ICD-10-CM    1. Primary osteoarthritis of one hip, right  M16.11           Start Time: 1609  Stop Time: 1651  Total time in clinic (min): 42 minutes    Subjective: Pt noted that she is surprised  that she is still really sore.       Objective: See treatment diary below      Assessment:  Continued with treatment session with focus on R hip. At this time was able to make some progressions with abd and extension with standing hip motions for strengthening. No p! Reported. Tolerated treatment well. Patient exhibited good technique with therapeutic exercises and would benefit from continued PT  S/p treatment session no changes noted during treatment session.    Provided a HEP at this time with print out and corresponding bands.     Plan: Continue per plan of care.      Precautions: L1-S1 fusion, C2-C7 fusion >10 years, HTN, DM, Tachycardia , osteopenia.  Right Posterior KENNEDY 25    Access Code: [email or web address] - 3/17/25.     POC expires Unit limit Auth  expiration date PT/OT + Visit Limit?   4/3/25 NA 25 1                 Visit/Unit Tracking  AUTH Status:  Date 2/6 2/24 2/26 3/4 3/6 3/10 3/13 3/17        Authorized Used 1 2 3 4 5 6 7 8         Remaining                  QR     Manuals  3/4 3/6 3/10 3/13 3/17     PROM right hip    SC SC JF JF SC                               Re-eval  TS           Neuro Re-Ed             Pt education Rehab progression.  Pain and edema control.  Safety in home.  Stairs and GT       DOMS and HEP                                                                                    Ther Ex             Nustep  10' L6 10' L6  10 min L6  10 min L4  L4x10' L5x10' L5 10 min      Standing hip ABD and Ext Instructed  3x10 ea NV  Abd 2 10  2x10 ea 3x10  YTB 2x 10      Bridges   5X5 5\" 2x 5  5\" 2x 10  5\" 3x10 5\"x30 " "5\" 3x 10      Clamshell hooklying   YTB 3x10  YTB 3\"  YTB 2x 10  YTB 3x10 RTB x30 RTB 3x 10      SLR   March 2x20  March 2x 10   March 2x 10  SLR 2x10 SLR 3x10 SLR VC\"S 3x 10      Seated LAQ Instructed            QS Instructed    5\" 2x 10  HEP       Ankle pumps Instructed     HEP       Sidelying right hip ABD      AAROM 2x10       Ther Activity             Squats Instructed  L22 3x10   L22 3x 10   Standing 3x10 Standing 3x10 3x 10      Step ups F/L   4\" 2x10  4\" 2x 10  4\" 2x 10 F  6\" x10 6\" 3x10 6\" 2x 10  F and   4\" 2x 10 L                                Gait Training                                       Modalities                                                    "

## 2025-03-20 ENCOUNTER — TELEPHONE (OUTPATIENT)
Dept: OBGYN CLINIC | Facility: HOSPITAL | Age: 65
End: 2025-03-20

## 2025-03-20 ENCOUNTER — OFFICE VISIT (OUTPATIENT)
Dept: PHYSICAL THERAPY | Facility: CLINIC | Age: 65
End: 2025-03-20
Payer: COMMERCIAL

## 2025-03-20 DIAGNOSIS — M16.11 PRIMARY OSTEOARTHRITIS OF ONE HIP, RIGHT: Primary | ICD-10-CM

## 2025-03-20 PROCEDURE — 97140 MANUAL THERAPY 1/> REGIONS: CPT | Performed by: PHYSICAL THERAPIST

## 2025-03-20 PROCEDURE — 97110 THERAPEUTIC EXERCISES: CPT | Performed by: PHYSICAL THERAPIST

## 2025-03-20 PROCEDURE — 97530 THERAPEUTIC ACTIVITIES: CPT | Performed by: PHYSICAL THERAPIST

## 2025-03-20 NOTE — TELEPHONE ENCOUNTER
Caller: pt     Call back#: Phone:   [phone]     Best call back time am/pm/all day: all day    Doctor: Dr Borges    Surgery: yes    Date of Surgery: 2/20/25     Reason for call: pt would like to know if it is normal to have pain in the groin. States R hip is sore. States she is ambulating with a cane but at home without. Pain today is a 5/10, yesterday was 8/10.  Still going to PT, going well.       Urgent matters - Please Teams message Nurse Navigator Team Chat & send phone note to Orthopedic Nurse Navigator Pool as high priority before transferring call.   Non-Urgent matters - Please send a phone note to Orthopedic Nurse Navigator Pool only. Nurse Navigators will call patients back asap.

## 2025-03-20 NOTE — PROGRESS NOTES
"Daily Note     Today's date: 3/20/2025  Patient name: Denita Brown  : 1960  MRN: [id number]  Referring provider: Ezio Borges,*  Dx:   Encounter Diagnosis     ICD-10-CM    1. Primary osteoarthritis of one hip, right  M16.11                      Subjective: Patient states she is having a lot of groin and thigh pain.  Called the surgeon who told her to rest, ice, and elevate her leg.  Patient hasn't tried that yet because she is too busy at work.      Objective: See treatment diary below      Assessment: Tolerated treatment well. Patient demonstrated fatigue post treatment and would benefit from continued PT.  Held supine SLR today due to complaints of groin pain.  Pain with sit to stand transfers improved after manual stretching.      Plan: Continue per plan of care.  Progress treatment as tolerated.       Precautions: L1-S1 fusion, C2-C7 fusion >10 years, HTN, DM, Tachycardia , osteopenia.  Right Posterior KENNEDY 25    Access Code: [email or web address] - 3/17/25.     POC expires Unit limit Auth  expiration date PT/OT + Visit Limit?   4/3/25 NA 25 1                 Visit/Unit Tracking  AUTH Status:  Date  3/4 3/6 3/10 3/13 3/17  3/20      Authorized Used 1 2 3 4 5 6 7 8  9       Remaining                  QR     Manuals 2/6 2/24 2/26 3/4 3/6 3/10 3/13 3/17 3/20    PROM right hip    SC SC JF JF SC JF    Gentle hip flexor stretch at edge of mat         JF 3x30\"                 Re-eval  TS           Neuro Re-Ed             Pt education Rehab progression.  Pain and edema control.  Safety in home.  Stairs and GT       DOMS and HEP                                                                                    Ther Ex             Nustep  10' L6 10' L6  10 min L6  10 min L4  L4x10' L5x10' L5 10 min  L5x10'    Standing hip ABD and Ext Instructed  3x10 ea NV  Abd 2 10  2x10 ea 3x10  YTB 2x 10  No band.  Pain today 3x10    Bridges   5X5 5\" 2x 5  5\" 2x 10  5\" 3x10 5\"x30 5\" 3x 10  5\" 3x 10   " "  Clamshell hooklying   YTB 3x10  YTB 3\"  YTB 2x 10  YTB 3x10 RTB x30 RTB 3x 10      SLR   March 2x20  March 2x 10   March 2x 10  SLR 2x10 SLR 3x10 SLR VC\"S 3x 10  Hold today only    Seated LAQ Instructed            QS Instructed    5\" 2x 10  HEP       Ankle pumps Instructed     HEP       SL clamshells pillow between knees         3\"x30    Sidelying right hip ABD      AAROM 2x10       Ther Activity             Squats Instructed  L22 3x10   L22 3x 10   Standing 3x10 Standing 3x10 3x 10  3x10    Step ups F/L   4\" 2x10  4\" 2x 10  4\" 2x 10 F  6\" x10 6\" 3x10 6\" 2x 10  F and   4\" 2x 10 L  6\" 3x10 fwd                              Gait Training                                       Modalities                                                    "

## 2025-03-24 ENCOUNTER — OFFICE VISIT (OUTPATIENT)
Dept: PHYSICAL THERAPY | Facility: CLINIC | Age: 65
End: 2025-03-24
Payer: COMMERCIAL

## 2025-03-24 ENCOUNTER — OFFICE VISIT (OUTPATIENT)
Dept: FAMILY MEDICINE CLINIC | Facility: CLINIC | Age: 65
End: 2025-03-24
Payer: COMMERCIAL

## 2025-03-24 VITALS
SYSTOLIC BLOOD PRESSURE: 124 MMHG | HEART RATE: 72 BPM | TEMPERATURE: 97 F | HEIGHT: 65 IN | WEIGHT: 155.4 LBS | OXYGEN SATURATION: 97 % | DIASTOLIC BLOOD PRESSURE: 80 MMHG | BODY MASS INDEX: 25.89 KG/M2

## 2025-03-24 DIAGNOSIS — M16.11 PRIMARY OSTEOARTHRITIS OF ONE HIP, RIGHT: Primary | ICD-10-CM

## 2025-03-24 DIAGNOSIS — E66.3 OVERWEIGHT (BMI 25.0-29.9): Primary | ICD-10-CM

## 2025-03-24 PROCEDURE — 99213 OFFICE O/P EST LOW 20 MIN: CPT

## 2025-03-24 PROCEDURE — 97140 MANUAL THERAPY 1/> REGIONS: CPT

## 2025-03-24 PROCEDURE — G2211 COMPLEX E/M VISIT ADD ON: HCPCS

## 2025-03-24 PROCEDURE — 97110 THERAPEUTIC EXERCISES: CPT

## 2025-03-24 PROCEDURE — 97530 THERAPEUTIC ACTIVITIES: CPT

## 2025-03-24 RX ORDER — TIRZEPATIDE 12.5 MG/.5ML
12.5 INJECTION, SOLUTION SUBCUTANEOUS WEEKLY
Qty: 2 ML | Refills: 0 | Status: SHIPPED | OUTPATIENT
Start: 2025-03-24

## 2025-03-24 NOTE — PROGRESS NOTES
"Name: Denita Brown      : 1960      MRN: [id number]  Encounter Provider: Clif Mcfadden PA-C  Encounter Date: 3/24/2025   Encounter department: [facility]    Assessment & Plan  Overweight (BMI 25.0-29.9)    Taking zepbound 12.5 mg, shared decision making to not increase his BMI is 25.86 and is overall reassuring, we will follow-up in 3 months and if patient continues to lose weight will wean off injectable medication as patient will have reached healthy weight level  Follow-up in 3 months  Orders:    tirzepatide (Zepbound) 12.5 mg/0.5 mL auto-injector; Inject 0.5 mL (12.5 mg total) under the skin once a week         History of Present Illness     Denita Brown is a 64 y.o. female  presenting for routine wt loss f/u        **Note: Portions of the record may have been created with voice recognition software.  Occasional wrong word or \"sound alike\" substitutions may have occurred due to the inherent limitations of voice recognition software.  Please read the chart carefully and recognize, using context, where substitutions have occurred. Please contact for further clarification, when necessary.     Review of Systems   Constitutional:  Negative for chills and fever.   HENT:  Negative for ear pain and sore throat.    Eyes:  Negative for pain and visual disturbance.   Respiratory:  Negative for cough and shortness of breath.    Cardiovascular:  Negative for chest pain and palpitations.   Gastrointestinal:  Negative for abdominal pain and vomiting.   Genitourinary:  Negative for dysuria and hematuria.   Musculoskeletal:  Negative for arthralgias and back pain.   Skin:  Negative for color change and rash.   Neurological:  Negative for seizures and syncope.   All other systems reviewed and are negative.    Past Medical History:   Diagnosis Date    Abdominal pain, epigastric 2018    Added automatically from request for surgery [id number]    Abnormal x-ray of lumbar spine 2015    Acute " cystitis with hematuria 2020    Bariatric surgery status     Basal cell carcinoma     basil cell carcinoma- in remission    Benign essential hypertension 2012    Bone bruise 2023    Breakdown (mechanical) of internal fixation device of vertebrae, initial encounter ([facility]) 2019    Calculus of bile duct with cholecystitis without obstruction 2018    Added automatically from request for surgery [id number]    Cellulitis of finger 2015    Closed fracture of left distal fibula 10/28/2023    Degenerative lumbar disc     Digital mucinous cyst 2015    DMII (diabetes mellitus, type 2) ([facility]) 2013    GERD (gastroesophageal reflux disease)     Gross hematuria 2019    Hiatal hernia     History of COVID-19 2021    History of transfusion     Post-op spinal surgery    Hyperlipidemia     Low back pain     Lower urinary tract infectious disease 2015    Medicare annual wellness visit, initial 2019    Obesity     Resolved 10/6/2016     Other closed fracture of distal end of left fibula, initial encounter 10/28/2023    Overactive bladder     Postsurgical malabsorption     Recurrent UTI 2019    Spinal stenosis     SVT (supraventricular tachycardia) ([facility])     Tachycardia     Resolved 2016     Type 2 diabetes mellitus ([facility])     Last assesssed 2018     Wears glasses      Past Surgical History:   Procedure Laterality Date    APPENDECTOMY      ARTHRODESIS      Lumbar - Last assessed 2018     BACK SURGERY      Lumbar (3 surgeries)- two levels fused, clean out from infection, then fusion of 7 levels (last surgery in )    CARDIAC ELECTROPHYSIOLOGY STUDY AND ABLATION      CARPAL TUNNEL RELEASE      x2    CERVICAL SPINE SURGERY      Fusion of C2-C7 over a period of 3 surgeries ( first)     SECTION      X2    CHOLECYSTECTOMY      FL MYELOGRAM LUMBAR  2019    HIP ARTHROPLASTY Right 2025    INSERTION / PLACEMENT / REVISION  NEUROSTIMULATOR      X2- both were removed    NECK SURGERY      OTHER SURGICAL HISTORY      Catheter ablation     KS ARTHRP ACETBLR/PROX FEM PROSTC AGRFT/ALGRFT Right 2/20/2025    Procedure: ARTHROPLASTY HIP TOTAL;  Surgeon: Ezio Borges MD;  Location: [facility];  Service: Orthopedics    KS EGD TRANSORAL BIOPSY SINGLE/MULTIPLE N/A 09/14/2016    Procedure: ESOPHAGOGASTRODUODENOSCOPY (EGD);  Surgeon: Tanya Orta MD;  Location: [facility];  Service: Bariatrics    KS EGD TRANSORAL BIOPSY SINGLE/MULTIPLE N/A 04/18/2018    Procedure: ESOPHAGOGASTRODUODENOSCOPY (EGD) with biopsy;  Surgeon: Tanya Orta MD;  Location: [facility];  Service: Bariatrics    KS LAPAROSCOPY SURG CHOLECYSTECTOMY N/A 05/14/2018    Procedure: CHOLECYSTECTOMY LAPAROSCOPIC;  Surgeon: Tanya Orta MD;  [facility];  Service: Bariatrics    KS LAPS GSTR RSTCV PX W/BYP BRAULIO-EN-Y LIMB <150 CM N/A 10/25/2016    Procedure: BYPASS GASTRIC  BRAULIO-EN-Y LAPAROSCOPIC, WITH INTRA OP EGD;  Surgeon: Tanya Orta MD;  Location: [facility];  Service: Bariatrics    SKIN CANCER EXCISION      TONSILLECTOMY      TUBAL LIGATION      US GUIDED BREAST BIOPSY RIGHT COMPLETE Right 07/12/2023    WISDOM TOOTH EXTRACTION       Family History   Problem Relation Age of Onset    Arthritis Mother         Rheumatoid    Angina Mother     Coronary artery disease Mother     Diabetes Mother     Cancer Father         Lung    Cystic fibrosis Father     Rheum arthritis Sister     Diabetes Sister     No Known Problems Maternal Grandmother     No Known Problems Maternal Grandfather     No Known Problems Paternal Grandmother     No Known Problems Paternal Grandfather     Other Brother         Back problems     Diabetes Brother     No Known Problems Brother     No Known Problems Son     No Known Problems Son     Hypertension Family     Heart disease Family     Breast cancer Neg Hx      Social History     Tobacco Use    Smoking status: Former     Current packs/day:  0.00     Average packs/day: 1 pack/day for 20.0 years (20.0 ttl pk-yrs)     Types: Cigarettes     Start date:      Quit date:      Years since quittin.2     Passive exposure: Past    Smokeless tobacco: Never   Vaping Use    Vaping status: Never Used   Substance and Sexual Activity    Alcohol use: Not Currently    Drug use: No    Sexual activity: Not Currently     Current Outpatient Medications on File Prior to Visit   Medication Sig    acetaminophen (TYLENOL) 500 mg tablet Take 2 tablets (1,000 mg total) by mouth every 6 (six) hours as needed for mild pain    albuterol (PROVENTIL HFA,VENTOLIN HFA) 90 mcg/act inhaler Inhale 2 puffs every 6 (six) hours as needed for wheezing or shortness of breath (cough, chest tightness)    baclofen 10 mg tablet Take 10 mg by mouth 3 (three) times a day    buPROPion (WELLBUTRIN) 75 mg tablet take 1 tablet by mouth twice a day    Calcium Citrate-Vitamin D 315-250 MG-UNIT TABS Take 1 tablet by mouth 2 (two) times a day     Cholecalciferol 25 MCG (1000 UT) CHEW Chew    enoxaparin (LOVENOX) 40 mg/0.4 mL Inject 0.4 mL (40 mg total) under the skin daily for 28 days Start injections after surgery (Patient not taking: Reported on 2025)    escitalopram (LEXAPRO) 20 mg tablet take 1 tablet by mouth every morning    estrogens, conjugated (Premarin) vaginal cream Insert 1 g into the vagina 3 (three) times a week    famotidine (PEPCID) 40 MG tablet take 1 tablet by mouth at bedtime    fenofibrate 160 MG tablet take 1 tablet by mouth once daily    fluocinonide (LIDEX) 0.05 % cream Apply topically 2 (two) times a day as needed for rash    fluticasone (FLONASE) 50 mcg/act nasal spray 1 spray into each nostril daily    gabapentin (NEURONTIN) 100 mg capsule 100 mg 5 (five) times a day     lidocaine (XYLOCAINE) 5 % ointment Apply topically as needed for mild pain    loratadine (CLARITIN) 10 mg tablet Take 10 mg by mouth daily.    methocarbamol (ROBAXIN) 500 mg tablet Take 1 tablet  (500 mg total) by mouth 3 (three) times a day as needed for muscle spasms    metoclopramide (Reglan) 10 mg tablet Take 1 tablet (10 mg total) by mouth every 6 (six) hours    Multiple Vitamins-Minerals (BARIATRIC FUSION PO) Take 1 tablet by mouth daily    ondansetron (ZOFRAN) 4 mg tablet Take 1 tablet (4 mg total) by mouth every 8 (eight) hours as needed for nausea or vomiting    oxybutynin (DITROPAN) 5 mg tablet Take 1 tablet (5 mg total) by mouth daily    pantoprazole (PROTONIX) 40 mg tablet take 1 tablet by mouth daily before breakfast    sucralfate (CARAFATE) 1 g/10 mL suspension Take 10 mL (1 g total) by mouth 2 (two) times a day as needed (gastritis)    tirzepatide (Zepbound) 12.5 mg/0.5 mL auto-injector Inject 0.5 mL (12.5 mg total) under the skin once a week     No Known Allergies  Immunization History   Administered Date(s) Administered    COVID-19 PFIZER VACCINE 0.3 ML IM 04/13/2021, 05/04/2021, 12/19/2021    COVID-19 Pfizer Vac BIVALENT Moo-sucrose 12 Yr+ IM 09/16/2022    INFLUENZA 10/20/2017, 09/29/2021, 09/05/2022, 09/24/2023    Influenza Injectable, MDCK, Preservative Free, Quadrivalent, 0.5 mL 10/03/2020    Influenza, injectable, quadrivalent, preservative free 0.5 mL 09/22/2018, 10/06/2019    Influenza, seasonal, injectable, preservative free 09/07/2024    Tdap 02/24/2018    Tuberculin Skin Test-PPD Intradermal 01/28/2016    Zoster 10/25/2014    Zoster Vaccine Recombinant 08/08/2022, 10/12/2022     Objective   There were no vitals taken for this visit.    Physical Exam  Vitals and nursing note reviewed.   Constitutional:       General: She is not in acute distress.     Appearance: She is well-developed.   HENT:      Head: Normocephalic and atraumatic.      Nose: Nose normal.   Eyes:      Conjunctiva/sclera: Conjunctivae normal.   Cardiovascular:      Rate and Rhythm: Normal rate and regular rhythm.      Heart sounds: No murmur heard.  Pulmonary:      Effort: Pulmonary effort is normal. No respiratory  distress.      Breath sounds: Normal breath sounds. No wheezing.   Musculoskeletal:         General: No swelling.      Cervical back: Neck supple.   Skin:     General: Skin is warm and dry.   Neurological:      Mental Status: She is alert and oriented to person, place, and time.   Psychiatric:         Mood and Affect: Mood normal.

## 2025-03-24 NOTE — PROGRESS NOTES
"Daily Note     Today's date: 3/24/2025  Patient name: Denita Brown  : 1960  MRN: [id number]  Referring provider: Ezio Borges,*  Dx:   Encounter Diagnosis     ICD-10-CM    1. Primary osteoarthritis of one hip, right  M16.11             Start Time: 1508  Stop Time: 1547  Total time in clinic (min): 39 minutes    Subjective: Notes that she twisted her leg coming out of car which caused some mild irritation. States that she continues with groin pain, but it is slightly improved. Notes that inc time in WB causes fatigue and some inc in irritation of the hip.       Objective: See treatment diary below      Assessment: Initiated session on NuStep but dec level 2* to subjected. All activities done to patient tolerance. Activities done in rec position per patient request. Patient demonstrated good tolerance to today's program, but continues with anterior flex posture and antalgic gait outside activities.  Patient would benefit from continued     Plan: Continue per plan of care.  Progress treatment as tolerated.       Precautions: L1-S1 fusion, C2-C7 fusion >10 years, HTN, DM, Tachycardia , osteopenia.  Right Posterior KENNEDY 25    Access Code: [email or web address] - 3/17/25.     POC expires Unit limit Auth  expiration date PT/OT + Visit Limit?   4/3/25 NA 25 1                 Visit/Unit Tracking  AUTH Status:  Date  3/4 3/6 3/10 3/13 3/17  3/20 3/24     Authorized Used 1 2 3 4 5 6 7 8  9 10      Remaining                  QR     Manuals  3/4 3/6 3/10 3/13 3/17 3/20 3/24   PROM right hip    SC SC JF JF SC JF LQ   Gentle hip flexor stretch at edge of mat         JF 3x30\" LQ  3x30\"                Re-eval  TS           Neuro Re-Ed             Pt education Rehab progression.  Pain and edema control.  Safety in home.  Stairs and GT       DOMS and HEP                                                                                    Ther Ex             Nustep  10' L6 10' L6  10 min L6  10 " "min L4  L4x10' L5x10' L5 10 min  L5x10' L3 x10   Standing hip ABD and Ext Instructed  3x10 ea NV  Abd 2 10  2x10 ea 3x10  YTB 2x 10  No band.  Pain today 3x10 3x10 No band   Bridges   5X5 5\" 2x 5  5\" 2x 10  5\" 3x10 5\"x30 5\" 3x 10  5\" 3x 10  5\" 3x 10    Clamshell hooklying   YTB 3x10  YTB 3\"  YTB 2x 10  YTB 3x10 RTB x30 RTB 3x 10      SLR   March 2x20  March 2x 10   March 2x 10  SLR 2x10 SLR 3x10 SLR VC\"S 3x 10  Hold today only HELD   Seated LAQ Instructed            QS Instructed    5\" 2x 10  HEP       Ankle pumps Instructed     HEP       SL clamshells pillow between knees         3\"x30 3\"x30   Sidelying right hip ABD      AAROM 2x10       Ther Activity             Squats Instructed  L22 3x10   L22 3x 10   Standing 3x10 Standing 3x10 3x 10  3x10 3x10   Step ups F/L   4\" 2x10  4\" 2x 10  4\" 2x 10 F  6\" x10 6\" 3x10 6\" 2x 10  F and   4\" 2x 10 L  6\" 3x10 fwd 6\" 3x 10  F and   4\" 2x 10 L                              Gait Training                                       Modalities                                                    "

## 2025-03-26 DIAGNOSIS — M16.11 PRIMARY OSTEOARTHRITIS OF ONE HIP, RIGHT: ICD-10-CM

## 2025-03-26 DIAGNOSIS — R11.0 NAUSEA: ICD-10-CM

## 2025-03-27 ENCOUNTER — EVALUATION (OUTPATIENT)
Dept: PHYSICAL THERAPY | Facility: CLINIC | Age: 65
End: 2025-03-27
Payer: COMMERCIAL

## 2025-03-27 DIAGNOSIS — M16.11 PRIMARY OSTEOARTHRITIS OF ONE HIP, RIGHT: Primary | ICD-10-CM

## 2025-03-27 PROCEDURE — 97110 THERAPEUTIC EXERCISES: CPT | Performed by: PHYSICAL THERAPIST

## 2025-03-27 PROCEDURE — 97140 MANUAL THERAPY 1/> REGIONS: CPT | Performed by: PHYSICAL THERAPIST

## 2025-03-27 RX ORDER — METHOCARBAMOL 500 MG/1
500 TABLET, FILM COATED ORAL 3 TIMES DAILY PRN
Qty: 90 TABLET | Refills: 1 | Status: SHIPPED | OUTPATIENT
Start: 2025-03-27

## 2025-03-27 RX ORDER — METOCLOPRAMIDE 10 MG/1
10 TABLET ORAL EVERY 6 HOURS
Qty: 90 TABLET | Refills: 1 | Status: SHIPPED | OUTPATIENT
Start: 2025-03-27

## 2025-03-27 NOTE — PROGRESS NOTES
PT Re-Evaluation     Today's date: 25  Patient name: Denita Brown  : 1960  MRN: [id number]  Referring provider: Ezio Borges,*  Dx:   Encounter Diagnosis     ICD-10-CM    1. Primary osteoarthritis of one hip, right  M16.11                       Assessment  Impairments: abnormal gait, abnormal or restricted ROM, abnormal movement, activity intolerance, impaired physical strength, lacks appropriate home exercise program and pain with function  Functional limitations: Pain with lying flat in bed and walking without a cane.  Symptom irritability: high    Assessment details: Patient reports feeling 60% improved since starting PT services.  She has noticed a reduction in her pain, as well as, improvements in her strength and quality of gait.  She is now walking with a SPC for short community distances.  She has tried to walk without a SPC, but notices it causes increased hip pain in a short period of time.  Objectively, hip strength is greatly improved, although there is still some weakness in her hip extensors and abductors.  Advised patient to use a SPC to normalize gait and minimize strain on her hip.  Significant hip flexor tightness persists.  Patient was instructed in a standing hip flexor stretch.  Patient will benefit from continuation of PT services in order to further alleviate her pain, improve her strength, and to transition to ambulating without an assistive device, in order to maximize her functional independence.  Understanding of Dx/Px/POC: good     Prognosis: good    Goals  STG (4 weeks)  1. Patient will demonstrate the ability to correct posture with minimal VC's - met  2. Patient will demonstrate 25% gains in Hip ROM in all deficient planes- met  3. Patient will report pain as a 4-5/10 at worst with all normal activities- not met  4. Patient will report 50% reduction in sleep disturbances related to pain- met  LTG (8 weeks)  1. Patient will report pain as a 0-1/10 at worst with  normal activities- not met  2. Patient will sleep through the night without disturbances related to pain- not met  3. Patient will demonstrate Hip ROM WFL to facilitate improved quality of gait- not met  4. Patient will demonstrate Hip strength WNL to improve improve transfers, quality of gait, and ability to negotiate stairs.- not met  5. Patient will be independent and compliant with a HEP in order to maintain gains made with skilled PT services.- not met      Plan  Patient would benefit from: skilled physical therapy  Planned modality interventions: cryotherapy and thermotherapy: hydrocollator packs    Planned therapy interventions: joint mobilization, manual therapy, balance, neuromuscular re-education, patient education, strengthening, stretching, therapeutic activities, therapeutic exercise and home exercise program    Frequency: 2x week  Duration in weeks: 4  Plan of Care beginning date: 2025  Plan of Care expiration date: 2025  Treatment plan discussed with: patient      Subjective Evaluation    History of Present Illness  Mechanism of injury: Patient reports feeling 60% improved since starting PT services.  She states her hip mobility, transfers, strength, and gait have all improved.  She now ambulates with a SPC up to 10 minutes at a stretch.  She is having some pain at her anterior hip and has tenderness to touch at her lateral thigh.  She notices that walking without a cane causes quite a bit of pain and she feels better using a SPC.  Still uses CP at home.            Recurrent probem    Quality of life: good    Patient Goals  Patient goals for therapy: decreased pain, increased strength, return to work and increased motion  Patient goal: Walk without pain.  negotiate stairs reciprocally.  Pain  Current pain ratin  At best pain ratin  At worst pain ratin  Location: Right lateral hip  Quality: dull ache  Relieving factors: medications  Exacerbated by: Stairs, walking > 5  minutes.  Progression: improved    Social Support  Stairs in house: yes (13 stairs with Left HR)   Lives in: multiple-level home  Lives with: adult children    Employment status: working    Diagnostic Tests  X-ray: abnormal    FCE comments: Paresthesias into right foot related to her back.  (-) sleep disturbances when lying in her recliner.  Has pulling and anterior hip pain when lying supine.    Using CP throughout the day.Treatments  Previous treatment: medication  Current treatment: medication      Objective     Static Posture     Hip   Hip (Left): No increased flexion.   Hip (Right): Increased flexion.     Observations     Right Hip  Negative for edema and incision.     Neurological Testing     Sensation     Hip     Right Hip   Hyposensation: light touch    Comments   Right light touch: mihir-incisional region.     Active Range of Motion     Right Hip   Flexion: 90 degrees   Abduction: 40 degrees   External rotation (90/90): 35 degrees     Passive Range of Motion     Right Hip   Flexion: 90 degrees   Abduction: 40 degrees   External rotation (90/90): 40 degrees     Strength/Myotome Testing     Right Hip   Planes of Motion   Flexion: 4+  Extension: 4-  Abduction: 4-    Ambulation   Weight-Bearing Status   Weight-Bearing Status (Left): full weight bearing   Weight-Bearing Status (Right): weight-bearing as tolerated    Assistive device used: single point cane    Ambulation: Stairs   Ascend stairs: independent  Pattern: reciprocal  Railings: one rail  Descend stairs: independent  Pattern: reciprocal  Railings: one rail    Additional Stairs Ambulation Details  Uses HR and SPC.    Observational Gait   Gait: antalgic   Decreased walking speed and stride length.          Precautions: L1-S1 fusion, C2-C7 fusion >10 years, HTN, DM, Tachycardia , osteopenia.  Right Posterior KENNEDY 2/20/25    Access Code: [email or web address] - 3/17/25.     POC expires Unit limit Auth  expiration date PT/OT + Visit Limit?   4/3/25 NA 12/31/25 1             "     Visit/Unit Tracking  AUTH Status:  Date 2/6 2/24 2/26 3/4 3/6 3/10 3/13 3/17  3/20 3/24 3/27    Authorized Used 1 2 3 4 5 6 7 8  9 10 11     Remaining                  QR     Manuals 3/27 2/24 2/26 3/4 3/6 3/10 3/13 3/17 3/20 3/24   PROM right hip JF   SC SC JF JF SC JF LQ   Gentle hip flexor stretch at edge of mat JF        JF 3x30\" LQ  3x30\"                Re-eval JF TS           Neuro Re-Ed             Pt education        DOMS and HEP                                                                                    Ther Ex             Nustep L5x10' 10' L6 10' L6  10 min L6  10 min L4  L4x10' L5x10' L5 10 min  L5x10' L3 x10   Standing hip ABD and Ext   3x10 ea NV  Abd 2 10  2x10 ea 3x10  YTB 2x 10  No band.  Pain today 3x10 3x10 No band   Bridges   5X5 5\" 2x 5  5\" 2x 10  5\" 3x10 5\"x30 5\" 3x 10  5\" 3x 10  5\" 3x 10    Clamshell hooklying   YTB 3x10  YTB 3\"  YTB 2x 10  YTB 3x10 RTB x30 RTB 3x 10      SLR   March 2x20  March 2x 10   March 2x 10  SLR 2x10 SLR 3x10 SLR VC\"S 3x 10  Hold today only HELD   Seated LAQ Instructed            QS Instructed    5\" 2x 10  HEP       Ankle pumps Instructed     HEP       SL clamshells pillow between knees         3\"x30 3\"x30   Sidelying right hip ABD      AAROM 2x10       Ther Activity             Squats Instructed  L22 3x10   L22 3x 10   Standing 3x10 Standing 3x10 3x 10  3x10 3x10   Step ups F/L   4\" 2x10  4\" 2x 10  4\" 2x 10 F  6\" x10 6\" 3x10 6\" 2x 10  F and   4\" 2x 10 L  6\" 3x10 fwd 6\" 3x 10  F and   4\" 2x 10 L                              Gait Training                                       Modalities                                                   "

## 2025-03-30 NOTE — PROGRESS NOTES
"Daily Note     Today's date: 3/30/2025  Patient name: Denita Brown  : 1960  MRN: [id number]  Referring provider: Ezio Borges,*  Dx:   Encounter Diagnosis     ICD-10-CM    1. Primary osteoarthritis of one hip, right  M16.11                      Subjective: Patient states that today was the first day she walked throughout the day without her SPC.  She feels good walking without it for short stretches, but gets some pain after prolonged periods.      Objective: See treatment diary below      Assessment: Tolerated treatment well. Patient demonstrated fatigue post treatment and would benefit from continued PT.  Strength continues to improve.  Decreased complaints of pain with TE.  Making slow, steady progress.      Plan: Continue per plan of care.  Progress treatment as tolerated.       Precautions: L1-S1 fusion, C2-C7 fusion >10 years, HTN, DM, Tachycardia , osteopenia.  Right Posterior KENNEDY 25    Access Code: [email or web address] - 3/17/25.     POC expires Unit limit Auth  expiration date PT/OT + Visit Limit?   4/3/25 NA 25 1                 Visit/Unit Tracking  AUTH Status:  Date 2/6 2/24 2/26 3/4 3/6 3/10 3/13 3/17  3/20 3/24 3/27 3/31   Authorized Used 1 2 3 4 5 6 7 8  9 10 11 12    Remaining                  QR     Manuals 3/27 3/31 2/26 3/4 3/6 3/10 3/13 3/17 3/20 3/24   PROM right hip JF   SC SC JF JF SC JF LQ   Gentle hip flexor stretch at edge of mat JF        JF 3x30\" LQ  3x30\"                Re-eval JF            Neuro Re-Ed             Pt education        DOMS and HEP                                                                                    Ther Ex             Nustep L5x10' 10' L5 10' L6  10 min L6  10 min L4  L4x10' L5x10' L5 10 min  L5x10' L3 x10   Standing hip ABD and Ext   YTB 2x 10  3x10 ea NV  Abd 2 10  2x10 ea 3x10  YTB 2x 10  No band.  Pain today 3x10 3x10 No band   Bridges  GTB 3\" 3x10 5X5 5\" 2x 5  5\" 2x 10  5\" 3x10 5\"x30 5\" 3x 10  5\" 3x 10  5\" 3x 10    Clamshell hooklying " " GTB 3\" x30 BTB  YTB 3\"  YTB 2x 10  YTB 3x10 RTB x30 RTB 3x 10      SLR  2x10 March 2x20  March 2x 10   March 2x 10  SLR 2x10 SLR 3x10 SLR VC\"S 3x 10  Hold today only HELD   Seated LAQ Instructed            QS Instructed    5\" 2x 10  HEP       Ankle pumps Instructed     HEP       SL clamshells pillow between knees         3\"x30 3\"x30   Sidelying right hip ABD  AAROM 2x10    AAROM 2x10       Ther Activity             Squats Instructed 3x10 L22 3x10   L22 3x 10   Standing 3x10 Standing 3x10 3x 10  3x10 3x10   Step ups F/L  6\" 3x10 fwd 4\" 2x10  4\" 2x 10  4\" 2x 10 F  6\" x10 6\" 3x10 6\" 2x 10  F and   4\" 2x 10 L  6\" 3x10 fwd 6\" 3x 10  F and   4\" 2x 10 L                              Gait Training                                       Modalities                                                 "

## 2025-03-31 ENCOUNTER — OFFICE VISIT (OUTPATIENT)
Dept: PHYSICAL THERAPY | Facility: CLINIC | Age: 65
End: 2025-03-31
Payer: COMMERCIAL

## 2025-03-31 DIAGNOSIS — M16.11 PRIMARY OSTEOARTHRITIS OF ONE HIP, RIGHT: Primary | ICD-10-CM

## 2025-03-31 PROCEDURE — 97112 NEUROMUSCULAR REEDUCATION: CPT | Performed by: PHYSICAL THERAPIST

## 2025-03-31 PROCEDURE — 97140 MANUAL THERAPY 1/> REGIONS: CPT | Performed by: PHYSICAL THERAPIST

## 2025-03-31 PROCEDURE — 97110 THERAPEUTIC EXERCISES: CPT | Performed by: PHYSICAL THERAPIST

## 2025-04-02 NOTE — PROGRESS NOTES
"Daily Note     Today's date: 2025  Patient name: Denita Brown  : 1960  MRN: [id number]  Referring provider: Ezio Borges,*  Dx:   Encounter Diagnosis     ICD-10-CM    1. Primary osteoarthritis of one hip, right  M16.11                      Subjective: Patient states she is pretty sore today.  She thinks she overdid it with walking.      Objective: See treatment diary below      Assessment: Tolerated treatment well. Patient demonstrated fatigue post treatment and would benefit from continued PT.  Added lateral step downs for hip strengthening today.  No complaints of pain, but hip musculature fatigued.      Plan: Continue per plan of care.  Progress treatment as tolerated.       Precautions: L1-S1 fusion, C2-C7 fusion >10 years, HTN, DM, Tachycardia , osteopenia.  Right Posterior EKNNEDY 25    Access Code: [email or web address] - 3/17/25.     POC expires Unit limit Auth  expiration date PT/OT + Visit Limit?   4/3/25 NA 25 1                 Visit/Unit Tracking  AUTH Status:  Date 4/3 2/24 2/26 3/4 3/6 3/10 3/13 3/17  3/20 3/24 3/27 3/31   Authorized Used 13 2 3 4 5 6 7 8  9 10 11 12    Remaining                  QR     Manuals 3/27 3/31 4/3 3/4 3/6 3/10 3/13 3/17 3/20 3/24   PROM right hip JF  JF SC SC JF JF SC JF LQ   Gentle hip flexor stretch at edge of mat JF        JF 3x30\" LQ  3x30\"                Re-eval JF            Neuro Re-Ed             Pt education        DOMS and HEP                                                                                    Ther Ex             Nustep L5x10' 10' L5 10' L6  10 min L6  10 min L4  L4x10' L5x10' L5 10 min  L5x10' L3 x10   Standing hip ABD and Ext   YTB 2x 10   YTB 2x 10  NV  Abd 2 10  2x10 ea 3x10  YTB 2x 10  No band.  Pain today 3x10 3x10 No band   Bridges  GTB 3\" 3x10 GTB 3\" 3x10 5\" 2x 5  5\" 2x 10  5\" 3x10 5\"x30 5\" 3x 10  5\" 3x 10  5\" 3x 10    Clamshell hooklying  GTB 3\" x30 GTB 3\" x30 YTB 3\"  YTB 2x 10  YTB 3x10 RTB x30 RTB 3x 10      SLR  2x10 2x10 " " March 2x 10   March 2x 10  SLR 2x10 SLR 3x10 SLR VC\"S 3x 10  Hold today only HELD   Seated LAQ Instructed            QS Instructed    5\" 2x 10  HEP       Ankle pumps Instructed     HEP       SL clamshells pillow between knees         3\"x30 3\"x30   Sidelying right hip ABD  AAROM 2x10 AAROM 2x10   AAROM 2x10       Ther Activity             Squats Instructed 3x10 3x10  L22 3x 10   Standing 3x10 Standing 3x10 3x 10  3x10 3x10   Step ups F/L  6\" 3x10 fwd 6\" 3x10 fwd 4\" 2x 10  4\" 2x 10 F  6\" x10 6\" 3x10 6\" 2x 10  F and   4\" 2x 10 L  6\" 3x10 fwd 6\" 3x 10  F and   4\" 2x 10 L    Right lateral step downs   4\" x10                       Gait Training                                       Modalities                                                 "

## 2025-04-03 ENCOUNTER — OFFICE VISIT (OUTPATIENT)
Dept: PHYSICAL THERAPY | Facility: CLINIC | Age: 65
End: 2025-04-03
Payer: COMMERCIAL

## 2025-04-03 DIAGNOSIS — M16.11 PRIMARY OSTEOARTHRITIS OF ONE HIP, RIGHT: Primary | ICD-10-CM

## 2025-04-03 PROCEDURE — 97140 MANUAL THERAPY 1/> REGIONS: CPT | Performed by: PHYSICAL THERAPIST

## 2025-04-03 PROCEDURE — 97112 NEUROMUSCULAR REEDUCATION: CPT | Performed by: PHYSICAL THERAPIST

## 2025-04-03 PROCEDURE — 97110 THERAPEUTIC EXERCISES: CPT | Performed by: PHYSICAL THERAPIST

## 2025-04-04 ENCOUNTER — OFFICE VISIT (OUTPATIENT)
Dept: OBGYN CLINIC | Facility: MEDICAL CENTER | Age: 65
End: 2025-04-04

## 2025-04-04 VITALS — HEIGHT: 65 IN | WEIGHT: 156 LBS | BODY MASS INDEX: 25.99 KG/M2

## 2025-04-04 DIAGNOSIS — Z47.1 AFTERCARE FOLLOWING RIGHT HIP JOINT REPLACEMENT SURGERY: Primary | ICD-10-CM

## 2025-04-04 DIAGNOSIS — Z96.641 AFTERCARE FOLLOWING RIGHT HIP JOINT REPLACEMENT SURGERY: Primary | ICD-10-CM

## 2025-04-04 PROCEDURE — 99024 POSTOP FOLLOW-UP VISIT: CPT | Performed by: ORTHOPAEDIC SURGERY

## 2025-04-04 NOTE — PROGRESS NOTES
Name: Denita Brown      : 1960       MRN: [id number]   Encounter Provider: Ezio Borges MD   Encounter Date: 25  Encounter department: [facility] [place]     ASSESSMENT & PLAN:  Assessment & Plan  Aftercare following right hip joint replacement surgery  Continue physical therapy   Continue weight bearing activities as tolerated   Continue pain medications as needed  Able to slowly progress out of hip precautions   Voltaren gel ordered today for application to tender surgical scar    Follow up in 6 weeks for 3 month post-op appointment with repeat x-rays    Orders:  •  Diclofenac Sodium (VOLTAREN) 1 %; Apply 2 g topically 4 (four) times a day         To do next visit:  Return in about 6 weeks (around 2025) for 3 month post-op appointment with repeat x-rays.    _____________________________________________________  CHIEF COMPLAINT:  Chief Complaint   Patient presents with   • Right Hip - Post-op         SUBJECTIVE:  Denita Brown is a 64 y.o. female who presents 6 weeks s/p right total hip arthroplasty.  She continues to do well.  She denies pain of the right hip however admits to soreness and hypersensitivity of surgical scar.  She continues to attend physical therapy twice a week and is making great progress.  Patient explains using SPC when ambulating for long distances for support.          PAST MEDICAL HISTORY:  Past Medical History:   Diagnosis Date   • Abdominal pain, epigastric 2018    Added automatically from request for surgery [id number]   • Abnormal x-ray of lumbar spine 2015   • Acute cystitis with hematuria 2020   • Bariatric surgery status    • Basal cell carcinoma     basil cell carcinoma- in remission   • Benign essential hypertension 2012   • Bone bruise 2023   • Breakdown (mechanical) of internal fixation device of vertebrae, initial encounter ([facility]) 2019   • Calculus of bile duct with cholecystitis without  obstruction 2018    Added automatically from request for surgery [id number]   • Cellulitis of finger 2015   • Closed fracture of left distal fibula 10/28/2023   • Degenerative lumbar disc    • Digital mucinous cyst 2015   • DMII (diabetes mellitus, type 2) ([facility]) 2013   • GERD (gastroesophageal reflux disease)    • Gross hematuria 2019   • Hiatal hernia    • History of COVID-19 2021   • History of transfusion 2014    Post-op spinal surgery   • Hyperlipidemia    • Low back pain    • Lower urinary tract infectious disease 2015   • Medicare annual wellness visit, initial 2019   • Obesity     Resolved 10/6/2016    • Other closed fracture of distal end of left fibula, initial encounter 10/28/2023   • Overactive bladder    • Postsurgical malabsorption    • Recurrent UTI 2019   • Spinal stenosis    • SVT (supraventricular tachycardia) ([facility])    • Tachycardia     Resolved 2016    • Type 2 diabetes mellitus ([facility])     Last assesssed 2018    • Wears glasses        PAST SURGICAL HISTORY:  Past Surgical History:   Procedure Laterality Date   • APPENDECTOMY     • ARTHRODESIS      Lumbar - Last assessed 2018    • BACK SURGERY      Lumbar (3 surgeries)- two levels fused, clean out from infection, then fusion of 7 levels (last surgery in )   • CARDIAC ELECTROPHYSIOLOGY STUDY AND ABLATION     • CARPAL TUNNEL RELEASE      x2   • CERVICAL SPINE SURGERY      Fusion of C2-C7 over a period of 3 surgeries ( first)   •  SECTION      X2   • CHOLECYSTECTOMY     • FL MYELOGRAM LUMBAR  2019   • HIP ARTHROPLASTY Right 2025   • INSERTION / PLACEMENT / REVISION NEUROSTIMULATOR      X2- both were removed   • NECK SURGERY     • OTHER SURGICAL HISTORY      Catheter ablation    • CA ARTHRP ACETBLR/PROX FEM PROSTC AGRFT/ALGRFT Right 2025    Procedure: ARTHROPLASTY HIP TOTAL;  Surgeon: Ezio Borges MD;  [facility]: [facility];  Service: Orthopedics   •  UT EGD TRANSORAL BIOPSY SINGLE/MULTIPLE N/A 2016    Procedure: ESOPHAGOGASTRODUODENOSCOPY (EGD);  Surgeon: Tanya Orta MD;  Location: [facility];  Service: Bariatrics   • UT EGD TRANSORAL BIOPSY SINGLE/MULTIPLE N/A 2018    Procedure: ESOPHAGOGASTRODUODENOSCOPY (EGD) with biopsy;  Surgeon: Tanya Orta MD;  Location: [facility];  Service: Bariatrics   • UT LAPAROSCOPY SURG CHOLECYSTECTOMY N/A 2018    Procedure: CHOLECYSTECTOMY LAPAROSCOPIC;  Surgeon: Tanya Orta MD;  [facility];  Service: Bariatrics   • UT LAPS GSTR RSTCV PX W/BYP BRAULIO-EN-Y LIMB <150 CM N/A 10/25/2016    Procedure: BYPASS GASTRIC  BRAULIO-EN-Y LAPAROSCOPIC, WITH INTRA OP EGD;  Surgeon: Tanya Orta MD;  Location[facility];  Service: Bariatrics   • SKIN CANCER EXCISION     • TONSILLECTOMY     • TUBAL LIGATION     • US GUIDED BREAST BIOPSY RIGHT COMPLETE Right 2023   • WISDOM TOOTH EXTRACTION         FAMILY HISTORY:  Family History   Problem Relation Age of Onset   • Arthritis Mother         Rheumatoid   • Angina Mother    • Coronary artery disease Mother    • Diabetes Mother    • Cancer Father         Lung   • Cystic fibrosis Father    • Rheum arthritis Sister    • Diabetes Sister    • No Known Problems Maternal Grandmother    • No Known Problems Maternal Grandfather    • No Known Problems Paternal Grandmother    • No Known Problems Paternal Grandfather    • Other Brother         Back problems    • Diabetes Brother    • No Known Problems Brother    • No Known Problems Son    • No Known Problems Son    • Hypertension Family    • Heart disease Family    • Breast cancer Neg Hx        SOCIAL HISTORY:  Social History     Tobacco Use   • Smoking status: Former     Current packs/day: 0.00     Average packs/day: 1 pack/day for 20.0 years (20.0 ttl pk-yrs)     Types: Cigarettes     Start date:      Quit date:      Years since quittin.2     Passive exposure: Past   • Smokeless tobacco: Never   Vaping Use    • Vaping status: Never Used   Substance Use Topics   • Alcohol use: Not Currently   • Drug use: No       MEDICATIONS:    Current Outpatient Medications:   •  Diclofenac Sodium (VOLTAREN) 1 %, Apply 2 g topically 4 (four) times a day, Disp: 350 g, Rfl: 2  •  acetaminophen (TYLENOL) 500 mg tablet, Take 2 tablets (1,000 mg total) by mouth every 6 (six) hours as needed for mild pain, Disp: 90 tablet, Rfl: 0  •  albuterol (PROVENTIL HFA,VENTOLIN HFA) 90 mcg/act inhaler, Inhale 2 puffs every 6 (six) hours as needed for wheezing or shortness of breath (cough, chest tightness), Disp: 18 g, Rfl: 1  •  baclofen 10 mg tablet, Take 10 mg by mouth 3 (three) times a day, Disp: , Rfl:   •  buPROPion (WELLBUTRIN) 75 mg tablet, take 1 tablet by mouth twice a day, Disp: 180 tablet, Rfl: 1  •  Calcium Citrate-Vitamin D 315-250 MG-UNIT TABS, Take 1 tablet by mouth 2 (two) times a day , Disp: , Rfl:   •  Cholecalciferol 25 MCG (1000 UT) CHEW, Chew, Disp: , Rfl:   •  enoxaparin (LOVENOX) 40 mg/0.4 mL, Inject 0.4 mL (40 mg total) under the skin daily for 28 days Start injections after surgery (Patient not taking: Reported on 1/11/2025), Disp: 11.2 mL, Rfl: 0  •  escitalopram (LEXAPRO) 20 mg tablet, take 1 tablet by mouth every morning, Disp: 90 tablet, Rfl: 1  •  estrogens, conjugated (Premarin) vaginal cream, Insert 1 g into the vagina 3 (three) times a week, Disp: 12 g, Rfl: 3  •  famotidine (PEPCID) 40 MG tablet, take 1 tablet by mouth at bedtime, Disp: 90 tablet, Rfl: 3  •  fenofibrate 160 MG tablet, take 1 tablet by mouth once daily, Disp: 90 tablet, Rfl: 0  •  fluocinonide (LIDEX) 0.05 % cream, Apply topically 2 (two) times a day as needed for rash, Disp: 15 g, Rfl: 1  •  fluticasone (FLONASE) 50 mcg/act nasal spray, 1 spray into each nostril daily, Disp: 48 g, Rfl: 1  •  gabapentin (NEURONTIN) 100 mg capsule, 100 mg 5 (five) times a day , Disp: , Rfl:   •  lidocaine (XYLOCAINE) 5 % ointment, Apply topically as needed for mild  "pain, Disp: 30 g, Rfl: 2  •  loratadine (CLARITIN) 10 mg tablet, Take 10 mg by mouth daily., Disp: , Rfl:   •  methocarbamol (ROBAXIN) 500 mg tablet, Take 1 tablet (500 mg total) by mouth 3 (three) times a day as needed for muscle spasms, Disp: 90 tablet, Rfl: 1  •  metoclopramide (Reglan) 10 mg tablet, Take 1 tablet (10 mg total) by mouth every 6 (six) hours, Disp: 90 tablet, Rfl: 1  •  Multiple Vitamins-Minerals (BARIATRIC FUSION PO), Take 1 tablet by mouth daily, Disp: , Rfl:   •  ondansetron (ZOFRAN) 4 mg tablet, Take 1 tablet (4 mg total) by mouth every 8 (eight) hours as needed for nausea or vomiting, Disp: 90 tablet, Rfl: 0  •  oxybutynin (DITROPAN) 5 mg tablet, Take 1 tablet (5 mg total) by mouth daily, Disp: 90 tablet, Rfl: 3  •  pantoprazole (PROTONIX) 40 mg tablet, take 1 tablet by mouth daily before breakfast, Disp: 90 tablet, Rfl: 1  •  sucralfate (CARAFATE) 1 g/10 mL suspension, Take 10 mL (1 g total) by mouth 2 (two) times a day as needed (gastritis), Disp: 473 mL, Rfl: 0  •  tirzepatide (Zepbound) 12.5 mg/0.5 mL auto-injector, Inject 0.5 mL (12.5 mg total) under the skin once a week, Disp: 2 mL, Rfl: 0    ALLERGIES:  No Known Allergies    LABS:  HgA1c:   Lab Results   Component Value Date    HGBA1C 5.3 01/25/2025     BMP:   Lab Results   Component Value Date    GLUCOSE 122 03/02/2015    CALCIUM 9.2 02/23/2025     03/02/2015    K 3.7 02/23/2025    CO2 22 02/23/2025     02/23/2025    BUN 13 02/23/2025    CREATININE 0.65 02/23/2025     CBC: No components found for: \"CBC\"    _____________________________________________________  PHYSICAL EXAMINATION:  Vital signs: Ht 5' 5\" (1.651 m)   Wt 70.8 kg (156 lb)   BMI 25.96 kg/m²   General: No acute distress, awake and alert  Psychiatric: Mood and affect appear appropriate  HEENT: Trachea Midline, No torticollis, no apparent facial trauma  Cardiovascular: No audible murmurs; Extremities appear perfused  Pulmonary: No audible wheezing or " stridor  Skin: No open lesions; see further details (if any) below    MUSCULOSKELETAL EXAMINATION:  Ortho Exam  Right hip:  Well healed posterior incision  No erythema, edema, or signs of infection  Good arc of motion with no pain  Patient sits comfortably in chair with hip flexed at 90 degrees  Patient stands from seated position without assistance  Calf compartments soft and supple  Sensation intact  Toes are warm sensate and mobile     ___________________________________________________  STUDIES REVIEWED:  I personally reviewed the images obtained in office today and my independent interpretation is as follows:    None performed      PROCEDURES PERFORMED:    None musa Fitzpatrick PA-C

## 2025-04-07 ENCOUNTER — OFFICE VISIT (OUTPATIENT)
Dept: PHYSICAL THERAPY | Facility: CLINIC | Age: 65
End: 2025-04-07
Payer: COMMERCIAL

## 2025-04-07 DIAGNOSIS — M16.11 PRIMARY OSTEOARTHRITIS OF ONE HIP, RIGHT: Primary | ICD-10-CM

## 2025-04-07 PROCEDURE — 97110 THERAPEUTIC EXERCISES: CPT | Performed by: PHYSICAL THERAPIST

## 2025-04-07 PROCEDURE — 97140 MANUAL THERAPY 1/> REGIONS: CPT | Performed by: PHYSICAL THERAPIST

## 2025-04-07 PROCEDURE — 97530 THERAPEUTIC ACTIVITIES: CPT | Performed by: PHYSICAL THERAPIST

## 2025-04-07 NOTE — PROGRESS NOTES
"Daily Note     Today's date: 2025  Patient name: Denita Brown  : 1960  MRN: [id number]  Referring provider: Ezio Borges,*  Dx:   Encounter Diagnosis     ICD-10-CM    1. Primary osteoarthritis of one hip, right  M16.11                      Subjective: Patient states her hip is feeling better.  Prescribed Voltaren for soreness at incision.      Objective: See treatment diary below      Assessment: Tolerated treatment well. Patient demonstrated fatigue post treatment and would benefit from continued PT.  Increased difficulty with forward step ups and increased repetitions with supine flexion and SL ABD today to increase strength and endurance.  Progressed PROM into hip flexion today as restrictions to 90 degrees were lifted by surgeon.      Plan: Continue per plan of care.  Progress treatment as tolerated.       Precautions: L1-S1 fusion, C2-C7 fusion >10 years, HTN, DM, Tachycardia , osteopenia.  Right Posterior KENNEDY 25.  Per surgeon note 25 able to flex beyond 90 degrees    Access Code: [email or web address] - 3/17/25.     POC expires Unit limit Auth  expiration date PT/OT + Visit Limit?   4/3/25 NA 25 1                 Visit/Unit Tracking  AUTH Status:  Date 4/3 4/7 2/26 3/4 3/6 3/10 3/13 3/17  3/20 3/24 3/27 3/31   Authorized Used 13 14 3 4 5 6 7 8  9 10 11 12    Remaining                  QR     Manuals 3/27 3/31 4/3 4/7 3/6 3/10 3/13 3/17 3/20 3/24   PROM right hip JF  JF JF.  Flex beyond 90 degrees SC JF JF SC JF LQ   Gentle hip flexor stretch at edge of mat JF        JF 3x30\" LQ  3x30\"                Re-eval JF            Neuro Re-Ed             Pt education        DOMS and HEP                                                                                    Ther Ex             Nustep L5x10' 10' L5 10' L6  10 min L6  10 min L4  L4x10' L5x10' L5 10 min  L5x10' L3 x10   Standing hip ABD and Ext   YTB 2x 10   YTB 2x 10   YTB 2x 10 Abd 2 10  2x10 ea 3x10  YTB 2x 10  No band.  Pain today " "3x10 3x10 No band   Bridges  GTB 3\" 3x10 GTB 3\" 3x10 GTB 3\" 3x10 5\" 2x 10  5\" 3x10 5\"x30 5\" 3x 10  5\" 3x 10  5\" 3x 10    Clamshell hooklying  GTB 3\" x30 GTB 3\" x30 GTB 3\" 3x10 YTB 2x 10  YTB 3x10 RTB x30 RTB 3x 10      SLR  2x10 2x10 2x15  March 2x 10  SLR 2x10 SLR 3x10 SLR VC\"S 3x 10  Hold today only HELD   Seated LAQ Instructed            QS Instructed    5\" 2x 10  HEP   15    Ankle pumps Instructed     HEP       SL clamshells pillow between knees         3\"x30 3\"x30   Sidelying right hip ABD  AAROM 2x10 AROM 2x10 AROM 3x10  AAROM 2x10       Ther Activity             Squats Instructed 3x10 3x10 3x 10   Standing 3x10 Standing 3x10 3x 10  3x10 3x10   Step ups F/L  6\" 3x10 fwd 6\" 3x10 fwd 4\" 2x 10  4\" 2x 10 F  6\" x10 6\" 3x10 6\" 2x 10  F and   4\" 2x 10 L  6\" 3x10 fwd 6\" 3x 10  F and   4\" 2x 10 L    Right lateral step downs   4\" x10 4\" 2x10                      Gait Training                                       Modalities                                                 "

## 2025-04-10 ENCOUNTER — OFFICE VISIT (OUTPATIENT)
Dept: PHYSICAL THERAPY | Facility: CLINIC | Age: 65
End: 2025-04-10
Payer: COMMERCIAL

## 2025-04-10 DIAGNOSIS — M16.11 PRIMARY OSTEOARTHRITIS OF ONE HIP, RIGHT: Primary | ICD-10-CM

## 2025-04-10 PROCEDURE — 97530 THERAPEUTIC ACTIVITIES: CPT

## 2025-04-10 PROCEDURE — 97140 MANUAL THERAPY 1/> REGIONS: CPT

## 2025-04-10 PROCEDURE — 97110 THERAPEUTIC EXERCISES: CPT

## 2025-04-14 ENCOUNTER — OFFICE VISIT (OUTPATIENT)
Dept: PHYSICAL THERAPY | Facility: CLINIC | Age: 65
End: 2025-04-14
Payer: COMMERCIAL

## 2025-04-14 DIAGNOSIS — M16.11 PRIMARY OSTEOARTHRITIS OF ONE HIP, RIGHT: Primary | ICD-10-CM

## 2025-04-14 PROCEDURE — 97140 MANUAL THERAPY 1/> REGIONS: CPT | Performed by: PHYSICAL THERAPIST

## 2025-04-14 PROCEDURE — 97110 THERAPEUTIC EXERCISES: CPT | Performed by: PHYSICAL THERAPIST

## 2025-04-14 PROCEDURE — 97530 THERAPEUTIC ACTIVITIES: CPT | Performed by: PHYSICAL THERAPIST

## 2025-04-14 NOTE — PROGRESS NOTES
"Daily Note     Today's date: 2025  Patient name: Denita Brown  : 1960  MRN: [id number]  Referring provider: Ezio Borges,*  Dx:   Encounter Diagnosis     ICD-10-CM    1. Primary osteoarthritis of one hip, right  M16.11                      Subjective: Patient states she was squatting deeply  to wrap Easter Baskets and her right hip was hurting her a lot.      Objective: See treatment diary below      Assessment: Tolerated treatment well. Patient demonstrated fatigue post treatment and would benefit from continued PT.  Improved eccentric lowering with lateral step-downs.  Strength continues to improve.  Progressing well.      Plan: Continue per plan of care.  Progress treatment as tolerated.       Precautions: L1-S1 fusion, C2-C7 fusion >10 years, HTN, DM, Tachycardia , osteopenia.  Right Posterior KENNEDY 25.  Per surgeon note 25 able to flex beyond 90 degrees    Access Code: [email or web address] - 3/17/25.     POC expires Unit limit Auth  expiration date PT/OT + Visit Limit?   4/3/25 NA 25 1                 Visit/Unit Tracking  AUTH Status:  Date 4/3 4/7 2/26 3/4 3/6 3/10 3/13 3/17  3/20 3/24 3/27 3/31   Authorized Used 13 14 3 4 5 6 7 8  9 10 11 12    Remaining                  QR     Manuals 3/27 3/31 4/3 4/7 4/10 4/14 3/13 3/17 3/20 3/24   PROM right hip JF  JF JF.  Flex beyond 90 degrees   SC flexion beyond 90 degrees gentle.  JF.  Flex beyond 90 degrees JF SC JF LQ   Gentle hip flexor stretch at edge of mat JF        JF 3x30\" LQ  3x30\"                Re-eval JF            Neuro Re-Ed             Pt education        DOMS and HEP                                                                                    Ther Ex             Nustep L5x10' 10' L5 10' L6  10 min L6  10 min L6  10 min L6  L5x10' L5 10 min  L5x10' L3 x10   Standing hip ABD and Ext   YTB 2x 10   YTB 2x 10   YTB 2x 10 YTB 2 x10  YTB 3x10 3x10  YTB 2x 10  No band.  Pain today 3x10 3x10 No band   Bridges  GTB 3\" 3x10 GTB 3\" " "3x10 GTB 3\" 3x10 Gtb 3x 10  Gtb 3x 10 5\"x30 5\" 3x 10  5\" 3x 10  5\" 3x 10    Clamshell hooklying  GTB 3\" x30 GTB 3\" x30 GTB 3\" 3x10  GTB 2x 10 s/l   GTB 2x 10 s/l  RTB x30 RTB 3x 10      SLR  2x10 2x10 2x15  2x 15   2x 15  SLR 3x10 SLR VC\"S 3x 10  Hold today only HELD   Seated LAQ Instructed            QS Instructed        15    Ankle pumps Instructed            SL clamshells pillow between knees         3\"x30 3\"x30   Sidelying right hip ABD  AAROM 2x10 AROM 2x10 AROM 3x10 AROM  3x 10  AROM  3x 10        Ther Activity             Squats Instructed 3x10 3x10 3x 10  3x 10  3x10 Standing 3x10 3x 10  3x10 3x10   Step ups F/L  6\" 3x10 fwd 6\" 3x10 fwd 4\" 2x 10   6\" 2x 10  6\" 3x10 fwd 6\" 3x10 6\" 2x 10  F and   4\" 2x 10 L  6\" 3x10 fwd 6\" 3x 10  F and   4\" 2x 10 L    Right lateral step downs   4\" x10 4\" 2x10 4\" 2x 10  4\" 2x 10                     Gait Training                                       Modalities                                                   "

## 2025-04-16 ENCOUNTER — RA CDI HCC (OUTPATIENT)
Dept: OTHER | Facility: HOSPITAL | Age: 65
End: 2025-04-16

## 2025-04-16 NOTE — PROGRESS NOTES
HCC coding opportunities       Chart reviewed, no opportunity found: CHART REVIEWED, NO OPPORTUNITY FOUND        Patients Insurance     Medicare Insurance: Medicare / [facility]

## 2025-04-16 NOTE — PROGRESS NOTES
"Daily Note     Today's date: 2025  Patient name: Denita Brown  : 1960  MRN: [id number]  Referring provider: Ezio Borges,*  Dx:   Encounter Diagnosis     ICD-10-CM    1. Primary osteoarthritis of one hip, right  M16.11                      Subjective: Patient states she has started having pain in her groin going up stairs at home the past 2 days.  Previously did not experience pain with stairs.      Objective: See treatment diary below      Assessment: Tolerated treatment well. Patient demonstrated fatigue post treatment and would benefit from continued PT.  No TTP at hip musculature, but having TTP at Trochanteric bursa.  No pain with hip strengthening exercises today, just musculature fatigue.  Able to perform 8\" step-ups today with minimal discomfort.  Better than yesterday on stairs.  Likely muscle strain.      Plan: Continue per plan of care.  Progress treatment as tolerated.       Precautions: L1-S1 fusion, C2-C7 fusion >10 years, HTN, DM, Tachycardia , osteopenia.  Right Posterior KENNEDY 25.  Per surgeon note 25 able to flex beyond 90 degrees    Access Code: [email or web address] - 3/17/25.     POC expires Unit limit Auth  expiration date PT/OT + Visit Limit?   4/3/25 NA 25 1                 Visit/Unit Tracking  AUTH Status:  Date 4/3 4/7 4/10 4/14 4/17 3/10 3/13 3/17  3/20 3/24 3/27 3/31   Authorized Used 13 14 15 16 17 6 7 8  9 10 11 12    Remaining                  QR     Manuals 3/27 3/31 4/3 4/7 4/10 4/14 4/17 3/17 3/20 3/24   PROM right hip JF  JF JF.  Flex beyond 90 degrees   SC flexion beyond 90 degrees gentle.  JF.  Flex beyond 90 degrees JF.  Flex beyond 90 degrees SC JF LQ   Gentle hip flexor stretch at edge of mat JF        JF 3x30\" LQ  3x30\"                Re-eval JF            Neuro Re-Ed             Pt education        DOMS and HEP                                                                                    Ther Ex             Nustep L5x10' 10' L5 10' L6  10 min L6  " "10 min L6  10 min L6  10 min L6  L5 10 min  L5x10' L3 x10   Standing hip ABD and Ext   YTB 2x 10   YTB 2x 10   YTB 2x 10 YTB 2 x10  YTB 3x10 YTB 3x10  YTB 2x 10  No band.  Pain today 3x10 3x10 No band   Bridges  GTB 3\" 3x10 GTB 3\" 3x10 GTB 3\" 3x10 Gtb 3x 10  Gtb 3x 10 Gtb 3x 10 5\" 3x 10  5\" 3x 10  5\" 3x 10    Clamshell hooklying  GTB 3\" x30 GTB 3\" x30 GTB 3\" 3x10  GTB 2x 10 s/l   GTB 2x 10 s/l  GTB 3x 10 s/l  RTB 3x 10      SLR  2x10 2x10 2x15  2x 15   2x 15   2x 15  SLR VC\"S 3x 10  Hold today only HELD   Seated LAQ Instructed            QS Instructed        15    Ankle pumps Instructed            SL clamshells pillow between knees         3\"x30 3\"x30   Sidelying right hip ABD  AAROM 2x10 AROM 2x10 AROM 3x10 AROM  3x 10  AROM  3x 10  AROM  3x 10       Ther Activity             Squats Instructed 3x10 3x10 3x 10  3x 10  3x10 3x10 3x 10  3x10 3x10   Step ups F/L  6\" 3x10 fwd 6\" 3x10 fwd 4\" 2x 10   6\" 2x 10  6\" 3x10 fwd 8\" 2x10 6\" 2x 10  F and   4\" 2x 10 L  6\" 3x10 fwd 6\" 3x 10  F and   4\" 2x 10 L    Right lateral step downs   4\" x10 4\" 2x10 4\" 2x 10  4\" 2x 10  4\" 2x 10                    Gait Training                                       Modalities                                                   "

## 2025-04-17 ENCOUNTER — OFFICE VISIT (OUTPATIENT)
Dept: PHYSICAL THERAPY | Facility: CLINIC | Age: 65
End: 2025-04-17
Attending: ORTHOPAEDIC SURGERY
Payer: COMMERCIAL

## 2025-04-17 DIAGNOSIS — M16.11 PRIMARY OSTEOARTHRITIS OF ONE HIP, RIGHT: Primary | ICD-10-CM

## 2025-04-17 PROCEDURE — 97530 THERAPEUTIC ACTIVITIES: CPT | Performed by: PHYSICAL THERAPIST

## 2025-04-17 PROCEDURE — 97140 MANUAL THERAPY 1/> REGIONS: CPT | Performed by: PHYSICAL THERAPIST

## 2025-04-17 PROCEDURE — 97110 THERAPEUTIC EXERCISES: CPT | Performed by: PHYSICAL THERAPIST

## 2025-04-21 ENCOUNTER — OFFICE VISIT (OUTPATIENT)
Dept: PHYSICAL THERAPY | Facility: CLINIC | Age: 65
End: 2025-04-21
Payer: COMMERCIAL

## 2025-04-21 DIAGNOSIS — M16.11 PRIMARY OSTEOARTHRITIS OF ONE HIP, RIGHT: Primary | ICD-10-CM

## 2025-04-21 PROCEDURE — 97140 MANUAL THERAPY 1/> REGIONS: CPT | Performed by: PHYSICAL THERAPIST

## 2025-04-21 PROCEDURE — 97110 THERAPEUTIC EXERCISES: CPT | Performed by: PHYSICAL THERAPIST

## 2025-04-21 PROCEDURE — 97530 THERAPEUTIC ACTIVITIES: CPT | Performed by: PHYSICAL THERAPIST

## 2025-04-21 NOTE — PROGRESS NOTES
"Daily Note     Today's date: 2025  Patient name: Denita Brown  : 1960  MRN: [id number]  Referring provider: Ezio Borges,*  Dx:   Encounter Diagnosis     ICD-10-CM    1. Primary osteoarthritis of one hip, right  M16.11                      Subjective: Patient states that the increased pain she was having in her hip/groin with stairs last session is improved.  Stairs are not bothering her.  She does note sensitivity to light touch of her right anterior thigh.      Objective: See treatment diary below      Assessment: Tolerated treatment well. Patient demonstrated fatigue post treatment and would benefit from continued PT.  Added forward step-down for hip and thigh strengthening and patient reports some hip soreness.  Increased resistance for hip strengthening.      Plan: Continue per plan of care.  Progress treatment as tolerated.       Precautions: L1-S1 fusion, C2-C7 fusion >10 years, HTN, DM, Tachycardia , osteopenia.  Right Posterior KENNEDY 25.  Per surgeon note 25 able to flex beyond 90 degrees    Access Code: [email or web address] - 3/17/25.     POC expires Unit limit Auth  expiration date PT/OT + Visit Limit?   4/3/25 NA 25 1                 Visit/Unit Tracking  AUTH Status:  Date 4/3 4/7 4/10 4/14 4/17 4/21 3/13 3/17  3/20 3/24 3/27 3/31   Authorized Used 13 14 15 16 17 18 7 8  9 10 11 12    Remaining                  QR     Manuals 3/27 3/31 4/3 4/7 4/10 4/14 4/17 4/21 3/20 3/24   PROM right hip JF  JF JF.  Flex beyond 90 degrees   SC flexion beyond 90 degrees gentle.  JF.  Flex beyond 90 degrees JF.  Flex beyond 90 degrees JF.  Flex beyond 90 degrees JF LQ   Gentle hip flexor stretch at edge of mat JF        JF 3x30\" LQ  3x30\"                Re-eval JF            Neuro Re-Ed             Pt education                                                                                           Ther Ex             Nustep L5x10' 10' L5 10' L6  10 min L6  10 min L6  10 min L6  10 min L6  L6 10 " "min  L5x10' L3 x10   Standing hip ABD and Ext   YTB 2x 10   YTB 2x 10   YTB 2x 10 YTB 2 x10  YTB 3x10 YTB 3x10  RTB 2x 10  No band.  Pain today 3x10 3x10 No band   Bridges  GTB 3\" 3x10 GTB 3\" 3x10 GTB 3\" 3x10 Gtb 3x 10  Gtb 3x 10 Gtb 3x 10 BTB 3x10 5\" 3x 10  5\" 3x 10    Clamshell hooklying  GTB 3\" x30 GTB 3\" x30 GTB 3\" 3x10  GTB 2x 10 s/l   GTB 2x 10 s/l  GTB 3x 10 s/l  SL GTB 3x 10      SLR  2x10 2x10 2x15  2x 15   2x 15   2x 15  2x 15   Hold today only HELD   Sidelying right hip ABD  AAROM 2x10 AROM 2x10 AROM 3x10 AROM  3x 10  AROM  3x 10  AROM  3x 10  AROM  3x 10      Ther Activity             Squats Instructed 3x10 3x10 3x 10  3x 10  3x10 3x10 3x 10  3x10 3x10   Step ups F/L  6\" 3x10 fwd 6\" 3x10 fwd 4\" 2x 10   6\" 2x 10  6\" 3x10 fwd 8\" 2x10 6\" 2x 10  F and   4\" 2x 10 L  6\" 3x10 fwd 6\" 3x 10  F and   4\" 2x 10 L    Right lateral step downs   4\" x10 4\" 2x10 4\" 2x 10  4\" 2x 10  4\" 2x 10  4\" 2x 10      Fwd step-down       NV 6\" 2x10     Gait Training                                       Modalities                                                   "

## 2025-04-22 RX ORDER — HYDROMORPHONE HYDROCHLORIDE 4 MG/1
4 TABLET ORAL EVERY 6 HOURS PRN
COMMUNITY

## 2025-04-23 ENCOUNTER — OFFICE VISIT (OUTPATIENT)
Dept: FAMILY MEDICINE CLINIC | Facility: CLINIC | Age: 65
End: 2025-04-23
Payer: COMMERCIAL

## 2025-04-23 VITALS
SYSTOLIC BLOOD PRESSURE: 136 MMHG | HEIGHT: 65 IN | WEIGHT: 150 LBS | DIASTOLIC BLOOD PRESSURE: 72 MMHG | BODY MASS INDEX: 24.99 KG/M2 | HEART RATE: 59 BPM | TEMPERATURE: 98.4 F | OXYGEN SATURATION: 96 %

## 2025-04-23 DIAGNOSIS — Z00.00 MEDICARE ANNUAL WELLNESS VISIT, SUBSEQUENT: Primary | ICD-10-CM

## 2025-04-23 DIAGNOSIS — Z12.31 SCREENING MAMMOGRAM FOR BREAST CANCER: ICD-10-CM

## 2025-04-23 DIAGNOSIS — E66.3 OVERWEIGHT (BMI 25.0-29.9): ICD-10-CM

## 2025-04-23 PROCEDURE — G0439 PPPS, SUBSEQ VISIT: HCPCS | Performed by: FAMILY MEDICINE

## 2025-04-23 RX ORDER — TIRZEPATIDE 12.5 MG/.5ML
12.5 INJECTION, SOLUTION SUBCUTANEOUS WEEKLY
Qty: 2 ML | Refills: 2 | Status: SHIPPED | OUTPATIENT
Start: 2025-04-23

## 2025-04-23 NOTE — PATIENT INSTRUCTIONS
Medicare Preventive Visit Patient Instructions  Thank you for completing your Welcome to Medicare Visit or Medicare Annual Wellness Visit today. Your next wellness visit will be due in one year (4/24/2026).  The screening/preventive services that you may require over the next 5-10 years are detailed below. Some tests may not apply to you based off risk factors and/or age. Screening tests ordered at today's visit but not completed yet may show as past due. Also, please note that scanned in results may not display below.  Preventive Screenings:  Service Recommendations Previous Testing/Comments   Colorectal Cancer Screening  * Colonoscopy    * Fecal Occult Blood Test (FOBT)/Fecal Immunochemical Test (FIT)  * Fecal DNA/Cologuard Test  * Flexible Sigmoidoscopy Age: 45-75 years old   Colonoscopy: every 10 years (may be performed more frequently if at higher risk)  OR  FOBT/FIT: every 1 year  OR  Cologuard: every 3 years  OR  Sigmoidoscopy: every 5 years  Screening may be recommended earlier than age 45 if at higher risk for colorectal cancer. Also, an individualized decision between you and your healthcare provider will decide whether screening between the ages of 76-85 would be appropriate. Colonoscopy: Not on file  FOBT/FIT: Not on file  Cologuard: 02/14/2023  Sigmoidoscopy: Not on file    Screening Current     Breast Cancer Screening Age: 40+ years old  Frequency: every 1-2 years  Not required if history of left and right mastectomy Mammogram: 07/11/2024    Screening Current   Cervical Cancer Screening Between the ages of 21-29, pap smear recommended once every 3 years.   Between the ages of 30-65, can perform pap smear with HPV co-testing every 5 years.   Recommendations may differ for women with a history of total hysterectomy, cervical cancer, or abnormal pap smears in past. Pap Smear: 08/10/2020        Hepatitis C Screening Once for adults born between 1945 and 1965  More frequently in patients at high risk for  Hepatitis C Hep C Antibody: 02/06/2021    Screening Current   Diabetes Screening 1-2 times per year if you're at risk for diabetes or have pre-diabetes Fasting glucose: 88 mg/dL (2/21/2025)  A1C: 5.3 % (1/25/2025)  Screening Current   Cholesterol Screening Once every 5 years if you don't have a lipid disorder. May order more often based on risk factors. Lipid panel: 04/27/2024    Screening Not Indicated  History Lipid Disorder     Other Preventive Screenings Covered by Medicare:  Abdominal Aortic Aneurysm (AAA) Screening: covered once if your at risk. You're considered to be at risk if you have a family history of AAA.  Lung Cancer Screening: covers low dose CT scan once per year if you meet all of the following conditions: (1) Age 55-77; (2) No signs or symptoms of lung cancer; (3) Current smoker or have quit smoking within the last 15 years; (4) You have a tobacco smoking history of at least 20 pack years (packs per day multiplied by number of years you smoked); (5) You get a written order from a healthcare provider.  Glaucoma Screening: covered annually if you're considered high risk: (1) You have diabetes OR (2) Family history of glaucoma OR (3)  aged 50 and older OR (4)  American aged 65 and older  Osteoporosis Screening: covered every 2 years if you meet one of the following conditions: (1) You're estrogen deficient and at risk for osteoporosis based off medical history and other findings; (2) Have a vertebral abnormality; (3) On glucocorticoid therapy for more than 3 months; (4) Have primary hyperparathyroidism; (5) On osteoporosis medications and need to assess response to drug therapy.   Last bone density test (DXA Scan): 03/26/2024.  HIV Screening: covered annually if you're between the age of 15-65. Also covered annually if you are younger than 15 and older than 65 with risk factors for HIV infection. For pregnant patients, it is covered up to 3 times per  pregnancy.    Immunizations:  Immunization Recommendations   Influenza Vaccine Annual influenza vaccination during flu season is recommended for all persons aged >= 6 months who do not have contraindications   Pneumococcal Vaccine   * Pneumococcal conjugate vaccine = PCV13 (Prevnar 13), PCV15 (Vaxneuvance), PCV20 (Prevnar 20)  * Pneumococcal polysaccharide vaccine = PPSV23 (Pneumovax) Adults 19-63 yo with certain risk factors or if 65+ yo  If never received any pneumonia vaccine: recommend Prevnar 20 (PCV20)  Give PCV20 if previously received 1 dose of PCV13 or PPSV23   Hepatitis B Vaccine 3 dose series if at intermediate or high risk (ex: diabetes, end stage renal disease, liver disease)   Respiratory syncytial virus (RSV) Vaccine - COVERED BY MEDICARE PART D  * RSVPreF3 (Arexvy) CDC recommends that adults 60 years of age and older may receive a single dose of RSV vaccine using shared clinical decision-making (SCDM)   Tetanus (Td) Vaccine - COST NOT COVERED BY MEDICARE PART B Following completion of primary series, a booster dose should be given every 10 years to maintain immunity against tetanus. Td may also be given as tetanus wound prophylaxis.   Tdap Vaccine - COST NOT COVERED BY MEDICARE PART B Recommended at least once for all adults. For pregnant patients, recommended with each pregnancy.   Shingles Vaccine (Shingrix) - COST NOT COVERED BY MEDICARE PART B  2 shot series recommended in those 19 years and older who have or will have weakened immune systems or those 50 years and older     Health Maintenance Due:      Topic Date Due   • Breast Cancer Screening: Mammogram  07/11/2025   • Cervical Cancer Screening  08/10/2025   • Colorectal Cancer Screening  02/14/2026   • HIV Screening  Completed   • Hepatitis C Screening  Completed     Immunizations Due:      Topic Date Due   • COVID-19 Vaccine (5 - 2024-25 season) 09/01/2024     Advance Directives   What are advance directives?  Advance directives are legal  documents that state your wishes and plans for medical care. These plans are made ahead of time in case you lose your ability to make decisions for yourself. Advance directives can apply to any medical decision, such as the treatments you want, and if you want to donate organs.   What are the types of advance directives?  There are many types of advance directives, and each state has rules about how to use them. You may choose a combination of any of the following:  Living will:  This is a written record of the treatment you want. You can also choose which treatments you do not want, which to limit, and which to stop at a certain time. This includes surgery, medicine, IV fluid, and tube feedings.   Durable power of  for healthcare (DPAHC):  This is a written record that states who you want to make healthcare choices for you when you are unable to make them for yourself. This person, called a proxy, is usually a family member or a friend. You may choose more than 1 proxy.  Do not resuscitate (DNR) order:  A DNR order is used in case your heart stops beating or you stop breathing. It is a request not to have certain forms of treatment, such as CPR. A DNR order may be included in other types of advance directives.  Medical directive:  This covers the care that you want if you are in a coma, near death, or unable to make decisions for yourself. You can list the treatments you want for each condition. Treatment may include pain medicine, surgery, blood transfusions, dialysis, IV or tube feedings, and a ventilator (breathing machine).  Values history:  This document has questions about your views, beliefs, and how you feel and think about life. This information can help others choose the care that you would choose.  Why are advance directives important?  An advance directive helps you control your care. Although spoken wishes may be used, it is better to have your wishes written down. Spoken wishes can be  misunderstood, or not followed. Treatments may be given even if you do not want them. An advance directive may make it easier for your family to make difficult choices about your care.   Weight Management   Why it is important to manage your weight:  Being overweight increases your risk of health conditions such as heart disease, high blood pressure, type 2 diabetes, and certain types of cancer. It can also increase your risk for osteoarthritis, sleep apnea, and other respiratory problems. Aim for a slow, steady weight loss. Even a small amount of weight loss can lower your risk of health problems.  How to lose weight safely:  A safe and healthy way to lose weight is to eat fewer calories and get regular exercise. You can lose up about 1 pound a week by decreasing the number of calories you eat by 500 calories each day.   Healthy meal plan for weight management:  A healthy meal plan includes a variety of foods, contains fewer calories, and helps you stay healthy. A healthy meal plan includes the following:  Eat whole-grain foods more often.  A healthy meal plan should contain fiber. Fiber is the part of grains, fruits, and vegetables that is not broken down by your body. Whole-grain foods are healthy and provide extra fiber in your diet. Some examples of whole-grain foods are whole-wheat breads and pastas, oatmeal, brown rice, and bulgur.  Eat a variety of vegetables every day.  Include dark, leafy greens such as spinach, kale, blanche greens, and mustard greens. Eat yellow and orange vegetables such as carrots, sweet potatoes, and winter squash.   Eat a variety of fruits every day.  Choose fresh or canned fruit (canned in its own juice or light syrup) instead of juice. Fruit juice has very little or no fiber.  Eat low-fat dairy foods.  Drink fat-free (skim) milk or 1% milk. Eat fat-free yogurt and low-fat cottage cheese. Try low-fat cheeses such as mozzarella and other reduced-fat cheeses.  Choose meat and other  protein foods that are low in fat.  Choose beans or other legumes such as split peas or lentils. Choose fish, skinless poultry (chicken or turkey), or lean cuts of red meat (beef or pork). Before you cook meat or poultry, cut off any visible fat.   Use less fat and oil.  Try baking foods instead of frying them. Add less fat, such as margarine, sour cream, regular salad dressing and mayonnaise to foods. Eat fewer high-fat foods. Some examples of high-fat foods include french fries, doughnuts, ice cream, and cakes.  Eat fewer sweets.  Limit foods and drinks that are high in sugar. This includes candy, cookies, regular soda, and sweetened drinks.  Exercise:  Exercise at least 30 minutes per day on most days of the week. Some examples of exercise include walking, biking, dancing, and swimming. You can also fit in more physical activity by taking the stairs instead of the elevator or parking farther away from stores. Ask your healthcare provider about the best exercise plan for you.   Narcotic (Opioid) Safety    Use narcotics safely:  Take prescribed narcotics exactly as directed  Do not give narcotics to others or take narcotics that belong to someone else  Do not mix narcotics without medicines or alcohol  Do not drive or operate heavy machinery after you take the narcotic  Monitor for side effects and notify your healthcare provider if you experienced side effects such as nausea, sleepiness, itching, or trouble thinking clearly.    Manage constipation:    Constipation is the most common side effect of narcotic medicine. Constipation is when you have hard, dry bowel movements, or you go longer than usual between bowel movements. Tell your healthcare provider about all changes in your bowel movements while you are taking narcotics. He or she may recommend laxative medicine to help you have a bowel movement. He or she may also change the kind of narcotic you are taking, or change when you take it. The following are more  ways you can prevent or relieve constipation:    Drink liquids as directed.  You may need to drink extra liquids to help soften and move your bowels. Ask how much liquid to drink each day and which liquids are best for you.  Eat high-fiber foods.  This may help decrease constipation by adding bulk to your bowel movements. High-fiber foods include fruits, vegetables, whole-grain breads and cereals, and beans. Your healthcare provider or dietitian can help you create a high-fiber meal plan. Your provider may also recommend a fiber supplement if you cannot get enough fiber from food.  Exercise regularly.  Regular physical activity can help stimulate your intestines. Walking is a good exercise to prevent or relieve constipation. Ask which exercises are best for you.  Schedule a time each day to have a bowel movement.  This may help train your body to have regular bowel movements. Bend forward while you are on the toilet to help move the bowel movement out. Sit on the toilet for at least 10 minutes, even if you do not have a bowel movement.    Store narcotics safely:   Store narcotics where others cannot easily get them.  Keep them in a locked cabinet or secure area. Do not  keep them in a purse or other bag you carry with you. A person may be looking for something else and find the narcotics.  Make sure narcotics are stored out of the reach of children.  A child can easily overdose on narcotics. Narcotics may look like candy to a small child.    The best way to dispose of narcotics:      The laws vary by country and area. In the [place], the best way is to return the narcotics through a take-back program. This program is offered by the [place] Drug Enforcement Agency (CORA). The following are options for using the program:  Take the narcotics to a CORA collection site.  The site is often a law enforcement center. Call your local law enforcement center for scheduled take-back days in your area. You will be given  information on where to go if the collection site is in a different location.  Take the narcotics to an approved pharmacy or hospital.  A pharmacy or hospital may be set up as a collection site. You will need to ask if it is a CORA collection site if you were not directed there. A pharmacy or doctor's office may not be able to take back narcotics unless it is a CORA site.  Use a mail-back system.  This means you are given containers to put the narcotics into. You will then mail them in the containers.  Use a take-back drop box.  This is a place to leave the narcotics at any time. People and animals will not be able to get into the box. Your local law enforcement agency can tell you where to find a drop box in your area.    Other ways to manage pain:   Ask your healthcare provider about non-narcotic medicines to control pain.  Nonprescription medicines include NSAIDs (such as ibuprofen) and acetaminophen. Prescription medicines include muscle relaxers, antidepressants, and steroids.  Pain may be managed without any medicines.  Some ways to relieve pain include massage, aromatherapy, or meditation. Physical or occupational therapy may also help.    For more information:   Drug Enforcement Administration  [address] [place] [address]  Phone: 9- 068 - 783-4047  Web Address: https://www.deadiversion.[facility].gov/drug_disposal/    [place] Food and Drug Administration  [address] [place] [address]  Phone: 3- 565 - 309-6399  Web Address: http://www.fda.gov     © Copyright [employer] 2018 Information is for End User's use only and may not be sold, redistributed or otherwise used for commercial purposes. All illustrations and images included in CareNotes® are the copyrighted property of [employer] or [employer]

## 2025-04-23 NOTE — PROGRESS NOTES
PT Re-Evaluation     Today's date: 25  Patient name: Denita Brown  : 1960  MRN: [id number]  Referring provider: Ezio Borges,*  Dx:   Encounter Diagnosis     ICD-10-CM    1. Primary osteoarthritis of one hip, right  M16.11                       Assessment  Impairments: abnormal gait    Assessment details: Patient reports feeling 85% improved since starting PT services.  She no longer has pain in her hip and is ambulating community distances without an assistive device.  She also negotiates stairs reciprocally with a single HR.  She notices improved quality of gait and significantly improved strength.  She feels she is ready to transition to an HEP.  Objectively, patient demonstrates hip ROM and strength WFL.  Gait is improved, but she still demonstrates forward trunk lean, mainly due to back issues.  She is independent and compliant with a HEP.  DC PT services per patient request.    Goals  STG (4 weeks)  1. Patient will demonstrate the ability to correct posture with minimal VC's - met  2. Patient will demonstrate 25% gains in Hip ROM in all deficient planes- met  3. Patient will report pain as a 4-5/10 at worst with all normal activities- met  4. Patient will report 50% reduction in sleep disturbances related to pain- met  LTG (8 weeks)  1. Patient will report pain as a 0-1/10 at worst with normal activities- met  2. Patient will sleep through the night without disturbances related to pain- met  3. Patient will demonstrate Hip ROM WFL to facilitate improved quality of gait- met  4. Patient will demonstrate Hip strength WNL to improve improve transfers, quality of gait, and ability to negotiate stairs.- met  5. Patient will be independent and compliant with a HEP in order to maintain gains made with skilled PT services.- met      Plan    Planned therapy interventions: patient education and home exercise program    Plan of Care beginning date: 2025  Plan of Care expiration date:  2025  Treatment plan discussed with: patient      Subjective Evaluation    History of Present Illness  Mechanism of injury: Patient reports feeling 85% improved since starting PT services.  Over the past month she has noticed improved strength and quality of gait.  She is able to walk as long as she wants without limitations related to her hip.  At this time function is limited due to back pain.  Does HEP a few times a week.  Patient feels she is ready to DC to an HEP today.          Recurrent probem    Quality of life: good    Patient Goals  Patient goals for therapy: decreased pain, increased strength, return to work and increased motion  Patient goal: Walk without pain.  negotiate stairs reciprocally.  Pain  Current pain ratin  At best pain ratin  At worst pain ratin  Relieving factors: medications  Exacerbated by: Stairs, walking > 5 minutes.  Progression: resolved    Social Support  Stairs in house: yes (13 stairs with Left HR)   Lives in: multiple-level home  Lives with: adult children    Employment status: working    Diagnostic Tests  X-ray: abnormal    FCE comments: Paresthesias into right foot related to her back.  (-) sleep disturbances.  Still sleeping in a recliner.  Treatments  Previous treatment: medication  Current treatment: medication      Objective     Static Posture     Hip   Hip (Left): No increased flexion.   Hip (Right): Increased flexion.     Observations     Right Hip  Negative for edema and incision.     Neurological Testing     Sensation     Hip     Right Hip   Hyposensation: light touch    Comments   Right light touch: mihir-incisional region.     Active Range of Motion     Right Hip   Flexion: 100 degrees   Abduction: 40 degrees   External rotation (90/90): 35 degrees     Passive Range of Motion     Right Hip   Flexion: 110 degrees   Abduction: 40 degrees   External rotation (90/90): 40 degrees     Strength/Myotome Testing     Right Hip   Planes of Motion   Flexion:  "4+  Extension: 4+  Abduction: 4    Ambulation   Weight-Bearing Status   Weight-Bearing Status (Left): full weight bearing   Weight-Bearing Status (Right): full weight-bearing      Ambulation: Stairs   Ascend stairs: independent  Pattern: reciprocal  Railings: one rail  Descend stairs: independent  Pattern: reciprocal  Railings: one rail    Observational Gait   Gait: antalgic   Decreased walking speed and stride length.          Precautions: L1-S1 fusion, C2-C7 fusion >10 years, HTN, DM, Tachycardia , osteopenia.  Right Posterior KENNEDY 2/20/25.  Per surgeon note 4/4/25 able to flex beyond 90 degrees    Access Code: [email or web address] - 3/17/25.     POC expires Unit limit Auth  expiration date PT/OT + Visit Limit?   4/3/25 NA 12/31/25 1                 Visit/Unit Tracking  AUTH Status:  Date 4/3 4/7 4/10 4/14 4/17 4/21 4/24 3/17  3/20 3/24 3/27 3/31   Authorized Used 13 14 15 16 17 18 19 8  9 10 11 12    Remaining                  QR     Manuals 3/27 3/31 4/3 4/7 4/10 4/14 4/17 4/21 4/24 3/24   PROM right hip JF  JF JF.  Flex beyond 90 degrees   SC flexion beyond 90 degrees gentle.  JF.  Flex beyond 90 degrees JF.  Flex beyond 90 degrees JF.  Flex beyond 90 degrees JF.  Flex beyond 90 degrees LQ   Gentle hip flexor stretch at edge of mat JF         LQ  3x30\"                Re-eval JF        JF    Neuro Re-Ed             Pt education                                                                                           Ther Ex             Nustep L5x10' 10' L5 10' L6  10 min L6  10 min L6  10 min L6  10 min L6  L6 10 min  L6x10' L3 x10   Standing hip ABD and Ext   YTB 2x 10   YTB 2x 10   YTB 2x 10 YTB 2 x10  YTB 3x10 YTB 3x10  RTB 2x 10  RTB 3x 10  3x10 No band   Bridges  GTB 3\" 3x10 GTB 3\" 3x10 GTB 3\" 3x10 Gtb 3x 10  Gtb 3x 10 Gtb 3x 10 BTB 3x10  5\" 3x 10    Clamshell hooklying  GTB 3\" x30 GTB 3\" x30 GTB 3\" 3x10  GTB 2x 10 s/l   GTB 2x 10 s/l  GTB 3x 10 s/l  SL GTB 3x 10  SL GTB 3x 10     SLR  2x10 2x10 2x15  2x 15   2x 15   2x " "15  2x 15    HELD   Sidelying right hip ABD  AAROM 2x10 AROM 2x10 AROM 3x10 AROM  3x 10  AROM  3x 10  AROM  3x 10  AROM  3x 10  AROM  3x 10     Ther Activity             Squats Instructed 3x10 3x10 3x 10  3x 10  3x10 3x10 3x 10   3x10   Step ups F/L  6\" 3x10 fwd 6\" 3x10 fwd 4\" 2x 10   6\" 2x 10  6\" 3x10 fwd 8\" 2x10 6\" 2x 10  F and   4\" 2x 10 L   6\" 3x 10  F and   4\" 2x 10 L    Right lateral step downs   4\" x10 4\" 2x10 4\" 2x 10  4\" 2x 10  4\" 2x 10  4\" 2x 10      Fwd step-down       NV 6\" 2x10     Gait Training                                       Modalities                                                            "

## 2025-04-23 NOTE — PROGRESS NOTES
Name: Denita Brown      : 1960      MRN: [id number]  Encounter Provider: Nery Choi DO  Encounter Date: 2025   Encounter department: [facility]  :  Assessment & Plan  Medicare annual wellness visit, subsequent         Screening mammogram for breast cancer    Orders:  •  Mammo screening bilateral w 3d and cad; Future    Overweight (BMI 25.0-29.9)      Orders:  •  tirzepatide (Zepbound) 12.5 mg/0.5 mL auto-injector; Inject 0.5 mL (12.5 mg total) under the skin once a week       Preventive health issues were discussed with patient, and age appropriate screening tests were ordered as noted in patient's After Visit Summary. Personalized health advice and appropriate referrals for health education or preventive services given if needed, as noted in patient's After Visit Summary.    History of Present Illness     HPI     Pt presents for AWV     Needs refill on Zepbound     Patient Care Team:  Nery Choi DO as PCP - General (Family Medicine)  MIA Jackson MD Carol Ruspantini, PA-C Peter Baddick, DO (Inactive)  DO Tanya Rodriguez MD Brian P George, MD Steven Falowski, MD Giuseppina Biundo, PA-C Ashley M Santos, URMILA as Nurse Navigator (Orthopedics)  Susy Olvera MSW as  Care Manager (Care Coordination)    Review of Systems   Constitutional:  Negative for unexpected weight change.   HENT:  Negative for congestion, ear pain ((+) fullness), rhinorrhea and sore throat.         (+) allergies   Eyes:  Negative for visual disturbance.   Respiratory:  Negative for cough and shortness of breath.    Cardiovascular:  Negative for chest pain, palpitations and leg swelling.   Gastrointestinal:  Positive for constipation. Negative for abdominal pain, blood in stool and diarrhea.   Endocrine: Negative for polyuria.   Genitourinary:  Negative for dysuria and hematuria.   Musculoskeletal:  Positive for back pain and neck pain.    Neurological:  Positive for headaches (due to allergies). Negative for dizziness.   Psychiatric/Behavioral:  Negative for sleep disturbance.      Medical History Reviewed by provider this encounter:  Tobacco  Allergies  Meds  Problems  Med Hx  Surg Hx  Fam Hx       Annual Wellness Visit Questionnaire   Denita is here for her Subsequent Wellness visit.     Health Risk Assessment:   Patient rates overall health as good. Patient feels that their physical health rating is slightly better. Patient is satisfied with their life. Eyesight was rated as same. Hearing was rated as same. Patient feels that their emotional and mental health rating is much better. Patients states they are never, rarely angry. Patient states they are sometimes unusually tired/fatigued. Pain experienced in the last 7 days has been some. Patient's pain rating has been 6/10. Patient states that she has experienced no weight loss or gain in last 6 months.     Depression Screening:   PHQ-2 Score: 0      Fall Risk Screening:   In the past year, patient has experienced: history of falling in past year      Urinary Incontinence Screening:   Patient has not leaked urine accidently in the last six months.     Home Safety:  Patient does not have trouble with stairs inside or outside of their home. Patient has working smoke alarms and has working carbon monoxide detector. Home safety hazards include: none.     Nutrition:   Current diet is Regular.     Medications:   Patient is currently taking over-the-counter supplements. OTC medications include: see medication list. Patient is able to manage medications.     Activities of Daily Living (ADLs)/Instrumental Activities of Daily Living (IADLs):   Walk and transfer into and out of bed and chair?: Yes  Dress and groom yourself?: Yes    Bathe or shower yourself?: Yes    Feed yourself? Yes  Do your laundry/housekeeping?: Yes  Manage your money, pay your bills and track your expenses?: Yes  Make your own  meals?: Yes    Do your own shopping?: Yes    Durable Medical Equipment Suppliers  N/A    Previous Hospitalizations:   Any hospitalizations or ED visits within the last 12 months?: Yes    How many hospitalizations have you had in the last year?: 1-2    Advance Care Planning:   Living will: No    Durable POA for healthcare: No    Advanced directive: No      Comments:   Has paperwork at home     Cognitive Screening:   Provider or family/friend/caregiver concerned regarding cognition?: No    Preventive Screenings      Cardiovascular Screening:    General: Screening Not Indicated and History Lipid Disorder      Diabetes Screening:     General: Screening Current      Colorectal Cancer Screening:     General: Screening Current      Breast Cancer Screening:     General: Screening Current      Cervical Cancer Screening:    General: Screening Current      Osteoporosis Screening:    General: Screening Current      Abdominal Aortic Aneurysm (AAA) Screening:        General: Screening Not Indicated      Lung Cancer Screening:     General: Screening Not Indicated      Hepatitis C Screening:    General: Screening Current    Screening, Brief Intervention, and Referral to Treatment (SBIRT)     Screening  Typical number of drinks in a day: 0  Typical number of drinks in a week: 0  Interpretation: Low risk drinking behavior.    AUDIT-C Screenin) How often did you have a drink containing alcohol in the past year? never  2) How many drinks did you have on a typical day when you were drinking in the past year? 0  3) How often did you have 6 or more drinks on one occasion in the past year? never    AUDIT-C Score: 0  Interpretation: Score 0-2 (female): Negative screen for alcohol misuse    Single Item Drug Screening:  How often have you used an illegal drug (including marijuana) or a prescription medication for non-medical reasons in the past year? never    Single Item Drug Screen Score: 0  Interpretation: Negative screen for possible  "drug use disorder    Review of Current Opioid Use    Opioid Risk Tool (ORT) Interpretation: Complete Opioid Risk Tool (ORT)    Social Drivers of Health     Financial Resource Strain: Medium Risk (4/10/2023)    Overall Financial Resource Strain (CARDIA)    • Difficulty of Paying Living Expenses: Somewhat hard   Food Insecurity: No Food Insecurity (4/23/2025)    Hunger Vital Sign    • Worried About Running Out of Food in the Last Year: Never true    • Ran Out of Food in the Last Year: Never true   Transportation Needs: No Transportation Needs (4/23/2025)    PRAPARE - Transportation    • Lack of Transportation (Medical): No    • Lack of Transportation (Non-Medical): No   Housing Stability: Low Risk  (4/23/2025)    Housing Stability Vital Sign    • Unable to Pay for Housing in the Last Year: No    • Number of Times Moved in the Last Year: 0    • Homeless in the Last Year: No   Utilities: Not At Risk (4/23/2025)    [facility] Utilities    • Threatened with loss of utilities: No     No results found.    Objective   /72   Pulse 59   Temp 98.4 °F (36.9 °C)   Ht 5' 5\" (1.651 m)   Wt 68 kg (150 lb)   SpO2 96%   BMI 24.96 kg/m²     Physical Exam  Vitals and nursing note reviewed.   Constitutional:       General: She is not in acute distress.     Appearance: She is well-developed.   HENT:      Head: Normocephalic and atraumatic.      Right Ear: Tympanic membrane, ear canal and external ear normal.      Left Ear: Tympanic membrane, ear canal and external ear normal.      Nose: Nose normal. No rhinorrhea.      Mouth/Throat:      Mouth: Mucous membranes are moist.      Pharynx: No oropharyngeal exudate or posterior oropharyngeal erythema.   Eyes:      Conjunctiva/sclera: Conjunctivae normal.   Cardiovascular:      Rate and Rhythm: Normal rate and regular rhythm.   Pulmonary:      Effort: Pulmonary effort is normal. No respiratory distress.      Breath sounds: Normal breath sounds.   Abdominal:      General: Bowel sounds are " normal. There is no distension.      Palpations: Abdomen is soft.      Tenderness: There is no abdominal tenderness.   Musculoskeletal:      Right lower leg: No edema.      Left lower leg: No edema.   Lymphadenopathy:      Cervical: No cervical adenopathy.   Skin:     General: Skin is warm and dry.   Neurological:      Mental Status: She is alert.      Comments: Grossly intact   Psychiatric:         Mood and Affect: Mood normal.

## 2025-04-24 ENCOUNTER — EVALUATION (OUTPATIENT)
Dept: PHYSICAL THERAPY | Facility: CLINIC | Age: 65
End: 2025-04-24
Payer: COMMERCIAL

## 2025-04-24 DIAGNOSIS — M16.11 PRIMARY OSTEOARTHRITIS OF ONE HIP, RIGHT: Primary | ICD-10-CM

## 2025-04-24 PROCEDURE — 97110 THERAPEUTIC EXERCISES: CPT | Performed by: PHYSICAL THERAPIST

## 2025-04-28 DIAGNOSIS — E78.5 HYPERLIPIDEMIA, UNSPECIFIED HYPERLIPIDEMIA TYPE: ICD-10-CM

## 2025-04-29 DIAGNOSIS — E78.5 HYPERLIPIDEMIA, UNSPECIFIED HYPERLIPIDEMIA TYPE: ICD-10-CM

## 2025-04-29 RX ORDER — FENOFIBRATE 160 MG/1
160 TABLET ORAL DAILY
Qty: 30 TABLET | Refills: 0 | Status: SHIPPED | OUTPATIENT
Start: 2025-04-29 | End: 2025-04-29 | Stop reason: SDUPTHER

## 2025-04-29 RX ORDER — FENOFIBRATE 160 MG/1
160 TABLET ORAL DAILY
Qty: 90 TABLET | Refills: 1 | Status: SHIPPED | OUTPATIENT
Start: 2025-04-29

## 2025-05-16 ENCOUNTER — OFFICE VISIT (OUTPATIENT)
Dept: OBGYN CLINIC | Facility: MEDICAL CENTER | Age: 65
End: 2025-05-16

## 2025-05-16 ENCOUNTER — APPOINTMENT (OUTPATIENT)
Dept: RADIOLOGY | Facility: MEDICAL CENTER | Age: 65
End: 2025-05-16
Attending: ORTHOPAEDIC SURGERY
Payer: COMMERCIAL

## 2025-05-16 VITALS — BODY MASS INDEX: 24.76 KG/M2 | WEIGHT: 148.6 LBS | HEIGHT: 65 IN

## 2025-05-16 DIAGNOSIS — Z96.641 AFTERCARE FOLLOWING RIGHT HIP JOINT REPLACEMENT SURGERY: Primary | ICD-10-CM

## 2025-05-16 DIAGNOSIS — Z47.1 AFTERCARE FOLLOWING RIGHT HIP JOINT REPLACEMENT SURGERY: ICD-10-CM

## 2025-05-16 DIAGNOSIS — Z96.641 AFTERCARE FOLLOWING RIGHT HIP JOINT REPLACEMENT SURGERY: ICD-10-CM

## 2025-05-16 DIAGNOSIS — Z47.1 AFTERCARE FOLLOWING RIGHT HIP JOINT REPLACEMENT SURGERY: Primary | ICD-10-CM

## 2025-05-16 PROCEDURE — 99024 POSTOP FOLLOW-UP VISIT: CPT | Performed by: ORTHOPAEDIC SURGERY

## 2025-05-16 PROCEDURE — 73502 X-RAY EXAM HIP UNI 2-3 VIEWS: CPT

## 2025-05-16 NOTE — PROGRESS NOTES
Encounter Provider: Ezio Borges MD   Encounter Date: 05/16/25  Encounter department: [facility] [place]         ASSESSMENT & PLAN:  Assessment & Plan  Aftercare following right hip joint replacement surgery  3 months s/p right KENNEDY, 2/20/2025  She is doing well.    She is encouraged to remain active including HEP.    The patient is counseled on prophylactic antibiotic use prior to dental visits.    She should follow up in 3 months              To do next visit:  Return in about 3 months (around 8/16/2025) for re-check with x-rays.    Scribe Attestation      I,:  Kenny Hernandez MA am acting as a scribe while in the presence of the attending physician.:       I,:  Ezio Borges MD personally performed the services described in this documentation    as scribed in my presence.:             ____________________________________________________  CHIEF COMPLAINT:  Chief Complaint   Patient presents with    Right Hip - Follow-up     3 month P.O          SUBJECTIVE:  Denita Brown is a 64 y.o. female who presents 3 months s/p right KENNEDY, 2/20/2025.  She is doing well.  Today she complains of right lateral hip soreness.  First steps of the day aggravates.  She does use Voltaren gel with benefit.             PAST MEDICAL HISTORY:  Past Medical History:   Diagnosis Date    Abdominal pain, epigastric 03/23/2018    Added automatically from request for surgery [id number]    Abnormal x-ray of lumbar spine 11/03/2015    Acute cystitis with hematuria 04/07/2020    Bariatric surgery status     Basal cell carcinoma     basil cell carcinoma- in remission    Benign essential hypertension 06/12/2012    Bone bruise 11/02/2023    Breakdown (mechanical) of internal fixation device of vertebrae, initial encounter ([facility]) 03/01/2019    Calculus of bile duct with cholecystitis without obstruction 04/27/2018    Added automatically from request for surgery [id number]    Cellulitis of finger 12/03/2015    Closed  fracture of left distal fibula 10/28/2023    Degenerative lumbar disc     Digital mucinous cyst 2015    DMII (diabetes mellitus, type 2) ([facility]) 2013    GERD (gastroesophageal reflux disease)     Gross hematuria 2019    Hiatal hernia     History of COVID-19 2021    History of transfusion 2014    Post-op spinal surgery    Hyperlipidemia     Low back pain     Lower urinary tract infectious disease 2015    Medicare annual wellness visit, initial 2019    Obesity     Resolved 10/6/2016     Other closed fracture of distal end of left fibula, initial encounter 10/28/2023    Overactive bladder     Postsurgical malabsorption     Recurrent UTI 2019    Spinal stenosis     SVT (supraventricular tachycardia) ([facility])     Tachycardia     Resolved 2016     Type 2 diabetes mellitus ([facility])     Last assesssed 2018     Wears glasses        PAST SURGICAL HISTORY:  Past Surgical History:   Procedure Laterality Date    APPENDECTOMY      ARTHRODESIS      Lumbar - Last assessed 2018     BACK SURGERY      Lumbar (3 surgeries)- two levels fused, clean out from infection, then fusion of 7 levels (last surgery in )    CARDIAC ELECTROPHYSIOLOGY STUDY AND ABLATION      CARPAL TUNNEL RELEASE      x2    CERVICAL SPINE SURGERY      Fusion of C2-C7 over a period of 3 surgeries ( first)     SECTION      X2    CHOLECYSTECTOMY      FL MYELOGRAM LUMBAR  2019    HIP ARTHROPLASTY Right 2025    INSERTION / PLACEMENT / REVISION NEUROSTIMULATOR      X2- both were removed    NECK SURGERY      OTHER SURGICAL HISTORY      Catheter ablation     [place] ARTHRP ACETBLR/PROX FEM PROSTC AGRFT/ALGRFT Right 2025    Procedure: ARTHROPLASTY HIP TOTAL;  Surgeon: Ezio Borges MD;  [facility];  Service: Orthopedics    [place] EGD TRANSORAL BIOPSY SINGLE/MULTIPLE N/A 2016    Procedure: ESOPHAGOGASTRODUODENOSCOPY (EGD);  Surgeon: Tanya Orta MD;  [facility]: [facility];   Service: Bariatrics    [place] EGD TRANSORAL BIOPSY SINGLE/MULTIPLE N/A 04/18/2018    Procedure: ESOPHAGOGASTRODUODENOSCOPY (EGD) with biopsy;  Surgeon: Tanya Orta MD;  [facility]: [facility];  Service: Bariatrics    VA LAPAROSCOPY SURG CHOLECYSTECTOMY N/A 05/14/2018    Procedure: CHOLECYSTECTOMY LAPAROSCOPIC;  Surgeon: Tanya Orta MD;  [facility];  Service: Bariatrics    [place] LAPS GSTR RSTCV PX W/BYP BRAULIO-EN-Y LIMB <150 CM N/A 10/25/2016    Procedure: BYPASS GASTRIC  BRAULIO-EN-Y LAPAROSCOPIC, WITH INTRA OP EGD;  Surgeon: Tanya Orta MD;  [facility];  Service: Bariatrics    SKIN CANCER EXCISION      TONSILLECTOMY      TUBAL LIGATION      US GUIDED BREAST BIOPSY RIGHT COMPLETE Right 07/12/2023    WISDOM TOOTH EXTRACTION         FAMILY HISTORY:  Family History   Problem Relation Age of Onset    Arthritis Mother         Rheumatoid    Angina Mother     Coronary artery disease Mother     Diabetes Mother     Cancer Father         Lung    Cystic fibrosis Father     Rheum arthritis Sister     Diabetes Sister     No Known Problems Maternal Grandmother     No Known Problems Maternal Grandfather     No Known Problems Paternal Grandmother     No Known Problems Paternal Grandfather     Other Brother         Back problems     Diabetes Brother     No Known Problems Brother     No Known Problems Son     No Known Problems Son     Hypertension Family     Heart disease Family     Breast cancer Neg Hx        SOCIAL HISTORY:  Social History[1]    MEDICATIONS:  Current Medications[2]    ALLERGIES:  Allergies[3]    LABS:  HgA1c:   Lab Results   Component Value Date    HGBA1C 5.3 01/25/2025     BMP:   Lab Results   Component Value Date    GLUCOSE 122 03/02/2015    CALCIUM 9.2 02/23/2025     03/02/2015    K 3.7 02/23/2025    CO2 22 02/23/2025     02/23/2025    BUN 13 02/23/2025    CREATININE 0.65 02/23/2025     CBC: No components found for:  "\"CBC\"    _____________________________________________________  PHYSICAL EXAMINATION:  Vital signs: Ht 5' 5\" (1.651 m)   Wt 67.4 kg (148 lb 9.6 oz)   BMI 24.73 kg/m²   General: No acute distress, awake and alert  Psychiatric: Mood and affect appear appropriate  HEENT: Trachea Midline, No torticollis, no apparent facial trauma  Cardiovascular: No audible murmurs; Extremities appear perfused  Pulmonary: No audible wheezing or stridor  Skin: No open lesions; see further details (if any) below    Ortho Exam:  Right hip:  Well healed posterior incision  Good arc of motion with no pain  Patient sits comfortably in chair with hip flexed at 90 degrees  Patient stands from seated position without assistance  Calf compartments soft and supple  Sensation intact  Toes are warm sensate and mobile        _____________________________________________________  STUDIES REVIEWED:    The attending physician has personally reviewed the pertinent films in PACS and interpretation is as follows:  Right hip x-ray:  Well aligned prosthesis with no acute changes.      PROCEDURES PERFORMED:  Procedures      None Preformed       Portions of the record may have been created with voice recognition software.  Occasional wrong word or \"sound a like\" substitutions may have occurred due to the inherent limitations of voice recognition software.  Read the chart carefully and recognize, using context, where substitutions have occurred.             [1]   Social History  Tobacco Use    Smoking status: Former     Current packs/day: 0.00     Average packs/day: 1 pack/day for 20.0 years (20.0 ttl pk-yrs)     Types: Cigarettes     Start date:      Quit date:      Years since quittin.3     Passive exposure: Past    Smokeless tobacco: Never   Vaping Use    Vaping status: Never Used   Substance Use Topics    Alcohol use: Not Currently    Drug use: No   [2]   Current Outpatient Medications:     acetaminophen (TYLENOL) 500 mg tablet, Take 2 " tablets (1,000 mg total) by mouth every 6 (six) hours as needed for mild pain, Disp: 90 tablet, Rfl: 0    albuterol (PROVENTIL HFA,VENTOLIN HFA) 90 mcg/act inhaler, Inhale 2 puffs every 6 (six) hours as needed for wheezing or shortness of breath (cough, chest tightness), Disp: 18 g, Rfl: 1    baclofen 10 mg tablet, Take 10 mg by mouth in the morning and 10 mg in the evening and 10 mg before bedtime., Disp: , Rfl:     buPROPion (WELLBUTRIN) 75 mg tablet, take 1 tablet by mouth twice a day, Disp: 180 tablet, Rfl: 1    Calcium Citrate-Vitamin D 315-250 MG-UNIT TABS, Take 1 tablet by mouth in the morning and 1 tablet in the evening., Disp: , Rfl:     Cholecalciferol 25 MCG (1000 UT) CHEW, Chew, Disp: , Rfl:     Diclofenac Sodium (VOLTAREN) 1 %, Apply 2 g topically 4 (four) times a day, Disp: 350 g, Rfl: 2    escitalopram (LEXAPRO) 20 mg tablet, take 1 tablet by mouth every morning, Disp: 90 tablet, Rfl: 1    estrogens, conjugated (Premarin) vaginal cream, Insert 1 g into the vagina 3 (three) times a week, Disp: 12 g, Rfl: 3    famotidine (PEPCID) 40 MG tablet, take 1 tablet by mouth at bedtime, Disp: 90 tablet, Rfl: 3    fenofibrate 160 MG tablet, Take 1 tablet (160 mg total) by mouth daily, Disp: 90 tablet, Rfl: 1    fluocinonide (LIDEX) 0.05 % cream, Apply topically 2 (two) times a day as needed for rash, Disp: 15 g, Rfl: 1    fluticasone (FLONASE) 50 mcg/act nasal spray, 1 spray into each nostril daily, Disp: 48 g, Rfl: 1    gabapentin (NEURONTIN) 100 mg capsule, 100 mg 5 (five) times a day, Disp: , Rfl:     HYDROmorphone (DILAUDID) 4 mg tablet, Take 4 mg by mouth every 6 (six) hours as needed, Disp: , Rfl:     lidocaine (XYLOCAINE) 5 % ointment, Apply topically as needed for mild pain, Disp: 30 g, Rfl: 2    loratadine (CLARITIN) 10 mg tablet, Take 10 mg by mouth in the morning., Disp: , Rfl:     methocarbamol (ROBAXIN) 500 mg tablet, Take 1 tablet (500 mg total) by mouth 3 (three) times a day as needed for muscle  spasms, Disp: 90 tablet, Rfl: 1    metoclopramide (Reglan) 10 mg tablet, Take 1 tablet (10 mg total) by mouth every 6 (six) hours, Disp: 90 tablet, Rfl: 1    Multiple Vitamins-Minerals (BARIATRIC FUSION PO), Take 1 tablet by mouth in the morning., Disp: , Rfl:     ondansetron (ZOFRAN) 4 mg tablet, Take 1 tablet (4 mg total) by mouth every 8 (eight) hours as needed for nausea or vomiting, Disp: 90 tablet, Rfl: 0    oxybutynin (DITROPAN) 5 mg tablet, Take 1 tablet (5 mg total) by mouth daily, Disp: 90 tablet, Rfl: 3    pantoprazole (PROTONIX) 40 mg tablet, take 1 tablet by mouth daily before breakfast, Disp: 90 tablet, Rfl: 1    sucralfate (CARAFATE) 1 g/10 mL suspension, Take 10 mL (1 g total) by mouth 2 (two) times a day as needed (gastritis), Disp: 473 mL, Rfl: 0    tirzepatide (Zepbound) 12.5 mg/0.5 mL auto-injector, Inject 0.5 mL (12.5 mg total) under the skin once a week, Disp: 2 mL, Rfl: 2  [3] No Known Allergies

## 2025-05-23 DIAGNOSIS — E66.3 OVERWEIGHT (BMI 25.0-29.9): ICD-10-CM

## 2025-05-23 RX ORDER — TIRZEPATIDE 12.5 MG/.5ML
12.5 INJECTION, SOLUTION SUBCUTANEOUS WEEKLY
Qty: 2 ML | Refills: 2 | Status: SHIPPED | OUTPATIENT
Start: 2025-05-23

## 2025-05-26 ENCOUNTER — TELEPHONE (OUTPATIENT)
Dept: FAMILY MEDICINE CLINIC | Facility: CLINIC | Age: 65
End: 2025-05-26

## 2025-05-27 NOTE — TELEPHONE ENCOUNTER
PA for (Zepbound) 12.5 mg/0.5 mL auto-injector SUBMITTED to     via    [x][facility]-KEY: [id number]  []Surescripts-Case ID #   []Availity-Auth ID # NDC #   []Faxed to plan   []Other website   []Phone call Case ID #     [x]PA sent as URGENT    All office notes, labs and other pertaining documents and studies sent. Clinical questions answered. Awaiting determination from insurance company.     Turnaround time for your insurance to make a decision on your Prior Authorization can take 7-21 business days.

## 2025-05-27 NOTE — TELEPHONE ENCOUNTER
Patient called wanting to check to make sure the PA request was sent to the correct insurance    It should be [facility] [employer] NOT [facility]

## 2025-05-28 NOTE — TELEPHONE ENCOUNTER
PA for (Zepbound) 12.5 mg/0.5 mL auto-injector  APPROVED     Date(s) approved 05/27/2025 to 11/23/2025    Case #[id number]    Patient advised by          [][employer] Message  []Phone call   [x]LMOM  []L/M to call office as no active Communication consent on file  []Unable to leave detailed message as VM not approved on Communication consent       Pharmacy advised by    [x]Fax  []Phone call  []Secure Chat       Approval letter scanned into Media Yes

## 2025-06-24 ENCOUNTER — OFFICE VISIT (OUTPATIENT)
Dept: FAMILY MEDICINE CLINIC | Facility: CLINIC | Age: 65
End: 2025-06-24
Payer: COMMERCIAL

## 2025-06-24 VITALS
WEIGHT: 143 LBS | HEART RATE: 58 BPM | DIASTOLIC BLOOD PRESSURE: 80 MMHG | OXYGEN SATURATION: 97 % | SYSTOLIC BLOOD PRESSURE: 138 MMHG | BODY MASS INDEX: 23.82 KG/M2 | HEIGHT: 65 IN | TEMPERATURE: 97.7 F

## 2025-06-24 DIAGNOSIS — F32.5 MAJOR DEPRESSIVE DISORDER WITH SINGLE EPISODE, IN FULL REMISSION (HCC): ICD-10-CM

## 2025-06-24 DIAGNOSIS — R11.0 NAUSEA: ICD-10-CM

## 2025-06-24 PROCEDURE — 99213 OFFICE O/P EST LOW 20 MIN: CPT

## 2025-06-24 PROCEDURE — G2211 COMPLEX E/M VISIT ADD ON: HCPCS

## 2025-06-24 RX ORDER — TIRZEPATIDE 12.5 MG/.5ML
12.5 INJECTION, SOLUTION SUBCUTANEOUS WEEKLY
Qty: 2 ML | Refills: 2 | Status: SHIPPED | OUTPATIENT
Start: 2025-06-24

## 2025-06-24 NOTE — PROGRESS NOTES
"Name: Denita Brown      : 1960      MRN: [id number]  Encounter Provider: Clif Mcfadden PA-C  Encounter Date: 2025   Encounter department: [facility]    Assessment & Plan  BMI 23.0-23.9, adult  Taking zepbound 12.5 without AE, doing well and BMI normalized  Pt would like to continue to reach goal weight   Will refill and f/u in 3 months, at that point will consider decreasing dosage for maintenance   Orders:    tirzepatide (Zepbound) 12.5 mg/0.5 mL auto-injector; Inject 0.5 mL (12.5 mg total) under the skin once a week         History of Present Illness     Denita Brown is a 64 y.o. female  presenting for routine medication f/u.        **Note: Portions of the record may have been created with voice recognition software.  Occasional wrong word or \"sound alike\" substitutions may have occurred due to the inherent limitations of voice recognition software.  Please read the chart carefully and recognize, using context, where substitutions have occurred. Please contact for further clarification, when necessary.     Review of Systems   Constitutional:  Negative for chills and fever.   HENT:  Negative for ear pain and sore throat.    Eyes:  Negative for pain and visual disturbance.   Respiratory:  Negative for cough and shortness of breath.    Cardiovascular:  Negative for chest pain and palpitations.   Gastrointestinal:  Negative for abdominal pain, diarrhea, nausea and vomiting.   Genitourinary:  Negative for dysuria and hematuria.   Musculoskeletal:  Negative for arthralgias and back pain.   Skin:  Negative for color change and rash.   Neurological:  Negative for seizures and syncope.   All other systems reviewed and are negative.    Past Medical History[1]  Past Surgical History[2]  Family History[3]  Social History[4]  Medications[5]  No Known Allergies  Immunization History   Administered Date(s) Administered    COVID-19 PFIZER VACCINE 0.3 ML IM 2021, 2021, 2021 " "   COVID-19 Pfizer Vac BIVALENT Moo-sucrose 12 Yr+ IM 09/16/2022    INFLUENZA 10/20/2017, 09/29/2021, 09/05/2022, 09/24/2023    Influenza Injectable, MDCK, Preservative Free, Quadrivalent, 0.5 mL 10/03/2020    Influenza, injectable, quadrivalent, preservative free 0.5 mL 09/22/2018, 10/06/2019    Influenza, seasonal, injectable, preservative free 09/07/2024    Tdap 02/24/2018    Tuberculin Skin Test-PPD Intradermal 01/28/2016    Zoster 10/25/2014    Zoster Vaccine Recombinant 08/08/2022, 10/12/2022     Objective   /80   Pulse 58   Temp 97.7 °F (36.5 °C)   Ht 5' 5\" (1.651 m)   Wt 64.9 kg (143 lb)   SpO2 97%   BMI 23.80 kg/m²     Physical Exam  Vitals and nursing note reviewed.   Constitutional:       General: She is not in acute distress.     Appearance: She is well-developed.   HENT:      Head: Normocephalic and atraumatic.     Eyes:      Conjunctiva/sclera: Conjunctivae normal.       Cardiovascular:      Rate and Rhythm: Normal rate and regular rhythm.      Heart sounds: No murmur heard.  Pulmonary:      Effort: Pulmonary effort is normal. No respiratory distress.      Breath sounds: Normal breath sounds.   Abdominal:      Palpations: Abdomen is soft.      Tenderness: There is no abdominal tenderness.     Musculoskeletal:         General: No swelling.      Cervical back: Neck supple.     Skin:     General: Skin is warm and dry.     Neurological:      Mental Status: She is alert and oriented to person, place, and time.     Psychiatric:         Mood and Affect: Mood normal.                [1]   Past Medical History:  Diagnosis Date    Abdominal pain, epigastric 03/23/2018    Added automatically from request for surgery [id number]    Abnormal x-ray of lumbar spine 11/03/2015    Acute cystitis with hematuria 04/07/2020    Bariatric surgery status     Basal cell carcinoma     basil cell carcinoma- in remission    Benign essential hypertension 06/12/2012    Bone bruise 11/02/2023    Breakdown (mechanical) of " internal fixation device of vertebrae, initial encounter ([facility]) 2019    Calculus of bile duct with cholecystitis without obstruction 2018    Added automatically from request for surgery [id number]    Cellulitis of finger 2015    Closed fracture of left distal fibula 10/28/2023    Degenerative lumbar disc     Digital mucinous cyst 2015    DMII (diabetes mellitus, type 2) ([facility]) 2013    GERD (gastroesophageal reflux disease)     Gross hematuria 2019    Hiatal hernia     History of COVID-19 2021    History of transfusion 2014    Post-op spinal surgery    Hyperlipidemia     Low back pain     Lower urinary tract infectious disease 2015    Medicare annual wellness visit, initial 2019    Obesity     Resolved 10/6/2016     Other closed fracture of distal end of left fibula, initial encounter 10/28/2023    Overactive bladder     Postsurgical malabsorption     Recurrent UTI 2019    Spinal stenosis     SVT (supraventricular tachycardia) ([facility])     Tachycardia     Resolved 2016     Type 2 diabetes mellitus ([facility])     Last assesssed 2018     Wears glasses    [2]   Past Surgical History:  Procedure Laterality Date    APPENDECTOMY      ARTHRODESIS      Lumbar - Last assessed 2018     BACK SURGERY      Lumbar (3 surgeries)- two levels fused, clean out from infection, then fusion of 7 levels (last surgery in )    CARDIAC ELECTROPHYSIOLOGY STUDY AND ABLATION      CARPAL TUNNEL RELEASE      x2    CERVICAL SPINE SURGERY      Fusion of C2-C7 over a period of 3 surgeries ( first)     SECTION      X2    CHOLECYSTECTOMY      FL MYELOGRAM LUMBAR  2019    HIP ARTHROPLASTY Right 2025    INSERTION / PLACEMENT / REVISION NEUROSTIMULATOR      X2- both were removed    NECK SURGERY      OTHER SURGICAL HISTORY      Catheter ablation     [place] ARTHRP ACETBLR/PROX FEM PROSTC AGRFT/ALGRFT Right 2025    Procedure: ARTHROPLASTY HIP TOTAL;  Surgeon: Ezio  Wale Borges MD;  [facility];  Service: Orthopedics    [place] EGD TRANSORAL BIOPSY SINGLE/MULTIPLE N/A 2016    Procedure: ESOPHAGOGASTRODUODENOSCOPY (EGD);  Surgeon: Tanya Orta MD;  [facility];  Service: Bariatrics    [place] EGD TRANSORAL BIOPSY SINGLE/MULTIPLE N/A 2018    Procedure: ESOPHAGOGASTRODUODENOSCOPY (EGD) with biopsy;  Surgeon: Tanya Orta MD;  [facility];  Service: Bariatrics    [place] LAPAROSCOPY SURG CHOLECYSTECTOMY N/A 2018    Procedure: CHOLECYSTECTOMY LAPAROSCOPIC;  Surgeon: Tanya Orta MD;  [facility];  Service: Bariatrics    [place] LAPS GSTR RSTCV PX W/BYP BRAULIO-EN-Y LIMB <150 CM N/A 10/25/2016    Procedure: BYPASS GASTRIC  BRAULIO-EN-Y LAPAROSCOPIC, WITH INTRA OP EGD;  Surgeon: Tanya Orta MD;  [facility];  Service: Bariatrics    SKIN CANCER EXCISION      TONSILLECTOMY      TUBAL LIGATION      US GUIDED BREAST BIOPSY RIGHT COMPLETE Right 2023    WISDOM TOOTH EXTRACTION     [3]   Family History  Problem Relation Name Age of Onset    Arthritis Mother          Rheumatoid    Angina Mother      Coronary artery disease Mother      Diabetes Mother      Cancer Father          Lung    Cystic fibrosis Father      Rheum arthritis Sister      Diabetes Sister      No Known Problems Maternal Grandmother      No Known Problems Maternal Grandfather      No Known Problems Paternal Grandmother      No Known Problems Paternal Grandfather      Other Brother #1         Back problems     Diabetes Brother #1     No Known Problems Brother #2     No Known Problems Son      No Known Problems Son      Hypertension Family      Heart disease Family      Breast cancer Neg Hx     [4]   Social History  Tobacco Use    Smoking status: Former     Current packs/day: 0.00     Average packs/day: 1 pack/day for 20.0 years (20.0 ttl pk-yrs)     Types: Cigarettes     Start date:      Quit date:      Years since quittin.4     Passive exposure: Past     Smokeless tobacco: Never   Vaping Use    Vaping status: Never Used   Substance and Sexual Activity    Alcohol use: Not Currently    Drug use: No    Sexual activity: Not Currently   [5]   Current Outpatient Medications on File Prior to Visit   Medication Sig    albuterol (PROVENTIL HFA,VENTOLIN HFA) 90 mcg/act inhaler Inhale 2 puffs every 6 (six) hours as needed for wheezing or shortness of breath (cough, chest tightness)    baclofen 10 mg tablet Take 10 mg by mouth in the morning and 10 mg in the evening and 10 mg before bedtime.    buPROPion (WELLBUTRIN) 75 mg tablet take 1 tablet by mouth twice a day    Calcium Citrate-Vitamin D 315-250 MG-UNIT TABS Take 1 tablet by mouth in the morning and 1 tablet in the evening.    Cholecalciferol 25 MCG (1000 UT) CHEW Chew    Diclofenac Sodium (VOLTAREN) 1 % Apply 2 g topically 4 (four) times a day    escitalopram (LEXAPRO) 20 mg tablet take 1 tablet by mouth every morning    estrogens, conjugated (Premarin) vaginal cream Insert 1 g into the vagina 3 (three) times a week    famotidine (PEPCID) 40 MG tablet take 1 tablet by mouth at bedtime    fenofibrate 160 MG tablet Take 1 tablet (160 mg total) by mouth daily    fluocinonide (LIDEX) 0.05 % cream Apply topically 2 (two) times a day as needed for rash    fluticasone (FLONASE) 50 mcg/act nasal spray 1 spray into each nostril daily    gabapentin (NEURONTIN) 100 mg capsule 100 mg 5 (five) times a day    HYDROmorphone (DILAUDID) 4 mg tablet Take 4 mg by mouth every 6 (six) hours as needed    lidocaine (XYLOCAINE) 5 % ointment Apply topically as needed for mild pain    loratadine (CLARITIN) 10 mg tablet Take 10 mg by mouth in the morning.    methocarbamol (ROBAXIN) 500 mg tablet Take 1 tablet (500 mg total) by mouth 3 (three) times a day as needed for muscle spasms    metoclopramide (Reglan) 10 mg tablet Take 1 tablet (10 mg total) by mouth every 6 (six) hours    Multiple Vitamins-Minerals (BARIATRIC FUSION PO) Take 1 tablet by  mouth in the morning.    ondansetron (ZOFRAN) 4 mg tablet Take 1 tablet (4 mg total) by mouth every 8 (eight) hours as needed for nausea or vomiting    oxybutynin (DITROPAN) 5 mg tablet Take 1 tablet (5 mg total) by mouth daily    pantoprazole (PROTONIX) 40 mg tablet take 1 tablet by mouth daily before breakfast    sucralfate (CARAFATE) 1 g/10 mL suspension Take 10 mL (1 g total) by mouth 2 (two) times a day as needed (gastritis)    [DISCONTINUED] acetaminophen (TYLENOL) 500 mg tablet Take 2 tablets (1,000 mg total) by mouth every 6 (six) hours as needed for mild pain    [DISCONTINUED] tirzepatide (Zepbound) 12.5 mg/0.5 mL auto-injector Inject 0.5 mL (12.5 mg total) under the skin once a week

## 2025-06-26 RX ORDER — ONDANSETRON 4 MG/1
4 TABLET, FILM COATED ORAL EVERY 8 HOURS PRN
Qty: 90 TABLET | Refills: 1 | Status: SHIPPED | OUTPATIENT
Start: 2025-06-26

## 2025-06-26 RX ORDER — ESCITALOPRAM OXALATE 20 MG/1
20 TABLET ORAL EVERY MORNING
Qty: 90 TABLET | Refills: 1 | Status: SHIPPED | OUTPATIENT
Start: 2025-06-26

## 2025-07-01 DIAGNOSIS — K29.00 ACUTE GASTRITIS WITHOUT HEMORRHAGE, UNSPECIFIED GASTRITIS TYPE: ICD-10-CM

## 2025-07-01 DIAGNOSIS — K21.9 GASTROESOPHAGEAL REFLUX DISEASE WITHOUT ESOPHAGITIS: ICD-10-CM

## 2025-07-02 RX ORDER — PANTOPRAZOLE SODIUM 40 MG/1
40 TABLET, DELAYED RELEASE ORAL
Qty: 90 TABLET | Refills: 1 | Status: SHIPPED | OUTPATIENT
Start: 2025-07-02

## 2025-07-16 ENCOUNTER — HOSPITAL ENCOUNTER (OUTPATIENT)
Dept: MAMMOGRAPHY | Facility: CLINIC | Age: 65
Discharge: HOME | End: 2025-07-16
Payer: COMMERCIAL

## 2025-07-16 DIAGNOSIS — Z12.31 SCREENING MAMMOGRAM FOR BREAST CANCER: ICD-10-CM

## 2025-07-16 PROCEDURE — 77067 SCR MAMMO BI INCL CAD: CPT

## 2025-07-16 PROCEDURE — 77063 BREAST TOMOSYNTHESIS BI: CPT

## 2025-07-29 DIAGNOSIS — N32.81 OAB (OVERACTIVE BLADDER): ICD-10-CM

## 2025-07-29 DIAGNOSIS — E78.5 HYPERLIPIDEMIA, UNSPECIFIED HYPERLIPIDEMIA TYPE: ICD-10-CM

## 2025-07-30 RX ORDER — FENOFIBRATE 160 MG/1
160 TABLET ORAL DAILY
Qty: 30 TABLET | Refills: 0 | Status: SHIPPED | OUTPATIENT
Start: 2025-07-30

## 2025-07-31 RX ORDER — OXYBUTYNIN CHLORIDE 5 MG/1
5 TABLET ORAL DAILY
Qty: 90 TABLET | Refills: 1 | Status: SHIPPED | OUTPATIENT
Start: 2025-07-31

## 2025-08-05 DIAGNOSIS — Z47.1 AFTERCARE FOLLOWING RIGHT HIP JOINT REPLACEMENT SURGERY: ICD-10-CM

## 2025-08-05 DIAGNOSIS — R11.0 NAUSEA: ICD-10-CM

## 2025-08-05 DIAGNOSIS — R21 RASH: ICD-10-CM

## 2025-08-05 DIAGNOSIS — J01.90 ACUTE SINUSITIS, RECURRENCE NOT SPECIFIED, UNSPECIFIED LOCATION: ICD-10-CM

## 2025-08-05 DIAGNOSIS — N95.2 ATROPHIC VAGINITIS: ICD-10-CM

## 2025-08-05 DIAGNOSIS — G89.4 CHRONIC PAIN SYNDROME: ICD-10-CM

## 2025-08-05 DIAGNOSIS — Z96.641 AFTERCARE FOLLOWING RIGHT HIP JOINT REPLACEMENT SURGERY: ICD-10-CM

## 2025-08-06 RX ORDER — CONJUGATED ESTROGENS 0.62 MG/G
1 CREAM VAGINAL 3 TIMES WEEKLY
Qty: 12 G | Refills: 1 | Status: SHIPPED | OUTPATIENT
Start: 2025-08-06

## 2025-08-07 RX ORDER — FLUOCINONIDE 0.5 MG/G
CREAM TOPICAL 2 TIMES DAILY PRN
Qty: 15 G | Refills: 0 | Status: SHIPPED | OUTPATIENT
Start: 2025-08-07

## 2025-08-07 RX ORDER — ONDANSETRON 4 MG/1
4 TABLET, FILM COATED ORAL EVERY 8 HOURS PRN
Qty: 90 TABLET | Refills: 0 | Status: SHIPPED | OUTPATIENT
Start: 2025-08-07

## 2025-08-07 RX ORDER — BUPROPION HYDROCHLORIDE 75 MG/1
75 TABLET ORAL 2 TIMES DAILY
Qty: 180 TABLET | Refills: 0 | Status: SHIPPED | OUTPATIENT
Start: 2025-08-07

## 2025-08-07 RX ORDER — FLUTICASONE PROPIONATE 50 MCG
1 SPRAY, SUSPENSION (ML) NASAL DAILY
Qty: 48 G | Refills: 1 | Status: SHIPPED | OUTPATIENT
Start: 2025-08-07

## 2025-08-07 RX ORDER — METOCLOPRAMIDE 10 MG/1
10 TABLET ORAL EVERY 6 HOURS
Qty: 90 TABLET | Refills: 0 | Status: SHIPPED | OUTPATIENT
Start: 2025-08-07

## 2025-08-15 ENCOUNTER — OFFICE VISIT (OUTPATIENT)
Dept: OBGYN CLINIC | Facility: MEDICAL CENTER | Age: 65
End: 2025-08-15
Payer: COMMERCIAL

## 2025-08-15 ENCOUNTER — APPOINTMENT (OUTPATIENT)
Dept: RADIOLOGY | Facility: MEDICAL CENTER | Age: 65
End: 2025-08-15
Attending: ORTHOPAEDIC SURGERY
Payer: COMMERCIAL

## 2025-08-19 DIAGNOSIS — E78.5 HYPERLIPIDEMIA, UNSPECIFIED HYPERLIPIDEMIA TYPE: ICD-10-CM

## 2025-08-19 DIAGNOSIS — F32.5 MAJOR DEPRESSIVE DISORDER WITH SINGLE EPISODE, IN FULL REMISSION (HCC): ICD-10-CM

## 2025-08-21 RX ORDER — ESCITALOPRAM OXALATE 20 MG/1
20 TABLET ORAL EVERY MORNING
Qty: 90 TABLET | Refills: 1 | Status: SHIPPED | OUTPATIENT
Start: 2025-08-21

## 2025-08-21 RX ORDER — FENOFIBRATE 160 MG/1
160 TABLET ORAL DAILY
Qty: 90 TABLET | Refills: 1 | Status: SHIPPED | OUTPATIENT
Start: 2025-08-21

## (undated) DEVICE — BETHLEHEM UNIVERSAL MINOR GEN: Brand: CARDINAL HEALTH

## (undated) DEVICE — HANDPIECE SET WITH RETRACTABLE COAXIAL FAN SPRAY TIP AND SUCTION TUBE: Brand: INTERPULSE

## (undated) DEVICE — SINGLE-USE BIOPSY FORCEPS: Brand: RADIAL JAW 4

## (undated) DEVICE — SUT VICRYL PLUS 1 CTB-1 36 IN VCPB947H

## (undated) DEVICE — PENCIL ELECTROSURG E-Z CLEAN -0035H

## (undated) DEVICE — GLOVE SRG BIOGEL 6.5

## (undated) DEVICE — ASTOUND STANDARD SURGICAL GOWN, XL: Brand: CONVERTORS

## (undated) DEVICE — LIGACLIP 10-M/L, 10MM ENDOSCOPIC ROTATING MULTIPLE CLIP APPLIERS: Brand: LIGACLIP

## (undated) DEVICE — ENDOPATH 5MM ENDOSCOPIC BLUNT TIP DISSECTORS (12 POUCHES CONTAINING 3 DISSECTORS EACH): Brand: ENDOPATH

## (undated) DEVICE — VIOLET BRAIDED (POLYGLACTIN 910), SYNTHETIC ABSORBABLE SUTURE: Brand: COATED VICRYL

## (undated) DEVICE — PROXIMATE PLUS MD MULTI-DIRECTIONAL RELEASE SKIN STAPLERS CONTAINS 35 STAINLESS STEEL STAPLES APPROXIMATE CLOSED DIMENSIONS: 6.9MM X 3.9MM WIDE: Brand: PROXIMATE

## (undated) DEVICE — WEBRIL 6 IN UNSTERILE

## (undated) DEVICE — GLOVE INDICATOR PI UNDERGLOVE SZ 7.5 BLUE

## (undated) DEVICE — WET SKIN PREP TRAY: Brand: MEDLINE INDUSTRIES, INC.

## (undated) DEVICE — TUBING SMOKE EVAC W/FILTRATION DEVICE PLUMEPORT ACTIV

## (undated) DEVICE — CAPIT HIP MOP TH W/DUAL MOBILITY

## (undated) DEVICE — TUBING SUCTION 5MM X 12 FT

## (undated) DEVICE — BUTTON SWITCH PENCIL HOLSTER: Brand: VALLEYLAB

## (undated) DEVICE — SUT MONOCRYL 4-0 PS-2 27 IN Y426H

## (undated) DEVICE — BLADE INTREX LRG BONE OSCILLATING

## (undated) DEVICE — BETHLEHEM TOTAL HIP, KIT: Brand: CARDINAL HEALTH

## (undated) DEVICE — PMI DISPOSABLE PUNCTURE CLOSURE DEVICE / SUTURE GRASPER: Brand: PMI

## (undated) DEVICE — DRAPE TOWEL: Brand: CONVERTORS

## (undated) DEVICE — HOOD: Brand: T7PLUS

## (undated) DEVICE — SUT VICRYL PLUS 2-0 CTB-1 27 IN VCPB259H

## (undated) DEVICE — ELECTRODE LAP L WIRE E-Z CLEAN 33CM -0100

## (undated) DEVICE — HARMONIC ACE +7 LAPAROSCOPIC SHEARS ADVANCED HEMOSTASIS 5MM DIAMETER 36CM SHAFT LENGTH  FOR USE WITH GRAY HAND PIECE ONLY: Brand: HARMONIC ACE

## (undated) DEVICE — ENDOPATH PNEUMONEEDLE INSUFFLATION NEEDLES WITH LUER LOCK CONNECTORS 120MM: Brand: ENDOPATH

## (undated) DEVICE — IRRIG ENDO FLO TUBING

## (undated) DEVICE — GLOVE INDICATOR PI UNDERGLOVE SZ 7 BLUE

## (undated) DEVICE — STIRRUP STRAP ADULT DISP

## (undated) DEVICE — SURGICAL GOWN, XL SMARTSLEEVE: Brand: CONVERTORS

## (undated) DEVICE — GLOVE SRG BIOGEL 8

## (undated) DEVICE — ENDOPATH XCEL BLADELESS TROCARS WITH STABILITY SLEEVES: Brand: ENDOPATH XCEL

## (undated) DEVICE — TRAY FOLEY 16FR URIMETER SURESTEP

## (undated) DEVICE — BLUE HEAT SCOPE WARMER

## (undated) DEVICE — DRESSING MEPILEX AG BORDER POST-OP 4 X 12 IN

## (undated) DEVICE — [HIGH FLOW INSUFFLATOR,  DO NOT USE IF PACKAGE IS DAMAGED,  KEEP DRY,  KEEP AWAY FROM SUNLIGHT,  PROTECT FROM HEAT AND RADIOACTIVE SOURCES.]: Brand: PNEUMOSURE

## (undated) DEVICE — NEEDLE SPINAL 22G X 3.5IN  QUINCKE

## (undated) DEVICE — ADHESIVE SKIN CLSR DERMABOND NX

## (undated) DEVICE — SCD SEQUENTIAL COMPRESSION COMFORT SLEEVE MEDIUM KNEE LENGTH: Brand: KENDALL SCD

## (undated) DEVICE — ANTI-FOG SOLUTION WITH FOAM PAD: Brand: DEVON

## (undated) DEVICE — GLOVE SRG BIOGEL 7.5

## (undated) DEVICE — MEDI-VAC TUBING CONNECTOR 6-IN-1 STRAIGHT: Brand: CARDINAL HEALTH

## (undated) DEVICE — INTENDED FOR TISSUE SEPARATION, AND OTHER PROCEDURES THAT REQUIRE A SHARP SURGICAL BLADE TO PUNCTURE OR CUT.: Brand: BARD-PARKER SAFETY BLADES SIZE 15, STERILE

## (undated) DEVICE — SYRINGE 10ML LL

## (undated) DEVICE — ABDUCTION PILLOW FOAM POSITIONER: Brand: CARDINAL HEALTH

## (undated) DEVICE — 3000CC GUARDIAN II: Brand: GUARDIAN

## (undated) DEVICE — TIBURON LAPAROSCOPIC ABDOMINAL DRAPE: Brand: CONVERTORS

## (undated) DEVICE — CAUTERY TIP POLISHER: Brand: DEVON

## (undated) DEVICE — HOOD WITH PEEL AWAY FACE SHIELD: Brand: T7PLUS

## (undated) DEVICE — CHLORAPREP HI-LITE 26ML ORANGE

## (undated) DEVICE — STOCKINETTE REGULAR

## (undated) DEVICE — ENDOPOUCH RETRIEVER SPECIMEN RETRIEVAL BAGS: Brand: ENDOPOUCH RETRIEVER

## (undated) DEVICE — ENDOPATH XCEL UNIVERSAL TROCAR STABLILITY SLEEVES: Brand: ENDOPATH XCEL

## (undated) DEVICE — GLOVE INDICATOR PI UNDERGLOVE SZ 8.5 BLUE

## (undated) DEVICE — DRAPE EQUIPMENT RF WAND